# Patient Record
Sex: FEMALE | Race: WHITE | URBAN - METROPOLITAN AREA
[De-identification: names, ages, dates, MRNs, and addresses within clinical notes are randomized per-mention and may not be internally consistent; named-entity substitution may affect disease eponyms.]

---

## 2017-04-19 ENCOUNTER — OUTPATIENT (OUTPATIENT)
Dept: OUTPATIENT SERVICES | Facility: HOSPITAL | Age: 82
LOS: 1 days | Discharge: HOME | End: 2017-04-19

## 2017-06-22 ENCOUNTER — OUTPATIENT (OUTPATIENT)
Dept: OUTPATIENT SERVICES | Facility: HOSPITAL | Age: 82
LOS: 1 days | Discharge: HOME | End: 2017-06-22

## 2017-06-22 DIAGNOSIS — R42 DIZZINESS AND GIDDINESS: ICD-10-CM

## 2017-06-27 DIAGNOSIS — D64.9 ANEMIA, UNSPECIFIED: ICD-10-CM

## 2017-06-28 DIAGNOSIS — R07.81 PLEURODYNIA: ICD-10-CM

## 2017-06-28 DIAGNOSIS — M25.552 PAIN IN LEFT HIP: ICD-10-CM

## 2017-09-08 ENCOUNTER — OUTPATIENT (OUTPATIENT)
Dept: OUTPATIENT SERVICES | Facility: HOSPITAL | Age: 82
LOS: 1 days | Discharge: HOME | End: 2017-09-08

## 2017-09-08 DIAGNOSIS — I20.9 ANGINA PECTORIS, UNSPECIFIED: ICD-10-CM

## 2017-09-08 DIAGNOSIS — E78.00 PURE HYPERCHOLESTEROLEMIA, UNSPECIFIED: ICD-10-CM

## 2017-09-08 DIAGNOSIS — I10 ESSENTIAL (PRIMARY) HYPERTENSION: ICD-10-CM

## 2017-09-08 DIAGNOSIS — R42 DIZZINESS AND GIDDINESS: ICD-10-CM

## 2017-09-08 DIAGNOSIS — E03.9 HYPOTHYROIDISM, UNSPECIFIED: ICD-10-CM

## 2017-09-08 DIAGNOSIS — I48.0 PAROXYSMAL ATRIAL FIBRILLATION: ICD-10-CM

## 2018-01-18 ENCOUNTER — OUTPATIENT (OUTPATIENT)
Dept: OUTPATIENT SERVICES | Facility: HOSPITAL | Age: 83
LOS: 1 days | Discharge: HOME | End: 2018-01-18

## 2018-01-18 DIAGNOSIS — Z79.899 OTHER LONG TERM (CURRENT) DRUG THERAPY: ICD-10-CM

## 2018-01-18 DIAGNOSIS — E78.00 PURE HYPERCHOLESTEROLEMIA, UNSPECIFIED: ICD-10-CM

## 2018-01-18 DIAGNOSIS — I25.10 ATHEROSCLEROTIC HEART DISEASE OF NATIVE CORONARY ARTERY WITHOUT ANGINA PECTORIS: ICD-10-CM

## 2018-01-18 DIAGNOSIS — R42 DIZZINESS AND GIDDINESS: ICD-10-CM

## 2018-01-18 DIAGNOSIS — Z01.810 ENCOUNTER FOR PREPROCEDURAL CARDIOVASCULAR EXAMINATION: ICD-10-CM

## 2018-02-27 ENCOUNTER — OUTPATIENT (OUTPATIENT)
Dept: OUTPATIENT SERVICES | Facility: HOSPITAL | Age: 83
LOS: 1 days | Discharge: HOME | End: 2018-02-27

## 2018-02-27 DIAGNOSIS — I20.9 ANGINA PECTORIS, UNSPECIFIED: ICD-10-CM

## 2018-02-27 DIAGNOSIS — I48.0 PAROXYSMAL ATRIAL FIBRILLATION: ICD-10-CM

## 2018-02-27 DIAGNOSIS — E03.9 HYPOTHYROIDISM, UNSPECIFIED: ICD-10-CM

## 2018-02-27 DIAGNOSIS — I25.10 ATHEROSCLEROTIC HEART DISEASE OF NATIVE CORONARY ARTERY WITHOUT ANGINA PECTORIS: ICD-10-CM

## 2018-06-08 ENCOUNTER — INPATIENT (INPATIENT)
Facility: HOSPITAL | Age: 83
LOS: 3 days | Discharge: HOME | End: 2018-06-12
Attending: INTERNAL MEDICINE | Admitting: INTERNAL MEDICINE

## 2018-06-08 ENCOUNTER — TRANSCRIPTION ENCOUNTER (OUTPATIENT)
Age: 83
End: 2018-06-08

## 2018-06-08 VITALS
HEIGHT: 68 IN | OXYGEN SATURATION: 92 % | RESPIRATION RATE: 18 BRPM | SYSTOLIC BLOOD PRESSURE: 135 MMHG | WEIGHT: 175.05 LBS | HEART RATE: 80 BPM | DIASTOLIC BLOOD PRESSURE: 61 MMHG | TEMPERATURE: 101 F

## 2018-06-08 DIAGNOSIS — J18.1 LOBAR PNEUMONIA, UNSPECIFIED ORGANISM: ICD-10-CM

## 2018-06-08 DIAGNOSIS — I10 ESSENTIAL (PRIMARY) HYPERTENSION: ICD-10-CM

## 2018-06-08 DIAGNOSIS — E03.9 HYPOTHYROIDISM, UNSPECIFIED: ICD-10-CM

## 2018-06-08 DIAGNOSIS — Z90.710 ACQUIRED ABSENCE OF BOTH CERVIX AND UTERUS: Chronic | ICD-10-CM

## 2018-06-08 DIAGNOSIS — Z90.49 ACQUIRED ABSENCE OF OTHER SPECIFIED PARTS OF DIGESTIVE TRACT: Chronic | ICD-10-CM

## 2018-06-08 DIAGNOSIS — Z95.5 PRESENCE OF CORONARY ANGIOPLASTY IMPLANT AND GRAFT: Chronic | ICD-10-CM

## 2018-06-08 LAB
ALBUMIN SERPL ELPH-MCNC: 3.4 G/DL — LOW (ref 3.5–5.2)
ALP SERPL-CCNC: 89 U/L — SIGNIFICANT CHANGE UP (ref 30–115)
ALT FLD-CCNC: 16 U/L — SIGNIFICANT CHANGE UP (ref 0–41)
ANION GAP SERPL CALC-SCNC: 13 MMOL/L — SIGNIFICANT CHANGE UP (ref 7–14)
AST SERPL-CCNC: 19 U/L — SIGNIFICANT CHANGE UP (ref 0–41)
BILIRUB SERPL-MCNC: 1 MG/DL — SIGNIFICANT CHANGE UP (ref 0.2–1.2)
BUN SERPL-MCNC: 22 MG/DL — HIGH (ref 10–20)
CALCIUM SERPL-MCNC: 9.3 MG/DL — SIGNIFICANT CHANGE UP (ref 8.5–10.1)
CHLORIDE SERPL-SCNC: 100 MMOL/L — SIGNIFICANT CHANGE UP (ref 98–110)
CK MB CFR SERPL CALC: 1.1 NG/ML — SIGNIFICANT CHANGE UP (ref 0.6–6.3)
CK SERPL-CCNC: 64 U/L — SIGNIFICANT CHANGE UP (ref 0–225)
CO2 SERPL-SCNC: 24 MMOL/L — SIGNIFICANT CHANGE UP (ref 17–32)
CREAT SERPL-MCNC: 1.1 MG/DL — SIGNIFICANT CHANGE UP (ref 0.7–1.5)
GLUCOSE SERPL-MCNC: 106 MG/DL — HIGH (ref 70–99)
HCT VFR BLD CALC: 33.7 % — LOW (ref 37–47)
HGB BLD-MCNC: 11.3 G/DL — LOW (ref 12–16)
INR BLD: 1.31 RATIO — HIGH (ref 0.65–1.3)
LACTATE SERPL-SCNC: 1.5 MMOL/L — SIGNIFICANT CHANGE UP (ref 0.5–2.2)
MCHC RBC-ENTMCNC: 31.7 PG — HIGH (ref 27–31)
MCHC RBC-ENTMCNC: 33.5 G/DL — SIGNIFICANT CHANGE UP (ref 32–37)
MCV RBC AUTO: 94.7 FL — SIGNIFICANT CHANGE UP (ref 81–99)
NRBC # BLD: 0 /100 WBCS — SIGNIFICANT CHANGE UP (ref 0–0)
NT-PROBNP SERPL-SCNC: 1022 PG/ML — HIGH (ref 0–300)
PLATELET # BLD AUTO: 242 K/UL — SIGNIFICANT CHANGE UP (ref 130–400)
POTASSIUM SERPL-MCNC: 4.2 MMOL/L — SIGNIFICANT CHANGE UP (ref 3.5–5)
POTASSIUM SERPL-SCNC: 4.2 MMOL/L — SIGNIFICANT CHANGE UP (ref 3.5–5)
PROT SERPL-MCNC: 6.1 G/DL — SIGNIFICANT CHANGE UP (ref 6–8)
PROTHROM AB SERPL-ACNC: 14.2 SEC — HIGH (ref 9.95–12.87)
RBC # BLD: 3.56 M/UL — LOW (ref 4.2–5.4)
RBC # FLD: 12.2 % — SIGNIFICANT CHANGE UP (ref 11.5–14.5)
SODIUM SERPL-SCNC: 137 MMOL/L — SIGNIFICANT CHANGE UP (ref 135–146)
TROPONIN T SERPL-MCNC: <0.01 NG/ML — SIGNIFICANT CHANGE UP
WBC # BLD: 11.49 K/UL — HIGH (ref 4.8–10.8)
WBC # FLD AUTO: 11.49 K/UL — HIGH (ref 4.8–10.8)

## 2018-06-08 RX ORDER — METOPROLOL TARTRATE 50 MG
25 TABLET ORAL DAILY
Qty: 0 | Refills: 0 | Status: DISCONTINUED | OUTPATIENT
Start: 2018-06-08 | End: 2018-06-12

## 2018-06-08 RX ORDER — SODIUM CHLORIDE 9 MG/ML
1000 INJECTION INTRAMUSCULAR; INTRAVENOUS; SUBCUTANEOUS ONCE
Qty: 0 | Refills: 0 | Status: COMPLETED | OUTPATIENT
Start: 2018-06-08 | End: 2018-06-08

## 2018-06-08 RX ORDER — CEFTRIAXONE 500 MG/1
1 INJECTION, POWDER, FOR SOLUTION INTRAMUSCULAR; INTRAVENOUS ONCE
Qty: 0 | Refills: 0 | Status: COMPLETED | OUTPATIENT
Start: 2018-06-08 | End: 2018-06-08

## 2018-06-08 RX ORDER — LISINOPRIL 2.5 MG/1
2.5 TABLET ORAL DAILY
Qty: 0 | Refills: 0 | Status: DISCONTINUED | OUTPATIENT
Start: 2018-06-08 | End: 2018-06-08

## 2018-06-08 RX ORDER — LISINOPRIL 2.5 MG/1
20 TABLET ORAL DAILY
Qty: 0 | Refills: 0 | Status: DISCONTINUED | OUTPATIENT
Start: 2018-06-08 | End: 2018-06-12

## 2018-06-08 RX ORDER — PANTOPRAZOLE SODIUM 20 MG/1
40 TABLET, DELAYED RELEASE ORAL
Qty: 0 | Refills: 0 | Status: DISCONTINUED | OUTPATIENT
Start: 2018-06-08 | End: 2018-06-12

## 2018-06-08 RX ORDER — BRIMONIDINE TARTRATE 2 MG/MG
1 SOLUTION/ DROPS OPHTHALMIC THREE TIMES A DAY
Qty: 0 | Refills: 0 | Status: DISCONTINUED | OUTPATIENT
Start: 2018-06-08 | End: 2018-06-10

## 2018-06-08 RX ORDER — ISOSORBIDE DINITRATE 5 MG/1
20 TABLET ORAL ONCE
Qty: 0 | Refills: 0 | Status: COMPLETED | OUTPATIENT
Start: 2018-06-08 | End: 2018-06-08

## 2018-06-08 RX ORDER — ISOSORBIDE DINITRATE 5 MG/1
20 TABLET ORAL THREE TIMES A DAY
Qty: 0 | Refills: 0 | Status: DISCONTINUED | OUTPATIENT
Start: 2018-06-08 | End: 2018-06-12

## 2018-06-08 RX ORDER — SODIUM CHLORIDE 9 MG/ML
1000 INJECTION, SOLUTION INTRAVENOUS ONCE
Qty: 0 | Refills: 0 | Status: COMPLETED | OUTPATIENT
Start: 2018-06-08 | End: 2018-06-08

## 2018-06-08 RX ORDER — LISINOPRIL 2.5 MG/1
20 TABLET ORAL DAILY
Qty: 0 | Refills: 0 | Status: DISCONTINUED | OUTPATIENT
Start: 2018-06-08 | End: 2018-06-08

## 2018-06-08 RX ORDER — AZITHROMYCIN 500 MG/1
500 TABLET, FILM COATED ORAL ONCE
Qty: 0 | Refills: 0 | Status: COMPLETED | OUTPATIENT
Start: 2018-06-08 | End: 2018-06-08

## 2018-06-08 RX ORDER — CLOPIDOGREL BISULFATE 75 MG/1
75 TABLET, FILM COATED ORAL DAILY
Qty: 0 | Refills: 0 | Status: DISCONTINUED | OUTPATIENT
Start: 2018-06-08 | End: 2018-06-12

## 2018-06-08 RX ORDER — LEVOTHYROXINE SODIUM 125 MCG
25 TABLET ORAL DAILY
Qty: 0 | Refills: 0 | Status: DISCONTINUED | OUTPATIENT
Start: 2018-06-08 | End: 2018-06-12

## 2018-06-08 RX ORDER — METOPROLOL TARTRATE 50 MG
25 TABLET ORAL ONCE
Qty: 0 | Refills: 0 | Status: COMPLETED | OUTPATIENT
Start: 2018-06-08 | End: 2018-06-08

## 2018-06-08 RX ORDER — ASPIRIN/CALCIUM CARB/MAGNESIUM 324 MG
81 TABLET ORAL DAILY
Qty: 0 | Refills: 0 | Status: DISCONTINUED | OUTPATIENT
Start: 2018-06-08 | End: 2018-06-12

## 2018-06-08 RX ORDER — LISINOPRIL 2.5 MG/1
20 TABLET ORAL ONCE
Qty: 0 | Refills: 0 | Status: COMPLETED | OUTPATIENT
Start: 2018-06-08 | End: 2018-06-08

## 2018-06-08 RX ORDER — LATANOPROST 0.05 MG/ML
1 SOLUTION/ DROPS OPHTHALMIC; TOPICAL AT BEDTIME
Qty: 0 | Refills: 0 | Status: DISCONTINUED | OUTPATIENT
Start: 2018-06-08 | End: 2018-06-12

## 2018-06-08 RX ORDER — HYDRALAZINE HCL 50 MG
25 TABLET ORAL EVERY 8 HOURS
Qty: 0 | Refills: 0 | Status: DISCONTINUED | OUTPATIENT
Start: 2018-06-08 | End: 2018-06-09

## 2018-06-08 RX ADMIN — SODIUM CHLORIDE 1000 MILLILITER(S): 9 INJECTION, SOLUTION INTRAVENOUS at 19:10

## 2018-06-08 RX ADMIN — CEFTRIAXONE 100 GRAM(S): 500 INJECTION, POWDER, FOR SOLUTION INTRAMUSCULAR; INTRAVENOUS at 20:06

## 2018-06-08 RX ADMIN — SODIUM CHLORIDE 1000 MILLILITER(S): 9 INJECTION INTRAMUSCULAR; INTRAVENOUS; SUBCUTANEOUS at 20:33

## 2018-06-08 RX ADMIN — AZITHROMYCIN 255 MILLIGRAM(S): 500 TABLET, FILM COATED ORAL at 20:32

## 2018-06-08 NOTE — ED PROVIDER NOTE - CONSTITUTIONAL, MLM
normal... Uncomfortable appearing, thin elderly woman, appears younger than stated age, awake, alert, oriented to person, place, time/situation and in no apparent distress.

## 2018-06-08 NOTE — ED ADULT NURSE NOTE - PSH
History of coronary artery stent placement H/O abdominal hysterectomy    History of cholecystectomy    History of coronary artery stent placement

## 2018-06-08 NOTE — H&P ADULT - NSHPLABSRESULTS_GEN_ALL_CORE
11.3   11.49 )-----------( 242      ( 08 Jun 2018 18:42 )             33.7     06-08    137  |  100  |  22<H>  ----------------------------<  106<H>  4.2   |  24  |  1.1    Ca    9.3      08 Jun 2018 18:42    TPro  6.1  /  Alb  3.4<L>  /  TBili  1.0  /  DBili  x   /  AST  19  /  ALT  16  /  AlkPhos  89  06-08            PT/INR - ( 08 Jun 2018 18:42 )   PT: 14.20 sec;   INR: 1.31 ratio           Lactate Trend  06-08 @ 18:42 Lactate:1.5     CARDIAC MARKERS ( 08 Jun 2018 18:42 )  x     / <0.01 ng/mL / 64 U/L / x     / 1.1 ng/mL      CAPILLARY BLOOD GLUCOSE

## 2018-06-08 NOTE — H&P ADULT - ASSESSMENT
Patient is a 89y old  Female who presents with a chief complaint of cough/sob (08 Jun 2018 22:11)---> 24 hrs                                                                                                                                                                                                                                                                                       HEALTH ISSUES - PROBLEM Dx:pneumonia                                                copd                                                 hcvd                                                hypo thyrioidism

## 2018-06-08 NOTE — ED PROVIDER NOTE - OBJECTIVE STATEMENT
89yF with PMH COPD, CVA, HTN, heart stents X 3 on asa/plavix p/w weakness, cough, and fever/chills X 3 days. +wheezing, SOB. no CP. decreased appetite. no orthopnea, leg swelling. no diaphoresis, nausea, back pain.

## 2018-06-08 NOTE — H&P ADULT - NSHPREVIEWOFSYSTEMS_GEN_ALL_CORE
REVIEW OF SYSTEMS:      CONSTITUTIONAL:   fever  neg      EYES/ENT:     ===============  nil  significant  NECK:            supple  RESPIRATORY:       copd    +  CARDIOVASCULAR:   ---------------cad  +  htn +    GASTROINTESTINAL:    ------------------pud    dm==   GENITOURINARY:        NEUROLOGICAL:        SKIN:

## 2018-06-08 NOTE — ED PROVIDER NOTE - PSH
H/O abdominal hysterectomy    History of cholecystectomy    History of coronary artery stent placement

## 2018-06-09 RX ORDER — IPRATROPIUM/ALBUTEROL SULFATE 18-103MCG
3 AEROSOL WITH ADAPTER (GRAM) INHALATION EVERY 6 HOURS
Qty: 0 | Refills: 0 | Status: DISCONTINUED | OUTPATIENT
Start: 2018-06-09 | End: 2018-06-09

## 2018-06-09 RX ORDER — HEPARIN SODIUM 5000 [USP'U]/ML
5000 INJECTION INTRAVENOUS; SUBCUTANEOUS EVERY 12 HOURS
Qty: 0 | Refills: 0 | Status: DISCONTINUED | OUTPATIENT
Start: 2018-06-09 | End: 2018-06-12

## 2018-06-09 RX ORDER — AZITHROMYCIN 500 MG/1
TABLET, FILM COATED ORAL
Qty: 0 | Refills: 0 | Status: DISCONTINUED | OUTPATIENT
Start: 2018-06-10 | End: 2018-06-12

## 2018-06-09 RX ORDER — TIOTROPIUM BROMIDE 18 UG/1
1 CAPSULE ORAL; RESPIRATORY (INHALATION) DAILY
Qty: 0 | Refills: 0 | Status: DISCONTINUED | OUTPATIENT
Start: 2018-06-09 | End: 2018-06-09

## 2018-06-09 RX ORDER — HYDRALAZINE HCL 50 MG
25 TABLET ORAL EVERY 12 HOURS
Qty: 0 | Refills: 0 | Status: DISCONTINUED | OUTPATIENT
Start: 2018-06-09 | End: 2018-06-12

## 2018-06-09 RX ORDER — CEFTRIAXONE 500 MG/1
INJECTION, POWDER, FOR SOLUTION INTRAMUSCULAR; INTRAVENOUS
Qty: 0 | Refills: 0 | Status: DISCONTINUED | OUTPATIENT
Start: 2018-06-10 | End: 2018-06-12

## 2018-06-09 RX ORDER — ALBUTEROL 90 UG/1
1 AEROSOL, METERED ORAL EVERY 4 HOURS
Qty: 0 | Refills: 0 | Status: DISCONTINUED | OUTPATIENT
Start: 2018-06-09 | End: 2018-06-10

## 2018-06-09 RX ORDER — ACETAMINOPHEN 500 MG
650 TABLET ORAL EVERY 6 HOURS
Qty: 0 | Refills: 0 | Status: DISCONTINUED | OUTPATIENT
Start: 2018-06-09 | End: 2018-06-12

## 2018-06-09 RX ADMIN — Medication 650 MILLIGRAM(S): at 14:27

## 2018-06-09 RX ADMIN — BRIMONIDINE TARTRATE 1 DROP(S): 2 SOLUTION/ DROPS OPHTHALMIC at 14:04

## 2018-06-09 RX ADMIN — Medication 81 MILLIGRAM(S): at 12:56

## 2018-06-09 RX ADMIN — Medication 650 MILLIGRAM(S): at 14:06

## 2018-06-09 RX ADMIN — Medication 3 MILLILITER(S): at 01:33

## 2018-06-09 RX ADMIN — CLOPIDOGREL BISULFATE 75 MILLIGRAM(S): 75 TABLET, FILM COATED ORAL at 12:56

## 2018-06-09 RX ADMIN — Medication 25 MILLIGRAM(S): at 00:27

## 2018-06-09 RX ADMIN — Medication 200 MILLIGRAM(S): at 17:34

## 2018-06-09 RX ADMIN — Medication 3 MILLILITER(S): at 07:14

## 2018-06-09 RX ADMIN — HEPARIN SODIUM 5000 UNIT(S): 5000 INJECTION INTRAVENOUS; SUBCUTANEOUS at 17:34

## 2018-06-09 RX ADMIN — LISINOPRIL 20 MILLIGRAM(S): 2.5 TABLET ORAL at 00:26

## 2018-06-09 RX ADMIN — ISOSORBIDE DINITRATE 20 MILLIGRAM(S): 5 TABLET ORAL at 00:26

## 2018-06-09 RX ADMIN — Medication 3 MILLILITER(S): at 13:13

## 2018-06-09 RX ADMIN — ISOSORBIDE DINITRATE 20 MILLIGRAM(S): 5 TABLET ORAL at 14:04

## 2018-06-09 RX ADMIN — ISOSORBIDE DINITRATE 20 MILLIGRAM(S): 5 TABLET ORAL at 21:14

## 2018-06-09 RX ADMIN — Medication 25 MILLIGRAM(S): at 21:14

## 2018-06-09 RX ADMIN — Medication 25 MILLIGRAM(S): at 06:38

## 2018-06-09 RX ADMIN — LATANOPROST 1 DROP(S): 0.05 SOLUTION/ DROPS OPHTHALMIC; TOPICAL at 21:14

## 2018-06-09 RX ADMIN — Medication 25 MICROGRAM(S): at 06:37

## 2018-06-09 RX ADMIN — HEPARIN SODIUM 5000 UNIT(S): 5000 INJECTION INTRAVENOUS; SUBCUTANEOUS at 06:36

## 2018-06-09 RX ADMIN — Medication 25 MILLIGRAM(S): at 17:34

## 2018-06-09 RX ADMIN — Medication 650 MILLIGRAM(S): at 00:52

## 2018-06-09 RX ADMIN — PANTOPRAZOLE SODIUM 40 MILLIGRAM(S): 20 TABLET, DELAYED RELEASE ORAL at 06:38

## 2018-06-09 RX ADMIN — Medication 200 MILLIGRAM(S): at 12:56

## 2018-06-09 NOTE — PROGRESS NOTE ADULT - SUBJECTIVE AND OBJECTIVE BOX
EDGARCHIRAG  89y  Female    Patient is a 89y old  Female who presents with a chief complaint of cough & sob (08 Jun 2018 22:11)      INTERVAL HPI/OVERNIGHT EVENTS:  No overnight events. Patient still complaining of cough and Pleuritic chest pain.      REVIEW OF SYSTEMS:  CONSTITUTIONAL: No fever, weight loss, or fatigue  EYES: No eye pain, visual disturbances, or discharge  ENMT:  No difficulty hearing, tinnitus, vertigo; No sinus or throat pain  NECK: No pain or stiffness  BREASTS: No pain, masses, or nipple discharge  RESPIRATORY: + cough, No wheezing or hemoptysis; + shortness of breath  CARDIOVASCULAR: + chest pain, No palpitations, dizziness, or leg swelling  GASTROINTESTINAL: No abdominal or epigastric pain. No nausea, vomiting, or hematemesis; No diarrhea or constipation. No melena or hematochezia.  GENITOURINARY: No dysuria, frequency, hematuria, or incontinence  NEUROLOGICAL: No headaches, memory loss, loss of strength, numbness, or tremors  SKIN: No itching, burning, rashes, or lesions   LYMPH NODES: No enlarged glands  ENDOCRINE: No heat or cold intolerance; No hair loss  MUSCULOSKELETAL: No joint pain or swelling; No muscle, back, or extremity pain  PSYCHIATRIC: No depression, anxiety, mood swings, or difficulty sleeping  HEME/LYMPH: No easy bruising, or bleeding gums  ALLERGY AND IMMUNOLOGIC: No hives or eczema      VITALS:  T(F): 97.5 (06-09-18 @ 21:00), Max: 97.9 (06-09-18 @ 05:59)  HR: 67 (06-09-18 @ 21:00) (65 - 80)  BP: 111/56 (06-09-18 @ 21:00) (111/56 - 199/83)  RR: 18 (06-09-18 @ 21:00) (18 - 19)  SpO2: 98% (06-09-18 @ 02:05) (90% - 98%)      PHYSICAL EXAM:  GENERAL: NAD, well-groomed, well-developed  HEAD:  Atraumatic, Normocephalic  EYES: conjunctiva and sclera clear  ENMT: No tonsillar erythema, exudates, or enlargement; Moist mucous membranes, Good dentition, No lesions  NECK: Supple, Normal thyroid  NERVOUS SYSTEM:  Alert & Oriented X3, Good concentration; Motor Strength 5/5 B/L upper and lower extremities; DTRs 2+ intact and symmetric  CHEST/LUNG: Decreased AE at the bases bilaterally; + rales, No rhonchi, wheezing, or rubs  HEART: Regular rate and rhythm; No murmurs, rubs, or gallops  ABDOMEN: Soft, Nontender, Nondistended; Bowel sounds present  EXTREMITIES:  2+ Peripheral Pulses, No clubbing, cyanosis, or edema  LYMPH: No lymphadenopathy noted  SKIN: No rashes or lesions    Consultant(s) Notes Reviewed:  [x ] YES  [ ] NO  Care Discussed with Consultants/Other Providers [ x] YES  [ ] NO    LABS:                        11.3   11.49 )-----------( 242      ( 08 Jun 2018 18:42 )             33.7     06-08    137  |  100  |  22<H>  ----------------------------<  106<H>  4.2   |  24  |  1.1    Ca    9.3      08 Jun 2018 18:42    TPro  6.1  /  Alb  3.4<L>  /  TBili  1.0  /  DBili  x   /  AST  19  /  ALT  16  /  AlkPhos  89  06-08    MICROBIOLOGY: None      RADIOLOGY & ADDITIONAL TESTS:  X-ray Chest 2 Views PA/Lat (06.08.18 @ 18:56)   No definite radiographic evidence of pneumonia.      Imaging Personally Reviewed:  [x] YES  [ ] NO    MEDICATIONS  (STANDING):  ALBUTerol    90 MICROgram(s) HFA Inhaler 1 Puff(s) Inhalation every 4 hours  ALBUTerol/ipratropium for Nebulization 3 milliLiter(s) Nebulizer every 6 hours  aspirin  chewable 81 milliGRAM(s) Oral daily  brimonidine 0.2% Ophthalmic Solution 1 Drop(s) Both EYES three times a day  clopidogrel Tablet 75 milliGRAM(s) Oral daily  guaiFENesin   Syrup  (Sugar-Free) 200 milliGRAM(s) Oral every 6 hours  heparin  Injectable 5000 Unit(s) SubCutaneous every 12 hours  hydrALAZINE 25 milliGRAM(s) Oral every 12 hours  isosorbide   dinitrate Tablet (ISORDIL) 20 milliGRAM(s) Oral three times a day  latanoprost 0.005% Ophthalmic Solution 1 Drop(s) Both EYES at bedtime  levothyroxine 25 MICROGram(s) Oral daily  lisinopril 20 milliGRAM(s) Oral daily  metoprolol succinate ER 25 milliGRAM(s) Oral daily  pantoprazole    Tablet 40 milliGRAM(s) Oral before breakfast  tiotropium 18 MICROgram(s) Capsule 1 Capsule(s) Inhalation daily    MEDICATIONS  (PRN):  acetaminophen   Tablet. 650 milliGRAM(s) Oral every 6 hours PRN Moderate Pain (4 - 6)      HEALTH ISSUES - PROBLEM Dx:  Hypothyroid  Hypertension  Left lower lobe pneumonia  Hypertension  Hypothyroid  High blood cholesterol  H/O abdominal hysterectomy  History of cholecystectomy  History of coronary artery stent placement

## 2018-06-09 NOTE — PROGRESS NOTE ADULT - ASSESSMENT
89 year old Female with h/o COPD not on home oxygen, CVA, HTN, CAD s/p stents x 3 on ASA/Plavix presents with weakness, Pleuritic chest pain, cough, and fever/chills for 3 days. Patient mentions dyspnea and wheezing, decreased appetite but no orthopnea, leg swelling or hemoptysis. Work up in the ED was unremarkable: mild Leukocytosis and No pneumonia on Chest X-ray. Patient was admitted for further evaluation.       Assessment and Plan:    1. ? Left lower lobe pneumonia:  Chest x-ray shows increased Left nodular densities. Continue IV Antibiotics.  Repeat Chest x-ray in the AM. Pulm evaluation.        2.  Pleuritic chest pain:   ? due to Pneumonia. No definite consolidation seen on Imaging but nodular densities.  Continue Antibiotics Will hold off any CT imaging until seen by Pulm.  Follow up D-dimer. Cardiac enzyme negative x 1.       3. CAD s/p Stents, Hypertension:  Continue DAPT, Imdur, Metoprolol.  Blood pressures controlled on Lisinopril and Hydralazine.  ? Statin      4. Hypothyroid:  On Synthroid.         DVT Prophylaxis: Heparin  Disposition: when stable. 89 year old Female with h/o COPD not on home oxygen, CVA, HTN, CAD s/p stents x 3 on ASA/Plavix presents with weakness, Pleuritic chest pain, cough, and fever/chills for 3 days. Patient mentions dyspnea and wheezing, decreased appetite but no orthopnea, leg swelling or hemoptysis. Work up in the ED was unremarkable: mild Leukocytosis and No pneumonia on Chest X-ray. Patient was admitted for further evaluation.       Assessment and Plan:    1. ? Left lower lobe pneumonia:  Chest x-ray shows increased Left nodular densities. Continue IV Antibiotics.  Repeat Chest x-ray in the AM. Pulm evaluation.        2.  Pleuritic chest pain:   ? due to Pneumonia. No definite consolidation seen on Imaging but nodular densities.  Continue Antibiotics Will hold off any CT imaging until seen by Pulm.  Follow up D-dimer. Cardiac enzyme negative x 1.       3. h/o CVA, CAD s/p Stents, Hypertension:  Continue DAPT, Imdur, Metoprolol.  Blood pressures controlled on Lisinopril and Hydralazine.  ? Statin        4. Hypothyroid:  On Synthroid.         5. COPD: with acute exacerbation  Continue steroids, ABx and supplemental Oxygen  Duoneb.        DVT Prophylaxis: Heparin  Disposition: when stable.

## 2018-06-10 LAB
ANION GAP SERPL CALC-SCNC: 13 MMOL/L — SIGNIFICANT CHANGE UP (ref 7–14)
BASOPHILS # BLD AUTO: 0.05 K/UL — SIGNIFICANT CHANGE UP (ref 0–0.2)
BASOPHILS NFR BLD AUTO: 0.5 % — SIGNIFICANT CHANGE UP (ref 0–1)
BUN SERPL-MCNC: 14 MG/DL — SIGNIFICANT CHANGE UP (ref 10–20)
CALCIUM SERPL-MCNC: 8.7 MG/DL — SIGNIFICANT CHANGE UP (ref 8.5–10.1)
CHLORIDE SERPL-SCNC: 104 MMOL/L — SIGNIFICANT CHANGE UP (ref 98–110)
CK SERPL-CCNC: 76 U/L — SIGNIFICANT CHANGE UP (ref 0–225)
CO2 SERPL-SCNC: 26 MMOL/L — SIGNIFICANT CHANGE UP (ref 17–32)
CREAT SERPL-MCNC: 0.7 MG/DL — SIGNIFICANT CHANGE UP (ref 0.7–1.5)
CRP SERPL-MCNC: 15.1 MG/DL — HIGH (ref 0–0.4)
D DIMER BLD IA.RAPID-MCNC: 557 NG/ML DDU — HIGH (ref 0–230)
EOSINOPHIL # BLD AUTO: 0.28 K/UL — SIGNIFICANT CHANGE UP (ref 0–0.7)
EOSINOPHIL NFR BLD AUTO: 2.8 % — SIGNIFICANT CHANGE UP (ref 0–8)
ERYTHROCYTE [SEDIMENTATION RATE] IN BLOOD: 65 MM/HR — HIGH (ref 0–20)
GLUCOSE SERPL-MCNC: 92 MG/DL — SIGNIFICANT CHANGE UP (ref 70–99)
HCT VFR BLD CALC: 31 % — LOW (ref 37–47)
HGB BLD-MCNC: 10.3 G/DL — LOW (ref 12–16)
IMM GRANULOCYTES NFR BLD AUTO: 0.7 % — HIGH (ref 0.1–0.3)
LYMPHOCYTES # BLD AUTO: 1.27 K/UL — SIGNIFICANT CHANGE UP (ref 1.2–3.4)
LYMPHOCYTES # BLD AUTO: 12.5 % — LOW (ref 20.5–51.1)
MCHC RBC-ENTMCNC: 31.7 PG — HIGH (ref 27–31)
MCHC RBC-ENTMCNC: 33.2 G/DL — SIGNIFICANT CHANGE UP (ref 32–37)
MCV RBC AUTO: 95.4 FL — SIGNIFICANT CHANGE UP (ref 81–99)
MONOCYTES # BLD AUTO: 1.34 K/UL — HIGH (ref 0.1–0.6)
MONOCYTES NFR BLD AUTO: 13.2 % — HIGH (ref 1.7–9.3)
NEUTROPHILS # BLD AUTO: 7.11 K/UL — HIGH (ref 1.4–6.5)
NEUTROPHILS NFR BLD AUTO: 70.3 % — SIGNIFICANT CHANGE UP (ref 42.2–75.2)
NRBC # BLD: 0 /100 WBCS — SIGNIFICANT CHANGE UP (ref 0–0)
PLATELET # BLD AUTO: 257 K/UL — SIGNIFICANT CHANGE UP (ref 130–400)
POTASSIUM SERPL-MCNC: 4.1 MMOL/L — SIGNIFICANT CHANGE UP (ref 3.5–5)
POTASSIUM SERPL-SCNC: 4.1 MMOL/L — SIGNIFICANT CHANGE UP (ref 3.5–5)
RBC # BLD: 3.25 M/UL — LOW (ref 4.2–5.4)
RBC # FLD: 12.3 % — SIGNIFICANT CHANGE UP (ref 11.5–14.5)
SODIUM SERPL-SCNC: 143 MMOL/L — SIGNIFICANT CHANGE UP (ref 135–146)
TROPONIN T SERPL-MCNC: <0.01 NG/ML — SIGNIFICANT CHANGE UP
WBC # BLD: 10.12 K/UL — SIGNIFICANT CHANGE UP (ref 4.8–10.8)
WBC # FLD AUTO: 10.12 K/UL — SIGNIFICANT CHANGE UP (ref 4.8–10.8)

## 2018-06-10 RX ORDER — ALBUTEROL 90 UG/1
2.5 AEROSOL, METERED ORAL EVERY 6 HOURS
Qty: 0 | Refills: 0 | Status: DISCONTINUED | OUTPATIENT
Start: 2018-06-10 | End: 2018-06-10

## 2018-06-10 RX ORDER — AZITHROMYCIN 500 MG/1
500 TABLET, FILM COATED ORAL ONCE
Qty: 0 | Refills: 0 | Status: COMPLETED | OUTPATIENT
Start: 2018-06-10 | End: 2018-06-10

## 2018-06-10 RX ORDER — CEFTRIAXONE 500 MG/1
1 INJECTION, POWDER, FOR SOLUTION INTRAMUSCULAR; INTRAVENOUS ONCE
Qty: 0 | Refills: 0 | Status: COMPLETED | OUTPATIENT
Start: 2018-06-10 | End: 2018-06-10

## 2018-06-10 RX ORDER — IPRATROPIUM/ALBUTEROL SULFATE 18-103MCG
3 AEROSOL WITH ADAPTER (GRAM) INHALATION EVERY 6 HOURS
Qty: 0 | Refills: 0 | Status: DISCONTINUED | OUTPATIENT
Start: 2018-06-10 | End: 2018-06-12

## 2018-06-10 RX ORDER — AZITHROMYCIN 500 MG/1
500 TABLET, FILM COATED ORAL EVERY 24 HOURS
Qty: 0 | Refills: 0 | Status: DISCONTINUED | OUTPATIENT
Start: 2018-06-11 | End: 2018-06-12

## 2018-06-10 RX ORDER — CEFTRIAXONE 500 MG/1
1 INJECTION, POWDER, FOR SOLUTION INTRAMUSCULAR; INTRAVENOUS EVERY 24 HOURS
Qty: 0 | Refills: 0 | Status: DISCONTINUED | OUTPATIENT
Start: 2018-06-11 | End: 2018-06-12

## 2018-06-10 RX ADMIN — CEFTRIAXONE 100 GRAM(S): 500 INJECTION, POWDER, FOR SOLUTION INTRAMUSCULAR; INTRAVENOUS at 00:41

## 2018-06-10 RX ADMIN — ISOSORBIDE DINITRATE 20 MILLIGRAM(S): 5 TABLET ORAL at 05:39

## 2018-06-10 RX ADMIN — Medication 200 MILLIGRAM(S): at 05:38

## 2018-06-10 RX ADMIN — Medication 200 MILLIGRAM(S): at 00:01

## 2018-06-10 RX ADMIN — ISOSORBIDE DINITRATE 20 MILLIGRAM(S): 5 TABLET ORAL at 21:29

## 2018-06-10 RX ADMIN — Medication 40 MILLIGRAM(S): at 13:34

## 2018-06-10 RX ADMIN — Medication 25 MILLIGRAM(S): at 05:39

## 2018-06-10 RX ADMIN — Medication 3 MILLILITER(S): at 20:31

## 2018-06-10 RX ADMIN — BRIMONIDINE TARTRATE 1 DROP(S): 2 SOLUTION/ DROPS OPHTHALMIC at 13:35

## 2018-06-10 RX ADMIN — Medication 25 MICROGRAM(S): at 05:39

## 2018-06-10 RX ADMIN — Medication 200 MILLIGRAM(S): at 17:25

## 2018-06-10 RX ADMIN — Medication 40 MILLIGRAM(S): at 21:29

## 2018-06-10 RX ADMIN — Medication 200 MILLIGRAM(S): at 12:38

## 2018-06-10 RX ADMIN — Medication 200 MILLIGRAM(S): at 23:24

## 2018-06-10 RX ADMIN — Medication 81 MILLIGRAM(S): at 12:38

## 2018-06-10 RX ADMIN — CLOPIDOGREL BISULFATE 75 MILLIGRAM(S): 75 TABLET, FILM COATED ORAL at 12:38

## 2018-06-10 RX ADMIN — Medication 25 MILLIGRAM(S): at 05:38

## 2018-06-10 RX ADMIN — ISOSORBIDE DINITRATE 20 MILLIGRAM(S): 5 TABLET ORAL at 13:35

## 2018-06-10 RX ADMIN — HEPARIN SODIUM 5000 UNIT(S): 5000 INJECTION INTRAVENOUS; SUBCUTANEOUS at 05:38

## 2018-06-10 RX ADMIN — HEPARIN SODIUM 5000 UNIT(S): 5000 INJECTION INTRAVENOUS; SUBCUTANEOUS at 17:25

## 2018-06-10 RX ADMIN — Medication 25 MILLIGRAM(S): at 17:26

## 2018-06-10 RX ADMIN — CEFTRIAXONE 100 GRAM(S): 500 INJECTION, POWDER, FOR SOLUTION INTRAMUSCULAR; INTRAVENOUS at 23:24

## 2018-06-10 RX ADMIN — Medication 3 MILLILITER(S): at 13:50

## 2018-06-10 RX ADMIN — PANTOPRAZOLE SODIUM 40 MILLIGRAM(S): 20 TABLET, DELAYED RELEASE ORAL at 06:12

## 2018-06-10 RX ADMIN — LATANOPROST 1 DROP(S): 0.05 SOLUTION/ DROPS OPHTHALMIC; TOPICAL at 21:29

## 2018-06-10 RX ADMIN — LISINOPRIL 20 MILLIGRAM(S): 2.5 TABLET ORAL at 05:39

## 2018-06-10 RX ADMIN — AZITHROMYCIN 255 MILLIGRAM(S): 500 TABLET, FILM COATED ORAL at 23:24

## 2018-06-10 RX ADMIN — AZITHROMYCIN 255 MILLIGRAM(S): 500 TABLET, FILM COATED ORAL at 02:27

## 2018-06-10 NOTE — PROGRESS NOTE ADULT - SUBJECTIVE AND OBJECTIVE BOX
EDGARTONY SAVAGEBISHOP  89y  Female    Patient is a 89y old  Female who presents with a chief complaint of cough & sob (08 Jun 2018 22:11)      INTERVAL HPI/OVERNIGHT EVENTS:  No overnight events. Patient complaining of persistent cough and dyspnea overnight with wheezing, Chest pain still persistent.       REVIEW OF SYSTEMS:  CONSTITUTIONAL: No fever, weight loss, or fatigue  EYES: No eye pain, visual disturbances, or discharge  ENMT:  No difficulty hearing, tinnitus, vertigo; No sinus or throat pain  NECK: No pain or stiffness  BREASTS: No pain, masses, or nipple discharge  RESPIRATORY: + cough, + wheezing or hemoptysis; + shortness of breath  CARDIOVASCULAR: + chest pain, No palpitations, dizziness, or leg swelling  GASTROINTESTINAL: No abdominal or epigastric pain. No nausea, vomiting, or hematemesis; No diarrhea or constipation. No melena or hematochezia.  GENITOURINARY: No dysuria, frequency, hematuria, or incontinence  NEUROLOGICAL: No headaches, memory loss, loss of strength, numbness, or tremors  SKIN: No itching, burning, rashes, or lesions   LYMPH NODES: No enlarged glands  ENDOCRINE: No heat or cold intolerance; No hair loss  MUSCULOSKELETAL: No joint pain or swelling; No muscle, back, or extremity pain  PSYCHIATRIC: No depression, anxiety, mood swings, or difficulty sleeping  HEME/LYMPH: No easy bruising, or bleeding gums  ALLERGY AND IMMUNOLOGIC: No hives or eczema      VITALS:  T(C): 36.7 (10 Asif 2018 13:29), Max: 36.8 (10 Asif 2018 05:02)  T(F): 98.1 (10 Asif 2018 13:29), Max: 98.2 (10 Asif 2018 05:02)  HR: 72 (10 Asif 2018 13:29) (67 - 86)  BP: 151/65 (10 Asif 2018 13:29) (111/56 - 169/70)  BP(mean): --  RR: 16 (10 Asif 2018 13:29) (16 - 18)  SpO2: --      PHYSICAL EXAM:  GENERAL: NAD, well-groomed, well-developed  HEAD:  Atraumatic, Normocephalic  EYES: conjunctiva and sclera clear  ENMT: Moist mucous membranes  NECK: Supple, Normal thyroid  NERVOUS SYSTEM:  Alert & Oriented X3, Good concentration; Motor Strength 5/5 B/L upper and lower extremities  CHEST/LUNG: Decreased AE at the bases bilaterally; + rales, No rhonchi, wheezing, or rubs  HEART: Regular rate and rhythm; No murmurs, rubs, or gallops  ABDOMEN: Soft, Nontender, Nondistended; Bowel sounds present  EXTREMITIES:  2+ Peripheral Pulses, No clubbing, cyanosis, or edema  LYMPH: No lymphadenopathy noted  SKIN: No rashes or lesions    Consultant(s) Notes Reviewed:  [x ] YES  [ ] NO  Care Discussed with Consultants/Other Providers [ x] YES  [ ] NO    LABS:                        10.3   10.12 )-----------( 257      ( 10 Asif 2018 07:50 )             31.0     06-10    143  |  104  |  14  ----------------------------<  92  4.1   |  26  |  0.7    Ca    8.7      10 Asif 2018 07:50    TPro  6.1  /  Alb  3.4<L>  /  TBili  1.0  /  DBili  x   /  AST  19  /  ALT  16  /  AlkPhos  89  06-08    CARDIAC MARKERS ( 10 Asif 2018 07:50 )  x     / <0.01 ng/mL / 76 U/L / x     / x      CARDIAC MARKERS ( 08 Jun 2018 18:42 )  x     / <0.01 ng/mL / 64 U/L / x     / 1.1 ng/mL      D-Dimer Assay, Quantitative: 557: Notified Dr. Walker of high DD ng/mL DDU (06.10.18 @ 07:50)        MICROBIOLOGY:   Culture - Blood (06.08.18 @ 18:42)    Specimen Source: .Blood    Culture Results: No growth to date.        RADIOLOGY & ADDITIONAL TESTS:  X-ray Chest 2 Views PA/Lat (06.08.18 @ 18:56)   No definite radiographic evidence of pneumonia.    X-ray Chest 1 View- PORTABLE-Urgent (06.10.18 @ 11:19)  Support devices: None.    Cardiac/mediastinum/hilum: Unchanged    Lung parenchyma/Pleura: Interval development of small left pleural   effusion with adjacent atelectasis. There is also atelectasis in left   upper lobe. No evidence of pneumothorax.    Skeleton/soft tissues: Unchanged      Imaging Personally Reviewed:  [x] YES  [ ] NO    MEDICATIONS  (STANDING):  ALBUTerol/ipratropium for Nebulization 3 milliLiter(s) Nebulizer every 6 hours  aspirin  chewable 81 milliGRAM(s) Oral daily  azithromycin  IVPB      brimonidine 0.2% Ophthalmic Solution 1 Drop(s) Both EYES three times a day  cefTRIAXone   IVPB      clopidogrel Tablet 75 milliGRAM(s) Oral daily  guaiFENesin   Syrup  (Sugar-Free) 200 milliGRAM(s) Oral every 6 hours  heparin  Injectable 5000 Unit(s) SubCutaneous every 12 hours  hydrALAZINE 25 milliGRAM(s) Oral every 12 hours  isosorbide   dinitrate Tablet (ISORDIL) 20 milliGRAM(s) Oral three times a day  latanoprost 0.005% Ophthalmic Solution 1 Drop(s) Both EYES at bedtime  levothyroxine 25 MICROGram(s) Oral daily  lisinopril 20 milliGRAM(s) Oral daily  methylPREDNISolone sodium succinate Injectable 40 milliGRAM(s) IV Push every 8 hours  metoprolol succinate ER 25 milliGRAM(s) Oral daily  pantoprazole    Tablet 40 milliGRAM(s) Oral before breakfast    MEDICATIONS  (PRN):  acetaminophen   Tablet. 650 milliGRAM(s) Oral every 6 hours PRN Moderate Pain (4 - 6)        HEALTH ISSUES - PROBLEM Dx:  Hypothyroid  Hypertension  Left lower lobe pneumonia  Hypertension  Hypothyroid  High blood cholesterol  H/O abdominal hysterectomy  History of cholecystectomy  History of coronary artery stent placement

## 2018-06-10 NOTE — PROGRESS NOTE ADULT - ASSESSMENT
89 year old Female with h/o COPD not on home oxygen, CVA, HTN, CAD s/p stents x 3 on ASA/Plavix presents with weakness, Pleuritic chest pain, cough, and fever/chills for 3 days. Patient mentions dyspnea and wheezing, decreased appetite but no orthopnea, leg swelling or hemoptysis. Work up in the ED was unremarkable: mild Leukocytosis and No pneumonia on Chest X-ray. Patient was admitted for further evaluation.       Assessment and Plan:    1. ? Left lower lobe pneumonia:  Chest x-ray shows increased Left nodular densities. Continue IV Antibiotics.  Repeat Chest x-ray unremarkable. Pulm evaluation pending.      2.  Pleuritic chest pain:   ? due to Pneumonia. No definite consolidation seen on Imaging but nodular densities.  Continue Antibiotics. Elevated D-dimer. Will order CT Chest for further evaluation.  Cardiac enzyme negative x 2.       3. h/o CVA, CAD s/p Stents, Hypertension:  Continue DAPT, Imdur, Metoprolol.  Blood pressures controlled on Lisinopril and Hydralazine.  ? Statin        4. Hypothyroid:  On Synthroid.         5. COPD: with acute exacerbation  Continue steroids, ABx and supplemental Oxygen  Duoneb.        DVT Prophylaxis: Heparin  Disposition: when stable.

## 2018-06-11 RX ORDER — SODIUM CHLORIDE 9 MG/ML
1000 INJECTION, SOLUTION INTRAVENOUS
Qty: 0 | Refills: 0 | Status: DISCONTINUED | OUTPATIENT
Start: 2018-06-11 | End: 2018-06-12

## 2018-06-11 RX ORDER — FUROSEMIDE 40 MG
20 TABLET ORAL ONCE
Qty: 0 | Refills: 0 | Status: COMPLETED | OUTPATIENT
Start: 2018-06-11 | End: 2018-06-11

## 2018-06-11 RX ORDER — NITROGLYCERIN 6.5 MG
1 CAPSULE, EXTENDED RELEASE ORAL ONCE
Qty: 0 | Refills: 0 | Status: COMPLETED | OUTPATIENT
Start: 2018-06-11 | End: 2018-06-11

## 2018-06-11 RX ADMIN — Medication 200 MILLIGRAM(S): at 12:46

## 2018-06-11 RX ADMIN — Medication 25 MILLIGRAM(S): at 05:03

## 2018-06-11 RX ADMIN — Medication 40 MILLIGRAM(S): at 14:36

## 2018-06-11 RX ADMIN — SODIUM CHLORIDE 50 MILLILITER(S): 9 INJECTION, SOLUTION INTRAVENOUS at 02:48

## 2018-06-11 RX ADMIN — Medication 1 INCH(S): at 02:48

## 2018-06-11 RX ADMIN — Medication 3 MILLILITER(S): at 19:34

## 2018-06-11 RX ADMIN — Medication 3 MILLILITER(S): at 13:53

## 2018-06-11 RX ADMIN — Medication 25 MILLIGRAM(S): at 17:32

## 2018-06-11 RX ADMIN — ISOSORBIDE DINITRATE 20 MILLIGRAM(S): 5 TABLET ORAL at 21:39

## 2018-06-11 RX ADMIN — Medication 200 MILLIGRAM(S): at 17:31

## 2018-06-11 RX ADMIN — Medication 200 MILLIGRAM(S): at 05:03

## 2018-06-11 RX ADMIN — Medication 20 MILLIGRAM(S): at 02:48

## 2018-06-11 RX ADMIN — Medication 3 MILLILITER(S): at 08:48

## 2018-06-11 RX ADMIN — HEPARIN SODIUM 5000 UNIT(S): 5000 INJECTION INTRAVENOUS; SUBCUTANEOUS at 17:31

## 2018-06-11 RX ADMIN — Medication 40 MILLIGRAM(S): at 05:04

## 2018-06-11 RX ADMIN — LATANOPROST 1 DROP(S): 0.05 SOLUTION/ DROPS OPHTHALMIC; TOPICAL at 21:40

## 2018-06-11 RX ADMIN — HEPARIN SODIUM 5000 UNIT(S): 5000 INJECTION INTRAVENOUS; SUBCUTANEOUS at 05:04

## 2018-06-11 RX ADMIN — CLOPIDOGREL BISULFATE 75 MILLIGRAM(S): 75 TABLET, FILM COATED ORAL at 12:45

## 2018-06-11 RX ADMIN — Medication 40 MILLIGRAM(S): at 21:40

## 2018-06-11 RX ADMIN — Medication 25 MICROGRAM(S): at 05:03

## 2018-06-11 RX ADMIN — ISOSORBIDE DINITRATE 20 MILLIGRAM(S): 5 TABLET ORAL at 14:36

## 2018-06-11 RX ADMIN — ISOSORBIDE DINITRATE 20 MILLIGRAM(S): 5 TABLET ORAL at 05:05

## 2018-06-11 RX ADMIN — LISINOPRIL 20 MILLIGRAM(S): 2.5 TABLET ORAL at 05:03

## 2018-06-11 RX ADMIN — PANTOPRAZOLE SODIUM 40 MILLIGRAM(S): 20 TABLET, DELAYED RELEASE ORAL at 06:00

## 2018-06-11 RX ADMIN — Medication 81 MILLIGRAM(S): at 12:45

## 2018-06-11 NOTE — DISCHARGE NOTE ADULT - PATIENT PORTAL LINK FT
You can access the [facility] Patient Portal, offered by [facility], by registering with the following website: [email or web address]

## 2018-06-11 NOTE — DISCHARGE NOTE ADULT - CARE PLAN
Principal Discharge DX:	Bronchiectasis  Goal:	Prevent worsening symptoms  Assessment and plan of treatment:	Complete recommended course of Antibiotics and Steroids.  Follow up with Pulmonologist Dr. [facility].  Take Inhalers as prescribed.

## 2018-06-11 NOTE — DISCHARGE NOTE ADULT - HOSPITAL COURSE
89 year old Female with h/o COPD not on home oxygen, CVA, HTN, CAD s/p stents x 3 on ASA/Plavix presents with weakness, Pleuritic chest pain, cough, and fever/chills for 3 days. Patient mentions dyspnea and wheezing, decreased appetite but no orthopnea, leg swelling or hemoptysis. Work up in the ED was unremarkable: mild Leukocytosis and No pneumonia on Chest X-ray. Patient was admitted for further evaluation.       Assessment and Plan:    1. Bronchiectasis: ? super imposed Infection:  No PE or Left lower lobe pneumonia on CT.  Chest x-ray shows increased Left nodular densities. Continue IV Antibiotics.  Repeat Chest x-ray unremarkable.   Pulm consulted: Agreed with Steroids, ABx and bronchodilators.      2.  Pleuritic chest pain:   NO Pneumonia. No definite consolidation seen on Imaging but nodular densities.  Continue Antibiotics. Elevated D-dimer.   CT Chest: no PE, Extensive bronchiectasis.   Cardiac enzyme negative x 2.       3. h/o CVA, CAD s/p Stents, Hypertension:  Continued DAPT, Imdur, Metoprolol.  Blood pressures controlled on Lisinopril and Hydralazine.  ? Statin        4. Hypothyroid:  On Synthroid.         5. COPD: with acute exacerbation  Continued on steroids, ABx and supplemental Oxygen  Duoneb. ICS/LABA on discharge. Follow up with Pulm outpatient.

## 2018-06-11 NOTE — DISCHARGE NOTE ADULT - FINDINGS/TREATMENT
< from: CT Angio Chest w/ IV Cont (06.10.18 @ 21:20) >    No central pulmonary embolus.    Pulmonary edema with left greater than right small bilateral pleural   effusions.    Scarring and bronchiectasis within the right middle lobe and lingula,   with bronchial wall thickening and areas of mucoid impaction elsewhere,   findings compatible small airways disease (DONALD).    Right hilar lymphadenopathy, likely reactive.    < end of copied text >

## 2018-06-11 NOTE — CONSULT NOTE ADULT - SUBJECTIVE AND OBJECTIVE BOX
CHIRAG HERNÁNDEZ    Female     H 89yF with PMH COPD, CVA, HTN, heart stents X 3 on asa/plavix p/w weakness, cough, and fever/chills X 3 days. +wheezing, SOB. no CP. decreased appetite. no orthopnea, leg swelling. no diaphoresis, nausea, back pain.          Allergies  No Known Allergies      I&O's Summary    10 Asif 2018 07:01  -  11 Jun 2018 07:00  --------------------------------------------------------  IN: 250 mL / OUT: 0 mL / NET: 250 mL        FAMILY HISTORY: non contributory        HEALTH ISSUES - PROBLEM Dx:  Hypothyroid: Hypothyroid  Hypertension: Hypertension  Left lower lobe pneumonia: Left lower lobe pneumonia          Vital Signs Last 24 Hrs  T(C): 35.8 (11 Jun 2018 06:25), Max: 36.7 (10 Asif 2018 13:29)  T(F): 96.4 (11 Jun 2018 06:25), Max: 98.1 (10 Asif 2018 13:29)  HR: 70 (11 Jun 2018 06:25) (70 - 80)  BP: 167/71 (11 Jun 2018 06:25) (151/65 - 197/80)  RR: 16 (11 Jun 2018 06:25) (16 - 16)      REVIEW OF SYSTEMS:  CONSTITUTIONAL: No fever, weight loss, or fatigue  EYES: No eye pain, visual disturbances, or discharge  NECK: No pain or stiffness  RESPIRATORY: +cough, wheezing, +chills or hemoptysis; +shortness of breath  CARDIOVASCULAR: +chest pain, no palpitations, dizziness, or leg swelling  GASTROINTESTINAL: No abdominal or epigastric pain. No nausea, vomiting, or hematemesis; No diarrhea or constipation. No melena or hematochezia.  GENITOURINARY: No dysuria, frequency, hematuria, or incontinence  NEUROLOGICAL: No headaches, memory loss, loss of strength, numbness, or tremors  MUSCULOSKELETAL: No joint pain or swelling; No muscle, back, or extremity pain                                  10.3   10.12 )-----------( 257      ( 10 Asif 2018 07:50 )             31.0       06-10    143  |  104  |  14  ----------------------------<  92  4.1   |  26  |  0.7    Ca    8.7      10 Asif 2018 07:50        CARDIAC MARKERS ( 10 Asif 2018 07:50 )  x     / <0.01 ng/mL / 76 U/L / x     / x                  Culture - Blood (collected 08 Jun 2018 18:42)  Source: .Blood Blood  Preliminary Report (10 Asif 2018 02:12):    No growth to date.            Radiology: IMPRESSION:  No central pulmonary embolus.  Pulmonary edema with left greater than right small bilateral pleural   effusions.  Scarring and bronchiectasis within the right middle lobe and lingula,   with bronchial wall thickening and areas of mucoid impaction elsewhere,   findings compatible small airways disease (DONALD).  Right hilar lymphadenopathy, likely reactive.  Dr. Josee Wilks discussed preliminary findings with Cookie BLANCO on   6/10/2018 10:29 PM with readback.  JOSEE WILKS M.D., RESIDENT RADIOLOGIST  This document has been electronically signed.  RONIT BATISTA M.D., ATTENDING RADIOLOGIST  This document has been electronically signed. Jun 11 2018  6:44AM                MEDICATIONS  (STANDING):  ALBUTerol/ipratropium for Nebulization 3 milliLiter(s) Nebulizer every 6 hours  aspirin  chewable 81 milliGRAM(s) Oral daily  azithromycin  IVPB      azithromycin  IVPB 500 milliGRAM(s) IV Intermittent every 24 hours  cefTRIAXone   IVPB 1 Gram(s) IV Intermittent every 24 hours  cefTRIAXone   IVPB      clopidogrel Tablet 75 milliGRAM(s) Oral daily  guaiFENesin   Syrup  (Sugar-Free) 200 milliGRAM(s) Oral every 6 hours  heparin  Injectable 5000 Unit(s) SubCutaneous every 12 hours  hydrALAZINE 25 milliGRAM(s) Oral every 12 hours  isosorbide   dinitrate Tablet (ISORDIL) 20 milliGRAM(s) Oral three times a day  latanoprost 0.005% Ophthalmic Solution 1 Drop(s) Both EYES at bedtime  levothyroxine 25 MICROGram(s) Oral daily  lisinopril 20 milliGRAM(s) Oral daily  methylPREDNISolone sodium succinate Injectable 40 milliGRAM(s) IV Push every 8 hours  metoprolol succinate ER 25 milliGRAM(s) Oral daily  pantoprazole    Tablet 40 milliGRAM(s) Oral before breakfast  sodium chloride 0.45%. 1000 milliLiter(s) (50 mL/Hr) IV Continuous <Continuous>    MEDICATIONS  (PRN):  acetaminophen   Tablet. 650 milliGRAM(s) Oral every 6 hours PRN Moderate Pain (4 - 6)      Prescriptions:        PHYSICAL EXAM:  GENERAL: NAD, well-groomed, well-developed  HEAD:  Atraumatic, Normocephalic  EYES: EOMI, PERRLA, conjunctiva and sclera clear  NERVOUS SYSTEM:  Alert & Oriented X 4, Good concentration; Motor Strength 5/5 B/L upper and lower extremities; DTRs 2+ intact and symmetric  CHEST/LUNG: ++ rales,+ rhonchi,   HEART: Regular rate and rhythm; No murmurs, rubs, or gallops  ABDOMEN: Soft, Nontender, Nondistended; Bowel sounds present  EXTREMITIES:  2+ Peripheral Pulses, No clubbing, cyanosis, or edema  SKIN: No rashes or lesions EDGAR, MAURICEFERNANDO    Female     H 89yF with PMH COPD, CVA, HTN, heart stents X 3 on asa/plavix p/w weakness, cough, and fever/chills X 3 days. +wheezing, SOB. no CP. decreased appetite. no orthopnea, leg swelling. no diaphoresis, nausea, back pain.   She has had multiple pneumonias in the past. She states that she "waited too long" to come in this time.          Allergies  No Known Allergies      I&O's Summary    10 Asif 2018 07:01  -  11 Jun 2018 07:00  --------------------------------------------------------  IN: 250 mL / OUT: 0 mL / NET: 250 mL        FAMILY HISTORY: non contributory        HEALTH ISSUES - PROBLEM Dx:  Hypothyroid: Hypothyroid  Hypertension: Hypertension  Left lower lobe pneumonia: Left lower lobe pneumonia          Vital Signs Last 24 Hrs  T(C): 35.8 (11 Jun 2018 06:25), Max: 36.7 (10 Asif 2018 13:29)  T(F): 96.4 (11 Jun 2018 06:25), Max: 98.1 (10 Asif 2018 13:29)  HR: 70 (11 Jun 2018 06:25) (70 - 80)  BP: 167/71 (11 Jun 2018 06:25) (151/65 - 197/80)  RR: 16 (11 Jun 2018 06:25) (16 - 16)      REVIEW OF SYSTEMS:  CONSTITUTIONAL: No fever, weight loss, or fatigue  EYES: No eye pain, visual disturbances, or discharge  NECK: No pain or stiffness  RESPIRATORY: +cough, wheezing, +chills or hemoptysis; +shortness of breath  CARDIOVASCULAR: +chest pain, no palpitations, dizziness, or leg swelling  GASTROINTESTINAL: No abdominal or epigastric pain. No nausea, vomiting, or hematemesis; No diarrhea or constipation. No melena or hematochezia.  GENITOURINARY: No dysuria, frequency, hematuria, or incontinence  NEUROLOGICAL: No headaches, memory loss, loss of strength, numbness, or tremors  MUSCULOSKELETAL: No joint pain or swelling; No muscle, back, or extremity pain                                  10.3   10.12 )-----------( 257      ( 10 Asif 2018 07:50 )             31.0       06-10    143  |  104  |  14  ----------------------------<  92  4.1   |  26  |  0.7    Ca    8.7      10 Asif 2018 07:50        CARDIAC MARKERS ( 10 Asif 2018 07:50 )  x     / <0.01 ng/mL / 76 U/L / x     / x                  Culture - Blood (collected 08 Jun 2018 18:42)  Source: .Blood Blood  Preliminary Report (10 Asif 2018 02:12):    No growth to date.            Radiology: IMPRESSION:  No central pulmonary embolus.  Pulmonary edema with left greater than right small bilateral pleural   effusions.  Scarring and bronchiectasis within the right middle lobe and lingula,   with bronchial wall thickening and areas of mucoid impaction elsewhere,   findings compatible small airways disease (DONALD).  Right hilar lymphadenopathy, likely reactive.  Dr. Josee Wilks discussed preliminary findings with Cookie BLANCO on   6/10/2018 10:29 PM with readback.  JOSEE WILKS M.D., RESIDENT RADIOLOGIST  This document has been electronically signed.  RONIT BATISTA M.D., ATTENDING RADIOLOGIST  This document has been electronically signed. Jun 11 2018  6:44AM                MEDICATIONS  (STANDING):  ALBUTerol/ipratropium for Nebulization 3 milliLiter(s) Nebulizer every 6 hours  aspirin  chewable 81 milliGRAM(s) Oral daily  azithromycin  IVPB      azithromycin  IVPB 500 milliGRAM(s) IV Intermittent every 24 hours  cefTRIAXone   IVPB 1 Gram(s) IV Intermittent every 24 hours  cefTRIAXone   IVPB      clopidogrel Tablet 75 milliGRAM(s) Oral daily  guaiFENesin   Syrup  (Sugar-Free) 200 milliGRAM(s) Oral every 6 hours  heparin  Injectable 5000 Unit(s) SubCutaneous every 12 hours  hydrALAZINE 25 milliGRAM(s) Oral every 12 hours  isosorbide   dinitrate Tablet (ISORDIL) 20 milliGRAM(s) Oral three times a day  latanoprost 0.005% Ophthalmic Solution 1 Drop(s) Both EYES at bedtime  levothyroxine 25 MICROGram(s) Oral daily  lisinopril 20 milliGRAM(s) Oral daily  methylPREDNISolone sodium succinate Injectable 40 milliGRAM(s) IV Push every 8 hours  metoprolol succinate ER 25 milliGRAM(s) Oral daily  pantoprazole    Tablet 40 milliGRAM(s) Oral before breakfast  sodium chloride 0.45%. 1000 milliLiter(s) (50 mL/Hr) IV Continuous <Continuous>    MEDICATIONS  (PRN):  acetaminophen   Tablet. 650 milliGRAM(s) Oral every 6 hours PRN Moderate Pain (4 - 6)      Prescriptions:        PHYSICAL EXAM:  GENERAL: NAD, well-groomed, well-developed  HEAD:  Atraumatic, Normocephalic  EYES: EOMI, PERRLA, conjunctiva and sclera clear  NERVOUS SYSTEM:  Alert & Oriented X 4, Good concentration; Motor Strength 5/5 B/L upper and lower extremities; DTRs 2+ intact and symmetric  CHEST/LUNG: ++ rales,+ rhonchi,   HEART: Regular rate and rhythm; No murmurs, rubs, or gallops  ABDOMEN: Soft, Nontender, Nondistended; Bowel sounds present  EXTREMITIES:  2+ Peripheral Pulses, No clubbing, cyanosis, or edema  SKIN: No rashes or lesions

## 2018-06-11 NOTE — DISCHARGE NOTE ADULT - OTHER SIGNIFICANT FINDINGS
LABS:                        10.3   10.12 )-----------( 257      ( 10 Asif 2018 07:50 )             31.0     06-10    143  |  104  |  14  ----------------------------<  92  4.1   |  26  |  0.7    Ca    8.7      10 Asif 2018 07:50        CARDIAC MARKERS ( 10 Asif 2018 07:50 )  x     / <0.01 ng/mL / 76 U/L / x     / x      CARDIAC MARKERS ( 08 Jun 2018 18:42 )  x     / <0.01 ng/mL / 64 U/L / x     / 1.1 ng/mL      D-Dimer Assay, Quantitative: 557: Notified Dr. Walker of high DD ng/mL DDU (06.10.18 @ 07:50)        MICROBIOLOGY:   Culture - Blood (06.08.18 @ 18:42)    Specimen Source: .Blood    Culture Results: No growth to date.        RADIOLOGY & ADDITIONAL TESTS:  X-ray Chest 2 Views PA/Lat (06.08.18 @ 18:56)   No definite radiographic evidence of pneumonia.    X-ray Chest 1 View- PORTABLE-Urgent (06.10.18 @ 11:19)  Support devices: None.    Cardiac/mediastinum/hilum: Unchanged    Lung parenchyma/Pleura: Interval development of small left pleural   effusion with adjacent atelectasis. There is also atelectasis in left   upper lobe. No evidence of pneumothorax.    Skeleton/soft tissues: Unchanged

## 2018-06-11 NOTE — PROGRESS NOTE ADULT - ASSESSMENT
89 year old Female with h/o COPD not on home oxygen, CVA, HTN, CAD s/p stents x 3 on ASA/Plavix presents with weakness, Pleuritic chest pain, cough, and fever/chills for 3 days. Patient mentions dyspnea and wheezing, decreased appetite but no orthopnea, leg swelling or hemoptysis. Work up in the ED was unremarkable: mild Leukocytosis and No pneumonia on Chest X-ray. Patient was admitted for further evaluation.       Assessment and Plan:    1. Bronchiectasis ? super imposed Infection:  No PE or Left lower lobe pneumonia on CT.  Chest x-ray shows increased Left nodular densities. Continue IV Antibiotics.  Repeat Chest x-ray unremarkable.   Pulm consulted: Continue current management pending.      2.  Pleuritic chest pain:   NO Pneumonia. No definite consolidation seen on Imaging but nodular densities.  Continue Antibiotics. Elevated D-dimer.   CT Chest: no PE, Extensive bronchiectasis.   Cardiac enzyme negative x 2.       3. h/o CVA, CAD s/p Stents, Hypertension:  Continue DAPT, Imdur, Metoprolol.  Blood pressures controlled on Lisinopril and Hydralazine.  ? Statin        4. Hypothyroid:  On Synthroid.         5. COPD: with acute exacerbation  Continue steroids, ABx and supplemental Oxygen  Duoneb. ICS/LABA on discharge. Follow up with Pulm outpatient.        DVT Prophylaxis: Heparin  Disposition: when stable.

## 2018-06-11 NOTE — PROGRESS NOTE ADULT - SUBJECTIVE AND OBJECTIVE BOX
EDGARTONY SAVAGEBISHOP  89y  Female    Patient is a 89y old  Female who presents with a chief complaint of cough & sob (08 Jun 2018 22:11)      INTERVAL HPI/OVERNIGHT EVENTS:  No overnight events. Patient has no new complaints.       REVIEW OF SYSTEMS:  CONSTITUTIONAL: No fever, weight loss, or fatigue  EYES: No eye pain, visual disturbances, or discharge  ENMT:  No difficulty hearing, tinnitus, vertigo; No sinus or throat pain  NECK: No pain or stiffness  BREASTS: No pain, masses, or nipple discharge  RESPIRATORY: + cough, + wheezing, No hemoptysis; + shortness of breath  CARDIOVASCULAR: + chest pain, No palpitations, dizziness, or leg swelling  GASTROINTESTINAL: No abdominal or epigastric pain. No nausea, vomiting, or hematemesis; No diarrhea or constipation. No melena or hematochezia.  GENITOURINARY: No dysuria, frequency, hematuria, or incontinence  NEUROLOGICAL: No headaches, memory loss, loss of strength, numbness, or tremors  SKIN: No itching, burning, rashes, or lesions   LYMPH NODES: No enlarged glands  ENDOCRINE: No heat or cold intolerance; No hair loss  MUSCULOSKELETAL: No joint pain or swelling; No muscle, back, or extremity pain  PSYCHIATRIC: No depression, anxiety, mood swings, or difficulty sleeping  HEME/LYMPH: No easy bruising, or bleeding gums  ALLERGY AND IMMUNOLOGIC: No hives or eczema      VITALS:  T(C): 35.6 (11 Jun 2018 14:09), Max: 36.5 (10 Asif 2018 21:30)  T(F): 96.1 (11 Jun 2018 14:09), Max: 97.7 (10 Asif 2018 21:30)  HR: 74 (11 Jun 2018 14:09) (70 - 80)  BP: 159/68 (11 Jun 2018 14:09) (159/68 - 197/80)  BP(mean): --  RR: 16 (11 Jun 2018 14:09) (16 - 16)  SpO2: --      PHYSICAL EXAM:  GENERAL: NAD, well-groomed, well-developed  HEAD:  Atraumatic, Normocephalic  EYES: conjunctiva and sclera clear  ENMT: Moist mucous membranes  NECK: Supple, Normal thyroid  NERVOUS SYSTEM:  Alert & Oriented X3, Good concentration; Motor Strength 5/5 B/L upper and lower extremities  CHEST/LUNG: Decreased AE at the bases bilaterally; + rales, No rhonchi, wheezing, or rubs  HEART: Regular rate and rhythm; No murmurs, rubs, or gallops  ABDOMEN: Soft, Nontender, Nondistended; Bowel sounds present  EXTREMITIES:  2+ Peripheral Pulses, No clubbing, cyanosis, or edema  LYMPH: No lymphadenopathy noted  SKIN: No rashes or lesions    Consultant(s) Notes Reviewed:  [x ] YES  [ ] NO  Care Discussed with Consultants/Other Providers [ x] YES  [ ] NO    LABS:                        10.3   10.12 )-----------( 257      ( 10 Asif 2018 07:50 )             31.0     06-10    143  |  104  |  14  ----------------------------<  92  4.1   |  26  |  0.7    Ca    8.7      10 Asif 2018 07:50        CARDIAC MARKERS ( 10 Asif 2018 07:50 )  x     / <0.01 ng/mL / 76 U/L / x     / x      CARDIAC MARKERS ( 08 Jun 2018 18:42 )  x     / <0.01 ng/mL / 64 U/L / x     / 1.1 ng/mL      D-Dimer Assay, Quantitative: 557: Notified Dr. Walker of high DD ng/mL DDU (06.10.18 @ 07:50)        MICROBIOLOGY:   Culture - Blood (06.08.18 @ 18:42)    Specimen Source: .Blood    Culture Results: No growth to date.        RADIOLOGY & ADDITIONAL TESTS:  X-ray Chest 2 Views PA/Lat (06.08.18 @ 18:56)   No definite radiographic evidence of pneumonia.    X-ray Chest 1 View- PORTABLE-Urgent (06.10.18 @ 11:19)  Support devices: None.    Cardiac/mediastinum/hilum: Unchanged    Lung parenchyma/Pleura: Interval development of small left pleural   effusion with adjacent atelectasis. There is also atelectasis in left   upper lobe. No evidence of pneumothorax.    Skeleton/soft tissues: Unchanged      Imaging Personally Reviewed:  [x] YES  [ ] NO    MEDICATIONS  (STANDING):  ALBUTerol/ipratropium for Nebulization 3 milliLiter(s) Nebulizer every 6 hours  aspirin  chewable 81 milliGRAM(s) Oral daily  azithromycin  IVPB 500 milliGRAM(s) IV Intermittent every 24 hours  cefTRIAXone   IVPB 1 Gram(s) IV Intermittent every 24 hours  clopidogrel Tablet 75 milliGRAM(s) Oral daily  guaiFENesin   Syrup  (Sugar-Free) 200 milliGRAM(s) Oral every 6 hours  heparin  Injectable 5000 Unit(s) SubCutaneous every 12 hours  hydrALAZINE 25 milliGRAM(s) Oral every 12 hours  isosorbide   dinitrate Tablet (ISORDIL) 20 milliGRAM(s) Oral three times a day  latanoprost 0.005% Ophthalmic Solution 1 Drop(s) Both EYES at bedtime  levothyroxine 25 MICROGram(s) Oral daily  lisinopril 20 milliGRAM(s) Oral daily  methylPREDNISolone sodium succinate Injectable 40 milliGRAM(s) IV Push every 8 hours  metoprolol succinate ER 25 milliGRAM(s) Oral daily  pantoprazole    Tablet 40 milliGRAM(s) Oral before breakfast  sodium chloride 0.45%. 1000 milliLiter(s) (50 mL/Hr) IV Continuous <Continuous>    MEDICATIONS  (PRN):  acetaminophen   Tablet. 650 milliGRAM(s) Oral every 6 hours PRN Moderate Pain (4 - 6)          HEALTH ISSUES - PROBLEM Dx:  Hypothyroid  Hypertension  Left lower lobe pneumonia  Hypertension  Hypothyroid  High blood cholesterol  H/O abdominal hysterectomy  History of cholecystectomy  History of coronary artery stent placement

## 2018-06-11 NOTE — DISCHARGE NOTE ADULT - PLAN OF CARE
Prevent worsening symptoms Complete recommended course of Antibiotics and Steroids.  Follow up with Pulmonologist Dr. Guajardo [facility].  Take Inhalers as prescribed.

## 2018-06-11 NOTE — DISCHARGE NOTE ADULT - CARE PROVIDER_API CALL
Collin Guajardo), Critical Care Medicine; Geriatric Medicine; Internal Medicine; Pulmonary Disease  [address] [place] [address]  [address]  [place] [address]  Phone: [phone]  Fax: [phone]    Leana Azul (), Internal Medicine  [address] [place] [address] [address]  [place] [address]  Phone: [phone]  Fax: [phone]

## 2018-06-11 NOTE — DISCHARGE NOTE ADULT - MEDICATION SUMMARY - MEDICATIONS TO TAKE
I will START or STAY ON the medications listed below when I get home from the hospital:    Deltasone 20 mg oral tablet  -- 1 tab(s) by mouth once a day   -- Indication: For Bronchiectasis    aspirin 81 mg oral tablet, chewable  -- 1 tab(s) by mouth once a day  -- Indication: For History of coronary artery stent placement    lisinopril 20 mg oral tablet  -- 1 tab(s) by mouth once a day  -- Indication: For Hypertension    isosorbide dinitrate 20 mg oral tablet  -- 1 tab(s) by mouth 3 times a day  -- Indication: For History of coronary artery stent placement    pravastatin 20 mg oral tablet  -- 1 tab(s) by mouth once a day  -- Indication: For History of coronary artery stent placement    clopidogrel 75 mg oral tablet  -- 1 tab(s) by mouth once a day  -- Indication: For History of coronary artery stent placement    metoprolol succinate 25 mg oral tablet, extended release  -- Indication: For Hypertension    Symbicort 80 mcg-4.5 mcg/inh inhalation aerosol  -- 2 puff(s) inhaled every 12 hours   -- Check with your doctor before becoming pregnant.  For inhalation only.  Rinse mouth thoroughly after use.    -- Indication: For Bronchiectasis    Spiriva 18 mcg inhalation capsule  -- 1 cap(s) inhaled once a day   -- Check with your doctor before becoming pregnant.  For inhalation only.  It is very important that you take or use this exactly as directed.  Do not skip doses or discontinue unless directed by your doctor.  Obtain medical advice before taking any non-prescription drugs as some may affect the action of this medication.    -- Indication: For Bronchiectasis    ipratropium-albuterol 20 mcg-100 mcg/inh inhalation aerosol  -- 2 puff(s) inhaled every 6 hours, As Needed   -- Check with your doctor before becoming pregnant.  For inhalation only.  May cause drowsiness or dizziness.  Obtain medical advice before taking any non-prescription drugs as some may affect the action of this medication.  This drug may impair the ability to drive or operate machinery.  Use care until you become familiar with its effects.    -- Indication: For Bronchiectasis    guaiFENesin 100 mg/5 mL oral liquid  -- 10 milliliter(s) by mouth every 6 hours  -- Indication: For Bronchiectasis    isosorbide  -- Indication: For History of coronary artery stent placement    brimonidine 0.2% ophthalmic solution  -- 1 drop(s) to each affected eye 3 times a day  -- Indication: For Vision    latanoprost 0.005% ophthalmic solution  -- 1 drop(s) to each affected eye once a day (in the evening)  -- Indication: For Vision    pantoprazole 20 mg oral delayed release tablet  -- 1 tab(s) by mouth once a day  -- Indication: For Reflux    Levaquin 500 mg oral tablet  -- 1 tab(s) by mouth once a day   -- Avoid prolonged or excessive exposure to direct and/or artificial sunlight while taking this medication.  Do not take dairy products, antacids, or iron preparations within one hour of this medication.  Finish all this medication unless otherwise directed by prescriber.  May cause drowsiness or dizziness.  Medication should be taken with plenty of water.    -- Indication: For Bronchiectasis    Synthroid 25 mcg (0.025 mg) oral tablet  -- 1 tab(s) by mouth once a day  -- Indication: For Low thyroid    hydrALAZINE 25 mg oral tablet  -- Indication: For Hypertension

## 2018-06-11 NOTE — CONSULT NOTE ADULT - ASSESSMENT
Impression:  possible small Pneumonia L base can be causing the pain  No PE  B/L bronchiectasis  Element of obstructive lung disease  tiny effusions.    Suggest:    Supportive care  Continue current abx.  bronchodilators  pt would benefit from a ICS/LABA  Pulmonary toilet  Keep SaO2 >92% Impression:  possible small Pneumonia L base can be causing the pain  No PE  B/L bronchiectasis chronic  Element of obstructive lung disease  tiny effusions.    Suggest:    Supportive care  Continue current abx.  bronchodilators  pt would benefit from a ICS/LABA as OP. PRN albuterol.  Pulmonary toilet  Keep SaO2 >92%  F/U my office 2-4 weeks

## 2018-06-12 ENCOUNTER — TRANSCRIPTION ENCOUNTER (OUTPATIENT)
Age: 83
End: 2018-06-12

## 2018-06-12 VITALS
OXYGEN SATURATION: 95 % | SYSTOLIC BLOOD PRESSURE: 155 MMHG | RESPIRATION RATE: 16 BRPM | HEART RATE: 73 BPM | DIASTOLIC BLOOD PRESSURE: 61 MMHG

## 2018-06-12 RX ORDER — ISOSORBIDE DINITRATE 5 MG/1
0 TABLET ORAL
Qty: 0 | Refills: 0 | DISCHARGE
Start: 2018-06-12

## 2018-06-12 RX ORDER — ISOSORBIDE DINITRATE 5 MG/1
1 TABLET ORAL
Qty: 0 | Refills: 0 | DISCHARGE
Start: 2018-06-12

## 2018-06-12 RX ORDER — BUDESONIDE AND FORMOTEROL FUMARATE DIHYDRATE 160; 4.5 UG/1; UG/1
2 AEROSOL RESPIRATORY (INHALATION)
Qty: 1 | Refills: 0 | OUTPATIENT
Start: 2018-06-12 | End: 2018-07-11

## 2018-06-12 RX ORDER — CIPROFLOXACIN LACTATE 400MG/40ML
1 VIAL (ML) INTRAVENOUS
Qty: 5 | Refills: 0 | OUTPATIENT
Start: 2018-06-12 | End: 2018-06-16

## 2018-06-12 RX ORDER — IPRATROPIUM/ALBUTEROL SULFATE 18-103MCG
2 AEROSOL WITH ADAPTER (GRAM) INHALATION
Qty: 1 | Refills: 0 | OUTPATIENT
Start: 2018-06-12

## 2018-06-12 RX ORDER — TIOTROPIUM BROMIDE 18 UG/1
1 CAPSULE ORAL; RESPIRATORY (INHALATION)
Qty: 30 | Refills: 0 | OUTPATIENT
Start: 2018-06-12 | End: 2018-07-11

## 2018-06-12 RX ADMIN — CLOPIDOGREL BISULFATE 75 MILLIGRAM(S): 75 TABLET, FILM COATED ORAL at 11:17

## 2018-06-12 RX ADMIN — Medication 40 MILLIGRAM(S): at 11:17

## 2018-06-12 RX ADMIN — LISINOPRIL 20 MILLIGRAM(S): 2.5 TABLET ORAL at 06:35

## 2018-06-12 RX ADMIN — Medication 81 MILLIGRAM(S): at 11:17

## 2018-06-12 RX ADMIN — CEFTRIAXONE 100 GRAM(S): 500 INJECTION, POWDER, FOR SOLUTION INTRAMUSCULAR; INTRAVENOUS at 00:20

## 2018-06-12 RX ADMIN — PANTOPRAZOLE SODIUM 40 MILLIGRAM(S): 20 TABLET, DELAYED RELEASE ORAL at 06:36

## 2018-06-12 RX ADMIN — Medication 25 MICROGRAM(S): at 06:34

## 2018-06-12 RX ADMIN — ISOSORBIDE DINITRATE 20 MILLIGRAM(S): 5 TABLET ORAL at 13:58

## 2018-06-12 RX ADMIN — Medication 3 MILLILITER(S): at 08:14

## 2018-06-12 RX ADMIN — Medication 25 MILLIGRAM(S): at 06:35

## 2018-06-12 RX ADMIN — ISOSORBIDE DINITRATE 20 MILLIGRAM(S): 5 TABLET ORAL at 06:35

## 2018-06-12 RX ADMIN — Medication 200 MILLIGRAM(S): at 11:17

## 2018-06-12 NOTE — PROVIDER CONTACT NOTE (OTHER) - SITUATION
PT IS CONFUSED ABOUT SITUATION, GRABBED RN WITH NAILS/DIGGING INTO SKIN, FRUSTRATED & ANGRY ABOUT WHY SHE IS HERE; RN NOTIFIED DTR SNEHAL VIA PHONE, [phone]

## 2018-06-12 NOTE — PROGRESS NOTE ADULT - SUBJECTIVE AND OBJECTIVE BOX
Patient has become agitated and uncooperative-could not give late dose of zithromax. RN did speak to family. Will monitor

## 2018-06-12 NOTE — PROGRESS NOTE ADULT - SUBJECTIVE AND OBJECTIVE BOX
Pt seen and examined. Pt feels well. Upset about episode overnight where she feels like she lost awareness of her surroundings and nurse had dismissed her complaints    T(F): , Max: 97.4 (06-11-18 @ 22:11)  HR: 73 (06-12-18 @ 09:43) (71 - 84)  BP: 155/61 (06-12-18 @ 09:43)  RR: 16 (06-12-18 @ 09:43)  SpO2: 95% (06-12-18 @ 09:43)  General: No apparent distress  Cardiovascular: S1, S2  Gastrointestinal: Soft, Non-tender, Non-distended  Respiratory: Good air entry bilaterally  Musculoskeletal: Moves all extremities  Lymphatic: No edema  Neurologic: No gross motor deficit, A+O x 3  Dermatologic: Skin dry

## 2018-06-12 NOTE — PHYSICAL THERAPY INITIAL EVALUATION ADULT - GENERAL OBSERVATIONS, REHAB EVAL
13:50 - 14:04.  Chart reviewed. Patient available to be seen for physical therapy, confirmed with nurse. Patient encountered already seated at edge of bed, family at bedside. Patient says she would like to walk with PT. 13:50 - 14:05.  Chart reviewed. Patient available to be seen for physical therapy, confirmed with nurse. Patient encountered already seated at edge of bed, family at bedside. Patient says she would like to walk with PT.

## 2018-06-12 NOTE — PROGRESS NOTE ADULT - ASSESSMENT
1. Bronchiectasis ? super imposed Infection:  D/C on levaquin, outpt F/U with pulm      2.  Pleuritic chest pain:   resolved       3. h/o CVA, CAD s/p Stents, Hypertension:  Continue DAPT, Imdur, Metoprolol.  Blood pressures controlled on Lisinopril and Hydralazine, on statin        4. Hypothyroid:  On Synthroid.         5. COPD: with acute exacerbation  Continue steroids, ABx and supplemental Oxygen  Duoneb. ICS/LABA on discharge. Follow up with Pulm outpatient.

## 2018-06-12 NOTE — PROVIDER CONTACT NOTE (OTHER) - REASON
PT IS CONFUSED ABOUT SITUATION, GRABBED RN WITH NAILS/DIGGING INTO SKIN, FRUSTRATED & ANGRY ABOUT WHY SHE IS HERE; RN NOTIFIED DTR SNEHAL VIA PHONE, [phone]; RN NOTIFIED MD EVANS OF AFOREMENTIONED-MD ASSESSED

## 2018-06-12 NOTE — PROVIDER CONTACT NOTE (OTHER) - SITUATION
PT REFUSES am VS & MEDS-bp meds x 3 included; 2ND CALL TO SON-GERTRUDIS CALLED-WILL COME IN; [phone]

## 2018-06-12 NOTE — PROVIDER CONTACT NOTE (OTHER) - REASON
PT REFUSES am VS & MEDS-bp meds x 3 included; 2ND CALL TO SON-GERTRUDIS CALLED-WILL COME IN; [phone]

## 2018-06-12 NOTE — PROVIDER CONTACT NOTE (OTHER) - REASON
bp = 210/88, P = 84; DTR BEBA JANE; PT WILL TAKE po MEDS; BUT STILL INCREDULOUS OF STAFF'S INTENTIONS; HAS IV LINE AND ABX BAGS IN HER PURSE.

## 2018-06-12 NOTE — PROVIDER CONTACT NOTE (OTHER) - SITUATION
bp = 210/88, P = 84; DTR BEBA JANE; PT WILL TAKE po MEDS; BUT STILL INCREDULOUS OF STAFF'S INTENTIONS; HAS IV LINE AND ABX BAGS IN HER PURSE

## 2018-06-14 LAB
CULTURE RESULTS: SIGNIFICANT CHANGE UP
SPECIMEN SOURCE: SIGNIFICANT CHANGE UP

## 2018-06-15 DIAGNOSIS — Z86.73 PERSONAL HISTORY OF TRANSIENT ISCHEMIC ATTACK (TIA), AND CEREBRAL INFARCTION WITHOUT RESIDUAL DEFICITS: ICD-10-CM

## 2018-06-15 DIAGNOSIS — J81.1 CHRONIC PULMONARY EDEMA: ICD-10-CM

## 2018-06-15 DIAGNOSIS — Z79.02 LONG TERM (CURRENT) USE OF ANTITHROMBOTICS/ANTIPLATELETS: ICD-10-CM

## 2018-06-15 DIAGNOSIS — D72.829 ELEVATED WHITE BLOOD CELL COUNT, UNSPECIFIED: ICD-10-CM

## 2018-06-15 DIAGNOSIS — J44.1 CHRONIC OBSTRUCTIVE PULMONARY DISEASE WITH (ACUTE) EXACERBATION: ICD-10-CM

## 2018-06-15 DIAGNOSIS — Z90.710 ACQUIRED ABSENCE OF BOTH CERVIX AND UTERUS: ICD-10-CM

## 2018-06-15 DIAGNOSIS — I25.10 ATHEROSCLEROTIC HEART DISEASE OF NATIVE CORONARY ARTERY WITHOUT ANGINA PECTORIS: ICD-10-CM

## 2018-06-15 DIAGNOSIS — Z79.52 LONG TERM (CURRENT) USE OF SYSTEMIC STEROIDS: ICD-10-CM

## 2018-06-15 DIAGNOSIS — R59.9 ENLARGED LYMPH NODES, UNSPECIFIED: ICD-10-CM

## 2018-06-15 DIAGNOSIS — E78.5 HYPERLIPIDEMIA, UNSPECIFIED: ICD-10-CM

## 2018-06-15 DIAGNOSIS — Z95.5 PRESENCE OF CORONARY ANGIOPLASTY IMPLANT AND GRAFT: ICD-10-CM

## 2018-06-15 DIAGNOSIS — J18.9 PNEUMONIA, UNSPECIFIED ORGANISM: ICD-10-CM

## 2018-06-15 DIAGNOSIS — R07.81 PLEURODYNIA: ICD-10-CM

## 2018-06-15 DIAGNOSIS — I10 ESSENTIAL (PRIMARY) HYPERTENSION: ICD-10-CM

## 2018-06-15 DIAGNOSIS — E03.9 HYPOTHYROIDISM, UNSPECIFIED: ICD-10-CM

## 2018-06-15 DIAGNOSIS — Z79.82 LONG TERM (CURRENT) USE OF ASPIRIN: ICD-10-CM

## 2018-06-15 DIAGNOSIS — Z90.49 ACQUIRED ABSENCE OF OTHER SPECIFIED PARTS OF DIGESTIVE TRACT: ICD-10-CM

## 2018-06-21 ENCOUNTER — INPATIENT (INPATIENT)
Facility: HOSPITAL | Age: 83
LOS: 2 days | Discharge: HOME | End: 2018-06-24
Attending: INTERNAL MEDICINE | Admitting: INTERNAL MEDICINE

## 2018-06-21 VITALS
TEMPERATURE: 98 F | HEART RATE: 67 BPM | RESPIRATION RATE: 18 BRPM | WEIGHT: 169.98 LBS | HEIGHT: 68 IN | OXYGEN SATURATION: 95 % | SYSTOLIC BLOOD PRESSURE: 197 MMHG | DIASTOLIC BLOOD PRESSURE: 79 MMHG

## 2018-06-21 DIAGNOSIS — E78.00 PURE HYPERCHOLESTEROLEMIA, UNSPECIFIED: ICD-10-CM

## 2018-06-21 DIAGNOSIS — Z95.5 PRESENCE OF CORONARY ANGIOPLASTY IMPLANT AND GRAFT: Chronic | ICD-10-CM

## 2018-06-21 DIAGNOSIS — J18.9 PNEUMONIA, UNSPECIFIED ORGANISM: ICD-10-CM

## 2018-06-21 DIAGNOSIS — E03.9 HYPOTHYROIDISM, UNSPECIFIED: ICD-10-CM

## 2018-06-21 DIAGNOSIS — I10 ESSENTIAL (PRIMARY) HYPERTENSION: ICD-10-CM

## 2018-06-21 DIAGNOSIS — Z90.710 ACQUIRED ABSENCE OF BOTH CERVIX AND UTERUS: Chronic | ICD-10-CM

## 2018-06-21 DIAGNOSIS — R06.02 SHORTNESS OF BREATH: ICD-10-CM

## 2018-06-21 DIAGNOSIS — Z90.49 ACQUIRED ABSENCE OF OTHER SPECIFIED PARTS OF DIGESTIVE TRACT: Chronic | ICD-10-CM

## 2018-06-21 LAB
ALBUMIN SERPL ELPH-MCNC: 3.4 G/DL — LOW (ref 3.5–5.2)
ALP SERPL-CCNC: 82 U/L — SIGNIFICANT CHANGE UP (ref 30–115)
ALT FLD-CCNC: 22 U/L — SIGNIFICANT CHANGE UP (ref 0–41)
ANION GAP SERPL CALC-SCNC: 13 MMOL/L — SIGNIFICANT CHANGE UP (ref 7–14)
APPEARANCE UR: CLEAR — SIGNIFICANT CHANGE UP
AST SERPL-CCNC: 17 U/L — SIGNIFICANT CHANGE UP (ref 0–41)
BASE EXCESS BLDV CALC-SCNC: 5.4 MMOL/L — HIGH (ref -2–2)
BASOPHILS # BLD AUTO: 0.08 K/UL — SIGNIFICANT CHANGE UP (ref 0–0.2)
BASOPHILS NFR BLD AUTO: 0.6 % — SIGNIFICANT CHANGE UP (ref 0–1)
BILIRUB DIRECT SERPL-MCNC: <0.2 MG/DL — SIGNIFICANT CHANGE UP (ref 0–0.2)
BILIRUB INDIRECT FLD-MCNC: >0.4 MG/DL — SIGNIFICANT CHANGE UP (ref 0.2–1.2)
BILIRUB SERPL-MCNC: 0.6 MG/DL — SIGNIFICANT CHANGE UP (ref 0.2–1.2)
BILIRUB UR-MCNC: NEGATIVE — SIGNIFICANT CHANGE UP
BUN SERPL-MCNC: 15 MG/DL — SIGNIFICANT CHANGE UP (ref 10–20)
CA-I SERPL-SCNC: 1.27 MMOL/L — SIGNIFICANT CHANGE UP (ref 1.12–1.3)
CALCIUM SERPL-MCNC: 9.8 MG/DL — SIGNIFICANT CHANGE UP (ref 8.5–10.1)
CHLORIDE SERPL-SCNC: 101 MMOL/L — SIGNIFICANT CHANGE UP (ref 98–110)
CK MB CFR SERPL CALC: 1.5 NG/ML — SIGNIFICANT CHANGE UP (ref 0.6–6.3)
CK SERPL-CCNC: 24 U/L — SIGNIFICANT CHANGE UP (ref 0–225)
CO2 SERPL-SCNC: 29 MMOL/L — SIGNIFICANT CHANGE UP (ref 17–32)
COLOR SPEC: YELLOW — SIGNIFICANT CHANGE UP
CREAT SERPL-MCNC: 1 MG/DL — SIGNIFICANT CHANGE UP (ref 0.7–1.5)
DIFF PNL FLD: NEGATIVE — SIGNIFICANT CHANGE UP
EOSINOPHIL # BLD AUTO: 0.39 K/UL — SIGNIFICANT CHANGE UP (ref 0–0.7)
EOSINOPHIL NFR BLD AUTO: 3.1 % — SIGNIFICANT CHANGE UP (ref 0–8)
GAS PNL BLDV: 141 MMOL/L — SIGNIFICANT CHANGE UP (ref 136–145)
GAS PNL BLDV: SIGNIFICANT CHANGE UP
GLUCOSE SERPL-MCNC: 103 MG/DL — HIGH (ref 70–99)
GLUCOSE UR QL: NEGATIVE MG/DL — SIGNIFICANT CHANGE UP
HCO3 BLDV-SCNC: 31 MMOL/L — HIGH (ref 22–29)
HCT VFR BLD CALC: 37.6 % — SIGNIFICANT CHANGE UP (ref 37–47)
HCT VFR BLDA CALC: 39.4 % — SIGNIFICANT CHANGE UP (ref 34–44)
HGB BLD CALC-MCNC: 12.9 G/DL — LOW (ref 14–18)
HGB BLD-MCNC: 12.2 G/DL — SIGNIFICANT CHANGE UP (ref 12–16)
HOROWITZ INDEX BLDV+IHG-RTO: 21 — SIGNIFICANT CHANGE UP
IMM GRANULOCYTES NFR BLD AUTO: 2.3 % — HIGH (ref 0.1–0.3)
KETONES UR-MCNC: NEGATIVE — SIGNIFICANT CHANGE UP
LACTATE BLDV-MCNC: 1.4 MMOL/L — SIGNIFICANT CHANGE UP (ref 0.5–1.6)
LACTATE SERPL-SCNC: 1.2 MMOL/L — SIGNIFICANT CHANGE UP (ref 0.5–2.2)
LEUKOCYTE ESTERASE UR-ACNC: NEGATIVE — SIGNIFICANT CHANGE UP
LYMPHOCYTES # BLD AUTO: 18.6 % — LOW (ref 20.5–51.1)
LYMPHOCYTES # BLD AUTO: 2.31 K/UL — SIGNIFICANT CHANGE UP (ref 1.2–3.4)
MAGNESIUM SERPL-MCNC: 2 MG/DL — SIGNIFICANT CHANGE UP (ref 1.8–2.4)
MANUAL SMEAR VERIFICATION: SIGNIFICANT CHANGE UP
MCHC RBC-ENTMCNC: 31.4 PG — HIGH (ref 27–31)
MCHC RBC-ENTMCNC: 32.4 G/DL — SIGNIFICANT CHANGE UP (ref 32–37)
MCV RBC AUTO: 96.9 FL — SIGNIFICANT CHANGE UP (ref 81–99)
MONOCYTES # BLD AUTO: 1.23 K/UL — HIGH (ref 0.1–0.6)
MONOCYTES NFR BLD AUTO: 9.9 % — HIGH (ref 1.7–9.3)
NEUTROPHILS # BLD AUTO: 8.14 K/UL — HIGH (ref 1.4–6.5)
NEUTROPHILS NFR BLD AUTO: 65.5 % — SIGNIFICANT CHANGE UP (ref 42.2–75.2)
NEUTS BAND # BLD: 3 % — SIGNIFICANT CHANGE UP (ref 0–6)
NITRITE UR-MCNC: NEGATIVE — SIGNIFICANT CHANGE UP
NRBC # BLD: 0 /100 WBCS — SIGNIFICANT CHANGE UP (ref 0–0)
NRBC # BLD: 0 /100 — SIGNIFICANT CHANGE UP (ref 0–0)
NT-PROBNP SERPL-SCNC: 513 PG/ML — HIGH (ref 0–300)
PCO2 BLDV: 50 MMHG — SIGNIFICANT CHANGE UP (ref 41–51)
PH BLDV: 7.4 — SIGNIFICANT CHANGE UP (ref 7.26–7.43)
PH UR: 5.5 — SIGNIFICANT CHANGE UP (ref 5–8)
PLAT MORPH BLD: NORMAL — SIGNIFICANT CHANGE UP
PLATELET # BLD AUTO: 295 K/UL — SIGNIFICANT CHANGE UP (ref 130–400)
PO2 BLDV: 24 MMHG — SIGNIFICANT CHANGE UP (ref 20–40)
POTASSIUM BLDV-SCNC: 4.3 MMOL/L — SIGNIFICANT CHANGE UP (ref 3.3–5.6)
POTASSIUM SERPL-MCNC: 4.7 MMOL/L — SIGNIFICANT CHANGE UP (ref 3.5–5)
POTASSIUM SERPL-SCNC: 4.7 MMOL/L — SIGNIFICANT CHANGE UP (ref 3.5–5)
PROT SERPL-MCNC: 6.5 G/DL — SIGNIFICANT CHANGE UP (ref 6–8)
PROT UR-MCNC: NEGATIVE MG/DL — SIGNIFICANT CHANGE UP
RBC # BLD: 3.88 M/UL — LOW (ref 4.2–5.4)
RBC # FLD: 13.3 % — SIGNIFICANT CHANGE UP (ref 11.5–14.5)
RBC BLD AUTO: NORMAL — SIGNIFICANT CHANGE UP
SAO2 % BLDV: 43 % — SIGNIFICANT CHANGE UP
SODIUM SERPL-SCNC: 143 MMOL/L — SIGNIFICANT CHANGE UP (ref 135–146)
SP GR SPEC: 1.01 — SIGNIFICANT CHANGE UP (ref 1.01–1.03)
TROPONIN T SERPL-MCNC: <0.01 NG/ML — SIGNIFICANT CHANGE UP
UROBILINOGEN FLD QL: 0.2 MG/DL — SIGNIFICANT CHANGE UP (ref 0.2–0.2)
VARIANT LYMPHS # BLD: 1 % — SIGNIFICANT CHANGE UP (ref 0–5)
WBC # BLD: 12.43 K/UL — HIGH (ref 4.8–10.8)
WBC # FLD AUTO: 12.43 K/UL — HIGH (ref 4.8–10.8)

## 2018-06-21 RX ORDER — IPRATROPIUM/ALBUTEROL SULFATE 18-103MCG
3 AEROSOL WITH ADAPTER (GRAM) INHALATION EVERY 6 HOURS
Qty: 0 | Refills: 0 | Status: DISCONTINUED | OUTPATIENT
Start: 2018-06-21 | End: 2018-06-24

## 2018-06-21 RX ORDER — CLOPIDOGREL BISULFATE 75 MG/1
75 TABLET, FILM COATED ORAL DAILY
Qty: 0 | Refills: 0 | Status: DISCONTINUED | OUTPATIENT
Start: 2018-06-21 | End: 2018-06-24

## 2018-06-21 RX ORDER — ISOSORBIDE MONONITRATE 60 MG/1
30 TABLET, EXTENDED RELEASE ORAL DAILY
Qty: 0 | Refills: 0 | Status: DISCONTINUED | OUTPATIENT
Start: 2018-06-21 | End: 2018-06-24

## 2018-06-21 RX ORDER — METOPROLOL TARTRATE 50 MG
25 TABLET ORAL DAILY
Qty: 0 | Refills: 0 | Status: DISCONTINUED | OUTPATIENT
Start: 2018-06-21 | End: 2018-06-24

## 2018-06-21 RX ORDER — HEPARIN SODIUM 5000 [USP'U]/ML
5000 INJECTION INTRAVENOUS; SUBCUTANEOUS EVERY 12 HOURS
Qty: 0 | Refills: 0 | Status: DISCONTINUED | OUTPATIENT
Start: 2018-06-21 | End: 2018-06-24

## 2018-06-21 RX ORDER — BRIMONIDINE TARTRATE 2 MG/MG
1 SOLUTION/ DROPS OPHTHALMIC THREE TIMES A DAY
Qty: 0 | Refills: 0 | Status: DISCONTINUED | OUTPATIENT
Start: 2018-06-21 | End: 2018-06-21

## 2018-06-21 RX ORDER — LISINOPRIL 2.5 MG/1
20 TABLET ORAL DAILY
Qty: 0 | Refills: 0 | Status: DISCONTINUED | OUTPATIENT
Start: 2018-06-21 | End: 2018-06-24

## 2018-06-21 RX ORDER — LEVOTHYROXINE SODIUM 125 MCG
25 TABLET ORAL DAILY
Qty: 0 | Refills: 0 | Status: DISCONTINUED | OUTPATIENT
Start: 2018-06-21 | End: 2018-06-24

## 2018-06-21 RX ORDER — ASPIRIN/CALCIUM CARB/MAGNESIUM 324 MG
325 TABLET ORAL DAILY
Qty: 0 | Refills: 0 | Status: DISCONTINUED | OUTPATIENT
Start: 2018-06-21 | End: 2018-06-24

## 2018-06-21 RX ORDER — PANTOPRAZOLE SODIUM 20 MG/1
40 TABLET, DELAYED RELEASE ORAL
Qty: 0 | Refills: 0 | Status: DISCONTINUED | OUTPATIENT
Start: 2018-06-21 | End: 2018-06-24

## 2018-06-21 RX ORDER — PANTOPRAZOLE SODIUM 20 MG/1
40 TABLET, DELAYED RELEASE ORAL AT BEDTIME
Qty: 0 | Refills: 0 | Status: DISCONTINUED | OUTPATIENT
Start: 2018-06-21 | End: 2018-06-21

## 2018-06-21 RX ORDER — IPRATROPIUM/ALBUTEROL SULFATE 18-103MCG
3 AEROSOL WITH ADAPTER (GRAM) INHALATION ONCE
Qty: 0 | Refills: 0 | Status: COMPLETED | OUTPATIENT
Start: 2018-06-21 | End: 2018-06-21

## 2018-06-21 RX ORDER — SIMVASTATIN 20 MG/1
10 TABLET, FILM COATED ORAL AT BEDTIME
Qty: 0 | Refills: 0 | Status: DISCONTINUED | OUTPATIENT
Start: 2018-06-21 | End: 2018-06-24

## 2018-06-21 RX ORDER — LATANOPROST 0.05 MG/ML
1 SOLUTION/ DROPS OPHTHALMIC; TOPICAL AT BEDTIME
Qty: 0 | Refills: 0 | Status: DISCONTINUED | OUTPATIENT
Start: 2018-06-21 | End: 2018-06-24

## 2018-06-21 RX ORDER — HYDRALAZINE HCL 50 MG
25 TABLET ORAL EVERY 12 HOURS
Qty: 0 | Refills: 0 | Status: DISCONTINUED | OUTPATIENT
Start: 2018-06-21 | End: 2018-06-24

## 2018-06-21 RX ORDER — ASPIRIN/CALCIUM CARB/MAGNESIUM 324 MG
81 TABLET ORAL DAILY
Qty: 0 | Refills: 0 | Status: DISCONTINUED | OUTPATIENT
Start: 2018-06-21 | End: 2018-06-21

## 2018-06-21 RX ORDER — ISOSORBIDE DINITRATE 5 MG/1
20 TABLET ORAL THREE TIMES A DAY
Qty: 0 | Refills: 0 | Status: DISCONTINUED | OUTPATIENT
Start: 2018-06-21 | End: 2018-06-21

## 2018-06-21 RX ORDER — CEFEPIME 1 G/1
1000 INJECTION, POWDER, FOR SOLUTION INTRAMUSCULAR; INTRAVENOUS ONCE
Qty: 0 | Refills: 0 | Status: COMPLETED | OUTPATIENT
Start: 2018-06-21 | End: 2018-06-21

## 2018-06-21 RX ADMIN — LATANOPROST 1 DROP(S): 0.05 SOLUTION/ DROPS OPHTHALMIC; TOPICAL at 23:36

## 2018-06-21 RX ADMIN — Medication 125 MILLIGRAM(S): at 17:56

## 2018-06-21 RX ADMIN — Medication 3 MILLILITER(S): at 17:58

## 2018-06-21 RX ADMIN — SIMVASTATIN 10 MILLIGRAM(S): 20 TABLET, FILM COATED ORAL at 23:36

## 2018-06-21 RX ADMIN — CEFEPIME 100 MILLIGRAM(S): 1 INJECTION, POWDER, FOR SOLUTION INTRAMUSCULAR; INTRAVENOUS at 19:58

## 2018-06-21 NOTE — ED PROVIDER NOTE - MEDICAL DECISION MAKING DETAILS
results reviewed and d/w patient and family.  Pt does report improvement with ED treatment.  Will admit for further care,

## 2018-06-21 NOTE — ED PROVIDER NOTE - ATTENDING CONTRIBUTION TO CARE
89 yo F PMHx noted presents with c/o cough x 2 weeks, progressive weakness.  Pt was admitted for pneumonia and completed course of abx.  States that since hospital d/c she has seen PMD twice and on todays visist was sent to ED for admisison.  Pt has had decreased appetite and lost 2 pounds in 1 week.,  no fevers, no n/v.  On exam pt in NAD AOA x 3, OP clear, no lad, Lungs with ronchi and wheeze b/l, no edema, abd isosft nt nd

## 2018-06-21 NOTE — H&P ADULT - HISTORY OF PRESENT ILLNESS
[age] yo female recently hospitalized is advised to go to the ER by her PMD due to weakness, fatigue and loss of appetite which has resulted in 2 pound weight loss during the week. Patient feels much better after ER intervention which included iv antibiotics, iv steroids and nebulizer treatments 89 yo female recently hospitalized with diagnosis of pneumonia is advised to go to the ER by her PMD due to weakness, fatigue and loss of appetite which has resulted in 2 pound weight loss during the week. Patient feels much better after ER intervention which included iv antibiotics, iv steroids and nebulizer treatments

## 2018-06-21 NOTE — PROGRESS NOTE ADULT - SUBJECTIVE AND OBJECTIVE BOX
Family provided a list of current medications which varies from ER listing. Will adjust current medications and leave copy of list in chart.

## 2018-06-21 NOTE — H&P ADULT - PROBLEM SELECTOR PLAN 1
for now hold spiriva, symbicort and oral prednisone and give iv steroids, duoneb treatments and iv antibiotics for now give iv steroids, duoneb treatments and iv antibiotics (patient used to be on spiriva, symbicort and oral steroids)

## 2018-06-21 NOTE — H&P ADULT - PROBLEM SELECTOR PLAN 2
iv antibiotics iv antibiotics-need official interpretation iv antibiotics-need official interpretation of cxr for now continue iv antibiotics (will continue cefepime and add zithromax)-pending official interpretation of cxr

## 2018-06-21 NOTE — H&P ADULT - NSHPLABSRESULTS_GEN_ALL_CORE
CXR- to me show loss of left sided cardiac border CXR- to me show loss of left sided cardiac border                          12.2   12.43 )-----------( 295      ( 2018 17:53 )             37.6         143  |  101  |  15  ----------------------------<  103<H>  4.7   |  29  |  1.0    Ca    9.8      2018 17:53  Mg     2.0         TPro  6.5  /  Alb  3.4<L>  /  TBili  0.6  /  DBili  <0.2  /  AST  17  /  ALT  22  /  AlkPhos  82            Urinalysis Basic - ( 2018 17:56 )    Color: Yellow / Appearance: Clear / S.010 / pH: x  Gluc: x / Ketone: Negative  / Bili: Negative / Urobili: 0.2 mg/dL   Blood: x / Protein: Negative mg/dL / Nitrite: Negative   Leuk Esterase: Negative / RBC: x / WBC x   Sq Epi: x / Non Sq Epi: x / Bacteria: x        Lactate Trend   @ 17:53 Lactate:1.2     CARDIAC MARKERS ( 2018 17:53 )  x     / <0.01 ng/mL / 24 U/L / x     / 1.5 ng/mL      CAPILLARY BLOOD GLUCOSE        Culture Results:   No growth at 5 days. ( @ 18:42)

## 2018-06-21 NOTE — ED PROVIDER NOTE - PHYSICAL EXAMINATION
VITAL SIGNS: I have reviewed nursing notes and confirm.  CONSTITUTIONAL: Well-developed; well-nourished; in no acute distress.  SKIN: Skin exam is warm and dry, no acute rash.  HEAD: Normocephalic; atraumatic.  EYES: Conjunctiva and sclera clear.  ENT: No nasal discharge; airway clear.  NECK: Supple; non tender.  CARD: S1, S2 normal; no murmurs, gallops, or rubs. Regular rate and rhythm.  RESP: (+) LUQ rhonchi/rales. Speaking in full sentences.   ABD: Normal bowel sounds; soft; non-distended; non-tender; No rebound or guarding.   EXT: Normal ROM. No clubbing, cyanosis or edema.  NEURO: Alert, oriented. Grossly unremarkable. No focal deficits.

## 2018-06-21 NOTE — ED PROVIDER NOTE - OBJECTIVE STATEMENT
89 yo F with PMHx of HTN, HLD, and hypothyroidism presents to the ED c/o SOB, cough, and weakness. Pt was recently discharged from hospital after being admitted for pneumonia. Since discharge pt states symptoms have been persisting. Pt went to PMD today who recommended she go to ED for admission. Pt denies fever, chills, nausea, vomiting, abdominal pain, diarrhea, headache, dizziness, weakness, chest pain, SOB, back pain, LOC, trauma, urinary symptoms, calf pain/swelling, recent travel, recent surgery.

## 2018-06-21 NOTE — ED ADULT NURSE REASSESSMENT NOTE - NS ED NURSE REASSESS COMMENT FT1
Patient is being admitted to medical floor for further evaluation. Alert and oriented X 3. VSS. Resting comfortably. Family at bedside. Report given to receiving nurse Clayton. Awaiting transport.

## 2018-06-22 LAB
ALBUMIN SERPL ELPH-MCNC: 3.3 G/DL — LOW (ref 3.5–5.2)
ALP SERPL-CCNC: 78 U/L — SIGNIFICANT CHANGE UP (ref 30–115)
ALT FLD-CCNC: 18 U/L — SIGNIFICANT CHANGE UP (ref 0–41)
ANION GAP SERPL CALC-SCNC: 15 MMOL/L — HIGH (ref 7–14)
AST SERPL-CCNC: 13 U/L — SIGNIFICANT CHANGE UP (ref 0–41)
BILIRUB SERPL-MCNC: 0.3 MG/DL — SIGNIFICANT CHANGE UP (ref 0.2–1.2)
BUN SERPL-MCNC: 17 MG/DL — SIGNIFICANT CHANGE UP (ref 10–20)
CALCIUM SERPL-MCNC: 9.2 MG/DL — SIGNIFICANT CHANGE UP (ref 8.5–10.1)
CHLORIDE SERPL-SCNC: 102 MMOL/L — SIGNIFICANT CHANGE UP (ref 98–110)
CO2 SERPL-SCNC: 25 MMOL/L — SIGNIFICANT CHANGE UP (ref 17–32)
CREAT SERPL-MCNC: 0.8 MG/DL — SIGNIFICANT CHANGE UP (ref 0.7–1.5)
GLUCOSE SERPL-MCNC: 186 MG/DL — HIGH (ref 70–99)
HCT VFR BLD CALC: 34.2 % — LOW (ref 37–47)
HGB BLD-MCNC: 11.2 G/DL — LOW (ref 12–16)
MCHC RBC-ENTMCNC: 31.4 PG — HIGH (ref 27–31)
MCHC RBC-ENTMCNC: 32.7 G/DL — SIGNIFICANT CHANGE UP (ref 32–37)
MCV RBC AUTO: 95.8 FL — SIGNIFICANT CHANGE UP (ref 81–99)
NRBC # BLD: 0 /100 WBCS — SIGNIFICANT CHANGE UP (ref 0–0)
PLATELET # BLD AUTO: 264 K/UL — SIGNIFICANT CHANGE UP (ref 130–400)
POTASSIUM SERPL-MCNC: 4.4 MMOL/L — SIGNIFICANT CHANGE UP (ref 3.5–5)
POTASSIUM SERPL-SCNC: 4.4 MMOL/L — SIGNIFICANT CHANGE UP (ref 3.5–5)
PROT SERPL-MCNC: 5.7 G/DL — LOW (ref 6–8)
RBC # BLD: 3.57 M/UL — LOW (ref 4.2–5.4)
RBC # FLD: 13.1 % — SIGNIFICANT CHANGE UP (ref 11.5–14.5)
SODIUM SERPL-SCNC: 142 MMOL/L — SIGNIFICANT CHANGE UP (ref 135–146)
WBC # BLD: 7.36 K/UL — SIGNIFICANT CHANGE UP (ref 4.8–10.8)
WBC # FLD AUTO: 7.36 K/UL — SIGNIFICANT CHANGE UP (ref 4.8–10.8)

## 2018-06-22 RX ORDER — CEFEPIME 1 G/1
1000 INJECTION, POWDER, FOR SOLUTION INTRAMUSCULAR; INTRAVENOUS DAILY
Qty: 0 | Refills: 0 | Status: DISCONTINUED | OUTPATIENT
Start: 2018-06-22 | End: 2018-06-24

## 2018-06-22 RX ORDER — METOPROLOL TARTRATE 50 MG
5 TABLET ORAL ONCE
Qty: 0 | Refills: 0 | Status: COMPLETED | OUTPATIENT
Start: 2018-06-22 | End: 2018-06-22

## 2018-06-22 RX ORDER — AZITHROMYCIN 500 MG/1
500 TABLET, FILM COATED ORAL EVERY 24 HOURS
Qty: 0 | Refills: 0 | Status: DISCONTINUED | OUTPATIENT
Start: 2018-06-22 | End: 2018-06-24

## 2018-06-22 RX ORDER — HYDRALAZINE HCL 50 MG
25 TABLET ORAL ONCE
Qty: 0 | Refills: 0 | Status: COMPLETED | OUTPATIENT
Start: 2018-06-22 | End: 2018-06-22

## 2018-06-22 RX ADMIN — CEFEPIME 100 MILLIGRAM(S): 1 INJECTION, POWDER, FOR SOLUTION INTRAMUSCULAR; INTRAVENOUS at 13:52

## 2018-06-22 RX ADMIN — Medication 100 MILLIGRAM(S): at 11:30

## 2018-06-22 RX ADMIN — Medication 25 MILLIGRAM(S): at 18:40

## 2018-06-22 RX ADMIN — PANTOPRAZOLE SODIUM 40 MILLIGRAM(S): 20 TABLET, DELAYED RELEASE ORAL at 05:36

## 2018-06-22 RX ADMIN — Medication 40 MILLIGRAM(S): at 05:35

## 2018-06-22 RX ADMIN — PANTOPRAZOLE SODIUM 40 MILLIGRAM(S): 20 TABLET, DELAYED RELEASE ORAL at 18:41

## 2018-06-22 RX ADMIN — Medication 3 MILLILITER(S): at 19:28

## 2018-06-22 RX ADMIN — Medication 25 MILLIGRAM(S): at 05:37

## 2018-06-22 RX ADMIN — Medication 3 MILLILITER(S): at 13:31

## 2018-06-22 RX ADMIN — LATANOPROST 1 DROP(S): 0.05 SOLUTION/ DROPS OPHTHALMIC; TOPICAL at 21:47

## 2018-06-22 RX ADMIN — LISINOPRIL 20 MILLIGRAM(S): 2.5 TABLET ORAL at 05:36

## 2018-06-22 RX ADMIN — Medication 40 MILLIGRAM(S): at 18:41

## 2018-06-22 RX ADMIN — Medication 25 MILLIGRAM(S): at 05:36

## 2018-06-22 RX ADMIN — AZITHROMYCIN 255 MILLIGRAM(S): 500 TABLET, FILM COATED ORAL at 12:32

## 2018-06-22 RX ADMIN — Medication 325 MILLIGRAM(S): at 11:30

## 2018-06-22 RX ADMIN — HEPARIN SODIUM 5000 UNIT(S): 5000 INJECTION INTRAVENOUS; SUBCUTANEOUS at 18:40

## 2018-06-22 RX ADMIN — CLOPIDOGREL BISULFATE 75 MILLIGRAM(S): 75 TABLET, FILM COATED ORAL at 11:31

## 2018-06-22 RX ADMIN — HEPARIN SODIUM 5000 UNIT(S): 5000 INJECTION INTRAVENOUS; SUBCUTANEOUS at 05:34

## 2018-06-22 RX ADMIN — Medication 25 MICROGRAM(S): at 05:36

## 2018-06-22 RX ADMIN — SIMVASTATIN 10 MILLIGRAM(S): 20 TABLET, FILM COATED ORAL at 21:46

## 2018-06-22 RX ADMIN — Medication 25 MILLIGRAM(S): at 11:33

## 2018-06-22 RX ADMIN — Medication 3 MILLILITER(S): at 07:26

## 2018-06-22 RX ADMIN — ISOSORBIDE MONONITRATE 30 MILLIGRAM(S): 60 TABLET, EXTENDED RELEASE ORAL at 11:31

## 2018-06-23 RX ADMIN — Medication 3 MILLILITER(S): at 19:21

## 2018-06-23 RX ADMIN — HEPARIN SODIUM 5000 UNIT(S): 5000 INJECTION INTRAVENOUS; SUBCUTANEOUS at 17:19

## 2018-06-23 RX ADMIN — Medication 25 MILLIGRAM(S): at 05:53

## 2018-06-23 RX ADMIN — CEFEPIME 100 MILLIGRAM(S): 1 INJECTION, POWDER, FOR SOLUTION INTRAMUSCULAR; INTRAVENOUS at 13:38

## 2018-06-23 RX ADMIN — PANTOPRAZOLE SODIUM 40 MILLIGRAM(S): 20 TABLET, DELAYED RELEASE ORAL at 05:52

## 2018-06-23 RX ADMIN — Medication 25 MILLIGRAM(S): at 17:19

## 2018-06-23 RX ADMIN — Medication 3 MILLILITER(S): at 01:31

## 2018-06-23 RX ADMIN — LISINOPRIL 20 MILLIGRAM(S): 2.5 TABLET ORAL at 05:52

## 2018-06-23 RX ADMIN — Medication 25 MILLIGRAM(S): at 05:52

## 2018-06-23 RX ADMIN — Medication 25 MICROGRAM(S): at 05:52

## 2018-06-23 RX ADMIN — AZITHROMYCIN 255 MILLIGRAM(S): 500 TABLET, FILM COATED ORAL at 11:46

## 2018-06-23 RX ADMIN — Medication 40 MILLIGRAM(S): at 17:19

## 2018-06-23 RX ADMIN — Medication 3 MILLILITER(S): at 07:22

## 2018-06-23 RX ADMIN — SIMVASTATIN 10 MILLIGRAM(S): 20 TABLET, FILM COATED ORAL at 21:18

## 2018-06-23 RX ADMIN — ISOSORBIDE MONONITRATE 30 MILLIGRAM(S): 60 TABLET, EXTENDED RELEASE ORAL at 11:47

## 2018-06-23 RX ADMIN — PANTOPRAZOLE SODIUM 40 MILLIGRAM(S): 20 TABLET, DELAYED RELEASE ORAL at 19:21

## 2018-06-23 RX ADMIN — CLOPIDOGREL BISULFATE 75 MILLIGRAM(S): 75 TABLET, FILM COATED ORAL at 11:47

## 2018-06-23 RX ADMIN — LATANOPROST 1 DROP(S): 0.05 SOLUTION/ DROPS OPHTHALMIC; TOPICAL at 21:18

## 2018-06-23 RX ADMIN — Medication 100 MILLIGRAM(S): at 05:58

## 2018-06-23 RX ADMIN — HEPARIN SODIUM 5000 UNIT(S): 5000 INJECTION INTRAVENOUS; SUBCUTANEOUS at 05:52

## 2018-06-23 RX ADMIN — Medication 3 MILLILITER(S): at 13:48

## 2018-06-23 RX ADMIN — Medication 40 MILLIGRAM(S): at 05:52

## 2018-06-23 RX ADMIN — Medication 325 MILLIGRAM(S): at 11:47

## 2018-06-23 NOTE — CONSULT NOTE ADULT - SUBJECTIVE AND OBJECTIVE BOX
CHIRAG HERNÁNDEZ  MRN-[id number]    HISTORY OF PRESENT ILLNESS:    89 yo female recently hospitalized with diagnosis of pneumonia is advised to go to the ER by her PMD due to weakness, fatigue and loss of appetite which has resulted in 2 pound weight loss during the week. Patient feels much better after ER intervention which included iv antibiotics, iv steroids and nebulizer treatments      PMH/PSH:  PAST MEDICAL & SURGICAL HISTORY:  Hypertension  Hypothyroid  High blood cholesterol  H/O abdominal hysterectomy  History of cholecystectomy  History of coronary artery stent placement    ALLERGIES:  Allergies    No Known Allergies    Intolerances      SOCIAL HABITS:  ex smoker    REVIEW OF SYSTEM:  Elements of review of systems are negative or non-applicable except as noted above in HPI section.       HOME MEDICATIONS:  aspirin 81 mg oral tablet, chewable  brimonidine 0.2% ophthalmic solution  clopidogrel 75 mg oral tablet  hydrALAZINE 25 mg oral tablet  isosorbide dinitrate 20 mg oral tablet  latanoprost 0.005% ophthalmic solution  lisinopril 20 mg oral tablet  metoprolol succinate 25 mg oral tablet, extended release  pantoprazole 20 mg oral delayed release tablet  pravastatin 20 mg oral tablet  Synthroid 25 mcg (0.025 mg) oral tablet    MEDICATIONS:  MEDICATIONS  (STANDING):  ALBUTerol/ipratropium for Nebulization 3 milliLiter(s) Nebulizer every 6 hours  aspirin 325 milliGRAM(s) Oral daily  azithromycin  IVPB 500 milliGRAM(s) IV Intermittent every 24 hours  cefepime   IVPB 1000 milliGRAM(s) IV Intermittent daily  clopidogrel Tablet 75 milliGRAM(s) Oral daily  heparin  Injectable 5000 Unit(s) SubCutaneous every 12 hours  hydrALAZINE 25 milliGRAM(s) Oral every 12 hours  isosorbide   mononitrate ER Tablet (IMDUR) 30 milliGRAM(s) Oral daily  latanoprost 0.005% Ophthalmic Solution 1 Drop(s) Both EYES at bedtime  levothyroxine 25 MICROGram(s) Oral daily  lisinopril 20 milliGRAM(s) Oral daily  methylPREDNISolone sodium succinate Injectable 40 milliGRAM(s) IV Push every 12 hours  metoprolol succinate ER 25 milliGRAM(s) Oral daily  pantoprazole    Tablet 40 milliGRAM(s) Oral two times a day  simvastatin 10 milliGRAM(s) Oral at bedtime    MEDICATIONS  (PRN):  guaiFENesin    Syrup 100 milliGRAM(s) Oral every 6 hours PRN Cough        VITALS:   Vital Signs Last 24 Hrs  T(C): 35.7 (24 Jun 2018 05:05), Max: 36 (23 Jun 2018 14:46)  T(F): 96.3 (24 Jun 2018 05:05), Max: 96.8 (23 Jun 2018 14:46)  HR: 65 (24 Jun 2018 05:05) (65 - 72)  BP: 155/70 (24 Jun 2018 05:05) (151/67 - 161/71)  BP(mean): --  RR: 16 (24 Jun 2018 05:05) (14 - 16)  SpO2: --        PHYSICAL EXAM:    GENERAL: NAD, well-developed  HEAD:  Atraumatic, Normocephalic  NECK: Supple, No JVD  CHEST/LUNG: wheeze  HEART: Regular rate and rhythm; No murmurs, rubs, or gallops  ABDOMEN: Soft, Nontender, Nondistended; Bowel sounds present  EXTREMITIES:  Good peripheral Pulses, No clubbing, cyanosis, or edema      LABS:      Culture - Blood (collected 06-21-18 @ 17:53)  Source: .Blood Blood  Preliminary Report (06-23-18 @ 01:02):    No growth to date.    Culture - Blood (collected 06-21-18 @ 17:53)  Source: .Blood Blood  Preliminary Report (06-23-18 @ 01:02):    No growth to date.        DIAGNOSTIC STUDIES:  < from: Xray Chest 2 Views PA/Lat (06.21.18 @ 17:26) >  Impression:      Left basilar opacity, decreased.    < end of copied text >

## 2018-06-23 NOTE — CONSULT NOTE ADULT - ASSESSMENT
Bronchiectasis/COPD with acute exac  Recent Pna s/p abx    cont iv steroids, switch to po prednisone 40 mg for 5 days start in am  start symbicort bid 160/4.5 2 puff bid  albuterol nebs prn  cont abx for now  follow up pan cultures  id eval  may benefit from bronchoscopic eval down the line as an outpt, to r/o atypical mycobaterial infection, would not complete as in pt as pt actively wheezing  dvt/gi px  oob-chair  consider pt eval

## 2018-06-23 NOTE — PROGRESS NOTE ADULT - SUBJECTIVE AND OBJECTIVE BOX
Pt seen and examined. Still C/O cough and ROBERTS    T(F): , Max: 96.9 (06-23-18 @ 05:47)  HR: 73 (06-23-18 @ 05:47) (71 - 74)  BP: 166/72 (06-23-18 @ 05:47)  RR: 16 (06-23-18 @ 05:47)  SpO2: 95% (06-22-18 @ 19:40)  General: No apparent distress  Cardiovascular: S1, S2  Gastrointestinal: Soft, Non-tender, Non-distended  Respiratory: Good air entry bilaterally  Musculoskeletal: Moves all extremities  Lymphatic: No edema  Neurologic: No gross motor deficit  Dermatologic: Skin dry                          11.2   7.36  )-----------( 264      ( 22 Jun 2018 07:19 )             34.2     06-22    142  |  102  |  17  ----------------------------<  186<H>  4.4   |  25  |  0.8    Ca    9.2      22 Jun 2018 07:19  Mg     2.0     06-21    TPro  5.7<L>  /  Alb  3.3<L>  /  TBili  0.3  /  DBili  x   /  AST  13  /  ALT  18  /  AlkPhos  78  06-22      Culture - Blood (collected 21 Jun 2018 17:53)  Source: .Blood Blood  Preliminary Report (23 Jun 2018 01:02):    No growth to date.    Culture - Blood (collected 21 Jun 2018 17:53)  Source: .Blood Blood  Preliminary Report (23 Jun 2018 01:02):    No growth to date.

## 2018-06-24 ENCOUNTER — TRANSCRIPTION ENCOUNTER (OUTPATIENT)
Age: 83
End: 2018-06-24

## 2018-06-24 VITALS
SYSTOLIC BLOOD PRESSURE: 155 MMHG | TEMPERATURE: 96 F | RESPIRATION RATE: 16 BRPM | DIASTOLIC BLOOD PRESSURE: 70 MMHG | HEART RATE: 65 BPM

## 2018-06-24 RX ORDER — ISOSORBIDE DINITRATE 5 MG/1
0 TABLET ORAL
Qty: 0 | Refills: 0 | COMMUNITY

## 2018-06-24 RX ORDER — AZITHROMYCIN 500 MG/1
1 TABLET, FILM COATED ORAL
Qty: 5 | Refills: 0 | OUTPATIENT
Start: 2018-06-24

## 2018-06-24 RX ADMIN — Medication 25 MICROGRAM(S): at 05:34

## 2018-06-24 RX ADMIN — LISINOPRIL 20 MILLIGRAM(S): 2.5 TABLET ORAL at 05:34

## 2018-06-24 RX ADMIN — Medication 40 MILLIGRAM(S): at 05:33

## 2018-06-24 RX ADMIN — PANTOPRAZOLE SODIUM 40 MILLIGRAM(S): 20 TABLET, DELAYED RELEASE ORAL at 05:34

## 2018-06-24 RX ADMIN — HEPARIN SODIUM 5000 UNIT(S): 5000 INJECTION INTRAVENOUS; SUBCUTANEOUS at 05:33

## 2018-06-24 RX ADMIN — Medication 25 MILLIGRAM(S): at 05:34

## 2018-06-24 NOTE — DISCHARGE NOTE ADULT - MEDICATION SUMMARY - MEDICATIONS TO STOP TAKING
I will STOP taking the medications listed below when I get home from the hospital:    isosorbide    Levaquin 500 mg oral tablet  -- 1 tab(s) by mouth once a day   -- Avoid prolonged or excessive exposure to direct and/or artificial sunlight while taking this medication.  Do not take dairy products, antacids, or iron preparations within one hour of this medication.  Finish all this medication unless otherwise directed by prescriber.  May cause drowsiness or dizziness.  Medication should be taken with plenty of water.

## 2018-06-24 NOTE — DISCHARGE NOTE ADULT - HOSPITAL COURSE
Pt admitted for cough an SOB, Found to be wheezing. Recently admitted for PNA/COPD flare. Pt started on steroids/Abx and nebs. Seen by pulmonary. Discharged in stable condition with instructions to F/U pulm within 2 weeks in office

## 2018-06-24 NOTE — DISCHARGE NOTE ADULT - CARE PLAN
Principal Discharge DX:	COPD with lower respiratory infection  Goal:	To breathe well  Assessment and plan of treatment:	Take medications as prescribed and F/U with Pulm as outpatient within 2 weeks

## 2018-06-24 NOTE — DISCHARGE NOTE ADULT - PATIENT PORTAL LINK FT
You can access the [facility] Patient Portal, offered by [facility], by registering with the following website: [email or web address]

## 2018-06-24 NOTE — DISCHARGE NOTE ADULT - MEDICATION SUMMARY - MEDICATIONS TO TAKE
I will START or STAY ON the medications listed below when I get home from the hospital:    predniSONE 10 mg oral tablet  -- 4 tab daily x 3 days, 3 tab daily x 3 days, 2 tab daily  x 3 days, 1 tab daily x 3 days  -- It is very important that you take or use this exactly as directed.  Do not skip doses or discontinue unless directed by your doctor.  Obtain medical advice before taking any non-prescription drugs as some may affect the action of this medication.  Take with food or milk.    -- Indication: For COPD    aspirin 81 mg oral tablet, chewable  -- 1 tab(s) by mouth once a day  -- Indication: For Hypertension, unspecified type    lisinopril 20 mg oral tablet  -- 1 tab(s) by mouth once a day  -- Indication: For Hypertension, unspecified type    isosorbide dinitrate 20 mg oral tablet  -- 1 tab(s) by mouth 3 times a day  -- Indication: For CAD    pravastatin 20 mg oral tablet  -- 1 tab(s) by mouth once a day  -- Indication: For High blood cholesterol    clopidogrel 75 mg oral tablet  -- 1 tab(s) by mouth once a day  -- Indication: For CAD    metoprolol succinate 25 mg oral tablet, extended release  -- Indication: For Hypertension, unspecified type    Symbicort 80 mcg-4.5 mcg/inh inhalation aerosol  -- 2 puff(s) inhaled every 12 hours   -- Check with your doctor before becoming pregnant.  For inhalation only.  Rinse mouth thoroughly after use.    -- Indication: For COPD    Spiriva 18 mcg inhalation capsule  -- 1 cap(s) inhaled once a day   -- Check with your doctor before becoming pregnant.  For inhalation only.  It is very important that you take or use this exactly as directed.  Do not skip doses or discontinue unless directed by your doctor.  Obtain medical advice before taking any non-prescription drugs as some may affect the action of this medication.    -- Indication: For COPD    ipratropium-albuterol 20 mcg-100 mcg/inh inhalation aerosol  -- 2 puff(s) inhaled every 6 hours, As Needed   -- Check with your doctor before becoming pregnant.  For inhalation only.  May cause drowsiness or dizziness.  Obtain medical advice before taking any non-prescription drugs as some may affect the action of this medication.  This drug may impair the ability to drive or operate machinery.  Use care until you become familiar with its effects.    -- Indication: For COPD    guaiFENesin 100 mg/5 mL oral liquid  -- 10 milliliter(s) by mouth every 6 hours  -- Indication: For COPD    azithromycin 500 mg oral tablet  -- 1 tab(s) by mouth once a day   -- Do not take dairy products, antacids, or iron preparations within one hour of this medication.  Finish all this medication unless otherwise directed by prescriber.    -- Indication: For COPD    brimonidine 0.2% ophthalmic solution  -- 1 drop(s) to each affected eye 3 times a day  -- Indication: For Eyes    latanoprost 0.005% ophthalmic solution  -- 1 drop(s) to each affected eye once a day (in the evening)  -- Indication: For Eyes    pantoprazole 20 mg oral delayed release tablet  -- 1 tab(s) by mouth once a day  -- Indication: For GERD    Synthroid 25 mcg (0.025 mg) oral tablet  -- 1 tab(s) by mouth once a day  -- Indication: For Hypothyroidism, unspecified type    hydrALAZINE 25 mg oral tablet  -- Indication: For Hypertension, unspecified type

## 2018-06-27 LAB
CULTURE RESULTS: SIGNIFICANT CHANGE UP
CULTURE RESULTS: SIGNIFICANT CHANGE UP
SPECIMEN SOURCE: SIGNIFICANT CHANGE UP
SPECIMEN SOURCE: SIGNIFICANT CHANGE UP

## 2018-06-28 DIAGNOSIS — R63.4 ABNORMAL WEIGHT LOSS: ICD-10-CM

## 2018-06-28 DIAGNOSIS — Z87.891 PERSONAL HISTORY OF NICOTINE DEPENDENCE: ICD-10-CM

## 2018-06-28 DIAGNOSIS — Z95.5 PRESENCE OF CORONARY ANGIOPLASTY IMPLANT AND GRAFT: ICD-10-CM

## 2018-06-28 DIAGNOSIS — J47.1 BRONCHIECTASIS WITH (ACUTE) EXACERBATION: ICD-10-CM

## 2018-06-28 DIAGNOSIS — J44.1 CHRONIC OBSTRUCTIVE PULMONARY DISEASE WITH (ACUTE) EXACERBATION: ICD-10-CM

## 2018-06-28 DIAGNOSIS — I25.10 ATHEROSCLEROTIC HEART DISEASE OF NATIVE CORONARY ARTERY WITHOUT ANGINA PECTORIS: ICD-10-CM

## 2018-06-28 DIAGNOSIS — R63.0 ANOREXIA: ICD-10-CM

## 2018-06-28 DIAGNOSIS — E78.5 HYPERLIPIDEMIA, UNSPECIFIED: ICD-10-CM

## 2018-06-28 DIAGNOSIS — E03.9 HYPOTHYROIDISM, UNSPECIFIED: ICD-10-CM

## 2018-06-28 DIAGNOSIS — J18.9 PNEUMONIA, UNSPECIFIED ORGANISM: ICD-10-CM

## 2018-06-28 DIAGNOSIS — K21.9 GASTRO-ESOPHAGEAL REFLUX DISEASE WITHOUT ESOPHAGITIS: ICD-10-CM

## 2018-06-28 DIAGNOSIS — I10 ESSENTIAL (PRIMARY) HYPERTENSION: ICD-10-CM

## 2018-06-28 DIAGNOSIS — J44.0 CHRONIC OBSTRUCTIVE PULMONARY DISEASE WITH (ACUTE) LOWER RESPIRATORY INFECTION: ICD-10-CM

## 2018-07-11 ENCOUNTER — EMERGENCY (EMERGENCY)
Facility: HOSPITAL | Age: 83
LOS: 0 days | Discharge: HOME | End: 2018-07-11
Attending: EMERGENCY MEDICINE | Admitting: EMERGENCY MEDICINE

## 2018-07-11 VITALS
HEART RATE: 84 BPM | OXYGEN SATURATION: 98 % | RESPIRATION RATE: 18 BRPM | DIASTOLIC BLOOD PRESSURE: 78 MMHG | SYSTOLIC BLOOD PRESSURE: 156 MMHG

## 2018-07-11 VITALS
OXYGEN SATURATION: 97 % | HEIGHT: 68 IN | TEMPERATURE: 96 F | SYSTOLIC BLOOD PRESSURE: 197 MMHG | DIASTOLIC BLOOD PRESSURE: 87 MMHG | HEART RATE: 77 BPM | WEIGHT: 175.05 LBS | RESPIRATION RATE: 19 BRPM

## 2018-07-11 DIAGNOSIS — M54.9 DORSALGIA, UNSPECIFIED: ICD-10-CM

## 2018-07-11 DIAGNOSIS — E03.9 HYPOTHYROIDISM, UNSPECIFIED: ICD-10-CM

## 2018-07-11 DIAGNOSIS — Z90.710 ACQUIRED ABSENCE OF BOTH CERVIX AND UTERUS: Chronic | ICD-10-CM

## 2018-07-11 DIAGNOSIS — Z90.49 ACQUIRED ABSENCE OF OTHER SPECIFIED PARTS OF DIGESTIVE TRACT: Chronic | ICD-10-CM

## 2018-07-11 DIAGNOSIS — M54.5 LOW BACK PAIN: ICD-10-CM

## 2018-07-11 DIAGNOSIS — R10.33 PERIUMBILICAL PAIN: ICD-10-CM

## 2018-07-11 DIAGNOSIS — I10 ESSENTIAL (PRIMARY) HYPERTENSION: ICD-10-CM

## 2018-07-11 DIAGNOSIS — Z95.5 PRESENCE OF CORONARY ANGIOPLASTY IMPLANT AND GRAFT: Chronic | ICD-10-CM

## 2018-07-11 DIAGNOSIS — Z79.899 OTHER LONG TERM (CURRENT) DRUG THERAPY: ICD-10-CM

## 2018-07-11 DIAGNOSIS — E78.5 HYPERLIPIDEMIA, UNSPECIFIED: ICD-10-CM

## 2018-07-11 DIAGNOSIS — Z79.82 LONG TERM (CURRENT) USE OF ASPIRIN: ICD-10-CM

## 2018-07-11 LAB
ALBUMIN SERPL ELPH-MCNC: 4 G/DL — SIGNIFICANT CHANGE UP (ref 3.5–5.2)
ALP SERPL-CCNC: 72 U/L — SIGNIFICANT CHANGE UP (ref 30–115)
ALT FLD-CCNC: 18 U/L — SIGNIFICANT CHANGE UP (ref 0–41)
ANION GAP SERPL CALC-SCNC: 13 MMOL/L — SIGNIFICANT CHANGE UP (ref 7–14)
AST SERPL-CCNC: 20 U/L — SIGNIFICANT CHANGE UP (ref 0–41)
BASOPHILS # BLD AUTO: 0.05 K/UL — SIGNIFICANT CHANGE UP (ref 0–0.2)
BASOPHILS NFR BLD AUTO: 0.6 % — SIGNIFICANT CHANGE UP (ref 0–1)
BILIRUB SERPL-MCNC: 0.7 MG/DL — SIGNIFICANT CHANGE UP (ref 0.2–1.2)
BUN SERPL-MCNC: 18 MG/DL — SIGNIFICANT CHANGE UP (ref 10–20)
CALCIUM SERPL-MCNC: 9.9 MG/DL — SIGNIFICANT CHANGE UP (ref 8.5–10.1)
CHLORIDE SERPL-SCNC: 103 MMOL/L — SIGNIFICANT CHANGE UP (ref 98–110)
CO2 SERPL-SCNC: 28 MMOL/L — SIGNIFICANT CHANGE UP (ref 17–32)
CREAT SERPL-MCNC: 0.8 MG/DL — SIGNIFICANT CHANGE UP (ref 0.7–1.5)
EOSINOPHIL # BLD AUTO: 0.25 K/UL — SIGNIFICANT CHANGE UP (ref 0–0.7)
EOSINOPHIL NFR BLD AUTO: 3.1 % — SIGNIFICANT CHANGE UP (ref 0–8)
GLUCOSE SERPL-MCNC: 95 MG/DL — SIGNIFICANT CHANGE UP (ref 70–99)
HCT VFR BLD CALC: 36.7 % — LOW (ref 37–47)
HGB BLD-MCNC: 11.9 G/DL — LOW (ref 12–16)
IMM GRANULOCYTES NFR BLD AUTO: 0.4 % — HIGH (ref 0.1–0.3)
LIDOCAIN IGE QN: 16 U/L — SIGNIFICANT CHANGE UP (ref 7–60)
LYMPHOCYTES # BLD AUTO: 1.81 K/UL — SIGNIFICANT CHANGE UP (ref 1.2–3.4)
LYMPHOCYTES # BLD AUTO: 22.1 % — SIGNIFICANT CHANGE UP (ref 20.5–51.1)
MCHC RBC-ENTMCNC: 31.4 PG — HIGH (ref 27–31)
MCHC RBC-ENTMCNC: 32.4 G/DL — SIGNIFICANT CHANGE UP (ref 32–37)
MCV RBC AUTO: 96.8 FL — SIGNIFICANT CHANGE UP (ref 81–99)
MONOCYTES # BLD AUTO: 0.76 K/UL — HIGH (ref 0.1–0.6)
MONOCYTES NFR BLD AUTO: 9.3 % — SIGNIFICANT CHANGE UP (ref 1.7–9.3)
NEUTROPHILS # BLD AUTO: 5.29 K/UL — SIGNIFICANT CHANGE UP (ref 1.4–6.5)
NEUTROPHILS NFR BLD AUTO: 64.5 % — SIGNIFICANT CHANGE UP (ref 42.2–75.2)
NRBC # BLD: 0 /100 WBCS — SIGNIFICANT CHANGE UP (ref 0–0)
PLATELET # BLD AUTO: 205 K/UL — SIGNIFICANT CHANGE UP (ref 130–400)
POTASSIUM SERPL-MCNC: 5.2 MMOL/L — HIGH (ref 3.5–5)
POTASSIUM SERPL-SCNC: 5.2 MMOL/L — HIGH (ref 3.5–5)
PROT SERPL-MCNC: 6.7 G/DL — SIGNIFICANT CHANGE UP (ref 6–8)
RBC # BLD: 3.79 M/UL — LOW (ref 4.2–5.4)
RBC # FLD: 13.2 % — SIGNIFICANT CHANGE UP (ref 11.5–14.5)
SODIUM SERPL-SCNC: 144 MMOL/L — SIGNIFICANT CHANGE UP (ref 135–146)
WBC # BLD: 8.19 K/UL — SIGNIFICANT CHANGE UP (ref 4.8–10.8)
WBC # FLD AUTO: 8.19 K/UL — SIGNIFICANT CHANGE UP (ref 4.8–10.8)

## 2018-07-11 RX ORDER — BRIMONIDINE TARTRATE 2 MG/MG
1 SOLUTION/ DROPS OPHTHALMIC
Qty: 0 | Refills: 0 | COMMUNITY

## 2018-07-11 RX ORDER — SODIUM CHLORIDE 9 MG/ML
1000 INJECTION INTRAMUSCULAR; INTRAVENOUS; SUBCUTANEOUS ONCE
Qty: 0 | Refills: 0 | Status: COMPLETED | OUTPATIENT
Start: 2018-07-11 | End: 2018-07-11

## 2018-07-11 RX ADMIN — SODIUM CHLORIDE 1000 MILLILITER(S): 9 INJECTION INTRAMUSCULAR; INTRAVENOUS; SUBCUTANEOUS at 19:50

## 2018-07-11 NOTE — ED PROVIDER NOTE - MEDICAL DECISION MAKING DETAILS
results reviewed and d/w patient and grandson.  Copies given.  Will continue, Tylenol or Motrin.  f/u with PMD . Pt has appt tomorrow.  Instructed to return if any worsneing symptoms or concerns.

## 2018-07-11 NOTE — ED PROVIDER NOTE - NS ED ROS FT
Review of Systems    Constitutional: (-) fever/ chills (-) weight loss  Eyes/ENT: (-) blurry vision, (-) epistaxis (-) sore throat (-) ear pain  Cardiovascular: (-) chest pain, (-) syncope (-) palpitations  Respiratory: (-) cough, (-) shortness of breath  Gastrointestinal: (-) vomiting, (-) diarrhea (+) abdominal pain  Musculoskeletal: (-) neck pain, (+) back pain, (-) joint pain (-) pedal edema   Integumentary: (-) rash, (-) swelling  Neurological: (-) headache, (-) altered mental status  Psychiatric: (-) hallucinations or depression   Allergic/Immunologic: (-) pruritus

## 2018-07-11 NOTE — ED PROVIDER NOTE - OBJECTIVE STATEMENT
PT comes in c/o lower back pain and abdominal pain. PT states that she has been taking Motrin for the lower back pain with minimal relief. Pt denies any fever, chills, nausea, vomiting, CP, SOB, urinary symptoms. Pt was recently treated for PNA. PT comes in c/o right lower back pain and abdominal pain. PT states that she has been taking Motrin for the lower back pain with minimal relief. Pt denies any fever, chills, nausea, vomiting, CP, SOB, urinary symptoms. Pt was recently treated for PNA with hospital admission. no cough or fever. patient has been sleeping on recliner recently instead of bed.

## 2018-07-11 NOTE — ED PROVIDER NOTE - ATTENDING CONTRIBUTION TO CARE
89 yo F PMHx noted presents with c/o lower back pain x few days.  Taking Motrin with little relief,  Worse with movement, no h/o trauma, no fever , no urinary complaints.  Pt c/o some abd discomfort as well.  Pt with recent admission for pneumonia.  On exam pt in NAD AAO x 3, appears well.  ambulates with walker, + tender with spasm to left lumbar paraspinals, no rash, no midline vertebral tenderness, Ext atraumatic, FROM, no edema, good tone, equal strength, abd is soft + tender right abdomen, + healing ecchymosis to left lower abdomen,

## 2018-07-11 NOTE — ED PROVIDER NOTE - PHYSICAL EXAMINATION
Vital Signs: I have reviewed the initial vital signs.  Constitutional: well-nourished, no acute distress, normocephalic  Eyes: PERRLA, EOMI, no nystagmus, clear conjunctiva  ENT: MMM, TM b/l clear , no nasal congestion  Cardiovascular: regular rate, regular rhythm, no murmur appreciated  Respiratory: unlabored respiratory effort, clear to auscultation bilaterally  Gastrointestinal: soft, (+) Right sided abdominal tenderness, no rebound, no guarding, non-distended  abdomen, no pulsatile mass  Musculoskeletal: (+) left lower paraspinal muscle spasm, no midline tenderness,   Integumentary: warm, dry, no rash  Neurologic: awake, alert, cranial nerves II-XII grossly intact, extremities’ motor and sensory functions grossly intact, no focal deficits, GCS 15  Psychiatric: appropriate mood, appropriate affect Vital Signs: I have reviewed the initial vital signs.  Constitutional: well-nourished, no acute distress,   Eyes: PERRLA, EOMI, clear conjunctiva  ENT: MMM,   Cardiovascular: regular rate, regular rhythm, no murmur appreciated  Respiratory: unlabored respiratory effort, clear to auscultation bilaterally  Gastrointestinal: soft, (+) Right sided abdominal tenderness, +para umbilical hernia, no rebound, no guarding, non-distended  abdomen, no pulsatile mass  Musculoskeletal: (+) left lower paraspinal muscle spasm, no midline tenderness,   Integumentary: warm, dry, no rash  Neurologic: awake, alert, cranial nerves II-XII grossly intact, extremities’ motor and sensory functions grossly intact, no focal deficits,   Psychiatric: appropriate mood, appropriate affect

## 2018-09-02 NOTE — PATIENT PROFILE ADULT. - PHONE #
continue IV ceftriaxone  follow up urine cultures  ID following  Septic Shock due to UTI with metabolic Encephelopathy, DONNA and Hyperglycemia now resolved [phone] (aeyk)

## 2018-10-02 ENCOUNTER — OUTPATIENT (OUTPATIENT)
Dept: OUTPATIENT SERVICES | Facility: HOSPITAL | Age: 83
LOS: 1 days | Discharge: HOME | End: 2018-10-02

## 2018-10-02 DIAGNOSIS — Z95.5 PRESENCE OF CORONARY ANGIOPLASTY IMPLANT AND GRAFT: Chronic | ICD-10-CM

## 2018-10-02 DIAGNOSIS — J18.2 HYPOSTATIC PNEUMONIA, UNSPECIFIED ORGANISM: ICD-10-CM

## 2018-10-02 DIAGNOSIS — Z90.49 ACQUIRED ABSENCE OF OTHER SPECIFIED PARTS OF DIGESTIVE TRACT: Chronic | ICD-10-CM

## 2018-10-02 DIAGNOSIS — Z90.710 ACQUIRED ABSENCE OF BOTH CERVIX AND UTERUS: Chronic | ICD-10-CM

## 2018-12-10 NOTE — H&P ADULT - NSCORESITESY/N_GEN_A_CORE_RD
[id number]  [address] [place] [id number]  Dept: [phone]  Dept Fax: [phone]    Adolfo Barker is a 13 y.o. female who presents today forher medical conditions/complaints as noted below. Adolfo Barker is c/o of   Chief Complaint   Patient presents with    Pharyngitis     started lastnight         HPI:     Pharyngitis   This is a new problem. The current episode started today (woke up this morning ). The problem occurs constantly. Associated symptoms include congestion and a sore throat. Pertinent negatives include no abdominal pain, anorexia, arthralgias, change in bowel habit, chest pain, chills, coughing, diaphoresis, fatigue, fever, headaches, joint swelling, myalgias, nausea, neck pain, numbness, rash, swollen glands, urinary symptoms, vertigo, visual change, vomiting or weakness. Associated symptoms comments: No otalgia or fullness. . She has tried NSAIDs for the symptoms. The treatment provided mild relief. Adolfo Barker in walk-in accompanied by father with C/O sore throat       Past Medical History:   Diagnosis Date    Allergy     Asthma       History reviewed. No pertinent surgical history. Family History   Problem Relation Age of Onset    Cancer Father     Diabetes Father        Social History   Substance Use Topics    Smoking status: Passive Smoke Exposure - Never Smoker    Smokeless tobacco: Never Used    Alcohol use No      No current outpatient prescriptions on file. No current facility-administered medications for this visit. No Known Allergies        Subjective:      Review of Systems   Constitutional: Negative for appetite change, chills, diaphoresis, fatigue and fever. HENT: Positive for congestion, postnasal drip and sore throat. Negative for drooling, ear discharge, ear pain, facial swelling, hearing loss, mouth sores, nosebleeds, rhinorrhea, sinus pain, sinus pressure and sneezing. Eyes: Negative.
No

## 2019-01-17 ENCOUNTER — OUTPATIENT (OUTPATIENT)
Dept: OUTPATIENT SERVICES | Facility: HOSPITAL | Age: 84
LOS: 1 days | Discharge: HOME | End: 2019-01-17

## 2019-01-17 DIAGNOSIS — I10 ESSENTIAL (PRIMARY) HYPERTENSION: ICD-10-CM

## 2019-01-17 DIAGNOSIS — Z95.5 PRESENCE OF CORONARY ANGIOPLASTY IMPLANT AND GRAFT: Chronic | ICD-10-CM

## 2019-01-17 DIAGNOSIS — Z90.710 ACQUIRED ABSENCE OF BOTH CERVIX AND UTERUS: Chronic | ICD-10-CM

## 2019-01-17 DIAGNOSIS — Z90.49 ACQUIRED ABSENCE OF OTHER SPECIFIED PARTS OF DIGESTIVE TRACT: Chronic | ICD-10-CM

## 2019-02-20 ENCOUNTER — OUTPATIENT (OUTPATIENT)
Dept: OUTPATIENT SERVICES | Facility: HOSPITAL | Age: 84
LOS: 1 days | Discharge: HOME | End: 2019-02-20

## 2019-02-20 DIAGNOSIS — Z90.49 ACQUIRED ABSENCE OF OTHER SPECIFIED PARTS OF DIGESTIVE TRACT: Chronic | ICD-10-CM

## 2019-02-20 DIAGNOSIS — Z90.710 ACQUIRED ABSENCE OF BOTH CERVIX AND UTERUS: Chronic | ICD-10-CM

## 2019-02-20 DIAGNOSIS — R51 HEADACHE: ICD-10-CM

## 2019-02-20 DIAGNOSIS — Z95.5 PRESENCE OF CORONARY ANGIOPLASTY IMPLANT AND GRAFT: Chronic | ICD-10-CM

## 2019-10-21 ENCOUNTER — APPOINTMENT (OUTPATIENT)
Dept: CARDIOLOGY | Facility: CLINIC | Age: 84
End: 2019-10-21
Payer: MEDICARE

## 2019-10-21 PROCEDURE — 99214 OFFICE O/P EST MOD 30 MIN: CPT

## 2019-10-21 PROCEDURE — 93000 ELECTROCARDIOGRAM COMPLETE: CPT

## 2020-02-19 ENCOUNTER — APPOINTMENT (OUTPATIENT)
Dept: CARDIOLOGY | Facility: CLINIC | Age: 85
End: 2020-02-19
Payer: MEDICARE

## 2020-02-19 PROCEDURE — 93306 TTE W/DOPPLER COMPLETE: CPT

## 2020-02-24 ENCOUNTER — APPOINTMENT (OUTPATIENT)
Dept: CARDIOLOGY | Facility: CLINIC | Age: 85
End: 2020-02-24
Payer: MEDICARE

## 2020-02-24 PROCEDURE — 99214 OFFICE O/P EST MOD 30 MIN: CPT

## 2020-02-24 PROCEDURE — 93000 ELECTROCARDIOGRAM COMPLETE: CPT

## 2020-08-03 ENCOUNTER — RECORD ABSTRACTING (OUTPATIENT)
Age: 85
End: 2020-08-03

## 2020-08-03 DIAGNOSIS — Z78.9 OTHER SPECIFIED HEALTH STATUS: ICD-10-CM

## 2020-08-03 DIAGNOSIS — Z87.891 PERSONAL HISTORY OF NICOTINE DEPENDENCE: ICD-10-CM

## 2020-08-03 DIAGNOSIS — Z87.442 PERSONAL HISTORY OF URINARY CALCULI: ICD-10-CM

## 2020-08-03 DIAGNOSIS — I37.9 NONRHEUMATIC PULMONARY VALVE DISORDER, UNSPECIFIED: ICD-10-CM

## 2020-08-03 DIAGNOSIS — M19.90 UNSPECIFIED OSTEOARTHRITIS, UNSPECIFIED SITE: ICD-10-CM

## 2020-08-03 RX ORDER — LEVOTHYROXINE SODIUM 25 UG/1
25 TABLET ORAL DAILY
Refills: 0 | Status: ACTIVE | COMMUNITY

## 2020-08-03 RX ORDER — POTASSIUM CHLORIDE 10 MEQ
CAPSULE, EXTENDED RELEASE ORAL
Refills: 0 | Status: ACTIVE | COMMUNITY

## 2020-08-03 RX ORDER — HYDRALAZINE HYDROCHLORIDE 25 MG/1
25 TABLET ORAL DAILY
Refills: 0 | Status: ACTIVE | COMMUNITY

## 2020-08-03 RX ORDER — PRAVASTATIN SODIUM 20 MG/1
20 TABLET ORAL DAILY
Refills: 0 | Status: ACTIVE | COMMUNITY

## 2020-08-03 RX ORDER — METOPROLOL TARTRATE 25 MG/1
25 TABLET, FILM COATED ORAL DAILY
Refills: 0 | Status: ACTIVE | COMMUNITY

## 2020-08-03 RX ORDER — CRANBERRY FRUIT EXTRACT 200 MG
CAPSULE ORAL
Refills: 0 | Status: ACTIVE | COMMUNITY

## 2020-08-03 RX ORDER — ASPIRIN 325 MG/1
325 TABLET, FILM COATED ORAL DAILY
Refills: 0 | Status: ACTIVE | COMMUNITY

## 2020-08-03 RX ORDER — PSYLLIUM HUSK 0.4 G
CAPSULE ORAL
Refills: 0 | Status: ACTIVE | COMMUNITY

## 2020-08-03 RX ORDER — LATANOPROST/PF 0.005 %
0.01 DROPS OPHTHALMIC (EYE)
Refills: 0 | Status: ACTIVE | COMMUNITY

## 2020-08-03 RX ORDER — ISOSORBIDE MONONITRATE 30 MG
30 TABLET, EXTENDED RELEASE 24 HR ORAL
Refills: 0 | Status: ACTIVE | COMMUNITY

## 2020-08-03 RX ORDER — FUROSEMIDE 40 MG/1
40 TABLET ORAL
Refills: 0 | Status: ACTIVE | COMMUNITY

## 2020-08-03 RX ORDER — PANTOPRAZOLE 40 MG/1
40 TABLET, DELAYED RELEASE ORAL DAILY
Refills: 0 | Status: ACTIVE | COMMUNITY

## 2020-08-03 RX ORDER — ERGOCALCIFEROL (VITAMIN D2) 1250 MCG
50000 CAPSULE ORAL
Refills: 0 | Status: ACTIVE | COMMUNITY

## 2020-08-17 ENCOUNTER — APPOINTMENT (OUTPATIENT)
Dept: CARDIOLOGY | Facility: CLINIC | Age: 85
End: 2020-08-17
Payer: MEDICARE

## 2020-08-17 VITALS
HEART RATE: 63 BPM | BODY MASS INDEX: 26.83 KG/M2 | TEMPERATURE: 98 F | SYSTOLIC BLOOD PRESSURE: 142 MMHG | HEIGHT: 68 IN | WEIGHT: 177 LBS | DIASTOLIC BLOOD PRESSURE: 90 MMHG

## 2020-08-17 DIAGNOSIS — G45.9 TRANSIENT CEREBRAL ISCHEMIC ATTACK, UNSPECIFIED: ICD-10-CM

## 2020-08-17 DIAGNOSIS — Z86.79 PERSONAL HISTORY OF OTHER DISEASES OF THE CIRCULATORY SYSTEM: ICD-10-CM

## 2020-08-17 DIAGNOSIS — I36.9 NONRHEUMATIC TRICUSPID VALVE DISORDER, UNSPECIFIED: ICD-10-CM

## 2020-08-17 DIAGNOSIS — Z00.00 ENCOUNTER FOR GENERAL ADULT MEDICAL EXAMINATION W/OUT ABNORMAL FINDINGS: ICD-10-CM

## 2020-08-17 PROCEDURE — 99214 OFFICE O/P EST MOD 30 MIN: CPT

## 2020-08-17 PROCEDURE — 93000 ELECTROCARDIOGRAM COMPLETE: CPT

## 2020-08-17 RX ORDER — LISINOPRIL 20 MG/1
20 TABLET ORAL
Qty: 90 | Refills: 0 | Status: ACTIVE | COMMUNITY
Start: 2020-08-14

## 2020-08-17 RX ORDER — FLUTICASONE FUROATE AND VILANTEROL TRIFENATATE 100; 25 UG/1; UG/1
100-25 POWDER RESPIRATORY (INHALATION)
Qty: 60 | Refills: 0 | Status: ACTIVE | COMMUNITY
Start: 2020-03-02

## 2020-08-17 RX ORDER — METOPROLOL SUCCINATE 25 MG/1
25 TABLET, EXTENDED RELEASE ORAL
Qty: 90 | Refills: 0 | Status: ACTIVE | COMMUNITY
Start: 2020-03-12

## 2020-08-17 RX ORDER — CLOPIDOGREL BISULFATE 75 MG/1
75 TABLET, FILM COATED ORAL
Qty: 90 | Refills: 0 | Status: ACTIVE | COMMUNITY
Start: 2020-05-22

## 2020-08-17 NOTE — HISTORY OF PRESENT ILLNESS
The sheath is removed from the left femoral artery.  [FreeTextEntry1] : ASHD\par Prior coronary PTCA/stent\par CVA\par Hyperlipidemia

## 2020-08-17 NOTE — PHYSICAL EXAM
[Normal Appearance] : normal appearance [General Appearance - Well Nourished] : well nourished [Normal Conjunctiva] : the conjunctiva exhibited no abnormalities [Normal Oral Mucosa] : normal oral mucosa [General Appearance - In No Acute Distress] : no acute distress [Respiration, Rhythm And Depth] : normal respiratory rhythm and effort [Exaggerated Use Of Accessory Muscles For Inspiration] : no accessory muscle use [Heart Rate And Rhythm] : heart rate and rhythm were normal [Auscultation Breath Sounds / Voice Sounds] : lungs were clear to auscultation bilaterally [Heart Sounds] : normal S1 and S2 [Edema] : no peripheral edema present [Arterial Pulses Normal] : the arterial pulses were normal [Abdomen Soft] : soft [Abdomen Tenderness] : non-tender [Nail Clubbing] : no clubbing of the fingernails [Cyanosis, Localized] : no localized cyanosis [] : no ischemic changes [No Skin Ulcers] : no skin ulcer [No Venous Stasis] : no venous stasis [Skin Turgor] : normal skin turgor [Oriented To Time, Place, And Person] : oriented to person, place, and time [Impaired Insight] : insight and judgment were intact [Affect] : the affect was normal

## 2020-08-17 NOTE — DISCUSSION/SUMMARY
[FreeTextEntry1] : The patient was advised to maintain her present medications.\par She was advised to lower her T. cholesterol level to less than 200 mg/dl and LDL to less than 100 mg/dl.\par Start an exercise program.\par Maintain a low fat, low cholesterol diet.\par Weight reduction is advised.\par RV in 6 months.

## 2021-02-22 ENCOUNTER — APPOINTMENT (OUTPATIENT)
Dept: CARDIOLOGY | Facility: CLINIC | Age: 86
End: 2021-02-22

## 2021-07-26 ENCOUNTER — INPATIENT (INPATIENT)
Facility: HOSPITAL | Age: 86
LOS: 4 days | Discharge: HOME HEALTH | End: 2021-07-31
Attending: INTERNAL MEDICINE | Admitting: INTERNAL MEDICINE
Payer: MEDICARE

## 2021-07-26 VITALS
OXYGEN SATURATION: 92 % | DIASTOLIC BLOOD PRESSURE: 58 MMHG | RESPIRATION RATE: 20 BRPM | WEIGHT: 149.91 LBS | HEIGHT: 68 IN | TEMPERATURE: 98 F | SYSTOLIC BLOOD PRESSURE: 125 MMHG | HEART RATE: 59 BPM

## 2021-07-26 DIAGNOSIS — Z90.49 ACQUIRED ABSENCE OF OTHER SPECIFIED PARTS OF DIGESTIVE TRACT: Chronic | ICD-10-CM

## 2021-07-26 DIAGNOSIS — Z95.5 PRESENCE OF CORONARY ANGIOPLASTY IMPLANT AND GRAFT: Chronic | ICD-10-CM

## 2021-07-26 DIAGNOSIS — J18.9 PNEUMONIA, UNSPECIFIED ORGANISM: ICD-10-CM

## 2021-07-26 DIAGNOSIS — Z90.710 ACQUIRED ABSENCE OF BOTH CERVIX AND UTERUS: Chronic | ICD-10-CM

## 2021-07-26 LAB
ALBUMIN SERPL ELPH-MCNC: 4.1 G/DL — SIGNIFICANT CHANGE UP (ref 3.5–5.2)
ALP SERPL-CCNC: 92 U/L — SIGNIFICANT CHANGE UP (ref 30–115)
ALT FLD-CCNC: 21 U/L — SIGNIFICANT CHANGE UP (ref 0–41)
ANION GAP SERPL CALC-SCNC: 10 MMOL/L — SIGNIFICANT CHANGE UP (ref 7–14)
AST SERPL-CCNC: 42 U/L — HIGH (ref 0–41)
BASOPHILS # BLD AUTO: 0.07 K/UL — SIGNIFICANT CHANGE UP (ref 0–0.2)
BASOPHILS NFR BLD AUTO: 0.9 % — SIGNIFICANT CHANGE UP (ref 0–1)
BILIRUB SERPL-MCNC: 0.6 MG/DL — SIGNIFICANT CHANGE UP (ref 0.2–1.2)
BUN SERPL-MCNC: 23 MG/DL — HIGH (ref 10–20)
CALCIUM SERPL-MCNC: 9.9 MG/DL — SIGNIFICANT CHANGE UP (ref 8.5–10.1)
CHLORIDE SERPL-SCNC: 103 MMOL/L — SIGNIFICANT CHANGE UP (ref 98–110)
CO2 SERPL-SCNC: 26 MMOL/L — SIGNIFICANT CHANGE UP (ref 17–32)
CREAT SERPL-MCNC: 1.2 MG/DL — SIGNIFICANT CHANGE UP (ref 0.7–1.5)
EOSINOPHIL # BLD AUTO: 0.15 K/UL — SIGNIFICANT CHANGE UP (ref 0–0.7)
EOSINOPHIL NFR BLD AUTO: 1.9 % — SIGNIFICANT CHANGE UP (ref 0–8)
GLUCOSE SERPL-MCNC: 95 MG/DL — SIGNIFICANT CHANGE UP (ref 70–99)
HCT VFR BLD CALC: 39.8 % — SIGNIFICANT CHANGE UP (ref 37–47)
HGB BLD-MCNC: 12.8 G/DL — SIGNIFICANT CHANGE UP (ref 12–16)
IMM GRANULOCYTES NFR BLD AUTO: 1 % — HIGH (ref 0.1–0.3)
LACTATE SERPL-SCNC: 1.6 MMOL/L — SIGNIFICANT CHANGE UP (ref 0.7–2)
LYMPHOCYTES # BLD AUTO: 2.19 K/UL — SIGNIFICANT CHANGE UP (ref 1.2–3.4)
LYMPHOCYTES # BLD AUTO: 27.4 % — SIGNIFICANT CHANGE UP (ref 20.5–51.1)
MCHC RBC-ENTMCNC: 31.6 PG — HIGH (ref 27–31)
MCHC RBC-ENTMCNC: 32.2 G/DL — SIGNIFICANT CHANGE UP (ref 32–37)
MCV RBC AUTO: 98.3 FL — SIGNIFICANT CHANGE UP (ref 81–99)
MONOCYTES # BLD AUTO: 1.15 K/UL — HIGH (ref 0.1–0.6)
MONOCYTES NFR BLD AUTO: 14.4 % — HIGH (ref 1.7–9.3)
NEUTROPHILS # BLD AUTO: 4.36 K/UL — SIGNIFICANT CHANGE UP (ref 1.4–6.5)
NEUTROPHILS NFR BLD AUTO: 54.4 % — SIGNIFICANT CHANGE UP (ref 42.2–75.2)
NRBC # BLD: 0 /100 WBCS — SIGNIFICANT CHANGE UP (ref 0–0)
PLATELET # BLD AUTO: 205 K/UL — SIGNIFICANT CHANGE UP (ref 130–400)
POTASSIUM SERPL-MCNC: 5.8 MMOL/L — HIGH (ref 3.5–5)
POTASSIUM SERPL-SCNC: 5.8 MMOL/L — HIGH (ref 3.5–5)
PROT SERPL-MCNC: 7.2 G/DL — SIGNIFICANT CHANGE UP (ref 6–8)
RAPID RVP RESULT: DETECTED
RBC # BLD: 4.05 M/UL — LOW (ref 4.2–5.4)
RBC # FLD: 12.6 % — SIGNIFICANT CHANGE UP (ref 11.5–14.5)
RSV RNA SPEC QL NAA+PROBE: DETECTED
SARS-COV-2 RNA SPEC QL NAA+PROBE: SIGNIFICANT CHANGE UP
SODIUM SERPL-SCNC: 139 MMOL/L — SIGNIFICANT CHANGE UP (ref 135–146)
WBC # BLD: 8 K/UL — SIGNIFICANT CHANGE UP (ref 4.8–10.8)
WBC # FLD AUTO: 8 K/UL — SIGNIFICANT CHANGE UP (ref 4.8–10.8)

## 2021-07-26 PROCEDURE — 99285 EMERGENCY DEPT VISIT HI MDM: CPT

## 2021-07-26 PROCEDURE — 71045 X-RAY EXAM CHEST 1 VIEW: CPT | Mod: 26

## 2021-07-26 RX ORDER — AZITHROMYCIN 500 MG/1
500 TABLET, FILM COATED ORAL ONCE
Refills: 0 | Status: COMPLETED | OUTPATIENT
Start: 2021-07-26 | End: 2021-07-26

## 2021-07-26 RX ORDER — CEFTRIAXONE 500 MG/1
1000 INJECTION, POWDER, FOR SOLUTION INTRAMUSCULAR; INTRAVENOUS ONCE
Refills: 0 | Status: COMPLETED | OUTPATIENT
Start: 2021-07-26 | End: 2021-07-26

## 2021-07-26 RX ORDER — SODIUM CHLORIDE 9 MG/ML
1000 INJECTION INTRAMUSCULAR; INTRAVENOUS; SUBCUTANEOUS ONCE
Refills: 0 | Status: COMPLETED | OUTPATIENT
Start: 2021-07-26 | End: 2021-07-26

## 2021-07-26 RX ORDER — IPRATROPIUM/ALBUTEROL SULFATE 18-103MCG
3 AEROSOL WITH ADAPTER (GRAM) INHALATION
Refills: 0 | Status: DISCONTINUED | OUTPATIENT
Start: 2021-07-26 | End: 2021-07-31

## 2021-07-26 RX ADMIN — SODIUM CHLORIDE 1000 MILLILITER(S): 9 INJECTION INTRAMUSCULAR; INTRAVENOUS; SUBCUTANEOUS at 19:37

## 2021-07-26 RX ADMIN — Medication 3 MILLILITER(S): at 20:20

## 2021-07-26 RX ADMIN — AZITHROMYCIN 255 MILLIGRAM(S): 500 TABLET, FILM COATED ORAL at 20:56

## 2021-07-26 RX ADMIN — Medication 3 MILLILITER(S): at 19:58

## 2021-07-26 RX ADMIN — Medication 125 MILLIGRAM(S): at 19:37

## 2021-07-26 RX ADMIN — CEFTRIAXONE 100 MILLIGRAM(S): 500 INJECTION, POWDER, FOR SOLUTION INTRAMUSCULAR; INTRAVENOUS at 20:56

## 2021-07-26 RX ADMIN — Medication 3 MILLILITER(S): at 20:03

## 2021-07-26 NOTE — H&P ADULT - PROBLEM SELECTOR PLAN 2
Per ER documents. Since patient is improving will continue IV steroids (40 mg BID) with bronchodilators prn

## 2021-07-26 NOTE — H&P ADULT - NSHPREVIEWOFSYSTEMS_GEN_ALL_CORE
At least 9 ROS discussed with patient and are generally negative except for pertinent positives in HPI, PMH and uses for current medications

## 2021-07-26 NOTE — ED ADULT NURSE NOTE - NSIMPLEMENTINTERV_GEN_ALL_ED
Implemented All Universal Safety Interventions:  [place] to call system. Call bell, personal items and telephone within reach. Instruct patient to call for assistance. Room bathroom lighting operational. Non-slip footwear when patient is off stretcher. Physically safe environment: no spills, clutter or unnecessary equipment. Stretcher in lowest position, wheels locked, appropriate side rails in place.

## 2021-07-26 NOTE — H&P ADULT - PROBLEM SELECTOR PLAN 1
with viral component with viral component but strongly doubt gram negative infection with viral component but strongly doubt gram negative infection. For now continue Rocephin and Zithromax started in the ER and monitor

## 2021-07-26 NOTE — H&P ADULT - HISTORY OF PRESENT ILLNESS
89yo female whose PMH includes elevated cholesterol, hypothyroidism HTN and CAD (stent), presents to the ER after she [place] to [place] [facility] [place] endorses cough but says she sought medical care because she was having increasing shortness of breath for a few days. Denies chest pain and is feeling much better after ER intervention   89yo female whose PMH includes elevated cholesterol, hypothyroidism HTN and CAD (stent), presents to the ER after she was found to have pneumonia on CXR done at a [facility] [place]. Patient endorses cough but says she sought medical care because she was having increasing shortness of breath for a few days. Denies chest pain or fever and is feeling much better after ER intervention

## 2021-07-26 NOTE — H&P ADULT - PROBLEM SELECTOR PLAN 4
Monitor on current medications though will not order lisinopril until accurate potassium level is obtained (ER specimen was hemolyzed)

## 2021-07-26 NOTE — H&P ADULT - NSHPLABSRESULTS_GEN_ALL_CORE
12.8   8.00  )-----------( 205 ( 26 Jul 2021 19:35 )             39.8     07-26    139  |  103  |  23<H>  ----------------------------<  95  5.8<H>   |  26  |  1.2    Ca    9.9      26 Jul 2021 19:35    TPro  7.2  /  Alb  4.1  /  TBili  0.6  /  DBili  x   /  AST  42<H>  /  ALT  21  /  AlkPhos  92  07-26              Lactate Trend  07-26 @ 21:05 Lactate:1.6         CAPILLARY BLOOD GLUCOSE

## 2021-07-26 NOTE — H&P ADULT - PROBLEM SELECTOR PROBLEM 2
The patient has been notified of this information and all questions answered.  Medication list has been updated that Mr Paula can stop his Plavix per Dr. Palencia   COPD exacerbation

## 2021-07-26 NOTE — H&P ADULT - NSICDXPASTSURGICALHX_GEN_ALL_CORE_FT
PAST SURGICAL HISTORY:  H/O abdominal hysterectomy     History of cholecystectomy     History of coronary artery stent placement

## 2021-07-27 ENCOUNTER — TRANSCRIPTION ENCOUNTER (OUTPATIENT)
Age: 86
End: 2021-07-27

## 2021-07-27 DIAGNOSIS — J12.1 RESPIRATORY SYNCYTIAL VIRUS PNEUMONIA: ICD-10-CM

## 2021-07-27 DIAGNOSIS — Z79.899 OTHER LONG TERM (CURRENT) DRUG THERAPY: ICD-10-CM

## 2021-07-27 DIAGNOSIS — E03.9 HYPOTHYROIDISM, UNSPECIFIED: ICD-10-CM

## 2021-07-27 DIAGNOSIS — J44.1 CHRONIC OBSTRUCTIVE PULMONARY DISEASE WITH (ACUTE) EXACERBATION: ICD-10-CM

## 2021-07-27 DIAGNOSIS — I10 ESSENTIAL (PRIMARY) HYPERTENSION: ICD-10-CM

## 2021-07-27 DIAGNOSIS — E78.00 PURE HYPERCHOLESTEROLEMIA, UNSPECIFIED: ICD-10-CM

## 2021-07-27 LAB
ALBUMIN SERPL ELPH-MCNC: 3.8 G/DL — SIGNIFICANT CHANGE UP (ref 3.5–5.2)
ALP SERPL-CCNC: 84 U/L — SIGNIFICANT CHANGE UP (ref 30–115)
ALT FLD-CCNC: 17 U/L — SIGNIFICANT CHANGE UP (ref 0–41)
ANION GAP SERPL CALC-SCNC: 14 MMOL/L — SIGNIFICANT CHANGE UP (ref 7–14)
APPEARANCE UR: CLEAR — SIGNIFICANT CHANGE UP
AST SERPL-CCNC: 20 U/L — SIGNIFICANT CHANGE UP (ref 0–41)
BACTERIA # UR AUTO: ABNORMAL
BILIRUB SERPL-MCNC: 0.3 MG/DL — SIGNIFICANT CHANGE UP (ref 0.2–1.2)
BILIRUB UR-MCNC: NEGATIVE — SIGNIFICANT CHANGE UP
BUN SERPL-MCNC: 17 MG/DL — SIGNIFICANT CHANGE UP (ref 10–20)
CALCIUM SERPL-MCNC: 9.3 MG/DL — SIGNIFICANT CHANGE UP (ref 8.5–10.1)
CHLORIDE SERPL-SCNC: 108 MMOL/L — SIGNIFICANT CHANGE UP (ref 98–110)
CO2 SERPL-SCNC: 23 MMOL/L — SIGNIFICANT CHANGE UP (ref 17–32)
COD CRY URNS QL: NEGATIVE — SIGNIFICANT CHANGE UP
COLOR SPEC: YELLOW — SIGNIFICANT CHANGE UP
CREAT SERPL-MCNC: 0.8 MG/DL — SIGNIFICANT CHANGE UP (ref 0.7–1.5)
DIFF PNL FLD: NEGATIVE — SIGNIFICANT CHANGE UP
EPI CELLS # UR: ABNORMAL /HPF
GLUCOSE SERPL-MCNC: 156 MG/DL — HIGH (ref 70–99)
GLUCOSE UR QL: NEGATIVE MG/DL — SIGNIFICANT CHANGE UP
GRAN CASTS # UR COMP ASSIST: NEGATIVE — SIGNIFICANT CHANGE UP
HCT VFR BLD CALC: 39.7 % — SIGNIFICANT CHANGE UP (ref 37–47)
HGB BLD-MCNC: 12.5 G/DL — SIGNIFICANT CHANGE UP (ref 12–16)
HYALINE CASTS # UR AUTO: NEGATIVE — SIGNIFICANT CHANGE UP
KETONES UR-MCNC: NEGATIVE — SIGNIFICANT CHANGE UP
LEUKOCYTE ESTERASE UR-ACNC: NEGATIVE — SIGNIFICANT CHANGE UP
MCHC RBC-ENTMCNC: 31.1 PG — HIGH (ref 27–31)
MCHC RBC-ENTMCNC: 31.5 G/DL — LOW (ref 32–37)
MCV RBC AUTO: 98.8 FL — SIGNIFICANT CHANGE UP (ref 81–99)
NITRITE UR-MCNC: NEGATIVE — SIGNIFICANT CHANGE UP
NRBC # BLD: 0 /100 WBCS — SIGNIFICANT CHANGE UP (ref 0–0)
PH UR: 7 — SIGNIFICANT CHANGE UP (ref 5–8)
PLATELET # BLD AUTO: 207 K/UL — SIGNIFICANT CHANGE UP (ref 130–400)
POTASSIUM SERPL-MCNC: 4.4 MMOL/L — SIGNIFICANT CHANGE UP (ref 3.5–5)
POTASSIUM SERPL-SCNC: 4.4 MMOL/L — SIGNIFICANT CHANGE UP (ref 3.5–5)
PROCALCITONIN SERPL-MCNC: 0.04 NG/ML — SIGNIFICANT CHANGE UP (ref 0.02–0.1)
PROT SERPL-MCNC: 6.4 G/DL — SIGNIFICANT CHANGE UP (ref 6–8)
PROT UR-MCNC: 100 MG/DL
RBC # BLD: 4.02 M/UL — LOW (ref 4.2–5.4)
RBC # FLD: 12.4 % — SIGNIFICANT CHANGE UP (ref 11.5–14.5)
RBC CASTS # UR COMP ASSIST: SIGNIFICANT CHANGE UP /HPF
SODIUM SERPL-SCNC: 145 MMOL/L — SIGNIFICANT CHANGE UP (ref 135–146)
SP GR SPEC: 1.02 — SIGNIFICANT CHANGE UP (ref 1.01–1.03)
TRI-PHOS CRY UR QL COMP ASSIST: NEGATIVE — SIGNIFICANT CHANGE UP
URATE CRY FLD QL MICRO: NEGATIVE — SIGNIFICANT CHANGE UP
UROBILINOGEN FLD QL: 0.2 MG/DL — SIGNIFICANT CHANGE UP (ref 0.2–0.2)
WBC # BLD: 5.51 K/UL — SIGNIFICANT CHANGE UP (ref 4.8–10.8)
WBC # FLD AUTO: 5.51 K/UL — SIGNIFICANT CHANGE UP (ref 4.8–10.8)
WBC UR QL: SIGNIFICANT CHANGE UP /HPF

## 2021-07-27 PROCEDURE — 99232 SBSQ HOSP IP/OBS MODERATE 35: CPT

## 2021-07-27 RX ORDER — ASPIRIN/CALCIUM CARB/MAGNESIUM 324 MG
81 TABLET ORAL DAILY
Refills: 0 | Status: DISCONTINUED | OUTPATIENT
Start: 2021-07-27 | End: 2021-07-31

## 2021-07-27 RX ORDER — LEVOTHYROXINE SODIUM 125 MCG
25 TABLET ORAL
Refills: 0 | Status: DISCONTINUED | OUTPATIENT
Start: 2021-07-27 | End: 2021-07-31

## 2021-07-27 RX ORDER — LATANOPROST 0.05 MG/ML
1 SOLUTION/ DROPS OPHTHALMIC; TOPICAL AT BEDTIME
Refills: 0 | Status: DISCONTINUED | OUTPATIENT
Start: 2021-07-27 | End: 2021-07-31

## 2021-07-27 RX ORDER — CEFTRIAXONE 500 MG/1
1000 INJECTION, POWDER, FOR SOLUTION INTRAMUSCULAR; INTRAVENOUS EVERY 24 HOURS
Refills: 0 | Status: COMPLETED | OUTPATIENT
Start: 2021-07-27 | End: 2021-07-31

## 2021-07-27 RX ORDER — CEFTRIAXONE 500 MG/1
1000 INJECTION, POWDER, FOR SOLUTION INTRAMUSCULAR; INTRAVENOUS EVERY 24 HOURS
Refills: 0 | Status: DISCONTINUED | OUTPATIENT
Start: 2021-07-27 | End: 2021-07-27

## 2021-07-27 RX ORDER — LISINOPRIL 2.5 MG/1
20 TABLET ORAL DAILY
Refills: 0 | Status: DISCONTINUED | OUTPATIENT
Start: 2021-07-27 | End: 2021-07-27

## 2021-07-27 RX ORDER — PANTOPRAZOLE SODIUM 20 MG/1
40 TABLET, DELAYED RELEASE ORAL
Refills: 0 | Status: DISCONTINUED | OUTPATIENT
Start: 2021-07-27 | End: 2021-07-31

## 2021-07-27 RX ORDER — HEPARIN SODIUM 5000 [USP'U]/ML
5000 INJECTION INTRAVENOUS; SUBCUTANEOUS EVERY 8 HOURS
Refills: 0 | Status: DISCONTINUED | OUTPATIENT
Start: 2021-07-27 | End: 2021-07-31

## 2021-07-27 RX ORDER — AZITHROMYCIN 500 MG/1
500 TABLET, FILM COATED ORAL EVERY 24 HOURS
Refills: 0 | Status: DISCONTINUED | OUTPATIENT
Start: 2021-07-27 | End: 2021-07-27

## 2021-07-27 RX ORDER — AZITHROMYCIN 500 MG/1
250 TABLET, FILM COATED ORAL DAILY
Refills: 0 | Status: COMPLETED | OUTPATIENT
Start: 2021-07-27 | End: 2021-07-30

## 2021-07-27 RX ORDER — ISOSORBIDE MONONITRATE 60 MG/1
30 TABLET, EXTENDED RELEASE ORAL DAILY
Refills: 0 | Status: DISCONTINUED | OUTPATIENT
Start: 2021-07-27 | End: 2021-07-31

## 2021-07-27 RX ORDER — IPRATROPIUM/ALBUTEROL SULFATE 18-103MCG
3 AEROSOL WITH ADAPTER (GRAM) INHALATION EVERY 6 HOURS
Refills: 0 | Status: DISCONTINUED | OUTPATIENT
Start: 2021-07-27 | End: 2021-07-31

## 2021-07-27 RX ORDER — METOPROLOL TARTRATE 50 MG
25 TABLET ORAL DAILY
Refills: 0 | Status: DISCONTINUED | OUTPATIENT
Start: 2021-07-27 | End: 2021-07-31

## 2021-07-27 RX ORDER — CLOPIDOGREL BISULFATE 75 MG/1
75 TABLET, FILM COATED ORAL DAILY
Refills: 0 | Status: DISCONTINUED | OUTPATIENT
Start: 2021-07-27 | End: 2021-07-31

## 2021-07-27 RX ORDER — HYDRALAZINE HCL 50 MG
25 TABLET ORAL
Refills: 0 | Status: DISCONTINUED | OUTPATIENT
Start: 2021-07-27 | End: 2021-07-30

## 2021-07-27 RX ADMIN — HEPARIN SODIUM 5000 UNIT(S): 5000 INJECTION INTRAVENOUS; SUBCUTANEOUS at 22:16

## 2021-07-27 RX ADMIN — CLOPIDOGREL BISULFATE 75 MILLIGRAM(S): 75 TABLET, FILM COATED ORAL at 12:14

## 2021-07-27 RX ADMIN — Medication 40 MILLIGRAM(S): at 17:31

## 2021-07-27 RX ADMIN — AZITHROMYCIN 250 MILLIGRAM(S): 500 TABLET, FILM COATED ORAL at 12:14

## 2021-07-27 RX ADMIN — PANTOPRAZOLE SODIUM 40 MILLIGRAM(S): 20 TABLET, DELAYED RELEASE ORAL at 05:16

## 2021-07-27 RX ADMIN — Medication 81 MILLIGRAM(S): at 12:14

## 2021-07-27 RX ADMIN — Medication 25 MILLIGRAM(S): at 17:31

## 2021-07-27 RX ADMIN — Medication 25 MILLIGRAM(S): at 05:16

## 2021-07-27 RX ADMIN — Medication 40 MILLIGRAM(S): at 05:16

## 2021-07-27 RX ADMIN — ISOSORBIDE MONONITRATE 30 MILLIGRAM(S): 60 TABLET, EXTENDED RELEASE ORAL at 12:14

## 2021-07-27 RX ADMIN — CEFTRIAXONE 100 MILLIGRAM(S): 500 INJECTION, POWDER, FOR SOLUTION INTRAMUSCULAR; INTRAVENOUS at 12:14

## 2021-07-27 RX ADMIN — Medication 25 MICROGRAM(S): at 05:16

## 2021-07-27 NOTE — DISCHARGE NOTE PROVIDER - NSDCMRMEDTOKEN_GEN_ALL_CORE_FT
aspirin 81 mg oral tablet, chewable: 1 tab(s) orally once a day  clopidogrel 75 mg oral tablet: 1 tab(s) orally once a day  hydrALAZINE 25 mg oral tablet:   isosorbide dinitrate 20 mg oral tablet: 1 tab(s) orally 3 times a day  latanoprost 0.005% ophthalmic solution: 1 drop(s) to each affected eye once a day (in the evening)  latanoprost ophthalmic: to each affected eye once a day (at bedtime)  lisinopril 20 mg oral tablet: 1 tab(s) orally once a day  metoprolol succinate 25 mg oral tablet, extended release:   pantoprazole 20 mg oral delayed release tablet: 1 tab(s) orally once a day  pravastatin 20 mg oral tablet: 1 tab(s) orally once a day  Synthroid 25 mcg (0.025 mg) oral tablet: 1 tab(s) orally once a day   albuterol 90 mcg/inh inhalation aerosol: 2 puff(s) inhaled every 6 hours   aspirin 81 mg oral tablet, chewable: 1 tab(s) orally once a day  azithromycin 500 mg oral tablet: 1 tab(s) orally once a day  budesonide-formoterol 80 mcg-4.5 mcg/inh inhalation aerosol: 2 puff(s) inhaled 2 times a day   cefpodoxime 200 mg oral tablet: 1 tab(s) orally every 12 hours   clopidogrel 75 mg oral tablet: 1 tab(s) orally once a day  hydrALAZINE 25 mg oral tablet: 1 tab(s) orally 3 times a day  isosorbide dinitrate 20 mg oral tablet: 1 tab(s) orally 3 times a day  latanoprost 0.005% ophthalmic solution: 1 drop(s) to each affected eye once a day (in the evening)  lisinopril 20 mg oral tablet: 1 tab(s) orally once a day  metoprolol succinate 25 mg oral tablet, extended release:   pantoprazole 20 mg oral delayed release tablet: 1 tab(s) orally once a day  polyethylene glycol 3350 oral powder for reconstitution: 17 gram(s) orally once a day  predniSONE 10 mg oral tablet: 4 tab(s) orally once a day x 3 days  decrese by one every 3 days  senna oral tablet: 2 tab(s) orally once a day (at bedtime)  Synthroid 25 mcg (0.025 mg) oral tablet: 1 tab(s) orally once a day  tamsulosin 0.4 mg oral capsule: 1 cap(s) orally once a day (at bedtime)

## 2021-07-27 NOTE — DISCHARGE NOTE PROVIDER - CARE PROVIDERS DIRECT ADDRESSES
,DirectAddress_Unknown ,DirectAddress_Unknown,[email or web address]

## 2021-07-27 NOTE — PROGRESS NOTE ADULT - SUBJECTIVE AND OBJECTIVE BOX
Patient is a 89y old  Female who presents with a chief complaint of sob and urine incontinence    SUBJECTIVE / OVERNIGHT EVENTS: Pt on 3L O2 and lungs are filled with rhonchi. Pt states shes concerned about her incontinence that began yesterday on admission. she does not want a acevedo but also wants to make sure her incontinence is addressed. Prior to this she felt urgency but always made it to the bathroom. Sent a ua and Ucx to make sure she has no uti prior to starting oxybutynin.     ADDITIONAL REVIEW OF SYSTEMS:  incontinence  sob  no cp  no diarrhea or constipation  no abd pain    MEDICATIONS  (STANDING):  albuterol/ipratropium for Nebulization.. 3 milliLiter(s) Nebulizer every 20 minutes  aspirin  chewable 81 milliGRAM(s) Oral daily  azithromycin   Tablet 250 milliGRAM(s) Oral daily  cefTRIAXone   IVPB 1000 milliGRAM(s) IV Intermittent every 24 hours  clopidogrel Tablet 75 milliGRAM(s) Oral daily  hydrALAZINE 25 milliGRAM(s) Oral two times a day  isosorbide   mononitrate ER Tablet (IMDUR) 30 milliGRAM(s) Oral daily  latanoprost 0.005% Ophthalmic Solution 1 Drop(s) Both EYES at bedtime  levothyroxine 25 MICROGram(s) Oral <User Schedule>  methylPREDNISolone sodium succinate Injectable 40 milliGRAM(s) IV Push two times a day  metoprolol succinate ER 25 milliGRAM(s) Oral daily  pantoprazole    Tablet 40 milliGRAM(s) Oral before breakfast    MEDICATIONS  (PRN):  albuterol/ipratropium for Nebulization 3 milliLiter(s) Nebulizer every 6 hours PRN Shortness of Breath and/or Wheezing      CAPILLARY BLOOD GLUCOSE        I&O's Summary    26 Jul 2021 07:01  -  27 Jul 2021 07:00  --------------------------------------------------------  IN: 0 mL / OUT: 800 mL / NET: -800 mL        PHYSICAL EXAM:  Vital Signs Last 24 Hrs  T(C): 35.8 (27 Jul 2021 05:05), Max: 36.4 (26 Jul 2021 17:45)  T(F): 96.5 (27 Jul 2021 05:05), Max: 97.6 (26 Jul 2021 17:45)  HR: 66 (27 Jul 2021 06:57) (59 - 75)  BP: 182/75 (27 Jul 2021 06:57) (125/58 - 190/71)  BP(mean): --  RR: 20 (27 Jul 2021 05:05) (18 - 20)  SpO2: 92% (27 Jul 2021 01:05) (92% - 92%)  CONSTITUTIONAL: NAD, well-developed, well-groomed  EYES: PERRLA; conjunctiva and sclera clear  ENMT: Moist oral mucosa, no pharyngeal injection or exudates; normal dentition  NECK: Supple, no palpable masses; no thyromegaly  RESPIRATORY: Rhonchi in all lung fields  CARDIOVASCULAR: Regular rate and rhythm, normal S1 and S2, no murmur/rub/gallop; No lower extremity edema; Peripheral pulses are 2+ bilaterally  ABDOMEN: Nontender to palpation, normoactive bowel sounds, no rebound/guarding; No hepatosplenomegaly  MUSCULOSKELETAL:  Normal gait; no clubbing or cyanosis of digits; no joint swelling or tenderness to palpation  PSYCH: A+O to person, place, and time; affect appropriate  NEUROLOGY: CN 2-12 are intact and symmetric; no gross sensory deficits   SKIN: No rashes; no palpable lesions    LABS:                        12.5   5.51  )-----------( 207      ( 27 Jul 2021 05:46 )             39.7     07-27    145  |  108  |  17  ----------------------------<  156<H>  4.4   |  23  |  0.8    Ca    9.3      27 Jul 2021 05:46    TPro  6.4  /  Alb  3.8  /  TBili  0.3  /  DBili  x   /  AST  20  /  ALT  17  /  AlkPhos  84  07-27                RADIOLOGY & ADDITIONAL TESTS:  Results Reviewed:   Imaging Personally Reviewed:  Electrocardiogram Personally Reviewed:    COORDINATION OF CARE:  Care Discussed with Consultants/Other Providers [Y/N]:  Prior or Outpatient Records Reviewed [Y/N]:

## 2021-07-27 NOTE — PROGRESS NOTE ADULT - ASSESSMENT
89yo female whose PMH includes elevated cholesterol, hypothyroidism HTN and CAD (stent), presents to the ER after she was found to have pneumonia on CXR done at [place] [facility] [place] Patient endorses cough but says she sought medical care because she was having increasing shortness of breath for a few days. 89yo female whose PMH includes elevated cholesterol, hypothyroidism HTN and CAD (stent), presents to the ER after she was [place] to [place] [facility] [place] endorses cough but says she sought medical care because she was having increasing shortness of breath for a few days.      #RSV PNa with overlying bacterial PNA?  #underlying copd (smoking h/o)  - nebs  - yellow phlegm and 3L O2  - will c/w azithro and ceftriaxone  - wean off O2 as tolerated  - supportive treatment for viral component  - solumedrol    #hypothyroidism  - 25 mcg levothyroxine    #CAD w/ stent  - c/w aspirin and plavix  - toprol    #HTN  - hydralazine    #gerd  - ppi    #proph  vte: heparin

## 2021-07-27 NOTE — ED PROVIDER NOTE - OBJECTIVE STATEMENT
89 yo female hx of COPD/HTN/ HLD/ Hypothyroid present c/o coughing for about a week. felt more SOB and coughing so came to ED for evaluation. denies sick contact and recent travel. Completed COVID-19 vaccine series few months ago. Denies fever/chill/HA/dizziness/chest pain/palpitation/abd pain/n/v/d/ black stool/bloody stool/urinary sxs

## 2021-07-27 NOTE — ED PROVIDER NOTE - ATTENDING WITH...
Continue Regimen: - Continue Triamcinalone cream as needed for leg.\\n- Continue to keep the legs elevated to the level of the hips Otc Regimen: - continue moisturizer cream Vanicream or Cerave on a daily basis Detail Level: Detailed ACP

## 2021-07-27 NOTE — DISCHARGE NOTE PROVIDER - PROVIDER TOKENS
FREE:[LAST:[Primary Care],FIRST:[Doctor],PHONE:[(   )    -],FAX:[(   )    -]] PROVIDER:[TOKEN:[[id number]],FOLLOWUP:[2 weeks]] PROVIDER:[TOKEN:[[id number]],FOLLOWUP:[2 weeks]],PROVIDER:[TOKEN:[[id number]],FOLLOWUP:[1 week]]

## 2021-07-27 NOTE — DISCHARGE NOTE PROVIDER - CARE PROVIDER_API CALL
Primary Care, Doctor  Phone: (   )    -  Fax: (   )    -  Follow Up Time:    Tan Pop)  Critical Care Medicine; Internal Medicine; Pulmonary Disease  [address] [place] [address]  Phone: [phone]  Fax: [phone]  Follow Up Time: 2 weeks   Tan Pop)  Critical Care Medicine; Internal Medicine; Pulmonary Disease  [address], [address]  [place] [address]  Phone: [phone]  Fax: [phone]  Follow Up Time: 2 weeks    Jan Castano  UROLOGY  [address] [place] [address], [address] [address]  Phone: [phone]  Fax: [phone]  Follow Up Time: 1 week

## 2021-07-27 NOTE — ED PROVIDER NOTE - PROGRESS NOTE DETAILS
SaO2 - 92%. Elderly female with COPD. CXR found RML infiltrate. + RSV. will admit for further management and observation.

## 2021-07-27 NOTE — DISCHARGE NOTE PROVIDER - HOSPITAL COURSE
89yo female whose PMH includes elevated cholesterol, hypothyroidism HTN and CAD (stent), presents to the ER after she was found to [place] [facility] [place] endorses cough but says she sought medical care because she was having increasing shortness of breath for a few days. Denies chest pain or fever and is feeling much better after ER intervention.    Patient admitted to medicine service, started on Zithromax and Rocephin along with IV solumedrol and experienced symtomatic improvement *** 89yo female whose PMH includes elevated cholesterol, hypothyroidism HTN and CAD (stent), presents to the ER after she was found to have pneumonia on CXR done [place] [facility] [place] Patient endorses cough but says she sought medical care because she was having increasing shortness of breath for a few days. Denies chest pain or fever and is feeling much better after ER intervention.  Patient admitted to medicine service, started on Zithromax and Rocephin along with IV solumedrol and experienced symptomatic improvement. Pulmonary was following closely.  Pt will be dc home on steroid taper and outpatient follow up with pulmonology.  89yo female whose PMH includes elevated cholesterol, hypothyroidism HTN and CAD (stent), presents to the ER after she was found to have pneumonia on [place] [facility] [place] Patient endorses cough but says she sought medical care because she was having increasing shortness of breath for a few days.      #RSV PNa with overlying bacterial PNA?  #underlying copd (smoking h/o)  -BC shows Staph epi.  vanco started.  ID recommended to hold vanco.  repeat Blood culture negative  - SPO2 on RA 96%.  With ambulation 95% on RA  -Continue with current antibiotic  -Continue with po steriod with taper   -repeat cxr shows improvement  -Outpatient follow up with pulmonary    #hypothyroidism  - 25 mcg levothyroxine  -TSH wnl    #CAD w/ stent  - c/w aspirin and plavix  - toprol    #HTN  - hydralazine    #gerd  - ppi    # urinary retention  -Flomax, and acevedo  -outpatient follow up with urology for acevedo removal    patient was seen and examined today  feels better  cough improving.  SOB resolved.  afebrile  Constitutional: No fever, fatigue or weight loss.  Skin: No rash.  Eyes: No recent vision problems or eye pain.  ENT: No congestion, ear pain, or sore throat.  Endocrine: No thyroid problems.  Cardiovascular: No chest pain or palpation.  Respiratory: No   shortness of breath, congestion, or wheezing.  Gastrointestinal: No abdominal pain, nausea, vomiting, or diarrhea.  Genitourinary: No dysuria.  Musculoskeletal: No joint swelling.  Neurologic: No headache.  Vital Signs Last 24 Hrs  T(C): 35.4 (07-31-21 @ 04:36), Max: 35.5 (07-30-21 @ 21:19)  T(F): 95.8 (07-31-21 @ 04:36), Max: 95.9 (07-30-21 @ 21:19)  HR: 48 (07-31-21 @ 09:22) (48 - 96)  BP: 158/68 (07-31-21 @ 04:36) (144/67 - 191/81)  BP(mean): --  RR: 17 (07-31-21 @ 09:22) (16 - 18)  SpO2: 96% (07-31-21 @ 09:22) (95% - 96%)    PHYSICAL EXAM-  GENERAL: NAD,    HEAD:  Atraumatic, Normocephalic  EYES: EOMI, PERRLA, conjunctiva and sclera clear  NECK: Supple, No JVD, Normal thyroid  NERVOUS SYSTEM:  Alert & Oriented X3, Moving all extremities  CHEST/LUNG: Clear to percussion bilaterally; No rales, rhonchi, wheezing, or rubs  HEART: Regular rate and rhythm; No murmurs, rubs, or gallops  ABDOMEN: Soft, Nontender, Nondistended; Bowel sounds present  EXTREMITIES:   , No clubbing, cyanosis, or edema  SKIN: No rashes or lesions  Offered rehab but patient, and family refused, prefers home with services  Hospital course, and discharge planning were discussed with patient, and grand daughter in details.  all questions were answered.  seems to understand, and in agreement.  time 70 min

## 2021-07-27 NOTE — DISCHARGE NOTE PROVIDER - NSDCFUADDAPPT_GEN_ALL_CORE_FT
Please follow up with your doctor, and pulmonary in 1-2 weeks  Please come back to the hospital if you develop any new symptoms or concerns Please follow up with your doctor, and pulmonary in 1-2 weeks  Please follow up with urology in 3-4 days for acevedo removal  Please come back to the hospital if you develop any new symptoms or concerns

## 2021-07-27 NOTE — DISCHARGE NOTE PROVIDER - NSDCCPCAREPLAN_GEN_ALL_CORE_FT
PRINCIPAL DISCHARGE DIAGNOSIS  Diagnosis: Pneumonia  Assessment and Plan of Treatment: You were given IV antibiotics, make sure to complete the course on discharge.      SECONDARY DISCHARGE DIAGNOSES  Diagnosis: RSV (respiratory syncytial virus pneumonia)  Assessment and Plan of Treatment: Symptomatic treatment    Diagnosis: COPD exacerbation  Assessment and Plan of Treatment:      PRINCIPAL DISCHARGE DIAGNOSIS  Diagnosis: Pneumonia  Assessment and Plan of Treatment: You were given IV antibiotics and steroids, make sure to complete the course as directed.  Please follow up with pulmonary in 1-2 weeks      SECONDARY DISCHARGE DIAGNOSES  Diagnosis: RSV (respiratory syncytial virus pneumonia)  Assessment and Plan of Treatment: Symptomatic treatment    Diagnosis: COPD exacerbation  Assessment and Plan of Treatment: continue medications as recommended

## 2021-07-27 NOTE — ED PROVIDER NOTE - PMH
High blood cholesterol    Hypertension    Hypothyroid     COPD (chronic obstructive pulmonary disease)    High blood cholesterol    Hypertension    Hypothyroid

## 2021-07-27 NOTE — ED PROVIDER NOTE - CLINICAL SUMMARY MEDICAL DECISION MAKING FREE TEXT BOX
89yF  pmhx copd htn hld CAD  pw cough yellow sputum x 5 days with progressive  SOB.   CXR  + rightopacity  CAP abx given,  rvp with  + RSV.  pt admitted

## 2021-07-27 NOTE — ED PROVIDER NOTE - CARE PLAN
Principal Discharge DX:	Pneumonia  Secondary Diagnosis:	RSV (respiratory syncytial virus pneumonia)  Secondary Diagnosis:	COPD exacerbation

## 2021-07-28 LAB
-  STAPHYLOCOCCUS EPIDERMIDIS, METHICILLIN RESISTANT: SIGNIFICANT CHANGE UP
ANION GAP SERPL CALC-SCNC: 14 MMOL/L — SIGNIFICANT CHANGE UP (ref 7–14)
BUN SERPL-MCNC: 23 MG/DL — HIGH (ref 10–20)
CALCIUM SERPL-MCNC: 10.2 MG/DL — HIGH (ref 8.5–10.1)
CHLORIDE SERPL-SCNC: 101 MMOL/L — SIGNIFICANT CHANGE UP (ref 98–110)
CO2 SERPL-SCNC: 25 MMOL/L — SIGNIFICANT CHANGE UP (ref 17–32)
CREAT SERPL-MCNC: 0.8 MG/DL — SIGNIFICANT CHANGE UP (ref 0.7–1.5)
GLUCOSE SERPL-MCNC: 95 MG/DL — SIGNIFICANT CHANGE UP (ref 70–99)
GRAM STN FLD: SIGNIFICANT CHANGE UP
HCT VFR BLD CALC: 43.8 % — SIGNIFICANT CHANGE UP (ref 37–47)
HGB BLD-MCNC: 14.2 G/DL — SIGNIFICANT CHANGE UP (ref 12–16)
MCHC RBC-ENTMCNC: 31.7 PG — HIGH (ref 27–31)
MCHC RBC-ENTMCNC: 32.4 G/DL — SIGNIFICANT CHANGE UP (ref 32–37)
MCV RBC AUTO: 97.8 FL — SIGNIFICANT CHANGE UP (ref 81–99)
METHOD TYPE: SIGNIFICANT CHANGE UP
MRSA PCR RESULT.: NEGATIVE — SIGNIFICANT CHANGE UP
NRBC # BLD: 0 /100 WBCS — SIGNIFICANT CHANGE UP (ref 0–0)
PLATELET # BLD AUTO: 225 K/UL — SIGNIFICANT CHANGE UP (ref 130–400)
POTASSIUM SERPL-MCNC: 4.6 MMOL/L — SIGNIFICANT CHANGE UP (ref 3.5–5)
POTASSIUM SERPL-SCNC: 4.6 MMOL/L — SIGNIFICANT CHANGE UP (ref 3.5–5)
RBC # BLD: 4.48 M/UL — SIGNIFICANT CHANGE UP (ref 4.2–5.4)
RBC # FLD: 12.3 % — SIGNIFICANT CHANGE UP (ref 11.5–14.5)
SODIUM SERPL-SCNC: 140 MMOL/L — SIGNIFICANT CHANGE UP (ref 135–146)
WBC # BLD: 14.43 K/UL — HIGH (ref 4.8–10.8)
WBC # FLD AUTO: 14.43 K/UL — HIGH (ref 4.8–10.8)

## 2021-07-28 PROCEDURE — 99232 SBSQ HOSP IP/OBS MODERATE 35: CPT

## 2021-07-28 RX ORDER — VANCOMYCIN HCL 1 G
750 VIAL (EA) INTRAVENOUS ONCE
Refills: 0 | Status: COMPLETED | OUTPATIENT
Start: 2021-07-28 | End: 2021-07-28

## 2021-07-28 RX ADMIN — CEFTRIAXONE 100 MILLIGRAM(S): 500 INJECTION, POWDER, FOR SOLUTION INTRAMUSCULAR; INTRAVENOUS at 12:47

## 2021-07-28 RX ADMIN — HEPARIN SODIUM 5000 UNIT(S): 5000 INJECTION INTRAVENOUS; SUBCUTANEOUS at 05:53

## 2021-07-28 RX ADMIN — CLOPIDOGREL BISULFATE 75 MILLIGRAM(S): 75 TABLET, FILM COATED ORAL at 12:46

## 2021-07-28 RX ADMIN — LATANOPROST 1 DROP(S): 0.05 SOLUTION/ DROPS OPHTHALMIC; TOPICAL at 22:55

## 2021-07-28 RX ADMIN — Medication 40 MILLIGRAM(S): at 05:53

## 2021-07-28 RX ADMIN — Medication 40 MILLIGRAM(S): at 17:25

## 2021-07-28 RX ADMIN — Medication 25 MICROGRAM(S): at 05:54

## 2021-07-28 RX ADMIN — Medication 250 MILLIGRAM(S): at 16:17

## 2021-07-28 RX ADMIN — Medication 25 MILLIGRAM(S): at 17:25

## 2021-07-28 RX ADMIN — Medication 81 MILLIGRAM(S): at 12:47

## 2021-07-28 RX ADMIN — PANTOPRAZOLE SODIUM 40 MILLIGRAM(S): 20 TABLET, DELAYED RELEASE ORAL at 06:05

## 2021-07-28 RX ADMIN — AZITHROMYCIN 250 MILLIGRAM(S): 500 TABLET, FILM COATED ORAL at 12:46

## 2021-07-28 RX ADMIN — Medication 25 MILLIGRAM(S): at 05:54

## 2021-07-28 RX ADMIN — ISOSORBIDE MONONITRATE 30 MILLIGRAM(S): 60 TABLET, EXTENDED RELEASE ORAL at 12:46

## 2021-07-28 NOTE — PHYSICAL THERAPY INITIAL EVALUATION ADULT - GAIT DEVIATIONS NOTED, PT EVAL
forward flexed trunk, dec heel strike and push off/decreased vicki/decreased step length/decreased stride length/decreased weight-shifting ability

## 2021-07-28 NOTE — CONSULT NOTE ADULT - SUBJECTIVE AND OBJECTIVE BOX
EDGAR CHIRAG  89y, Female  Allergy: No Known Allergies      CHIEF COMPLAINT: PNA (2021 12:03)      HPI:  89yo female whose PMH includes elevated cholesterol, hypothyroidism, COPD HTN and CAD (stent), presents to the ER after she was found to have pneumonia on CXR done [place] [facility] [place] Patient endorses cough but says she sought medical care because she was having increasing shortness of breath for a few days. Denies chest pain or fever and is feeling much better after ER intervention   (2021 23:33)    FAMILY HISTORY:  Family history unknown      PAST MEDICAL & SURGICAL HISTORY:  High blood cholesterol    Hypothyroid    Hypertension    COPD (chronic obstructive pulmonary disease)    History of coronary artery stent placement    History of cholecystectomy    H/O abdominal hysterectomy        SOCIAL HISTORY  Social History:  Stopped tobacco use many years ago (2021 23:33)        ROS  General: Denies fevers, chills, nightsweats, weight loss  HEENT: Denies headache, rhinorrhea, sore throat, eye pain  CV: Denies CP, palpitations  GI: Denies abdominal pain, diarrhea  : Denies dysuria, hematuria  MSK: Denies arthralgias  SKIN: Denies rash   NEURO: Denies paresthesias, weakness  PSYCH: Denies depression    VITALS:  T(F): 95.6, Max: 96 (21 @ 21:49)  HR: 52  BP: 163/69  RR: 18Vital Signs Last 24 Hrs  T(C): 35.3 (2021 04:35), Max: 35.6 (2021 21:49)  T(F): 95.6 (2021 04:35), Max: 96 (2021 21:49)  HR: 52 (2021 04:35) (52 - 56)  BP: 163/69 (2021 04:35) (157/68 - 185/75)  BP(mean): --  RR: 18 (2021 04:35) (18 - 18)  SpO2: 92% (2021 06:28) (92% - 96%)    PHYSICAL EXAM:  Gen: NAD, resting in bed  HEENT: Normocephalic, atraumatic  Neck: supple, no lymphadenopathy  CV: Regular rate & regular rhythm  Lungs: decreased BS at bases, no fremitus  Abdomen: Soft, BS present  Ext: Warm, well perfused  Neuro: non focal, awake  Skin: no rash, no erythema    TESTS & MEASUREMENTS:                        14.2   14.43 )-----------( 225      ( 2021 06:40 )             43.8         140  |  101  |  23<H>  ----------------------------<  95  4.6   |  25  |  0.8    Ca    10.2<H>      2021 06:40    TPro  6.4  /  Alb  3.8  /  TBili  0.3  /  DBili  x   /  AST  20  /  ALT  17  /  AlkPhos  84      eGFR if Non African American: 63 mL/min/1.73M2 (21 @ 06:40)  eGFR if : 73 mL/min/1.73M2 (21 @ 06:40)    LIVER FUNCTIONS - ( 2021 05:46 )  Alb: 3.8 g/dL / Pro: 6.4 g/dL / ALK PHOS: 84 U/L / ALT: 17 U/L / AST: 20 U/L / GGT: x           Urinalysis Basic - ( 2021 20:10 )    Color: Yellow / Appearance: Clear / S.020 / pH: x  Gluc: x / Ketone: Negative  / Bili: Negative / Urobili: 0.2 mg/dL   Blood: x / Protein: 100 mg/dL / Nitrite: Negative   Leuk Esterase: Negative / RBC: 1-2 /HPF / WBC 3-5 /HPF   Sq Epi: x / Non Sq Epi: Few /HPF / Bacteria: Few        Culture - Blood (collected 21 @ 19:35)  Source: .Blood Blood  Gram Stain (21 @ 05:48):    Growth in aerobic bottle: Gram Positive Cocci in Clusters  Preliminary Report (21 @ 05:48):    Growth in aerobic bottle: Gram Positive Cocci in Clusters    ***Blood Panel PCR results on this specimen are available    approximately 3 hours after the Gram stain result.***    Gram stain, PCR, and/or culture results may not always    correspond due to difference in methodologies.    ************************************************************    This PCR assay was performed by multiplex PCR. This    Assay tests for 66 bacterial and resistance gene targets.    Please refer to the [facility] test directory    at [email or web address] for details.  Organism: Blood Culture PCR (21 @ 07:58)  Organism: Blood Culture PCR (21 @ 07:58)      -  Staphylococcus epidermidis, Methicillin resistant: Detec      Method Type: PCR    Culture - Blood (collected 21 @ 19:35)  Source: .Blood Blood  Preliminary Report (21 @ 04:01):    No growth to date.        Lactate, Blood: 1.6 mmol/L (21 @ 21:05)      INFECTIOUS DISEASES TESTING      RADIOLOGY & ADDITIONAL TESTS:  I have personally reviewed the last Chest xray  CXR  Xray Chest 1 View- PORTABLE-Urgent:   EXAM:  XR CHEST PORTABLE URGENT 1V            PROCEDURE DATE:  2021            INTERPRETATION:  Clinical History / Reason for exam: Cough    Comparison : Chest radiograph 10/2/2018.    Technique/Positioning: Single frontal chest radiograph.    Findings:    Support devices: None.    Cardiac/mediastinum/hilum: Stable heart size. Atherosclerotic aorta.    Lung parenchyma/Pleura: Patchy bilateral pulmonary opacities. No pneumothorax.    Skeleton/soft tissues: Bony demineralization. Degenerative changes.    Impression:    Patchy bilateral pulmonary opacities compatible with infectious etiology in the appropriate setting. Radiographic follow-up is recommended.        --- End of Report ---              PRICE BRICENO MD; Attending Radiologist  This document has been electronically signed. 2021  9:05AM (21 @ 19:55)      CT      CARDIOLOGY TESTING      MEDICATIONS  albuterol/ipratropium for Nebulization.. 3  aspirin  chewable 81  azithromycin   Tablet 250  cefTRIAXone   IVPB 1000  clopidogrel Tablet 75  heparin   Injectable 5000  hydrALAZINE 25  isosorbide   mononitrate ER Tablet (IMDUR) 30  latanoprost 0.005% Ophthalmic Solution 1  levothyroxine 25  methylPREDNISolone sodium succinate Injectable 40  metoprolol succinate ER 25  pantoprazole    Tablet 40  vancomycin  IVPB 750      ANTIBIOTICS:  azithromycin   Tablet 250 milliGRAM(s) Oral daily  cefTRIAXone   IVPB 1000 milliGRAM(s) IV Intermittent every 24 hours  vancomycin  IVPB 750 milliGRAM(s) IV Intermittent once      All available historical data has been reviewed

## 2021-07-28 NOTE — PROGRESS NOTE ADULT - SUBJECTIVE AND OBJECTIVE BOX
CC.  sob and urine incontinence  HPI.  Patient reports Cough, and SOB which is improving  Afebrile      Constitutional: No fever, fatigue or weight loss.  Skin: No rash.  Eyes: No recent vision problems or eye pain.  ENT: No congestion, ear pain, or sore throat.  Endocrine: No thyroid problems.  Cardiovascular: No chest pain or palpation.  Respiratory: No   congestion, or wheezing.  Gastrointestinal: No abdominal pain, nausea, vomiting, or diarrhea.  Genitourinary: No dysuria.  Musculoskeletal: No joint swelling.  Neurologic: No headache.      Vital Signs Last 24 Hrs  T(C): 35.3 (21 @ 04:35), Max: 35.6 (21 @ 21:49)  T(F): 95.6 (21 @ 04:35), Max: 96 (21 @ 21:49)  HR: 52 (21 @ 04:35) (52 - 56)  BP: 163/69 (21 @ 04:35) (157/68 - 185/75)  BP(mean): --  RR: 18 (21 @ 04:35) (18 - 18)  SpO2: 92% (21 @ 06:28) (92% - 96%)        PHYSICAL EXAM-  GENERAL: NAD   HEAD:  Atraumatic, Normocephalic  EYES: EOMI, PERRLA, conjunctiva and sclera clear  NECK: Supple, No JVD, Normal thyroid  NERVOUS SYSTEM:  Alert & Oriented X3, Moving all extremities  CHEST/LUNG: + rhonchi with good air entry  HEART: Regular rate and rhythm; No murmurs, rubs, or gallops  ABDOMEN: Soft, Nontender, Nondistended; Bowel sounds present  EXTREMITIES:    No clubbing, cyanosis, or edema  SKIN: No rashes or lesions                                  14.2   14.43 )-----------( 225      ( 2021 06:40 )             43.8         140  |  101  |  23<H>  ----------------------------<  95  4.6   |  25  |  0.8    Ca    10.2<H>      2021 06:40    TPro  6.4  /  Alb  3.8  /  TBili  0.3  /  DBili  x   /  AST  20  /  ALT  17  /  AlkPhos  84  -          Urinalysis Basic - ( 2021 20:10 )    Color: Yellow / Appearance: Clear / S.020 / pH: x  Gluc: x / Ketone: Negative  / Bili: Negative / Urobili: 0.2 mg/dL   Blood: x / Protein: 100 mg/dL / Nitrite: Negative   Leuk Esterase: Negative / RBC: 1-2 /HPF / WBC 3-5 /HPF   Sq Epi: x / Non Sq Epi: Few /HPF / Bacteria: Few              MEDICATIONS  (STANDING):  albuterol/ipratropium for Nebulization.. 3 milliLiter(s) Nebulizer every 20 minutes  aspirin  chewable 81 milliGRAM(s) Oral daily  azithromycin   Tablet 250 milliGRAM(s) Oral daily  cefTRIAXone   IVPB 1000 milliGRAM(s) IV Intermittent every 24 hours  clopidogrel Tablet 75 milliGRAM(s) Oral daily  heparin   Injectable 5000 Unit(s) SubCutaneous every 8 hours  hydrALAZINE 25 milliGRAM(s) Oral two times a day  isosorbide   mononitrate ER Tablet (IMDUR) 30 milliGRAM(s) Oral daily  latanoprost 0.005% Ophthalmic Solution 1 Drop(s) Both EYES at bedtime  levothyroxine 25 MICROGram(s) Oral <User Schedule>  methylPREDNISolone sodium succinate Injectable 40 milliGRAM(s) IV Push two times a day  metoprolol succinate ER 25 milliGRAM(s) Oral daily  pantoprazole    Tablet 40 milliGRAM(s) Oral before breakfast  vancomycin  IVPB 750 milliGRAM(s) IV Intermittent once    MEDICATIONS  (PRN):  albuterol/ipratropium for Nebulization 3 milliLiter(s) Nebulizer every 6 hours PRN Shortness of Breath and/or Wheezing      Imaging Personally Reviewed:     [x ] YES  [ ] NO    Consultant(s) Notes Reviewed:  [x ] YES  [ ] NO    Care Discussed with Consultants/Other Providers [x ] YES  [ ] NO  Care Discussed with Consultants/Other Providers [x ] YES  [ ] No medical contraindication for discharge

## 2021-07-28 NOTE — CHART NOTE - NSCHARTNOTEFT_GEN_A_CORE
Called for positive blood cultures, GPC in clusters. For now continue current antibiotic regimen Called for positive blood cultures, GPC in clusters. For now continue current antibiotic regimen (Temps reviewed)

## 2021-07-28 NOTE — PHYSICAL THERAPY INITIAL EVALUATION ADULT - ADDITIONAL COMMENTS
Pt reports she lives in the side apartment of her daughter's house - no steps to enter the apartment. Pt ambulates with rollator at baseline and takes care of herself during the day.

## 2021-07-28 NOTE — PROGRESS NOTE ADULT - ASSESSMENT
89yo female whose PMH includes elevated cholesterol, hypothyroidism HTN and CAD (stent), presents to the ER after she was found to have pneumonia on CXR done at a [facility] [place]. Patient endorses cough but says she sought medical care because she was having increasing shortness of breath for a few days.      #RSV PNa with overlying bacterial PNA?  #underlying copd (smoking h/o)  -BC shows Staph epi.  vanco started.  ID recommended to hold vanco  - nebs  - yellow phlegm and 3L O2  - will c/w azithro and ceftriaxone  - wean off O2 as tolerated  - supportive treatment for viral component  - solumedrol  -repeat cxr in am  -pulmonary consult     #hypothyroidism  - 25 mcg levothyroxine    #CAD w/ stent  - c/w aspirin and plavix  - toprol    #HTN  - hydralazine    #gerd  - ppi    #Progress Note Handoff  Pending (specify):  still acute   Family discussion:  plan of care was discussed with patient in details.  all questions were answered.  seems to understand, and in agreement  Disposition:  unknown

## 2021-07-28 NOTE — PHYSICAL THERAPY INITIAL EVALUATION ADULT - GENERAL OBSERVATIONS, REHAB EVAL
Pt encountered semi-reclined in bed, NAD, on 2 L02/min via NC, +primafit, ok to be seen by PT as confirmed by RN and pt agreeable. +chart reviewed

## 2021-07-29 LAB
ANION GAP SERPL CALC-SCNC: 14 MMOL/L — SIGNIFICANT CHANGE UP (ref 7–14)
BUN SERPL-MCNC: 29 MG/DL — HIGH (ref 10–20)
CALCIUM SERPL-MCNC: 10.3 MG/DL — HIGH (ref 8.5–10.1)
CHLORIDE SERPL-SCNC: 105 MMOL/L — SIGNIFICANT CHANGE UP (ref 98–110)
CO2 SERPL-SCNC: 20 MMOL/L — SIGNIFICANT CHANGE UP (ref 17–32)
COVID-19 SPIKE DOMAIN AB INTERP: POSITIVE
COVID-19 SPIKE DOMAIN ANTIBODY RESULT: 56.2 U/ML — HIGH
CREAT SERPL-MCNC: 0.7 MG/DL — SIGNIFICANT CHANGE UP (ref 0.7–1.5)
CULTURE RESULTS: SIGNIFICANT CHANGE UP
CULTURE RESULTS: SIGNIFICANT CHANGE UP
GLUCOSE SERPL-MCNC: 110 MG/DL — HIGH (ref 70–99)
HCT VFR BLD CALC: 43.9 % — SIGNIFICANT CHANGE UP (ref 37–47)
HGB BLD-MCNC: 14.2 G/DL — SIGNIFICANT CHANGE UP (ref 12–16)
MCHC RBC-ENTMCNC: 31.3 PG — HIGH (ref 27–31)
MCHC RBC-ENTMCNC: 32.3 G/DL — SIGNIFICANT CHANGE UP (ref 32–37)
MCV RBC AUTO: 96.9 FL — SIGNIFICANT CHANGE UP (ref 81–99)
NRBC # BLD: 0 /100 WBCS — SIGNIFICANT CHANGE UP (ref 0–0)
ORGANISM # SPEC MICROSCOPIC CNT: SIGNIFICANT CHANGE UP
ORGANISM # SPEC MICROSCOPIC CNT: SIGNIFICANT CHANGE UP
PLATELET # BLD AUTO: 281 K/UL — SIGNIFICANT CHANGE UP (ref 130–400)
POTASSIUM SERPL-MCNC: 4.5 MMOL/L — SIGNIFICANT CHANGE UP (ref 3.5–5)
POTASSIUM SERPL-SCNC: 4.5 MMOL/L — SIGNIFICANT CHANGE UP (ref 3.5–5)
RBC # BLD: 4.53 M/UL — SIGNIFICANT CHANGE UP (ref 4.2–5.4)
RBC # FLD: 12.4 % — SIGNIFICANT CHANGE UP (ref 11.5–14.5)
SARS-COV-2 IGG+IGM SERPL QL IA: 56.2 U/ML — HIGH
SARS-COV-2 IGG+IGM SERPL QL IA: POSITIVE
SODIUM SERPL-SCNC: 139 MMOL/L — SIGNIFICANT CHANGE UP (ref 135–146)
SPECIMEN SOURCE: SIGNIFICANT CHANGE UP
SPECIMEN SOURCE: SIGNIFICANT CHANGE UP
TSH SERPL-MCNC: 0.58 UIU/ML — SIGNIFICANT CHANGE UP (ref 0.27–4.2)
WBC # BLD: 13.71 K/UL — HIGH (ref 4.8–10.8)
WBC # FLD AUTO: 13.71 K/UL — HIGH (ref 4.8–10.8)

## 2021-07-29 PROCEDURE — 99222 1ST HOSP IP/OBS MODERATE 55: CPT

## 2021-07-29 PROCEDURE — 99232 SBSQ HOSP IP/OBS MODERATE 35: CPT

## 2021-07-29 PROCEDURE — 71045 X-RAY EXAM CHEST 1 VIEW: CPT | Mod: 26

## 2021-07-29 RX ORDER — HYDRALAZINE HCL 50 MG
10 TABLET ORAL ONCE
Refills: 0 | Status: COMPLETED | OUTPATIENT
Start: 2021-07-29 | End: 2021-07-29

## 2021-07-29 RX ORDER — BUDESONIDE AND FORMOTEROL FUMARATE DIHYDRATE 160; 4.5 UG/1; UG/1
2 AEROSOL RESPIRATORY (INHALATION)
Refills: 0 | Status: DISCONTINUED | OUTPATIENT
Start: 2021-07-29 | End: 2021-07-31

## 2021-07-29 RX ORDER — AMLODIPINE BESYLATE 2.5 MG/1
5 TABLET ORAL ONCE
Refills: 0 | Status: COMPLETED | OUTPATIENT
Start: 2021-07-29 | End: 2021-07-29

## 2021-07-29 RX ADMIN — HEPARIN SODIUM 5000 UNIT(S): 5000 INJECTION INTRAVENOUS; SUBCUTANEOUS at 05:57

## 2021-07-29 RX ADMIN — AMLODIPINE BESYLATE 5 MILLIGRAM(S): 2.5 TABLET ORAL at 01:26

## 2021-07-29 RX ADMIN — Medication 40 MILLIGRAM(S): at 05:57

## 2021-07-29 RX ADMIN — Medication 10 MILLIGRAM(S): at 01:26

## 2021-07-29 RX ADMIN — Medication 25 MILLIGRAM(S): at 05:57

## 2021-07-29 RX ADMIN — Medication 40 MILLIGRAM(S): at 17:51

## 2021-07-29 RX ADMIN — Medication 81 MILLIGRAM(S): at 11:09

## 2021-07-29 RX ADMIN — ISOSORBIDE MONONITRATE 30 MILLIGRAM(S): 60 TABLET, EXTENDED RELEASE ORAL at 11:09

## 2021-07-29 RX ADMIN — Medication 25 MILLIGRAM(S): at 17:50

## 2021-07-29 RX ADMIN — Medication 25 MICROGRAM(S): at 05:57

## 2021-07-29 RX ADMIN — Medication 25 MILLIGRAM(S): at 05:58

## 2021-07-29 RX ADMIN — CLOPIDOGREL BISULFATE 75 MILLIGRAM(S): 75 TABLET, FILM COATED ORAL at 11:09

## 2021-07-29 RX ADMIN — PANTOPRAZOLE SODIUM 40 MILLIGRAM(S): 20 TABLET, DELAYED RELEASE ORAL at 05:58

## 2021-07-29 RX ADMIN — LATANOPROST 1 DROP(S): 0.05 SOLUTION/ DROPS OPHTHALMIC; TOPICAL at 21:07

## 2021-07-29 RX ADMIN — HEPARIN SODIUM 5000 UNIT(S): 5000 INJECTION INTRAVENOUS; SUBCUTANEOUS at 21:07

## 2021-07-29 RX ADMIN — AZITHROMYCIN 250 MILLIGRAM(S): 500 TABLET, FILM COATED ORAL at 11:09

## 2021-07-29 RX ADMIN — CEFTRIAXONE 100 MILLIGRAM(S): 500 INJECTION, POWDER, FOR SOLUTION INTRAMUSCULAR; INTRAVENOUS at 11:09

## 2021-07-29 RX ADMIN — HEPARIN SODIUM 5000 UNIT(S): 5000 INJECTION INTRAVENOUS; SUBCUTANEOUS at 16:09

## 2021-07-29 NOTE — PROGRESS NOTE ADULT - ASSESSMENT
89yo female whose PMH includes elevated cholesterol, hypothyroidism HTN and CAD (stent), presents to the ER after she was found to have pneumonia on CXR done [place] [facility] [place]. Patient endorses cough but says she sought medical care because she was having increasing shortness of breath for a few days.      #RSV PNa with overlying bacterial PNA?  #underlying copd (smoking h/o)  -BC shows Staph epi.  vanco started.  ID recommended to hold vanco  - nebs  - yellow phlegm and 3L O2  - will c/w azithro and ceftriaxone  - wean off O2 as tolerated  - supportive treatment for viral component  - solumedrol  -repeat cxr in am  -pulmonary consult     #hypothyroidism  - 25 mcg levothyroxine    #CAD w/ stent  - c/w aspirin and plavix  - toprol    #HTN  - hydralazine    #gerd  - ppi    #Progress Note Handoff  Pending (specify):  still acute   Family discussion:  plan of care was discussed with patient in details.  all questions were answered.  seems to understand, and in agreement  Disposition:  unknown   89yo female whose PMH includes elevated cholesterol, hypothyroidism HTN and CAD (stent), presents to the ER after she was found to have pneumonia on CXR done at a [facility] [place]. Patient endorses cough but says she sought medical care because she was having increasing shortness of breath for a few days.      #RSV PNa with overlying bacterial PNA?  #underlying copd (smoking h/o)  -BC shows Staph epi.  vanco started.  ID recommended to hold vanco  - nebs  - yellow phlegm and 3L O2  - c/w azithro and ceftriaxone  - wean off O2 as tolerated  - supportive treatment for viral component  - switch to po steriod  -repeat cxr shows improvement  -pulmonary to follow up     #hypothyroidism  - 25 mcg levothyroxine    #CAD w/ stent  - c/w aspirin and plavix  - toprol    #HTN  - hydralazine    #gerd  - ppi    #Progress Note Handoff  Pending (specify):  still acute   Family discussion:  plan of care was discussed with patient in details.  all questions were answered.  seems to understand, and in agreement  Disposition:  unknown

## 2021-07-29 NOTE — PROGRESS NOTE ADULT - SUBJECTIVE AND OBJECTIVE BOX
CC.  sob and urine incontinence  HPI.  Patient reports Cough, and SOB which is improving  Afebrile      Constitutional: No fever, fatigue or weight loss.  Skin: No rash.  Eyes: No recent vision problems or eye pain.  ENT: No congestion, ear pain, or sore throat.  Endocrine: No thyroid problems.  Cardiovascular: No chest pain or palpation.  Respiratory: No   congestion, or wheezing.  Gastrointestinal: No abdominal pain, nausea, vomiting, or diarrhea.  Genitourinary: No dysuria.  Musculoskeletal: No joint swelling.  Neurologic: No headache.      Vital Signs Last 24 Hrs  T(C): 35.7 (2021 04:36), Max: 35.8 (2021 21:48)  T(F): 96.3 (2021 04:36), Max: 96.4 (2021 21:48)  HR: 62 (2021 04:36) (56 - 66)  BP: 149/64 (2021 04:36) (145/61 - 196/81)  BP(mean): --  RR: 18 (2021 04:36) (16 - 18)  SpO2: 98% (2021 00:50) (98% - 98%)        PHYSICAL EXAM-  GENERAL: NAD   HEAD:  Atraumatic, Normocephalic  EYES: EOMI, PERRLA, conjunctiva and sclera clear  NECK: Supple, No JVD, Normal thyroid  NERVOUS SYSTEM:  Alert & Oriented X3, Moving all extremities  CHEST/LUNG: + rhonchi with good air entry  HEART: Regular rate and rhythm; No murmurs, rubs, or gallops  ABDOMEN: Soft, Nontender, Nondistended; Bowel sounds present  EXTREMITIES:    No clubbing, cyanosis, or edema  SKIN: No rashes or lesions                                    14.2   13.71 )-----------( 281      ( 2021 07:38 )             43.9     07-    139  |  105  |  29<H>  ----------------------------<  110<H>  4.5   |  20  |  0.7    Ca    10.3<H>      2021 07:38            Urinalysis Basic - ( 2021 20:10 )    Color: Yellow / Appearance: Clear / S.020 / pH: x  Gluc: x / Ketone: Negative  / Bili: Negative / Urobili: 0.2 mg/dL   Blood: x / Protein: 100 mg/dL / Nitrite: Negative   Leuk Esterase: Negative / RBC: 1-2 /HPF / WBC 3-5 /HPF   Sq Epi: x / Non Sq Epi: Few /HPF / Bacteria: Few                  MEDICATIONS  (STANDING):  albuterol/ipratropium for Nebulization.. 3 milliLiter(s) Nebulizer every 20 minutes  aspirin  chewable 81 milliGRAM(s) Oral daily  azithromycin   Tablet 250 milliGRAM(s) Oral daily  cefTRIAXone   IVPB 1000 milliGRAM(s) IV Intermittent every 24 hours  clopidogrel Tablet 75 milliGRAM(s) Oral daily  heparin   Injectable 5000 Unit(s) SubCutaneous every 8 hours  hydrALAZINE 25 milliGRAM(s) Oral two times a day  isosorbide   mononitrate ER Tablet (IMDUR) 30 milliGRAM(s) Oral daily  latanoprost 0.005% Ophthalmic Solution 1 Drop(s) Both EYES at bedtime  levothyroxine 25 MICROGram(s) Oral <User Schedule>  methylPREDNISolone sodium succinate Injectable 40 milliGRAM(s) IV Push two times a day  metoprolol succinate ER 25 milliGRAM(s) Oral daily  pantoprazole    Tablet 40 milliGRAM(s) Oral before breakfast  vancomycin  IVPB 750 milliGRAM(s) IV Intermittent once    MEDICATIONS  (PRN):  albuterol/ipratropium for Nebulization 3 milliLiter(s) Nebulizer every 6 hours PRN Shortness of Breath and/or Wheezing      Imaging Personally Reviewed:     [x ] YES  [ ] NO    Consultant(s) Notes Reviewed:  [x ] YES  [ ] NO    Care Discussed with Consultants/Other Providers [x ] YES  [ ] NO  Care Discussed with Consultants/Other Providers [x ] YES  [ ] No medical contraindication for discharge CC.  sob and urine incontinence  HPI.  Patient reports Cough, and SOB which is improving  Afebrile      Constitutional: No fever, fatigue or weight loss.  Skin: No rash.  Eyes: No recent vision problems or eye pain.  ENT: No congestion, ear pain, or sore throat.  Endocrine: No thyroid problems.  Cardiovascular: No chest pain or palpation.  Respiratory: No   congestion, or wheezing.  Gastrointestinal: No abdominal pain, nausea, vomiting, or diarrhea.  Genitourinary: No dysuria.  Musculoskeletal: No joint swelling.  Neurologic: No headache.      Vital Signs Last 24 Hrs  T(C): 35.7 (2021 04:36), Max: 35.8 (2021 21:48)  T(F): 96.3 (2021 04:36), Max: 96.4 (2021 21:48)  HR: 62 (2021 04:36) (56 - 66)  BP: 149/64 (2021 04:36) (145/61 - 196/81)  BP(mean): --  RR: 18 (2021 04:36) (16 - 18)  SpO2: 98% (2021 00:50) (98% - 98%)        PHYSICAL EXAM-  GENERAL: NAD   HEAD:  Atraumatic, Normocephalic  EYES: EOMI, PERRLA, conjunctiva and sclera clear  NECK: Supple, No JVD, Normal thyroid  NERVOUS SYSTEM:  Alert & Oriented X3, Moving all extremities  CHEST/LUNG: + rhonchi with good air entry  HEART: Regular rate and rhythm; No murmurs, rubs, or gallops  ABDOMEN: Soft, Nontender, Nondistended; Bowel sounds present  EXTREMITIES:    No clubbing, cyanosis, or edema  SKIN: No rashes or lesions                              14.2   13.71 )-----------( 281      ( 2021 07:38 )             43.9     07-    139  |  105  |  29<H>  ----------------------------<  110<H>  4.5   |  20  |  0.7    Ca    10.3<H>      2021 07:38            Urinalysis Basic - ( 2021 20:10 )    Color: Yellow / Appearance: Clear / S.020 / pH: x  Gluc: x / Ketone: Negative  / Bili: Negative / Urobili: 0.2 mg/dL   Blood: x / Protein: 100 mg/dL / Nitrite: Negative   Leuk Esterase: Negative / RBC: 1-2 /HPF / WBC 3-5 /HPF   Sq Epi: x / Non Sq Epi: Few /HPF / Bacteria: Few      MEDICATIONS  (STANDING):  albuterol/ipratropium for Nebulization.. 3 milliLiter(s) Nebulizer every 20 minutes  aspirin  chewable 81 milliGRAM(s) Oral daily  azithromycin   Tablet 250 milliGRAM(s) Oral daily  cefTRIAXone   IVPB 1000 milliGRAM(s) IV Intermittent every 24 hours  clopidogrel Tablet 75 milliGRAM(s) Oral daily  heparin   Injectable 5000 Unit(s) SubCutaneous every 8 hours  hydrALAZINE 25 milliGRAM(s) Oral two times a day  isosorbide   mononitrate ER Tablet (IMDUR) 30 milliGRAM(s) Oral daily  latanoprost 0.005% Ophthalmic Solution 1 Drop(s) Both EYES at bedtime  levothyroxine 25 MICROGram(s) Oral <User Schedule>  methylPREDNISolone sodium succinate Injectable 40 milliGRAM(s) IV Push two times a day  metoprolol succinate ER 25 milliGRAM(s) Oral daily  pantoprazole    Tablet 40 milliGRAM(s) Oral before breakfast  vancomycin  IVPB 750 milliGRAM(s) IV Intermittent once    MEDICATIONS  (PRN):  albuterol/ipratropium for Nebulization 3 milliLiter(s) Nebulizer every 6 hours PRN Shortness of Breath and/or Wheezing      Imaging Personally Reviewed:     [x ] YES  [ ] NO    Consultant(s) Notes Reviewed:  [x ] YES  [ ] NO    Care Discussed with Consultants/Other Providers [x ] YES  [ ] NO  Care Discussed with Consultants/Other Providers [x ] YES  [ ] No medical contraindication for discharge

## 2021-07-29 NOTE — CONSULT NOTE ADULT - SUBJECTIVE AND OBJECTIVE BOX
Patient is a 89y old  Female who presents with a chief complaint of SOB (2021 12:00)      HPI:  89yo female whose PMH includes elevated cholesterol, hypothyroidism HTN and CAD (stent), presents to the ER after she was found to have pneumonia on [place] [facility] [place] Patient endorses cough but says she sought medical care because she was having increasing shortness of breath for a few days. Denies chest pain or fever and is feeling much better after ER intervention   (2021 23:33)      PAST MEDICAL & SURGICAL HISTORY:  High blood cholesterol    Hypothyroid    Hypertension    COPD (chronic obstructive pulmonary disease)    History of coronary artery stent placement    History of cholecystectomy    H/O abdominal hysterectomy        FAMILY HISTORY:  Family history unknown      Family history: No family cardiovascular system   Occupation:  Alochol: Denied  Smoking: Denied  Drug Use: Denied  Marital Status:           Allergies    No Known Allergies    Intolerances        Home Medications:  aspirin 81 mg oral tablet, chewable: 1 tab(s) orally once a day (2018 18:04)  clopidogrel 75 mg oral tablet: 1 tab(s) orally once a day (2018 18:04)  hydrALAZINE 25 mg oral tablet:  (2018 18:04)  isosorbide dinitrate 20 mg oral tablet: 1 tab(s) orally 3 times a day (2018 18:04)  latanoprost 0.005% ophthalmic solution: 1 drop(s) to each affected eye once a day (in the evening) (2018 18:02)  latanoprost ophthalmic: to each affected eye once a day (at bedtime) (2018 18:04)  lisinopril 20 mg oral tablet: 1 tab(s) orally once a day (2018 18:04)  metoprolol succinate 25 mg oral tablet, extended release:  (2018 18:04)  pantoprazole 20 mg oral delayed release tablet: 1 tab(s) orally once a day (2018 18:04)  pravastatin 20 mg oral tablet: 1 tab(s) orally once a day (2018 18:04)  Synthroid 25 mcg (0.025 mg) oral tablet: 1 tab(s) orally once a day (2018 18:04)      ROS: as in HPI; All other systems reviewed are negative        PHYSICAL EXAM:  Vital Signs Last 24 Hrs  T(C): 35.7 (2021 04:36), Max: 35.8 (2021 21:48)  T(F): 96.3 (2021 04:36), Max: 96.4 (2021 21:48)  HR: 62 (2021 04:36) (56 - 66)  BP: 149/64 (2021 04:36) (145/61 - 196/81)  BP(mean): --  RR: 18 (2021 04:36) (16 - 18)  SpO2: 98% (2021 00:50) (98% - 98%)      GENERAL: NAD, well-groomed, well-developed  HEAD:  Atraumatic, Normocephalic  EYES: EOMI, PERRLA, conjunctiva and sclera clear  ENMT: No tonsillar erythema, exudates, or enlargement; Moist mucous membranes, Good dentition, No lesions  NECK: Supple, No JVD, Normal thyroid  NERVOUS SYSTEM:  Alert & Oriented X3, Good concentration; Motor Strength 5/5 B/L upper and lower extremities; DTRs 2+ intact and symmetric  CHEST/LUNG: b/l wheeze   HEART: Regular rate and rhythm; No murmurs, rubs, or gallops  ABDOMEN: Soft, Nontender, Nondistended; Bowel sounds present  EXTREMITIES:  2+ Peripheral Pulses, No clubbing, cyanosis, or edema  LYMPH: No lymphadenopathy noted  SKIN: No rashes or lesions    HOSPITAL MEDICATIONS:  MEDICATIONS  (STANDING):  albuterol/ipratropium for Nebulization.. 3 milliLiter(s) Nebulizer every 20 minutes  aspirin  chewable 81 milliGRAM(s) Oral daily  azithromycin   Tablet 250 milliGRAM(s) Oral daily  cefTRIAXone   IVPB 1000 milliGRAM(s) IV Intermittent every 24 hours  clopidogrel Tablet 75 milliGRAM(s) Oral daily  heparin   Injectable 5000 Unit(s) SubCutaneous every 8 hours  hydrALAZINE 25 milliGRAM(s) Oral two times a day  isosorbide   mononitrate ER Tablet (IMDUR) 30 milliGRAM(s) Oral daily  latanoprost 0.005% Ophthalmic Solution 1 Drop(s) Both EYES at bedtime  levothyroxine 25 MICROGram(s) Oral <User Schedule>  methylPREDNISolone sodium succinate Injectable 40 milliGRAM(s) IV Push two times a day  metoprolol succinate ER 25 milliGRAM(s) Oral daily  pantoprazole    Tablet 40 milliGRAM(s) Oral before breakfast    MEDICATIONS  (PRN):  albuterol/ipratropium for Nebulization 3 milliLiter(s) Nebulizer every 6 hours PRN Shortness of Breath and/or Wheezing      LABS:                        14.2   13.71 )-----------( 281      ( 2021 07:38 )             43.9     07-    139  |  105  |  29<H>  ----------------------------<  110<H>  4.5   |  20  |  0.7    Ca    10.3<H>      2021 07:38        Urinalysis Basic - ( 2021 20:10 )    Color: Yellow / Appearance: Clear / S.020 / pH: x  Gluc: x / Ketone: Negative  / Bili: Negative / Urobili: 0.2 mg/dL   Blood: x / Protein: 100 mg/dL / Nitrite: Negative   Leuk Esterase: Negative / RBC: 1-2 /HPF / WBC 3-5 /HPF   Sq Epi: x / Non Sq Epi: Few /HPF / Bacteria: Few              Culture - Urine (collected 2021 20:10)  Source: Clean Catch Clean Catch (Midstream)  Final Report (2021 04:22):    <10,000 CFU/mL Normal Urogenital Kristin    Culture - Blood (collected 2021 19:35)  Source: .Blood Blood  Gram Stain (2021 05:48):    Growth in aerobic bottle: Gram Positive Cocci in Clusters  Preliminary Report (2021 05:48):    Growth in aerobic bottle: Gram Positive Cocci in Clusters    ***Blood Panel PCR results on this specimen are available    approximately 3 hours after the Gram stain result.***    Gram stain, PCR, and/or culture results may not always    correspond due to difference in methodologies.    ************************************************************    This PCR assay was performed by multiplex PCR. This    Assay tests for 66 bacterial and resistance gene targets.    Please refer to the [facility] test directory    at [email or web address] for details.  Organism: Blood Culture PCR (2021 07:58)  Organism: Blood Culture PCR (2021 07:58)    Culture - Blood (collected 2021 19:35)  Source: .Blood Blood  Preliminary Report (2021 04:01):    No growth to date.        RADIOLOGY: small b/l opacity decrease on   [ ] Reviewed and interpreted by me    ECHO:    Point of Care Ultrasound Findings;    PFT:

## 2021-07-29 NOTE — CONSULT NOTE ADULT - ASSESSMENT
Impression:  most likley viral PNA   with asthma exacerbation hyperactive airway         Plan:  prednisone 40 mg daily   albuterol Q 4 hrs and prn   check procal   azitrhomycin total 5 days   pancx   start symbicort 80 Q 12 hrs   
ASSESSMENT  89y F admitted with PNEUMONIA; RSV (RESPIRATORY SYNCYTIAL PNEUMONIA)    HPI:  89yo female whose PMH includes elevated cholesterol, hypothyroidism, COPD HTN and CAD (stent), presents to the ER after she was found to have pneumonia on CXR done at a [facility] [place]. Patient endorses cough but says she sought medical care because she was having increasing shortness of breath for a few days. Denies chest pain or fever and is feeling much better after ER intervention     PROBLEMS  Pt with suspected Gram negative pneumonia due to COPD.    On steroids for RSV (wbc ralph to 14)    Cxray with patchy bilateral pulmonary opacities.     B/C with MRSE (1/2 sets): probable contaminant    New problem with additional W/U  acute illness with systemic symptoms     On azithromycin   Tablet 250 milliGRAM(s) Oral daily  cefTRIAXone   IVPB 1000 milliGRAM(s) IV Intermittent every 24 hours  vancomycin  IVPB 750 milliGRAM(s) IV Intermittent once      PLAN  - Await blood cultures  Sputum C&S and Gram stain  Urine legionella antigen  D/C Vancomycin  - Continue Ceftriaxone, Zithromax  Repeat B/C x 2  TSH in view of hypothermia

## 2021-07-30 LAB
ANION GAP SERPL CALC-SCNC: 10 MMOL/L — SIGNIFICANT CHANGE UP (ref 7–14)
BUN SERPL-MCNC: 34 MG/DL — HIGH (ref 10–20)
CALCIUM SERPL-MCNC: 10 MG/DL — SIGNIFICANT CHANGE UP (ref 8.5–10.1)
CHLORIDE SERPL-SCNC: 104 MMOL/L — SIGNIFICANT CHANGE UP (ref 98–110)
CO2 SERPL-SCNC: 24 MMOL/L — SIGNIFICANT CHANGE UP (ref 17–32)
CREAT SERPL-MCNC: 0.8 MG/DL — SIGNIFICANT CHANGE UP (ref 0.7–1.5)
GLUCOSE SERPL-MCNC: 119 MG/DL — HIGH (ref 70–99)
HCT VFR BLD CALC: 40.9 % — SIGNIFICANT CHANGE UP (ref 37–47)
HGB BLD-MCNC: 13.3 G/DL — SIGNIFICANT CHANGE UP (ref 12–16)
MCHC RBC-ENTMCNC: 31.4 PG — HIGH (ref 27–31)
MCHC RBC-ENTMCNC: 32.5 G/DL — SIGNIFICANT CHANGE UP (ref 32–37)
MCV RBC AUTO: 96.5 FL — SIGNIFICANT CHANGE UP (ref 81–99)
NRBC # BLD: 0 /100 WBCS — SIGNIFICANT CHANGE UP (ref 0–0)
PLATELET # BLD AUTO: 257 K/UL — SIGNIFICANT CHANGE UP (ref 130–400)
POTASSIUM SERPL-MCNC: 4.6 MMOL/L — SIGNIFICANT CHANGE UP (ref 3.5–5)
POTASSIUM SERPL-SCNC: 4.6 MMOL/L — SIGNIFICANT CHANGE UP (ref 3.5–5)
RBC # BLD: 4.24 M/UL — SIGNIFICANT CHANGE UP (ref 4.2–5.4)
RBC # FLD: 12.3 % — SIGNIFICANT CHANGE UP (ref 11.5–14.5)
SODIUM SERPL-SCNC: 138 MMOL/L — SIGNIFICANT CHANGE UP (ref 135–146)
WBC # BLD: 12.05 K/UL — HIGH (ref 4.8–10.8)
WBC # FLD AUTO: 12.05 K/UL — HIGH (ref 4.8–10.8)

## 2021-07-30 PROCEDURE — 99232 SBSQ HOSP IP/OBS MODERATE 35: CPT

## 2021-07-30 PROCEDURE — 99233 SBSQ HOSP IP/OBS HIGH 50: CPT

## 2021-07-30 RX ORDER — AZITHROMYCIN 500 MG/1
500 TABLET, FILM COATED ORAL DAILY
Refills: 0 | Status: DISCONTINUED | OUTPATIENT
Start: 2021-07-30 | End: 2021-07-31

## 2021-07-30 RX ORDER — HYDRALAZINE HCL 50 MG
25 TABLET ORAL THREE TIMES A DAY
Refills: 0 | Status: DISCONTINUED | OUTPATIENT
Start: 2021-07-30 | End: 2021-07-31

## 2021-07-30 RX ORDER — HYDRALAZINE HCL 50 MG
25 TABLET ORAL ONCE
Refills: 0 | Status: COMPLETED | OUTPATIENT
Start: 2021-07-30 | End: 2021-07-30

## 2021-07-30 RX ADMIN — Medication 40 MILLIGRAM(S): at 05:27

## 2021-07-30 RX ADMIN — Medication 25 MILLIGRAM(S): at 05:36

## 2021-07-30 RX ADMIN — HEPARIN SODIUM 5000 UNIT(S): 5000 INJECTION INTRAVENOUS; SUBCUTANEOUS at 21:02

## 2021-07-30 RX ADMIN — Medication 25 MICROGRAM(S): at 05:27

## 2021-07-30 RX ADMIN — BUDESONIDE AND FORMOTEROL FUMARATE DIHYDRATE 2 PUFF(S): 160; 4.5 AEROSOL RESPIRATORY (INHALATION) at 09:02

## 2021-07-30 RX ADMIN — CLOPIDOGREL BISULFATE 75 MILLIGRAM(S): 75 TABLET, FILM COATED ORAL at 13:45

## 2021-07-30 RX ADMIN — Medication 81 MILLIGRAM(S): at 13:43

## 2021-07-30 RX ADMIN — HEPARIN SODIUM 5000 UNIT(S): 5000 INJECTION INTRAVENOUS; SUBCUTANEOUS at 13:45

## 2021-07-30 RX ADMIN — PANTOPRAZOLE SODIUM 40 MILLIGRAM(S): 20 TABLET, DELAYED RELEASE ORAL at 05:27

## 2021-07-30 RX ADMIN — Medication 25 MILLIGRAM(S): at 21:15

## 2021-07-30 RX ADMIN — HEPARIN SODIUM 5000 UNIT(S): 5000 INJECTION INTRAVENOUS; SUBCUTANEOUS at 05:27

## 2021-07-30 RX ADMIN — Medication 25 MILLIGRAM(S): at 16:43

## 2021-07-30 RX ADMIN — LATANOPROST 1 DROP(S): 0.05 SOLUTION/ DROPS OPHTHALMIC; TOPICAL at 21:15

## 2021-07-30 RX ADMIN — AZITHROMYCIN 250 MILLIGRAM(S): 500 TABLET, FILM COATED ORAL at 13:45

## 2021-07-30 RX ADMIN — CEFTRIAXONE 100 MILLIGRAM(S): 500 INJECTION, POWDER, FOR SOLUTION INTRAMUSCULAR; INTRAVENOUS at 13:46

## 2021-07-30 RX ADMIN — ISOSORBIDE MONONITRATE 30 MILLIGRAM(S): 60 TABLET, EXTENDED RELEASE ORAL at 13:45

## 2021-07-30 NOTE — PROGRESS NOTE ADULT - ASSESSMENT
Impression:  most likley viral PNA   with asthma exacerbation hyperactive airway         Plan:  prednisone 40 mg daily for 2 days and then 20 mg for 3 days   albuterol Q 4 hrs and prn   check procal   azitrhomycin total 5 days   pancx   symbicort 80 Q 12 hrs   follow as outpatient

## 2021-07-30 NOTE — PROGRESS NOTE ADULT - SUBJECTIVE AND OBJECTIVE BOX
CC.  sob and urine incontinence  HPI.  Patient reports Cough, and SOB which is improving  Afebrile      Constitutional: No fever, fatigue or weight loss.  Skin: No rash.  Eyes: No recent vision problems or eye pain.  ENT: No congestion, ear pain, or sore throat.  Endocrine: No thyroid problems.  Cardiovascular: No chest pain or palpation.  Respiratory: No   congestion, or wheezing.  Gastrointestinal: No abdominal pain, nausea, vomiting, or diarrhea.  Genitourinary: No dysuria.  Musculoskeletal: No joint swelling.  Neurologic: No headache.      Vital Signs Last 24 Hrs  T(C): 36.4 (30 Jul 2021 05:37), Max: 36.4 (30 Jul 2021 05:37)  T(F): 97.5 (30 Jul 2021 05:37), Max: 97.5 (30 Jul 2021 05:37)  HR: 48 (30 Jul 2021 05:37) (48 - 54)  BP: 167/70 (30 Jul 2021 05:37) (129/59 - 167/70)  BP(mean): --  RR: 16 (30 Jul 2021 05:37) (16 - 18)  SpO2: --        PHYSICAL EXAM-  GENERAL: NAD   HEAD:  Atraumatic, Normocephalic  EYES: EOMI, PERRLA, conjunctiva and sclera clear  NECK: Supple, No JVD, Normal thyroid  NERVOUS SYSTEM:  Alert & Oriented X3, Moving all extremities  CHEST/LUNG: + rhonchi with good air entry  HEART: Regular rate and rhythm; No murmurs, rubs, or gallops  ABDOMEN: Soft, Nontender, Nondistended; Bowel sounds present  EXTREMITIES:    No clubbing, cyanosis, or edema  SKIN: No rashes or lesions                                            13.3   12.05 )-----------( 257      ( 30 Jul 2021 07:27 )             40.9     07-30    138  |  104  |  34<H>  ----------------------------<  119<H>  4.6   |  24  |  0.8    Ca    10.0      30 Jul 2021 07:27              MEDICATIONS  (STANDING):  albuterol/ipratropium for Nebulization.. 3 milliLiter(s) Nebulizer every 20 minutes  aspirin  chewable 81 milliGRAM(s) Oral daily  azithromycin   Tablet 250 milliGRAM(s) Oral daily  cefTRIAXone   IVPB 1000 milliGRAM(s) IV Intermittent every 24 hours  clopidogrel Tablet 75 milliGRAM(s) Oral daily  heparin   Injectable 5000 Unit(s) SubCutaneous every 8 hours  hydrALAZINE 25 milliGRAM(s) Oral two times a day  isosorbide   mononitrate ER Tablet (IMDUR) 30 milliGRAM(s) Oral daily  latanoprost 0.005% Ophthalmic Solution 1 Drop(s) Both EYES at bedtime  levothyroxine 25 MICROGram(s) Oral <User Schedule>  methylPREDNISolone sodium succinate Injectable 40 milliGRAM(s) IV Push two times a day  metoprolol succinate ER 25 milliGRAM(s) Oral daily  pantoprazole    Tablet 40 milliGRAM(s) Oral before breakfast  vancomycin  IVPB 750 milliGRAM(s) IV Intermittent once    MEDICATIONS  (PRN):  albuterol/ipratropium for Nebulization 3 milliLiter(s) Nebulizer every 6 hours PRN Shortness of Breath and/or Wheezing      Imaging Personally Reviewed:     [x ] YES  [ ] NO    Consultant(s) Notes Reviewed:  [x ] YES  [ ] NO    Care Discussed with Consultants/Other Providers [x ] YES  [ ] NO  Care Discussed with Consultants/Other Providers [x ] YES  [ ] No medical contraindication for discharge

## 2021-07-30 NOTE — PROGRESS NOTE ADULT - SUBJECTIVE AND OBJECTIVE BOX
Patient is a 89y old  Female who presents with a chief complaint of SOB (30 Jul 2021 08:58)      Over Night Events:  Patient seen and examined.   feel better today still has cough but improved     ROS:  See HPI    PHYSICAL EXAM    ICU Vital Signs Last 24 Hrs  T(C): 36.4 (30 Jul 2021 05:37), Max: 36.4 (30 Jul 2021 05:37)  T(F): 97.5 (30 Jul 2021 05:37), Max: 97.5 (30 Jul 2021 05:37)  HR: 48 (30 Jul 2021 05:37) (48 - 54)  BP: 167/70 (30 Jul 2021 05:37) (129/59 - 167/70)  BP(mean): --  ABP: --  ABP(mean): --  RR: 16 (30 Jul 2021 09:05) (16 - 18)  SpO2: 95% (30 Jul 2021 09:05) (95% - 97%)      General:AO  HEENT:     cuca           Lymph Nodes: NO cervical LN   Lungs: Bilateral mild wheeze   Cardiovascular: Regular   Abdomen: Soft, Positive BS  Extremities: No clubbing   Skin: warm   Neurological: no focal   Musculoskeletal: move all ext     I&O's Detail    29 Jul 2021 07:01  -  30 Jul 2021 07:00  --------------------------------------------------------  IN:  Total IN: 0 mL    OUT:    Voided (mL): 1400 mL  Total OUT: 1400 mL    Total NET: -1400 mL          LABS:                          13.3   12.05 )-----------( 257      ( 30 Jul 2021 07:27 )             40.9         30 Jul 2021 07:27    138    |  104    |  34     ----------------------------<  119    4.6     |  24     |  0.8      Ca    10.0       30 Jul 2021 07:27                                                                                                                                                  Culture - Urine (collected 27 Jul 2021 20:10)  Source: Clean Catch Clean Catch (Midstream)  Final Report (29 Jul 2021 04:22):    <10,000 CFU/mL Normal Urogenital Kristin                                                                                           MEDICATIONS  (STANDING):  albuterol/ipratropium for Nebulization.. 3 milliLiter(s) Nebulizer every 20 minutes  aspirin  chewable 81 milliGRAM(s) Oral daily  azithromycin   Tablet 250 milliGRAM(s) Oral daily  budesonide  80 MICROgram(s)/formoterol 4.5 MICROgram(s) Inhaler 2 Puff(s) Inhalation two times a day  cefTRIAXone   IVPB 1000 milliGRAM(s) IV Intermittent every 24 hours  clopidogrel Tablet 75 milliGRAM(s) Oral daily  heparin   Injectable 5000 Unit(s) SubCutaneous every 8 hours  hydrALAZINE 25 milliGRAM(s) Oral two times a day  isosorbide   mononitrate ER Tablet (IMDUR) 30 milliGRAM(s) Oral daily  latanoprost 0.005% Ophthalmic Solution 1 Drop(s) Both EYES at bedtime  levothyroxine 25 MICROGram(s) Oral <User Schedule>  metoprolol succinate ER 25 milliGRAM(s) Oral daily  pantoprazole    Tablet 40 milliGRAM(s) Oral before breakfast  predniSONE   Tablet 40 milliGRAM(s) Oral daily    MEDICATIONS  (PRN):  albuterol/ipratropium for Nebulization 3 milliLiter(s) Nebulizer every 6 hours PRN Shortness of Breath and/or Wheezing          Xrays:  TLC:  OG:  ET tube:                                                                                       ECHO:  [facility] ICU:

## 2021-07-30 NOTE — PROGRESS NOTE ADULT - ASSESSMENT
89yo female whose PMH includes elevated cholesterol, hypothyroidism HTN and CAD (stent), presents to the ER after she was found to have pneumonia on CXR done at a [facility]. Patient endorses cough but says she sought medical care because she was having increasing shortness of breath for a few days.      #RSV PNa with overlying bacterial PNA?  #underlying copd (smoking h/o)  -BC shows Staph epi.  vanco started.  ID recommended to hold vanco.  repeat Blood culture pending   - nebs  - Currently on 2 liter, wean off oxygen, and asses for home oxygen  - c/w azithro and ceftriaxone  - supportive treatment for viral component  - continue with po steriod with taper   -repeat cxr shows improvement  -pulmonary and ID to follow up     #hypothyroidism  - 25 mcg levothyroxine  -TSH wnl    #CAD w/ stent  - c/w aspirin and plavix  - toprol    #HTN  - hydralazine    #gerd  - ppi    #Progress Note Handoff  Pending (specify):  still acute   Family discussion:  plan of care was discussed with patient in details.  all questions were answered.  seems to understand, and in agreement  Disposition:  unknown

## 2021-07-31 ENCOUNTER — TRANSCRIPTION ENCOUNTER (OUTPATIENT)
Age: 86
End: 2021-07-31

## 2021-07-31 VITALS
SYSTOLIC BLOOD PRESSURE: 143 MMHG | DIASTOLIC BLOOD PRESSURE: 76 MMHG | RESPIRATION RATE: 16 BRPM | TEMPERATURE: 96 F | OXYGEN SATURATION: 96 % | HEART RATE: 65 BPM

## 2021-07-31 LAB — PROCALCITONIN SERPL-MCNC: 0.04 NG/ML — SIGNIFICANT CHANGE UP (ref 0.02–0.1)

## 2021-07-31 PROCEDURE — 93010 ELECTROCARDIOGRAM REPORT: CPT

## 2021-07-31 PROCEDURE — 99232 SBSQ HOSP IP/OBS MODERATE 35: CPT

## 2021-07-31 RX ORDER — BUDESONIDE AND FORMOTEROL FUMARATE DIHYDRATE 160; 4.5 UG/1; UG/1
2 AEROSOL RESPIRATORY (INHALATION)
Qty: 1 | Refills: 0
Start: 2021-07-31 | End: 2021-08-29

## 2021-07-31 RX ORDER — AZITHROMYCIN 500 MG/1
1 TABLET, FILM COATED ORAL
Qty: 3 | Refills: 0
Start: 2021-07-31 | End: 2021-08-02

## 2021-07-31 RX ORDER — TAMSULOSIN HYDROCHLORIDE 0.4 MG/1
1 CAPSULE ORAL
Qty: 30 | Refills: 0
Start: 2021-07-31 | End: 2021-08-29

## 2021-07-31 RX ORDER — ALBUTEROL 90 UG/1
2 AEROSOL, METERED ORAL
Qty: 1 | Refills: 0
Start: 2021-07-31 | End: 2021-08-29

## 2021-07-31 RX ORDER — POLYETHYLENE GLYCOL 3350 17 G/17G
17 POWDER, FOR SOLUTION ORAL
Qty: 510 | Refills: 0
Start: 2021-07-31 | End: 2021-08-29

## 2021-07-31 RX ORDER — HYDRALAZINE HCL 50 MG
0 TABLET ORAL
Qty: 0 | Refills: 0 | DISCHARGE

## 2021-07-31 RX ORDER — SENNA PLUS 8.6 MG/1
2 TABLET ORAL AT BEDTIME
Refills: 0 | Status: DISCONTINUED | OUTPATIENT
Start: 2021-07-31 | End: 2021-07-31

## 2021-07-31 RX ORDER — SENNA PLUS 8.6 MG/1
2 TABLET ORAL
Qty: 60 | Refills: 0
Start: 2021-07-31 | End: 2021-08-29

## 2021-07-31 RX ORDER — POLYETHYLENE GLYCOL 3350 17 G/17G
17 POWDER, FOR SOLUTION ORAL DAILY
Refills: 0 | Status: DISCONTINUED | OUTPATIENT
Start: 2021-07-31 | End: 2021-07-31

## 2021-07-31 RX ORDER — CEFPODOXIME PROXETIL 100 MG
1 TABLET ORAL
Qty: 14 | Refills: 0
Start: 2021-07-31 | End: 2021-08-06

## 2021-07-31 RX ORDER — LATANOPROST 0.05 MG/ML
0 SOLUTION/ DROPS OPHTHALMIC; TOPICAL
Qty: 0 | Refills: 0 | DISCHARGE

## 2021-07-31 RX ORDER — TAMSULOSIN HYDROCHLORIDE 0.4 MG/1
0.4 CAPSULE ORAL AT BEDTIME
Refills: 0 | Status: DISCONTINUED | OUTPATIENT
Start: 2021-07-31 | End: 2021-07-31

## 2021-07-31 RX ADMIN — Medication 40 MILLIGRAM(S): at 05:21

## 2021-07-31 RX ADMIN — Medication 25 MILLIGRAM(S): at 05:20

## 2021-07-31 RX ADMIN — SENNA PLUS 2 TABLET(S): 8.6 TABLET ORAL at 14:24

## 2021-07-31 RX ADMIN — BUDESONIDE AND FORMOTEROL FUMARATE DIHYDRATE 2 PUFF(S): 160; 4.5 AEROSOL RESPIRATORY (INHALATION) at 08:51

## 2021-07-31 RX ADMIN — CLOPIDOGREL BISULFATE 75 MILLIGRAM(S): 75 TABLET, FILM COATED ORAL at 11:21

## 2021-07-31 RX ADMIN — ISOSORBIDE MONONITRATE 30 MILLIGRAM(S): 60 TABLET, EXTENDED RELEASE ORAL at 11:22

## 2021-07-31 RX ADMIN — PANTOPRAZOLE SODIUM 40 MILLIGRAM(S): 20 TABLET, DELAYED RELEASE ORAL at 05:20

## 2021-07-31 RX ADMIN — Medication 25 MICROGRAM(S): at 05:20

## 2021-07-31 RX ADMIN — AZITHROMYCIN 500 MILLIGRAM(S): 500 TABLET, FILM COATED ORAL at 11:21

## 2021-07-31 RX ADMIN — CEFTRIAXONE 100 MILLIGRAM(S): 500 INJECTION, POWDER, FOR SOLUTION INTRAMUSCULAR; INTRAVENOUS at 11:24

## 2021-07-31 RX ADMIN — POLYETHYLENE GLYCOL 3350 17 GRAM(S): 17 POWDER, FOR SOLUTION ORAL at 11:22

## 2021-07-31 RX ADMIN — HEPARIN SODIUM 5000 UNIT(S): 5000 INJECTION INTRAVENOUS; SUBCUTANEOUS at 14:20

## 2021-07-31 RX ADMIN — Medication 81 MILLIGRAM(S): at 11:21

## 2021-07-31 RX ADMIN — Medication 25 MILLIGRAM(S): at 14:20

## 2021-07-31 RX ADMIN — Medication 25 MILLIGRAM(S): at 05:23

## 2021-07-31 RX ADMIN — HEPARIN SODIUM 5000 UNIT(S): 5000 INJECTION INTRAVENOUS; SUBCUTANEOUS at 05:20

## 2021-07-31 NOTE — DISCHARGE NOTE NURSING/CASE MANAGEMENT/SOCIAL WORK - PATIENT PORTAL LINK FT
You can access the FollowMyHealth Patient Portal offered by [facility] by registering at the following website: [email or web address]. By joining [employer]’s FollowMyHealth portal, you will also be able to view your health information using other applications (apps) compatible with our system.

## 2021-07-31 NOTE — DISCHARGE NOTE NURSING/CASE MANAGEMENT/SOCIAL WORK - NSDCFUADDAPPT_GEN_ALL_CORE_FT
Please follow up with your doctor, and pulmonary in 1-2 weeks  Please follow up with urology in 3-4 days for acevedo removal  Please come back to the hospital if you develop any new symptoms or concerns

## 2021-08-01 ENCOUNTER — EMERGENCY (EMERGENCY)
Facility: HOSPITAL | Age: 86
LOS: 0 days | Discharge: HOME | End: 2021-08-01
Attending: EMERGENCY MEDICINE | Admitting: EMERGENCY MEDICINE
Payer: MEDICARE

## 2021-08-01 VITALS
SYSTOLIC BLOOD PRESSURE: 132 MMHG | DIASTOLIC BLOOD PRESSURE: 63 MMHG | OXYGEN SATURATION: 95 % | TEMPERATURE: 96 F | HEIGHT: 68 IN | RESPIRATION RATE: 18 BRPM | HEART RATE: 62 BPM | WEIGHT: 179.9 LBS

## 2021-08-01 DIAGNOSIS — J44.9 CHRONIC OBSTRUCTIVE PULMONARY DISEASE, UNSPECIFIED: ICD-10-CM

## 2021-08-01 DIAGNOSIS — Z98.890 OTHER SPECIFIED POSTPROCEDURAL STATES: ICD-10-CM

## 2021-08-01 DIAGNOSIS — Z79.82 LONG TERM (CURRENT) USE OF ASPIRIN: ICD-10-CM

## 2021-08-01 DIAGNOSIS — I10 ESSENTIAL (PRIMARY) HYPERTENSION: ICD-10-CM
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
## 2021-08-01 DIAGNOSIS — Z96.0 PRESENCE OF UROGENITAL IMPLANTS: ICD-10-CM

## 2021-08-01 DIAGNOSIS — Z79.890 HORMONE REPLACEMENT THERAPY: ICD-10-CM
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
## 2021-08-01 DIAGNOSIS — Z95.5 PRESENCE OF CORONARY ANGIOPLASTY IMPLANT AND GRAFT: Chronic | ICD-10-CM

## 2021-08-01 DIAGNOSIS — E03.9 HYPOTHYROIDISM, UNSPECIFIED: ICD-10-CM

## 2021-08-01 DIAGNOSIS — Z87.891 PERSONAL HISTORY OF NICOTINE DEPENDENCE: ICD-10-CM

## 2021-08-01 DIAGNOSIS — Z90.49 ACQUIRED ABSENCE OF OTHER SPECIFIED PARTS OF DIGESTIVE TRACT: Chronic | ICD-10-CM

## 2021-08-01 DIAGNOSIS — E78.5 HYPERLIPIDEMIA, UNSPECIFIED: ICD-10-CM

## 2021-08-01 DIAGNOSIS — Z90.89 ACQUIRED ABSENCE OF OTHER ORGANS: ICD-10-CM

## 2021-08-01 DIAGNOSIS — Z79.899 OTHER LONG TERM (CURRENT) DRUG THERAPY: ICD-10-CM

## 2021-08-01 DIAGNOSIS — T83.098A OTHER MECHANICAL COMPLICATION OF OTHER URINARY CATHETER, INITIAL ENCOUNTER: ICD-10-CM

## 2021-08-01 DIAGNOSIS — Z90.710 ACQUIRED ABSENCE OF BOTH CERVIX AND UTERUS: Chronic | ICD-10-CM

## 2021-08-01 LAB
CULTURE RESULTS: SIGNIFICANT CHANGE UP
SPECIMEN SOURCE: SIGNIFICANT CHANGE UP

## 2021-08-01 PROCEDURE — 99283 EMERGENCY DEPT VISIT LOW MDM: CPT

## 2021-08-01 NOTE — ED PROVIDER NOTE - ATTENDING CONTRIBUTION TO CARE
89 yo F presnets from home with family for replacement of Martines catheter,  Pt accidentally pulled catheter out.  On exam pt in NAD AAOx  3, pleasant, abd soft nt nd

## 2021-08-01 NOTE — ED ADULT NURSE NOTE - PMH
COPD (chronic obstructive pulmonary disease)    High blood cholesterol    Hypertension    Hypothyroid

## 2021-08-01 NOTE — ED PROVIDER NOTE - NS ED ROS FT
Constitutional: no fever, chills, no recent weight loss, change in appetite or malaise  GI: No nausea, vomiting, diarrhea or abdominal pain.  : see HPI  Skin: No skin rash.  Endocrine: No history of thyroid disease or diabetes.

## 2021-08-01 NOTE — ED PROVIDER NOTE - PHYSICAL EXAMINATION
CONSTITUTIONAL: Well-appearing; well-nourished; in no apparent distress.   GI/: Normal bowel sounds; non-distended; non-tender; no palpable organomegaly.   NEURO/PSYCH: A & O x 4; grossly unremarkable.

## 2021-08-01 NOTE — ED PROVIDER NOTE - OBJECTIVE STATEMENT
89 yo female hx of HTN/HLD/Hypothyroid/ COPD/ chronic Martines present from home 2/2 Martines catheter accidentally removed at home. denies pain and bleeding from urethra.

## 2021-08-01 NOTE — ED PROVIDER NOTE - PATIENT PORTAL LINK FT
You can access the FollowMyHealth Patient Portal offered by [facility] by registering at the following website: [email or web address]. By joining [employer]’s FollowMyHealth portal, you will also be able to view your health information using other applications (apps) compatible with our system.

## 2021-08-01 NOTE — ED ADULT NURSE NOTE - NSIMPLEMENTINTERV_GEN_ALL_ED
Implemented All Fall with Harm Risk Interventions:  [place] to call system. Call bell, personal items and telephone within reach. Instruct patient to call for assistance. Room bathroom lighting operational. Non-slip footwear when patient is off stretcher. Physically safe environment: no spills, clutter or unnecessary equipment. Stretcher in lowest position, wheels locked, appropriate side rails in place. Provide visual cue, wrist band, yellow gown, etc. Monitor gait and stability. Monitor for mental status changes and reorient to person, place, and time. Review medications for side effects contributing to fall risk. Reinforce activity limits and safety measures with patient and family. Provide visual clues: red socks.

## 2021-08-01 NOTE — ED PROVIDER NOTE - NSFOLLOWUPINSTRUCTIONS_ED_ALL_ED_FT
Martines Catheter Placement and Care    WHAT YOU NEED TO KNOW:    A Martines catheter is a sterile tube that is inserted into your bladder to drain urine. It is also called an indwelling urinary catheter. The tip of the catheter has a small balloon filled with solution that holds the catheter inside your bladder.    DISCHARGE INSTRUCTIONS:    Return to the emergency department if:   Your catheter comes out.   You suddenly have material that looks like sand in the tubing or drainage bag.  No urine is draining into the bag and you have checked the system.  You have pain in your hip, back, pelvis, or lower abdomen.  You are confused or cannot think clearly.    Contact your healthcare provider if:   You have a fever.  You have bladder spasms for more than 1 day after the catheter is placed.  You see blood in the tubing or drainage bag.  You have a rash or itching where the catheter tube is secured to your skin.  Urine leaks from or around the catheter, tubing, or drainage bag.  The closed drainage system has accidently come open or apart.   You see a layer of crystals inside the tubing.  You have questions or concerns about your condition or care.    Care for your Martines catheter:     Clean your genital area 2 times every day.Clean your catheter and the area around where it was inserted. Use soap and water. Clean your anal opening and catheter area after every bowel movement.     Secure the catheter tube so you do not pull or move the catheter. This helps prevent pain and bladder spasms. Healthcare providers will show you how to use medical tape or a strap to secure the catheter tube to your body.     Keep a closed drainage system. Your Martines catheter should always be attached to the drainage bag to form a closed system. Do not disconnect any part of the closed system unless you need to change the bag.    Care for your drainage bag:     Ask if a leg bag is right for you. A leg bag can be worn under your clothes. Ask your healthcare provider for more information about a leg bag.     Keep the drainage bag below the level of your waist. This helps stop urine from moving back up the tubing and into your bladder. Do not loop or kink the tubing. This can cause urine to back up and collect in your bladder. Do not let the drainage bag touch or lie on the floor.    Empty the drainage bag when needed. The weight of a full drainage bag can be painful. Empty the drainage bag every 3 to 6 hours or when it is ? full.     Clean and change the drainage bag as directed. Ask your healthcare provider how often you should change the drainage bag and what cleaning solution to use. Wear disposable gloves when you change the bag. Do not allow the end of the catheter or tubing to touch anything. Clean the ends with an alcohol pad before you reconnect them.    What to do if problems develop:     No urine is draining into the bag:   Check for kinks in the tubing and straighten them out.     Check the tape or strap used to secure the catheter tube to your skin. Make sure it is not blocking the tube.     Make sure you are not sitting or lying on the tubing.    Make sure the urine bag is hanging below the level of your waist.    Urine leaks from or around the catheter, tubing, or drainage bag: Check if the closed drainage system has accidently come open or apart. Clean the catheter and tubing ends with a new alcohol pad and reconnect them.     Prevent an infection:     Wash your hands often. Wash before and after you touch your catheter, tubing, or drainage bag. Use soap and water. Wear clean disposable gloves when you care for your catheter or disconnect the drainage bag. Wash your hands before you prepare or eat food.     Drink liquids as directed. Ask your healthcare provider how much liquid to drink each day and which liquids are best for you. Liquids will help flush your kidneys and bladder to help prevent infection.    Follow up with your healthcare provider as directed: Write down your questions so you remember to ask them during your visits.

## 2021-08-02 PROBLEM — J44.9 CHRONIC OBSTRUCTIVE PULMONARY DISEASE, UNSPECIFIED: Chronic | Status: ACTIVE | Noted: 2021-07-27

## 2021-08-03 ENCOUNTER — NON-APPOINTMENT (OUTPATIENT)
Age: 86
End: 2021-08-03

## 2021-08-03 ENCOUNTER — APPOINTMENT (OUTPATIENT)
Dept: UROLOGY | Facility: CLINIC | Age: 86
End: 2021-08-03
Payer: MEDICARE

## 2021-08-03 VITALS — HEIGHT: 68 IN | BODY MASS INDEX: 27.28 KG/M2 | WEIGHT: 180 LBS

## 2021-08-03 DIAGNOSIS — N39.45 CONTINUOUS LEAKAGE: ICD-10-CM

## 2021-08-03 DIAGNOSIS — R35.0 FREQUENCY OF MICTURITION: ICD-10-CM

## 2021-08-03 PROCEDURE — 99204 OFFICE O/P NEW MOD 45 MIN: CPT

## 2021-08-03 NOTE — HISTORY OF PRESENT ILLNESS
[FreeTextEntry1] : CHIRAG HERNÁNDEZ is a 89 year old female who presents for consultation for urinary incontinence and Martines catheter placement.\par \par On 7/26/2021 patient presented to the emergency room complaining of shortness of breath with cough and severe urinary incontinence.  Symptoms had started few days prior.\par \par She was found to have pneumonia secondary to RSV as well as positive blood cultures for staph.  Martines catheter was placed secondary to her incontinence.  She states she was not in urinary retention however, she was placed on tamsulosin.\par She was found to have blood cultures positive for staph and was treated with ceftriaxone and azithromycin.  Infectious disease consults reviewed.\par She was discharged, on Cefpodoxime and azithromycin, and instructed to follow-up with urology regarding a Martines catheter.\par The next day she was getting out of bed and pulled the Martines catheter out, with balloon intact, which resulted in severe pain and bleeding.  On 8/1/2021 she presents emergency room where catheter was replaced.\par She presents today for Martines removal.\par At baseline she is voiding well with 2-3 episodes of nocturia however, no urgency, frequency, or incontinence throughout the day.  She denies history of urinary tract infections.\par Hospital notes reviewed.\par \par Denies gross hematuria, dysuria or associated symptoms. Denies flank pain.\par Denies  PMH including previous kidney stones, recurrent UTIs.\par Family History: No  malignancies\par Social History: Denies cigarette smoking, EtOH abuse, or illicit drug use. \par \par  CBC from 7/30/2021 demonstrates slightly elevated white count at 12.05.\par BMP demonstrates a creatinine of 0.8 with an estimated GFR 63.\par Glucose slightly elevated 119.\par Urine testing demonstrated bacteria with no significant RBCs.\par Blood culture demonstrates coag negative staph\par \par \par

## 2021-08-03 NOTE — ASSESSMENT
[FreeTextEntry1] : CHIRAG HERNÁNDEZ is a 89 year old female who presents for consultation for urinary incontinence and Martines catheter placement during hospitalization for RSV pneumonia, unclear if pt was ever in urinary retention.\par \par AT baseline no urologic hx, no LUTS prior or UTIs.\par Martines catheter removed today (Jalil Thornton RN as chaperone) for trial of void\par \par -Increase p.o. fluid today.  All signs/symptoms of urinary retention reviewed in detail and patient understands to present to the office and/or go to the emergency room if they present.\par -Can discontinue tamsulosin if urinating well by the end of the week\par -Continue antibiotic regimen as per ID\par -Follow-up 6 weeks renal/bladder ultrasound

## 2021-08-03 NOTE — PHYSICAL EXAM
[General Appearance - Well Developed] : well developed [General Appearance - Well Nourished] : well nourished [Normal Appearance] : normal appearance [Well Groomed] : well groomed [General Appearance - In No Acute Distress] : no acute distress [Respiration, Rhythm And Depth] : normal respiratory rhythm and effort [Exaggerated Use Of Accessory Muscles For Inspiration] : no accessory muscle use [Abdomen Soft] : soft [Abdomen Tenderness] : non-tender [Costovertebral Angle Tenderness] : no ~M costovertebral angle tenderness [] : no rash [No Focal Deficits] : no focal deficits [Affect] : the affect was normal [Oriented To Time, Place, And Person] : oriented to person, place, and time [Mood] : the mood was normal [Not Anxious] : not anxious [FreeTextEntry1] : Ambulating with walker

## 2021-08-04 DIAGNOSIS — E78.5 HYPERLIPIDEMIA, UNSPECIFIED: ICD-10-CM

## 2021-08-04 DIAGNOSIS — K21.9 GASTRO-ESOPHAGEAL REFLUX DISEASE WITHOUT ESOPHAGITIS: ICD-10-CM

## 2021-08-04 DIAGNOSIS — Z87.891 PERSONAL HISTORY OF NICOTINE DEPENDENCE: ICD-10-CM

## 2021-08-04 DIAGNOSIS — I25.10 ATHEROSCLEROTIC HEART DISEASE OF NATIVE CORONARY ARTERY WITHOUT ANGINA PECTORIS: ICD-10-CM

## 2021-08-04 DIAGNOSIS — J44.1 CHRONIC OBSTRUCTIVE PULMONARY DISEASE WITH (ACUTE) EXACERBATION: ICD-10-CM

## 2021-08-04 DIAGNOSIS — Z79.82 LONG TERM (CURRENT) USE OF ASPIRIN: ICD-10-CM

## 2021-08-04 DIAGNOSIS — Z79.02 LONG TERM (CURRENT) USE OF ANTITHROMBOTICS/ANTIPLATELETS: ICD-10-CM

## 2021-08-04 DIAGNOSIS — J12.1 RESPIRATORY SYNCYTIAL VIRUS PNEUMONIA: ICD-10-CM

## 2021-08-04 DIAGNOSIS — E03.9 HYPOTHYROIDISM, UNSPECIFIED: ICD-10-CM

## 2021-08-04 DIAGNOSIS — Z79.890 HORMONE REPLACEMENT THERAPY: ICD-10-CM

## 2021-08-04 DIAGNOSIS — J15.9 UNSPECIFIED BACTERIAL PNEUMONIA: ICD-10-CM

## 2021-08-04 DIAGNOSIS — E78.00 PURE HYPERCHOLESTEROLEMIA, UNSPECIFIED: ICD-10-CM

## 2021-08-04 DIAGNOSIS — J44.0 CHRONIC OBSTRUCTIVE PULMONARY DISEASE WITH (ACUTE) LOWER RESPIRATORY INFECTION: ICD-10-CM

## 2021-08-04 DIAGNOSIS — I10 ESSENTIAL (PRIMARY) HYPERTENSION: ICD-10-CM

## 2021-08-04 DIAGNOSIS — R05 COUGH: ICD-10-CM

## 2021-08-04 DIAGNOSIS — Z95.5 PRESENCE OF CORONARY ANGIOPLASTY IMPLANT AND GRAFT: ICD-10-CM

## 2021-08-04 DIAGNOSIS — R32 UNSPECIFIED URINARY INCONTINENCE: ICD-10-CM

## 2021-08-19 ENCOUNTER — RESULT REVIEW (OUTPATIENT)
Age: 86
End: 2021-08-19

## 2021-08-19 ENCOUNTER — OUTPATIENT (OUTPATIENT)
Dept: OUTPATIENT SERVICES | Facility: HOSPITAL | Age: 86
LOS: 1 days | Discharge: HOME | End: 2021-08-19
Payer: MEDICARE

## 2021-08-19 DIAGNOSIS — Z95.5 PRESENCE OF CORONARY ANGIOPLASTY IMPLANT AND GRAFT: Chronic | ICD-10-CM

## 2021-08-19 DIAGNOSIS — R35.0 FREQUENCY OF MICTURITION: ICD-10-CM

## 2021-08-19 DIAGNOSIS — N39.45 CONTINUOUS LEAKAGE: ICD-10-CM

## 2021-08-19 DIAGNOSIS — Z90.710 ACQUIRED ABSENCE OF BOTH CERVIX AND UTERUS: Chronic | ICD-10-CM

## 2021-08-19 DIAGNOSIS — Z90.49 ACQUIRED ABSENCE OF OTHER SPECIFIED PARTS OF DIGESTIVE TRACT: Chronic | ICD-10-CM

## 2021-08-19 PROCEDURE — 76770 US EXAM ABDO BACK WALL COMP: CPT | Mod: 26

## 2021-08-20 ENCOUNTER — NON-APPOINTMENT (OUTPATIENT)
Age: 86
End: 2021-08-20

## 2021-09-01 ENCOUNTER — APPOINTMENT (OUTPATIENT)
Age: 86
End: 2021-09-01

## 2021-09-14 ENCOUNTER — APPOINTMENT (OUTPATIENT)
Dept: CARDIOLOGY | Facility: CLINIC | Age: 86
End: 2021-09-14
Payer: MEDICARE

## 2021-09-14 VITALS — DIASTOLIC BLOOD PRESSURE: 70 MMHG | SYSTOLIC BLOOD PRESSURE: 120 MMHG | HEART RATE: 52 BPM

## 2021-09-14 PROCEDURE — 93000 ELECTROCARDIOGRAM COMPLETE: CPT

## 2021-09-14 PROCEDURE — 99214 OFFICE O/P EST MOD 30 MIN: CPT

## 2021-09-14 NOTE — PHYSICAL EXAM
[Normal Appearance] : normal appearance [General Appearance - Well Nourished] : well nourished [General Appearance - In No Acute Distress] : no acute distress [Normal Conjunctiva] : the conjunctiva exhibited no abnormalities [Normal Oral Mucosa] : normal oral mucosa [Respiration, Rhythm And Depth] : normal respiratory rhythm and effort [Exaggerated Use Of Accessory Muscles For Inspiration] : no accessory muscle use [Auscultation Breath Sounds / Voice Sounds] : lungs were clear to auscultation bilaterally [Heart Rate And Rhythm] : heart rate and rhythm were normal [Arterial Pulses Normal] : the arterial pulses were normal [Heart Sounds] : normal S1 and S2 [Edema] : no peripheral edema present [Abdomen Soft] : soft [Abdomen Tenderness] : non-tender [Nail Clubbing] : no clubbing of the fingernails [Cyanosis, Localized] : no localized cyanosis [] : no ischemic changes [Skin Turgor] : normal skin turgor [No Venous Stasis] : no venous stasis [No Skin Ulcers] : no skin ulcer [Oriented To Time, Place, And Person] : oriented to person, place, and time [Impaired Insight] : insight and judgment were intact [Affect] : the affect was normal

## 2021-09-14 NOTE — DISCUSSION/SUMMARY
[FreeTextEntry1] : The patient was advised to maintain her present medications.\par She was advised to lower her T. cholesterol level to less than 200 mg/dl and LDL to less than 100 mg/dl.\par CBC BMP lipid and hepatic panel is advised.\par Maintain a low fat, low cholesterol diet.\par Weight reduction is advised.\par RV in 6 months.

## 2021-09-17 ENCOUNTER — APPOINTMENT (OUTPATIENT)
Age: 86
End: 2021-09-17

## 2021-09-23 ENCOUNTER — APPOINTMENT (OUTPATIENT)
Dept: UROLOGY | Facility: CLINIC | Age: 86
End: 2021-09-23

## 2021-09-29 ENCOUNTER — APPOINTMENT (OUTPATIENT)
Age: 86
End: 2021-09-29

## 2021-10-16 ENCOUNTER — TRANSCRIPTION ENCOUNTER (OUTPATIENT)
Age: 86
End: 2021-10-16

## 2021-10-16 ENCOUNTER — RESULT REVIEW (OUTPATIENT)
Age: 86
End: 2021-10-16

## 2022-03-15 ENCOUNTER — APPOINTMENT (OUTPATIENT)
Dept: CARDIOLOGY | Facility: CLINIC | Age: 87
End: 2022-03-15
Payer: MEDICARE

## 2022-03-15 VITALS
HEART RATE: 62 BPM | BODY MASS INDEX: 24.55 KG/M2 | TEMPERATURE: 97.4 F | SYSTOLIC BLOOD PRESSURE: 162 MMHG | WEIGHT: 162 LBS | HEIGHT: 68 IN | DIASTOLIC BLOOD PRESSURE: 64 MMHG

## 2022-03-15 PROCEDURE — 93000 ELECTROCARDIOGRAM COMPLETE: CPT

## 2022-03-15 PROCEDURE — 99214 OFFICE O/P EST MOD 30 MIN: CPT

## 2022-03-15 RX ORDER — ISOSORBIDE MONONITRATE 30 MG/1
30 TABLET, EXTENDED RELEASE ORAL
Qty: 90 | Refills: 3 | Status: ACTIVE | COMMUNITY
Start: 2022-03-15 | End: 1900-01-01

## 2022-03-15 NOTE — PHYSICAL EXAM
[Normal Appearance] : normal appearance [General Appearance - Well Nourished] : well nourished [General Appearance - In No Acute Distress] : no acute distress [Normal Conjunctiva] : the conjunctiva exhibited no abnormalities [Normal Oral Mucosa] : normal oral mucosa [Respiration, Rhythm And Depth] : normal respiratory rhythm and effort [Exaggerated Use Of Accessory Muscles For Inspiration] : no accessory muscle use [Auscultation Breath Sounds / Voice Sounds] : lungs were clear to auscultation bilaterally [Heart Rate And Rhythm] : heart rate and rhythm were normal [Heart Sounds] : normal S1 and S2 [Arterial Pulses Normal] : the arterial pulses were normal [Edema] : no peripheral edema present [Abdomen Soft] : soft [Abdomen Tenderness] : non-tender [Nail Clubbing] : no clubbing of the fingernails [Cyanosis, Localized] : no localized cyanosis [] : no ischemic changes [Skin Turgor] : normal skin turgor [No Venous Stasis] : no venous stasis [No Skin Ulcers] : no skin ulcer [Oriented To Time, Place, And Person] : oriented to person, place, and time [Impaired Insight] : insight and judgment were intact [Affect] : the affect was normal [FreeTextEntry1] : Grade II/VI systolic murmur at LSB

## 2022-03-15 NOTE — DISCUSSION/SUMMARY
[FreeTextEntry1] : The patient was advised to maintain her present medications.\par Will restart imdur 30 mg QD\par 2D echo doppler\par She was advised to lower her T. cholesterol level to less than 200 mg/dl and LDL to less than 100 mg/dl.\par CBC BMP lipid and hepatic panel is advised.\par Maintain a low fat, low cholesterol diet.\par Weight reduction is advised.\par RV in 6 months.

## 2022-03-15 NOTE — REASON FOR VISIT
[FreeTextEntry1] : Patient presents for an over due follow up visit.\par She has not had any lab testing performed and has a request for blood work from her PMD.

## 2022-03-30 ENCOUNTER — APPOINTMENT (OUTPATIENT)
Dept: CARDIOLOGY | Facility: CLINIC | Age: 87
End: 2022-03-30
Payer: MEDICARE

## 2022-03-30 PROCEDURE — 93306 TTE W/DOPPLER COMPLETE: CPT

## 2022-05-13 NOTE — DISCHARGE NOTE PROVIDER - NSDCCONDITION_GEN_ALL_CORE
amLODIPine 10 mg oral tablet: 1 tab(s) orally once a day  aspirin 81 mg oral delayed release tablet: 1 tab(s) orally once a day  atenolol 50 mg oral tablet: 1 tab(s) orally once a day  clopidogrel 75 mg oral tablet: 1 tab(s) orally once a day  finasteride 5 mg oral tablet: 1 tab(s) orally once a day  folic acid 1 mg oral tablet: 1 tab(s) orally once a day  Rapaflo 8 mg oral capsule: 1 cap(s) orally once a day  rosuvastatin 5 mg oral tablet: 1 tab(s) orally once a day (at bedtime)   Stable

## 2022-08-04 ENCOUNTER — INPATIENT (INPATIENT)
Facility: HOSPITAL | Age: 87
LOS: 14 days | Discharge: HOME | End: 2022-08-19
Attending: INTERNAL MEDICINE | Admitting: INTERNAL MEDICINE

## 2022-08-04 VITALS
TEMPERATURE: 96 F | RESPIRATION RATE: 18 BRPM | OXYGEN SATURATION: 95 % | WEIGHT: 151.9 LBS | DIASTOLIC BLOOD PRESSURE: 81 MMHG | HEIGHT: 68 IN | SYSTOLIC BLOOD PRESSURE: 184 MMHG | HEART RATE: 63 BPM

## 2022-08-04 DIAGNOSIS — H26.9 UNSPECIFIED CATARACT: Chronic | ICD-10-CM

## 2022-08-04 DIAGNOSIS — Z95.5 PRESENCE OF CORONARY ANGIOPLASTY IMPLANT AND GRAFT: Chronic | ICD-10-CM

## 2022-08-04 DIAGNOSIS — Z90.49 ACQUIRED ABSENCE OF OTHER SPECIFIED PARTS OF DIGESTIVE TRACT: Chronic | ICD-10-CM

## 2022-08-04 DIAGNOSIS — Z90.710 ACQUIRED ABSENCE OF BOTH CERVIX AND UTERUS: Chronic | ICD-10-CM

## 2022-08-04 LAB
ALBUMIN SERPL ELPH-MCNC: 4 G/DL — SIGNIFICANT CHANGE UP (ref 3.5–5.2)
ALP SERPL-CCNC: 99 U/L — SIGNIFICANT CHANGE UP (ref 30–115)
ALT FLD-CCNC: 25 U/L — SIGNIFICANT CHANGE UP (ref 0–41)
ANION GAP SERPL CALC-SCNC: 10 MMOL/L — SIGNIFICANT CHANGE UP (ref 7–14)
APPEARANCE UR: CLEAR — SIGNIFICANT CHANGE UP
AST SERPL-CCNC: 26 U/L — SIGNIFICANT CHANGE UP (ref 0–41)
BACTERIA # UR AUTO: ABNORMAL
BASOPHILS # BLD AUTO: 0.08 K/UL — SIGNIFICANT CHANGE UP (ref 0–0.2)
BASOPHILS NFR BLD AUTO: 1 % — SIGNIFICANT CHANGE UP (ref 0–1)
BILIRUB SERPL-MCNC: 0.7 MG/DL — SIGNIFICANT CHANGE UP (ref 0.2–1.2)
BILIRUB UR-MCNC: NEGATIVE — SIGNIFICANT CHANGE UP
BUN SERPL-MCNC: 13 MG/DL — SIGNIFICANT CHANGE UP (ref 10–20)
CALCIUM SERPL-MCNC: 10 MG/DL — SIGNIFICANT CHANGE UP (ref 8.5–10.1)
CHLORIDE SERPL-SCNC: 103 MMOL/L — SIGNIFICANT CHANGE UP (ref 98–110)
CO2 SERPL-SCNC: 27 MMOL/L — SIGNIFICANT CHANGE UP (ref 17–32)
COD CRY URNS QL: NEGATIVE — SIGNIFICANT CHANGE UP
COLOR SPEC: YELLOW — SIGNIFICANT CHANGE UP
CREAT SERPL-MCNC: 0.8 MG/DL — SIGNIFICANT CHANGE UP (ref 0.7–1.5)
DIFF PNL FLD: NEGATIVE — SIGNIFICANT CHANGE UP
EGFR: 67 ML/MIN/1.73M2 — SIGNIFICANT CHANGE UP
EOSINOPHIL # BLD AUTO: 0.12 K/UL — SIGNIFICANT CHANGE UP (ref 0–0.7)
EOSINOPHIL NFR BLD AUTO: 1.5 % — SIGNIFICANT CHANGE UP (ref 0–8)
EPI CELLS # UR: ABNORMAL /HPF
GLUCOSE SERPL-MCNC: 88 MG/DL — SIGNIFICANT CHANGE UP (ref 70–99)
GLUCOSE UR QL: NEGATIVE MG/DL — SIGNIFICANT CHANGE UP
GRAN CASTS # UR COMP ASSIST: NEGATIVE — SIGNIFICANT CHANGE UP
HCT VFR BLD CALC: 38.8 % — SIGNIFICANT CHANGE UP (ref 37–47)
HGB BLD-MCNC: 12.6 G/DL — SIGNIFICANT CHANGE UP (ref 12–16)
HYALINE CASTS # UR AUTO: NEGATIVE — SIGNIFICANT CHANGE UP
IMM GRANULOCYTES NFR BLD AUTO: 0.7 % — HIGH (ref 0.1–0.3)
KETONES UR-MCNC: NEGATIVE — SIGNIFICANT CHANGE UP
LEUKOCYTE ESTERASE UR-ACNC: ABNORMAL
LIDOCAIN IGE QN: 9 U/L — SIGNIFICANT CHANGE UP (ref 7–60)
LYMPHOCYTES # BLD AUTO: 2.47 K/UL — SIGNIFICANT CHANGE UP (ref 1.2–3.4)
LYMPHOCYTES # BLD AUTO: 29.9 % — SIGNIFICANT CHANGE UP (ref 20.5–51.1)
MAGNESIUM SERPL-MCNC: 1.5 MG/DL — LOW (ref 1.8–2.4)
MCHC RBC-ENTMCNC: 31.3 PG — HIGH (ref 27–31)
MCHC RBC-ENTMCNC: 32.5 G/DL — SIGNIFICANT CHANGE UP (ref 32–37)
MCV RBC AUTO: 96.3 FL — SIGNIFICANT CHANGE UP (ref 81–99)
MONOCYTES # BLD AUTO: 1.13 K/UL — HIGH (ref 0.1–0.6)
MONOCYTES NFR BLD AUTO: 13.7 % — HIGH (ref 1.7–9.3)
NEUTROPHILS # BLD AUTO: 4.4 K/UL — SIGNIFICANT CHANGE UP (ref 1.4–6.5)
NEUTROPHILS NFR BLD AUTO: 53.2 % — SIGNIFICANT CHANGE UP (ref 42.2–75.2)
NITRITE UR-MCNC: NEGATIVE — SIGNIFICANT CHANGE UP
NRBC # BLD: 0 /100 WBCS — SIGNIFICANT CHANGE UP (ref 0–0)
PH UR: 7 — SIGNIFICANT CHANGE UP (ref 5–8)
PLATELET # BLD AUTO: 310 K/UL — SIGNIFICANT CHANGE UP (ref 130–400)
POTASSIUM SERPL-MCNC: 3.6 MMOL/L — SIGNIFICANT CHANGE UP (ref 3.5–5)
POTASSIUM SERPL-SCNC: 3.6 MMOL/L — SIGNIFICANT CHANGE UP (ref 3.5–5)
PROT SERPL-MCNC: 6.5 G/DL — SIGNIFICANT CHANGE UP (ref 6–8)
PROT UR-MCNC: NEGATIVE MG/DL — SIGNIFICANT CHANGE UP
RBC # BLD: 4.03 M/UL — LOW (ref 4.2–5.4)
RBC # FLD: 13.1 % — SIGNIFICANT CHANGE UP (ref 11.5–14.5)
RBC CASTS # UR COMP ASSIST: NEGATIVE — SIGNIFICANT CHANGE UP
SARS-COV-2 RNA SPEC QL NAA+PROBE: SIGNIFICANT CHANGE UP
SODIUM SERPL-SCNC: 140 MMOL/L — SIGNIFICANT CHANGE UP (ref 135–146)
SP GR SPEC: 1.01 — SIGNIFICANT CHANGE UP (ref 1.01–1.03)
TRI-PHOS CRY UR QL COMP ASSIST: NEGATIVE — SIGNIFICANT CHANGE UP
TROPONIN T SERPL-MCNC: <0.01 NG/ML — SIGNIFICANT CHANGE UP
URATE CRY FLD QL MICRO: NEGATIVE — SIGNIFICANT CHANGE UP
UROBILINOGEN FLD QL: 1 MG/DL
WBC # BLD: 8.26 K/UL — SIGNIFICANT CHANGE UP (ref 4.8–10.8)
WBC # FLD AUTO: 8.26 K/UL — SIGNIFICANT CHANGE UP (ref 4.8–10.8)
WBC UR QL: ABNORMAL /HPF

## 2022-08-04 PROCEDURE — 99222 1ST HOSP IP/OBS MODERATE 55: CPT

## 2022-08-04 PROCEDURE — 93010 ELECTROCARDIOGRAM REPORT: CPT

## 2022-08-04 PROCEDURE — 74177 CT ABD & PELVIS W/CONTRAST: CPT | Mod: 26,MA

## 2022-08-04 PROCEDURE — 99285 EMERGENCY DEPT VISIT HI MDM: CPT

## 2022-08-04 RX ORDER — MAGNESIUM SULFATE 500 MG/ML
2 VIAL (ML) INJECTION ONCE
Refills: 0 | Status: COMPLETED | OUTPATIENT
Start: 2022-08-04 | End: 2022-08-04

## 2022-08-04 RX ORDER — LEVOTHYROXINE SODIUM 125 MCG
1 TABLET ORAL
Qty: 0 | Refills: 0 | DISCHARGE

## 2022-08-04 RX ORDER — SODIUM CHLORIDE 9 MG/ML
1000 INJECTION INTRAMUSCULAR; INTRAVENOUS; SUBCUTANEOUS ONCE
Refills: 0 | Status: COMPLETED | OUTPATIENT
Start: 2022-08-04 | End: 2022-08-04

## 2022-08-04 RX ORDER — CLOPIDOGREL BISULFATE 75 MG/1
1 TABLET, FILM COATED ORAL
Qty: 0 | Refills: 0 | DISCHARGE

## 2022-08-04 RX ADMIN — Medication 25 GRAM(S): at 20:19

## 2022-08-04 RX ADMIN — SODIUM CHLORIDE 1000 MILLILITER(S): 9 INJECTION INTRAMUSCULAR; INTRAVENOUS; SUBCUTANEOUS at 18:03

## 2022-08-04 NOTE — ED PROVIDER NOTE - ATTENDING APP SHARED VISIT CONTRIBUTION OF CARE
89y female with nausea, anorexia, and intermittent watery nb diarrhea x 3 weeks with weight loss after sharing a roast beef sandwich with family who also fell ill, no fever, no travel, no recent abx use, saw PMD today who sent stool sample but referred for eval, on exam vital signs appreciated, nontoxic, youthful, dry mm abd +bs, sof twith mild diffuse ttp no g/r, labs and studies reviewed, will admit for supprotive care, GI eval, stable for floor

## 2022-08-04 NOTE — ED PROVIDER NOTE - PHYSICAL EXAMINATION
Vital Signs: I have reviewed the initial vital signs.  Constitutional: well-nourished, no acute distress, normocephalic  Eyes: PERRLA, EOMI, no nystagmus, clear conjunctiva  ENT: MMM,+dry mucous membranes  Cardiovascular: regular rate, regular rhythm, no murmur appreciated  Respiratory: unlabored respiratory effort, clear to auscultation bilaterally  Gastrointestinal: soft, non-tender, non-distended  abdomen, no cva tenderness  Musculoskeletal: supple neck, no lower extremity edema, no bony tenderness  Integumentary: warm, dry, no rash  Neurologic: awake, alert, cranial nerves II-XII grossly intact, extremities’ motor and sensory functions grossly intact, no focal deficits, GCS 15

## 2022-08-04 NOTE — H&P ADULT - NSHPPHYSICALEXAM_GEN_ALL_CORE
VITALS:   T(C): 35.7 (08-04-22 @ 20:50), Max: 35.7 (08-04-22 @ 17:37)  HR: 65 (08-04-22 @ 20:50) (63 - 65)  BP: 159/64 (08-04-22 @ 20:50) (159/64 - 184/81)  RR: 18 (08-04-22 @ 20:50) (18 - 18)  SpO2: 93% (08-04-22 @ 20:50) (93% - 95%)    GENERAL: NAD, lying in bed comfortably  HEAD:  Atraumatic, Normocephalic  EYES: EOMI, conjunctiva and sclera clear  ENT: Moist mucous membranes  NECK: Supple, No JVD  CHEST/LUNG: + rhonchi, no wheezing, Unlabored respirations  HEART: Regular rate and rhythm; No murmurs, rubs, or gallops  ABDOMEN: Bowel sounds present; Soft, + tenderness left side abdomen  EXTREMITIES: No clubbing, cyanosis, or edema  NERVOUS SYSTEM:  Alert & Oriented X3, speech clear. No deficits   MSK: FROM all 4 extremities, full and equal strength  SKIN: No rashes or lesions

## 2022-08-04 NOTE — ED ADULT NURSE NOTE - NSICDXPASTMEDICALHX_GEN_ALL_CORE_FT
PAST MEDICAL HISTORY:  COPD (chronic obstructive pulmonary disease)     High blood cholesterol     Hypertension     Hypothyroid

## 2022-08-04 NOTE — H&P ADULT - NS ATTEND AMEND GEN_ALL_CORE FT
Seen at bedside independent of medical PA, agree with assessment (would classify as magnesium deficiency) and plan as outlined

## 2022-08-04 NOTE — ED PROVIDER NOTE - CARE PLAN
Principal Discharge DX:	Diarrhea  Secondary Diagnosis:	Dehydration  Secondary Diagnosis:	Hypomagnesemia   1

## 2022-08-04 NOTE — ED ADULT NURSE NOTE - NSICDXPASTSURGICALHX_GEN_ALL_CORE_FT
PAST SURGICAL HISTORY:  Cataract     H/O abdominal hysterectomy     History of cholecystectomy     History of coronary artery stent placement

## 2022-08-04 NOTE — ED PROVIDER NOTE - NS ED ROS FT
Review of Systems    Constitutional: (-) fever/ chills (+)loss of appetite or  weight loss  Eyes (-) visual changes  ENT: (-) epistaxis (-) sore throat (-) ear pain  Cardiovascular: (-) chest pain, (-) syncope (-) palpitations  Respiratory: (-) cough, (-) shortness of breath  Gastrointestinal: (-) vomiting, (+) diarrhea (+) abdominal pain  : (-) dysuria , hematuria   neck: (-) neck pain or stiffness  Musculoskeletal:  (-) back pain, (-) joint pain   Integumentary: (-) rash, (-) swelling  Neurological: (-) headache, (-) altered mental status

## 2022-08-04 NOTE — ED ADULT NURSE NOTE - NSFALLRSKASSESSTYPE_ED_ALL_ED
Patient had PD catheter placed yesterday and called concerned this morning with concerns of gauze turning pink and had become wet. Patient did have serous drainage on gauze but no s/s infection to PD catheter insertion site and incision to left side of abdomen continues to have dermabond in place. Explained to patient that this drainage can occur after PD catheter insertion due to the tunnel that is created and the size of the abdomen. Cleansed area with betadine and applied clean DSD and secured with tape. Patient will call if there are any further questions or concerns.
Initial (On Arrival)

## 2022-08-04 NOTE — ED PROVIDER NOTE - OBJECTIVE STATEMENT
89 y/o female with hx of HTN, hypothyroid presents to the ED with non bloody diarrhea and 30 lb weight loss over past 5 weeks. patient states similar illness with son in law who also ate same food prior to onset of symptoms. patient denies any vomiting . patient with decreased po intake. no dysuria or gross hematuria . no back pain. no fevers. patient sent stool sample off to lab. no rash. no cp, palpitations, sob. patient c/o dry mouth and increased weakness.

## 2022-08-04 NOTE — H&P ADULT - NSICDXPASTMEDICALHX_GEN_ALL_CORE_FT
PAST MEDICAL HISTORY:  CAD (coronary artery disease)     COPD (chronic obstructive pulmonary disease)     High blood cholesterol     Hypertension     Hypothyroid

## 2022-08-04 NOTE — H&P ADULT - NSHPLABSRESULTS_GEN_ALL_CORE
LABS:  cret                        12.6   8.26  )-----------( 310      ( 04 Aug 2022 18:05 )             38.8     08-04    140  |  103  |  13  ----------------------------<  88  3.6   |  27  |  0.8    Ca    10.0      04 Aug 2022 18:05  Mg     1.5     08-04    TPro  6.5  /  Alb  4.0  /  TBili  0.7  /  DBili  x   /  AST  26  /  ALT  25  /  AlkPhos  99  08-04      RADIOLOGY:    CT Abdomen and Pelvis w/ IV Cont (08.04.22 @ 21:58)     IMPRESSION:    No evidence of acute intra-abdominal pathology.

## 2022-08-04 NOTE — H&P ADULT - ASSESSMENT
# Diarrhea  # Dehydration  # Hypomagnesemia. 89 y/o fF w/PMHx of HTN, CAD s/p stent, COPD, hypothyroidism presented to the ED with intermittent abdominal pain with associated intermittent non bloody diarrhea and 30 lb weight loss over past 5 weeks.    # Abdominal pain  # Diarrhea  # Dehydration  # Hypomagnesemia  - CT A/P 8/4: No evidence of acute intra-abdominal pathology.  -  89 y/o F w/PMHx of HTN, CAD s/p stent, COPD, hypothyroidism presented to the ED with intermittent abdominal pain with associated intermittent non bloody diarrhea and 30 lb weight loss over past 5 weeks.    # Abdominal pain  # Diarrhea  # Dehydration  # Hypomagnesemia  - CT A/P 8/4: No evidence of acute intra-abdominal pathology.  - WBC WNL, pt afebrile  - IVF  - NPO  - GI consult  - stool culture  - ova and parasite  - c-diff with iso  - am labs    # Hypomagnesemia   - repleted in ED  - am Mg+ to reassess    # CAD  - c/w plavix and ASA 81    # HTN  - c/w lisinopril, metoprolol ER, hydralazine     # Hypothyroidism  - c/w synthroid    # COPD  - c/w inhalers   89 y/o F w/PMHx of HTN, CAD s/p stent, COPD, hypothyroidism presented to the ED with intermittent abdominal pain with associated intermittent non bloody diarrhea and 30 lb weight loss over past 5 weeks.    # Abdominal pain  # Diarrhea  # Dehydration  # Hypomagnesemia  - CT A/P 8/4: No evidence of acute intra-abdominal pathology.  - WBC WNL, pt afebrile  - IVF  - NPO  - GI consult  - GI PCR  - stool culture  - ova and parasite  - c-diff with iso  - am labs    # Hypomagnesemia   - repleted in ED  - am Mg+ to reassess    # CAD  - c/w plavix and ASA 81    # HTN  - c/w lisinopril, metoprolol ER, hydralazine     # Hypothyroidism  - c/w synthroid    # COPD  - c/w inhalers

## 2022-08-04 NOTE — H&P ADULT - HISTORY OF PRESENT ILLNESS
89 y/o female with hx of HTN, hypothyroid presented to the ED with non bloody diarrhea and 30 lb weight loss over past 5 weeks. patient states similar illness with son in law who also ate same food prior to onset of symptoms. patient denies any vomiting . patient with decreased po intake. no dysuria or gross hematuria . no back pain. no fevers. patient sent stool sample off to lab. no rash. no cp, palpitations, sob. patient c/o dry mouth and increased weakness. 89 y/o fF w/PMHx of HTN, CAD s/p stent, COPD, hypothyroidism presented to the ED with intermittent abdominal pain with associated intermittent non bloody diarrhea and 30 lb weight loss over past 5 weeks. patient states she had a roast beef meal prior to onset of symptoms. patient denies any vomiting. patient with decreased po intake. States she had a BM yesterday and + flatus today. Pt denies any of any hematuria, back pain, fever, chills. Admits to having dry mouth and increased weakness. States she sent stool sample off to lab. Denies cp, palpitations, sob.   Pt hesitant at times to answer my questions stating, "you know why im here" several times. AA+O x4  Last colonoscopy was >5 years ago and pt stated, "my doctor told me not to have any more because its not good for someone my age".  89 y/o F w/PMHx of HTN, CAD s/p stent, COPD, hypothyroidism presented to the ED with intermittent abdominal pain with associated intermittent non bloody diarrhea and 30 lb weight loss over past 5 weeks. patient states she had a roast beef meal prior to onset of symptoms. patient denies any vomiting. patient with decreased po intake. States she had a BM yesterday and + flatus today. Pt denies any of any hematuria, back pain, fever, chills. Admits to having dry mouth and increased weakness. no travel, no recent abx use, saw PMD today who sent stool sample but referred for eval, States she sent stool sample off to lab. Denies cp, palpitations, sob.   Pt hesitant at times to answer my questions stating, "you know why im here" several times. AA+O x4  Last colonoscopy was >5 years ago and pt stated, "my doctor told me not to have any more because its not good for someone my age".

## 2022-08-05 LAB
ALBUMIN SERPL ELPH-MCNC: 3.2 G/DL — LOW (ref 3.5–5.2)
ALP SERPL-CCNC: 89 U/L — SIGNIFICANT CHANGE UP (ref 30–115)
ALT FLD-CCNC: 22 U/L — SIGNIFICANT CHANGE UP (ref 0–41)
ANION GAP SERPL CALC-SCNC: 11 MMOL/L — SIGNIFICANT CHANGE UP (ref 7–14)
AST SERPL-CCNC: 29 U/L — SIGNIFICANT CHANGE UP (ref 0–41)
BILIRUB SERPL-MCNC: 0.7 MG/DL — SIGNIFICANT CHANGE UP (ref 0.2–1.2)
BUN SERPL-MCNC: 8 MG/DL — LOW (ref 10–20)
CALCIUM SERPL-MCNC: 8.5 MG/DL — SIGNIFICANT CHANGE UP (ref 8.5–10.1)
CHLORIDE SERPL-SCNC: 109 MMOL/L — SIGNIFICANT CHANGE UP (ref 98–110)
CO2 SERPL-SCNC: 25 MMOL/L — SIGNIFICANT CHANGE UP (ref 17–32)
CREAT SERPL-MCNC: 0.5 MG/DL — LOW (ref 0.7–1.5)
EGFR: 86 ML/MIN/1.73M2 — SIGNIFICANT CHANGE UP
GLUCOSE SERPL-MCNC: 78 MG/DL — SIGNIFICANT CHANGE UP (ref 70–99)
HCT VFR BLD CALC: 37.2 % — SIGNIFICANT CHANGE UP (ref 37–47)
HGB BLD-MCNC: 11.8 G/DL — LOW (ref 12–16)
MAGNESIUM SERPL-MCNC: 1.8 MG/DL — SIGNIFICANT CHANGE UP (ref 1.8–2.4)
MCHC RBC-ENTMCNC: 30.6 PG — SIGNIFICANT CHANGE UP (ref 27–31)
MCHC RBC-ENTMCNC: 31.7 G/DL — LOW (ref 32–37)
MCV RBC AUTO: 96.4 FL — SIGNIFICANT CHANGE UP (ref 81–99)
NRBC # BLD: 0 /100 WBCS — SIGNIFICANT CHANGE UP (ref 0–0)
PLATELET # BLD AUTO: 280 K/UL — SIGNIFICANT CHANGE UP (ref 130–400)
POTASSIUM SERPL-MCNC: 3.2 MMOL/L — LOW (ref 3.5–5)
POTASSIUM SERPL-SCNC: 3.2 MMOL/L — LOW (ref 3.5–5)
PROT SERPL-MCNC: 5.6 G/DL — LOW (ref 6–8)
RBC # BLD: 3.86 M/UL — LOW (ref 4.2–5.4)
RBC # FLD: 13.1 % — SIGNIFICANT CHANGE UP (ref 11.5–14.5)
SODIUM SERPL-SCNC: 145 MMOL/L — SIGNIFICANT CHANGE UP (ref 135–146)
WBC # BLD: 8.13 K/UL — SIGNIFICANT CHANGE UP (ref 4.8–10.8)
WBC # FLD AUTO: 8.13 K/UL — SIGNIFICANT CHANGE UP (ref 4.8–10.8)

## 2022-08-05 PROCEDURE — 99223 1ST HOSP IP/OBS HIGH 75: CPT

## 2022-08-05 PROCEDURE — 99233 SBSQ HOSP IP/OBS HIGH 50: CPT

## 2022-08-05 RX ORDER — LEVOTHYROXINE SODIUM 125 MCG
25 TABLET ORAL DAILY
Refills: 0 | Status: DISCONTINUED | OUTPATIENT
Start: 2022-08-05 | End: 2022-08-19

## 2022-08-05 RX ORDER — LEVOTHYROXINE SODIUM 125 MCG
0 TABLET ORAL
Qty: 0 | Refills: 0 | DISCHARGE

## 2022-08-05 RX ORDER — ENOXAPARIN SODIUM 100 MG/ML
30 INJECTION SUBCUTANEOUS EVERY 24 HOURS
Refills: 0 | Status: DISCONTINUED | OUTPATIENT
Start: 2022-08-05 | End: 2022-08-19

## 2022-08-05 RX ORDER — HYDRALAZINE HCL 50 MG
25 TABLET ORAL THREE TIMES A DAY
Refills: 0 | Status: DISCONTINUED | OUTPATIENT
Start: 2022-08-05 | End: 2022-08-11

## 2022-08-05 RX ORDER — LISINOPRIL 2.5 MG/1
20 TABLET ORAL DAILY
Refills: 0 | Status: DISCONTINUED | OUTPATIENT
Start: 2022-08-05 | End: 2022-08-19

## 2022-08-05 RX ORDER — CLOPIDOGREL BISULFATE 75 MG/1
75 TABLET, FILM COATED ORAL DAILY
Refills: 0 | Status: DISCONTINUED | OUTPATIENT
Start: 2022-08-05 | End: 2022-08-19

## 2022-08-05 RX ORDER — ALBUTEROL 90 UG/1
2 AEROSOL, METERED ORAL EVERY 6 HOURS
Refills: 0 | Status: DISCONTINUED | OUTPATIENT
Start: 2022-08-05 | End: 2022-08-19

## 2022-08-05 RX ORDER — BUDESONIDE AND FORMOTEROL FUMARATE DIHYDRATE 160; 4.5 UG/1; UG/1
2 AEROSOL RESPIRATORY (INHALATION)
Refills: 0 | Status: DISCONTINUED | OUTPATIENT
Start: 2022-08-05 | End: 2022-08-19

## 2022-08-05 RX ORDER — PANTOPRAZOLE SODIUM 20 MG/1
40 TABLET, DELAYED RELEASE ORAL
Refills: 0 | Status: DISCONTINUED | OUTPATIENT
Start: 2022-08-05 | End: 2022-08-19

## 2022-08-05 RX ORDER — SODIUM CHLORIDE 9 MG/ML
1000 INJECTION INTRAMUSCULAR; INTRAVENOUS; SUBCUTANEOUS
Refills: 0 | Status: DISCONTINUED | OUTPATIENT
Start: 2022-08-05 | End: 2022-08-07

## 2022-08-05 RX ORDER — ISOSORBIDE DINITRATE 5 MG/1
10 TABLET ORAL THREE TIMES A DAY
Refills: 0 | Status: DISCONTINUED | OUTPATIENT
Start: 2022-08-05 | End: 2022-08-19

## 2022-08-05 RX ORDER — ONDANSETRON 8 MG/1
4 TABLET, FILM COATED ORAL EVERY 8 HOURS
Refills: 0 | Status: DISCONTINUED | OUTPATIENT
Start: 2022-08-05 | End: 2022-08-19

## 2022-08-05 RX ORDER — HYDRALAZINE HCL 50 MG
25 TABLET ORAL ONCE
Refills: 0 | Status: COMPLETED | OUTPATIENT
Start: 2022-08-05 | End: 2022-08-05

## 2022-08-05 RX ORDER — LATANOPROST 0.05 MG/ML
1 SOLUTION/ DROPS OPHTHALMIC; TOPICAL AT BEDTIME
Refills: 0 | Status: DISCONTINUED | OUTPATIENT
Start: 2022-08-05 | End: 2022-08-19

## 2022-08-05 RX ORDER — ASPIRIN/CALCIUM CARB/MAGNESIUM 324 MG
81 TABLET ORAL DAILY
Refills: 0 | Status: DISCONTINUED | OUTPATIENT
Start: 2022-08-05 | End: 2022-08-19

## 2022-08-05 RX ORDER — METOPROLOL TARTRATE 50 MG
25 TABLET ORAL DAILY
Refills: 0 | Status: DISCONTINUED | OUTPATIENT
Start: 2022-08-05 | End: 2022-08-19

## 2022-08-05 RX ORDER — LANOLIN ALCOHOL/MO/W.PET/CERES
3 CREAM (GRAM) TOPICAL AT BEDTIME
Refills: 0 | Status: DISCONTINUED | OUTPATIENT
Start: 2022-08-05 | End: 2022-08-19

## 2022-08-05 RX ORDER — ACETAMINOPHEN 500 MG
650 TABLET ORAL EVERY 6 HOURS
Refills: 0 | Status: DISCONTINUED | OUTPATIENT
Start: 2022-08-05 | End: 2022-08-11

## 2022-08-05 RX ADMIN — ISOSORBIDE DINITRATE 10 MILLIGRAM(S): 5 TABLET ORAL at 06:08

## 2022-08-05 RX ADMIN — Medication 25 MILLIGRAM(S): at 03:53

## 2022-08-05 RX ADMIN — Medication 81 MILLIGRAM(S): at 12:16

## 2022-08-05 RX ADMIN — SODIUM CHLORIDE 75 MILLILITER(S): 9 INJECTION INTRAMUSCULAR; INTRAVENOUS; SUBCUTANEOUS at 21:48

## 2022-08-05 RX ADMIN — PANTOPRAZOLE SODIUM 40 MILLIGRAM(S): 20 TABLET, DELAYED RELEASE ORAL at 06:08

## 2022-08-05 RX ADMIN — Medication 25 MILLIGRAM(S): at 21:10

## 2022-08-05 RX ADMIN — LISINOPRIL 20 MILLIGRAM(S): 2.5 TABLET ORAL at 06:08

## 2022-08-05 RX ADMIN — LATANOPROST 1 DROP(S): 0.05 SOLUTION/ DROPS OPHTHALMIC; TOPICAL at 21:48

## 2022-08-05 RX ADMIN — Medication 25 MILLIGRAM(S): at 06:08

## 2022-08-05 RX ADMIN — BUDESONIDE AND FORMOTEROL FUMARATE DIHYDRATE 2 PUFF(S): 160; 4.5 AEROSOL RESPIRATORY (INHALATION) at 21:10

## 2022-08-05 RX ADMIN — ISOSORBIDE DINITRATE 10 MILLIGRAM(S): 5 TABLET ORAL at 12:17

## 2022-08-05 RX ADMIN — CLOPIDOGREL BISULFATE 75 MILLIGRAM(S): 75 TABLET, FILM COATED ORAL at 12:16

## 2022-08-05 RX ADMIN — Medication 25 MICROGRAM(S): at 06:08

## 2022-08-05 RX ADMIN — Medication 25 MILLIGRAM(S): at 15:17

## 2022-08-05 RX ADMIN — ISOSORBIDE DINITRATE 10 MILLIGRAM(S): 5 TABLET ORAL at 17:45

## 2022-08-05 RX ADMIN — SODIUM CHLORIDE 75 MILLILITER(S): 9 INJECTION INTRAMUSCULAR; INTRAVENOUS; SUBCUTANEOUS at 06:09

## 2022-08-05 RX ADMIN — SODIUM CHLORIDE 75 MILLILITER(S): 9 INJECTION INTRAMUSCULAR; INTRAVENOUS; SUBCUTANEOUS at 03:54

## 2022-08-05 NOTE — CONSULT NOTE ADULT - ASSESSMENT
89yFemale pmh HTN, CAD s/p stent, COPD presents for diarrhea about 2-3 episodes daily and weight loss over 5 weeks. GI consulted for abdominal pain and diarrhea.    Problem 1-Abdominal pain and diarrhea--->resolved  ddx r/o infectious colitis  Rec  If diarrhea returns can do as below  -Check C-Diff by PCR and if negative check stool Ova and Parasites x3, Cultures, Stool Giardia, Serum Ameba titres, GI PCR  -Tissue Transglutaminase IGA, IGG R/O Celiac Disease   -Check TSH  -Anti-Enterocyte Antibody R/O Autoimmune Enteritis   -Discussed prospects of EGD/Colonoscopy and patient does not want to consider it even outpatient  - Follow up with our [facility] located at [address] [place] [address]. Phone Number: [phone]   89yFemale pmh HTN, CAD s/p stent, COPD presents for diarrhea about 2-3 episodes daily and weight loss over 5 weeks. GI consulted for abdominal pain and diarrhea.    Problem 1-Abdominal pain and diarrhea--->improving   ddx r/o infectious colitis  Rec  If diarrhea returns can do as below  -Check C-Diff by PCR and if negative check stool Ova and Parasites x3, Cultures, Stool Giardia, Serum Ameba titres, GI PCR  -Tissue Transglutaminase IGA, IGG R/O Celiac Disease   -Check TSH  -Anti-Enterocyte Antibody R/O Autoimmune Enteritis   -Discussed prospects of EGD/Colonoscopy and patient does not want to consider it even outpatient, risks and benefits discussed   - Follow up with our [facility] located at [address] [place] [address]. Phone Number: [phone]     Recall GI if needed   89yFemale pmh HTN, CAD s/p stent, COPD presents for diarrhea about 2-3 episodes daily and weight loss over 5 weeks. GI consulted for abdominal pain and diarrhea.    #Abdominal pain and diarrhea--->improving   #chronic nausea  #decreased appetite  #unintentional weight loss    ddx includes organic pathology leading to n/v and unintentional weight loss (PUD, mass, etc) vs. infectious (but less likely diarrhea it has been ongoing for > 4 weeks)  CT is reassuring w/ no pathology but cannot exclude luminal pathology    Rec  -If diarrhea recurrs: Check C-Diff by PCR and if negative check stool Ova and Parasites x3, Cultures, Stool Giardia, Serum Ameba titres, GI PCR  -Tissue Transglutaminase IGA, and total IgA, R/O Celiac Disease   -Check TSH  -Discussed prospects of EGD/Colonoscopy and patient does not want to consider it even outpatient, risks and benefits discussed - reasonable given age and comorbities  -Follow up with our [facility] located at [address] [place] [address]. Phone Number: [phone]     Recall GI if needed

## 2022-08-05 NOTE — CONSULT NOTE ADULT - NS ATTEND AMEND GEN_ALL_CORE FT
I edited the note .   Time-based billing (NON-critical care).   70 minutes spent on total encounter; more than 50% of the visit was spent counseling and / or coordinating care by the attending physician.  The necessity of the time spent during the encounter on this date of service was due to:   Coordination of care.

## 2022-08-05 NOTE — PATIENT PROFILE ADULT - FALL HARM RISK - HARM RISK INTERVENTIONS

## 2022-08-05 NOTE — PROGRESS NOTE ADULT - SUBJECTIVE AND OBJECTIVE BOX
Patient is a 89y old  Female who presents with a chief complaint of Abdominal pain with diarrhea and weight loss     SUBJECTIVE / OVERNIGHT EVENTS: None       MEDICATIONS  (STANDING):  aspirin  chewable 81 milliGRAM(s) Oral daily  budesonide  80 MICROgram(s)/formoterol 4.5 MICROgram(s) Inhaler 2 Puff(s) Inhalation two times a day  clopidogrel Tablet 75 milliGRAM(s) Oral daily  hydrALAZINE 25 milliGRAM(s) Oral three times a day  isosorbide   dinitrate Tablet (ISORDIL) 10 milliGRAM(s) Oral three times a day  latanoprost 0.005% Ophthalmic Solution 1 Drop(s) Both EYES at bedtime  levothyroxine 25 MICROGram(s) Oral daily  lisinopril 20 milliGRAM(s) Oral daily  metoprolol succinate ER 25 milliGRAM(s) Oral daily  pantoprazole    Tablet 40 milliGRAM(s) Oral before breakfast  sodium chloride 0.9%. 1000 milliLiter(s) (75 mL/Hr) IV Continuous <Continuous>    MEDICATIONS  (PRN):  acetaminophen     Tablet .. 650 milliGRAM(s) Oral every 6 hours PRN Temp greater or equal to 38C (100.4F), Mild Pain (1 - 3)  ALBUTerol    90 MICROgram(s) HFA Inhaler 2 Puff(s) Inhalation every 6 hours PRN Shortness of Breath and/or Wheezing  aluminum hydroxide/magnesium hydroxide/simethicone Suspension 30 milliLiter(s) Oral every 4 hours PRN Dyspepsia  melatonin 3 milliGRAM(s) Oral at bedtime PRN Insomnia  ondansetron Injectable 4 milliGRAM(s) IV Push every 8 hours PRN Nausea and/or Vomiting      PHYSICAL EXAM:  Vital Signs Last 24 Hrs  T(C): 36.4 (05 Aug 2022 05:00), Max: 36.4 (05 Aug 2022 03:26)  T(F): 97.6 (05 Aug 2022 05:00), Max: 97.6 (05 Aug 2022 05:00)  HR: 65 (05 Aug 2022 05:00) (60 - 71)  BP: 190/78 (05 Aug 2022 05:00) (155/75 - 200/85)  BP(mean): --  RR: 18 (05 Aug 2022 05:00) (18 - 19)  SpO2: 95% (05 Aug 2022 02:31) (93% - 95%)    Parameters below as of 05 Aug 2022 02:31  Patient On (Oxygen Delivery Method): room air      GENERAL: No acute distress, well-developed  HEAD:  Atraumatic, Normocephalic  EYES: EOMI, PERRLA, conjunctiva and sclera clear  NECK: Supple, no lymphadenopathy, no JVD  CHEST/LUNG: CTAB; No wheezes, rales, or rhonchi  HEART: Regular rate and rhythm; No murmurs, rubs, or gallops  ABDOMEN: Soft, non-tender, non-distended; normal bowel sounds, no organomegaly  EXTREMITIES:  2+ peripheral pulses b/l, No clubbing, cyanosis, or edema  NEUROLOGY: A&O x 3, no focal deficits  SKIN: No rashes or lesions    LABS:                        12.6   8.26  )-----------( 310      ( 04 Aug 2022 18:05 )             38.8     08-04    140  |  103  |  13  ----------------------------<  88  3.6   |  27  |  0.8    Ca    10.0      04 Aug 2022 18:05  Mg     1.5     08-04    TPro  6.5  /  Alb  4.0  /  TBili  0.7  /  DBili  x   /  AST  26  /  ALT  25  /  AlkPhos  99  08-04      CARDIAC MARKERS ( 04 Aug 2022 18:05 )  x     / <0.01 ng/mL / x     / x     / x          Urinalysis Basic - ( 04 Aug 2022 18:45 )    Color: Yellow / Appearance: Clear / S.010 / pH: x  Gluc: x / Ketone: Negative  / Bili: Negative / Urobili: 1.0 mg/dL   Blood: x / Protein: Negative mg/dL / Nitrite: Negative   Leuk Esterase: Moderate / RBC: Negative / WBC 6-10 /HPF   Sq Epi: x / Non Sq Epi: Few /HPF / Bacteria: Many          RADIOLOGY & ADDITIONAL TESTS:    < from: CT Abdomen and Pelvis w/ IV Cont (22 @ 21:58) >  IMPRESSION:    No evidence of acute intra-abdominal pathology.    --- End of Report ---

## 2022-08-05 NOTE — PROGRESS NOTE ADULT - ASSESSMENT
89 y/o F w/PMHx of HTN, CAD s/p stent, COPD, hypothyroidism presented to the ED with intermittent abdominal pain with associated intermittent non bloody diarrhea and 30 lb weight loss over past 5 weeks.    Gastroenteritis likely Viral   -No signs of SIRS and CT A/P: No evidence of acute intra-abdominal pathology.  -Supportive Care: Bowel Rest and Hydration  -F/up GI consult, C.Diff, GI PCR, O&P and stool culture     Asymptomatic UTI: monitor for now     H/O CAD + Essential HTN   - c/w Plavix, ASA 81, Lisnopril, BB and Hydralazine   -Monitor BP, may need to taper given age       Magnesium  deficiency    Supplement and monitor       H/O Hypothyroidism:  c/w synthroid  H/O  COPD: stable : c/w inhalers    DVT PPX: Lovenox   GI PPX: PPI   Full code  Dispo:From

## 2022-08-05 NOTE — CONSULT NOTE ADULT - SUBJECTIVE AND OBJECTIVE BOX
Chief complaint/Reason for consult:    HPI:  89 y/o F w/PMHx of HTN, CAD s/p stent, COPD, hypothyroidism presented to the ED with intermittent abdominal pain with associated intermittent non bloody diarrhea and 30 lb weight loss over past 5 weeks. patient states she had a roast beef meal prior to onset of symptoms. patient denies any vomiting. patient with decreased po intake. States she had a BM yesterday and + flatus today. Pt denies any of any hematuria, back pain, fever, chills. Admits to having dry mouth and increased weakness. no travel, no recent abx use, saw PMD today who sent stool sample but referred for eval, States she sent stool sample off to lab. Denies cp, palpitations, sob.   Pt hesitant at times to answer my questions stating, "you know why im here" several times. AA+O x4  Last colonoscopy was >5 years ago and pt stated, "my doctor told me not to have any more because its not good for someone my age".  (04 Aug 2022 23:54)    89yFemale      PAST MEDICAL & SURGICAL HISTORY:      Family history:  FAMILY HISTORY:    No GI cancers in first or second degree relatives    Social History: No smoking. No alcohol. No illegal drug use.    Allergies:        MEDICATIONS: Home Medications:  aspirin 81 mg oral tablet, chewable: 1 tab(s) orally once a day (04 Aug 2022 18:08)  CLOPIDOGREL 75 MG TABLET: TAKE 1 TABLET BY MOUTH EVERY DAY (05 Aug 2022 01:12)  hydrALAZINE 25 mg oral tablet: 1 tab(s) orally 3 times a day (04 Aug 2022 18:08)  isosorbide dinitrate 10 mg oral tablet:  (04 Aug 2022 18:08)  latanoprost 0.005% ophthalmic solution: 1 drop(s) to each affected eye once a day (in the evening) (04 Aug 2022 18:08)  levothyroxine 25 mcg (0.025 mg) oral tablet: 1 tab(s) orally once a day (05 Aug 2022 01:14)  lisinopril 20 mg oral tablet: 1 tab(s) orally once a day (04 Aug 2022 18:08)  metoprolol succinate 25 mg oral tablet, extended release:  (04 Aug 2022 18:08)  pantoprazole 20 mg oral delayed release tablet: 1 tab(s) orally once a day (04 Aug 2022 18:08)          MEDICATIONS  (STANDING):  aspirin  chewable 81 milliGRAM(s) Oral daily  budesonide  80 MICROgram(s)/formoterol 4.5 MICROgram(s) Inhaler 2 Puff(s) Inhalation two times a day  clopidogrel Tablet 75 milliGRAM(s) Oral daily  enoxaparin Injectable 30 milliGRAM(s) SubCutaneous every 24 hours  hydrALAZINE 25 milliGRAM(s) Oral three times a day  isosorbide   dinitrate Tablet (ISORDIL) 10 milliGRAM(s) Oral three times a day  latanoprost 0.005% Ophthalmic Solution 1 Drop(s) Both EYES at bedtime  levothyroxine 25 MICROGram(s) Oral daily  lisinopril 20 milliGRAM(s) Oral daily  metoprolol succinate ER 25 milliGRAM(s) Oral daily  pantoprazole    Tablet 40 milliGRAM(s) Oral before breakfast  sodium chloride 0.9%. 1000 milliLiter(s) (75 mL/Hr) IV Continuous <Continuous>    MEDICATIONS  (PRN):  acetaminophen     Tablet .. 650 milliGRAM(s) Oral every 6 hours PRN Temp greater or equal to 38C (100.4F), Mild Pain (1 - 3)  ALBUTerol    90 MICROgram(s) HFA Inhaler 2 Puff(s) Inhalation every 6 hours PRN Shortness of Breath and/or Wheezing  aluminum hydroxide/magnesium hydroxide/simethicone Suspension 30 milliLiter(s) Oral every 4 hours PRN Dyspepsia  melatonin 3 milliGRAM(s) Oral at bedtime PRN Insomnia  ondansetron Injectable 4 milliGRAM(s) IV Push every 8 hours PRN Nausea and/or Vomiting        REVIEW OF SYSTEMS  General:  No weight loss, fevers, or chills.  Eyes:  No reported pain or visual changes  ENT:  No sore throat or runny nose.  NECK: No stiffness or lymphadenopathy  CV:  No chest pain or palpitations.  Resp:  No shortness of breath, cough, wheezing or hemoptysis  GI:  No abdominal pain, nausea, vomiting, dysphagia, diarrhea or constipation. No rectal bleeding, melena, or hematemesis.  Muscle:  No aches or weakness  Neuro:  No tingling, numbness       VITALS:   T(F): 97.6 (08-05-22 @ 05:00), Max: 97.6 (08-05-22 @ 05:00)  HR: 59 (08-05-22 @ 08:56) (59 - 71)  BP: 174/70 (08-05-22 @ 08:56) (155/75 - 200/85)  RR: 18 (08-05-22 @ 05:00) (18 - 19)  SpO2: 95% (08-05-22 @ 02:31) (93% - 95%)    PHYSICAL EXAM:  GENERAL: AAOx3, no acute distress.  HEAD:  Atraumatic, Normocephalic  EYES: conjunctiva and sclera clear  NECK: Supple, No thyromegaly   CHEST/LUNG: Clear to auscultation bilaterally; No wheeze, rhonchi, or rales  HEART: Regular rate and rhythm; normal S1, S2, No murmurs.  ABDOMEN: Soft, nontender, nondistended; Bowel sounds present  NEUROLOGY: No asterixis or tremor  SKIN: Intact, no jaundice          LABS:  08-05    145  |  109  |  8<L>  ----------------------------<  78  3.2<L>   |  25  |  0.5<L>    Ca    8.5      05 Aug 2022 07:51  Mg     1.8     08-05    TPro  5.6<L>  /  Alb  3.2<L>  /  TBili  0.7  /  DBili  x   /  AST  29  /  ALT  22  /  AlkPhos  89  08-05                          11.8   8.13  )-----------( 280      ( 05 Aug 2022 07:51 )             37.2     LIVER FUNCTIONS - ( 05 Aug 2022 07:51 )  Alb: 3.2 g/dL / Pro: 5.6 g/dL / ALK PHOS: 89 U/L / ALT: 22 U/L / AST: 29 U/L / GGT: x               IMAGING:    < from: CT Abdomen and Pelvis w/ IV Cont (08.04.22 @ 21:58) >    ACC: [id number] EXAM:  CT ABDOMEN AND PELVIS IC                          PROCEDURE DATE:  08/04/2022          INTERPRETATION:  CLINICAL HISTORY: Abdominal pain.    TECHNIQUE: Contiguous axial CT images were obtained from the lower chest   to the pubic symphysis following administration of intravenous contrast.   Oral contrast was not administered. Reformatted images in the coronal and   sagittal planes were acquired.    COMPARISON: CT ABDOMEN/PELVIS 7/11/2018. Lateral chest x-ray October 16, 2021.    FINDINGS:    LOWER CHEST: Chronic right middle lobe and lingular traction   bronchiectasis.    HEPATOBILIARY: Post cholecystectomy. Stable mild intra and extra hepatic   biliary duct dilatation, expected post cholecystectomy state.   Unremarkable liver.    SPLEEN: Unremarkable.    PANCREAS: Unremarkable.    ADRENAL GLANDS: Mild nodular thickening of the left adrenal gland..    KIDNEYS: Symmetric enhancement. No hydronephrosis. Right renal cysts in   addition to bilateral subcentimeter hypodensities too small to   characterize.    ABDOMINOPELVIC NODES: No lymphadenopathy.    PELVIC ORGANS: Post hysterectomy.    PERITONEUM/MESENTERY/BOWEL: No bowel obstruction, ascites or   intraperitoneal free air. Colonic diverticulosis without evidenceof   acute diverticulitis. Appendix not visualized; no pericecal inflammatory   changes.    BONES/SOFT TISSUES: Degenerative changes of the spine. Chronic   compression deformity of T12. Small fat-containing umbilical hernia.    OTHER: Atherosclerotic calcification.    IMPRESSION:    No evidence of acute intra-abdominal pathology.    --- End of Report ---          EDDI ORR MD; Resident Radiologist  This document has been electronically signed.  DEVIN GORDILLO MD; Attending Radiologist  This document has been electronically signed. Aug  4 2022 10:41PM    < end of copied text >       Chief complaint/Reason for consult: diarrhea, abdominal pain    HPI:  89 y/o F w/PMHx of HTN, CAD s/p stent, COPD, hypothyroidism presented to the ED with intermittent abdominal pain with associated intermittent non bloody diarrhea and 30 lb weight loss over past 5 weeks. patient states she had a roast beef meal prior to onset of symptoms. patient denies any vomiting. patient with decreased po intake. States she had a BM yesterday and + flatus today. Pt denies any of any hematuria, back pain, fever, chills. Admits to having dry mouth and increased weakness. no travel, no recent abx use, saw PMD today who sent stool sample but referred for eval, States she sent stool sample off to lab. Denies cp, palpitations, sob.   Pt hesitant at times to answer my questions stating, "you know why im here" several times. AA+O x4  Last colonoscopy was >5 years ago and pt stated, "my doctor told me not to have any more because its not good for someone my age".  (04 Aug 2022 23:54)    GI Updates: 89yFemale pmh HTN, CAD s/p stent, COPD presents for diarrhea about 2-3 episodes daily and weight loss over 5 weeks. GI consulted for abdominal pain and diarrhea. Currently patient reports it has resolved and she feels much better. Patient denies nausea, vomiting, hematemesis, melena, blood in stool, diarrhea, constipation, abdominal pain.       PAST MEDICAL & SURGICAL HISTORY:   High blood cholesterol      Hypothyroid      Hypertension      COPD (chronic obstructive pulmonary disease)      CAD (coronary artery disease)      History of coronary artery stent placement      History of cholecystectomy      H/O abdominal hysterectomy      Cataract            Family history:  FAMILY HISTORY:    No GI cancers in first or second degree relatives    Social History: No smoking. No alcohol. No illegal drug use.    Allergies:   No Known Allergies        MEDICATIONS: Home Medications:  aspirin 81 mg oral tablet, chewable: 1 tab(s) orally once a day (04 Aug 2022 18:08)  CLOPIDOGREL 75 MG TABLET: TAKE 1 TABLET BY MOUTH EVERY DAY (05 Aug 2022 01:12)  hydrALAZINE 25 mg oral tablet: 1 tab(s) orally 3 times a day (04 Aug 2022 18:08)  isosorbide dinitrate 10 mg oral tablet:  (04 Aug 2022 18:08)  latanoprost 0.005% ophthalmic solution: 1 drop(s) to each affected eye once a day (in the evening) (04 Aug 2022 18:08)  levothyroxine 25 mcg (0.025 mg) oral tablet: 1 tab(s) orally once a day (05 Aug 2022 01:14)  lisinopril 20 mg oral tablet: 1 tab(s) orally once a day (04 Aug 2022 18:08)  metoprolol succinate 25 mg oral tablet, extended release:  (04 Aug 2022 18:08)  pantoprazole 20 mg oral delayed release tablet: 1 tab(s) orally once a day (04 Aug 2022 18:08)          MEDICATIONS  (STANDING):  aspirin  chewable 81 milliGRAM(s) Oral daily  budesonide  80 MICROgram(s)/formoterol 4.5 MICROgram(s) Inhaler 2 Puff(s) Inhalation two times a day  clopidogrel Tablet 75 milliGRAM(s) Oral daily  enoxaparin Injectable 30 milliGRAM(s) SubCutaneous every 24 hours  hydrALAZINE 25 milliGRAM(s) Oral three times a day  isosorbide   dinitrate Tablet (ISORDIL) 10 milliGRAM(s) Oral three times a day  latanoprost 0.005% Ophthalmic Solution 1 Drop(s) Both EYES at bedtime  levothyroxine 25 MICROGram(s) Oral daily  lisinopril 20 milliGRAM(s) Oral daily  metoprolol succinate ER 25 milliGRAM(s) Oral daily  pantoprazole    Tablet 40 milliGRAM(s) Oral before breakfast  sodium chloride 0.9%. 1000 milliLiter(s) (75 mL/Hr) IV Continuous <Continuous>    MEDICATIONS  (PRN):  acetaminophen     Tablet .. 650 milliGRAM(s) Oral every 6 hours PRN Temp greater or equal to 38C (100.4F), Mild Pain (1 - 3)  ALBUTerol    90 MICROgram(s) HFA Inhaler 2 Puff(s) Inhalation every 6 hours PRN Shortness of Breath and/or Wheezing  aluminum hydroxide/magnesium hydroxide/simethicone Suspension 30 milliLiter(s) Oral every 4 hours PRN Dyspepsia  melatonin 3 milliGRAM(s) Oral at bedtime PRN Insomnia  ondansetron Injectable 4 milliGRAM(s) IV Push every 8 hours PRN Nausea and/or Vomiting        REVIEW OF SYSTEMS  General:  + weight loss, No fevers, or chills.  Eyes:  No reported pain or visual changes  ENT:  No sore throat or runny nose.  NECK: No stiffness or lymphadenopathy  CV:  No chest pain or palpitations.  Resp:  No shortness of breath, cough, wheezing or hemoptysis  GI:  No abdominal pain, nausea, vomiting, dysphagia, diarrhea or constipation. No rectal bleeding, melena, or hematemesis.  Muscle:  No aches or weakness  Neuro:  No tingling, numbness       VITALS:   T(F): 97.6 (08-05-22 @ 05:00), Max: 97.6 (08-05-22 @ 05:00)  HR: 59 (08-05-22 @ 08:56) (59 - 71)  BP: 174/70 (08-05-22 @ 08:56) (155/75 - 200/85)  RR: 18 (08-05-22 @ 05:00) (18 - 19)  SpO2: 95% (08-05-22 @ 02:31) (93% - 95%)    PHYSICAL EXAM:  GENERAL: AAOx3, no acute distress.  HEAD:  Atraumatic, Normocephalic  EYES: conjunctiva and sclera clear  NECK: Supple, No thyromegaly   CHEST/LUNG: Clear to auscultation bilaterally; No wheeze, rhonchi, or rales  HEART: Regular rate and rhythm; normal S1, S2, No murmurs.  ABDOMEN: Soft, nontender, nondistended; Bowel sounds present  NEUROLOGY: No asterixis or tremor  SKIN: Intact, no jaundice          LABS:  08-05    145  |  109  |  8<L>  ----------------------------<  78  3.2<L>   |  25  |  0.5<L>    Ca    8.5      05 Aug 2022 07:51  Mg     1.8     08-05    TPro  5.6<L>  /  Alb  3.2<L>  /  TBili  0.7  /  DBili  x   /  AST  29  /  ALT  22  /  AlkPhos  89  08-05                          11.8   8.13  )-----------( 280      ( 05 Aug 2022 07:51 )             37.2     LIVER FUNCTIONS - ( 05 Aug 2022 07:51 )  Alb: 3.2 g/dL / Pro: 5.6 g/dL / ALK PHOS: 89 U/L / ALT: 22 U/L / AST: 29 U/L / GGT: x               IMAGING:    < from: CT Abdomen and Pelvis w/ IV Cont (08.04.22 @ 21:58) >    ACC: [id number] EXAM:  CT ABDOMEN AND PELVIS IC                          PROCEDURE DATE:  08/04/2022          INTERPRETATION:  CLINICAL HISTORY: Abdominal pain.    TECHNIQUE: Contiguous axial CT images were obtained from the lower chest   to the pubic symphysis following administration of intravenous contrast.   Oral contrast was not administered. Reformatted images in the coronal and   sagittal planes were acquired.    COMPARISON: CT ABDOMEN/PELVIS 7/11/2018. Lateral chest x-ray October 16, 2021.    FINDINGS:    LOWER CHEST: Chronic right middle lobe and lingular traction   bronchiectasis.    HEPATOBILIARY: Post cholecystectomy. Stable mild intra and extra hepatic   biliary duct dilatation, expected post cholecystectomy state.   Unremarkable liver.    SPLEEN: Unremarkable.    PANCREAS: Unremarkable.    ADRENAL GLANDS: Mild nodular thickening of the left adrenal gland..    KIDNEYS: Symmetric enhancement. No hydronephrosis. Right renal cysts in   addition to bilateral subcentimeter hypodensities too small to   characterize.    ABDOMINOPELVIC NODES: No lymphadenopathy.    PELVIC ORGANS: Post hysterectomy.    PERITONEUM/MESENTERY/BOWEL: No bowel obstruction, ascites or   intraperitoneal free air. Colonic diverticulosis without evidenceof   acute diverticulitis. Appendix not visualized; no pericecal inflammatory   changes.    BONES/SOFT TISSUES: Degenerative changes of the spine. Chronic   compression deformity of T12. Small fat-containing umbilical hernia.    OTHER: Atherosclerotic calcification.    IMPRESSION:    No evidence of acute intra-abdominal pathology.    --- End of Report ---          EDDI ORR MD; Resident Radiologist  This document has been electronically signed.  DEVIN GORDILLO MD; Attending Radiologist  This document has been electronically signed. Aug  4 2022 10:41PM    < end of copied text >

## 2022-08-06 LAB
ANION GAP SERPL CALC-SCNC: 12 MMOL/L — SIGNIFICANT CHANGE UP (ref 7–14)
BASOPHILS # BLD AUTO: 0.07 K/UL — SIGNIFICANT CHANGE UP (ref 0–0.2)
BASOPHILS NFR BLD AUTO: 0.8 % — SIGNIFICANT CHANGE UP (ref 0–1)
BUN SERPL-MCNC: 8 MG/DL — LOW (ref 10–20)
C DIFF BY PCR RESULT: NEGATIVE — SIGNIFICANT CHANGE UP
CALCIUM SERPL-MCNC: 8.7 MG/DL — SIGNIFICANT CHANGE UP (ref 8.5–10.1)
CHLORIDE SERPL-SCNC: 105 MMOL/L — SIGNIFICANT CHANGE UP (ref 98–110)
CO2 SERPL-SCNC: 25 MMOL/L — SIGNIFICANT CHANGE UP (ref 17–32)
CREAT SERPL-MCNC: 0.6 MG/DL — LOW (ref 0.7–1.5)
EGFR: 82 ML/MIN/1.73M2 — SIGNIFICANT CHANGE UP
EOSINOPHIL # BLD AUTO: 0.32 K/UL — SIGNIFICANT CHANGE UP (ref 0–0.7)
EOSINOPHIL NFR BLD AUTO: 3.8 % — SIGNIFICANT CHANGE UP (ref 0–8)
GLUCOSE BLDC GLUCOMTR-MCNC: 63 MG/DL — LOW (ref 70–99)
GLUCOSE SERPL-MCNC: 172 MG/DL — HIGH (ref 70–99)
HCT VFR BLD CALC: 38.3 % — SIGNIFICANT CHANGE UP (ref 37–47)
HGB BLD-MCNC: 12.3 G/DL — SIGNIFICANT CHANGE UP (ref 12–16)
IMM GRANULOCYTES NFR BLD AUTO: 0.5 % — HIGH (ref 0.1–0.3)
LYMPHOCYTES # BLD AUTO: 1.61 K/UL — SIGNIFICANT CHANGE UP (ref 1.2–3.4)
LYMPHOCYTES # BLD AUTO: 19.2 % — LOW (ref 20.5–51.1)
MAGNESIUM SERPL-MCNC: 1.7 MG/DL — LOW (ref 1.8–2.4)
MCHC RBC-ENTMCNC: 31 PG — SIGNIFICANT CHANGE UP (ref 27–31)
MCHC RBC-ENTMCNC: 32.1 G/DL — SIGNIFICANT CHANGE UP (ref 32–37)
MCV RBC AUTO: 96.5 FL — SIGNIFICANT CHANGE UP (ref 81–99)
MONOCYTES # BLD AUTO: 0.84 K/UL — HIGH (ref 0.1–0.6)
MONOCYTES NFR BLD AUTO: 10 % — HIGH (ref 1.7–9.3)
NEUTROPHILS # BLD AUTO: 5.51 K/UL — SIGNIFICANT CHANGE UP (ref 1.4–6.5)
NEUTROPHILS NFR BLD AUTO: 65.7 % — SIGNIFICANT CHANGE UP (ref 42.2–75.2)
NRBC # BLD: 0 /100 WBCS — SIGNIFICANT CHANGE UP (ref 0–0)
PHOSPHATE SERPL-MCNC: 2.8 MG/DL — SIGNIFICANT CHANGE UP (ref 2.1–4.9)
PLATELET # BLD AUTO: 278 K/UL — SIGNIFICANT CHANGE UP (ref 130–400)
POTASSIUM SERPL-MCNC: 3 MMOL/L — LOW (ref 3.5–5)
POTASSIUM SERPL-SCNC: 3 MMOL/L — LOW (ref 3.5–5)
RBC # BLD: 3.97 M/UL — LOW (ref 4.2–5.4)
RBC # FLD: 13 % — SIGNIFICANT CHANGE UP (ref 11.5–14.5)
SODIUM SERPL-SCNC: 142 MMOL/L — SIGNIFICANT CHANGE UP (ref 135–146)
WBC # BLD: 8.39 K/UL — SIGNIFICANT CHANGE UP (ref 4.8–10.8)
WBC # FLD AUTO: 8.39 K/UL — SIGNIFICANT CHANGE UP (ref 4.8–10.8)

## 2022-08-06 PROCEDURE — 99232 SBSQ HOSP IP/OBS MODERATE 35: CPT

## 2022-08-06 RX ORDER — POTASSIUM CHLORIDE 20 MEQ
40 PACKET (EA) ORAL
Refills: 0 | Status: COMPLETED | OUTPATIENT
Start: 2022-08-06 | End: 2022-08-06

## 2022-08-06 RX ORDER — HYDRALAZINE HCL 50 MG
10 TABLET ORAL ONCE
Refills: 0 | Status: COMPLETED | OUTPATIENT
Start: 2022-08-06 | End: 2022-08-06

## 2022-08-06 RX ORDER — MAGNESIUM SULFATE 500 MG/ML
2 VIAL (ML) INJECTION ONCE
Refills: 0 | Status: COMPLETED | OUTPATIENT
Start: 2022-08-06 | End: 2022-08-06

## 2022-08-06 RX ORDER — DEXTROSE 50 % IN WATER 50 %
25 SYRINGE (ML) INTRAVENOUS ONCE
Refills: 0 | Status: COMPLETED | OUTPATIENT
Start: 2022-08-06 | End: 2022-08-06

## 2022-08-06 RX ADMIN — BUDESONIDE AND FORMOTEROL FUMARATE DIHYDRATE 2 PUFF(S): 160; 4.5 AEROSOL RESPIRATORY (INHALATION) at 20:41

## 2022-08-06 RX ADMIN — Medication 25 MICROGRAM(S): at 05:05

## 2022-08-06 RX ADMIN — ISOSORBIDE DINITRATE 10 MILLIGRAM(S): 5 TABLET ORAL at 18:03

## 2022-08-06 RX ADMIN — Medication 25 MILLIGRAM(S): at 13:51

## 2022-08-06 RX ADMIN — Medication 25 MILLIGRAM(S): at 05:04

## 2022-08-06 RX ADMIN — Medication 40 MILLIEQUIVALENT(S): at 13:52

## 2022-08-06 RX ADMIN — BUDESONIDE AND FORMOTEROL FUMARATE DIHYDRATE 2 PUFF(S): 160; 4.5 AEROSOL RESPIRATORY (INHALATION) at 11:25

## 2022-08-06 RX ADMIN — Medication 40 MILLIEQUIVALENT(S): at 18:03

## 2022-08-06 RX ADMIN — Medication 81 MILLIGRAM(S): at 11:25

## 2022-08-06 RX ADMIN — ENOXAPARIN SODIUM 30 MILLIGRAM(S): 100 INJECTION SUBCUTANEOUS at 05:05

## 2022-08-06 RX ADMIN — LATANOPROST 1 DROP(S): 0.05 SOLUTION/ DROPS OPHTHALMIC; TOPICAL at 21:52

## 2022-08-06 RX ADMIN — Medication 12.5 GRAM(S): at 13:54

## 2022-08-06 RX ADMIN — Medication 10 MILLIGRAM(S): at 18:50

## 2022-08-06 RX ADMIN — ONDANSETRON 4 MILLIGRAM(S): 8 TABLET, FILM COATED ORAL at 23:39

## 2022-08-06 RX ADMIN — CLOPIDOGREL BISULFATE 75 MILLIGRAM(S): 75 TABLET, FILM COATED ORAL at 11:25

## 2022-08-06 RX ADMIN — Medication 25 GRAM(S): at 07:35

## 2022-08-06 RX ADMIN — Medication 25 MILLIGRAM(S): at 21:51

## 2022-08-06 RX ADMIN — ISOSORBIDE DINITRATE 10 MILLIGRAM(S): 5 TABLET ORAL at 10:07

## 2022-08-06 RX ADMIN — LISINOPRIL 20 MILLIGRAM(S): 2.5 TABLET ORAL at 05:04

## 2022-08-06 RX ADMIN — PANTOPRAZOLE SODIUM 40 MILLIGRAM(S): 20 TABLET, DELAYED RELEASE ORAL at 11:25

## 2022-08-06 RX ADMIN — ISOSORBIDE DINITRATE 10 MILLIGRAM(S): 5 TABLET ORAL at 05:05

## 2022-08-06 NOTE — PROGRESS NOTE ADULT - ASSESSMENT
89 y/o F w/PMHx of HTN, CAD s/p stent, COPD, hypothyroidism presented to the ED with intermittent abdominal pain with associated intermittent non bloody diarrhea and 30 lb weight loss over past 5 weeks.    Gastroenteritis likely Viral   -No signs of SIRS and CT A/P: No evidence of acute intra-abdominal pathology  -Supportive Care: Bowel Rest and Hydration  -F/up GI consult, C.Diff, GI PCR, O&P and stool culture     Hypomagnesemia and Hypokalemia  Likely from Diarrhaea  Supplement as need      Asymptomatic UTI  Ucx: gram Negative Rods   Sensitivities pending     H/O CAD + Essential HTN   - c/w Plavix, ASA 81, Lisinopril BB and Hydralazine       H/O Hypothyroidism:  c/w synthroid  H/O  COPD: stable : c/w inhalers    DVT PPX: Lovenox   GI PPX: PPI   Full code  Dispo:From home  Pending clinical improvement

## 2022-08-06 NOTE — CHART NOTE - NSCHARTNOTEFT_GEN_A_CORE
RN called for /76. Pt with hx HTN.   As per Dr. Paris recommendation will order Hydralazine 10 mg iv x1 stat.   RN aware of the plan and will repeat BP in 30 min. Will monitor.

## 2022-08-06 NOTE — PROGRESS NOTE ADULT - SUBJECTIVE AND OBJECTIVE BOX
Patient is a 89y old  Female who presents with a chief complaint of Abdominal pain with diarrhea and weight loss     SUBJECTIVE / OVERNIGHT EVENTS: None     Examined today, diarrhea improved.   Now on clears will advance as tolerated         MEDICATIONS  (STANDING):  aspirin  chewable 81 milliGRAM(s) Oral daily  budesonide  80 MICROgram(s)/formoterol 4.5 MICROgram(s) Inhaler 2 Puff(s) Inhalation two times a day  clopidogrel Tablet 75 milliGRAM(s) Oral daily  dextrose 50% Injectable 25 Gram(s) IV Push once  enoxaparin Injectable 30 milliGRAM(s) SubCutaneous every 24 hours  hydrALAZINE 25 milliGRAM(s) Oral three times a day  isosorbide   dinitrate Tablet (ISORDIL) 10 milliGRAM(s) Oral three times a day  latanoprost 0.005% Ophthalmic Solution 1 Drop(s) Both EYES at bedtime  levothyroxine 25 MICROGram(s) Oral daily  lisinopril 20 milliGRAM(s) Oral daily  magnesium sulfate  IVPB 2 Gram(s) IV Intermittent once  metoprolol succinate ER 25 milliGRAM(s) Oral daily  pantoprazole    Tablet 40 milliGRAM(s) Oral before breakfast  potassium chloride   Powder 40 milliEquivalent(s) Oral every 2 hours  sodium chloride 0.9%. 1000 milliLiter(s) (75 mL/Hr) IV Continuous <Continuous>    MEDICATIONS  (PRN):  acetaminophen     Tablet .. 650 milliGRAM(s) Oral every 6 hours PRN Temp greater or equal to 38C (100.4F), Mild Pain (1 - 3)  ALBUTerol    90 MICROgram(s) HFA Inhaler 2 Puff(s) Inhalation every 6 hours PRN Shortness of Breath and/or Wheezing  aluminum hydroxide/magnesium hydroxide/simethicone Suspension 30 milliLiter(s) Oral every 4 hours PRN Dyspepsia  melatonin 3 milliGRAM(s) Oral at bedtime PRN Insomnia  ondansetron Injectable 4 milliGRAM(s) IV Push every 8 hours PRN Nausea and/or Vomiting      CAPILLARY BLOOD GLUCOSE      POCT Blood Glucose.: 63 mg/dL (06 Aug 2022 07:31)    I&O's Summary    05 Aug 2022 07:01  -  06 Aug 2022 07:00  --------------------------------------------------------  IN: 0 mL / OUT: 400 mL / NET: -400 mL        PHYSICAL EXAM:  Vital Signs Last 24 Hrs  T(C): 35.6 (06 Aug 2022 05:00), Max: 36.3 (05 Aug 2022 13:56)  T(F): 96.1 (06 Aug 2022 05:00), Max: 97.3 (05 Aug 2022 13:56)  HR: 58 (06 Aug 2022 09:00) (57 - 64)  BP: 186/77 (06 Aug 2022 09:00) (116/74 - 193/81)  BP(mean): --  RR: 18 (06 Aug 2022 05:00) (18 - 18)  SpO2: --        GENERAL: No acute distress, well-developed  HEAD:  Atraumatic, Normocephalic  EYES: EOMI, PERRLA, conjunctiva and sclera clear  NECK: Supple, no lymphadenopathy, no JVD  CHEST/LUNG: CTAB; No wheezes, rales, or rhonchi  HEART: Regular rate and rhythm; No murmurs, rubs, or gallops  ABDOMEN: Soft, non-tender, non-distended; normal bowel sounds, no organomegaly  EXTREMITIES:  2+ peripheral pulses b/l, No clubbing, cyanosis, or edema  NEUROLOGY: A&O x 3, no focal deficits  SKIN: No rashes or lesions    LABS:                        12.3   8.39  )-----------( 278      ( 06 Aug 2022 08:11 )             38.3     08-06    142  |  105  |  8<L>  ----------------------------<  172<H>  3.0<L>   |  25  |  0.6<L>    Ca    8.7      06 Aug 2022 08:11  Phos  2.8     08-06  Mg     1.7     08-06    TPro  5.6<L>  /  Alb  3.2<L>  /  TBili  0.7  /  DBili  x   /  AST  29  /  ALT  22  /  AlkPhos  89  08-05      CARDIAC MARKERS ( 04 Aug 2022 18:05 )  x     / <0.01 ng/mL / x     / x     / x          Urinalysis Basic - ( 04 Aug 2022 18:45 )    Color: Yellow / Appearance: Clear / S.010 / pH: x  Gluc: x / Ketone: Negative  / Bili: Negative / Urobili: 1.0 mg/dL   Blood: x / Protein: Negative mg/dL / Nitrite: Negative   Leuk Esterase: Moderate / RBC: Negative / WBC 6-10 /HPF   Sq Epi: x / Non Sq Epi: Few /HPF / Bacteria: Many        Culture - Urine (collected 04 Aug 2022 18:45)  Source: Clean Catch Clean Catch (Midstream)  Preliminary Report (06 Aug 2022 09:16):    >100,000 CFU/ml Gram Negative Rods

## 2022-08-06 NOTE — CHART NOTE - NSCHARTNOTEFT_GEN_A_CORE
Pt was found to he hypoglycemic this morning. Pt was NPO. RN administered dextrose. Will recheck FS.   Will advance diet to clears as per Dr. Paris.   It pt tolerates, will advance further for lunch.

## 2022-08-07 LAB
-  AMIKACIN: SIGNIFICANT CHANGE UP
-  AMOXICILLIN/CLAVULANIC ACID: SIGNIFICANT CHANGE UP
-  AMPICILLIN/SULBACTAM: SIGNIFICANT CHANGE UP
-  AMPICILLIN: SIGNIFICANT CHANGE UP
-  AZTREONAM: SIGNIFICANT CHANGE UP
-  CEFAZOLIN: SIGNIFICANT CHANGE UP
-  CEFEPIME: SIGNIFICANT CHANGE UP
-  CEFOXITIN: SIGNIFICANT CHANGE UP
-  CEFTRIAXONE: SIGNIFICANT CHANGE UP
-  CIPROFLOXACIN: SIGNIFICANT CHANGE UP
-  ERTAPENEM: SIGNIFICANT CHANGE UP
-  GENTAMICIN: SIGNIFICANT CHANGE UP
-  LEVOFLOXACIN: SIGNIFICANT CHANGE UP
-  MEROPENEM: SIGNIFICANT CHANGE UP
-  NITROFURANTOIN: SIGNIFICANT CHANGE UP
-  PIPERACILLIN/TAZOBACTAM: SIGNIFICANT CHANGE UP
-  TOBRAMYCIN: SIGNIFICANT CHANGE UP
-  TRIMETHOPRIM/SULFAMETHOXAZOLE: SIGNIFICANT CHANGE UP
ANION GAP SERPL CALC-SCNC: 9 MMOL/L — SIGNIFICANT CHANGE UP (ref 7–14)
BASOPHILS # BLD AUTO: 0.07 K/UL — SIGNIFICANT CHANGE UP (ref 0–0.2)
BASOPHILS NFR BLD AUTO: 0.6 % — SIGNIFICANT CHANGE UP (ref 0–1)
BUN SERPL-MCNC: 7 MG/DL — LOW (ref 10–20)
C DIFF TOX GENS STL QL NAA+PROBE: SIGNIFICANT CHANGE UP
CALCIUM SERPL-MCNC: 8.7 MG/DL — SIGNIFICANT CHANGE UP (ref 8.5–10.1)
CHLORIDE SERPL-SCNC: 106 MMOL/L — SIGNIFICANT CHANGE UP (ref 98–110)
CO2 SERPL-SCNC: 23 MMOL/L — SIGNIFICANT CHANGE UP (ref 17–32)
CREAT SERPL-MCNC: 0.6 MG/DL — LOW (ref 0.7–1.5)
CULTURE RESULTS: SIGNIFICANT CHANGE UP
CULTURE RESULTS: SIGNIFICANT CHANGE UP
EGFR: 82 ML/MIN/1.73M2 — SIGNIFICANT CHANGE UP
EOSINOPHIL # BLD AUTO: 0.28 K/UL — SIGNIFICANT CHANGE UP (ref 0–0.7)
EOSINOPHIL NFR BLD AUTO: 2.6 % — SIGNIFICANT CHANGE UP (ref 0–8)
GLUCOSE SERPL-MCNC: 97 MG/DL — SIGNIFICANT CHANGE UP (ref 70–99)
HCT VFR BLD CALC: 38.6 % — SIGNIFICANT CHANGE UP (ref 37–47)
HGB BLD-MCNC: 12.5 G/DL — SIGNIFICANT CHANGE UP (ref 12–16)
IMM GRANULOCYTES NFR BLD AUTO: 0.5 % — HIGH (ref 0.1–0.3)
LYMPHOCYTES # BLD AUTO: 1.37 K/UL — SIGNIFICANT CHANGE UP (ref 1.2–3.4)
LYMPHOCYTES # BLD AUTO: 12.5 % — LOW (ref 20.5–51.1)
MAGNESIUM SERPL-MCNC: 1.9 MG/DL — SIGNIFICANT CHANGE UP (ref 1.8–2.4)
MCHC RBC-ENTMCNC: 31.2 PG — HIGH (ref 27–31)
MCHC RBC-ENTMCNC: 32.4 G/DL — SIGNIFICANT CHANGE UP (ref 32–37)
MCV RBC AUTO: 96.3 FL — SIGNIFICANT CHANGE UP (ref 81–99)
METHOD TYPE: SIGNIFICANT CHANGE UP
MONOCYTES # BLD AUTO: 1.15 K/UL — HIGH (ref 0.1–0.6)
MONOCYTES NFR BLD AUTO: 10.5 % — HIGH (ref 1.7–9.3)
NEUTROPHILS # BLD AUTO: 7.99 K/UL — HIGH (ref 1.4–6.5)
NEUTROPHILS NFR BLD AUTO: 73.3 % — SIGNIFICANT CHANGE UP (ref 42.2–75.2)
NRBC # BLD: 0 /100 WBCS — SIGNIFICANT CHANGE UP (ref 0–0)
ORGANISM # SPEC MICROSCOPIC CNT: SIGNIFICANT CHANGE UP
ORGANISM # SPEC MICROSCOPIC CNT: SIGNIFICANT CHANGE UP
PHOSPHATE SERPL-MCNC: 2.4 MG/DL — SIGNIFICANT CHANGE UP (ref 2.1–4.9)
PLATELET # BLD AUTO: 285 K/UL — SIGNIFICANT CHANGE UP (ref 130–400)
POTASSIUM SERPL-MCNC: 3.7 MMOL/L — SIGNIFICANT CHANGE UP (ref 3.5–5)
POTASSIUM SERPL-SCNC: 3.7 MMOL/L — SIGNIFICANT CHANGE UP (ref 3.5–5)
RBC # BLD: 4.01 M/UL — LOW (ref 4.2–5.4)
RBC # FLD: 13.1 % — SIGNIFICANT CHANGE UP (ref 11.5–14.5)
SODIUM SERPL-SCNC: 138 MMOL/L — SIGNIFICANT CHANGE UP (ref 135–146)
SPECIMEN SOURCE: SIGNIFICANT CHANGE UP
SPECIMEN SOURCE: SIGNIFICANT CHANGE UP
WBC # BLD: 10.92 K/UL — HIGH (ref 4.8–10.8)
WBC # FLD AUTO: 10.92 K/UL — HIGH (ref 4.8–10.8)

## 2022-08-07 PROCEDURE — 99233 SBSQ HOSP IP/OBS HIGH 50: CPT

## 2022-08-07 RX ADMIN — Medication 81 MILLIGRAM(S): at 11:21

## 2022-08-07 RX ADMIN — LATANOPROST 1 DROP(S): 0.05 SOLUTION/ DROPS OPHTHALMIC; TOPICAL at 21:37

## 2022-08-07 RX ADMIN — BUDESONIDE AND FORMOTEROL FUMARATE DIHYDRATE 2 PUFF(S): 160; 4.5 AEROSOL RESPIRATORY (INHALATION) at 08:25

## 2022-08-07 RX ADMIN — Medication 25 MILLIGRAM(S): at 21:34

## 2022-08-07 RX ADMIN — ISOSORBIDE DINITRATE 10 MILLIGRAM(S): 5 TABLET ORAL at 11:21

## 2022-08-07 RX ADMIN — ISOSORBIDE DINITRATE 10 MILLIGRAM(S): 5 TABLET ORAL at 15:18

## 2022-08-07 RX ADMIN — Medication 25 MILLIGRAM(S): at 15:18

## 2022-08-07 RX ADMIN — ENOXAPARIN SODIUM 30 MILLIGRAM(S): 100 INJECTION SUBCUTANEOUS at 05:53

## 2022-08-07 RX ADMIN — ISOSORBIDE DINITRATE 10 MILLIGRAM(S): 5 TABLET ORAL at 05:57

## 2022-08-07 RX ADMIN — Medication 25 MILLIGRAM(S): at 05:57

## 2022-08-07 RX ADMIN — Medication 25 MICROGRAM(S): at 05:57

## 2022-08-07 RX ADMIN — LISINOPRIL 20 MILLIGRAM(S): 2.5 TABLET ORAL at 05:57

## 2022-08-07 RX ADMIN — PANTOPRAZOLE SODIUM 40 MILLIGRAM(S): 20 TABLET, DELAYED RELEASE ORAL at 06:51

## 2022-08-07 RX ADMIN — CLOPIDOGREL BISULFATE 75 MILLIGRAM(S): 75 TABLET, FILM COATED ORAL at 11:21

## 2022-08-07 NOTE — PROGRESS NOTE ADULT - SUBJECTIVE AND OBJECTIVE BOX
Patient is a 89y old  Female who presents with a chief complaint of Diarrhea       SUBJECTIVE / OVERNIGHT EVENTS: none       MEDICATIONS  (STANDING):  aspirin  chewable 81 milliGRAM(s) Oral daily  budesonide  80 MICROgram(s)/formoterol 4.5 MICROgram(s) Inhaler 2 Puff(s) Inhalation two times a day  clopidogrel Tablet 75 milliGRAM(s) Oral daily  enoxaparin Injectable 30 milliGRAM(s) SubCutaneous every 24 hours  hydrALAZINE 25 milliGRAM(s) Oral three times a day  isosorbide   dinitrate Tablet (ISORDIL) 10 milliGRAM(s) Oral three times a day  latanoprost 0.005% Ophthalmic Solution 1 Drop(s) Both EYES at bedtime  levothyroxine 25 MICROGram(s) Oral daily  lisinopril 20 milliGRAM(s) Oral daily  metoprolol succinate ER 25 milliGRAM(s) Oral daily  pantoprazole    Tablet 40 milliGRAM(s) Oral before breakfast    MEDICATIONS  (PRN):  acetaminophen     Tablet .. 650 milliGRAM(s) Oral every 6 hours PRN Temp greater or equal to 38C (100.4F), Mild Pain (1 - 3)  ALBUTerol    90 MICROgram(s) HFA Inhaler 2 Puff(s) Inhalation every 6 hours PRN Shortness of Breath and/or Wheezing  aluminum hydroxide/magnesium hydroxide/simethicone Suspension 30 milliLiter(s) Oral every 4 hours PRN Dyspepsia  melatonin 3 milliGRAM(s) Oral at bedtime PRN Insomnia  ondansetron Injectable 4 milliGRAM(s) IV Push every 8 hours PRN Nausea and/or Vomiting        I&O's Summary    06 Aug 2022 07:01  -  07 Aug 2022 07:00  --------------------------------------------------------  IN: 0 mL / OUT: 350 mL / NET: -350 mL        PHYSICAL EXAM:  Vital Signs Last 24 Hrs  T(C): 36 (07 Aug 2022 05:00), Max: 36.3 (06 Aug 2022 21:06)  T(F): 96.8 (07 Aug 2022 05:00), Max: 97.3 (06 Aug 2022 21:06)  HR: 69 (07 Aug 2022 05:00) (65 - 70)  BP: 139/81 (07 Aug 2022 05:00) (139/81 - 208/76)  BP(mean): --  RR: 18 (07 Aug 2022 05:00) (18 - 18)  SpO2: 96% (06 Aug 2022 21:56) (96% - 96%)    Parameters below as of 06 Aug 2022 21:56  Patient On (Oxygen Delivery Method): room air      GENERAL: No acute distress, well-developed  HEAD:  Atraumatic, Normocephalic  EYES: EOMI, PERRLA, conjunctiva and sclera clear  NECK: Supple, no lymphadenopathy, no JVD  CHEST/LUNG: CTAB; No wheezes, rales, or rhonchi  HEART: Regular rate and rhythm; No murmurs, rubs, or gallops  ABDOMEN: Soft, non-tender, non-distended; normal bowel sounds, no organomegaly  EXTREMITIES:  2+ peripheral pulses b/l, No clubbing, cyanosis, or edema  NEUROLOGY: A&O x 3, no focal deficits  SKIN: No rashes or lesions    LABS:                        12.5   10.92 )-----------( 285      ( 07 Aug 2022 08:09 )             38.6     08-07    138  |  106  |  7<L>  ----------------------------<  97  3.7   |  23  |  0.6<L>    Ca    8.7      07 Aug 2022 08:09  Phos  2.4     08-07  Mg     1.9     08-07        Culture - Urine (collected 04 Aug 2022 18:45)  Source: Clean Catch Clean Catch (Midstream)  Final Report (07 Aug 2022 09:46):    >100,000 CFU/ml Proteus mirabilis  Organism: Proteus mirabilis (07 Aug 2022 09:46)  Organism: Proteus mirabilis (07 Aug 2022 09:46)        RADIOLOGY & ADDITIONAL TESTS:  Results Reviewed:   Imaging Personally Reviewed:  Electrocardiogram Personally Reviewed:    COORDINATION OF CARE:  Care Discussed with Consultants/Other Providers [Y/N]:  Prior or Outpatient Records Reviewed [Y/N]:

## 2022-08-07 NOTE — PROGRESS NOTE ADULT - ASSESSMENT
89 y/o F w/PMHx of HTN, CAD s/p stent, COPD, hypothyroidism presented to the ED with intermittent abdominal pain with associated intermittent non bloody diarrhea and 30 lb weight loss over past 5 weeks.    Gastroenteritis likely Viral   -No signs of SIRS and CT A/P: No evidence of acute intra-abdominal pathology  -Supportive Care: Bowel Rest and Hydration  -F/up GI consult, C.Diff, GI PCR, O&P and stool culture     Hypomagnesemia and Hypokalemia  Likely from Diarrhaea  Supplement as need      Asymptomatic UTI  Ucx: gram Negative Rods   Proteus Mirabilis, Will ask ID if Treatment is needed     H/O CAD + Essential HTN   - c/w Plavix, ASA 81, Lisinopril BB and Hydralazine       H/O Hypothyroidism:  c/w synthroid  H/O  COPD: stable : c/w inhalers    DVT PPX: Lovenox   GI PPX: PPI   Full code  Dispo:From home  Pending clinical improvement    89 y/o F w/PMHx of HTN, CAD s/p stent, COPD, hypothyroidism presented to the ED with intermittent abdominal pain with associated intermittent non bloody diarrhea and 30 lb weight loss over past 5 weeks.    Gastroenteritis likely Viral   -No signs of SIRS and CT A/P: No evidence of acute intra-abdominal pathology  -Supportive Care: Bowel Rest and Hydration  -C. diff toxin, GI PCR, stool culture all neg   -will advance diet and plan to D/C tomorrow     Hypomagnesemia and Hypokalemia  Likely from Diarrhaea  Supplement as need      Asymptomatic UTI  Ucx: gram Negative Rods   Proteus Mirabilis, Will ask ID if Treatment is needed     H/O CAD + Essential HTN   - c/w Plavix, ASA 81, Lisinopril BB and Hydralazine       H/O Hypothyroidism:  c/w synthroid  H/O  COPD: stable : c/w inhalers    DVT PPX: Lovenox   GI PPX: PPI   Full code  Dispo: From home  Possible D/C tomorrow

## 2022-08-08 LAB
ANION GAP SERPL CALC-SCNC: 11 MMOL/L — SIGNIFICANT CHANGE UP (ref 7–14)
BASOPHILS # BLD AUTO: 0.06 K/UL — SIGNIFICANT CHANGE UP (ref 0–0.2)
BASOPHILS NFR BLD AUTO: 0.4 % — SIGNIFICANT CHANGE UP (ref 0–1)
BUN SERPL-MCNC: 9 MG/DL — LOW (ref 10–20)
CALCIUM SERPL-MCNC: 9 MG/DL — SIGNIFICANT CHANGE UP (ref 8.5–10.1)
CHLORIDE SERPL-SCNC: 104 MMOL/L — SIGNIFICANT CHANGE UP (ref 98–110)
CO2 SERPL-SCNC: 24 MMOL/L — SIGNIFICANT CHANGE UP (ref 17–32)
CREAT SERPL-MCNC: 0.7 MG/DL — SIGNIFICANT CHANGE UP (ref 0.7–1.5)
CULTURE RESULTS: SIGNIFICANT CHANGE UP
EGFR: 79 ML/MIN/1.73M2 — SIGNIFICANT CHANGE UP
EOSINOPHIL # BLD AUTO: 0.33 K/UL — SIGNIFICANT CHANGE UP (ref 0–0.7)
EOSINOPHIL NFR BLD AUTO: 2 % — SIGNIFICANT CHANGE UP (ref 0–8)
GLUCOSE SERPL-MCNC: 97 MG/DL — SIGNIFICANT CHANGE UP (ref 70–99)
HCT VFR BLD CALC: 39.4 % — SIGNIFICANT CHANGE UP (ref 37–47)
HGB BLD-MCNC: 12.6 G/DL — SIGNIFICANT CHANGE UP (ref 12–16)
IMM GRANULOCYTES NFR BLD AUTO: 0.7 % — HIGH (ref 0.1–0.3)
LYMPHOCYTES # BLD AUTO: 1.98 K/UL — SIGNIFICANT CHANGE UP (ref 1.2–3.4)
LYMPHOCYTES # BLD AUTO: 12.2 % — LOW (ref 20.5–51.1)
MCHC RBC-ENTMCNC: 30.9 PG — SIGNIFICANT CHANGE UP (ref 27–31)
MCHC RBC-ENTMCNC: 32 G/DL — SIGNIFICANT CHANGE UP (ref 32–37)
MCV RBC AUTO: 96.6 FL — SIGNIFICANT CHANGE UP (ref 81–99)
MONOCYTES # BLD AUTO: 1.58 K/UL — HIGH (ref 0.1–0.6)
MONOCYTES NFR BLD AUTO: 9.7 % — HIGH (ref 1.7–9.3)
NEUTROPHILS # BLD AUTO: 12.15 K/UL — HIGH (ref 1.4–6.5)
NEUTROPHILS NFR BLD AUTO: 75 % — SIGNIFICANT CHANGE UP (ref 42.2–75.2)
NRBC # BLD: 0 /100 WBCS — SIGNIFICANT CHANGE UP (ref 0–0)
PLATELET # BLD AUTO: 308 K/UL — SIGNIFICANT CHANGE UP (ref 130–400)
POTASSIUM SERPL-MCNC: 3.9 MMOL/L — SIGNIFICANT CHANGE UP (ref 3.5–5)
POTASSIUM SERPL-SCNC: 3.9 MMOL/L — SIGNIFICANT CHANGE UP (ref 3.5–5)
RBC # BLD: 4.08 M/UL — LOW (ref 4.2–5.4)
RBC # FLD: 13.2 % — SIGNIFICANT CHANGE UP (ref 11.5–14.5)
SODIUM SERPL-SCNC: 139 MMOL/L — SIGNIFICANT CHANGE UP (ref 135–146)
SPECIMEN SOURCE: SIGNIFICANT CHANGE UP
WBC # BLD: 16.21 K/UL — HIGH (ref 4.8–10.8)
WBC # FLD AUTO: 16.21 K/UL — HIGH (ref 4.8–10.8)

## 2022-08-08 PROCEDURE — 99233 SBSQ HOSP IP/OBS HIGH 50: CPT

## 2022-08-08 RX ADMIN — Medication 25 MILLIGRAM(S): at 14:09

## 2022-08-08 RX ADMIN — ISOSORBIDE DINITRATE 10 MILLIGRAM(S): 5 TABLET ORAL at 12:19

## 2022-08-08 RX ADMIN — LATANOPROST 1 DROP(S): 0.05 SOLUTION/ DROPS OPHTHALMIC; TOPICAL at 22:16

## 2022-08-08 RX ADMIN — Medication 25 MILLIGRAM(S): at 05:38

## 2022-08-08 RX ADMIN — PANTOPRAZOLE SODIUM 40 MILLIGRAM(S): 20 TABLET, DELAYED RELEASE ORAL at 05:38

## 2022-08-08 RX ADMIN — Medication 25 MICROGRAM(S): at 05:38

## 2022-08-08 RX ADMIN — ISOSORBIDE DINITRATE 10 MILLIGRAM(S): 5 TABLET ORAL at 05:38

## 2022-08-08 RX ADMIN — BUDESONIDE AND FORMOTEROL FUMARATE DIHYDRATE 2 PUFF(S): 160; 4.5 AEROSOL RESPIRATORY (INHALATION) at 08:02

## 2022-08-08 RX ADMIN — ISOSORBIDE DINITRATE 10 MILLIGRAM(S): 5 TABLET ORAL at 16:10

## 2022-08-08 RX ADMIN — CLOPIDOGREL BISULFATE 75 MILLIGRAM(S): 75 TABLET, FILM COATED ORAL at 12:23

## 2022-08-08 RX ADMIN — Medication 81 MILLIGRAM(S): at 12:22

## 2022-08-08 RX ADMIN — ENOXAPARIN SODIUM 30 MILLIGRAM(S): 100 INJECTION SUBCUTANEOUS at 05:39

## 2022-08-08 RX ADMIN — LISINOPRIL 20 MILLIGRAM(S): 2.5 TABLET ORAL at 05:38

## 2022-08-08 RX ADMIN — Medication 25 MILLIGRAM(S): at 22:15

## 2022-08-08 RX ADMIN — BUDESONIDE AND FORMOTEROL FUMARATE DIHYDRATE 2 PUFF(S): 160; 4.5 AEROSOL RESPIRATORY (INHALATION) at 22:16

## 2022-08-08 NOTE — PHYSICAL THERAPY INITIAL EVALUATION ADULT - ADDITIONAL COMMENTS
Pt reports she lives with daughter and has no steps to enter or any steps inside home.   Pt uses rollator PTA and requires assist from her daughter for most activities PTA.

## 2022-08-08 NOTE — PHYSICAL THERAPY INITIAL EVALUATION ADULT - PERTINENT HX OF CURRENT PROBLEM, REHAB EVAL
89 y/o F w/PMHx of HTN, CAD s/p stent, COPD, hypothyroidism presented to the ED with intermittent abdominal pain with associated intermittent non bloody diarrhea and 30 lb weight loss over past 5 weeks.  Pt admitted with diagnosis of Diarrhea, Dehydration and Hypomagnesemia.

## 2022-08-08 NOTE — PROGRESS NOTE ADULT - ASSESSMENT
89 y/o F w/PMHx of HTN, CAD s/p stent, COPD, hypothyroidism presented to the ED with intermittent abdominal pain with associated intermittent non bloody diarrhea and 30 lb weight loss over past 5 weeks.    Gastroenteritis likely Viral   -No signs of SIRS and CT A/P: No evidence of acute intra-abdominal pathology  -Supportive Care: Bowel Rest and Hydration  -Now Tolerating regular diet   -C. diff toxin Neg, GI PCR with Cyclospora SPP   -ID consult for treatment evaluation     Hypomagnesemia and Hypokalemia  Likely from Diarrhaea  Supplement as need      Asymptomatic UTI  Ucx: gram Negative Rods   Proteus Mirabilis, no treatment required per ID  ** (Dr Lopez)     H/O CAD + Essential HTN   - c/w Plavix, ASA 81, Lisinopril BB and Hydralazine       H/O Hypothyroidism:  c/w synthroid  H/O  COPD: stable : c/w inhalers    DVT PPX: Lovenox   GI PPX: PPI   Full code  Dispo: From home  Possible D/C tomorrow

## 2022-08-08 NOTE — PHYSICAL THERAPY INITIAL EVALUATION ADULT - GENERAL OBSERVATIONS, REHAB EVAL
11:35-12:00 Chart reviewed. Patient available to be seen for physical therapy, denies pain, confirmed with RN.  Pt rec'd on commode in NAD.

## 2022-08-08 NOTE — PROGRESS NOTE ADULT - SUBJECTIVE AND OBJECTIVE BOX
Patient is a 89y old  Female who presents with a chief complaint of Diarrhea       SUBJECTIVE / OVERNIGHT EVENTS: none     Examined, no Diarrhea for the past two days  GI PCR positive with Cyclospora Spp, will consult ID   D/C planning probably tomorrow       MEDICATIONS  (STANDING):  aspirin  chewable 81 milliGRAM(s) Oral daily  budesonide  80 MICROgram(s)/formoterol 4.5 MICROgram(s) Inhaler 2 Puff(s) Inhalation two times a day  clopidogrel Tablet 75 milliGRAM(s) Oral daily  enoxaparin Injectable 30 milliGRAM(s) SubCutaneous every 24 hours  hydrALAZINE 25 milliGRAM(s) Oral three times a day  isosorbide   dinitrate Tablet (ISORDIL) 10 milliGRAM(s) Oral three times a day  latanoprost 0.005% Ophthalmic Solution 1 Drop(s) Both EYES at bedtime  levothyroxine 25 MICROGram(s) Oral daily  lisinopril 20 milliGRAM(s) Oral daily  metoprolol succinate ER 25 milliGRAM(s) Oral daily  pantoprazole    Tablet 40 milliGRAM(s) Oral before breakfast    MEDICATIONS  (PRN):  acetaminophen     Tablet .. 650 milliGRAM(s) Oral every 6 hours PRN Temp greater or equal to 38C (100.4F), Mild Pain (1 - 3)  ALBUTerol    90 MICROgram(s) HFA Inhaler 2 Puff(s) Inhalation every 6 hours PRN Shortness of Breath and/or Wheezing  aluminum hydroxide/magnesium hydroxide/simethicone Suspension 30 milliLiter(s) Oral every 4 hours PRN Dyspepsia  melatonin 3 milliGRAM(s) Oral at bedtime PRN Insomnia  ondansetron Injectable 4 milliGRAM(s) IV Push every 8 hours PRN Nausea and/or Vomiting      PHYSICAL EXAM:  Vital Signs Last 24 Hrs  T(C): 36.7 (08 Aug 2022 13:23), Max: 36.7 (08 Aug 2022 13:23)  T(F): 98 (08 Aug 2022 13:23), Max: 98 (08 Aug 2022 13:23)  HR: 64 (08 Aug 2022 13:23) (64 - 77)  BP: 133/61 (08 Aug 2022 13:23) (133/61 - 191/79)  BP(mean): --  RR: 18 (08 Aug 2022 05:19) (18 - 18)  SpO2: --        GENERAL: No acute distress, well-developed  HEAD:  Atraumatic, Normocephalic  EYES: EOMI, PERRLA, conjunctiva and sclera clear  NECK: Supple, no lymphadenopathy, no JVD  CHEST/LUNG: CTAB; No wheezes, rales, or rhonchi  HEART: Regular rate and rhythm; No murmurs, rubs, or gallops  ABDOMEN: Soft, non-tender, non-distended; normal bowel sounds, no organomegaly  EXTREMITIES:  2+ peripheral pulses b/l, No clubbing, cyanosis, or edema  NEUROLOGY: A&O x 3, no focal deficits  SKIN: No rashes or lesions    LABS:                        12.6   16.21 )-----------( 308      ( 08 Aug 2022 08:27 )             39.4     08-08    139  |  104  |  9<L>  ----------------------------<  97  3.9   |  24  |  0.7    Ca    9.0      08 Aug 2022 08:27  Phos  2.4     08-07  Mg     1.9     08-07      GI PCR Panel, Stool (collected 06 Aug 2022 22:51)  Source: .Stool Feces  Final Report (07 Aug 2022 22:44):    Cyclospora species    DETECTED by PCR    *******Please Note:*******    GI panel PCR evaluates for:    Campylobacter, Plesiomonas shigelloides, Salmonella,    Vibrio, Yersinia enterocolitica, Enteroaggregative    Escherichia coli (EAEC), Enteropathogenic E.coli(EPEC),    Enterotoxigenic E. coli (ETEC) lt/st, Shiga-like    toxin-producing E. coli (STEC) stx1/stx2,    Shigella/ Enteroinvasive E. coli (EIEC), Cryptosporidium,    Cyclospora cayetanensis, Entamoeba histolytica,    Giardia lamblia, Adenovirus F 40/41, Astrovirus,    Norovirus GI/GII, Rotavirus A, Sapovirus

## 2022-08-09 LAB
ANION GAP SERPL CALC-SCNC: 9 MMOL/L — SIGNIFICANT CHANGE UP (ref 7–14)
BASOPHILS # BLD AUTO: 0.07 K/UL — SIGNIFICANT CHANGE UP (ref 0–0.2)
BASOPHILS NFR BLD AUTO: 0.6 % — SIGNIFICANT CHANGE UP (ref 0–1)
BUN SERPL-MCNC: 12 MG/DL — SIGNIFICANT CHANGE UP (ref 10–20)
CALCIUM SERPL-MCNC: 8.9 MG/DL — SIGNIFICANT CHANGE UP (ref 8.5–10.1)
CHLORIDE SERPL-SCNC: 104 MMOL/L — SIGNIFICANT CHANGE UP (ref 98–110)
CO2 SERPL-SCNC: 26 MMOL/L — SIGNIFICANT CHANGE UP (ref 17–32)
CREAT SERPL-MCNC: 0.7 MG/DL — SIGNIFICANT CHANGE UP (ref 0.7–1.5)
CULTURE RESULTS: SIGNIFICANT CHANGE UP
EGFR: 79 ML/MIN/1.73M2 — SIGNIFICANT CHANGE UP
EOSINOPHIL # BLD AUTO: 0.36 K/UL — SIGNIFICANT CHANGE UP (ref 0–0.7)
EOSINOPHIL NFR BLD AUTO: 2.8 % — SIGNIFICANT CHANGE UP (ref 0–8)
GLUCOSE SERPL-MCNC: 82 MG/DL — SIGNIFICANT CHANGE UP (ref 70–99)
HCT VFR BLD CALC: 34.9 % — LOW (ref 37–47)
HGB BLD-MCNC: 11.4 G/DL — LOW (ref 12–16)
IMM GRANULOCYTES NFR BLD AUTO: 0.6 % — HIGH (ref 0.1–0.3)
LYMPHOCYTES # BLD AUTO: 17 % — LOW (ref 20.5–51.1)
LYMPHOCYTES # BLD AUTO: 2.15 K/UL — SIGNIFICANT CHANGE UP (ref 1.2–3.4)
MAGNESIUM SERPL-MCNC: 1.6 MG/DL — LOW (ref 1.8–2.4)
MCHC RBC-ENTMCNC: 31.7 PG — HIGH (ref 27–31)
MCHC RBC-ENTMCNC: 32.7 G/DL — SIGNIFICANT CHANGE UP (ref 32–37)
MCV RBC AUTO: 96.9 FL — SIGNIFICANT CHANGE UP (ref 81–99)
MONOCYTES # BLD AUTO: 1.24 K/UL — HIGH (ref 0.1–0.6)
MONOCYTES NFR BLD AUTO: 9.8 % — HIGH (ref 1.7–9.3)
NEUTROPHILS # BLD AUTO: 8.75 K/UL — HIGH (ref 1.4–6.5)
NEUTROPHILS NFR BLD AUTO: 69.2 % — SIGNIFICANT CHANGE UP (ref 42.2–75.2)
NRBC # BLD: 0 /100 WBCS — SIGNIFICANT CHANGE UP (ref 0–0)
PLATELET # BLD AUTO: 296 K/UL — SIGNIFICANT CHANGE UP (ref 130–400)
POTASSIUM SERPL-MCNC: 3.7 MMOL/L — SIGNIFICANT CHANGE UP (ref 3.5–5)
POTASSIUM SERPL-SCNC: 3.7 MMOL/L — SIGNIFICANT CHANGE UP (ref 3.5–5)
RBC # BLD: 3.6 M/UL — LOW (ref 4.2–5.4)
RBC # FLD: 13.2 % — SIGNIFICANT CHANGE UP (ref 11.5–14.5)
SARS-COV-2 RNA SPEC QL NAA+PROBE: SIGNIFICANT CHANGE UP
SODIUM SERPL-SCNC: 139 MMOL/L — SIGNIFICANT CHANGE UP (ref 135–146)
SPECIMEN SOURCE: SIGNIFICANT CHANGE UP
WBC # BLD: 12.65 K/UL — HIGH (ref 4.8–10.8)
WBC # FLD AUTO: 12.65 K/UL — HIGH (ref 4.8–10.8)

## 2022-08-09 PROCEDURE — 99233 SBSQ HOSP IP/OBS HIGH 50: CPT

## 2022-08-09 PROCEDURE — 74018 RADEX ABDOMEN 1 VIEW: CPT | Mod: 26

## 2022-08-09 RX ORDER — CEFTRIAXONE 500 MG/1
1000 INJECTION, POWDER, FOR SOLUTION INTRAMUSCULAR; INTRAVENOUS ONCE
Refills: 0 | Status: COMPLETED | OUTPATIENT
Start: 2022-08-09 | End: 2022-08-09

## 2022-08-09 RX ORDER — SACCHAROMYCES BOULARDII 250 MG
250 POWDER IN PACKET (EA) ORAL
Refills: 0 | Status: DISCONTINUED | OUTPATIENT
Start: 2022-08-09 | End: 2022-08-19

## 2022-08-09 RX ORDER — CEFTRIAXONE 500 MG/1
INJECTION, POWDER, FOR SOLUTION INTRAMUSCULAR; INTRAVENOUS
Refills: 0 | Status: DISCONTINUED | OUTPATIENT
Start: 2022-08-09 | End: 2022-08-10

## 2022-08-09 RX ORDER — CEFTRIAXONE 500 MG/1
1000 INJECTION, POWDER, FOR SOLUTION INTRAMUSCULAR; INTRAVENOUS EVERY 24 HOURS
Refills: 0 | Status: DISCONTINUED | OUTPATIENT
Start: 2022-08-10 | End: 2022-08-10

## 2022-08-09 RX ADMIN — Medication 81 MILLIGRAM(S): at 11:26

## 2022-08-09 RX ADMIN — BUDESONIDE AND FORMOTEROL FUMARATE DIHYDRATE 2 PUFF(S): 160; 4.5 AEROSOL RESPIRATORY (INHALATION) at 21:09

## 2022-08-09 RX ADMIN — ISOSORBIDE DINITRATE 10 MILLIGRAM(S): 5 TABLET ORAL at 11:25

## 2022-08-09 RX ADMIN — Medication 25 MILLIGRAM(S): at 15:15

## 2022-08-09 RX ADMIN — Medication 25 MILLIGRAM(S): at 06:27

## 2022-08-09 RX ADMIN — Medication 25 MICROGRAM(S): at 06:27

## 2022-08-09 RX ADMIN — ISOSORBIDE DINITRATE 10 MILLIGRAM(S): 5 TABLET ORAL at 15:16

## 2022-08-09 RX ADMIN — LISINOPRIL 20 MILLIGRAM(S): 2.5 TABLET ORAL at 06:27

## 2022-08-09 RX ADMIN — PANTOPRAZOLE SODIUM 40 MILLIGRAM(S): 20 TABLET, DELAYED RELEASE ORAL at 06:27

## 2022-08-09 RX ADMIN — BUDESONIDE AND FORMOTEROL FUMARATE DIHYDRATE 2 PUFF(S): 160; 4.5 AEROSOL RESPIRATORY (INHALATION) at 08:25

## 2022-08-09 RX ADMIN — ENOXAPARIN SODIUM 30 MILLIGRAM(S): 100 INJECTION SUBCUTANEOUS at 06:26

## 2022-08-09 RX ADMIN — LATANOPROST 1 DROP(S): 0.05 SOLUTION/ DROPS OPHTHALMIC; TOPICAL at 21:13

## 2022-08-09 RX ADMIN — Medication 25 MILLIGRAM(S): at 21:13

## 2022-08-09 RX ADMIN — ISOSORBIDE DINITRATE 10 MILLIGRAM(S): 5 TABLET ORAL at 06:27

## 2022-08-09 RX ADMIN — Medication 25 MILLIGRAM(S): at 06:26

## 2022-08-09 RX ADMIN — CLOPIDOGREL BISULFATE 75 MILLIGRAM(S): 75 TABLET, FILM COATED ORAL at 11:26

## 2022-08-09 RX ADMIN — CEFTRIAXONE 100 MILLIGRAM(S): 500 INJECTION, POWDER, FOR SOLUTION INTRAMUSCULAR; INTRAVENOUS at 16:30

## 2022-08-09 NOTE — PROGRESS NOTE ADULT - SUBJECTIVE AND OBJECTIVE BOX
Patient is a 89y old  Female who presents with a chief complaint of Weakness     SUBJECTIVE / OVERNIGHT EVENTS: None     Patient examined  Consulted with ID, no intervention for Cyclospora  Plans to  treat UTI given recent leukocytosis  F/UP KUB, abdomen is mildly distended     MEDICATIONS  (STANDING):  aspirin  chewable 81 milliGRAM(s) Oral daily  budesonide  80 MICROgram(s)/formoterol 4.5 MICROgram(s) Inhaler 2 Puff(s) Inhalation two times a day  clopidogrel Tablet 75 milliGRAM(s) Oral daily  enoxaparin Injectable 30 milliGRAM(s) SubCutaneous every 24 hours  hydrALAZINE 25 milliGRAM(s) Oral three times a day  isosorbide   dinitrate Tablet (ISORDIL) 10 milliGRAM(s) Oral three times a day  latanoprost 0.005% Ophthalmic Solution 1 Drop(s) Both EYES at bedtime  levothyroxine 25 MICROGram(s) Oral daily  lisinopril 20 milliGRAM(s) Oral daily  metoprolol succinate ER 25 milliGRAM(s) Oral daily  pantoprazole    Tablet 40 milliGRAM(s) Oral before breakfast    MEDICATIONS  (PRN):  acetaminophen     Tablet .. 650 milliGRAM(s) Oral every 6 hours PRN Temp greater or equal to 38C (100.4F), Mild Pain (1 - 3)  ALBUTerol    90 MICROgram(s) HFA Inhaler 2 Puff(s) Inhalation every 6 hours PRN Shortness of Breath and/or Wheezing  aluminum hydroxide/magnesium hydroxide/simethicone Suspension 30 milliLiter(s) Oral every 4 hours PRN Dyspepsia  melatonin 3 milliGRAM(s) Oral at bedtime PRN Insomnia  ondansetron Injectable 4 milliGRAM(s) IV Push every 8 hours PRN Nausea and/or Vomiting      CAPILLARY BLOOD GLUCOSE        I&O's Summary    08 Aug 2022 07:01  -  09 Aug 2022 07:00  --------------------------------------------------------  IN: 0 mL / OUT: 800 mL / NET: -800 mL        PHYSICAL EXAM:  Vital Signs Last 24 Hrs  T(C): 36.7 (09 Aug 2022 14:48), Max: 36.8 (08 Aug 2022 20:38)  T(F): 98 (09 Aug 2022 14:48), Max: 98.3 (08 Aug 2022 20:38)  HR: 68 (09 Aug 2022 14:48) (68 - 69)  BP: 188/78 (09 Aug 2022 14:48) (144/63 - 188/78)  BP(mean): --  RR: 18 (09 Aug 2022 05:00) (18 - 18)  SpO2: --          LABS:                        11.4   12.65 )-----------( 296      ( 09 Aug 2022 05:57 )             34.9     08-09    139  |  104  |  12  ----------------------------<  82  3.7   |  26  |  0.7    Ca    8.9      09 Aug 2022 05:57  Mg     1.6     08-09      GI PCR Panel, Stool (collected 06 Aug 2022 22:51)  Source: .Stool Feces  Final Report (07 Aug 2022 22:44):    Cyclospora species    DETECTED by PCR    *******Please Note:*******    GI panel PCR evaluates for:    Campylobacter, Plesiomonas shigelloides, Salmonella,    Vibrio, Yersinia enterocolitica, Enteroaggregative    Escherichia coli (EAEC), Enteropathogenic E.coli(EPEC),    Enterotoxigenic E. coli (ETEC) lt/st, Shiga-like    toxin-producing E. coli (STEC) stx1/stx2,    Shigella/ Enteroinvasive E. coli (EIEC), Cryptosporidium,    Cyclospora cayetanensis, Entamoeba histolytica,    Giardia lamblia, Adenovirus F 40/41, Astrovirus,    Norovirus GI/GII, Rotavirus A, Sapovirus    Culture - Stool (collected 06 Aug 2022 22:51)  Source: .Stool Feces  Preliminary Report (08 Aug 2022 17:18):    No enteric pathogens to date: Final culture pending      RADIOLOGY & ADDITIONAL TESTS

## 2022-08-09 NOTE — CONSULT NOTE ADULT - SUBJECTIVE AND OBJECTIVE BOX
CHIRAG HERNÁNDEZ  89y, Female  Allergy: No Known Allergies      CHIEF COMPLAINT: diarrhea (08 Aug 2022 15:03)      LOS  5d    HPI:  89 y/o F w/PMHx of HTN, CAD s/p stent, COPD, hypothyroidism presented to the ED with intermittent abdominal pain with associated intermittent non bloody diarrhea and 30 lb weight loss over past 5 weeks. patient states she had a roast beef meal prior to onset of symptoms. patient denies any vomiting. patient with decreased po intake. States she had a BM yesterday and + flatus today. Pt denies any of any hematuria, back pain, fever, chills. Admits to having dry mouth and increased weakness. no travel, no recent abx use, saw PMD today who sent stool sample but referred for eval, States she sent stool sample off to lab. Denies cp, palpitations, sob.   Pt hesitant at times to answer my questions stating, "you know why im here" several times. AA+O x4  Last colonoscopy was >5 years ago and pt stated, "my doctor told me not to have any more because its not good for someone my age".  (04 Aug 2022 23:54)      INFECTIOUS DISEASE HISTORY:  History as above.     PAST MEDICAL & SURGICAL HISTORY:  High blood cholesterol      Hypothyroid      Hypertension      COPD (chronic obstructive pulmonary disease)      CAD (coronary artery disease)      History of coronary artery stent placement      History of cholecystectomy      H/O abdominal hysterectomy      Cataract          FAMILY HISTORY  Family history unknown        SOCIAL HISTORY  Social History:  Denies smoking  Denies ETOH use  Denies drug use  Lives with family (04 Aug 2022 23:54)        ROS  General: Denies rigors, nightsweats  HEENT: Denies headache, rhinorrhea, sore throat, eye pain  CV: Denies CP, palpitations  PULM: Denies wheezing, hemoptysis  GI: Denies hematemesis, hematochezia, melena  : Denies discharge, hematuria  MSK: Denies arthralgias, myalgias  SKIN: Denies rash, lesions  NEURO: Denies paresthesias, weakness  PSYCH: Denies depression, anxiety    VITALS:  T(F): 97.3, Max: 98.3 (08-08-22 @ 20:38)  HR: 69  BP: 160/70  RR: 18Vital Signs Last 24 Hrs  T(C): 36.3 (09 Aug 2022 05:00), Max: 36.8 (08 Aug 2022 20:38)  T(F): 97.3 (09 Aug 2022 05:00), Max: 98.3 (08 Aug 2022 20:38)  HR: 69 (09 Aug 2022 05:00) (68 - 69)  BP: 160/70 (09 Aug 2022 05:00) (144/63 - 160/70)  BP(mean): --  RR: 18 (09 Aug 2022 05:00) (18 - 18)  SpO2: --        PHYSICAL EXAM:  Gen: NAD, resting in bed  HEENT: Normocephalic, atraumatic  Neck: supple, no lymphadenopathy  CV: Regular rate & regular rhythm  Lungs: decreased BS at bases, no fremitus  Abdomen: Soft, BS present  Ext: Warm, well perfused  Neuro: non focal, awake  Skin: no rash, no erythema  Lines: no phlebitis    TESTS & MEASUREMENTS:                        11.4   12.65 )-----------( 296      ( 09 Aug 2022 05:57 )             34.9     08-09    139  |  104  |  12  ----------------------------<  82  3.7   |  26  |  0.7    Ca    8.9      09 Aug 2022 05:57  Mg     1.6     08-09              GI PCR Panel, Stool (collected 08-06-22 @ 22:51)  Source: .Stool Feces  Final Report (08-07-22 @ 22:44):    Cyclospora species    DETECTED by PCR    *******Please Note:*******    GI panel PCR evaluates for:    Campylobacter, Plesiomonas shigelloides, Salmonella,    Vibrio, Yersinia enterocolitica, Enteroaggregative    Escherichia coli (EAEC), Enteropathogenic E.coli(EPEC),    Enterotoxigenic E. coli (ETEC) lt/st, Shiga-like    toxin-producing E. coli (STEC) stx1/stx2,    Shigella/ Enteroinvasive E. coli (EIEC), Cryptosporidium,    Cyclospora cayetanensis, Entamoeba histolytica,    Giardia lamblia, Adenovirus F 40/41, Astrovirus,    Norovirus GI/GII, Rotavirus A, Sapovirus    Culture - Stool (collected 08-06-22 @ 22:51)  Source: .Stool Feces  Preliminary Report (08-08-22 @ 17:18):    No enteric pathogens to date: Final culture pending    Culture - Urine (collected 08-04-22 @ 18:45)  Source: Clean Catch Clean Catch (Midstream)  Final Report (08-07-22 @ 09:46):    >100,000 CFU/ml Proteus mirabilis  Organism: Proteus mirabilis (08-07-22 @ 09:46)  Organism: Proteus mirabilis (08-07-22 @ 09:46)      -  Amikacin: S <=16      -  Amoxicillin/Clavulanic Acid: S <=8/4      -  Ampicillin: S <=8 These ampicillin results predict results for amoxicillin      -  Ampicillin/Sulbactam: S <=4/2 Enterobacter, Klebsiella aerogenes, Citrobacter, and Serratia may develop resistance during prolonged therapy (3-4 days)      -  Aztreonam: S <=4      -  Cefazolin: S 4 (MIC_CL_COM_ENTERIC_CEFAZU) For uncomplicated UTI with K. pneumoniae, E. coli, or P. mirablis: SAMEER <=16 is sensitive and SAMEER >=32 is resistant. This also predicts results for oral agents cefaclor, cefdinir, cefpodoxime, cefprozil, cefuroxime axetil, cephalexin and locarbef for uncomplicated UTI. Note that some isolates may be susceptible to these agents while testing resistant to cefazolin.      -  Cefepime: S <=2      -  Cefoxitin: S <=8      -  Ceftriaxone: S <=1 Enterobacter, Klebsiella aerogenes, Citrobacter, and Serratia may develop resistance during prolonged therapy      -  Ciprofloxacin: S <=0.25      -  Ertapenem: S <=0.5      -  Gentamicin: S 4      -  Levofloxacin: S <=0.5      -  Meropenem: S <=1      -  Nitrofurantoin: R >64 Should not be used to treat pyelonephritis      -  Piperacillin/Tazobactam: S <=8      -  Tobramycin: S <=2      -  Trimethoprim/Sulfamethoxazole: S <=0.5/9.5      Method Type: [facility]            INFECTIOUS DISEASES TESTING  Clostridium difficile Toxin by PCR: RESULT INTERPRETATION:    Not Detected - No Clostridium difficile toxins detected by amplified DNA  PCR    C. difficile PCR test results should be interpreted only with  consideration of the patient's clinical situation and history.  This test  will detect the presence of toxigenic C. difficle.  However it cannot be  used as the sole criteria  for the diagnosis of antibiotic associated diarrhea, antibiotic  associated colitis, or pseudomembranous colitis.  Colonization with  C.difficile may exceed 20% in hospital patients, the majority of whom are  without Toxigenic  Clostridium Diffidisease.  Testing is generally not recommended in  children below the age of 1 year, as up to half of healthy infants are  asymptomatically colonized withC.difficile.  In addition, C.difficile  PCR testing  cannot be used as a "test of cure" as dead organism nucleic acids will  persist and be detected after treatment.  Successful treatment of  C.difficile disease is determined by resolution of clinical symptoms. Method: CDPCR  TOXIGENIC CLOST.DIFFICILE NEGATIVE;  027-NAP1-B1 PRESUMPTIVE NEGATIVE.  By: Real-Time PCR (Polymerase Chain Reaction)  NOTE: This method detects the Toxin B gene (tCdB), the  binary toxin gene (CDT), and the single-base-pair  deletion at nucleotide 117 within the gene encoding  a negative regulator of toxin production (tcdC 117).  The combined presence of genes encoding Toxin B and  binary toxin and the tcdC 117 deletion has been  associated with hypervirulent C. difficile strain  known as 027/NAP1/B1, which has been associated with  severe disease outbreaks in healthcare facilities  worldwide. (08-06-22 @ 22:51)  COVID-19 PCR: NotDetec (08-04-22 @ 22:40)      RADIOLOGY & ADDITIONAL TESTS:  I have personally reviewed the last Chest xray  CXR      CT      CARDIOLOGY TESTING  12 Lead ECG:   Ventricular Rate 66 BPM    Atrial Rate 66 BPM    P-R Interval 212 ms    QRS Duration 140 ms    Q-T Interval 446 ms    QTC Calculation(Bazett) 467 ms    P Axis 68 degrees    R Axis -54 degrees    T Axis 96 degrees    Diagnosis Line Sinus rhythm [id number] degree A-V block  Non-specific intra-ventricular conduction delay  Left ventricular hypertrophy with repolarization abnormality  Left axis deviation  Possible Lateral infarct , age undetermined  Abnormal ECG    Confirmed by Luis Eduardo Johnson ([id number]) on 8/5/2022 9:56:09 AM (08-04-22 @ 18:45)      MEDICATIONS  aspirin  chewable 81 Oral daily  budesonide  80 MICROgram(s)/formoterol 4.5 MICROgram(s) Inhaler 2 Inhalation two times a day  clopidogrel Tablet 75 Oral daily  enoxaparin Injectable 30 SubCutaneous every 24 hours  hydrALAZINE 25 Oral three times a day  isosorbide   dinitrate Tablet (ISORDIL) 10 Oral three times a day  latanoprost 0.005% Ophthalmic Solution 1 Both EYES at bedtime  levothyroxine 25 Oral daily  lisinopril 20 Oral daily  metoprolol succinate ER 25 Oral daily  pantoprazole    Tablet 40 Oral before breakfast      Weight  Weight (kg): 72.3 (08-05-22 @ 03:26)    ANTIBIOTICS:      ALLERGIES:  No Known Allergies

## 2022-08-09 NOTE — PROGRESS NOTE ADULT - ASSESSMENT
89 y/o F w/PMHx of HTN, CAD s/p stent, COPD, hypothyroidism presented to the ED with intermittent abdominal pain with associated intermittent non bloody diarrhea and 30 lb weight loss over past 5 weeks.    Gastroenteritis likely Viral   -No signs of SIRS and CT A/P: No evidence of acute intra-abdominal pathology  -Supportive Care: Bowel Rest and Hydration  -Now Tolerating regular diet   -C. diff toxin Neg, GI PCR with Cyclospora SPP   -ID consult for treatment evaluation: no intervention   F/up KUB for mildly distended abdomen     Hypomagnesemia and Hypokalemia  Likely from Diarrhaea  Supplement as need      Asymptomatic UTI  Ucx: gram Negative Rods   Proteus Mirabilis, ID Initially suggested no treatment  -Will treat given new leukocytosis   -Ceftriaxone     H/O CAD + Essential HTN   - c/w Plavix, ASA 81, Lisinopril BB and Hydralazine     H/O Hypothyroidism:  c/w synthroid  H/O  COPD: stable : c/w inhalers    DVT PPX: Lovenox   GI PPX: PPI   Full code  Dispo: From home  Possible D/C tomorrow  89 y/o F w/PMHx of HTN, CAD s/p stent, COPD, hypothyroidism presented to the ED with intermittent abdominal pain with associated intermittent non bloody diarrhea and 30 lb weight loss over past 5 weeks.    Gastroenteritis likely Viral   -No signs of SIRS and CT A/P: No evidence of acute intra-abdominal pathology  -Supportive Care: Bowel Rest and Hydration  -Now Tolerating regular diet   -C. diff toxin Neg, GI PCR with Cyclospora SPP   -ID consult for treatment evaluation: no intervention   F/up KUB for mildly distended abdomen     Hypomagnesemia and Hypokalemia  Likely from Diarrhaea  Supplement as need      Asymptomatic UTI  Ucx: gram Negative Rods   Proteus Mirabilis, ID Initially suggested no treatment  -Will treat given new leukocytosis   -Ceftriaxone     H/O CAD + Essential HTN   - c/w Plavix, ASA 81, Lisinopril BB and Hydralazine     H/O Hypothyroidism:  c/w synthroid  H/O  COPD: stable : c/w inhalers    DVT PPX: Lovenox   GI PPX: PPI   Full code  Dispo: From home  Possible D/C tomorrow pending Auth from Nick

## 2022-08-09 NOTE — CONSULT NOTE ADULT - ASSESSMENT
ASSESSMENT  89 y/o F w/PMHx of HTN, CAD s/p stent, COPD, hypothyroidism presented to the ED with intermittent abdominal pain with associated intermittent non bloody diarrhea and 30 lb weight loss over past 5 weeks    IMPRESSION  #Chronic diarrhea - Cyclospora identified on PCR - resolved  #Leukocytosis   #Urine Cx 8/4 Proteus mirabilis  #CAD   #COPD  #Obesity BMI (kg/m2): 24.2  #Abx allergy: NDKA      RECOMMENDATIONS  This is a preliminary incomplete pended note, all final recommendations to follow after interview and examination of the patient.    Please call or message on [employer] Teams if with any questions.  Spectra [phone]       ASSESSMENT  89 y/o F w/PMHx of HTN, CAD s/p stent, COPD, hypothyroidism presented to the ED with intermittent abdominal pain with associated intermittent non bloody diarrhea and 30 lb weight loss over past 5 weeks    IMPRESSION  #Chronic diarrhea - Cyclospora identified on PCR - resolved  - CT Abd/pelvis 8/4 no acute intra-abdominal process  #Leukocytosis   #Urine Cx 8/4 Proteus mirabilis - now symptomatic? - complaining of dysuria during interview  #CAD   #COPD  #Obesity BMI (kg/m2): 24.2  #Abx allergy: NDKA    RECOMMENDATIONS  - no treatment needed for cyclospora as diarrhea seems to have resolved   - start ceftriaxone 1g daily for symptomatic UTI   - trend WBC -- if stable, can treat with PO vantin 200 mg BID for 5 days for cystitis     Please call or message on [employer] Teams if with any questions.  Spectra [phone]

## 2022-08-10 ENCOUNTER — TRANSCRIPTION ENCOUNTER (OUTPATIENT)
Age: 87
End: 2022-08-10

## 2022-08-10 LAB
ANION GAP SERPL CALC-SCNC: 12 MMOL/L — SIGNIFICANT CHANGE UP (ref 7–14)
APPEARANCE UR: CLEAR — SIGNIFICANT CHANGE UP
BACTERIA # UR AUTO: SIGNIFICANT CHANGE UP /HPF
BASOPHILS # BLD AUTO: 0.1 K/UL — SIGNIFICANT CHANGE UP (ref 0–0.2)
BASOPHILS NFR BLD AUTO: 0.9 % — SIGNIFICANT CHANGE UP (ref 0–1)
BILIRUB UR-MCNC: NEGATIVE — SIGNIFICANT CHANGE UP
BUN SERPL-MCNC: 12 MG/DL — SIGNIFICANT CHANGE UP (ref 10–20)
CALCIUM SERPL-MCNC: 8.9 MG/DL — SIGNIFICANT CHANGE UP (ref 8.5–10.1)
CHLORIDE SERPL-SCNC: 100 MMOL/L — SIGNIFICANT CHANGE UP (ref 98–110)
CO2 SERPL-SCNC: 25 MMOL/L — SIGNIFICANT CHANGE UP (ref 17–32)
COLOR SPEC: YELLOW — SIGNIFICANT CHANGE UP
CREAT SERPL-MCNC: 0.7 MG/DL — SIGNIFICANT CHANGE UP (ref 0.7–1.5)
DIFF PNL FLD: NEGATIVE — SIGNIFICANT CHANGE UP
EGFR: 79 ML/MIN/1.73M2 — SIGNIFICANT CHANGE UP
EOSINOPHIL # BLD AUTO: 0.09 K/UL — SIGNIFICANT CHANGE UP (ref 0–0.7)
EOSINOPHIL NFR BLD AUTO: 0.9 % — SIGNIFICANT CHANGE UP (ref 0–8)
EPI CELLS # UR: ABNORMAL /HPF
GLUCOSE SERPL-MCNC: 102 MG/DL — HIGH (ref 70–99)
GLUCOSE UR QL: NEGATIVE MG/DL — SIGNIFICANT CHANGE UP
HCT VFR BLD CALC: 37.3 % — SIGNIFICANT CHANGE UP (ref 37–47)
HGB BLD-MCNC: 12.1 G/DL — SIGNIFICANT CHANGE UP (ref 12–16)
IMM GRANULOCYTES NFR BLD AUTO: 1.8 % — HIGH (ref 0.1–0.3)
KETONES UR-MCNC: NEGATIVE — SIGNIFICANT CHANGE UP
LACTATE SERPL-SCNC: 1.4 MMOL/L — SIGNIFICANT CHANGE UP (ref 0.7–2)
LEUKOCYTE ESTERASE UR-ACNC: ABNORMAL
LYMPHOCYTES # BLD AUTO: 0.95 K/UL — LOW (ref 1.2–3.4)
LYMPHOCYTES # BLD AUTO: 9 % — LOW (ref 20.5–51.1)
MCHC RBC-ENTMCNC: 31.3 PG — HIGH (ref 27–31)
MCHC RBC-ENTMCNC: 32.4 G/DL — SIGNIFICANT CHANGE UP (ref 32–37)
MCV RBC AUTO: 96.4 FL — SIGNIFICANT CHANGE UP (ref 81–99)
MONOCYTES # BLD AUTO: 0.71 K/UL — HIGH (ref 0.1–0.6)
MONOCYTES NFR BLD AUTO: 6.7 % — SIGNIFICANT CHANGE UP (ref 1.7–9.3)
NEUTROPHILS # BLD AUTO: 8.49 K/UL — HIGH (ref 1.4–6.5)
NEUTROPHILS NFR BLD AUTO: 80.7 % — HIGH (ref 42.2–75.2)
NITRITE UR-MCNC: NEGATIVE — SIGNIFICANT CHANGE UP
NRBC # BLD: 0 /100 WBCS — SIGNIFICANT CHANGE UP (ref 0–0)
PH UR: 6.5 — SIGNIFICANT CHANGE UP (ref 5–8)
PLATELET # BLD AUTO: 309 K/UL — SIGNIFICANT CHANGE UP (ref 130–400)
POTASSIUM SERPL-MCNC: 3.9 MMOL/L — SIGNIFICANT CHANGE UP (ref 3.5–5)
POTASSIUM SERPL-SCNC: 3.9 MMOL/L — SIGNIFICANT CHANGE UP (ref 3.5–5)
PROT UR-MCNC: 30 MG/DL
RBC # BLD: 3.87 M/UL — LOW (ref 4.2–5.4)
RBC # FLD: 13.1 % — SIGNIFICANT CHANGE UP (ref 11.5–14.5)
RBC CASTS # UR COMP ASSIST: NEGATIVE — SIGNIFICANT CHANGE UP
SODIUM SERPL-SCNC: 137 MMOL/L — SIGNIFICANT CHANGE UP (ref 135–146)
SP GR SPEC: 1.02 — SIGNIFICANT CHANGE UP (ref 1.01–1.03)
UROBILINOGEN FLD QL: 1 MG/DL
WBC # BLD: 10.53 K/UL — SIGNIFICANT CHANGE UP (ref 4.8–10.8)
WBC # FLD AUTO: 10.53 K/UL — SIGNIFICANT CHANGE UP (ref 4.8–10.8)
WBC UR QL: ABNORMAL /HPF

## 2022-08-10 PROCEDURE — 99233 SBSQ HOSP IP/OBS HIGH 50: CPT

## 2022-08-10 PROCEDURE — 71045 X-RAY EXAM CHEST 1 VIEW: CPT | Mod: 26

## 2022-08-10 PROCEDURE — 70450 CT HEAD/BRAIN W/O DYE: CPT | Mod: 26

## 2022-08-10 RX ORDER — HYDRALAZINE HCL 50 MG
20 TABLET ORAL ONCE
Refills: 0 | Status: DISCONTINUED | OUTPATIENT
Start: 2022-08-10 | End: 2022-08-10

## 2022-08-10 RX ORDER — HYDRALAZINE HCL 50 MG
10 TABLET ORAL ONCE
Refills: 0 | Status: DISCONTINUED | OUTPATIENT
Start: 2022-08-10 | End: 2022-08-10

## 2022-08-10 RX ORDER — ACETAMINOPHEN 500 MG
650 TABLET ORAL ONCE
Refills: 0 | Status: COMPLETED | OUTPATIENT
Start: 2022-08-10 | End: 2022-08-10

## 2022-08-10 RX ORDER — METOPROLOL TARTRATE 50 MG
0 TABLET ORAL
Qty: 0 | Refills: 0 | DISCHARGE

## 2022-08-10 RX ORDER — CEFEPIME 1 G/1
1000 INJECTION, POWDER, FOR SOLUTION INTRAMUSCULAR; INTRAVENOUS EVERY 12 HOURS
Refills: 0 | Status: DISCONTINUED | OUTPATIENT
Start: 2022-08-10 | End: 2022-08-12

## 2022-08-10 RX ORDER — CLOPIDOGREL BISULFATE 75 MG/1
0 TABLET, FILM COATED ORAL
Qty: 0 | Refills: 1 | DISCHARGE

## 2022-08-10 RX ORDER — SODIUM CHLORIDE 9 MG/ML
1000 INJECTION INTRAMUSCULAR; INTRAVENOUS; SUBCUTANEOUS
Refills: 0 | Status: DISCONTINUED | OUTPATIENT
Start: 2022-08-10 | End: 2022-08-19

## 2022-08-10 RX ORDER — LABETALOL HCL 100 MG
10 TABLET ORAL ONCE
Refills: 0 | Status: COMPLETED | OUTPATIENT
Start: 2022-08-10 | End: 2022-08-10

## 2022-08-10 RX ORDER — HYDRALAZINE HCL 50 MG
10 TABLET ORAL ONCE
Refills: 0 | Status: COMPLETED | OUTPATIENT
Start: 2022-08-10 | End: 2022-08-10

## 2022-08-10 RX ORDER — LABETALOL HCL 100 MG
5 TABLET ORAL ONCE
Refills: 0 | Status: COMPLETED | OUTPATIENT
Start: 2022-08-10 | End: 2022-08-10

## 2022-08-10 RX ORDER — SACCHAROMYCES BOULARDII 250 MG
1 POWDER IN PACKET (EA) ORAL
Qty: 0 | Refills: 0 | DISCHARGE
Start: 2022-08-10

## 2022-08-10 RX ORDER — VANCOMYCIN HCL 1 G
1000 VIAL (EA) INTRAVENOUS EVERY 12 HOURS
Refills: 0 | Status: DISCONTINUED | OUTPATIENT
Start: 2022-08-10 | End: 2022-08-11

## 2022-08-10 RX ADMIN — Medication 650 MILLIGRAM(S): at 21:47

## 2022-08-10 RX ADMIN — Medication 250 MILLIGRAM(S): at 05:01

## 2022-08-10 RX ADMIN — Medication 10 MILLIGRAM(S): at 03:29

## 2022-08-10 RX ADMIN — SODIUM CHLORIDE 100 MILLILITER(S): 9 INJECTION INTRAMUSCULAR; INTRAVENOUS; SUBCUTANEOUS at 19:42

## 2022-08-10 RX ADMIN — Medication 650 MILLIGRAM(S): at 11:03

## 2022-08-10 RX ADMIN — Medication 25 MILLIGRAM(S): at 08:22

## 2022-08-10 RX ADMIN — CLOPIDOGREL BISULFATE 75 MILLIGRAM(S): 75 TABLET, FILM COATED ORAL at 11:01

## 2022-08-10 RX ADMIN — Medication 650 MILLIGRAM(S): at 04:53

## 2022-08-10 RX ADMIN — LATANOPROST 1 DROP(S): 0.05 SOLUTION/ DROPS OPHTHALMIC; TOPICAL at 22:31

## 2022-08-10 RX ADMIN — Medication 250 MILLIGRAM(S): at 18:26

## 2022-08-10 RX ADMIN — Medication 10 MILLIGRAM(S): at 04:42

## 2022-08-10 RX ADMIN — PANTOPRAZOLE SODIUM 40 MILLIGRAM(S): 20 TABLET, DELAYED RELEASE ORAL at 05:00

## 2022-08-10 RX ADMIN — Medication 5 MILLIGRAM(S): at 02:27

## 2022-08-10 RX ADMIN — Medication 81 MILLIGRAM(S): at 11:01

## 2022-08-10 RX ADMIN — Medication 25 MILLIGRAM(S): at 13:28

## 2022-08-10 RX ADMIN — CEFEPIME 100 MILLIGRAM(S): 1 INJECTION, POWDER, FOR SOLUTION INTRAMUSCULAR; INTRAVENOUS at 17:21

## 2022-08-10 RX ADMIN — ISOSORBIDE DINITRATE 10 MILLIGRAM(S): 5 TABLET ORAL at 08:22

## 2022-08-10 RX ADMIN — ENOXAPARIN SODIUM 30 MILLIGRAM(S): 100 INJECTION SUBCUTANEOUS at 05:01

## 2022-08-10 RX ADMIN — ONDANSETRON 4 MILLIGRAM(S): 8 TABLET, FILM COATED ORAL at 04:02

## 2022-08-10 RX ADMIN — LISINOPRIL 20 MILLIGRAM(S): 2.5 TABLET ORAL at 08:22

## 2022-08-10 RX ADMIN — Medication 1 TABLET(S): at 11:48

## 2022-08-10 RX ADMIN — Medication 650 MILLIGRAM(S): at 06:12

## 2022-08-10 RX ADMIN — ISOSORBIDE DINITRATE 10 MILLIGRAM(S): 5 TABLET ORAL at 11:01

## 2022-08-10 RX ADMIN — Medication 25 MICROGRAM(S): at 05:00

## 2022-08-10 NOTE — DISCHARGE NOTE PROVIDER - NSDCFUSCHEDAPPT_GEN_ALL_CORE_FT
[facility]  CARDIOLOGY [address] [place] [address]  Scheduled Appointment: 09/07/2022

## 2022-08-10 NOTE — DISCHARGE NOTE PROVIDER - CARE PROVIDER_API CALL
Ronak Curtis)  Cardiovascular Disease; Internal Medicine; Interventional Cardiology  [address] [place] [address]  Phone: [phone]  Fax: [phone]  Follow Up Time:

## 2022-08-10 NOTE — PROGRESS NOTE ADULT - SUBJECTIVE AND OBJECTIVE BOX
89 y/o F w/PMHx of HTN, CAD s/p stent, COPD, hypothyroidism presented to the ED with intermittent abdominal pain with associated intermittent non bloody diarrhea and 30 lb weight loss over past 5 weeks.    Today:  Seen at bedside, was nauseated overnight.            REVIEW OF SYSTEMS:  No new complaints    MEDICATIONS  (STANDING):  aspirin  chewable 81 milliGRAM(s) Oral daily  budesonide  80 MICROgram(s)/formoterol 4.5 MICROgram(s) Inhaler 2 Puff(s) Inhalation two times a day  clopidogrel Tablet 75 milliGRAM(s) Oral daily  enoxaparin Injectable 30 milliGRAM(s) SubCutaneous every 24 hours  hydrALAZINE 25 milliGRAM(s) Oral three times a day  isosorbide   dinitrate Tablet (ISORDIL) 10 milliGRAM(s) Oral three times a day  latanoprost 0.005% Ophthalmic Solution 1 Drop(s) Both EYES at bedtime  levothyroxine 25 MICROGram(s) Oral daily  lisinopril 20 milliGRAM(s) Oral daily  metoprolol succinate ER 25 milliGRAM(s) Oral daily  pantoprazole    Tablet 40 milliGRAM(s) Oral before breakfast  saccharomyces boulardii 250 milliGRAM(s) Oral two times a day  trimethoprim  160 mG/sulfamethoxazole 800 mG 1 Tablet(s) Oral two times a day    MEDICATIONS  (PRN):  acetaminophen     Tablet .. 650 milliGRAM(s) Oral every 6 hours PRN Temp greater or equal to 38C (100.4F), Mild Pain (1 - 3)  ALBUTerol    90 MICROgram(s) HFA Inhaler 2 Puff(s) Inhalation every 6 hours PRN Shortness of Breath and/or Wheezing  aluminum hydroxide/magnesium hydroxide/simethicone Suspension 30 milliLiter(s) Oral every 4 hours PRN Dyspepsia  melatonin 3 milliGRAM(s) Oral at bedtime PRN Insomnia  ondansetron Injectable 4 milliGRAM(s) IV Push every 8 hours PRN Nausea and/or Vomiting      Allergies  No Known Allergies        Vital Signs Last 24 Hrs  T(C): 37.4 (10 Aug 2022 10:51), Max: 38 (10 Aug 2022 05:10)  T(F): 99.3 (10 Aug 2022 10:51), Max: 100.4 (10 Aug 2022 05:10)  HR: 80 (10 Aug 2022 10:51) (68 - 87)  BP: 153/67 (10 Aug 2022 10:51) (113/59 - 219/87)  RR: 18 (10 Aug 2022 10:51) (18 - 18)  SpO2: 92% (10 Aug 2022 10:51) (92% - 92%)        PHYSICAL EXAM:  GENERAL: No acute distress, well-developed  HEAD:  Atraumatic, Normocephalic  EYES: EOMI, PERRLA, conjunctiva and sclera clear  NECK: Supple, no lymphadenopathy, no JVD  CHEST/LUNG: CTAB; No wheezes, rales, or rhonchi  HEART: Regular rate and rhythm; No murmurs, rubs, or gallops  ABDOMEN: Soft, non-tender, non-distended; normal bowel sounds, no organomegaly  EXTREMITIES:  2+ peripheral pulses b/l, No clubbing, cyanosis, or edema  NEUROLOGY: A&O x 3, no focal deficits  SKIN: No rashes or lesions      LABS:                        12.1   10.53 )-----------( 309      ( 10 Aug 2022 05:45 )             37.3     08-10    137  |  100  |  12  ----------------------------<  102<H>  3.9   |  25  |  0.7    Ca    8.9      10 Aug 2022 05:45  Mg     1.6     08-09             89 y/o F w/PMHx of HTN, CAD s/p stent, COPD, hypothyroidism presented to the ED with intermittent abdominal pain with associated intermittent non bloody diarrhea and 30 lb weight loss over past 5 weeks.    Today:  Seen at bedside, was nauseated overnight.  Fever spikes today 100.4 and 100.6 deg F.            REVIEW OF SYSTEMS:  No new complaints    MEDICATIONS  (STANDING):  aspirin  chewable 81 milliGRAM(s) Oral daily  budesonide  80 MICROgram(s)/formoterol 4.5 MICROgram(s) Inhaler 2 Puff(s) Inhalation two times a day  clopidogrel Tablet 75 milliGRAM(s) Oral daily  enoxaparin Injectable 30 milliGRAM(s) SubCutaneous every 24 hours  hydrALAZINE 25 milliGRAM(s) Oral three times a day  isosorbide   dinitrate Tablet (ISORDIL) 10 milliGRAM(s) Oral three times a day  latanoprost 0.005% Ophthalmic Solution 1 Drop(s) Both EYES at bedtime  levothyroxine 25 MICROGram(s) Oral daily  lisinopril 20 milliGRAM(s) Oral daily  metoprolol succinate ER 25 milliGRAM(s) Oral daily  pantoprazole    Tablet 40 milliGRAM(s) Oral before breakfast  saccharomyces boulardii 250 milliGRAM(s) Oral two times a day  trimethoprim  160 mG/sulfamethoxazole 800 mG 1 Tablet(s) Oral two times a day    MEDICATIONS  (PRN):  acetaminophen     Tablet .. 650 milliGRAM(s) Oral every 6 hours PRN Temp greater or equal to 38C (100.4F), Mild Pain (1 - 3)  ALBUTerol    90 MICROgram(s) HFA Inhaler 2 Puff(s) Inhalation every 6 hours PRN Shortness of Breath and/or Wheezing  aluminum hydroxide/magnesium hydroxide/simethicone Suspension 30 milliLiter(s) Oral every 4 hours PRN Dyspepsia  melatonin 3 milliGRAM(s) Oral at bedtime PRN Insomnia  ondansetron Injectable 4 milliGRAM(s) IV Push every 8 hours PRN Nausea and/or Vomiting      Allergies  No Known Allergies        Vital Signs Last 24 Hrs  T(C): 37.4 (10 Aug 2022 10:51), Max: 38 (10 Aug 2022 05:10)  T(F): 99.3 (10 Aug 2022 10:51), Max: 100.4 (10 Aug 2022 05:10)  HR: 80 (10 Aug 2022 10:51) (68 - 87)  BP: 153/67 (10 Aug 2022 10:51) (113/59 - 219/87)  RR: 18 (10 Aug 2022 10:51) (18 - 18)  SpO2: 92% (10 Aug 2022 10:51) (92% - 92%)        PHYSICAL EXAM:  GENERAL: lethargic, confused  HEAD:  Atraumatic, Normocephalic  EYES: EOMI, PERRLA, conjunctiva and sclera clear  NECK: Supple, no lymphadenopathy, no JVD  CHEST/LUNG: CTAB; No wheezes, rales, or rhonchi  HEART: Regular rate and rhythm; No murmurs, rubs, or gallops  ABDOMEN: Soft, non-tender, non-distended; normal bowel sounds, no organomegaly  EXTREMITIES:  2+ peripheral pulses b/l, No clubbing, cyanosis, or edema  NEUROLOGY: A&O x 1  SKIN: No rashes or lesions      LABS:                        12.1   10.53 )-----------( 309      ( 10 Aug 2022 05:45 )             37.3     08-10    137  |  100  |  12  ----------------------------<  102<H>  3.9   |  25  |  0.7    Ca    8.9      10 Aug 2022 05:45  Mg     1.6     08-09

## 2022-08-10 NOTE — CHART NOTE - NSCHARTNOTEFT_GEN_A_CORE
Systolic BP is 190 despite usual BP meds (which include metoprolol) and dose of hydralazine. She has a headache. Will try IVP Labetalol

## 2022-08-10 NOTE — PROGRESS NOTE ADULT - ASSESSMENT
89 y/o F w/PMHx of HTN, CAD s/p stent, COPD, hypothyroidism presented to the ED with intermittent abdominal pain with associated intermittent non bloody diarrhea and 30 lb weight loss over past 5 weeks.    Gastroenteritis- cyclospora    -No signs of SIRS and CT A/P: No evidence of acute intra-abdominal pathology  -Supportive Care: Bowel Rest and Hydration, Zofran PRN  -Now Tolerating regular diet   -C. diff toxin Neg, GI PCR with Cyclospora SPP   -Will DC on 7 day course of Bactrim    Hypomagnesemia and Hypokalemia  Likely from Diarrhaea  Monitor and replace    Acute Cystitis  Ucx: gram Negative Rods   Proteus Mirabilis  -Bactrim will cover both UTI and Cyclospora    H/O CAD + Essential HTN   - c/w Plavix, ASA 81, Lisinopril BB and Hydralazine     H/O Hypothyroidism:  c/w synthroid  H/O  COPD: stable : c/w inhalers    DC planning to [place] 89 y/o F w/PMHx of HTN, CAD s/p stent, COPD, hypothyroidism presented to the ED with intermittent abdominal pain with associated intermittent non bloody diarrhea and 30 lb weight loss over past 5 weeks.    New fevers 8/10 with AMS:  -Pt seen at bedside lethargic, confused  -urgent CT head  -CXR  -Blood Cx   -RVP, MRSA swab  -UA/UCx    Gastroenteritis- cyclospora    -No signs of SIRS on admission and CT A/P: No evidence of acute intra-abdominal pathology  -Supportive Care: Bowel Rest and Hydration, Zofran PRN  -Now Tolerating regular diet   -C. diff toxin Neg, GI PCR with Cyclospora SPP   -Started Bactrim    Hypomagnesemia and Hypokalemia  Likely from Diarrhaea  Monitor and replace    Acute Cystitis  Ucx: gram Negative Rods   Proteus Mirabilis  -Bactrim will cover both UTI and Cyclospora    H/O CAD + Essential HTN   - c/w Plavix, ASA 81, Lisinopril BB and Hydralazine     H/O Hypothyroidism:  c/w synthroid  H/O  COPD: stable : c/w inhalers    Acute- follow up new Septic work up.  Patient was to be discharged and spiked new fevers today. 89 y/o F w/PMHx of HTN, CAD s/p stent, COPD, hypothyroidism presented to the ED with intermittent abdominal pain with associated intermittent non bloody diarrhea and 30 lb weight loss over past 5 weeks.    New fevers 8/10 with AMS:  -Pt seen at bedside lethargic, confused  -urgent CT head  -CXR  -Blood Cx   -RVP, MRSA swab  -UA/UCx  -Started Vanco and Cefepime empirically, ID following    Gastroenteritis- cyclospora    -No signs of SIRS on admission and CT A/P: No evidence of acute intra-abdominal pathology  -Supportive Care: Bowel Rest and Hydration, Zofran PRN  -Now Tolerating regular diet.   -C. diff toxin Neg, GI PCR with Cyclospora SPP     Hypomagnesemia and Hypokalemia  Likely from Diarrhaea  Monitor and replace    Acute Cystitis  Ucx: gram Negative Rods   Proteus Mirabilis  -Now on Vanco and Cefepime for new fever spikes while inpatient, follow new work up    H/O CAD + Essential HTN   - c/w Plavix, ASA 81, Lisinopril BB and Hydralazine     H/O Hypothyroidism:  c/w synthroid  H/O  COPD: stable : c/w inhalers    Acute- follow up new Septic work up.  Patient was to be discharged and spiked new fevers today.

## 2022-08-10 NOTE — CHART NOTE - NSCHARTNOTEFT_GEN_A_CORE
BP now 113/59, in view of medications given overnight (IV labetalol and IV hydralazine), will postpone giving morning antihypertensive meds til 0900. BP now 113/59, in view of medications given overnight (IV labetalol and IV hydralazine), will postpone giving morning antihypertensive meds til 0900. Patient did report to RN that she feels much better and wants to sleep.

## 2022-08-10 NOTE — DISCHARGE NOTE PROVIDER - HOSPITAL COURSE
89 y/o F w/PMHx of HTN, CAD s/p stent, COPD, hypothyroidism presented to the ED with intermittent abdominal pain with associated intermittent non bloody diarrhea and 30 lb weight loss over past 5 weeks.    Gastroenteritis- cyclospora    -No signs of SIRS and CT A/P: No evidence of acute intra-abdominal pathology  -Supportive Care: Bowel Rest and Hydration  -Now Tolerating regular diet   -C. diff toxin Neg, GI PCR with Cyclospora SPP   -Will DC on 7 day course of Bactrim    Hypomagnesemia and Hypokalemia  Likely from Diarrhaea    Acute Cystitis  Ucx: gram Negative Rods   Proteus Mirabilis  -Bactrim will cover both UTI and Cyclospora    H/O CAD + Essential HTN   - c/w Plavix, ASA 81, Lisinopril BB and Hydralazine     H/O Hypothyroidism:  c/w synthroid  H/O  COPD: stable : c/w inhalers    Dispo: [place] 89 y/o F w/PMHx of HTN, CAD s/p stent, COPD, hypothyroidism presented to the ED with intermittent abdominal pain with associated intermittent non bloody diarrhea and 30 lb weight loss over past 5 weeks.    Gastroenteritis- Cyclospora SPP   Will complete bactrim course on 8/20    Acute Cystitis  Ucx: gram Negative Rods : Proteus Mirabilis  -Bactrim will cover both UTI and Cyclospora    r

## 2022-08-10 NOTE — DISCHARGE NOTE PROVIDER - NSDCMRMEDTOKEN_GEN_ALL_CORE_FT
albuterol 90 mcg/inh inhalation aerosol: 2 puff(s) inhaled every 6 hours   aspirin 81 mg oral tablet, chewable: 1 tab(s) orally once a day  budesonide-formoterol 80 mcg-4.5 mcg/inh inhalation aerosol: 2 puff(s) inhaled 2 times a day   CLOPIDOGREL 75 MG TABLET: 1 each orally once a day  hydrALAZINE 25 mg oral tablet: 1 tab(s) orally 3 times a day  isosorbide dinitrate 10 mg oral tablet: 1 tab(s) orally once a day  latanoprost 0.005% ophthalmic solution: 1 drop(s) to each affected eye once a day (in the evening)  levothyroxine 25 mcg (0.025 mg) oral tablet: 1 tab(s) orally once a day  lisinopril 20 mg oral tablet: 1 tab(s) orally once a day  metoprolol succinate 25 mg oral tablet, extended release: 1  orally once a day  pantoprazole 20 mg oral delayed release tablet: 1 tab(s) orally once a day  saccharomyces boulardii lyo 250 mg oral capsule: 1 cap(s) orally 2 times a day  sulfamethoxazole-trimethoprim 800 mg-160 mg oral tablet: 1 tab(s) orally 2 times a day   albuterol 90 mcg/inh inhalation aerosol: 2 puff(s) inhaled every 6 hours   aspirin 81 mg oral tablet, chewable: 1 tab(s) orally once a day  budesonide-formoterol 80 mcg-4.5 mcg/inh inhalation aerosol: 2 puff(s) inhaled 2 times a day   CLOPIDOGREL 75 MG TABLET: 1 each orally once a day  hydrALAZINE 25 mg oral tablet: 1 tab(s) orally 3 times a day  isosorbide dinitrate 10 mg oral tablet: 1 tab(s) orally once a day  latanoprost 0.005% ophthalmic solution: 1 drop(s) to each affected eye once a day (in the evening)  levothyroxine 25 mcg (0.025 mg) oral tablet: 1 tab(s) orally once a day  lisinopril 20 mg oral tablet: 1 tab(s) orally once a day  metoprolol succinate 25 mg oral tablet, extended release: 1  orally once a day  metroNIDAZOLE 500 mg oral tablet: 1 tab(s) orally every 8 hours  Ends 8/20/22  pantoprazole 20 mg oral delayed release tablet: 1 tab(s) orally once a day  polyethylene glycol 3350 oral powder for reconstitution: 17 gram(s) orally once a day  saccharomyces boulardii lyo 250 mg oral capsule: 1 cap(s) orally 2 times a day  senna leaf extract oral tablet: 2 tab(s) orally once a day (at bedtime)  sulfamethoxazole-trimethoprim 800 mg-160 mg oral tablet: 1 tab(s) orally 2 times a day  Ends 8/20/22

## 2022-08-10 NOTE — DISCHARGE NOTE PROVIDER - ATTENDING DISCHARGE PHYSICAL EXAMINATION:
GENERAL: No acute distress, well-developed  HEAD:  Atraumatic, Normocephalic  CHEST/LUNG: CTAB; No wheezes, rales, or rhonchi  HEART: Regular rate and rhythm; No murmurs, rubs, or gallops  ABDOMEN: Soft, non-tender, non-distended; normal bowel sounds, no organomegaly  EXTREMITIES:  2+ peripheral pulses b/l, No clubbing, cyanosis, or edema  NEUROLOGY: A&O x 2, no focal deficits

## 2022-08-10 NOTE — CHART NOTE - NSCHARTNOTEFT_GEN_A_CORE
Despite 15 mg IVP Labetalol, systolic BP is 203 though diastolic is now 83. Nausea has improved (she did get Zofran). Will now try IVP hydralazine Despite 15 mg IVP Labetalol, systolic BP is 203 though diastolic is now 83. Nausea has improved (she did get Zofran) and she actually looks more comfortable then earlier. Radial pulses feel normal (they haven't been pounding as I would have expected with these BP's. Will now try IVP hydralazine 10 mg (on oral dose of this)

## 2022-08-10 NOTE — DISCHARGE NOTE PROVIDER - NSDCCPCAREPLAN_GEN_ALL_CORE_FT
PRINCIPAL DISCHARGE DIAGNOSIS  Diagnosis: Diarrhea  Assessment and Plan of Treatment: You have an infection caused by Cyclospora which is a GI parasite.  Bactrim (antibiotic) should treat this.  Please follow with your primary doctor after rehab.       PRINCIPAL DISCHARGE DIAGNOSIS  Diagnosis: Diarrhea  Assessment and Plan of Treatment: You have an infection caused by Cyclospora which is a GI parasite.  Bactrim (antibiotic) should treat this.  Please follow with your primary doctor after rehab.      SECONDARY DISCHARGE DIAGNOSES  Diagnosis: Acute cystitis  Assessment and Plan of Treatment: Complete your antibiotics as prescribed

## 2022-08-11 LAB
ALBUMIN SERPL ELPH-MCNC: 3.5 G/DL — SIGNIFICANT CHANGE UP (ref 3.5–5.2)
ALP SERPL-CCNC: 102 U/L — SIGNIFICANT CHANGE UP (ref 30–115)
ALT FLD-CCNC: 21 U/L — SIGNIFICANT CHANGE UP (ref 0–41)
ANION GAP SERPL CALC-SCNC: 12 MMOL/L — SIGNIFICANT CHANGE UP (ref 7–14)
AST SERPL-CCNC: 27 U/L — SIGNIFICANT CHANGE UP (ref 0–41)
BASOPHILS # BLD AUTO: 0.13 K/UL — SIGNIFICANT CHANGE UP (ref 0–0.2)
BASOPHILS NFR BLD AUTO: 1.2 % — HIGH (ref 0–1)
BILIRUB SERPL-MCNC: 0.5 MG/DL — SIGNIFICANT CHANGE UP (ref 0.2–1.2)
BUN SERPL-MCNC: 12 MG/DL — SIGNIFICANT CHANGE UP (ref 10–20)
CALCIUM SERPL-MCNC: 9.5 MG/DL — SIGNIFICANT CHANGE UP (ref 8.5–10.1)
CHLORIDE SERPL-SCNC: 101 MMOL/L — SIGNIFICANT CHANGE UP (ref 98–110)
CO2 SERPL-SCNC: 25 MMOL/L — SIGNIFICANT CHANGE UP (ref 17–32)
CREAT SERPL-MCNC: 0.7 MG/DL — SIGNIFICANT CHANGE UP (ref 0.7–1.5)
EGFR: 79 ML/MIN/1.73M2 — SIGNIFICANT CHANGE UP
EOSINOPHIL # BLD AUTO: 0.09 K/UL — SIGNIFICANT CHANGE UP (ref 0–0.7)
EOSINOPHIL NFR BLD AUTO: 0.8 % — SIGNIFICANT CHANGE UP (ref 0–8)
GLUCOSE SERPL-MCNC: 119 MG/DL — HIGH (ref 70–99)
HCT VFR BLD CALC: 39.9 % — SIGNIFICANT CHANGE UP (ref 37–47)
HGB BLD-MCNC: 12.9 G/DL — SIGNIFICANT CHANGE UP (ref 12–16)
IMM GRANULOCYTES NFR BLD AUTO: 2.9 % — HIGH (ref 0.1–0.3)
LYMPHOCYTES # BLD AUTO: 2.88 K/UL — SIGNIFICANT CHANGE UP (ref 1.2–3.4)
LYMPHOCYTES # BLD AUTO: 26.2 % — SIGNIFICANT CHANGE UP (ref 20.5–51.1)
MAGNESIUM SERPL-MCNC: 1.6 MG/DL — LOW (ref 1.8–2.4)
MCHC RBC-ENTMCNC: 31.1 PG — HIGH (ref 27–31)
MCHC RBC-ENTMCNC: 32.3 G/DL — SIGNIFICANT CHANGE UP (ref 32–37)
MCV RBC AUTO: 96.1 FL — SIGNIFICANT CHANGE UP (ref 81–99)
MONOCYTES # BLD AUTO: 1.23 K/UL — HIGH (ref 0.1–0.6)
MONOCYTES NFR BLD AUTO: 11.2 % — HIGH (ref 1.7–9.3)
MRSA PCR RESULT.: NEGATIVE — SIGNIFICANT CHANGE UP
NEUTROPHILS # BLD AUTO: 6.33 K/UL — SIGNIFICANT CHANGE UP (ref 1.4–6.5)
NEUTROPHILS NFR BLD AUTO: 57.7 % — SIGNIFICANT CHANGE UP (ref 42.2–75.2)
NRBC # BLD: 0 /100 WBCS — SIGNIFICANT CHANGE UP (ref 0–0)
PHOSPHATE SERPL-MCNC: 3 MG/DL — SIGNIFICANT CHANGE UP (ref 2.1–4.9)
PLATELET # BLD AUTO: 370 K/UL — SIGNIFICANT CHANGE UP (ref 130–400)
POTASSIUM SERPL-MCNC: 4 MMOL/L — SIGNIFICANT CHANGE UP (ref 3.5–5)
POTASSIUM SERPL-SCNC: 4 MMOL/L — SIGNIFICANT CHANGE UP (ref 3.5–5)
PROCALCITONIN SERPL-MCNC: 0.05 NG/ML — SIGNIFICANT CHANGE UP (ref 0.02–0.1)
PROT SERPL-MCNC: 6.4 G/DL — SIGNIFICANT CHANGE UP (ref 6–8)
RAPID RVP RESULT: SIGNIFICANT CHANGE UP
RBC # BLD: 4.15 M/UL — LOW (ref 4.2–5.4)
RBC # FLD: 13 % — SIGNIFICANT CHANGE UP (ref 11.5–14.5)
SARS-COV-2 RNA SPEC QL NAA+PROBE: SIGNIFICANT CHANGE UP
SODIUM SERPL-SCNC: 138 MMOL/L — SIGNIFICANT CHANGE UP (ref 135–146)
WBC # BLD: 10.98 K/UL — HIGH (ref 4.8–10.8)
WBC # FLD AUTO: 10.98 K/UL — HIGH (ref 4.8–10.8)

## 2022-08-11 PROCEDURE — 99233 SBSQ HOSP IP/OBS HIGH 50: CPT

## 2022-08-11 PROCEDURE — 71045 X-RAY EXAM CHEST 1 VIEW: CPT | Mod: 26

## 2022-08-11 PROCEDURE — 74177 CT ABD & PELVIS W/CONTRAST: CPT | Mod: 26

## 2022-08-11 RX ORDER — HYDRALAZINE HCL 50 MG
10 TABLET ORAL EVERY 6 HOURS
Refills: 0 | Status: DISCONTINUED | OUTPATIENT
Start: 2022-08-11 | End: 2022-08-19

## 2022-08-11 RX ORDER — ACETAMINOPHEN 500 MG
650 TABLET ORAL EVERY 6 HOURS
Refills: 0 | Status: DISCONTINUED | OUTPATIENT
Start: 2022-08-11 | End: 2022-08-17

## 2022-08-11 RX ORDER — HYDRALAZINE HCL 50 MG
10 TABLET ORAL ONCE
Refills: 0 | Status: DISCONTINUED | OUTPATIENT
Start: 2022-08-11 | End: 2022-08-11

## 2022-08-11 RX ORDER — ACETAMINOPHEN 500 MG
975 TABLET ORAL ONCE
Refills: 0 | Status: COMPLETED | OUTPATIENT
Start: 2022-08-11 | End: 2022-08-11

## 2022-08-11 RX ADMIN — SODIUM CHLORIDE 100 MILLILITER(S): 9 INJECTION INTRAMUSCULAR; INTRAVENOUS; SUBCUTANEOUS at 05:55

## 2022-08-11 RX ADMIN — CEFEPIME 100 MILLIGRAM(S): 1 INJECTION, POWDER, FOR SOLUTION INTRAMUSCULAR; INTRAVENOUS at 18:38

## 2022-08-11 RX ADMIN — Medication 650 MILLIGRAM(S): at 14:43

## 2022-08-11 RX ADMIN — Medication 250 MILLIGRAM(S): at 06:11

## 2022-08-11 RX ADMIN — ENOXAPARIN SODIUM 30 MILLIGRAM(S): 100 INJECTION SUBCUTANEOUS at 06:01

## 2022-08-11 RX ADMIN — BUDESONIDE AND FORMOTEROL FUMARATE DIHYDRATE 2 PUFF(S): 160; 4.5 AEROSOL RESPIRATORY (INHALATION) at 20:42

## 2022-08-11 RX ADMIN — LATANOPROST 1 DROP(S): 0.05 SOLUTION/ DROPS OPHTHALMIC; TOPICAL at 21:58

## 2022-08-11 RX ADMIN — Medication 10 MILLIGRAM(S): at 21:51

## 2022-08-11 RX ADMIN — Medication 975 MILLIGRAM(S): at 03:01

## 2022-08-11 RX ADMIN — Medication 10 MILLIGRAM(S): at 10:50

## 2022-08-11 RX ADMIN — CEFEPIME 100 MILLIGRAM(S): 1 INJECTION, POWDER, FOR SOLUTION INTRAMUSCULAR; INTRAVENOUS at 05:56

## 2022-08-11 NOTE — PROGRESS NOTE ADULT - ASSESSMENT
89 y/o F w/PMHx of HTN, CAD s/p stent, COPD, hypothyroidism presented to the ED with intermittent abdominal pain with associated intermittent non bloody diarrhea and 30 lb weight loss over past 5 weeks.    Pt admitted for gastroenteritis that resolved then developed SIRS with fevers and leukocytosis started on 8/10 and AMS:  - remained lethargic, abd tender and distended  - get CT abd  - Ucx +ve for proteus senstive to cephlosporin, per chart had dysuria,  - cont with cfepime, f/u rvp, bcx, repeat ucx and CT abd, CTh neg  -urgent CT head  - Get ABG  -DC Vanco nasal mrsa neg, c/w Cefepime empirically, ID f/u appreciated   - daughter at bedside, discussed the plans with her, GOCs discussed she needs time to decide asked for palliative eval aware of the plans    Gastroenteritis- cyclospora    -No signs of SIRS on admission and CT A/P: No evidence of acute intra-abdominal pathology  -Supportive Care: Bowel Rest and Hydration, Zofran [place]  -C. diff toxin Neg, GI PCR with Cyclospora SPP     Hypomagnesemia and Hypokalemia  Likely from Diarrhaea  Monitor and replace    H/O CAD + Essential HTN   - c/w Plavix, ASA 81, Lisinopril BB and Hydralazine     H/O Hypothyroidism:  c/w synthroid  H/O  COPD: stable : c/w inhalers    GOCs: pt need time to decided, requested palliative eval  #Dispo: home,  prognosis poor 89 y/o F w/PMHx of HTN, CAD s/p stent, COPD, hypothyroidism presented to the ED with intermittent abdominal pain with associated intermittent non bloody diarrhea and 30 lb weight loss over past 5 weeks.    Pt admitted for gastroenteritis that resolved then developed SIRS with fevers and leukocytosis started on 8/10 and AMS  metabolic encephelopathy   - remained lethargic, abd tender and distended  - get CT abd  - Ucx +ve for proteus senstive to cephlosporin, per chart had dysuria,  - cont with cfepime, f/u rvp, bcx, repeat ucx and CT abd, CTh neg  -urgent CT head  - Get ABG  -DC Vanco nasal mrsa neg, c/w Cefepime empirically, ID f/u appreciated   - daughter at bedside, discussed the plans with her, GOCs discussed she needs time to decide asked for palliative eval aware of the plans    Gastroenteritis- cyclospora    -No signs of SIRS on admission and CT A/P: No evidence of acute intra-abdominal pathology  -Supportive Care: Bowel Rest and Hydration, Zofran [place]  -C. diff toxin Neg, GI PCR with Cyclospora SPP     Hypomagnesemia and Hypokalemia  Likely from Diarrhaea  Monitor and replace    H/O CAD + Essential HTN   - c/w Plavix, ASA 81, Lisinopril BB and Hydralazine     H/O Hypothyroidism:  c/w synthroid  H/O  COPD: stable : c/w inhalers    GOCs: pt need time to decided, requested palliative eval  #Dispo: home,  prognosis poor

## 2022-08-11 NOTE — CHART NOTE - NSCHARTNOTEFT_GEN_A_CORE
PALLIATIVE MEDICINE INTERDISCIPLINARY TEAM NOTE    Provider:  [ x  ]Social Work   [   ]          [ x  ] Initial visit [   ] Follow up    Family or contact name / phone #   Met with: [ x  ] Patient:  patient was observed to be resting comfortably   [ x  ] Family:  daughter, Magdalena Xie, was at bedside  [   ] Other:    Primary Language: [x   ] English [   ] Other*:                      *Interpretation provided by:    SUPPORT DIAGNOSES            (Check all that apply)  [   ] Psychosocial spiritual assessment (PSSA)  [x   ] EOL issues  [   ] Cultural / spiritual concerns  [ x  ] Pain / suffering  [   ] Dementia / AMS  [   ] Other:  [   ] AD issues  [   ] Grief / loss / sadness  [   ] Discharge issues  [ x  ] Distress / coping    PSYCHOSOCIAL ASSESSMENT OF PATIENT         (Check all that apply)  [  x ] Initial Assessment            [   ] Reassessment          [   ] Not Applicable this visit    Pain/suffering acuity:  patient appeared to be comfortable  [   ] None to mild (0-3)           [   ] Moderate (4-6)        [   ] High (7-10)    Mental Status:  [   ] Alert/oriented (x3)          [   ] Confused/Altered(x2/x1)         [x   ] Non-resp    Functional status:  [   ] Independent w ADLs      [   ] Needs Assistance             [ x  ] Bedbound/Full Care    Coping:  unable to assess  [   ] Coping well                     [   ] Coping w/difficulty            [   ] Poor coping    Support system:  [x   ] Strong                              [   ] Adequate                        [   ] Inadequate      Past history and medications for: unknown    [ ] Anxiety       [ ] Depression    [ ] Sleep disorders     SPIRITUAL ASSESSMENT  Episcopalian/Spiritual practice: ___________________________    Role of organized Tenriism:  [   ] Important                     [   ] Some (fam tradition, cultural)               [   ] None    Effects on medical care:  [   ] Yes, _____________________________________                         [   ] None    Cultural/Mosque need:  [   ] Yes, _____________________________________                         [   ] None    Refer to Pastoral Care:  [   ] Yes           [   ] No, not at this time    SERVICE PROVIDED  [   ]PSSA                                                                             [   ]Discharge support / facilitation  [x   ]AD / goals of care counseling                                  [   ]EOL / death / bereavement counseling  [ x  ]Counseling / support                                                [   ] Family meeting  [   ]Prayer / sacrament / ritual                                      [   ] Referral   [   ]Other                                                                       NOTE and Plan of Care (PoC):    Patient is a 89 year old female.  Patient was admitted on 8/4/22, dx:  Diarrhea, Dehydration, Hypomagnesemia.      Patient was observed to be resting comfortably.  Daughter, Magdalena Xie, HCP, was at bedside.  Provided information regarding role of Palliative Care, AD and CMO. Support rendered.  MOLST  to reflect DNR/I and Lack of Capacity were completed.  T/C to Dr. Celis:  requested MD to sign MOLST and LOC.

## 2022-08-11 NOTE — PROGRESS NOTE ADULT - SUBJECTIVE AND OBJECTIVE BOX
CHIRAG HERNÁNDEZ  89y, Female  Allergy: No Known Allergies      LOS  7d    CHIEF COMPLAINT: gastroenteritis (11 Aug 2022 11:42)      INTERVAL EVENTS/HPI  - No acute events overnight  - T(F): , Max: 101.4 (22 @ 13:40)  -- febrile, appears uncomfortable  - abdominal pain on exam   - WBC Count: 10.98 (22 @ 07:45)  WBC Count: 10.53 (08-10-22 @ 05:45)     - Creatinine, Serum: 0.7 (22 @ 07:45)  Creatinine, Serum: 0.7 (08-10-22 @ 05:45)       ROS  General: Denies rigors, nightsweats  HEENT: Denies headache, rhinorrhea, sore throat, eye pain  CV: Denies CP, palpitations  PULM: Denies wheezing, hemoptysis  GI: Denies hematemesis, hematochezia, melena  : Denies discharge, hematuria  MSK: Denies arthralgias, myalgias  SKIN: Denies rash, lesions  NEURO: Denies paresthesias, weakness  PSYCH: Denies depression, anxiety    VITALS:  T(F): 97.8, Max: 101.4 (22 @ 13:40)  HR: 69  BP: 146/101  RR: 18Vital Signs Last 24 Hrs  T(C): 36.6 (11 Aug 2022 21:00), Max: 38.6 (11 Aug 2022 13:40)  T(F): 97.8 (11 Aug 2022 21:00), Max: 101.4 (11 Aug 2022 13:40)  HR: 69 (11 Aug 2022 21:00) (69 - 80)  BP: 146/101 (11 Aug 2022 21:00) (146/101 - 184/71)  BP(mean): --  RR: 18 (11 Aug 2022 21:00) (18 - 18)  SpO2: 94% (11 Aug 2022 21:00) (92% - 94%)    Parameters below as of 11 Aug 2022 11:37  Patient On (Oxygen Delivery Method): nasal cannula  O2 Flow (L/min): 4      PHYSICAL EXAM:  Gen: NAD, resting in bed  HEENT: Normocephalic, atraumatic  Neck: supple, no lymphadenopathy  CV: Regular rate & regular rhythm  Lungs: decreased BS at bases, no fremitus  Abdomen: Soft, BS present  Ext: Warm, well perfused  Neuro: non focal, awake  Skin: no rash, no erythema  Lines: no phlebitis    FH: Non-contributory  Social Hx: Non-contributory    TESTS & MEASUREMENTS:                        12.9   10.98 )-----------( 370      ( 11 Aug 2022 07:45 )             39.9     08-    138  |  101  |  12  ----------------------------<  119<H>  4.0   |  25  |  0.7    Ca    9.5      11 Aug 2022 07:45  Phos  3.0       Mg     1.6         TPro  6.4  /  Alb  3.5  /  TBili  0.5  /  DBili  x   /  AST  27  /  ALT  21  /  AlkPhos  102  08      LIVER FUNCTIONS - ( 11 Aug 2022 07:45 )  Alb: 3.5 g/dL / Pro: 6.4 g/dL / ALK PHOS: 102 U/L / ALT: 21 U/L / AST: 27 U/L / GGT: x           Urinalysis Basic - ( 10 Aug 2022 20:20 )    Color: Yellow / Appearance: Clear / S.025 / pH: x  Gluc: x / Ketone: Negative  / Bili: Negative / Urobili: 1.0 mg/dL   Blood: x / Protein: 30 mg/dL / Nitrite: Negative   Leuk Esterase: Small / RBC: Negative / WBC 6-10 /HPF   Sq Epi: x / Non Sq Epi: Few /HPF / Bacteria: Occasional /HPF        GI PCR Panel, Stool (collected 22 @ 22:51)  Source: .Stool Feces  Final Report (22 @ 22:44):    Cyclospora species    DETECTED by PCR    *******Please Note:*******    GI panel PCR evaluates for:    Campylobacter, Plesiomonas shigelloides, Salmonella,    Vibrio, Yersinia enterocolitica, Enteroaggregative    Escherichia coli (EAEC), Enteropathogenic E.coli(EPEC),    Enterotoxigenic E. coli (ETEC) lt/st, Shiga-like    toxin-producing E. coli (STEC) stx1/stx2,    Shigella/ Enteroinvasive E. coli (EIEC), Cryptosporidium,    Cyclospora cayetanensis, Entamoeba histolytica,    Giardia lamblia, Adenovirus F 40/41, Astrovirus,    Norovirus GI/GII, Rotavirus A, Sapovirus    Culture - Stool (collected 22 @ 22:51)  Source: .Stool Feces  Final Report (22 @ 21:07):    No enteric pathogens isolated.    (Stool culture examined for Salmonella,    Shigella, Campylobacter, Aeromonas, Plesiomonas,    Vibrio, E.coli O157 and Yersinia)    Culture - Urine (collected 22 @ 18:45)  Source: Clean Catch Clean Catch (Midstream)  Final Report (22 @ 09:46):    >100,000 CFU/ml Proteus mirabilis  Organism: Proteus mirabilis (22 @ 09:46)  Organism: Proteus mirabilis (22 @ 09:46)      -  Amikacin: S <=16      -  Amoxicillin/Clavulanic Acid: S <=8/4      -  Ampicillin: S <=8 These ampicillin results predict results for amoxicillin      -  Ampicillin/Sulbactam: S <=4/2 Enterobacter, Klebsiella aerogenes, Citrobacter, and Serratia may develop resistance during prolonged therapy (3-4 days)      -  Aztreonam: S <=4      -  Cefazolin: S 4 ([email or web address]_[email or web address]) For uncomplicated UTI with K. pneumoniae, E. coli, or P. mirablis: SAMEER <=16 is sensitive and SAMEER >=32 is resistant. This also predicts results for oral agents cefaclor, cefdinir, cefpodoxime, cefprozil, cefuroxime axetil, cephalexin and locarbef for uncomplicated UTI. Note that some isolates may be susceptible to these agents while testing resistant to cefazolin.      -  Cefepime: S <=2      -  Cefoxitin: S <=8      -  Ceftriaxone: S <=1 Enterobacter, Klebsiella aerogenes, Citrobacter, and Serratia may develop resistance during prolonged therapy      -  Ciprofloxacin: S <=0.25      -  Ertapenem: S <=0.5      -  Gentamicin: S 4      -  Levofloxacin: S <=0.5      -  Meropenem: S <=1      -  Nitrofurantoin: R >64 Should not be used to treat pyelonephritis      -  Piperacillin/Tazobactam: S <=8      -  Tobramycin: S <=2      -  Trimethoprim/Sulfamethoxazole: S <=0.5/9.5      Method Type: SAMEER        Lactate, Blood: 1.4 mmol/L (08-10-22 @ 18:57)      INFECTIOUS DISEASES TESTING  Rapid RVP Result: NotDetec (22 @ 08:00)  MRSA PCR Result.: Negative (22 @ 00:00)  Procalcitonin, Serum: 0.05 (08-10-22 @ 18:57)  COVID-19 PCR: NotDetec (22 @ 11:45)  COVID-19 PCR: NotDetec (22 @ 22:40)      INFLAMMATORY MARKERS      RADIOLOGY & ADDITIONAL TESTS:  I have personally reviewed the last available Chest xray  CXR  Xray Chest 1 View- PORTABLE-Urgent:   ACC: [id number] EXAM:  XR CHEST PORTABLE URGENT 1V                          PROCEDURE DATE:  08/10/2022          INTERPRETATION:  Clinical History / Reason for exam: Fever    Comparison : Chest radiograph 10/16/2021.    Technique/Positioning: Frontal, portable.    Findings:    Support devices: None.    Cardiac/mediastinum/hilum: Heart size is stable.    Lung parenchyma/Pleura: Questionable right perihilar and left   retrocardiac opacities. Biapical pleural thickening versus scarring.    Skeleton/soft tissues: Degenerative change    Impression:    Questionable right perihilar and left retrocardiac opacities        --- End of Report ---            ANGEL LUIS MATTHEW MD; Attending Radiologist  This document has been electronically signed. Aug 11 2022 8:29AM (08-10-22 @ 15:53)      CT      CARDIOLOGY TESTING  12 Lead ECG:   Ventricular Rate 66 BPM    Atrial Rate 66 BPM    P-R Interval 212 ms    QRS Duration 140 ms    Q-T Interval 446 ms    QTC Calculation(Bazett) 467 ms    P Axis 68 degrees    R Axis -54 degrees    T Axis 96 degrees    Diagnosis Line Sinus rhythm [id number] degree A-V block  Non-specific intra-ventricular conduction delay  Left ventricular hypertrophy with repolarization abnormality  Left axis deviation  Possible Lateral infarct , age undetermined  Abnormal ECG    Confirmed by Luis Eduardo Johnson ([id number]) on 2022 9:56:09 AM (22 @ 18:45)      MEDICATIONS  aspirin  chewable 81 Oral daily  budesonide  80 MICROgram(s)/formoterol 4.5 MICROgram(s) Inhaler 2 Inhalation two times a day  cefepime   IVPB 1000 IV Intermittent every 12 hours  clopidogrel Tablet 75 Oral daily  enoxaparin Injectable 30 SubCutaneous every 24 hours  isosorbide   dinitrate Tablet (ISORDIL) 10 Oral three times a day  latanoprost 0.005% Ophthalmic Solution 1 Both EYES at bedtime  levothyroxine 25 Oral daily  lisinopril 20 Oral daily  metoprolol succinate ER 25 Oral daily  pantoprazole    Tablet 40 Oral before breakfast  saccharomyces boulardii 250 Oral two times a day  sodium chloride 0.9%. 1000 IV Continuous <Continuous>      WEIGHT  Weight (kg): 72.3 (22 @ 03:26)  Creatinine, Serum: 0.7 mg/dL (22 @ 07:45)      ANTIBIOTICS:  cefepime   IVPB 1000 milliGRAM(s) IV Intermittent every 12 hours      All available historical records have been reviewed

## 2022-08-11 NOTE — PROGRESS NOTE ADULT - ASSESSMENT
ASSESSMENT  89 y/o F w/PMHx of HTN, CAD s/p stent, COPD, hypothyroidism presented to the ED with intermittent abdominal pain with associated intermittent non bloody diarrhea and 30 lb weight loss over past 5 weeks    IMPRESSION  #Chronic diarrhea - Cyclospora identified on PCR - resolved  - CT Abd/pelvis 8/4 no acute intra-abdominal process  #Leukocytosis   #Urine Cx 8/4 Proteus mirabilis   #CAD   #COPD  #Obesity BMI (kg/m2): 24.2  #Abx allergy: NDKA    RECOMMENDATIONS  - continue cefepime   - planned for CT Abd/Pelvis  - add flagyl if any intra-abdominal process  - follow-up blood cx    Please call or message on [employer] Teams if with any questions.  Spectra [phone]

## 2022-08-11 NOTE — GOALS OF CARE CONVERSATION - ADVANCED CARE PLANNING - CONVERSATION DETAILS
I discussed the GOCs the daughter Magdalena Durant, her mother developed fever and has been lethargic,   she is getting treatment with anabiotics while pending results of the work up,  she doesn't want her mother to get intubated or resuscitated incase of cardiopulmonary arrest,  regarding central line she will think about it and let us know.
Provided information regarding the role of Palliative Care, AD and CMO.

## 2022-08-11 NOTE — PROGRESS NOTE ADULT - SUBJECTIVE AND OBJECTIVE BOX
Patient is a 89y old  Female who presents with a chief complaint of Diarrhea (09 Aug 2022 15:05)      OVERNIGHT EVENTS: events noted, remain lethargic, not taking PO meds, fever over night    SUBJECTIVE / INTERVAL HPI: Patient seen and examined at bedside.     VITAL SIGNS:  Vital Signs Last 24 Hrs  T(C): 37.4 (11 Aug 2022 05:00), Max: 38.6 (10 Aug 2022 22:02)  T(F): 99.4 (11 Aug 2022 05:00), Max: 101.4 (10 Aug 2022 22:02)  HR: 70 (11 Aug 2022 05:00) (70 - 75)  BP: 184/71 (11 Aug 2022 05:00) (141/59 - 184/72)  BP(mean): --  RR: 18 (11 Aug 2022 05:00) (18 - 18)  SpO2: 92% (11 Aug 2022 11:37) (92% - 92%)    Parameters below as of 11 Aug 2022 11:37  Patient On (Oxygen Delivery Method): nasal cannula  O2 Flow (L/min): 4      PHYSICAL EXAM:    General: old lady chronically look ill  HEENT: NC/AT; PERRL, clear conjunctiva  Neck: supple  Cardiovascular: +S1/S2; RRR  Respiratory: mild b/l crackles and wheeze  Gastrointestinal: soft, distended and tender abd at RUQ and umbilical area, no guarding or rebound ; +BSx4  Extremities: WWP; 2+ peripheral pulses; no edema   Neurological:  alert and awake, only follow simple commands, not talking no focal deficits    MEDICATIONS:  MEDICATIONS  (STANDING):  aspirin  chewable 81 milliGRAM(s) Oral daily  budesonide  80 MICROgram(s)/formoterol 4.5 MICROgram(s) Inhaler 2 Puff(s) Inhalation two times a day  cefepime   IVPB 1000 milliGRAM(s) IV Intermittent every 12 hours  clopidogrel Tablet 75 milliGRAM(s) Oral daily  enoxaparin Injectable 30 milliGRAM(s) SubCutaneous every 24 hours  isosorbide   dinitrate Tablet (ISORDIL) 10 milliGRAM(s) Oral three times a day  latanoprost 0.005% Ophthalmic Solution 1 Drop(s) Both EYES at bedtime  levothyroxine 25 MICROGram(s) Oral daily  lisinopril 20 milliGRAM(s) Oral daily  metoprolol succinate ER 25 milliGRAM(s) Oral daily  pantoprazole    Tablet 40 milliGRAM(s) Oral before breakfast  saccharomyces boulardii 250 milliGRAM(s) Oral two times a day  sodium chloride 0.9%. 1000 milliLiter(s) (50 mL/Hr) IV Continuous <Continuous>    MEDICATIONS  (PRN):  acetaminophen     Tablet .. 650 milliGRAM(s) Oral every 6 hours PRN Temp greater or equal to 38C (100.4F), Mild Pain (1 - 3)  ALBUTerol    90 MICROgram(s) HFA Inhaler 2 Puff(s) Inhalation every 6 hours PRN Shortness of Breath and/or Wheezing  aluminum hydroxide/magnesium hydroxide/simethicone Suspension 30 milliLiter(s) Oral every 4 hours PRN Dyspepsia  hydrALAZINE Injectable 10 milliGRAM(s) IV Push every 6 hours PRN hypertension  melatonin 3 milliGRAM(s) Oral at bedtime PRN Insomnia  ondansetron Injectable 4 milliGRAM(s) IV Push every 8 hours PRN Nausea and/or Vomiting      ALLERGIES:  Allergies    No Known Allergies    Intolerances        LABS:                        12.9   10.98 )-----------( 370      ( 11 Aug 2022 07:45 )             39.9     08-11    138  |  101  |  12  ----------------------------<  119<H>  4.0   |  25  |  0.7    Ca    9.5      11 Aug 2022 07:45  Phos  3.0     08-11  Mg     1.6     -    TPro  6.4  /  Alb  3.5  /  TBili  0.5  /  DBili  x   /  AST  27  /  ALT  21  /  AlkPhos  102  08-11      Urinalysis Basic - ( 10 Aug 2022 20:20 )    Color: Yellow / Appearance: Clear / S.025 / pH: x  Gluc: x / Ketone: Negative  / Bili: Negative / Urobili: 1.0 mg/dL   Blood: x / Protein: 30 mg/dL / Nitrite: Negative   Leuk Esterase: Small / RBC: Negative / WBC 6-10 /HPF   Sq Epi: x / Non Sq Epi: Few /HPF / Bacteria: Occasional /HPF      < from: CT Head No Cont (08.10.22 @ 18:02) >  IMPRESSION:    No acute intracranial pathology. No evidence of midline shift, mass   effect or intracranial hemorrhage.    < end of copied text >  Procalcitonin, Serum: 0.05: Note: Concentrations <0.5 ng/mL do not exclude an infection, on account  of localized infections (without systemic signs) which can be associated  with such low concentrations, or a systemic infection in its initial  stages (<6 hours). Furthermore, increased procalcitonin may occur without  infection. PCT concentrations between 0.5 and 2.0 ng/mL should be  interpreted taking into account the patient’s history. It is recommended  to pretest PCT within 6-24 hours if any concentrations <2.0 mg/mL are  obtained. ng/mL (08.10.22 @ 18:57)\Culture - Urine (22 @ 18:45)    -  Amikacin: S <=16    -  Amoxicillin/Clavulanic Acid: S <=8/4    -  Ampicillin: S <=8 These ampicillin results predict results for amoxicillin    -  Ampicillin/Sulbactam: S <=4/2 Enterobacter, Klebsiella aerogenes, Citrobacter, and Serratia may develop resistance during prolonged therapy (3-4 days)    -  Aztreonam: S <=4    -  Cefazolin: S 4 (MIC_CL_COM_[email or web address]) For uncomplicated UTI with K. pneumoniae, E. coli, or P. mirablis: SAMEER <=16 is sensitive and SAMEER >=32 is resistant. This also predicts results for oral agents cefaclor, cefdinir, cefpodoxime, cefprozil, cefuroxime axetil, cephalexin and locarbef for uncomplicated UTI. Note that some isolates may be susceptible to these agents while testing resistant to cefazolin.    -  Cefepime: S <=2    -  Cefoxitin: S <=8    -  Ceftriaxone: S <=1 Enterobacter, Klebsiella aerogenes, Citrobacter, and Serratia may develop resistance during prolonged therapy    -  Ciprofloxacin: S <=0.25    -  Ertapenem: S <=0.5    -  Gentamicin: S 4    -  Levofloxacin: S <=0.5    -  Meropenem: S <=1    -  Nitrofurantoin: R >64 Should not be used to treat pyelonephritis    -  Piperacillin/Tazobactam: S <=8    -  Tobramycin: S <=2    -  Trimethoprim/Sulfamethoxazole: S <=0.5/9.5    Specimen Source: Clean Catch Clean Catch (Midstream)    Culture Results:   >100,000 CFU/ml Proteus mirabilis    Organism Identification: Proteus mirabilis    Organism: Proteus mirabilis    Method Type: SAMEER

## 2022-08-12 DIAGNOSIS — E03.9 HYPOTHYROIDISM, UNSPECIFIED: ICD-10-CM

## 2022-08-12 DIAGNOSIS — K52.9 NONINFECTIVE GASTROENTERITIS AND COLITIS, UNSPECIFIED: ICD-10-CM

## 2022-08-12 DIAGNOSIS — Z71.89 OTHER SPECIFIED COUNSELING: ICD-10-CM

## 2022-08-12 DIAGNOSIS — R41.82 ALTERED MENTAL STATUS, UNSPECIFIED: ICD-10-CM

## 2022-08-12 DIAGNOSIS — Z51.5 ENCOUNTER FOR PALLIATIVE CARE: ICD-10-CM

## 2022-08-12 LAB
ALBUMIN SERPL ELPH-MCNC: 3.2 G/DL — LOW (ref 3.5–5.2)
ALP SERPL-CCNC: 86 U/L — SIGNIFICANT CHANGE UP (ref 30–115)
ALT FLD-CCNC: 19 U/L — SIGNIFICANT CHANGE UP (ref 0–41)
ANION GAP SERPL CALC-SCNC: 11 MMOL/L — SIGNIFICANT CHANGE UP (ref 7–14)
AST SERPL-CCNC: 27 U/L — SIGNIFICANT CHANGE UP (ref 0–41)
BASOPHILS # BLD AUTO: 0.12 K/UL — SIGNIFICANT CHANGE UP (ref 0–0.2)
BASOPHILS NFR BLD AUTO: 1.1 % — HIGH (ref 0–1)
BILIRUB SERPL-MCNC: 0.5 MG/DL — SIGNIFICANT CHANGE UP (ref 0.2–1.2)
BUN SERPL-MCNC: 11 MG/DL — SIGNIFICANT CHANGE UP (ref 10–20)
CALCIUM SERPL-MCNC: 9 MG/DL — SIGNIFICANT CHANGE UP (ref 8.5–10.1)
CHLORIDE SERPL-SCNC: 101 MMOL/L — SIGNIFICANT CHANGE UP (ref 98–110)
CO2 SERPL-SCNC: 25 MMOL/L — SIGNIFICANT CHANGE UP (ref 17–32)
CREAT SERPL-MCNC: 0.5 MG/DL — LOW (ref 0.7–1.5)
CULTURE RESULTS: NO GROWTH — SIGNIFICANT CHANGE UP
EGFR: 86 ML/MIN/1.73M2 — SIGNIFICANT CHANGE UP
EOSINOPHIL # BLD AUTO: 0.18 K/UL — SIGNIFICANT CHANGE UP (ref 0–0.7)
EOSINOPHIL NFR BLD AUTO: 1.7 % — SIGNIFICANT CHANGE UP (ref 0–8)
GLUCOSE SERPL-MCNC: 80 MG/DL — SIGNIFICANT CHANGE UP (ref 70–99)
HCT VFR BLD CALC: 37.8 % — SIGNIFICANT CHANGE UP (ref 37–47)
HGB BLD-MCNC: 11.9 G/DL — LOW (ref 12–16)
IMM GRANULOCYTES NFR BLD AUTO: 2.5 % — HIGH (ref 0.1–0.3)
LYMPHOCYTES # BLD AUTO: 1.37 K/UL — SIGNIFICANT CHANGE UP (ref 1.2–3.4)
LYMPHOCYTES # BLD AUTO: 12.8 % — LOW (ref 20.5–51.1)
MAGNESIUM SERPL-MCNC: 1.6 MG/DL — LOW (ref 1.8–2.4)
MCHC RBC-ENTMCNC: 30.4 PG — SIGNIFICANT CHANGE UP (ref 27–31)
MCHC RBC-ENTMCNC: 31.5 G/DL — LOW (ref 32–37)
MCV RBC AUTO: 96.4 FL — SIGNIFICANT CHANGE UP (ref 81–99)
MONOCYTES # BLD AUTO: 1.16 K/UL — HIGH (ref 0.1–0.6)
MONOCYTES NFR BLD AUTO: 10.8 % — HIGH (ref 1.7–9.3)
NEUTROPHILS # BLD AUTO: 7.62 K/UL — HIGH (ref 1.4–6.5)
NEUTROPHILS NFR BLD AUTO: 71.1 % — SIGNIFICANT CHANGE UP (ref 42.2–75.2)
NRBC # BLD: 0 /100 WBCS — SIGNIFICANT CHANGE UP (ref 0–0)
PLATELET # BLD AUTO: 317 K/UL — SIGNIFICANT CHANGE UP (ref 130–400)
POTASSIUM SERPL-MCNC: 3.9 MMOL/L — SIGNIFICANT CHANGE UP (ref 3.5–5)
POTASSIUM SERPL-SCNC: 3.9 MMOL/L — SIGNIFICANT CHANGE UP (ref 3.5–5)
PROT SERPL-MCNC: 5.6 G/DL — LOW (ref 6–8)
RBC # BLD: 3.92 M/UL — LOW (ref 4.2–5.4)
RBC # FLD: 13.1 % — SIGNIFICANT CHANGE UP (ref 11.5–14.5)
SODIUM SERPL-SCNC: 137 MMOL/L — SIGNIFICANT CHANGE UP (ref 135–146)
SPECIMEN SOURCE: SIGNIFICANT CHANGE UP
WBC # BLD: 10.72 K/UL — SIGNIFICANT CHANGE UP (ref 4.8–10.8)
WBC # FLD AUTO: 10.72 K/UL — SIGNIFICANT CHANGE UP (ref 4.8–10.8)

## 2022-08-12 PROCEDURE — 99233 SBSQ HOSP IP/OBS HIGH 50: CPT

## 2022-08-12 PROCEDURE — G0426: CPT | Mod: 95

## 2022-08-12 RX ORDER — MAGNESIUM SULFATE 500 MG/ML
2 VIAL (ML) INJECTION ONCE
Refills: 0 | Status: COMPLETED | OUTPATIENT
Start: 2022-08-12 | End: 2022-08-12

## 2022-08-12 RX ORDER — METRONIDAZOLE 500 MG
500 TABLET ORAL EVERY 8 HOURS
Refills: 0 | Status: DISCONTINUED | OUTPATIENT
Start: 2022-08-12 | End: 2022-08-17

## 2022-08-12 RX ORDER — CEFTRIAXONE 500 MG/1
1000 INJECTION, POWDER, FOR SOLUTION INTRAMUSCULAR; INTRAVENOUS EVERY 24 HOURS
Refills: 0 | Status: DISCONTINUED | OUTPATIENT
Start: 2022-08-13 | End: 2022-08-17

## 2022-08-12 RX ORDER — METRONIDAZOLE 500 MG
TABLET ORAL
Refills: 0 | Status: DISCONTINUED | OUTPATIENT
Start: 2022-08-12 | End: 2022-08-17

## 2022-08-12 RX ORDER — METRONIDAZOLE 500 MG
500 TABLET ORAL ONCE
Refills: 0 | Status: COMPLETED | OUTPATIENT
Start: 2022-08-12 | End: 2022-08-12

## 2022-08-12 RX ADMIN — LATANOPROST 1 DROP(S): 0.05 SOLUTION/ DROPS OPHTHALMIC; TOPICAL at 22:09

## 2022-08-12 RX ADMIN — SODIUM CHLORIDE 50 MILLILITER(S): 9 INJECTION INTRAMUSCULAR; INTRAVENOUS; SUBCUTANEOUS at 05:40

## 2022-08-12 RX ADMIN — Medication 100 MILLIGRAM(S): at 22:09

## 2022-08-12 RX ADMIN — Medication 100 MILLIGRAM(S): at 14:11

## 2022-08-12 RX ADMIN — Medication 260 MILLIGRAM(S): at 17:44

## 2022-08-12 RX ADMIN — Medication 25 GRAM(S): at 14:31

## 2022-08-12 RX ADMIN — Medication 260 MILLIGRAM(S): at 15:35

## 2022-08-12 RX ADMIN — CEFEPIME 100 MILLIGRAM(S): 1 INJECTION, POWDER, FOR SOLUTION INTRAMUSCULAR; INTRAVENOUS at 05:39

## 2022-08-12 RX ADMIN — Medication 100 MILLIGRAM(S): at 11:04

## 2022-08-12 RX ADMIN — ENOXAPARIN SODIUM 30 MILLIGRAM(S): 100 INJECTION SUBCUTANEOUS at 05:40

## 2022-08-12 RX ADMIN — Medication 10 MILLIGRAM(S): at 06:47

## 2022-08-12 NOTE — PROGRESS NOTE ADULT - SUBJECTIVE AND OBJECTIVE BOX
CHIRAG HERNÁNDEZ  89y, Female  Allergy: No Known Allergies      LOS  8d    CHIEF COMPLAINT: gastroenteritis (11 Aug 2022 11:42)      INTERVAL EVENTS/HPI  - No acute events overnight  - T(F): , Max: 101.4 (22 @ 13:40)  - Denies any worsening symptoms  - Tolerating medication  - WBC Count: 10.72 (22 @ 06:12)  WBC Count: 10.98 (22 @ 07:45)     - Creatinine, Serum: 0.5 (22 @ 06:12)  Creatinine, Serum: 0.7 (22 @ 07:45)       ROS  General: Denies rigors, nightsweats  HEENT: Denies headache, rhinorrhea, sore throat, eye pain  CV: Denies CP, palpitations  PULM: Denies wheezing, hemoptysis  GI: Denies hematemesis, hematochezia, melena  : Denies discharge, hematuria  MSK: Denies arthralgias, myalgias  SKIN: Denies rash, lesions  NEURO: Denies paresthesias, weakness  PSYCH: Denies depression, anxiety    VITALS:  T(F): 97.6, Max: 101.4 (22 @ 13:40)  HR: 69  BP: 181/89  RR: 18Vital Signs Last 24 Hrs  T(C): 36.4 (12 Aug 2022 05:07), Max: 38.6 (11 Aug 2022 13:40)  T(F): 97.6 (12 Aug 2022 05:07), Max: 101.4 (11 Aug 2022 13:40)  HR: 69 (12 Aug 2022 06:54) (66 - 80)  BP: 181/89 (12 Aug 2022 06:54) (146/101 - 182/66)  BP(mean): --  RR: 18 (12 Aug 2022 05:07) (18 - 18)  SpO2: 94% (11 Aug 2022 21:00) (94% - 94%)    Parameters below as of 11 Aug 2022 21:00  Patient On (Oxygen Delivery Method): nasal cannula  O2 Flow (L/min): 4      PHYSICAL EXAM:  Gen: NAD, resting in bed  HEENT: Normocephalic, atraumatic  Neck: supple, no lymphadenopathy  CV: Regular rate & regular rhythm  Lungs: decreased BS at bases, no fremitus  Abdomen: Soft, BS present  Ext: Warm, well perfused  Neuro: non focal, awake  Skin: no rash, no erythema  Lines: no phlebitis    FH: Non-contributory  Social Hx: Non-contributory    TESTS & MEASUREMENTS:                        11.9   10.72 )-----------( 317      ( 12 Aug 2022 06:12 )             37.8     0812    137  |  101  |  11  ----------------------------<  80  3.9   |  25  |  0.5<L>    Ca    9.0      12 Aug 2022 06:12  Phos  3.0       Mg     1.6         TPro  5.6<L>  /  Alb  3.2<L>  /  TBili  0.5  /  DBili  x   /  AST  27  /  ALT  19  /  AlkPhos  86  0812      LIVER FUNCTIONS - ( 12 Aug 2022 06:12 )  Alb: 3.2 g/dL / Pro: 5.6 g/dL / ALK PHOS: 86 U/L / ALT: 19 U/L / AST: 27 U/L / GGT: x           Urinalysis Basic - ( 10 Aug 2022 20:20 )    Color: Yellow / Appearance: Clear / S.025 / pH: x  Gluc: x / Ketone: Negative  / Bili: Negative / Urobili: 1.0 mg/dL   Blood: x / Protein: 30 mg/dL / Nitrite: Negative   Leuk Esterase: Small / RBC: Negative / WBC 6-10 /HPF   Sq Epi: x / Non Sq Epi: Few /HPF / Bacteria: Occasional /HPF        Culture - Urine (collected 08-10-22 @ 20:20)  Source: Clean Catch Clean Catch (Midstream)  Final Report (22 @ 07:38):    No growth    Culture - Blood (collected 08-10-22 @ 18:57)  Source: .Blood None  Preliminary Report (22 @ 01:02):    No growth to date.    Culture - Blood (collected 08-10-22 @ 16:10)  Source: .Blood Blood-Venous  Preliminary Report (22 @ 01:02):    No growth to date.    GI PCR Panel, Stool (collected 08-06-22 @ 22:51)  Source: .Stool Feces  Final Report (22 @ 22:44):    Cyclospora species    DETECTED by PCR    *******Please Note:*******    GI panel PCR evaluates for:    Campylobacter, Plesiomonas shigelloides, Salmonella,    Vibrio, Yersinia enterocolitica, Enteroaggregative    Escherichia coli (EAEC), Enteropathogenic E.coli(EPEC),    Enterotoxigenic E. coli (ETEC) lt/st, Shiga-like    toxin-producing E. coli (STEC) stx1/stx2,    Shigella/ Enteroinvasive E. coli (EIEC), Cryptosporidium,    Cyclospora cayetanensis, Entamoeba histolytica,    Giardia lamblia, Adenovirus F 40/41, Astrovirus,    Norovirus GI/GII, Rotavirus A, Sapovirus    Culture - Stool (collected 22 @ 22:51)  Source: .Stool Feces  Final Report (22 @ 21:07):    No enteric pathogens isolated.    (Stool culture examined for Salmonella,    Shigella, Campylobacter, Aeromonas, Plesiomonas,    Vibrio, E.coli O157 and Yersinia)    Culture - Urine (collected 22 @ 18:45)  Source: Clean Catch Clean Catch (Midstream)  Final Report (22 @ 09:46):    >100,000 CFU/ml Proteus mirabilis  Organism: Proteus mirabilis (22 @ 09:46)  Organism: Proteus mirabilis (22 @ 09:46)      -  Amikacin: S <=16      -  Amoxicillin/Clavulanic Acid: S <=8/4      -  Ampicillin: S <=8 These ampicillin results predict results for amoxicillin      -  Ampicillin/Sulbactam: S <=4/2 Enterobacter, Klebsiella aerogenes, Citrobacter, and Serratia may develop resistance during prolonged therapy (3-4 days)      -  Aztreonam: S <=4      -  Cefazolin: S 4 (MIC_CL_COM_ENTERIC_CEFAZU) For uncomplicated UTI with K. pneumoniae, E. coli, or P. mirablis: SAMEER <=16 is sensitive and SAMEER >=32 is resistant. This also predicts results for oral agents cefaclor, cefdinir, cefpodoxime, cefprozil, cefuroxime axetil, cephalexin and locarbef for uncomplicated UTI. Note that some isolates may be susceptible to these agents while testing resistant to cefazolin.      -  Cefepime: S <=2      -  Cefoxitin: S <=8      -  Ceftriaxone: S <=1 Enterobacter, Klebsiella aerogenes, Citrobacter, and Serratia may develop resistance during prolonged therapy      -  Ciprofloxacin: S <=0.25      -  Ertapenem: S <=0.5      -  Gentamicin: S 4      -  Levofloxacin: S <=0.5      -  Meropenem: S <=1      -  Nitrofurantoin: R >64 Should not be used to treat pyelonephritis      -  Piperacillin/Tazobactam: S <=8      -  Tobramycin: S <=2      -  Trimethoprim/Sulfamethoxazole: S <=0.5/9.5      Method Type: SAMEER        Lactate, Blood: 1.4 mmol/L (08-10-22 @ 18:57)      INFECTIOUS DISEASES TESTING  Rapid RVP Result: NotDetec (22 @ 08:00)  MRSA PCR Result.: Negative (22 @ 00:00)  Procalcitonin, Serum: 0.05 (08-10-22 @ 18:57)  COVID-19 PCR: NotDetec (22 @ 11:45)  COVID-19 PCR: NotDetec (22 @ 22:40)      INFLAMMATORY MARKERS      RADIOLOGY & ADDITIONAL TESTS:  I have personally reviewed the last available Chest xray  CXR  Xray Chest 1 View- PORTABLE-Urgent:   ACC: [id number] EXAM:  XR CHEST PORTABLE URGENT 1V                          PROCEDURE DATE:  08/10/2022          INTERPRETATION:  Clinical History / Reason for exam: Fever    Comparison : Chest radiograph 10/16/2021.    Technique/Positioning: Frontal, portable.    Findings:    Support devices: None.    Cardiac/mediastinum/hilum: Heart size is stable.    Lung parenchyma/Pleura: Questionable right perihilar and left   retrocardiac opacities. Biapical pleural thickening versus scarring.    Skeleton/soft tissues: Degenerative change    Impression:    Questionable right perihilar and left retrocardiac opacities        --- End of Report ---            ANGEL LUIS MATTHEW MD; Attending Radiologist  This document has been electronically signed. Aug 11 2022 8:29AM (08-10-22 @ 15:53)      CT  CT Abdomen and Pelvis w/ IV Cont:   ACC: [id number] EXAM:  CT ABDOMEN AND PELVIS IC                          PROCEDURE DATE:  2022          INTERPRETATION:  CLINICAL HISTORY: Fever, abdominal distention.    TECHNIQUE: Contiguous axial CT images were obtained from the lower chest   to the pubic symphysis following administration of Optiray intravenous   contrast. Oral contrast was not administered. Reformatted images in the   coronal and sagittal planes were acquired.    COMPARISON: CT abdomen pelvis 2022.      FINDINGS:    LOWER CHEST: Small bilateral pleural effusions. Bibasilar bronchiectasis   and areas of mucoid impaction redemonstrated.    HEPATOBILIARY: Unchanged    SPLEEN: Unchanged    PANCREAS: Unchanged    ADRENAL GLANDS: Unchanged    KIDNEYS: Right renal cysts and bilateral hypodensities too small to   characterize. Mild bilateral hydroureter, new  from prior.    ABDOMINOPELVIC NODES: Unchanged    PELVIC ORGANS: Distended bladder. Post hysterectomy.    PERITONEUM/MESENTERY/BOWEL: Mural thickening extending from right colon   to left colon, compatible with colitis. No free air or fluid collection.   No evidence of bowel obstruction. Colonic diverticulosis.    BONES/SOFT TISSUES: Small foci of gas in the anterior abdominal wall   likely injection related. Otherwise unchanged.    OTHER: Atherosclerotic calcifications      IMPRESSION:    Colitis extending from right to left colon.    Mild bilateral hydroureter, may be on the basis of bladder distention.    --- End of Report ---            PRICE BRICENO MD; Attending Radiologist  This document has been electronically signed. Aug 12 2022  8:39AM (22 @ 16:41)      CARDIOLOGY TESTING  12 Lead ECG:   Ventricular Rate 66 BPM    Atrial Rate 66 BPM    P-R Interval 212 ms    QRS Duration 140 ms    Q-T Interval 446 ms    QTC Calculation(Bazett) 467 ms    P Axis 68 degrees    R Axis -54 degrees    T Axis 96 degrees    Diagnosis Line Sinus rhythm [id number] degree A-V block  Non-specific intra-ventricular conduction delay  Left ventricular hypertrophy with repolarization abnormality  Left axis deviation  Possible Lateral infarct , age undetermined  Abnormal ECG    Confirmed by Luis Eduardo Johnson ([id number]) on 2022 9:56:09 AM (22 @ 18:45)      MEDICATIONS  aspirin  chewable 81 Oral daily  budesonide  80 MICROgram(s)/formoterol 4.5 MICROgram(s) Inhaler 2 Inhalation two times a day  cefepime   IVPB 1000 IV Intermittent every 12 hours  clopidogrel Tablet 75 Oral daily  enoxaparin Injectable 30 SubCutaneous every 24 hours  isosorbide   dinitrate Tablet (ISORDIL) 10 Oral three times a day  latanoprost 0.005% Ophthalmic Solution 1 Both EYES at bedtime  levothyroxine 25 Oral daily  lisinopril 20 Oral daily  metoprolol succinate ER 25 Oral daily  metroNIDAZOLE  IVPB     metroNIDAZOLE  IVPB 500 IV Intermittent every 8 hours  pantoprazole    Tablet 40 Oral before breakfast  saccharomyces boulardii 250 Oral two times a day  sodium chloride 0.9%. 1000 IV Continuous <Continuous>      WEIGHT  Weight (kg): 72.3 (22 @ 03:26)  Creatinine, Serum: 0.5 mg/dL (22 @ 06:12)      ANTIBIOTICS:  cefepime   IVPB 1000 milliGRAM(s) IV Intermittent every 12 hours  metroNIDAZOLE  IVPB      metroNIDAZOLE  IVPB 500 milliGRAM(s) IV Intermittent every 8 hours      All available historical records have been reviewed

## 2022-08-12 NOTE — CONSULT NOTE ADULT - SUBJECTIVE AND OBJECTIVE BOX
HPI: 89F with PMH of HTN, CAD s/p stent, COPD, hypothyroidism, here with intermittent abdominal pain and weight loss, due to colitis, possibly cyclospora, on ceftriaxone, flagyl, and bactrim. Palliative care called for GOC per daughter's request.     PERTINENT PM/SXH:   High blood cholesterol    Hypothyroid    Hypertension    COPD (chronic obstructive pulmonary disease)    CAD (coronary artery disease)      History of coronary artery stent placement    History of cholecystectomy    H/O abdominal hysterectomy    Cataract      FAMILY HISTORY: cannot obtain from patient    ITEMS NOT CHECKED ARE NOT PRESENT    SOCIAL HISTORY:   Significant other/partner[ ]  Children[X ]  Confucianist/Spirituality:  Substance hx:  [ ]   Tobacco hx:  [ ]   Alcohol hx: [ ]   Living Situation: [X ]Home  [ ]Long term care  [ ]Rehab [ ]Other  Home Services: [ ] HHA [ ] Zoltan RN [ ] Hospice  Occupation:  Home Opioid hx:  [ ] Y [ X] N [X ] I-Stop Reference No: [id number]    ADVANCE DIRECTIVES:    DNR  MOLST  [X ]  Living Will  [ ]   DECISION MAKER(s):  [ ] Health Care Proxy(s)  [ ] Surrogate(s)  [ ] Guardian           Name(s): Phone Number(s):    BASELINE (I)ADL(s) (prior to admission):  Robinson: [ ]Total  [ ] Moderate [ ]Dependent  Palliative Performance Status Version 2:         %    http://npcrc.org/files/news/palliative_performance_scale_ppsv2.pdf    Allergies    No Known Allergies    Intolerances    MEDICATIONS  (STANDING):  aspirin  chewable 81 milliGRAM(s) Oral daily  budesonide  80 MICROgram(s)/formoterol 4.5 MICROgram(s) Inhaler 2 Puff(s) Inhalation two times a day  clopidogrel Tablet 75 milliGRAM(s) Oral daily  enoxaparin Injectable 30 milliGRAM(s) SubCutaneous every 24 hours  isosorbide   dinitrate Tablet (ISORDIL) 10 milliGRAM(s) Oral three times a day  latanoprost 0.005% Ophthalmic Solution 1 Drop(s) Both EYES at bedtime  levothyroxine 25 MICROGram(s) Oral daily  lisinopril 20 milliGRAM(s) Oral daily  metoprolol succinate ER 25 milliGRAM(s) Oral daily  metroNIDAZOLE  IVPB      metroNIDAZOLE  IVPB 500 milliGRAM(s) IV Intermittent every 8 hours  pantoprazole    Tablet 40 milliGRAM(s) Oral before breakfast  saccharomyces boulardii 250 milliGRAM(s) Oral two times a day  sodium chloride 0.9%. 1000 milliLiter(s) (50 mL/Hr) IV Continuous <Continuous>  trimethoprim / sulfamethoxazole IVPB 160 milliGRAM(s) IV Intermittent two times a day    MEDICATIONS  (PRN):  acetaminophen  Suppository .. 650 milliGRAM(s) Rectal every 6 hours PRN Temp greater or equal to 38C (100.4F), Mild Pain (1 - 3)  ALBUTerol    90 MICROgram(s) HFA Inhaler 2 Puff(s) Inhalation every 6 hours PRN Shortness of Breath and/or Wheezing  aluminum hydroxide/magnesium hydroxide/simethicone Suspension 30 milliLiter(s) Oral every 4 hours PRN Dyspepsia  hydrALAZINE Injectable 10 milliGRAM(s) IV Push every 6 hours PRN hypertension  melatonin 3 milliGRAM(s) Oral at bedtime PRN Insomnia  ondansetron Injectable 4 milliGRAM(s) IV Push every 8 hours PRN Nausea and/or Vomiting    PRESENT SYMPTOMS: [X ]Unable to obtain due to poor mentation   Source if other than patient:  [ ]Family   [ ]Team     Pain: [ ]yes [X ]no  QOL impact -   Location -                    Aggravating factors -  Quality -  Radiation -  Timing-  Severity (0-10 scale):  Minimal acceptable level (0-10 scale):     CPOT:    https://www.[facility].org/getattachment/vym02h14-2z5p-5d0f-5d0o-1073k7450t3h/Critical-Care-Pain-Observation-Tool-(CPOT)      PAIN AD Score: 0    http://geriatrictoolkit.[place].[facility]/cog/painad.pdf (press ctrl +  left click to view)    Dyspnea:                           [ ]Mild [ ]Moderate [ ]Severe  Anxiety:                             [ ]Mild [ ]Moderate [ ]Severe  Fatigue:                             [ ]Mild [ ]Moderate [ ]Severe  Nausea:                             [ ]Mild [ ]Moderate [ ]Severe  Loss of appetite:              [ ]Mild [ ]Moderate [ ]Severe  Constipation:                    [ ]Mild [ ]Moderate [ ]Severe    Other Symptoms:  [ ]All other review of systems negative     Palliative Performance Status Version 2:         %    http://npcrc.org/files/news/palliative_performance_scale_ppsv2.pdf  PHYSICAL EXAM:  Vital Signs Last 24 Hrs  T(C): 36.8 (12 Aug 2022 13:57), Max: 37.7 (11 Aug 2022 18:30)  T(F): 98.2 (12 Aug 2022 13:57), Max: 99.8 (11 Aug 2022 18:30)  HR: 66 (12 Aug 2022 13:57) (66 - 69)  BP: 162/73 (12 Aug 2022 13:57) (146/101 - 181/89)  BP(mean): --  RR: 18 (12 Aug 2022 13:57) (18 - 18)  SpO2: 94% (11 Aug 2022 21:00) (94% - 94%)    Parameters below as of 11 Aug 2022 21:00  Patient On (Oxygen Delivery Method): nasal cannula  O2 Flow (L/min): 4   I&O's Summary    11 Aug 2022 07:01  -  12 Aug 2022 07:00  --------------------------------------------------------  IN: 0 mL / OUT: 700 mL / NET: -700 mL    12 Aug 2022 07:01  -  12 Aug 2022 17:03  --------------------------------------------------------  IN: 0 mL / OUT: 300 mL / NET: -300 mL    Limited exam due to telehealth  GENERAL:  [ ]Alert  [ ]Oriented x   [ ]Lethargic  [ ]Cachexia  [ ]Unarousable  [X ]Verbal  [ ]Non-Verbal  Behavioral:   [ ] Anxiety  [ ] Delirium [ ] Agitation [X ] Other  HEENT:  [ ]Normal   [ ]Dry mouth   [ ]ET Tube/Trach  [ ]Oral lesions  PULMONARY:   [ ]Clear [ ]Tachypnea  [ ]Audible excessive secretions [X] No labored breathing  [ ]Rhonchi        [ ]Right [ ]Left [ ]Bilateral  [ ]Crackles        [ ]Right [ ]Left [ ]Bilateral  [ ]Wheezing     [ ]Right [ ]Left [ ]Bilateral  [ ]Diminished breath sounds [ ]right [ ]left [ ]bilateral  CARDIOVASCULAR:    [ ]Regular [ ]Irregular [ ]Tachy  [ ]Brent [ ]Murmur [ ]Other  GASTROINTESTINAL:  [ ]Soft  [ ]Distended   [ ]+BS  [ ]Non tender [ ]Tender  [ ]PEG [ ]OGT/ NGT  Last BM:   GENITOURINARY:  [ ]Normal [ ] Incontinent   [ ]Oliguria/Anuria   [ ]Martines  MUSCULOSKELETAL:   [ ]Normal   [ ]Weakness  [ ]Bed/Wheelchair bound [ ]Edema  NEUROLOGIC:   [ ]No focal deficits  [X ]Cognitive impairment  [ ]Dysphagia [ ]Dysarthria [ ]Paresis [ ]Other   SKIN:   [ ]Normal   [X] Nonjaundiced [ ]Rash  [ ]Pressure ulcer(s)       Present on admission [ ]y [ ]n    CRITICAL CARE:  [ ] Shock Present  [ ]Septic [ ]Cardiogenic [ ]Neurologic [ ]Hypovolemic  [ ]  Vasopressors [ ]  Inotropes   [ ]Respiratory failure present [ ]Mechanical ventilation [ ]Non-invasive ventilatory support [ ]High flow  [ ]Acute  [ ]Chronic [ ]Hypoxic  [ ]Hypercarbic [ ]Other  [ ]Other organ failure     LABS: reviewed                        11.9   10.72 )-----------( 317      ( 12 Aug 2022 06:12 )             37.8   08-12    137  |  101  |  11  ----------------------------<  80  3.9   |  25  |  0.5<L>    Ca    9.0      12 Aug 2022 06:12  Phos  3.0     08-11  Mg     1.6     08-12    TPro  5.6<L>  /  Alb  3.2<L>  /  TBili  0.5  /  DBili  x   /  AST  27  /  ALT  19  /  AlkPhos  86  08-12      Urinalysis Basic - ( 10 Aug 2022 20:20 )    Color: Yellow / Appearance: Clear / S.025 / pH: x  Gluc: x / Ketone: Negative  / Bili: Negative / Urobili: 1.0 mg/dL   Blood: x / Protein: 30 mg/dL / Nitrite: Negative   Leuk Esterase: Small / RBC: Negative / WBC 6-10 /HPF   Sq Epi: x / Non Sq Epi: Few /HPF / Bacteria: Occasional /HPF      RADIOLOGY & ADDITIONAL STUDIES:    PROTEIN CALORIE MALNUTRITION PRESENT: [ ]mild [ ]moderate [ ]severe [ ]underweight [ ]morbid obesity  https://www.andeal.org/vault/2440/web/files/ONC/Table_Clinical%20Characteristics%20to%20Document%20Malnutrition-White%20JV%20et%20al%2020.pdf    Height (cm): 172.7 (22 @ 03:26)  Weight (kg): 72.3 (22 @ 03:26)  BMI (kg/m2): 24.2 (22 @ 03:26)    [ ]PPSV2 < or = to 30% [ ]significant weight loss  [ ]poor nutritional intake  [ ]anasarca      [ ]Artificial Nutrition      REFERRALS:   [ ]Chaplaincy  [ ]Hospice  [ ]Child Life  [ ]Social Work  [ ]Case management [ ]Holistic Therapy     Goals of Care Document:     ______________  Alex Carbone MD  Palliative Medicine  [facility]   of Geriatric and Palliative Medicine  [phone]

## 2022-08-12 NOTE — PROGRESS NOTE ADULT - ASSESSMENT
89 y/o F w/PMHx of HTN, CAD s/p stent, COPD, hypothyroidism presented to the ED with intermittent abdominal pain with associated intermittent non bloody diarrhea and 30 lb weight loss over past 5 weeks.    Pt admitted for gastroenteritis that resolved then developed SIRS with fevers and leukocytosis started on 8/10 and AMS  metabolic encephelopathy   - fever redolved, Bcx and Ucx negative  - previous Ucx +ve for proteus senstive to cephlosporin, per chart had dysuria,  - CTH negative  - CT abd with colitis, new colitis > has no diarrhea, ight be from Cyclospora she had on admission   - narrow abx to rocephin, flagyl and start Bactrim for cyclospora, for 7 days   - daughter at bedside, discussed the plans with her, GOCs discussed she needs time to decide asked for palliative eval aware of the plans    Gastroenteritis- cyclospora    -No signs of SIRS on admission and CT A/P: No evidence of acute intra-abdominal pathology  -Supportive Care: Bowel Rest and Hydration, Zofran [place]  -C. diff toxin Neg, GI PCR with Cyclospora SPP     Hypomagnesemia and Hypokalemia  Likely from Diarrhaea  Monitor and replace    H/O CAD + Essential HTN   - c/w Plavix, ASA 81, Lisinopril BB and Hydralazine     H/O Hypothyroidism:  c/w synthroid  H/O  COPD: stable : c/w inhalers    GOCs: DNR and DNI  #Dispo: home,  prognosis poor

## 2022-08-12 NOTE — CONSULT NOTE ADULT - PROBLEM SELECTOR RECOMMENDATION 4
- d/w daughter, who states mother lives in an apartment in their house, and has support  - she is open to GOC, after seeing how patient improves, if she is able to be back at her baseline (doing most ADLs by herself), she would like Eger to RAJESH before return home; if not, she is open to discussing other options  - patient is DNR

## 2022-08-12 NOTE — CONSULT NOTE ADULT - ASSESSMENT
89F with PMH of HTN, CAD s/p stent, COPD, hypothyroidism, here with intermittent abdominal pain and weight loss, due to colitis, possibly cyclospora, on ceftriaxone, flagyl, and bactrim. Palliative care called for GOC per daughter's request.

## 2022-08-12 NOTE — PROGRESS NOTE ADULT - ASSESSMENT
ASSESSMENT  89 y/o F w/PMHx of HTN, CAD s/p stent, COPD, hypothyroidism presented to the ED with intermittent abdominal pain with associated intermittent non bloody diarrhea and 30 lb weight loss over past 5 weeks    IMPRESSION  #Chronic diarrhea - Cyclospora identified on PCR - resolved, but worsening abdominal pain on 8/11  - CT Abd/pelvis 8/4 no acute intra-abdominal process  - CT Abdomen and Pelvis w/ IV Cont (08.11.22 @ 16:41): Colitis extending from right to left colon. Mild bilateral hydroureter, may be on the basis of bladder distention.    #Fevers 8/10  - Urine Cx 8/10 NG  - Blood Cx 8/10 NG     #Urine Cx 8/4 Proteus mirabilis   #CAD   #COPD  #Obesity BMI (kg/m2): 24.2  #Abx allergy: NDKA    RECOMMENDATIONS  - unclear if colitis is from Cyclospora infection - she does not have diarrhea, but in considerable abdominal pain   - start bactrim 1DS BID   - narrow cefepime to ceftriaxone 1g daily and start flagyl 500 mg TID for general colitis infection     Please call or message on Microsoft Teams if with any questions.  Spectra [phone]

## 2022-08-12 NOTE — CONSULT NOTE ADULT - PROBLEM SELECTOR RECOMMENDATION 5
- will follow  ______________  Alex Carbone MD  Palliative Medicine  [facility]   of Geriatric and Palliative Medicine  [phone]

## 2022-08-12 NOTE — PROGRESS NOTE ADULT - SUBJECTIVE AND OBJECTIVE BOX
Patient is a 89y old  Female who presents with a chief complaint of gastroenteritis (11 Aug 2022 11:42)      OVERNIGHT EVENTS: mental status slightly improved, no fever overnight, no diarrhea or SOB    SUBJECTIVE / INTERVAL HPI: Patient seen and examined at bedside.     VITAL SIGNS:  Vital Signs Last 24 Hrs  T(C): 36.8 (12 Aug 2022 13:57), Max: 37.7 (11 Aug 2022 18:30)  T(F): 98.2 (12 Aug 2022 13:57), Max: 99.8 (11 Aug 2022 18:30)  HR: 66 (12 Aug 2022 13:57) (66 - 69)  BP: 162/73 (12 Aug 2022 13:57) (146/101 - 181/89)  BP(mean): --  RR: 18 (12 Aug 2022 13:57) (18 - 18)  SpO2: 94% (11 Aug 2022 21:00) (94% - 94%)    Parameters below as of 11 Aug 2022 21:00  Patient On (Oxygen Delivery Method): nasal cannula  O2 Flow (L/min): 4      PHYSICAL EXAM:    HEENT: NC/AT; PERRL, clear conjunctiva  Neck: supple  Cardiovascular: +S1/S2; scattered b/l crackles   Respiratory: CTA b/l; no W/R/R  Gastrointestinal: soft, mild tendneress at Lower abd R>L, ND; +BSx4  Extremities: WWP; 2+ peripheral pulses; no edema   Neurological: Alert and awake, not oriented, reponse to simple questions by yes, no, i dont know, slightly better than yesterday (was  not responsing just moaning),  no apparent focal deficits    MEDICATIONS:  MEDICATIONS  (STANDING):  aspirin  chewable 81 milliGRAM(s) Oral daily  budesonide  80 MICROgram(s)/formoterol 4.5 MICROgram(s) Inhaler 2 Puff(s) Inhalation two times a day  clopidogrel Tablet 75 milliGRAM(s) Oral daily  enoxaparin Injectable 30 milliGRAM(s) SubCutaneous every 24 hours  isosorbide   dinitrate Tablet (ISORDIL) 10 milliGRAM(s) Oral three times a day  latanoprost 0.005% Ophthalmic Solution 1 Drop(s) Both EYES at bedtime  levothyroxine 25 MICROGram(s) Oral daily  lisinopril 20 milliGRAM(s) Oral daily  metoprolol succinate ER 25 milliGRAM(s) Oral daily  metroNIDAZOLE  IVPB      metroNIDAZOLE  IVPB 500 milliGRAM(s) IV Intermittent every 8 hours  pantoprazole    Tablet 40 milliGRAM(s) Oral before breakfast  saccharomyces boulardii 250 milliGRAM(s) Oral two times a day  sodium chloride 0.9%. 1000 milliLiter(s) (50 mL/Hr) IV Continuous <Continuous>  trimethoprim / sulfamethoxazole IVPB 160 milliGRAM(s) IV Intermittent two times a day    MEDICATIONS  (PRN):  acetaminophen  Suppository .. 650 milliGRAM(s) Rectal every 6 hours PRN Temp greater or equal to 38C (100.4F), Mild Pain (1 - 3)  ALBUTerol    90 MICROgram(s) HFA Inhaler 2 Puff(s) Inhalation every 6 hours PRN Shortness of Breath and/or Wheezing  aluminum hydroxide/magnesium hydroxide/simethicone Suspension 30 milliLiter(s) Oral every 4 hours PRN Dyspepsia  hydrALAZINE Injectable 10 milliGRAM(s) IV Push every 6 hours PRN hypertension  melatonin 3 milliGRAM(s) Oral at bedtime PRN Insomnia  ondansetron Injectable 4 milliGRAM(s) IV Push every 8 hours PRN Nausea and/or Vomiting      ALLERGIES:  Allergies    No Known Allergies    Intolerances        LABS:                        11.9   10.72 )-----------( 317      ( 12 Aug 2022 06:12 )             37.8     08-12    137  |  101  |  11  ----------------------------<  80  3.9   |  25  |  0.5<L>    Ca    9.0      12 Aug 2022 06:12  Phos  3.0     08-11  Mg     1.6     08-12    TPro  5.6<L>  /  Alb  3.2<L>  /  TBili  0.5  /  DBili  x   /  AST  27  /  ALT  19  /  AlkPhos  86  08-12      Urinalysis Basic - ( 10 Aug 2022 20:20 )    Color: Yellow / Appearance: Clear / S.025 / pH: x  Gluc: x / Ketone: Negative  / Bili: Negative / Urobili: 1.0 mg/dL   Blood: x / Protein: 30 mg/dL / Nitrite: Negative   Leuk Esterase: Small / RBC: Negative / WBC 6-10 /HPF   Sq Epi: x / Non Sq Epi: Few /HPF / Bacteria: Occasional /HPF      < from: CT Abdomen and Pelvis w/ IV Cont (22 @ 16:41) >  IMPRESSION:    Colitis extending from right to left colon.    Mild bilateral hydroureter, may be on the basis of bladder distention.    --- End of Report ---    < end of copied text >

## 2022-08-13 LAB
ALBUMIN SERPL ELPH-MCNC: 3.2 G/DL — LOW (ref 3.5–5.2)
ALP SERPL-CCNC: 88 U/L — SIGNIFICANT CHANGE UP (ref 30–115)
ALT FLD-CCNC: 20 U/L — SIGNIFICANT CHANGE UP (ref 0–41)
ANION GAP SERPL CALC-SCNC: 13 MMOL/L — SIGNIFICANT CHANGE UP (ref 7–14)
AST SERPL-CCNC: 31 U/L — SIGNIFICANT CHANGE UP (ref 0–41)
BASOPHILS # BLD AUTO: 0.15 K/UL — SIGNIFICANT CHANGE UP (ref 0–0.2)
BASOPHILS NFR BLD AUTO: 1.3 % — HIGH (ref 0–1)
BILIRUB SERPL-MCNC: 0.5 MG/DL — SIGNIFICANT CHANGE UP (ref 0.2–1.2)
BUN SERPL-MCNC: 10 MG/DL — SIGNIFICANT CHANGE UP (ref 10–20)
CALCIUM SERPL-MCNC: 8.8 MG/DL — SIGNIFICANT CHANGE UP (ref 8.5–10.1)
CHLORIDE SERPL-SCNC: 96 MMOL/L — LOW (ref 98–110)
CO2 SERPL-SCNC: 26 MMOL/L — SIGNIFICANT CHANGE UP (ref 17–32)
CREAT SERPL-MCNC: 0.5 MG/DL — LOW (ref 0.7–1.5)
EGFR: 86 ML/MIN/1.73M2 — SIGNIFICANT CHANGE UP
EOSINOPHIL # BLD AUTO: 0.23 K/UL — SIGNIFICANT CHANGE UP (ref 0–0.7)
EOSINOPHIL NFR BLD AUTO: 2.1 % — SIGNIFICANT CHANGE UP (ref 0–8)
GLUCOSE SERPL-MCNC: 72 MG/DL — SIGNIFICANT CHANGE UP (ref 70–99)
HCT VFR BLD CALC: 38.1 % — SIGNIFICANT CHANGE UP (ref 37–47)
HGB BLD-MCNC: 12.4 G/DL — SIGNIFICANT CHANGE UP (ref 12–16)
IMM GRANULOCYTES NFR BLD AUTO: 2.9 % — HIGH (ref 0.1–0.3)
LYMPHOCYTES # BLD AUTO: 1.51 K/UL — SIGNIFICANT CHANGE UP (ref 1.2–3.4)
LYMPHOCYTES # BLD AUTO: 13.6 % — LOW (ref 20.5–51.1)
MAGNESIUM SERPL-MCNC: 1.9 MG/DL — SIGNIFICANT CHANGE UP (ref 1.8–2.4)
MCHC RBC-ENTMCNC: 30.9 PG — SIGNIFICANT CHANGE UP (ref 27–31)
MCHC RBC-ENTMCNC: 32.5 G/DL — SIGNIFICANT CHANGE UP (ref 32–37)
MCV RBC AUTO: 95 FL — SIGNIFICANT CHANGE UP (ref 81–99)
MONOCYTES # BLD AUTO: 1.39 K/UL — HIGH (ref 0.1–0.6)
MONOCYTES NFR BLD AUTO: 12.5 % — HIGH (ref 1.7–9.3)
NEUTROPHILS # BLD AUTO: 7.54 K/UL — HIGH (ref 1.4–6.5)
NEUTROPHILS NFR BLD AUTO: 67.6 % — SIGNIFICANT CHANGE UP (ref 42.2–75.2)
NRBC # BLD: 0 /100 WBCS — SIGNIFICANT CHANGE UP (ref 0–0)
PLATELET # BLD AUTO: 350 K/UL — SIGNIFICANT CHANGE UP (ref 130–400)
POTASSIUM SERPL-MCNC: 3.3 MMOL/L — LOW (ref 3.5–5)
POTASSIUM SERPL-SCNC: 3.3 MMOL/L — LOW (ref 3.5–5)
PROCALCITONIN SERPL-MCNC: 0.07 NG/ML — SIGNIFICANT CHANGE UP (ref 0.02–0.1)
PROT SERPL-MCNC: 5.6 G/DL — LOW (ref 6–8)
RBC # BLD: 4.01 M/UL — LOW (ref 4.2–5.4)
RBC # FLD: 12.9 % — SIGNIFICANT CHANGE UP (ref 11.5–14.5)
SODIUM SERPL-SCNC: 135 MMOL/L — SIGNIFICANT CHANGE UP (ref 135–146)
WBC # BLD: 11.14 K/UL — HIGH (ref 4.8–10.8)
WBC # FLD AUTO: 11.14 K/UL — HIGH (ref 4.8–10.8)

## 2022-08-13 PROCEDURE — 76937 US GUIDE VASCULAR ACCESS: CPT | Mod: 26,59

## 2022-08-13 PROCEDURE — 36569 INSJ PICC 5 YR+ W/O IMAGING: CPT

## 2022-08-13 PROCEDURE — 99233 SBSQ HOSP IP/OBS HIGH 50: CPT

## 2022-08-13 RX ORDER — POTASSIUM CHLORIDE 20 MEQ
20 PACKET (EA) ORAL ONCE
Refills: 0 | Status: COMPLETED | OUTPATIENT
Start: 2022-08-13 | End: 2022-08-13

## 2022-08-13 RX ADMIN — CEFTRIAXONE 100 MILLIGRAM(S): 500 INJECTION, POWDER, FOR SOLUTION INTRAMUSCULAR; INTRAVENOUS at 05:33

## 2022-08-13 RX ADMIN — Medication 100 MILLIGRAM(S): at 05:33

## 2022-08-13 RX ADMIN — Medication 260 MILLIGRAM(S): at 21:03

## 2022-08-13 RX ADMIN — LATANOPROST 1 DROP(S): 0.05 SOLUTION/ DROPS OPHTHALMIC; TOPICAL at 21:03

## 2022-08-13 RX ADMIN — Medication 100 MILLIGRAM(S): at 16:51

## 2022-08-13 RX ADMIN — Medication 260 MILLIGRAM(S): at 08:21

## 2022-08-13 RX ADMIN — ENOXAPARIN SODIUM 30 MILLIGRAM(S): 100 INJECTION SUBCUTANEOUS at 05:38

## 2022-08-13 RX ADMIN — Medication 100 MILLIGRAM(S): at 21:03

## 2022-08-13 RX ADMIN — Medication 50 MILLIEQUIVALENT(S): at 10:49

## 2022-08-13 NOTE — PROGRESS NOTE ADULT - SUBJECTIVE AND OBJECTIVE BOX
Patient is a 89y old  Female who presents with a chief complaint of colitis (12 Aug 2022 17:00)      OVERNIGHT EVENTS: more awake today, able to say few words    SUBJECTIVE / INTERVAL HPI: Patient seen and examined at bedside.     VITAL SIGNS:  Vital Signs Last 24 Hrs  T(C): 36.7 (13 Aug 2022 05:00), Max: 36.8 (12 Aug 2022 13:57)  T(F): 98.1 (13 Aug 2022 05:00), Max: 98.2 (12 Aug 2022 13:57)  HR: 69 (13 Aug 2022 05:00) (66 - 74)  BP: 156/76 (13 Aug 2022 05:00) (156/76 - 172/63)  BP(mean): --  RR: 18 (13 Aug 2022 05:00) (18 - 18)  SpO2: 92% (13 Aug 2022 07:39) (92% - 92%)    Parameters below as of 13 Aug 2022 07:39  Patient On (Oxygen Delivery Method): nasal cannula  O2 Flow (L/min): 4      PHYSICAL EXAM:    General: thin old lady chronically look ill   HEENT: NC/AT; PERRL, clear conjunctiva  Neck: supple  Cardiovascular: +S1/S2  Respiratory: ; scattered b/l crackles   Gastrointestinal: soft, mid abd tenderness, no rebound or guarding /ND; +BSx4  Extremities: WWP; 2+ peripheral pulses; no edema   Neurological: alert, awake, able to say yes and no, follow very simple commands which was not 2 days ago; no focal deficits    MEDICATIONS:  MEDICATIONS  (STANDING):  aspirin  chewable 81 milliGRAM(s) Oral daily  budesonide  80 MICROgram(s)/formoterol 4.5 MICROgram(s) Inhaler 2 Puff(s) Inhalation two times a day  cefTRIAXone   IVPB 1000 milliGRAM(s) IV Intermittent every 24 hours  clopidogrel Tablet 75 milliGRAM(s) Oral daily  enoxaparin Injectable 30 milliGRAM(s) SubCutaneous every 24 hours  isosorbide   dinitrate Tablet (ISORDIL) 10 milliGRAM(s) Oral three times a day  latanoprost 0.005% Ophthalmic Solution 1 Drop(s) Both EYES at bedtime  levothyroxine 25 MICROGram(s) Oral daily  lisinopril 20 milliGRAM(s) Oral daily  metoprolol succinate ER 25 milliGRAM(s) Oral daily  metroNIDAZOLE  IVPB      metroNIDAZOLE  IVPB 500 milliGRAM(s) IV Intermittent every 8 hours  pantoprazole    Tablet 40 milliGRAM(s) Oral before breakfast  saccharomyces boulardii 250 milliGRAM(s) Oral two times a day  sodium chloride 0.9%. 1000 milliLiter(s) (50 mL/Hr) IV Continuous <Continuous>  trimethoprim / sulfamethoxazole IVPB 160 milliGRAM(s) IV Intermittent two times a day    MEDICATIONS  (PRN):  acetaminophen  Suppository .. 650 milliGRAM(s) Rectal every 6 hours PRN Temp greater or equal to 38C (100.4F), Mild Pain (1 - 3)  ALBUTerol    90 MICROgram(s) HFA Inhaler 2 Puff(s) Inhalation every 6 hours PRN Shortness of Breath and/or Wheezing  aluminum hydroxide/magnesium hydroxide/simethicone Suspension 30 milliLiter(s) Oral every 4 hours PRN Dyspepsia  hydrALAZINE Injectable 10 milliGRAM(s) IV Push every 6 hours PRN hypertension  melatonin 3 milliGRAM(s) Oral at bedtime PRN Insomnia  ondansetron Injectable 4 milliGRAM(s) IV Push every 8 hours PRN Nausea and/or Vomiting      ALLERGIES:  Allergies    No Known Allergies    Intolerances        LABS:                        12.4   11.14 )-----------( 350      ( 13 Aug 2022 06:08 )             38.1     08-13    135  |  96<L>  |  10  ----------------------------<  72  3.3<L>   |  26  |  0.5<L>    Ca    8.8      13 Aug 2022 06:08  Mg     1.9     08-13    TPro  5.6<L>  /  Alb  3.2<L>  /  TBili  0.5  /  DBili  x   /  AST  31  /  ALT  20  /  AlkPhos  88  08-13    < from: CT Abdomen and Pelvis w/ IV Cont (08.11.22 @ 16:41) >  IMPRESSION:    Colitis extending from right to left colon.    Mild bilateral hydroureter, may be on the basis of bladder distention.    --- End of Report ---    < end of copied text >

## 2022-08-13 NOTE — PROGRESS NOTE ADULT - ASSESSMENT
89 y/o F w/PMHx of HTN, CAD s/p stent, COPD, hypothyroidism presented to the ED with intermittent abdominal pain with associated intermittent non bloody diarrhea and 30 lb weight loss over past 5 weeks.    Pt admitted for gastroenteritis that resolved then developed SIRS with fevers and leukocytosis started on 8/10 and AMS  metabolic encephelopathy   - fever resolved Bcx and Ucx negative  - previous Ucx +ve for proteus sensitive to cephalosporin per chart had dysuria,  - CTH negative  - CT abd with colitis, new colitis > has no diarrhea, might be from Cyclospora she had on admission   - narrow abx to Rocephin flagyl and c/w Bactrim for cyclospora, for 7 days   - daughter at bedside, discussed the plans with her    Gastroenteritis- cyclospora    -No signs of SIRS on admission and CT A/P: No evidence of acute intra-abdominal pathology  -Supportive Care: Bowel Rest and Hydration, Zofran [place]  -C. diff toxin Neg, GI PCR with Cyclospora SPP     Hypomagnesemia and Hypokalemia  Likely from Diarrhaea  Monitor and replace    H/O CAD + Essential HTN   - c/w Plavix, ASA 81, Lisinopril BB and Hydralazine     H/O Hypothyroidism:  c/w synthroid  H/O  COPD: stable : c/w inhalers    GOCs: DNR and DNI  #Dispo: TBD,  prognosis poor   89 y/o F w/PMHx of HTN, CAD s/p stent, COPD, hypothyroidism presented to the ED with intermittent abdominal pain with associated intermittent non bloody diarrhea and 30 lb weight loss over past 5 weeks.    Pt admitted for gastroenteritis that resolved then developed SIRS with fevers and leukocytosis started on 8/10 and AMS  metabolic encephelopathy   - fever resolved Bcx and Ucx negative  - previous Ucx +ve for proteus sensitive to cephalosporin per chart had dysuria,  - CTH negative  - CT abd with colitis, new colitis > has no diarrhea, might be from Cyclospora she had on admission   - narrow abx to Rocephin flagyl and c/w Bactrim for cyclospora, for 7 days   - speech and swallow eval for feeding  - daughter at bedside, discussed the plans with her    Gastroenteritis- cyclospora    -No signs of SIRS on admission and CT A/P: No evidence of acute intra-abdominal pathology  -Supportive Care: Bowel Rest and Hydration, Zofran [place]  -C. diff toxin Neg, GI PCR with Cyclospora SPP     Hypomagnesemia and Hypokalemia  Likely from Diarrhaea  Monitor and replace    H/O CAD + Essential HTN   - c/w Plavix, ASA 81, Lisinopril BB and Hydralazine     H/O Hypothyroidism:  c/w synthroid  H/O  COPD: stable : c/w inhalers    GOCs: DNR and DNI  #Dispo: TBD,  prognosis poor

## 2022-08-14 LAB
ALBUMIN SERPL ELPH-MCNC: 3.3 G/DL — LOW (ref 3.5–5.2)
ALP SERPL-CCNC: 103 U/L — SIGNIFICANT CHANGE UP (ref 30–115)
ALT FLD-CCNC: 29 U/L — SIGNIFICANT CHANGE UP (ref 0–41)
ANION GAP SERPL CALC-SCNC: 20 MMOL/L — HIGH (ref 7–14)
AST SERPL-CCNC: 43 U/L — HIGH (ref 0–41)
BASOPHILS # BLD AUTO: 0.19 K/UL — SIGNIFICANT CHANGE UP (ref 0–0.2)
BASOPHILS NFR BLD AUTO: 1.4 % — HIGH (ref 0–1)
BILIRUB SERPL-MCNC: 0.4 MG/DL — SIGNIFICANT CHANGE UP (ref 0.2–1.2)
BUN SERPL-MCNC: 14 MG/DL — SIGNIFICANT CHANGE UP (ref 10–20)
CALCIUM SERPL-MCNC: 9.4 MG/DL — SIGNIFICANT CHANGE UP (ref 8.5–10.1)
CHLORIDE SERPL-SCNC: 95 MMOL/L — LOW (ref 98–110)
CO2 SERPL-SCNC: 21 MMOL/L — SIGNIFICANT CHANGE UP (ref 17–32)
CREAT SERPL-MCNC: 0.5 MG/DL — LOW (ref 0.7–1.5)
EGFR: 86 ML/MIN/1.73M2 — SIGNIFICANT CHANGE UP
EOSINOPHIL # BLD AUTO: 0.2 K/UL — SIGNIFICANT CHANGE UP (ref 0–0.7)
EOSINOPHIL NFR BLD AUTO: 1.5 % — SIGNIFICANT CHANGE UP (ref 0–8)
GLUCOSE SERPL-MCNC: 60 MG/DL — LOW (ref 70–99)
HCT VFR BLD CALC: 43.8 % — SIGNIFICANT CHANGE UP (ref 37–47)
HGB BLD-MCNC: 14 G/DL — SIGNIFICANT CHANGE UP (ref 12–16)
IMM GRANULOCYTES NFR BLD AUTO: 2.8 % — HIGH (ref 0.1–0.3)
LYMPHOCYTES # BLD AUTO: 1.73 K/UL — SIGNIFICANT CHANGE UP (ref 1.2–3.4)
LYMPHOCYTES # BLD AUTO: 12.9 % — LOW (ref 20.5–51.1)
MAGNESIUM SERPL-MCNC: 1.9 MG/DL — SIGNIFICANT CHANGE UP (ref 1.8–2.4)
MCHC RBC-ENTMCNC: 30.5 PG — SIGNIFICANT CHANGE UP (ref 27–31)
MCHC RBC-ENTMCNC: 32 G/DL — SIGNIFICANT CHANGE UP (ref 32–37)
MCV RBC AUTO: 95.4 FL — SIGNIFICANT CHANGE UP (ref 81–99)
MONOCYTES # BLD AUTO: 1.4 K/UL — HIGH (ref 0.1–0.6)
MONOCYTES NFR BLD AUTO: 10.5 % — HIGH (ref 1.7–9.3)
NEUTROPHILS # BLD AUTO: 9.47 K/UL — HIGH (ref 1.4–6.5)
NEUTROPHILS NFR BLD AUTO: 70.9 % — SIGNIFICANT CHANGE UP (ref 42.2–75.2)
NRBC # BLD: 0 /100 WBCS — SIGNIFICANT CHANGE UP (ref 0–0)
PLATELET # BLD AUTO: 398 K/UL — SIGNIFICANT CHANGE UP (ref 130–400)
POTASSIUM SERPL-MCNC: 3.7 MMOL/L — SIGNIFICANT CHANGE UP (ref 3.5–5)
POTASSIUM SERPL-SCNC: 3.7 MMOL/L — SIGNIFICANT CHANGE UP (ref 3.5–5)
PROT SERPL-MCNC: 5.9 G/DL — LOW (ref 6–8)
RBC # BLD: 4.59 M/UL — SIGNIFICANT CHANGE UP (ref 4.2–5.4)
RBC # FLD: 12.9 % — SIGNIFICANT CHANGE UP (ref 11.5–14.5)
SODIUM SERPL-SCNC: 136 MMOL/L — SIGNIFICANT CHANGE UP (ref 135–146)
WBC # BLD: 13.37 K/UL — HIGH (ref 4.8–10.8)
WBC # FLD AUTO: 13.37 K/UL — HIGH (ref 4.8–10.8)

## 2022-08-14 PROCEDURE — 99232 SBSQ HOSP IP/OBS MODERATE 35: CPT

## 2022-08-14 PROCEDURE — 76937 US GUIDE VASCULAR ACCESS: CPT | Mod: 26,59

## 2022-08-14 PROCEDURE — 36569 INSJ PICC 5 YR+ W/O IMAGING: CPT

## 2022-08-14 RX ADMIN — LATANOPROST 1 DROP(S): 0.05 SOLUTION/ DROPS OPHTHALMIC; TOPICAL at 21:09

## 2022-08-14 RX ADMIN — SODIUM CHLORIDE 50 MILLILITER(S): 9 INJECTION INTRAMUSCULAR; INTRAVENOUS; SUBCUTANEOUS at 14:36

## 2022-08-14 RX ADMIN — ISOSORBIDE DINITRATE 10 MILLIGRAM(S): 5 TABLET ORAL at 12:57

## 2022-08-14 RX ADMIN — Medication 100 MILLIGRAM(S): at 14:35

## 2022-08-14 RX ADMIN — Medication 260 MILLIGRAM(S): at 18:57

## 2022-08-14 RX ADMIN — Medication 260 MILLIGRAM(S): at 05:29

## 2022-08-14 RX ADMIN — CEFTRIAXONE 100 MILLIGRAM(S): 500 INJECTION, POWDER, FOR SOLUTION INTRAMUSCULAR; INTRAVENOUS at 05:28

## 2022-08-14 RX ADMIN — CLOPIDOGREL BISULFATE 75 MILLIGRAM(S): 75 TABLET, FILM COATED ORAL at 12:57

## 2022-08-14 RX ADMIN — BUDESONIDE AND FORMOTEROL FUMARATE DIHYDRATE 2 PUFF(S): 160; 4.5 AEROSOL RESPIRATORY (INHALATION) at 21:09

## 2022-08-14 RX ADMIN — ENOXAPARIN SODIUM 30 MILLIGRAM(S): 100 INJECTION SUBCUTANEOUS at 05:28

## 2022-08-14 RX ADMIN — Medication 100 MILLIGRAM(S): at 21:08

## 2022-08-14 RX ADMIN — ISOSORBIDE DINITRATE 10 MILLIGRAM(S): 5 TABLET ORAL at 16:17

## 2022-08-14 RX ADMIN — Medication 100 MILLIGRAM(S): at 05:29

## 2022-08-14 RX ADMIN — Medication 250 MILLIGRAM(S): at 18:57

## 2022-08-14 RX ADMIN — Medication 81 MILLIGRAM(S): at 12:57

## 2022-08-14 RX ADMIN — Medication 10 MILLIGRAM(S): at 05:27

## 2022-08-14 NOTE — PROGRESS NOTE ADULT - ASSESSMENT
89 y/o F w/PMHx of HTN, CAD s/p stent, COPD, hypothyroidism presented to the ED with intermittent abdominal pain with associated intermittent non bloody diarrhea and 30 lb weight loss over past 5 weeks.    Pt admitted for gastroenteritis that resolved then developed SIRS with fevers and leukocytosis started on 8/10 and AMS  metabolic encephelopathy   - fever resolved Bcx and Ucx negative  - previous Ucx +ve for proteus sensitive to cephalosporin per chart had dysuria,  - CTH negative  - CT abd with colitis, new colitis > has no diarrhea, might be from Cyclospora she had on admission   - narrow abx to Rocephin flagyl and c/w Bactrim for cyclospora, for 7 days   - speech and swallow eval for feeding  - daughter at bedside, discussed the plans with her    Gastroenteritis- cyclospora    -No signs of SIRS on admission and CT A/P: No evidence of acute intra-abdominal pathology  -Supportive Care: Bowel Rest and Hydration, Zofran [place]  -C. diff toxin Neg, GI PCR with Cyclospora SPP     Hypomagnesemia and Hypokalemia  Likely from Diarrhaea  Monitor and replace    H/O CAD + Essential HTN   - c/w Plavix, ASA 81, Lisinopril BB and Hydralazine     H/O Hypothyroidism:  c/w synthroid  H/O  COPD: stable : c/w inhalers    GOCs: DNR and DNI  #Dispo: Dispo planning, SNF ?,  prognosis poor 89 y/o F w/PMHx of HTN, CAD s/p stent, COPD, hypothyroidism presented to the ED with intermittent abdominal pain with associated intermittent non bloody diarrhea and 30 lb weight loss over past 5 weeks.    Pt admitted for gastroenteritis that resolved then developed SIRS with fevers and leukocytosis started on 8/10 and AMS  metabolic encephelopathy   - fever resolved Bcx and Ucx negative  - previous Ucx +ve for proteus sensitive to cephalosporin per chart had dysuria,  - CTH negative  - CT abd with colitis, new colitis > has no diarrhea, might be from Cyclospora she had on admission   - narrow abx to Rocephin miguel, final ID recm  - c/w Bactrim for cyclospora, for 7 days   - speech and swallow eval for feeding  - daughter at bedside, discussed the plans with her    Gastroenteritis- cyclospora    -No signs of SIRS on admission and CT A/P: No evidence of acute intra-abdominal pathology  -Supportive Care: Bowel Rest and Hydration, Zofran [place]  -C. diff toxin Neg, GI PCR with Cyclospora SPP     Hypomagnesemia and Hypokalemia  Likely from Diarrhaea  Monitor and replace    H/O CAD + Essential HTN   - c/w Plavix, ASA 81, Lisinopril BB and Hydralazine     H/O Hypothyroidism:  c/w synthroid  H/O  COPD: stable : c/w inhalers    GOCs: DNR and DNI  #Dispo: Dispo planning, ID final recom, SNF ?,  prognosis poor

## 2022-08-14 NOTE — PROGRESS NOTE ADULT - SUBJECTIVE AND OBJECTIVE BOX
Patient is a 89y old  Female who presents with a chief complaint of diarrhea (13 Aug 2022 12:11)      OVERNIGHT EVENTS: mental status back to baseline, able to answer my questions, follows commands     SUBJECTIVE / INTERVAL HPI: Patient seen and examined at bedside.     VITAL SIGNS:  Vital Signs Last 24 Hrs  T(C): 36.2 (14 Aug 2022 04:50), Max: 36.3 (13 Aug 2022 14:12)  T(F): 97.2 (14 Aug 2022 04:50), Max: 97.3 (13 Aug 2022 14:12)  HR: 73 (14 Aug 2022 04:50) (71 - 76)  BP: 171/72 (14 Aug 2022 09:59) (153/74 - 185/81)  BP(mean): --  RR: 16 (14 Aug 2022 04:50) (16 - 18)  SpO2: --        PHYSICAL EXAM:    General: thin old lady chronically look ill   HEENT: NC/AT; PERRL, clear conjunctiva  Neck: supple  Cardiovascular: +S1/S2  Respiratory: ; scattered b/l crackles   Gastrointestinal: soft, mid abd tenderness, no rebound or guarding /ND; +BSx4  Extremities: WWP; 2+ peripheral pulses; no edema   Neurological: alert, awake, oriented x2-3, no focal deficits    MEDICATIONS:  MEDICATIONS  (STANDING):  aspirin  chewable 81 milliGRAM(s) Oral daily  budesonide  80 MICROgram(s)/formoterol 4.5 MICROgram(s) Inhaler 2 Puff(s) Inhalation two times a day  cefTRIAXone   IVPB 1000 milliGRAM(s) IV Intermittent every 24 hours  clopidogrel Tablet 75 milliGRAM(s) Oral daily  enoxaparin Injectable 30 milliGRAM(s) SubCutaneous every 24 hours  isosorbide   dinitrate Tablet (ISORDIL) 10 milliGRAM(s) Oral three times a day  latanoprost 0.005% Ophthalmic Solution 1 Drop(s) Both EYES at bedtime  levothyroxine 25 MICROGram(s) Oral daily  lisinopril 20 milliGRAM(s) Oral daily  metoprolol succinate ER 25 milliGRAM(s) Oral daily  metroNIDAZOLE  IVPB      metroNIDAZOLE  IVPB 500 milliGRAM(s) IV Intermittent every 8 hours  pantoprazole    Tablet 40 milliGRAM(s) Oral before breakfast  saccharomyces boulardii 250 milliGRAM(s) Oral two times a day  sodium chloride 0.9%. 1000 milliLiter(s) (50 mL/Hr) IV Continuous <Continuous>  trimethoprim / sulfamethoxazole IVPB 160 milliGRAM(s) IV Intermittent two times a day    MEDICATIONS  (PRN):  acetaminophen  Suppository .. 650 milliGRAM(s) Rectal every 6 hours PRN Temp greater or equal to 38C (100.4F), Mild Pain (1 - 3)  ALBUTerol    90 MICROgram(s) HFA Inhaler 2 Puff(s) Inhalation every 6 hours PRN Shortness of Breath and/or Wheezing  aluminum hydroxide/magnesium hydroxide/simethicone Suspension 30 milliLiter(s) Oral every 4 hours PRN Dyspepsia  hydrALAZINE Injectable 10 milliGRAM(s) IV Push every 6 hours PRN hypertension  melatonin 3 milliGRAM(s) Oral at bedtime PRN Insomnia  ondansetron Injectable 4 milliGRAM(s) IV Push every 8 hours PRN Nausea and/or Vomiting      ALLERGIES:  Allergies    No Known Allergies    Intolerances        LABS:                        14.0   13.37 )-----------( 398      ( 14 Aug 2022 08:42 )             43.8     08-14    136  |  95<L>  |  14  ----------------------------<  60<L>  3.7   |  21  |  0.5<L>    Ca    9.4      14 Aug 2022 08:42  Mg     1.9     08-14    TPro  5.9<L>  /  Alb  3.3<L>  /  TBili  0.4  /  DBili  x   /  AST  43<H>  /  ALT  29  /  AlkPhos  103  08-14

## 2022-08-15 LAB
ALBUMIN SERPL ELPH-MCNC: 3.2 G/DL — LOW (ref 3.5–5.2)
ALP SERPL-CCNC: 93 U/L — SIGNIFICANT CHANGE UP (ref 30–115)
ALT FLD-CCNC: 31 U/L — SIGNIFICANT CHANGE UP (ref 0–41)
ANION GAP SERPL CALC-SCNC: 13 MMOL/L — SIGNIFICANT CHANGE UP (ref 7–14)
AST SERPL-CCNC: 51 U/L — HIGH (ref 0–41)
BASOPHILS # BLD AUTO: 0.16 K/UL — SIGNIFICANT CHANGE UP (ref 0–0.2)
BASOPHILS NFR BLD AUTO: 1 % — SIGNIFICANT CHANGE UP (ref 0–1)
BILIRUB SERPL-MCNC: 0.4 MG/DL — SIGNIFICANT CHANGE UP (ref 0.2–1.2)
BUN SERPL-MCNC: 17 MG/DL — SIGNIFICANT CHANGE UP (ref 10–20)
CALCIUM SERPL-MCNC: 9.1 MG/DL — SIGNIFICANT CHANGE UP (ref 8.5–10.1)
CHLORIDE SERPL-SCNC: 96 MMOL/L — LOW (ref 98–110)
CO2 SERPL-SCNC: 24 MMOL/L — SIGNIFICANT CHANGE UP (ref 17–32)
CREAT SERPL-MCNC: 0.7 MG/DL — SIGNIFICANT CHANGE UP (ref 0.7–1.5)
EGFR: 79 ML/MIN/1.73M2 — SIGNIFICANT CHANGE UP
EOSINOPHIL # BLD AUTO: 0.24 K/UL — SIGNIFICANT CHANGE UP (ref 0–0.7)
EOSINOPHIL NFR BLD AUTO: 1.5 % — SIGNIFICANT CHANGE UP (ref 0–8)
GLUCOSE SERPL-MCNC: 116 MG/DL — HIGH (ref 70–99)
HCT VFR BLD CALC: 40.1 % — SIGNIFICANT CHANGE UP (ref 37–47)
HGB BLD-MCNC: 13.1 G/DL — SIGNIFICANT CHANGE UP (ref 12–16)
IMM GRANULOCYTES NFR BLD AUTO: 2.6 % — HIGH (ref 0.1–0.3)
LYMPHOCYTES # BLD AUTO: 1.98 K/UL — SIGNIFICANT CHANGE UP (ref 1.2–3.4)
LYMPHOCYTES # BLD AUTO: 12.8 % — LOW (ref 20.5–51.1)
MAGNESIUM SERPL-MCNC: 1.7 MG/DL — LOW (ref 1.8–2.4)
MCHC RBC-ENTMCNC: 30.9 PG — SIGNIFICANT CHANGE UP (ref 27–31)
MCHC RBC-ENTMCNC: 32.7 G/DL — SIGNIFICANT CHANGE UP (ref 32–37)
MCV RBC AUTO: 94.6 FL — SIGNIFICANT CHANGE UP (ref 81–99)
MONOCYTES # BLD AUTO: 1.64 K/UL — HIGH (ref 0.1–0.6)
MONOCYTES NFR BLD AUTO: 10.6 % — HIGH (ref 1.7–9.3)
NEUTROPHILS # BLD AUTO: 11.1 K/UL — HIGH (ref 1.4–6.5)
NEUTROPHILS NFR BLD AUTO: 71.5 % — SIGNIFICANT CHANGE UP (ref 42.2–75.2)
NRBC # BLD: 0 /100 WBCS — SIGNIFICANT CHANGE UP (ref 0–0)
PLATELET # BLD AUTO: 387 K/UL — SIGNIFICANT CHANGE UP (ref 130–400)
POTASSIUM SERPL-MCNC: 3.7 MMOL/L — SIGNIFICANT CHANGE UP (ref 3.5–5)
POTASSIUM SERPL-SCNC: 3.7 MMOL/L — SIGNIFICANT CHANGE UP (ref 3.5–5)
PROT SERPL-MCNC: 5.8 G/DL — LOW (ref 6–8)
RBC # BLD: 4.24 M/UL — SIGNIFICANT CHANGE UP (ref 4.2–5.4)
RBC # FLD: 12.8 % — SIGNIFICANT CHANGE UP (ref 11.5–14.5)
SARS-COV-2 RNA SPEC QL NAA+PROBE: SIGNIFICANT CHANGE UP
SODIUM SERPL-SCNC: 133 MMOL/L — LOW (ref 135–146)
WBC # BLD: 15.52 K/UL — HIGH (ref 4.8–10.8)
WBC # FLD AUTO: 15.52 K/UL — HIGH (ref 4.8–10.8)

## 2022-08-15 PROCEDURE — 99232 SBSQ HOSP IP/OBS MODERATE 35: CPT

## 2022-08-15 PROCEDURE — 70450 CT HEAD/BRAIN W/O DYE: CPT | Mod: 26

## 2022-08-15 RX ORDER — MAGNESIUM SULFATE 500 MG/ML
2 VIAL (ML) INJECTION ONCE
Refills: 0 | Status: COMPLETED | OUTPATIENT
Start: 2022-08-15 | End: 2022-08-15

## 2022-08-15 RX ADMIN — Medication 100 MILLIGRAM(S): at 05:12

## 2022-08-15 RX ADMIN — CLOPIDOGREL BISULFATE 75 MILLIGRAM(S): 75 TABLET, FILM COATED ORAL at 11:30

## 2022-08-15 RX ADMIN — PANTOPRAZOLE SODIUM 40 MILLIGRAM(S): 20 TABLET, DELAYED RELEASE ORAL at 05:14

## 2022-08-15 RX ADMIN — ISOSORBIDE DINITRATE 10 MILLIGRAM(S): 5 TABLET ORAL at 20:31

## 2022-08-15 RX ADMIN — LISINOPRIL 20 MILLIGRAM(S): 2.5 TABLET ORAL at 05:13

## 2022-08-15 RX ADMIN — Medication 260 MILLIGRAM(S): at 05:14

## 2022-08-15 RX ADMIN — LATANOPROST 1 DROP(S): 0.05 SOLUTION/ DROPS OPHTHALMIC; TOPICAL at 20:32

## 2022-08-15 RX ADMIN — Medication 100 MILLIGRAM(S): at 13:46

## 2022-08-15 RX ADMIN — CEFTRIAXONE 100 MILLIGRAM(S): 500 INJECTION, POWDER, FOR SOLUTION INTRAMUSCULAR; INTRAVENOUS at 05:12

## 2022-08-15 RX ADMIN — ISOSORBIDE DINITRATE 10 MILLIGRAM(S): 5 TABLET ORAL at 05:13

## 2022-08-15 RX ADMIN — Medication 260 MILLIGRAM(S): at 20:31

## 2022-08-15 RX ADMIN — Medication 100 MILLIGRAM(S): at 20:31

## 2022-08-15 RX ADMIN — Medication 81 MILLIGRAM(S): at 11:30

## 2022-08-15 RX ADMIN — Medication 25 GRAM(S): at 13:45

## 2022-08-15 RX ADMIN — Medication 25 MICROGRAM(S): at 05:13

## 2022-08-15 RX ADMIN — Medication 25 MILLIGRAM(S): at 05:13

## 2022-08-15 RX ADMIN — Medication 250 MILLIGRAM(S): at 05:13

## 2022-08-15 RX ADMIN — ENOXAPARIN SODIUM 30 MILLIGRAM(S): 100 INJECTION SUBCUTANEOUS at 05:12

## 2022-08-15 RX ADMIN — ISOSORBIDE DINITRATE 10 MILLIGRAM(S): 5 TABLET ORAL at 12:08

## 2022-08-15 RX ADMIN — Medication 250 MILLIGRAM(S): at 18:52

## 2022-08-15 NOTE — OCCUPATIONAL THERAPY INITIAL EVALUATION ADULT - PERTINENT HX OF CURRENT PROBLEM, REHAB EVAL
Admitted due to intermittent abdominal pain with associated intermittent non bloody diarrhea and 30 lb weight loss over past 5 weeks. patient states she had a roast beef meal prior to onset of symptoms. patient denies any vomiting. patient with decreased po intake. States she had a BM yesterday and + flatus today. CT Head 8/15/22: No evidence of acute intracranial pathology. Stable exam since 8/10/2022. Stable moderate chronic microvascular changes and parenchymal atrophy. CT Abdomen 8/11/22: Colitis extending from right to left colon. Mild bilateral hydroureter, may be on the basis of bladder distention.

## 2022-08-15 NOTE — OCCUPATIONAL THERAPY INITIAL EVALUATION ADULT - ADDITIONAL COMMENTS
As per granddaughter and pt; pt was independent in all ADL's prior to admission. Pt uses rollator to ambulate at all times.

## 2022-08-15 NOTE — PROGRESS NOTE ADULT - SUBJECTIVE AND OBJECTIVE BOX
CHIRAG HERNÁNDEZ  89y, Female  Allergy: No Known Allergies      LOS  11d    CHIEF COMPLAINT: diarrhea (14 Aug 2022 11:31)      INTERVAL EVENTS/HPI  - No acute events overnight  - T(F): , Max: 97.7 (08-15-22 @ 14:19)  - still with abdominal pain   - denies any diarrhea  - WBC Count: 15.52 (08-15-22 @ 08:00)  WBC Count: 13.37 (08-14-22 @ 08:42)     - Creatinine, Serum: 0.7 (08-15-22 @ 08:00)  Creatinine, Serum: 0.5 (08-14-22 @ 08:42)       ROS  General: Denies rigors, nightsweats  HEENT: Denies headache, rhinorrhea, sore throat, eye pain  CV: Denies CP, palpitations  PULM: Denies wheezing, hemoptysis  GI: Denies hematemesis, hematochezia, melena  : Denies discharge, hematuria  MSK: Denies arthralgias, myalgias  SKIN: Denies rash, lesions  NEURO: Denies paresthesias, weakness  PSYCH: Denies depression, anxiety    VITALS:  T(F): 97.7, Max: 97.7 (08-15-22 @ 14:19)  HR: 64  BP: 150/65  RR: 18Vital Signs Last 24 Hrs  T(C): 36.5 (15 Aug 2022 14:19), Max: 36.5 (15 Aug 2022 14:19)  T(F): 97.7 (15 Aug 2022 14:19), Max: 97.7 (15 Aug 2022 14:19)  HR: 64 (15 Aug 2022 14:19) (2 - 72)  BP: 150/65 (15 Aug 2022 14:19) (142/55 - 190/82)  BP(mean): 67 (15 Aug 2022 11:30) (67 - 67)  RR: 18 (15 Aug 2022 14:19) (18 - 18)  SpO2: 96% (15 Aug 2022 11:30) (96% - 96%)        PHYSICAL EXAM:  Gen: NAD, resting in bed  HEENT: Normocephalic, atraumatic  Neck: supple, no lymphadenopathy  CV: Regular rate & regular rhythm  Lungs: decreased BS at bases, no fremitus  Abdomen: Soft, BS present  Ext: Warm, well perfused  Neuro: non focal, awake  Skin: no rash, no erythema  Lines: no phlebitis    FH: Non-contributory  Social Hx: Non-contributory    TESTS & MEASUREMENTS:                        13.1   15.52 )-----------( 387      ( 15 Aug 2022 08:00 )             40.1     08-15    133<L>  |  96<L>  |  17  ----------------------------<  116<H>  3.7   |  24  |  0.7    Ca    9.1      15 Aug 2022 08:00  Mg     1.7     08-15    TPro  5.8<L>  /  Alb  3.2<L>  /  TBili  0.4  /  DBili  x   /  AST  51<H>  /  ALT  31  /  AlkPhos  93  08-15      LIVER FUNCTIONS - ( 15 Aug 2022 08:00 )  Alb: 3.2 g/dL / Pro: 5.8 g/dL / ALK PHOS: 93 U/L / ALT: 31 U/L / AST: 51 U/L / GGT: x               Culture - Urine (collected 08-10-22 @ 20:20)  Source: Clean Catch Clean Catch (Midstream)  Final Report (08-12-22 @ 07:38):    No growth    Culture - Blood (collected 08-10-22 @ 18:57)  Source: .Blood None  Preliminary Report (08-12-22 @ 01:02):    No growth to date.    Culture - Blood (collected 08-10-22 @ 16:10)  Source: .Blood Blood-Venous  Preliminary Report (08-12-22 @ 01:02):    No growth to date.    GI PCR Panel, Stool (collected 08-06-22 @ 22:51)  Source: .Stool Feces  Final Report (08-07-22 @ 22:44):    Cyclospora species    DETECTED by PCR    *******Please Note:*******    GI panel PCR evaluates for:    Campylobacter, Plesiomonas shigelloides, Salmonella,    Vibrio, Yersinia enterocolitica, Enteroaggregative    Escherichia coli (EAEC), Enteropathogenic E.coli(EPEC),    Enterotoxigenic E. coli (ETEC) lt/st, Shiga-like    toxin-producing E. coli (STEC) stx1/stx2,    Shigella/ Enteroinvasive E. coli (EIEC), Cryptosporidium,    Cyclospora cayetanensis, Entamoeba histolytica,    Giardia lamblia, Adenovirus F 40/41, Astrovirus,    Norovirus GI/GII, Rotavirus A, Sapovirus    Culture - Stool (collected 08-06-22 @ 22:51)  Source: .Stool Feces  Final Report (08-09-22 @ 21:07):    No enteric pathogens isolated.    (Stool culture examined for Salmonella,    Shigella, Campylobacter, Aeromonas, Plesiomonas,    Vibrio, E.coli O157 and Yersinia)    Culture - Urine (collected 08-04-22 @ 18:45)  Source: Clean Catch Clean Catch (Midstream)  Final Report (08-07-22 @ 09:46):    >100,000 CFU/ml Proteus mirabilis  Organism: Proteus mirabilis (08-07-22 @ 09:46)  Organism: Proteus mirabilis (08-07-22 @ 09:46)      -  Amikacin: S <=16      -  Amoxicillin/Clavulanic Acid: S <=8/4      -  Ampicillin: S <=8 These ampicillin results predict results for amoxicillin      -  Ampicillin/Sulbactam: S <=4/2 Enterobacter, Klebsiella aerogenes, Citrobacter, and Serratia may develop resistance during prolonged therapy (3-4 days)      -  Aztreonam: S <=4      -  Cefazolin: S 4 (MIC_CL_COM_ENTERIC_CEFAZU) For uncomplicated UTI with K. pneumoniae, E. coli, or P. mirablis: SAMEER <=16 is sensitive and SAMEER >=32 is resistant. This also predicts results for oral agents cefaclor, cefdinir, cefpodoxime, cefprozil, cefuroxime axetil, cephalexin and locarbef for uncomplicated UTI. Note that some isolates may be susceptible to these agents while testing resistant to cefazolin.      -  Cefepime: S <=2      -  Cefoxitin: S <=8      -  Ceftriaxone: S <=1 Enterobacter, Klebsiella aerogenes, Citrobacter, and Serratia may develop resistance during prolonged therapy      -  Ciprofloxacin: S <=0.25      -  Ertapenem: S <=0.5      -  Gentamicin: S 4      -  Levofloxacin: S <=0.5      -  Meropenem: S <=1      -  Nitrofurantoin: R >64 Should not be used to treat pyelonephritis      -  Piperacillin/Tazobactam: S <=8      -  Tobramycin: S <=2      -  Trimethoprim/Sulfamethoxazole: S <=0.5/9.5      Method Type: [facility]            INFECTIOUS DISEASES TESTING  COVID-19 PCR: NotDetec (08-14-22 @ 13:15)  Procalcitonin, Serum: 0.07 (08-13-22 @ 06:08)  Rapid RVP Result: NotDetec (08-11-22 @ 08:00)  MRSA PCR Result.: Negative (08-11-22 @ 00:00)  Procalcitonin, Serum: 0.05 (08-10-22 @ 18:57)  COVID-19 PCR: NotDetec (08-09-22 @ 11:45)  COVID-19 PCR: NotDetec (08-04-22 @ 22:40)      INFLAMMATORY MARKERS      RADIOLOGY & ADDITIONAL TESTS:  I have personally reviewed the last available Chest xray  CXR      CT      CARDIOLOGY TESTING  12 Lead ECG:   Ventricular Rate 66 BPM    Atrial Rate 66 BPM    P-R Interval 212 ms    QRS Duration 140 ms    Q-T Interval 446 ms    QTC Calculation(Bazett) 467 ms    P Axis 68 degrees    R Axis -54 degrees    T Axis 96 degrees    Diagnosis Line Sinus rhythm clml7ty degree A-V block  Non-specific intra-ventricular conduction delay  Left ventricular hypertrophy with repolarization abnormality  Left axis deviation  Possible Lateral infarct , age undetermined  Abnormal ECG    Confirmed by Luis Eduardo Johnson ([id number]) on 8/5/2022 9:56:09 AM (08-04-22 @ 18:45)      MEDICATIONS  aspirin  chewable 81 Oral daily  budesonide  80 MICROgram(s)/formoterol 4.5 MICROgram(s) Inhaler 2 Inhalation two times a day  cefTRIAXone   IVPB 1000 IV Intermittent every 24 hours  clopidogrel Tablet 75 Oral daily  enoxaparin Injectable 30 SubCutaneous every 24 hours  isosorbide   dinitrate Tablet (ISORDIL) 10 Oral three times a day  latanoprost 0.005% Ophthalmic Solution 1 Both EYES at bedtime  levothyroxine 25 Oral daily  lisinopril 20 Oral daily  metoprolol succinate ER 25 Oral daily  metroNIDAZOLE  IVPB     metroNIDAZOLE  IVPB 500 IV Intermittent every 8 hours  pantoprazole    Tablet 40 Oral before breakfast  saccharomyces boulardii 250 Oral two times a day  sodium chloride 0.9%. 1000 IV Continuous <Continuous>  trimethoprim / sulfamethoxazole IVPB 160 IV Intermittent two times a day      WEIGHT  Weight (kg): 72.3 (08-05-22 @ 03:26)  Creatinine, Serum: 0.7 mg/dL (08-15-22 @ 08:00)      ANTIBIOTICS:  cefTRIAXone   IVPB 1000 milliGRAM(s) IV Intermittent every 24 hours  metroNIDAZOLE  IVPB      metroNIDAZOLE  IVPB 500 milliGRAM(s) IV Intermittent every 8 hours  trimethoprim / sulfamethoxazole IVPB 160 milliGRAM(s) IV Intermittent two times a day      All available historical records have been reviewed

## 2022-08-15 NOTE — CHART NOTE - NSCHARTNOTEFT_GEN_A_CORE
PALLIATIVE MEDICINE INTERDISCIPLINARY TEAM NOTE    Provider:  [   ]Social Work   [   ]          [   ] Initial visit [   ] Follow up    Family or contact name / phone #   Met with: [   ] Patient  [   ] Family  [   ] Other:    Primary Language: [   ] English [   ] Other*:                      *Interpretation provided by:    SUPPORT DIAGNOSES            (Check all that apply)  [   ] Psychosocial spiritual assessment (PSSA)  [   ] EOL issues  [   ] Cultural / spiritual concerns  [   ] Pain / suffering  [   ] Dementia / AMS  [   ] Other:  [   ] AD issues  [   ] Grief / loss / sadness  [   ] Discharge issues  [   ] Distress / coping    PSYCHOSOCIAL ASSESSMENT OF PATIENT         (Check all that apply)  [   ] Initial Assessment            [   ] Reassessment          [   ] Not Applicable this visit    Pain/suffering acuity:  [   ] None to mild (0-3)           [   ] Moderate (4-6)        [   ] High (7-10)    Mental Status:  [   ] Alert/oriented (x3)          [   ] Confused/Altered(x2/x1)         [   ] Non-resp    Functional status:  [   ] Independent w ADLs      [   ] Needs Assistance             [   ] Bedbound/Full Care    Coping:  [   ] Coping well                     [   ] Coping w/difficulty            [   ] Poor coping    Support system:  [   ] Strong                              [   ] Adequate                        [   ] Inadequate    SPIRITUAL ASSESSMENT  Yazdanism/Spiritual practice: ___Catholic________________________    Role of organized Holiness:  [ x  ] Important                     [   ] Some (fam tradition, cultural)               [   ] None    Effects on medical care:  [   ] Yes, _____________________________________                         [  x ] None    Cultural/Hinduism need:  [   ] Yes, _____________________________________                         [  x ] None    Refer to Pastoral Care:  [   ] Yes           [  x ] No, not at this time    SERVICE PROVIDED  [   ]PSSA                                                                             [   ]Discharge support / facilitation  [   ]AD / goals of care counseling                                  [   ]EOL / death / bereavement counseling  [   ]Counseling / support                                                [   ] Family meeting  [ x  ]Prayer / sacrament / ritual                                      [   ] Referral   [   ]Other                                                                       NOTE and Plan of Care (PoC): Pt seemed to be comfortable, her granddaughter-in-law was present at the time of visit. it is stated that yesterday she spoke a few words and was up for a while. Family is looking forward to her leaving the hospital. I will follow up as needed.

## 2022-08-15 NOTE — PROGRESS NOTE ADULT - SUBJECTIVE AND OBJECTIVE BOX
89 y/o F w/PMHx of HTN, CAD s/p stent, COPD, hypothyroidism presented to the ED with intermittent abdominal pain with associated intermittent non bloody diarrhea and 30 lb weight loss over past 5 weeks.    Today:  Seen at bedside, confused but awake and alert.          REVIEW OF SYSTEMS:  No new complaints    MEDICATIONS  (STANDING):  aspirin  chewable 81 milliGRAM(s) Oral daily  budesonide  80 MICROgram(s)/formoterol 4.5 MICROgram(s) Inhaler 2 Puff(s) Inhalation two times a day  cefTRIAXone   IVPB 1000 milliGRAM(s) IV Intermittent every 24 hours  clopidogrel Tablet 75 milliGRAM(s) Oral daily  enoxaparin Injectable 30 milliGRAM(s) SubCutaneous every 24 hours  isosorbide   dinitrate Tablet (ISORDIL) 10 milliGRAM(s) Oral three times a day  latanoprost 0.005% Ophthalmic Solution 1 Drop(s) Both EYES at bedtime  levothyroxine 25 MICROGram(s) Oral daily  lisinopril 20 milliGRAM(s) Oral daily  magnesium sulfate  IVPB 2 Gram(s) IV Intermittent once  metoprolol succinate ER 25 milliGRAM(s) Oral daily  metroNIDAZOLE  IVPB      metroNIDAZOLE  IVPB 500 milliGRAM(s) IV Intermittent every 8 hours  pantoprazole    Tablet 40 milliGRAM(s) Oral before breakfast  saccharomyces boulardii 250 milliGRAM(s) Oral two times a day  sodium chloride 0.9%. 1000 milliLiter(s) (50 mL/Hr) IV Continuous <Continuous>  trimethoprim / sulfamethoxazole IVPB 160 milliGRAM(s) IV Intermittent two times a day    MEDICATIONS  (PRN):  acetaminophen  Suppository .. 650 milliGRAM(s) Rectal every 6 hours PRN Temp greater or equal to 38C (100.4F), Mild Pain (1 - 3)  ALBUTerol    90 MICROgram(s) HFA Inhaler 2 Puff(s) Inhalation every 6 hours PRN Shortness of Breath and/or Wheezing  aluminum hydroxide/magnesium hydroxide/simethicone Suspension 30 milliLiter(s) Oral every 4 hours PRN Dyspepsia  hydrALAZINE Injectable 10 milliGRAM(s) IV Push every 6 hours PRN hypertension  melatonin 3 milliGRAM(s) Oral at bedtime PRN Insomnia  ondansetron Injectable 4 milliGRAM(s) IV Push every 8 hours PRN Nausea and/or Vomiting      Allergies  No Known Allergies        Vital Signs Last 24 Hrs  T(C): 35.6 (15 Aug 2022 04:45), Max: 36.1 (14 Aug 2022 13:53)  T(F): 96.1 (15 Aug 2022 04:45), Max: 97 (14 Aug 2022 20:20)  HR: 2 (15 Aug 2022 11:30) (2 - 83)  BP: 142/55 (15 Aug 2022 11:30) (129/59 - 198/79)  BP(mean): 67 (15 Aug 2022 11:30) (67 - 67)  RR: 18 (15 Aug 2022 11:30) (18 - 18)  SpO2: 96% (15 Aug 2022 11:30) (96% - 96%)        PHYSICAL EXAM:  General: frail and elderly  HEENT: NC/AT; PERRL, clear conjunctiva  Neck: supple  Cardiovascular: +S1/S2  Respiratory: ; scattered b/l crackles   Gastrointestinal: soft, mid abd tenderness, no rebound or guarding /ND; +BSx4  Extremities: WWP; 2+ peripheral pulses; no edema   Neurological: alert, awake, oriented x2-3, no focal deficits      LABS:                        13.1   15.52 )-----------( 387      ( 15 Aug 2022 08:00 )             40.1     08-15    133<L>  |  96<L>  |  17  ----------------------------<  116<H>  3.7   |  24  |  0.7    Ca    9.1      15 Aug 2022 08:00  Mg     1.7     08-15    TPro  5.8<L>  /  Alb  3.2<L>  /  TBili  0.4  /  DBili  x   /  AST  51<H>  /  ALT  31  /  AlkPhos  93  08-15

## 2022-08-15 NOTE — OCCUPATIONAL THERAPY INITIAL EVALUATION ADULT - RANGE OF MOTION EXAMINATION
RUE/LUE AROM 1/4 range bilateral shoulders; 1/2 range Bilateral elbows, wrists, and hands; PROM of shoulder 0/120; LUE/RUE elbow wrist and hands WFLs

## 2022-08-15 NOTE — PROGRESS NOTE ADULT - ASSESSMENT
89 y/o F w/PMHx of HTN, CAD s/p stent, COPD, hypothyroidism presented to the ED with intermittent abdominal pain with associated intermittent non bloody diarrhea and 30 lb weight loss over past 5 weeks.    Pt admitted for gastroenteritis that resolved then developed SIRS with fevers and leukocytosis started on 8/10 and AMS  metabolic encephelopathy   - fever resolved Bcx and Ucx negative  - previous Ucx +ve for proteus sensitive to cephalosporin per chart had dysuria,  - CTH negative, repeat head CT 8/15 neg  - CT abd with colitis, new colitis > has no diarrhea, might be from Cyclospora she had on admission   - narrow abx to Rocephin miguel, final ID recm  - c/w Bactrim for cyclospora, for 7 days   - speech and swallow eval-NPO until MBS    Gastroenteritis- cyclospora    -No signs of SIRS on admission and CT A/P: No evidence of acute intra-abdominal pathology  -Supportive Care: Bowel Rest and Hydration, Zofran HI  -C. diff toxin Neg, GI PCR with Cyclospora SPP     Hypomagnesemia and Hypokalemia  Likely from Diarrhaea  Monitor and replace    H/O CAD + Essential HTN   - c/w Plavix, ASA 81, Lisinopril BB and Hydralazine     H/O Hypothyroidism:  c/w synthroid  H/O  COPD: stable : c/w inhalers    GOCs: DNR and DNI  #Dispo: Dispo planning, ID final recom, SNF ?,  prognosis poor

## 2022-08-15 NOTE — PROGRESS NOTE ADULT - ASSESSMENT
ASSESSMENT  89 y/o F w/PMHx of HTN, CAD s/p stent, COPD, hypothyroidism presented to the ED with intermittent abdominal pain with associated intermittent non bloody diarrhea and 30 lb weight loss over past 5 weeks    IMPRESSION  #Chronic diarrhea - Cyclospora identified on PCR - resolved, but worsening abdominal pain on 8/11  - CT Abd/pelvis 8/4 no acute intra-abdominal process  - CT Abdomen and Pelvis w/ IV Cont (08.11.22 @ 16:41): Colitis extending from right to left colon. Mild bilateral hydroureter, may be on the basis of bladder distention.    #Fevers 8/10  - Urine Cx 8/10 NG  - Blood Cx 8/10 NG     #Urine Cx 8/4 Proteus mirabilis   #CAD   #COPD  #Obesity BMI (kg/m2): 24.2  #Abx allergy: NDKA    RECOMMENDATIONS  - continue bactrim 1DS BID  - continue ceftriaxone 1g daily and flagyl 500 mg TID   - trend WBC   - monitor bowel movements     Please call or message on [employer] Teams if with any questions.  Spectra [phone]

## 2022-08-15 NOTE — OCCUPATIONAL THERAPY INITIAL EVALUATION ADULT - GENERAL OBSERVATIONS, REHAB EVAL
13:47-14:11; Chart reviewed, ok to treat by Occupational Therapist as confirmed by SRIKANTH Liriano, Pt received seated in ASU recliner +heplock LUE +premafit +O2 (3L) in NAD, pt's daughter and granddaughter present at bedside. Pt in agreement with OT IE.

## 2022-08-15 NOTE — OCCUPATIONAL THERAPY INITIAL EVALUATION ADULT - LIVES WITH, PROFILE
daughter in apartment on the bottom floor; no steps to enter and no steps once inside. +walk-in shower +grab bars; as per granddaughter pt is supposed to move to [place] where bathroom has ADA requirements/children

## 2022-08-16 LAB
ALBUMIN SERPL ELPH-MCNC: 3 G/DL — LOW (ref 3.5–5.2)
ALP SERPL-CCNC: 83 U/L — SIGNIFICANT CHANGE UP (ref 30–115)
ALT FLD-CCNC: 29 U/L — SIGNIFICANT CHANGE UP (ref 0–41)
ANION GAP SERPL CALC-SCNC: 12 MMOL/L — SIGNIFICANT CHANGE UP (ref 7–14)
AST SERPL-CCNC: 39 U/L — SIGNIFICANT CHANGE UP (ref 0–41)
BASOPHILS # BLD AUTO: 0.18 K/UL — SIGNIFICANT CHANGE UP (ref 0–0.2)
BASOPHILS NFR BLD AUTO: 1.5 % — HIGH (ref 0–1)
BILIRUB SERPL-MCNC: 0.3 MG/DL — SIGNIFICANT CHANGE UP (ref 0.2–1.2)
BUN SERPL-MCNC: 14 MG/DL — SIGNIFICANT CHANGE UP (ref 10–20)
CALCIUM SERPL-MCNC: 8.8 MG/DL — SIGNIFICANT CHANGE UP (ref 8.5–10.1)
CHLORIDE SERPL-SCNC: 97 MMOL/L — LOW (ref 98–110)
CO2 SERPL-SCNC: 24 MMOL/L — SIGNIFICANT CHANGE UP (ref 17–32)
CREAT SERPL-MCNC: 0.6 MG/DL — LOW (ref 0.7–1.5)
CULTURE RESULTS: SIGNIFICANT CHANGE UP
CULTURE RESULTS: SIGNIFICANT CHANGE UP
EGFR: 82 ML/MIN/1.73M2 — SIGNIFICANT CHANGE UP
EOSINOPHIL # BLD AUTO: 0.2 K/UL — SIGNIFICANT CHANGE UP (ref 0–0.7)
EOSINOPHIL NFR BLD AUTO: 1.7 % — SIGNIFICANT CHANGE UP (ref 0–8)
GLUCOSE SERPL-MCNC: 90 MG/DL — SIGNIFICANT CHANGE UP (ref 70–99)
HCT VFR BLD CALC: 36.7 % — LOW (ref 37–47)
HGB BLD-MCNC: 12.1 G/DL — SIGNIFICANT CHANGE UP (ref 12–16)
IMM GRANULOCYTES NFR BLD AUTO: 3 % — HIGH (ref 0.1–0.3)
LYMPHOCYTES # BLD AUTO: 1.78 K/UL — SIGNIFICANT CHANGE UP (ref 1.2–3.4)
LYMPHOCYTES # BLD AUTO: 15.1 % — LOW (ref 20.5–51.1)
MAGNESIUM SERPL-MCNC: 2 MG/DL — SIGNIFICANT CHANGE UP (ref 1.8–2.4)
MCHC RBC-ENTMCNC: 30.9 PG — SIGNIFICANT CHANGE UP (ref 27–31)
MCHC RBC-ENTMCNC: 33 G/DL — SIGNIFICANT CHANGE UP (ref 32–37)
MCV RBC AUTO: 93.9 FL — SIGNIFICANT CHANGE UP (ref 81–99)
MONOCYTES # BLD AUTO: 1.36 K/UL — HIGH (ref 0.1–0.6)
MONOCYTES NFR BLD AUTO: 11.6 % — HIGH (ref 1.7–9.3)
NEUTROPHILS # BLD AUTO: 7.88 K/UL — HIGH (ref 1.4–6.5)
NEUTROPHILS NFR BLD AUTO: 67.1 % — SIGNIFICANT CHANGE UP (ref 42.2–75.2)
NRBC # BLD: 0 /100 WBCS — SIGNIFICANT CHANGE UP (ref 0–0)
PHOSPHATE SERPL-MCNC: 2.6 MG/DL — SIGNIFICANT CHANGE UP (ref 2.1–4.9)
PLATELET # BLD AUTO: 372 K/UL — SIGNIFICANT CHANGE UP (ref 130–400)
POTASSIUM SERPL-MCNC: 3.5 MMOL/L — SIGNIFICANT CHANGE UP (ref 3.5–5)
POTASSIUM SERPL-SCNC: 3.5 MMOL/L — SIGNIFICANT CHANGE UP (ref 3.5–5)
PROT SERPL-MCNC: 5 G/DL — LOW (ref 6–8)
RBC # BLD: 3.91 M/UL — LOW (ref 4.2–5.4)
RBC # FLD: 12.9 % — SIGNIFICANT CHANGE UP (ref 11.5–14.5)
SODIUM SERPL-SCNC: 133 MMOL/L — LOW (ref 135–146)
SPECIMEN SOURCE: SIGNIFICANT CHANGE UP
SPECIMEN SOURCE: SIGNIFICANT CHANGE UP
WBC # BLD: 11.75 K/UL — HIGH (ref 4.8–10.8)
WBC # FLD AUTO: 11.75 K/UL — HIGH (ref 4.8–10.8)

## 2022-08-16 PROCEDURE — 99232 SBSQ HOSP IP/OBS MODERATE 35: CPT

## 2022-08-16 PROCEDURE — 74230 X-RAY XM SWLNG FUNCJ C+: CPT | Mod: 26

## 2022-08-16 RX ADMIN — Medication 25 MICROGRAM(S): at 05:38

## 2022-08-16 RX ADMIN — Medication 250 MILLIGRAM(S): at 05:37

## 2022-08-16 RX ADMIN — Medication 250 MILLIGRAM(S): at 17:29

## 2022-08-16 RX ADMIN — ISOSORBIDE DINITRATE 10 MILLIGRAM(S): 5 TABLET ORAL at 17:29

## 2022-08-16 RX ADMIN — LATANOPROST 1 DROP(S): 0.05 SOLUTION/ DROPS OPHTHALMIC; TOPICAL at 22:17

## 2022-08-16 RX ADMIN — Medication 100 MILLIGRAM(S): at 05:37

## 2022-08-16 RX ADMIN — CEFTRIAXONE 100 MILLIGRAM(S): 500 INJECTION, POWDER, FOR SOLUTION INTRAMUSCULAR; INTRAVENOUS at 05:38

## 2022-08-16 RX ADMIN — ISOSORBIDE DINITRATE 10 MILLIGRAM(S): 5 TABLET ORAL at 05:38

## 2022-08-16 RX ADMIN — Medication 260 MILLIGRAM(S): at 05:37

## 2022-08-16 RX ADMIN — ENOXAPARIN SODIUM 30 MILLIGRAM(S): 100 INJECTION SUBCUTANEOUS at 05:37

## 2022-08-16 RX ADMIN — Medication 25 MILLIGRAM(S): at 05:38

## 2022-08-16 RX ADMIN — Medication 81 MILLIGRAM(S): at 12:33

## 2022-08-16 RX ADMIN — Medication 100 MILLIGRAM(S): at 14:47

## 2022-08-16 RX ADMIN — CLOPIDOGREL BISULFATE 75 MILLIGRAM(S): 75 TABLET, FILM COATED ORAL at 12:33

## 2022-08-16 RX ADMIN — Medication 100 MILLIGRAM(S): at 21:52

## 2022-08-16 RX ADMIN — Medication 260 MILLIGRAM(S): at 17:29

## 2022-08-16 RX ADMIN — BUDESONIDE AND FORMOTEROL FUMARATE DIHYDRATE 2 PUFF(S): 160; 4.5 AEROSOL RESPIRATORY (INHALATION) at 08:05

## 2022-08-16 RX ADMIN — SODIUM CHLORIDE 50 MILLILITER(S): 9 INJECTION INTRAMUSCULAR; INTRAVENOUS; SUBCUTANEOUS at 22:17

## 2022-08-16 RX ADMIN — LISINOPRIL 20 MILLIGRAM(S): 2.5 TABLET ORAL at 05:38

## 2022-08-16 NOTE — PROGRESS NOTE ADULT - SUBJECTIVE AND OBJECTIVE BOX
EDGARCHIRAG SAVAGE  89y, Female  Allergy: No Known Allergies      LOS  12d    CHIEF COMPLAINT: diarrhea (14 Aug 2022 11:31)      INTERVAL EVENTS/HPI  - No acute events overnight  - T(F): , Max: 98.2 (08-16-22 @ 04:50)  - WBC improving -- discussed with daughter   - no diarrhea   - WBC Count: 11.75 (08-16-22 @ 06:00)  WBC Count: 15.52 (08-15-22 @ 08:00)     - Creatinine, Serum: 0.6 (08-16-22 @ 06:00)  Creatinine, Serum: 0.7 (08-15-22 @ 08:00)       ROS  General: Denies rigors, nightsweats  HEENT: Denies headache, rhinorrhea, sore throat, eye pain  CV: Denies CP, palpitations  PULM: Denies wheezing, hemoptysis  GI: Denies hematemesis, hematochezia, melena  : Denies discharge, hematuria  MSK: Denies arthralgias, myalgias  SKIN: Denies rash, lesions  NEURO: Denies paresthesias, weakness  PSYCH: Denies depression, anxiety    VITALS:  T(F): 97.9, Max: 98.2 (08-16-22 @ 04:50)  HR: 71  BP: 178/79  RR: 18Vital Signs Last 24 Hrs  T(C): 36.6 (16 Aug 2022 14:21), Max: 36.8 (16 Aug 2022 04:50)  T(F): 97.9 (16 Aug 2022 14:21), Max: 98.2 (16 Aug 2022 04:50)  HR: 71 (16 Aug 2022 14:21) (67 - 75)  BP: 178/79 (16 Aug 2022 14:21) (140/66 - 182/72)  BP(mean): --  RR: 18 (16 Aug 2022 14:21) (18 - 18)  SpO2: 95% (16 Aug 2022 12:32) (95% - 95%)    Parameters below as of 16 Aug 2022 12:32  Patient On (Oxygen Delivery Method): nasal cannula  O2 Flow (L/min): 3      PHYSICAL EXAM:  Gen: NAD, resting in bed  HEENT: Normocephalic, atraumatic  Neck: supple, no lymphadenopathy  CV: Regular rate & regular rhythm  Lungs: decreased BS at bases, no fremitus  Abdomen: Soft, BS present  Ext: Warm, well perfused  Neuro: non focal, awake  Skin: no rash, no erythema  Lines: no phlebitis    FH: Non-contributory  Social Hx: Non-contributory    TESTS & MEASUREMENTS:                        12.1   11.75 )-----------( 372      ( 16 Aug 2022 06:00 )             36.7     08-16    133<L>  |  97<L>  |  14  ----------------------------<  90  3.5   |  24  |  0.6<L>    Ca    8.8      16 Aug 2022 06:00  Phos  2.6     08-16  Mg     2.0     08-16    TPro  5.0<L>  /  Alb  3.0<L>  /  TBili  0.3  /  DBili  x   /  AST  39  /  ALT  29  /  AlkPhos  83  08-16      LIVER FUNCTIONS - ( 16 Aug 2022 06:00 )  Alb: 3.0 g/dL / Pro: 5.0 g/dL / ALK PHOS: 83 U/L / ALT: 29 U/L / AST: 39 U/L / GGT: x               Culture - Urine (collected 08-10-22 @ 20:20)  Source: Clean Catch Clean Catch (Midstream)  Final Report (08-12-22 @ 07:38):    No growth    Culture - Blood (collected 08-10-22 @ 18:57)  Source: .Blood None  Final Report (08-16-22 @ 01:01):    No Growth Final    Culture - Blood (collected 08-10-22 @ 16:10)  Source: .Blood Blood-Venous  Final Report (08-16-22 @ 01:01):    No Growth Final    GI PCR Panel, Stool (collected 08-06-22 @ 22:51)  Source: .Stool Feces  Final Report (08-07-22 @ 22:44):    Cyclospora species    DETECTED by PCR    *******Please Note:*******    GI panel PCR evaluates for:    Campylobacter, Plesiomonas shigelloides, Salmonella,    Vibrio, Yersinia enterocolitica, Enteroaggregative    Escherichia coli (EAEC), Enteropathogenic E.coli(EPEC),    Enterotoxigenic E. coli (ETEC) lt/st, Shiga-like    toxin-producing E. coli (STEC) stx1/stx2,    Shigella/ Enteroinvasive E. coli (EIEC), Cryptosporidium,    Cyclospora cayetanensis, Entamoeba histolytica,    Giardia lamblia, Adenovirus F 40/41, Astrovirus,    Norovirus GI/GII, Rotavirus A, Sapovirus    Culture - Stool (collected 08-06-22 @ 22:51)  Source: .Stool Feces  Final Report (08-09-22 @ 21:07):    No enteric pathogens isolated.    (Stool culture examined for Salmonella,    Shigella, Campylobacter, Aeromonas, Plesiomonas,    Vibrio, E.coli O157 and Yersinia)    Culture - Urine (collected 08-04-22 @ 18:45)  Source: Clean Catch Clean Catch (Midstream)  Final Report (08-07-22 @ 09:46):    >100,000 CFU/ml Proteus mirabilis  Organism: Proteus mirabilis (08-07-22 @ 09:46)  Organism: Proteus mirabilis (08-07-22 @ 09:46)      -  Amikacin: S <=16      -  Amoxicillin/Clavulanic Acid: S <=8/4      -  Ampicillin: S <=8 These ampicillin results predict results for amoxicillin      -  Ampicillin/Sulbactam: S <=4/2 Enterobacter, Klebsiella aerogenes, Citrobacter, and Serratia may develop resistance during prolonged therapy (3-4 days)      -  Aztreonam: S <=4      -  Cefazolin: S 4 (MIC_CL_COM_ENTERIC_[id number]) For uncomplicated UTI with K. pneumoniae, E. coli, or P. mirablis: SAMEER <=16 is sensitive and SAMEER >=32 is resistant. This also predicts results for oral agents cefaclor, cefdinir, cefpodoxime, cefprozil, cefuroxime axetil, cephalexin and locarbef for uncomplicated UTI. Note that some isolates may be susceptible to these agents while testing resistant to cefazolin.      -  Cefepime: S <=2      -  Cefoxitin: S <=8      -  Ceftriaxone: S <=1 Enterobacter, Klebsiella aerogenes, Citrobacter, and Serratia may develop resistance during prolonged therapy      -  Ciprofloxacin: S <=0.25      -  Ertapenem: S <=0.5      -  Gentamicin: S 4      -  Levofloxacin: S <=0.5      -  Meropenem: S <=1      -  Nitrofurantoin: R >64 Should not be used to treat pyelonephritis      -  Piperacillin/Tazobactam: S <=8      -  Tobramycin: S <=2      -  Trimethoprim/Sulfamethoxazole: S <=0.5/9.5      Method Type: [facility]            INFECTIOUS DISEASES TESTING  COVID-19 PCR: NotDetec (08-14-22 @ 13:15)  Procalcitonin, Serum: 0.07 (08-13-22 @ 06:08)  Rapid RVP Result: NotDetec (08-11-22 @ 08:00)  MRSA PCR Result.: Negative (08-11-22 @ 00:00)  Procalcitonin, Serum: 0.05 (08-10-22 @ 18:57)  COVID-19 PCR: NotDetec (08-09-22 @ 11:45)  COVID-19 PCR: NotDetec (08-04-22 @ 22:40)      INFLAMMATORY MARKERS      RADIOLOGY & ADDITIONAL TESTS:  I have personally reviewed the last available Chest xray  CXR      CT      CARDIOLOGY TESTING  12 Lead ECG:   Ventricular Rate 66 BPM    Atrial Rate 66 BPM    P-R Interval 212 ms    QRS Duration 140 ms    Q-T Interval 446 ms    QTC Calculation(Bazett) 467 ms    P Axis 68 degrees    R Axis -54 degrees    T Axis 96 degrees    Diagnosis Line Sinus rhythm vsce3yr degree A-V block  Non-specific intra-ventricular conduction delay  Left ventricular hypertrophy with repolarization abnormality  Left axis deviation  Possible Lateral infarct , age undetermined  Abnormal ECG    Confirmed by Luis Eduardo Johnson ([id number]) on 8/5/2022 9:56:09 AM (08-04-22 @ 18:45)      MEDICATIONS  aspirin  chewable 81 Oral daily  budesonide  80 MICROgram(s)/formoterol 4.5 MICROgram(s) Inhaler 2 Inhalation two times a day  cefTRIAXone   IVPB 1000 IV Intermittent every 24 hours  clopidogrel Tablet 75 Oral daily  enoxaparin Injectable 30 SubCutaneous every 24 hours  isosorbide   dinitrate Tablet (ISORDIL) 10 Oral three times a day  latanoprost 0.005% Ophthalmic Solution 1 Both EYES at bedtime  levothyroxine 25 Oral daily  lisinopril 20 Oral daily  metoprolol succinate ER 25 Oral daily  metroNIDAZOLE  IVPB     metroNIDAZOLE  IVPB 500 IV Intermittent every 8 hours  pantoprazole    Tablet 40 Oral before breakfast  saccharomyces boulardii 250 Oral two times a day  sodium chloride 0.9%. 1000 IV Continuous <Continuous>  trimethoprim / sulfamethoxazole IVPB 160 IV Intermittent two times a day      WEIGHT  Weight (kg): 72.3 (08-05-22 @ 03:26)  Creatinine, Serum: 0.6 mg/dL (08-16-22 @ 06:00)      ANTIBIOTICS:  cefTRIAXone   IVPB 1000 milliGRAM(s) IV Intermittent every 24 hours  metroNIDAZOLE  IVPB      metroNIDAZOLE  IVPB 500 milliGRAM(s) IV Intermittent every 8 hours  trimethoprim / sulfamethoxazole IVPB 160 milliGRAM(s) IV Intermittent two times a day      All available historical records have been reviewed

## 2022-08-16 NOTE — SWALLOW VFSS/MBS ASSESSMENT ADULT - ESOPHAGEAL STAGE
Attempted A-P view to assess esophageal motility; however pt with poor positioning and unable to visualize

## 2022-08-16 NOTE — SWALLOW VFSS/MBS ASSESSMENT ADULT - PHARYNGEAL PHASE COMMENTS
Patient with aspiration during the swallow w/ thin and mildly thick liquids. Pt with diffuse pharyngeal weakness characterized by poor BOT to PPW contact, reduced hyolaryngeal excursion, and reduced arytenoid to epiglottis contact, resulting in aspiration during the swallow. Pt not sensate to aspirated material and cued coughs not effective in clearing material from airway. Chin tuck was effective in reducing airway invasion with mildly thick liquids.

## 2022-08-16 NOTE — SWALLOW VFSS/MBS ASSESSMENT ADULT - SLP GENERAL OBSERVATIONS
Pt transported to radiology; improved alertness and mental status from yesterday. Tolerating 3L O2 via nasal cannula

## 2022-08-16 NOTE — SWALLOW VFSS/MBS ASSESSMENT ADULT - ROSENBEK'S PENETRATION ASPIRATION SCALE
w/ thin and mildly thick liquids in head neutral position/(8) contrast passes glottis, visible subglottic residue remains, absent patient response (aspiration)

## 2022-08-16 NOTE — SWALLOW VFSS/MBS ASSESSMENT ADULT - ORAL PHASE COMMENTS
Pt with reduced bolus control resulting in diffuse residue in oral cavity. Poor tongue to palate contact. Spillage of bolus into pyriform sinuses noted before the swallow. Impairments likely 2/2 generalized weakness of oral musculature

## 2022-08-16 NOTE — SWALLOW VFSS/MBS ASSESSMENT ADULT - RECOMMENDED FEEDING/EATING TECHNIQUES
crush medication (when feasible)/oral hygiene/position upright (90 degrees)/small sips/bites/tuck chin

## 2022-08-16 NOTE — SWALLOW VFSS/MBS ASSESSMENT ADULT - ADDITIONAL INFORMATION
Patient reporting s/s dysphagia prior to admission. Did not benefit from bedside diet changes. Therefore, MBS recommended.

## 2022-08-16 NOTE — SWALLOW VFSS/MBS ASSESSMENT ADULT - ASPIRATION DURING THE SWALLOW - SILENT
aspiration during the swallow with thin (more than 5-10%) and mildly thick liquids (<5-10%). Pt not sensate to aspirated material; cued cough not effective in clearing material from airway/Moderate

## 2022-08-16 NOTE — SWALLOW VFSS/MBS ASSESSMENT ADULT - CONSISTENCIES ADMINISTERED
Not able to pull from straw with mildly thick liquids./thin liquid/moderately thick/mildly thick/pureed

## 2022-08-16 NOTE — PROGRESS NOTE ADULT - ASSESSMENT
89 y/o F w/PMHx of HTN, CAD s/p stent, COPD, hypothyroidism presented to the ED with intermittent abdominal pain with associated intermittent non bloody diarrhea and 30 lb weight loss over past 5 weeks.    Pt admitted for gastroenteritis that resolved then developed SIRS with fevers and leukocytosis started on 8/10 and AMS  metabolic encephelopathy   - fever resolved Bcx and Ucx negative  - previous Ucx +ve for proteus sensitive to cephalosporin per chart had dysuria  - CTH negative, repeat head CT 8/15 neg  - CT abd with colitis, new colitis > has no diarrhea, might be from Cyclospora she had on admission   - narrowed abx to Rocephin flagyl, final ID recm  - c/w Bactrim for cyclospora, for 7 days (to end 8/18)    Dysphagia:  -Was present prior to admission as per family  -MBS done, SLP appreciated.  -Puree with mild thick liquids and repeat MBS in 4 weeks    Gastroenteritis- cyclospora    -No signs of SIRS on admission and CT A/P: No evidence of acute intra-abdominal pathology  -Supportive Care: Bowel Rest and Hydration, Zofran [place]  -C. diff toxin Neg, GI PCR with Cyclospora SPP   -Continue Bactrim    Hypomagnesemia and Hypokalemia  Likely from Diarrhaea  Monitor and replace    H/O CAD + Essential HTN   - c/w Plavix, ASA 81, Lisinopril BB and Hydralazine     H/O Hypothyroidism:  c/w synthroid  H/O  COPD: stable : c/w inhalers    GOCs: DNR and DNI  #Dispo: Dispo planning, ID final recom, SNF ?,

## 2022-08-16 NOTE — PROGRESS NOTE ADULT - SUBJECTIVE AND OBJECTIVE BOX
89 y/o F w/PMHx of HTN, CAD s/p stent, COPD, hypothyroidism presented to the ED with intermittent abdominal pain with associated intermittent non bloody diarrhea and 30 lb weight loss over past 5 weeks.    Today:  Seen at bedside, no new complaints.          REVIEW OF SYSTEMS:  No new complaints      MEDICATIONS  (STANDING):  aspirin  chewable 81 milliGRAM(s) Oral daily  budesonide  80 MICROgram(s)/formoterol 4.5 MICROgram(s) Inhaler 2 Puff(s) Inhalation two times a day  cefTRIAXone   IVPB 1000 milliGRAM(s) IV Intermittent every 24 hours  clopidogrel Tablet 75 milliGRAM(s) Oral daily  enoxaparin Injectable 30 milliGRAM(s) SubCutaneous every 24 hours  isosorbide   dinitrate Tablet (ISORDIL) 10 milliGRAM(s) Oral three times a day  latanoprost 0.005% Ophthalmic Solution 1 Drop(s) Both EYES at bedtime  levothyroxine 25 MICROGram(s) Oral daily  lisinopril 20 milliGRAM(s) Oral daily  metoprolol succinate ER 25 milliGRAM(s) Oral daily  metroNIDAZOLE  IVPB      metroNIDAZOLE  IVPB 500 milliGRAM(s) IV Intermittent every 8 hours  pantoprazole    Tablet 40 milliGRAM(s) Oral before breakfast  saccharomyces boulardii 250 milliGRAM(s) Oral two times a day  sodium chloride 0.9%. 1000 milliLiter(s) (50 mL/Hr) IV Continuous <Continuous>  trimethoprim / sulfamethoxazole IVPB 160 milliGRAM(s) IV Intermittent two times a day    MEDICATIONS  (PRN):  acetaminophen  Suppository .. 650 milliGRAM(s) Rectal every 6 hours PRN Temp greater or equal to 38C (100.4F), Mild Pain (1 - 3)  ALBUTerol    90 MICROgram(s) HFA Inhaler 2 Puff(s) Inhalation every 6 hours PRN Shortness of Breath and/or Wheezing  aluminum hydroxide/magnesium hydroxide/simethicone Suspension 30 milliLiter(s) Oral every 4 hours PRN Dyspepsia  hydrALAZINE Injectable 10 milliGRAM(s) IV Push every 6 hours PRN hypertension  melatonin 3 milliGRAM(s) Oral at bedtime PRN Insomnia  ondansetron Injectable 4 milliGRAM(s) IV Push every 8 hours PRN Nausea and/or Vomiting      Allergies  No Known Allergies          Vital Signs Last 24 Hrs  T(C): 36.8 (16 Aug 2022 04:50), Max: 36.8 (16 Aug 2022 04:50)  T(F): 98.2 (16 Aug 2022 04:50), Max: 98.2 (16 Aug 2022 04:50)  HR: 70 (16 Aug 2022 06:44) (64 - 75)  BP: 140/66 (16 Aug 2022 06:44) (140/66 - 182/72)  RR: 18 (16 Aug 2022 12:32) (18 - 18)  SpO2: 95% (16 Aug 2022 12:32) (95% - 95%)    Parameters below as of 16 Aug 2022 12:32  Patient On (Oxygen Delivery Method): nasal cannula  O2 Flow (L/min): 3      PHYSICAL EXAM:  General: frail and elderly  HEENT: NC/AT; PERRL, clear conjunctiva  Neck: supple  Cardiovascular: +S1/S2  Respiratory: ; scattered b/l crackles   Gastrointestinal: soft, mid abd tenderness, no rebound or guarding /ND; +BSx4  Extremities: WWP; 2+ peripheral pulses; no edema   Neurological: alert, awake, oriented x2-3, no focal deficits    LABS:                        12.1   11.75 )-----------( 372      ( 16 Aug 2022 06:00 )             36.7     08-16    133<L>  |  97<L>  |  14  ----------------------------<  90  3.5   |  24  |  0.6<L>    Ca    8.8      16 Aug 2022 06:00  Phos  2.6     08-16  Mg     2.0     08-16    TPro  5.0<L>  /  Alb  3.0<L>  /  TBili  0.3  /  DBili  x   /  AST  39  /  ALT  29  /  AlkPhos  83  08-16

## 2022-08-16 NOTE — SWALLOW VFSS/MBS ASSESSMENT ADULT - ORAL PHASE
Delayed oral transit time/Residue in oral cavity/Incomplete tongue to palate contact/Uncontrolled bolus / spillover in leilani-pharynx

## 2022-08-16 NOTE — PROGRESS NOTE ADULT - ASSESSMENT
ASSESSMENT  89 y/o F w/PMHx of HTN, CAD s/p stent, COPD, hypothyroidism presented to the ED with intermittent abdominal pain with associated intermittent non bloody diarrhea and 30 lb weight loss over past 5 weeks    IMPRESSION  #Chronic diarrhea - Cyclospora identified on PCR - resolved, but worsening abdominal pain on 8/11  - CT Abd/pelvis 8/4 no acute intra-abdominal process  - CT Abdomen and Pelvis w/ IV Cont (08.11.22 @ 16:41): Colitis extending from right to left colon. Mild bilateral hydroureter, may be on the basis of bladder distention.    #Fevers 8/10  - Urine Cx 8/10 NG  - Blood Cx 8/10 NG     #Urine Cx 8/4 Proteus mirabilis   #CAD   #COPD  #Obesity BMI (kg/m2): 24.2  #Abx allergy: NDKA    RECOMMENDATIONS  - continue bactrim 1DS BID until 8/20 to complete 10 day course  - can stop ceftriaxone -- continue flagyl 500 mg TID also until 8/20     Please call or message on [employer] Teams if with any questions.  Spectra [phone]

## 2022-08-16 NOTE — SWALLOW VFSS/MBS ASSESSMENT ADULT - DIAGNOSTIC IMPRESSIONS
Patient with moderate oral phase dysphagia characterized by generalized weakness of oral musculature and poor coordination of bolus. Moderate pharyngeal dysphagia characterized by poor airway invasion. Unable to assess esophageal phase due to pt poor positioning/inability to stand for visualization of A-P view.

## 2022-08-17 LAB
ALBUMIN SERPL ELPH-MCNC: 3.1 G/DL — LOW (ref 3.5–5.2)
ALP SERPL-CCNC: 77 U/L — SIGNIFICANT CHANGE UP (ref 30–115)
ALT FLD-CCNC: 29 U/L — SIGNIFICANT CHANGE UP (ref 0–41)
ANION GAP SERPL CALC-SCNC: 11 MMOL/L — SIGNIFICANT CHANGE UP (ref 7–14)
AST SERPL-CCNC: 38 U/L — SIGNIFICANT CHANGE UP (ref 0–41)
BASOPHILS # BLD AUTO: 0.15 K/UL — SIGNIFICANT CHANGE UP (ref 0–0.2)
BASOPHILS NFR BLD AUTO: 1.5 % — HIGH (ref 0–1)
BILIRUB SERPL-MCNC: 0.3 MG/DL — SIGNIFICANT CHANGE UP (ref 0.2–1.2)
BUN SERPL-MCNC: 9 MG/DL — LOW (ref 10–20)
CALCIUM SERPL-MCNC: 8.8 MG/DL — SIGNIFICANT CHANGE UP (ref 8.5–10.1)
CHLORIDE SERPL-SCNC: 99 MMOL/L — SIGNIFICANT CHANGE UP (ref 98–110)
CO2 SERPL-SCNC: 26 MMOL/L — SIGNIFICANT CHANGE UP (ref 17–32)
CREAT SERPL-MCNC: 0.6 MG/DL — LOW (ref 0.7–1.5)
EGFR: 82 ML/MIN/1.73M2 — SIGNIFICANT CHANGE UP
EOSINOPHIL # BLD AUTO: 0.22 K/UL — SIGNIFICANT CHANGE UP (ref 0–0.7)
EOSINOPHIL NFR BLD AUTO: 2.1 % — SIGNIFICANT CHANGE UP (ref 0–8)
GLUCOSE SERPL-MCNC: 89 MG/DL — SIGNIFICANT CHANGE UP (ref 70–99)
HCT VFR BLD CALC: 35.5 % — LOW (ref 37–47)
HGB BLD-MCNC: 11.7 G/DL — LOW (ref 12–16)
IMM GRANULOCYTES NFR BLD AUTO: 2.5 % — HIGH (ref 0.1–0.3)
LYMPHOCYTES # BLD AUTO: 1.32 K/UL — SIGNIFICANT CHANGE UP (ref 1.2–3.4)
LYMPHOCYTES # BLD AUTO: 12.8 % — LOW (ref 20.5–51.1)
MAGNESIUM SERPL-MCNC: 1.8 MG/DL — SIGNIFICANT CHANGE UP (ref 1.8–2.4)
MCHC RBC-ENTMCNC: 30.9 PG — SIGNIFICANT CHANGE UP (ref 27–31)
MCHC RBC-ENTMCNC: 33 G/DL — SIGNIFICANT CHANGE UP (ref 32–37)
MCV RBC AUTO: 93.7 FL — SIGNIFICANT CHANGE UP (ref 81–99)
MONOCYTES # BLD AUTO: 1.11 K/UL — HIGH (ref 0.1–0.6)
MONOCYTES NFR BLD AUTO: 10.8 % — HIGH (ref 1.7–9.3)
NEUTROPHILS # BLD AUTO: 7.25 K/UL — HIGH (ref 1.4–6.5)
NEUTROPHILS NFR BLD AUTO: 70.3 % — SIGNIFICANT CHANGE UP (ref 42.2–75.2)
NRBC # BLD: 0 /100 WBCS — SIGNIFICANT CHANGE UP (ref 0–0)
PHOSPHATE SERPL-MCNC: 2.5 MG/DL — SIGNIFICANT CHANGE UP (ref 2.1–4.9)
PLATELET # BLD AUTO: 326 K/UL — SIGNIFICANT CHANGE UP (ref 130–400)
POTASSIUM SERPL-MCNC: 3.5 MMOL/L — SIGNIFICANT CHANGE UP (ref 3.5–5)
POTASSIUM SERPL-SCNC: 3.5 MMOL/L — SIGNIFICANT CHANGE UP (ref 3.5–5)
PROT SERPL-MCNC: 5.3 G/DL — LOW (ref 6–8)
RBC # BLD: 3.79 M/UL — LOW (ref 4.2–5.4)
RBC # FLD: 13.2 % — SIGNIFICANT CHANGE UP (ref 11.5–14.5)
SARS-COV-2 RNA SPEC QL NAA+PROBE: SIGNIFICANT CHANGE UP
SODIUM SERPL-SCNC: 136 MMOL/L — SIGNIFICANT CHANGE UP (ref 135–146)
WBC # BLD: 10.31 K/UL — SIGNIFICANT CHANGE UP (ref 4.8–10.8)
WBC # FLD AUTO: 10.31 K/UL — SIGNIFICANT CHANGE UP (ref 4.8–10.8)

## 2022-08-17 PROCEDURE — 99233 SBSQ HOSP IP/OBS HIGH 50: CPT

## 2022-08-17 PROCEDURE — 99232 SBSQ HOSP IP/OBS MODERATE 35: CPT

## 2022-08-17 PROCEDURE — 74018 RADEX ABDOMEN 1 VIEW: CPT | Mod: 26

## 2022-08-17 RX ORDER — POLYETHYLENE GLYCOL 3350 17 G/17G
17 POWDER, FOR SOLUTION ORAL DAILY
Refills: 0 | Status: DISCONTINUED | OUTPATIENT
Start: 2022-08-17 | End: 2022-08-19

## 2022-08-17 RX ORDER — ACETAMINOPHEN 500 MG
650 TABLET ORAL EVERY 6 HOURS
Refills: 0 | Status: DISCONTINUED | OUTPATIENT
Start: 2022-08-17 | End: 2022-08-19

## 2022-08-17 RX ORDER — IPRATROPIUM/ALBUTEROL SULFATE 18-103MCG
3 AEROSOL WITH ADAPTER (GRAM) INHALATION EVERY 6 HOURS
Refills: 0 | Status: DISCONTINUED | OUTPATIENT
Start: 2022-08-17 | End: 2022-08-19

## 2022-08-17 RX ORDER — METRONIDAZOLE 500 MG
500 TABLET ORAL EVERY 8 HOURS
Refills: 0 | Status: DISCONTINUED | OUTPATIENT
Start: 2022-08-17 | End: 2022-08-19

## 2022-08-17 RX ORDER — SENNA PLUS 8.6 MG/1
2 TABLET ORAL AT BEDTIME
Refills: 0 | Status: DISCONTINUED | OUTPATIENT
Start: 2022-08-17 | End: 2022-08-19

## 2022-08-17 RX ADMIN — CEFTRIAXONE 100 MILLIGRAM(S): 500 INJECTION, POWDER, FOR SOLUTION INTRAMUSCULAR; INTRAVENOUS at 05:51

## 2022-08-17 RX ADMIN — Medication 25 MICROGRAM(S): at 05:50

## 2022-08-17 RX ADMIN — Medication 25 MILLIGRAM(S): at 05:50

## 2022-08-17 RX ADMIN — Medication 100 MILLIGRAM(S): at 05:56

## 2022-08-17 RX ADMIN — Medication 250 MILLIGRAM(S): at 05:51

## 2022-08-17 RX ADMIN — ONDANSETRON 4 MILLIGRAM(S): 8 TABLET, FILM COATED ORAL at 08:06

## 2022-08-17 RX ADMIN — LATANOPROST 1 DROP(S): 0.05 SOLUTION/ DROPS OPHTHALMIC; TOPICAL at 21:55

## 2022-08-17 RX ADMIN — ISOSORBIDE DINITRATE 10 MILLIGRAM(S): 5 TABLET ORAL at 10:42

## 2022-08-17 RX ADMIN — ISOSORBIDE DINITRATE 10 MILLIGRAM(S): 5 TABLET ORAL at 16:54

## 2022-08-17 RX ADMIN — CLOPIDOGREL BISULFATE 75 MILLIGRAM(S): 75 TABLET, FILM COATED ORAL at 11:07

## 2022-08-17 RX ADMIN — Medication 81 MILLIGRAM(S): at 11:07

## 2022-08-17 RX ADMIN — ISOSORBIDE DINITRATE 10 MILLIGRAM(S): 5 TABLET ORAL at 05:50

## 2022-08-17 RX ADMIN — Medication 500 MILLIGRAM(S): at 21:55

## 2022-08-17 RX ADMIN — ENOXAPARIN SODIUM 30 MILLIGRAM(S): 100 INJECTION SUBCUTANEOUS at 05:52

## 2022-08-17 RX ADMIN — BUDESONIDE AND FORMOTEROL FUMARATE DIHYDRATE 2 PUFF(S): 160; 4.5 AEROSOL RESPIRATORY (INHALATION) at 21:54

## 2022-08-17 RX ADMIN — Medication 250 MILLIGRAM(S): at 17:00

## 2022-08-17 RX ADMIN — SENNA PLUS 2 TABLET(S): 8.6 TABLET ORAL at 21:58

## 2022-08-17 RX ADMIN — PANTOPRAZOLE SODIUM 40 MILLIGRAM(S): 20 TABLET, DELAYED RELEASE ORAL at 05:56

## 2022-08-17 RX ADMIN — Medication 260 MILLIGRAM(S): at 17:49

## 2022-08-17 RX ADMIN — Medication 260 MILLIGRAM(S): at 05:49

## 2022-08-17 RX ADMIN — LISINOPRIL 20 MILLIGRAM(S): 2.5 TABLET ORAL at 05:50

## 2022-08-17 RX ADMIN — Medication 500 MILLIGRAM(S): at 13:59

## 2022-08-17 RX ADMIN — BUDESONIDE AND FORMOTEROL FUMARATE DIHYDRATE 2 PUFF(S): 160; 4.5 AEROSOL RESPIRATORY (INHALATION) at 10:42

## 2022-08-17 NOTE — SWALLOW BEDSIDE ASSESSMENT ADULT - SWALLOW EVAL: ORAL MUSCULATURE
+generalized weakness/anomalies present

## 2022-08-17 NOTE — SWALLOW BEDSIDE ASSESSMENT ADULT - ESOPHAGEAL PHASE
No overt s/s esophageal impairment
BP acceptable, restart losartan
No overt s/s esophageal impairment

## 2022-08-17 NOTE — SWALLOW BEDSIDE ASSESSMENT ADULT - MODE OF PRESENTATION
hand-over-hand self feeding d/t weakness/cup/spoon/straw/self fed
hand-over-hand self feeding d/t weakness/spoon
hand-over-hand self feeding d/t weakness/spoon

## 2022-08-17 NOTE — PROGRESS NOTE ADULT - ASSESSMENT
89 y/o F w/PMHx of HTN, CAD s/p stent, COPD, hypothyroidism presented to the ED with intermittent abdominal pain with associated intermittent non bloody diarrhea and 30 lb weight loss over past 5 weeks.    Pt admitted for gastroenteritis that resolved then developed SIRS with fevers and leukocytosis started on 8/10 and AMS  metabolic encephelopathy   - fever resolved Bcx and Ucx negative  - previous Ucx +ve for proteus sensitive to cephalosporin per chart had dysuria  - CTH negative, repeat head CT 8/15 neg  - CT abd with colitis, new colitis > has no diarrhea, might be from Cyclospora she had on admission   - narrowed abx to Flagyl until 8/20  - c/w Bactrim for cyclospora, for 7 days (to end 8/20)    Dysphagia:  -Was present prior to admission as per family  -MBS done, SLP appreciated.  -Puree with mild thick liquids and repeat MBS in 4 weeks    Gastroenteritis- cyclospora    -No signs of SIRS on admission and CT A/P: No evidence of acute intra-abdominal pathology  -Supportive Care: Bowel Rest and Hydration, Zofran AR  -C. diff toxin Neg, GI PCR with Cyclospora SPP   -Continue Bactrim until 8/20    Hypomagnesemia and Hypokalemia  Likely from Diarrhaea  Monitor and replace    H/O CAD + Essential HTN   - c/w Plavix, ASA 81, Lisinopril BB and Hydralazine     H/O Hypothyroidism:  c/w synthroid  H/O  COPD: stable : c/w inhalers    GOCs: DNR and DNI  #Dispo: DC planning, likely DC to [facility].  Complete Flagyl PO (colitis) and Bactrim (cyclospora) on 8/20.

## 2022-08-17 NOTE — SWALLOW BEDSIDE ASSESSMENT ADULT - ASR SWALLOW LINGUAL MOBILITY
impaired left lateral movement/impaired right lateral movement
impaired left lateral movement/impaired right lateral movement

## 2022-08-17 NOTE — SWALLOW BEDSIDE ASSESSMENT ADULT - ADDITIONAL RECOMMENDATIONS
SLP services to f/u to monitor for potential further diet upgrade.
NPO until MBSS can be performed to r/o aspiration and determine most appropriate diet consistency
Stringent oral care to reduce oral pathogens and risk of aspiration PNA. Continue with pureed solids and mildly thick liquids w/ chin tuck. Will f/u PRN to implement postural maneuver

## 2022-08-17 NOTE — SWALLOW BEDSIDE ASSESSMENT ADULT - CONSISTENCIES ADMINISTERED
ice chips x2, thins via tsp x2, thins via cup sip x6, thins via straw sips x2./thin liquid/pureed/minced & moist/soft & bite-sized
mildly thick/pureed
via tsp x4/thin liquid

## 2022-08-17 NOTE — SWALLOW BEDSIDE ASSESSMENT ADULT - ASR SWALLOW REFERRAL
PT/OT to improve neck ROM for chin tuck posture/ENT/occupational therapy/physical therapy
shaky hands + poor coordination. Difficulty feeding herself./occupational therapy/physical therapy
shaky hands + poor coordination. Difficulty feeding herself./occupational therapy/physical therapy

## 2022-08-17 NOTE — SWALLOW BEDSIDE ASSESSMENT ADULT - PHARYNGEAL PHASE
for thin liquids via straw sips 1/2 trials./Cough post oral intake
1x following poor usage of chin tuck/Wet vocal quality post oral intake
Wet vocal quality post oral intake/Cough post oral intake/Throat clear post oral intake

## 2022-08-17 NOTE — SWALLOW BEDSIDE ASSESSMENT ADULT - COMMENTS
Per discussion with RN, PCA and family, pt was not tolerating PO d/t mental status and lethargy, however now mental status is nearing baseline.
Discussion with SRIKANTH April regarding MBS results
Per discussion with RN, pt with difficulty swallowing meds and thin liquids; poor PO intake overnight 2/2 pt reported difficulty swallowing.

## 2022-08-17 NOTE — PROGRESS NOTE ADULT - PROBLEM SELECTOR PLAN 4
- d/w patient and daughter, explained role of palliative care  - goals currently are for RAJESH, and return home, where patient was largely independent, lives in apartment attached to daughter's home, did not have HHA prior to admission  - daughter open to d/w palliative LCSW

## 2022-08-17 NOTE — PROGRESS NOTE ADULT - PROBLEM SELECTOR PLAN 5
- will follow  ______________  Alex Carbone MD  Palliative Medicine  [facility]   [facility]  [phone]

## 2022-08-17 NOTE — SWALLOW BEDSIDE ASSESSMENT ADULT - ASR SWALLOW LABIAL MOBILITY
impaired seal/impaired coordination

## 2022-08-17 NOTE — SWALLOW BEDSIDE ASSESSMENT ADULT - ORAL PHASE
Delayed oral transit time
Delayed oral transit time
mild prolonged mastication and stasis with soft & bite sized trials./Delayed oral transit time/Stasis in lateral sulci

## 2022-08-17 NOTE — PROGRESS NOTE ADULT - SUBJECTIVE AND OBJECTIVE BOX
HPI: 89F with PMH of HTN, CAD s/p stent, COPD, hypothyroidism, here with intermittent abdominal pain and weight loss, due to colitis, possibly cyclospora, on ceftriaxone, flagyl, and bactrim. Palliative care called for GOC per daughter's request.     INTERVAL EVENTS:  8/17: patient better today, alert, conversant, passed S&S    ADVANCE DIRECTIVES:    DNR  MOLST  [X ]  Living Will  [ ]   DECISION MAKER(s):  [ ] Health Care Proxy(s)  [ ] Surrogate(s)  [ ] Guardian           Name(s): Phone Number(s):    BASELINE (I)ADL(s) (prior to admission):  [place]: [ ]Total  [ ] Moderate [ ]Dependent  Palliative Performance Status Version 2:         %    http://npcrc.org/files/news/palliative_performance_scale_ppsv2.pdf    Allergies    No Known Allergies    Intolerances    MEDICATIONS  (STANDING):  aspirin  chewable 81 milliGRAM(s) Oral daily  budesonide  80 MICROgram(s)/formoterol 4.5 MICROgram(s) Inhaler 2 Puff(s) Inhalation two times a day  clopidogrel Tablet 75 milliGRAM(s) Oral daily  enoxaparin Injectable 30 milliGRAM(s) SubCutaneous every 24 hours  isosorbide   dinitrate Tablet (ISORDIL) 10 milliGRAM(s) Oral three times a day  latanoprost 0.005% Ophthalmic Solution 1 Drop(s) Both EYES at bedtime  levothyroxine 25 MICROGram(s) Oral daily  lisinopril 20 milliGRAM(s) Oral daily  metoprolol succinate ER 25 milliGRAM(s) Oral daily  metroNIDAZOLE    Tablet 500 milliGRAM(s) Oral every 8 hours  pantoprazole    Tablet 40 milliGRAM(s) Oral before breakfast  polyethylene glycol 3350 17 Gram(s) Oral daily  saccharomyces boulardii 250 milliGRAM(s) Oral two times a day  senna 2 Tablet(s) Oral at bedtime  sodium chloride 0.9%. 1000 milliLiter(s) (50 mL/Hr) IV Continuous <Continuous>  trimethoprim / sulfamethoxazole IVPB 160 milliGRAM(s) IV Intermittent two times a day    MEDICATIONS  (PRN):  acetaminophen     Tablet .. 650 milliGRAM(s) Oral every 6 hours PRN Temp greater or equal to 38C (100.4F), Mild Pain (1 - 3)  ALBUTerol    90 MICROgram(s) HFA Inhaler 2 Puff(s) Inhalation every 6 hours PRN Shortness of Breath and/or Wheezing  aluminum hydroxide/magnesium hydroxide/simethicone Suspension 30 milliLiter(s) Oral every 4 hours PRN Dyspepsia  hydrALAZINE Injectable 10 milliGRAM(s) IV Push every 6 hours PRN hypertension  melatonin 3 milliGRAM(s) Oral at bedtime PRN Insomnia  ondansetron Injectable 4 milliGRAM(s) IV Push every 8 hours PRN Nausea and/or Vomiting      PRESENT SYMPTOMS: [X ]Unable to obtain due to poor mentation   Source if other than patient:  [ ]Family   [ ]Team     Pain: [X ]yes [ ]no; intermittent mild abdominal pain  QOL impact -   Location -                    Aggravating factors -  Quality -  Radiation -  Timing-  Severity (0-10 scale):  Minimal acceptable level (0-10 scale):     CPOT:    https://www.[email or web address]/getattachment/[email or web address]/Critical-Care-Pain-Observation-Tool-(CPOT)      PAIN AD Score: 0    [email or web address].[email or web address]/painad.pdf (press ctrl +  left click to view)    Dyspnea:                           [ ]Mild [ ]Moderate [ ]Severe  Anxiety:                             [ ]Mild [ ]Moderate [ ]Severe  Fatigue:                             [ ]Mild [ ]Moderate [ ]Severe  Nausea:                             [ ]Mild [ ]Moderate [ ]Severe  Loss of appetite:              [ ]Mild [ ]Moderate [ ]Severe  Constipation:                    [ ]Mild [ ]Moderate [ ]Severe    Other Symptoms:  [ ]All other review of systems negative     Palliative Performance Status Version 2:         %    http://npcrc.org/files/news/palliative_performance_scale_ppsv2.pdf    PHYSICAL EXAM:  Vital Signs Last 24 Hrs  T(C): 36.4 (17 Aug 2022 14:07), Max: 36.9 (17 Aug 2022 04:40)  T(F): 97.6 (17 Aug 2022 14:07), Max: 98.5 (17 Aug 2022 04:40)  HR: 61 (17 Aug 2022 14:07) (61 - 76)  BP: 152/67 (17 Aug 2022 14:07) (124/61 - 183/77)  BP(mean): --  RR: 18 (17 Aug 2022 14:07) (18 - 18)  SpO2: --      GENERAL:  [ ]Alert  [ ]Oriented x   [ ]Lethargic  [ ]Cachexia  [ ]Unarousable  [X ]Verbal  [ ]Non-Verbal  Behavioral:   [ ] Anxiety  [ ] Delirium [ ] Agitation [X ] Other  HEENT:  [ ]Normal   [ ]Dry mouth   [ ]ET Tube/Trach  [ ]Oral lesions  PULMONARY:   [ ]Clear [ ]Tachypnea  [ ]Audible excessive secretions [X] No labored breathing  [ ]Rhonchi        [ ]Right [ ]Left [ ]Bilateral  [ ]Crackles        [ ]Right [ ]Left [ ]Bilateral  [ ]Wheezing     [ ]Right [ ]Left [ ]Bilateral  [ ]Diminished breath sounds [ ]right [ ]left [ ]bilateral  CARDIOVASCULAR:    [ ]Regular [ ]Irregular [ ]Tachy  [ ]Brent [ ]Murmur [ ]Other  GASTROINTESTINAL:  [ ]Soft  [ X]Nondistended   [ ]+BS  [ ]Non tender [ ]Tender  [ ]PEG [ ]OGT/ NGT  Last BM:   GENITOURINARY:  [X ]Normal [ ] Incontinent   [ ]Oliguria/Anuria   [ ]Martines  MUSCULOSKELETAL:   [ ]Normal   [ ]Weakness  [ ]Bed/Wheelchair bound [ ]Edema  NEUROLOGIC:   [X ]No focal deficits  [ ]Cognitive impairment  [ ]Dysphagia [ ]Dysarthria [ ]Paresis [ ]Other   SKIN:   [ ]Normal   [X] Nonjaundiced [ ]Rash  [ ]Pressure ulcer(s)       Present on admission [ ]y [ ]n    CRITICAL CARE:  [ ] Shock Present  [ ]Septic [ ]Cardiogenic [ ]Neurologic [ ]Hypovolemic  [ ]  Vasopressors [ ]  Inotropes   [ ]Respiratory failure present [ ]Mechanical ventilation [ ]Non-invasive ventilatory support [ ]High flow  [ ]Acute  [ ]Chronic [ ]Hypoxic  [ ]Hypercarbic [ ]Other  [ ]Other organ failure     LABS: reviewed                          11.7   10.31 )-----------( 326      ( 17 Aug 2022 07:19 )             35.5   08-17    136  |  99  |  9<L>  ----------------------------<  89  3.5   |  26  |  0.6<L>    Ca    8.8      17 Aug 2022 07:19  Phos  2.5     08-17  Mg     1.8     08-17        RADIOLOGY & ADDITIONAL STUDIES:    PROTEIN CALORIE MALNUTRITION PRESENT: [ ]mild [ ]moderate [ ]severe [ ]underweight [ ]morbid obesity  https://www.andeal.org/vault/2440/web/files/ONC/Table_Clinical%20Characteristics%20to%20Document%20Malnutrition-White%20JV%20et%20al%202012.pdf    Height (cm): 172.7 (08-05-22 @ 03:26)  Weight (kg): 72.3 (08-05-22 @ 03:26)  BMI (kg/m2): 24.2 (08-05-22 @ 03:26)    [ ]PPSV2 < or = to 30% [ ]significant weight loss  [ ]poor nutritional intake  [ ]anasarca      [ ]Artificial Nutrition      REFERRALS:   [ ]Chaplaincy  [ ]Hospice  [ ]Child Life  [ ]Social Work  [ ]Case management [ ]Holistic Therapy     Goals of Care Document:     ______________  Alex Carbone MD  Palliative Medicine  [facility]   of Geriatric and Palliative Medicine  [phone]

## 2022-08-17 NOTE — SWALLOW BEDSIDE ASSESSMENT ADULT - SWALLOW EVAL: FEEDING ASSISTANCE
Pt c/o shaky hands/frequent cues/help required

## 2022-08-17 NOTE — PROGRESS NOTE ADULT - SUBJECTIVE AND OBJECTIVE BOX
89 y/o F w/PMHx of HTN, CAD s/p stent, COPD, hypothyroidism presented to the ED with intermittent abdominal pain with associated intermittent non bloody diarrhea and 30 lb weight loss over past 5 weeks.    Today:  Seen at bedside, states she has abdominal pain.      REVIEW OF SYSTEMS:  Abdominal pain    MEDICATIONS  (STANDING):  aspirin  chewable 81 milliGRAM(s) Oral daily  budesonide  80 MICROgram(s)/formoterol 4.5 MICROgram(s) Inhaler 2 Puff(s) Inhalation two times a day  clopidogrel Tablet 75 milliGRAM(s) Oral daily  enoxaparin Injectable 30 milliGRAM(s) SubCutaneous every 24 hours  isosorbide   dinitrate Tablet (ISORDIL) 10 milliGRAM(s) Oral three times a day  latanoprost 0.005% Ophthalmic Solution 1 Drop(s) Both EYES at bedtime  levothyroxine 25 MICROGram(s) Oral daily  lisinopril 20 milliGRAM(s) Oral daily  metoprolol succinate ER 25 milliGRAM(s) Oral daily  metroNIDAZOLE  IVPB      metroNIDAZOLE  IVPB 500 milliGRAM(s) IV Intermittent every 8 hours  pantoprazole    Tablet 40 milliGRAM(s) Oral before breakfast  saccharomyces boulardii 250 milliGRAM(s) Oral two times a day  sodium chloride 0.9%. 1000 milliLiter(s) (50 mL/Hr) IV Continuous <Continuous>  trimethoprim / sulfamethoxazole IVPB 160 milliGRAM(s) IV Intermittent two times a day    MEDICATIONS  (PRN):  acetaminophen  Suppository .. 650 milliGRAM(s) Rectal every 6 hours PRN Temp greater or equal to 38C (100.4F), Mild Pain (1 - 3)  ALBUTerol    90 MICROgram(s) HFA Inhaler 2 Puff(s) Inhalation every 6 hours PRN Shortness of Breath and/or Wheezing  aluminum hydroxide/magnesium hydroxide/simethicone Suspension 30 milliLiter(s) Oral every 4 hours PRN Dyspepsia  hydrALAZINE Injectable 10 milliGRAM(s) IV Push every 6 hours PRN hypertension  melatonin 3 milliGRAM(s) Oral at bedtime PRN Insomnia  ondansetron Injectable 4 milliGRAM(s) IV Push every 8 hours PRN Nausea and/or Vomiting      Allergies  No Known Allergies          Vital Signs Last 24 Hrs  T(C): 36.9 (17 Aug 2022 04:40), Max: 36.9 (17 Aug 2022 04:40)  T(F): 98.5 (17 Aug 2022 04:40), Max: 98.5 (17 Aug 2022 04:40)  HR: 73 (17 Aug 2022 06:56) (70 - 76)  BP: 171/76 (17 Aug 2022 06:56) (124/61 - 183/77)  RR: 18 (17 Aug 2022 04:40) (18 - 18)  SpO2: 95% (16 Aug 2022 12:32) (95% - 95%)    Parameters below as of 16 Aug 2022 12:32  Patient On (Oxygen Delivery Method): nasal cannula  O2 Flow (L/min): 3      PHYSICAL EXAM:  General: frail and elderly  HEENT: NC/AT; PERRL, clear conjunctiva  Neck: supple  Cardiovascular: +S1/S2  Respiratory: ; scattered b/l crackles   Gastrointestinal: soft, mid abd tenderness, no rebound or guarding /ND; +BSx4  Extremities: WWP; 2+ peripheral pulses; no edema   Neurological: alert, awake, oriented x2-3, no focal deficits    LABS:                        11.7   10.31 )-----------( 326      ( 17 Aug 2022 07:19 )             35.5     08-17    136  |  99  |  9<L>  ----------------------------<  89  3.5   |  26  |  0.6<L>    Ca    8.8      17 Aug 2022 07:19  Phos  2.5     08-17  Mg     1.8     08-17    TPro  5.3<L>  /  Alb  3.1<L>  /  TBili  0.3  /  DBili  x   /  AST  38  /  ALT  29  /  AlkPhos  77  08-17

## 2022-08-17 NOTE — SWALLOW BEDSIDE ASSESSMENT ADULT - ORAL PREPARATORY PHASE
reduced labial seal around tsp/Anterior loss of bolus
reduced labial seal around tsp/Anterior loss of bolus
mild anterior loss with thins via cup, straw, and spoon sips./Anterior loss of bolus

## 2022-08-18 LAB
ALBUMIN SERPL ELPH-MCNC: 2.8 G/DL — LOW (ref 3.5–5.2)
ALP SERPL-CCNC: 68 U/L — SIGNIFICANT CHANGE UP (ref 30–115)
ALT FLD-CCNC: 26 U/L — SIGNIFICANT CHANGE UP (ref 0–41)
ANION GAP SERPL CALC-SCNC: 8 MMOL/L — SIGNIFICANT CHANGE UP (ref 7–14)
AST SERPL-CCNC: 33 U/L — SIGNIFICANT CHANGE UP (ref 0–41)
BASOPHILS # BLD AUTO: 0.15 K/UL — SIGNIFICANT CHANGE UP (ref 0–0.2)
BASOPHILS NFR BLD AUTO: 1.3 % — HIGH (ref 0–1)
BILIRUB SERPL-MCNC: 0.3 MG/DL — SIGNIFICANT CHANGE UP (ref 0.2–1.2)
BUN SERPL-MCNC: 7 MG/DL — LOW (ref 10–20)
CALCIUM SERPL-MCNC: 8.6 MG/DL — SIGNIFICANT CHANGE UP (ref 8.5–10.1)
CHLORIDE SERPL-SCNC: 98 MMOL/L — SIGNIFICANT CHANGE UP (ref 98–110)
CO2 SERPL-SCNC: 27 MMOL/L — SIGNIFICANT CHANGE UP (ref 17–32)
CREAT SERPL-MCNC: 0.7 MG/DL — SIGNIFICANT CHANGE UP (ref 0.7–1.5)
EGFR: 79 ML/MIN/1.73M2 — SIGNIFICANT CHANGE UP
EOSINOPHIL # BLD AUTO: 0.38 K/UL — SIGNIFICANT CHANGE UP (ref 0–0.7)
EOSINOPHIL NFR BLD AUTO: 3.2 % — SIGNIFICANT CHANGE UP (ref 0–8)
GLUCOSE SERPL-MCNC: 110 MG/DL — HIGH (ref 70–99)
HCT VFR BLD CALC: 32.9 % — LOW (ref 37–47)
HGB BLD-MCNC: 10.6 G/DL — LOW (ref 12–16)
IMM GRANULOCYTES NFR BLD AUTO: 1.6 % — HIGH (ref 0.1–0.3)
LYMPHOCYTES # BLD AUTO: 1.74 K/UL — SIGNIFICANT CHANGE UP (ref 1.2–3.4)
LYMPHOCYTES # BLD AUTO: 14.5 % — LOW (ref 20.5–51.1)
MCHC RBC-ENTMCNC: 30.9 PG — SIGNIFICANT CHANGE UP (ref 27–31)
MCHC RBC-ENTMCNC: 32.2 G/DL — SIGNIFICANT CHANGE UP (ref 32–37)
MCV RBC AUTO: 95.9 FL — SIGNIFICANT CHANGE UP (ref 81–99)
MONOCYTES # BLD AUTO: 1.26 K/UL — HIGH (ref 0.1–0.6)
MONOCYTES NFR BLD AUTO: 10.5 % — HIGH (ref 1.7–9.3)
NEUTROPHILS # BLD AUTO: 8.27 K/UL — HIGH (ref 1.4–6.5)
NEUTROPHILS NFR BLD AUTO: 68.9 % — SIGNIFICANT CHANGE UP (ref 42.2–75.2)
NRBC # BLD: 0 /100 WBCS — SIGNIFICANT CHANGE UP (ref 0–0)
PLATELET # BLD AUTO: 313 K/UL — SIGNIFICANT CHANGE UP (ref 130–400)
POTASSIUM SERPL-MCNC: 3.4 MMOL/L — LOW (ref 3.5–5)
POTASSIUM SERPL-SCNC: 3.4 MMOL/L — LOW (ref 3.5–5)
PROT SERPL-MCNC: 4.7 G/DL — LOW (ref 6–8)
RBC # BLD: 3.43 M/UL — LOW (ref 4.2–5.4)
RBC # FLD: 13.3 % — SIGNIFICANT CHANGE UP (ref 11.5–14.5)
SODIUM SERPL-SCNC: 133 MMOL/L — LOW (ref 135–146)
WBC # BLD: 11.99 K/UL — HIGH (ref 4.8–10.8)
WBC # FLD AUTO: 11.99 K/UL — HIGH (ref 4.8–10.8)

## 2022-08-18 PROCEDURE — 99233 SBSQ HOSP IP/OBS HIGH 50: CPT

## 2022-08-18 RX ORDER — POTASSIUM CHLORIDE 20 MEQ
40 PACKET (EA) ORAL EVERY 4 HOURS
Refills: 0 | Status: COMPLETED | OUTPATIENT
Start: 2022-08-18 | End: 2022-08-18

## 2022-08-18 RX ADMIN — Medication 260 MILLIGRAM(S): at 05:25

## 2022-08-18 RX ADMIN — Medication 500 MILLIGRAM(S): at 15:35

## 2022-08-18 RX ADMIN — ISOSORBIDE DINITRATE 10 MILLIGRAM(S): 5 TABLET ORAL at 15:36

## 2022-08-18 RX ADMIN — Medication 40 MILLIEQUIVALENT(S): at 15:35

## 2022-08-18 RX ADMIN — Medication 260 MILLIGRAM(S): at 17:58

## 2022-08-18 RX ADMIN — Medication 250 MILLIGRAM(S): at 05:24

## 2022-08-18 RX ADMIN — Medication 40 MILLIEQUIVALENT(S): at 17:57

## 2022-08-18 RX ADMIN — POLYETHYLENE GLYCOL 3350 17 GRAM(S): 17 POWDER, FOR SOLUTION ORAL at 11:31

## 2022-08-18 RX ADMIN — SODIUM CHLORIDE 50 MILLILITER(S): 9 INJECTION INTRAMUSCULAR; INTRAVENOUS; SUBCUTANEOUS at 00:53

## 2022-08-18 RX ADMIN — ENOXAPARIN SODIUM 30 MILLIGRAM(S): 100 INJECTION SUBCUTANEOUS at 05:24

## 2022-08-18 RX ADMIN — Medication 25 MICROGRAM(S): at 05:23

## 2022-08-18 RX ADMIN — SENNA PLUS 2 TABLET(S): 8.6 TABLET ORAL at 22:10

## 2022-08-18 RX ADMIN — CLOPIDOGREL BISULFATE 75 MILLIGRAM(S): 75 TABLET, FILM COATED ORAL at 11:30

## 2022-08-18 RX ADMIN — Medication 81 MILLIGRAM(S): at 11:31

## 2022-08-18 RX ADMIN — Medication 3 MILLILITER(S): at 19:09

## 2022-08-18 RX ADMIN — LISINOPRIL 20 MILLIGRAM(S): 2.5 TABLET ORAL at 05:24

## 2022-08-18 RX ADMIN — Medication 3 MILLILITER(S): at 07:56

## 2022-08-18 RX ADMIN — Medication 500 MILLIGRAM(S): at 22:10

## 2022-08-18 RX ADMIN — ISOSORBIDE DINITRATE 10 MILLIGRAM(S): 5 TABLET ORAL at 05:23

## 2022-08-18 RX ADMIN — ISOSORBIDE DINITRATE 10 MILLIGRAM(S): 5 TABLET ORAL at 11:31

## 2022-08-18 RX ADMIN — Medication 500 MILLIGRAM(S): at 05:23

## 2022-08-18 RX ADMIN — Medication 3 MILLILITER(S): at 14:08

## 2022-08-18 RX ADMIN — Medication 25 MILLIGRAM(S): at 05:23

## 2022-08-18 RX ADMIN — PANTOPRAZOLE SODIUM 40 MILLIGRAM(S): 20 TABLET, DELAYED RELEASE ORAL at 08:03

## 2022-08-18 RX ADMIN — Medication 250 MILLIGRAM(S): at 17:57

## 2022-08-18 RX ADMIN — LATANOPROST 1 DROP(S): 0.05 SOLUTION/ DROPS OPHTHALMIC; TOPICAL at 22:10

## 2022-08-18 RX ADMIN — BUDESONIDE AND FORMOTEROL FUMARATE DIHYDRATE 2 PUFF(S): 160; 4.5 AEROSOL RESPIRATORY (INHALATION) at 22:10

## 2022-08-18 NOTE — SWALLOW BEDSIDE ASSESSMENT ADULT - SWALLOW EVAL: CURRENT DIET
Minced/moist and thin via controlled sips
Puree with Mildly Thick liquids w/ chin heidy
NPO pending speech/swallow evaluation
Puree and mildly thick liquids w/ chin tuck

## 2022-08-18 NOTE — SWALLOW BEDSIDE ASSESSMENT ADULT - SLP PERTINENT HISTORY OF CURRENT PROBLEM
89 y/o F w/PMHx of HTN, CAD s/p stent, COPD, hypothyroidism presented to the ED with intermittent abdominal pain with associated intermittent non bloody diarrhea and 30 lb weight loss over past 5 weeks. Pt admitted for gastroenteritis that resolved then developed SIRS with fevers and leukocytosis started on 8/10 and AMS, metabolic encephelopathy
89 y/o F w/PMHx of HTN, CAD s/p stent, COPD, hypothyroidism presented to the ED with intermittent abdominal pain with associated intermittent non bloody diarrhea and 30 lb weight loss over past 5 weeks. Pt admitted for gastroenteritis that resolved then developed SIRS with fevers and leukocytosis started on 8/10 and AMS, metabolic encephelopathy
89 y/o F w/PMHx of HTN, CAD s/p stent, COPD, hypothyroidism presented to the ED with intermittent abdominal pain with associated intermittent non bloody diarrhea and 30 lb weight loss over past 5 weeks. Pt admitted for gastroenteritis that resolved then developed SIRS with fevers and leukocytosis started on 8/10 and AMS, metabolic encephelopathy
89 y/o F w/PMHx of HTN, CAD s/p stent, COPD, hypothyroidism presented to the ED with intermittent abdominal pain with associated intermittent non bloody diarrhea and 30 lb weight loss over past 5 weeks. Pt admitted for gastroenteritis that resolved then developed SIRS with fevers and leukocytosis started on 8/10 and AMS, metabolic encephelopathy

## 2022-08-18 NOTE — CHART NOTE - NSCHARTNOTEFT_GEN_A_CORE
Daughter was at bedside.  Patient reported that she was not in pain.  Daughter discussed that patient is improved.  Patient/family want rehab at SNF.

## 2022-08-18 NOTE — PROGRESS NOTE ADULT - TIME BILLING
I have personally seen and examined this patient.    I have reviewed all pertinent clinical information and reviewed all relevant imaging and diagnostic studies personally.   I counseled the patient about diagnostic testing and treatment plan. All questions were answered.   I discussed recommendations with the primary team.

## 2022-08-18 NOTE — SWALLOW BEDSIDE ASSESSMENT ADULT - SLP GENERAL OBSERVATIONS
Pt received alert but lethargic, upright in bed and orientedx2. Pt w/ wet vocal quality and throat clearing at baseline.
Pt AOx3, able to recall results of MBS and need for chin tuck during mildly thick liquid intake
Patient received awake in bed, minimally verbal but able to express some wants/needs. Daughter present at bedside.
Pt received awake and alert in bed with multiple family members at the bedside. Per discussion with pt and family, mental status is nearing baseline, makes wants/needs known without difficulty. Significant improved MS per staff. Pt denies h/o dysphagia. Pt appears very weak with shaky hands and poor coordination.

## 2022-08-18 NOTE — PROGRESS NOTE ADULT - ASSESSMENT
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 Mild bilateral hydroureter, may be on the basis of bladder distention.      #Urine Cx 8/4 Proteus mirabilis   #CAD   #COPD  #Obesity BMI (kg/m2): 24.2  #Abx allergy: NDKA    RECOMMENDATIONS  - continue bactrim 1DS BID until 8/20 to complete 10 day course  - can stop ceftriaxone -- continue flagyl 500 mg TID also until 8/20     Please call or message on Microsoft Teams if with any questions.  Spectra [phone]

## 2022-08-18 NOTE — SWALLOW BEDSIDE ASSESSMENT ADULT - SWALLOW EVAL: RECOMMENDED DIET
Continue puree consistency with mildly thick liquids with use of chin tuck
Minced & moist with thin liquids via small single cup sips only. No straws. Aspiration Precautions
NPO until MBSS
Puree and mildly thick liquids w/ chin tuck

## 2022-08-18 NOTE — PROGRESS NOTE ADULT - SUBJECTIVE AND OBJECTIVE BOX
Patient is a 89y old  Female who presents with a chief complaint of Diarrhea     SUBJECTIVE / OVERNIGHT EVENTS: none       MEDICATIONS  (STANDING):  albuterol/ipratropium for Nebulization 3 milliLiter(s) Nebulizer every 6 hours  aspirin  chewable 81 milliGRAM(s) Oral daily  budesonide  80 MICROgram(s)/formoterol 4.5 MICROgram(s) Inhaler 2 Puff(s) Inhalation two times a day  clopidogrel Tablet 75 milliGRAM(s) Oral daily  enoxaparin Injectable 30 milliGRAM(s) SubCutaneous every 24 hours  isosorbide   dinitrate Tablet (ISORDIL) 10 milliGRAM(s) Oral three times a day  latanoprost 0.005% Ophthalmic Solution 1 Drop(s) Both EYES at bedtime  levothyroxine 25 MICROGram(s) Oral daily  lisinopril 20 milliGRAM(s) Oral daily  metoprolol succinate ER 25 milliGRAM(s) Oral daily  metroNIDAZOLE    Tablet 500 milliGRAM(s) Oral every 8 hours  pantoprazole    Tablet 40 milliGRAM(s) Oral before breakfast  polyethylene glycol 3350 17 Gram(s) Oral daily  potassium chloride   Powder 40 milliEquivalent(s) Oral every 4 hours  saccharomyces boulardii 250 milliGRAM(s) Oral two times a day  senna 2 Tablet(s) Oral at bedtime  sodium chloride 0.9%. 1000 milliLiter(s) (50 mL/Hr) IV Continuous <Continuous>  trimethoprim / sulfamethoxazole IVPB 160 milliGRAM(s) IV Intermittent two times a day    MEDICATIONS  (PRN):  acetaminophen     Tablet .. 650 milliGRAM(s) Oral every 6 hours PRN Temp greater or equal to 38C (100.4F), Mild Pain (1 - 3)  ALBUTerol    90 MICROgram(s) HFA Inhaler 2 Puff(s) Inhalation every 6 hours PRN Shortness of Breath and/or Wheezing  aluminum hydroxide/magnesium hydroxide/simethicone Suspension 30 milliLiter(s) Oral every 4 hours PRN Dyspepsia  hydrALAZINE Injectable 10 milliGRAM(s) IV Push every 6 hours PRN hypertension  melatonin 3 milliGRAM(s) Oral at bedtime PRN Insomnia  ondansetron Injectable 4 milliGRAM(s) IV Push every 8 hours PRN Nausea and/or Vomiting        I&O's Summary    17 Aug 2022 07:01  -  18 Aug 2022 07:00  --------------------------------------------------------  IN: 0 mL / OUT: 1050 mL / NET: -1050 mL        PHYSICAL EXAM:  Vital Signs Last 24 Hrs  T(C): 36.5 (18 Aug 2022 14:00), Max: 37.2 (17 Aug 2022 21:06)  T(F): 97.7 (18 Aug 2022 14:00), Max: 98.9 (17 Aug 2022 21:06)  HR: 69 (18 Aug 2022 14:00) (69 - 71)  BP: 147/59 (18 Aug 2022 14:00) (135/69 - 157/68)  BP(mean): --  RR: 18 (18 Aug 2022 14:00) (18 - 18)  SpO2: --        GENERAL: No acute distress, well-developed  HEAD:  Atraumatic, Normocephalic  EYES: EOMI, PERRLA, conjunctiva and sclera clear  NECK: Supple, no lymphadenopathy, no JVD  CHEST/LUNG: CTAB; No wheezes, rales, or rhonchi  HEART: Regular rate and rhythm; No murmurs, rubs, or gallops  ABDOMEN: Soft, non-tender, non-distended; normal bowel sounds, no organomegaly  EXTREMITIES:  2+ peripheral pulses b/l, No clubbing, cyanosis, or edema  NEUROLOGY: A&O x 3, no focal deficits  SKIN: No rashes or lesions    LABS:                        10.6   11.99 )-----------( 313      ( 18 Aug 2022 07:36 )             32.9     08-18    133<L>  |  98  |  7<L>  ----------------------------<  110<H>  3.4<L>   |  27  |  0.7    Ca    8.6      18 Aug 2022 07:36  Phos  2.5     08-17  Mg     1.8     08-17    TPro  4.7<L>  /  Alb  2.8<L>  /  TBili  0.3  /  DBili  x   /  AST  33  /  ALT  26  /  AlkPhos  68  08-18

## 2022-08-18 NOTE — PROGRESS NOTE ADULT - PROVIDER SPECIALTY LIST ADULT
Hospitalist
Infectious Disease
Infectious Disease
Internal Medicine
Internal Medicine
Hospitalist
Infectious Disease
Palliative Care

## 2022-08-18 NOTE — SWALLOW BEDSIDE ASSESSMENT ADULT - SWALLOW EVAL: RECOMMENDED FEEDING/EATING TECHNIQUES
no straws/oral hygiene/position upright (90 degrees)/small sips/bites
chin tuck w/ mildly thick liquids only/crush medication (when feasible)/no straws/oral hygiene/position upright (90 degrees)/small sips/bites/tuck chin
chin tuck w/ mildly thick liquids only/allow for swallow between intakes/maintain upright posture during/after eating for 30 mins/oral hygiene/position upright (90 degrees)/small sips/bites/tuck chin
no straws/oral hygiene/position upright (90 degrees)/small sips/bites

## 2022-08-18 NOTE — PROGRESS NOTE ADULT - ASSESSMENT
Pt admitted for gastroenteritis that resolved then developed SIRS with fevers and leukocytosis started on 8/10 and AMS  metabolic encephelopathy: now Resolved   - fever resolved Bcx and Ucx negative  - previous Ucx grew proteus Mirabilis: Completed 3days Ceftriaxone   - CTH negative, repeat head CT 8/15 neg  - CT abd with colitis, new colitis > has no diarrhea, might be from Cyclospora she had on admission   - narrowed abx to Flagyl until 8/20  - c/w Bactrim for cyclospora, for 7 days (to end 8/20)    Dysphagia  -Was present prior to admission as per family  -MBS done, SLP appreciated.  -Puree with mild thick liquids and repeat MBS in 4 weeks    Gastroenteritis- cyclospora    -No signs of SIRS on admission and CT A/P: No evidence of acute intra-abdominal pathology  -Supportive Care: Bowel Rest and Hydration, Zofran ME  -C. diff toxin Neg, GI PCR with Cyclospora SPP   -Continue Bactrim until 8/20  -Probiotics     Hypomagnesemia and Hypokalemia  Likely from Diarrhaea  Monitor and replace    H/O CAD + Essential HTN   - c/w Plavix, ASA 81, Lisinopril BB and Hydralazine     H/O Hypothyroidism:  c/w synthroid  H/O  COPD: stable : c/w inhalers    DVT PPX: Lovenox    GI PPX: PPI   GOCs: DNR and DNI  #Dispo: from Home   DC planning Pending Auth  to STR.

## 2022-08-18 NOTE — SWALLOW BEDSIDE ASSESSMENT ADULT - ASR SWALLOW ASPIRATION MONITOR
change of breathing pattern/oral hygiene/position upright (90Y)/cough/gurgly voice/fever/pneumonia/throat clearing/upper respiratory infection

## 2022-08-18 NOTE — SWALLOW BEDSIDE ASSESSMENT ADULT - NS ASR SWALLOW FINDINGS DISCUS
RN (Nik)/Nursing
SRIKANTH April, daughter Magadlena/Physician/Nursing/Patient/Family
PA, SRIKANTH Asencio, PCA/Physician/Nursing/Patient/Family
SRIKANTH Liriano MD [place]/Physician/Nursing/Patient/Family

## 2022-08-18 NOTE — SWALLOW BEDSIDE ASSESSMENT ADULT - NS SPL SWALLOW CLINIC TRIAL FT
Pt with moderate oropharyngeal dysphagia as evidenced by MBS results. Pt re-educated on correct usage of chin tuck maneuver with mildly thick liquid intake. Pt able to perform in 2/3 trials, but poor neck ROM is barrier. Reported to PT/OT.
Attempted PO trials at bedside, however patient refusing. Dinner tray at bedside and per discussion with daughter, patient refused dinner. She reports her mother has been attempting to utilize chin tuck strategy, however noted with some difficulty due to limited neck ROM. Daughter expressed concern that patient refused PO this evening. RN made aware.
Pt w/ suspected pharyngeal dysphagia c/b poor secretion management at baseline (wet vocal quality, throat clearing) and coughing post tsp sips of thin liquids 2/2x. Pt reports "it's impossible to swallow right now." Additionally, RN reporting pt with difficulties over the weekend c/b increasingly wet vocal quality post thin liquid and puree trials, resulting in RN stopping PO feeding.

## 2022-08-18 NOTE — SWALLOW BEDSIDE ASSESSMENT ADULT - SWALLOW EVAL: DIAGNOSIS
Pt presented with mild oral dysphagia and concern for pharyngeal impairment with thin liquids when not small controlled cup sips.
Pt presents w/ suspected pharyngeal dysphagia c/b pt reports and + overt s/s aspiration
Patient with known moderate oropharyngeal dysphagia s/p MBSS on 8/16/2022
Moderate oropharyngeal dysphagia

## 2022-08-19 ENCOUNTER — TRANSCRIPTION ENCOUNTER (OUTPATIENT)
Age: 87
End: 2022-08-19

## 2022-08-19 VITALS
SYSTOLIC BLOOD PRESSURE: 191 MMHG | HEART RATE: 67 BPM | RESPIRATION RATE: 18 BRPM | DIASTOLIC BLOOD PRESSURE: 80 MMHG | TEMPERATURE: 98 F

## 2022-08-19 LAB
ANION GAP SERPL CALC-SCNC: 8 MMOL/L — SIGNIFICANT CHANGE UP (ref 7–14)
BASOPHILS # BLD AUTO: 0.11 K/UL — SIGNIFICANT CHANGE UP (ref 0–0.2)
BASOPHILS NFR BLD AUTO: 1.1 % — HIGH (ref 0–1)
BUN SERPL-MCNC: 6 MG/DL — LOW (ref 10–20)
CALCIUM SERPL-MCNC: 9 MG/DL — SIGNIFICANT CHANGE UP (ref 8.5–10.1)
CHLORIDE SERPL-SCNC: 103 MMOL/L — SIGNIFICANT CHANGE UP (ref 98–110)
CO2 SERPL-SCNC: 27 MMOL/L — SIGNIFICANT CHANGE UP (ref 17–32)
CREAT SERPL-MCNC: 0.6 MG/DL — LOW (ref 0.7–1.5)
EGFR: 82 ML/MIN/1.73M2 — SIGNIFICANT CHANGE UP
EOSINOPHIL # BLD AUTO: 0.28 K/UL — SIGNIFICANT CHANGE UP (ref 0–0.7)
EOSINOPHIL NFR BLD AUTO: 2.7 % — SIGNIFICANT CHANGE UP (ref 0–8)
GLUCOSE SERPL-MCNC: 102 MG/DL — HIGH (ref 70–99)
HCT VFR BLD CALC: 33.3 % — LOW (ref 37–47)
HGB BLD-MCNC: 10.9 G/DL — LOW (ref 12–16)
IMM GRANULOCYTES NFR BLD AUTO: 2.3 % — HIGH (ref 0.1–0.3)
LYMPHOCYTES # BLD AUTO: 1.96 K/UL — SIGNIFICANT CHANGE UP (ref 1.2–3.4)
LYMPHOCYTES # BLD AUTO: 18.8 % — LOW (ref 20.5–51.1)
MCHC RBC-ENTMCNC: 31.1 PG — HIGH (ref 27–31)
MCHC RBC-ENTMCNC: 32.7 G/DL — SIGNIFICANT CHANGE UP (ref 32–37)
MCV RBC AUTO: 94.9 FL — SIGNIFICANT CHANGE UP (ref 81–99)
MONOCYTES # BLD AUTO: 1.11 K/UL — HIGH (ref 0.1–0.6)
MONOCYTES NFR BLD AUTO: 10.6 % — HIGH (ref 1.7–9.3)
NEUTROPHILS # BLD AUTO: 6.74 K/UL — HIGH (ref 1.4–6.5)
NEUTROPHILS NFR BLD AUTO: 64.5 % — SIGNIFICANT CHANGE UP (ref 42.2–75.2)
NRBC # BLD: 0 /100 WBCS — SIGNIFICANT CHANGE UP (ref 0–0)
PLATELET # BLD AUTO: 327 K/UL — SIGNIFICANT CHANGE UP (ref 130–400)
POTASSIUM SERPL-MCNC: 4.1 MMOL/L — SIGNIFICANT CHANGE UP (ref 3.5–5)
POTASSIUM SERPL-SCNC: 4.1 MMOL/L — SIGNIFICANT CHANGE UP (ref 3.5–5)
RBC # BLD: 3.51 M/UL — LOW (ref 4.2–5.4)
RBC # FLD: 13.4 % — SIGNIFICANT CHANGE UP (ref 11.5–14.5)
SARS-COV-2 RNA SPEC QL NAA+PROBE: SIGNIFICANT CHANGE UP
SODIUM SERPL-SCNC: 138 MMOL/L — SIGNIFICANT CHANGE UP (ref 135–146)
WBC # BLD: 10.44 K/UL — SIGNIFICANT CHANGE UP (ref 4.8–10.8)
WBC # FLD AUTO: 10.44 K/UL — SIGNIFICANT CHANGE UP (ref 4.8–10.8)

## 2022-08-19 PROCEDURE — 99239 HOSP IP/OBS DSCHRG MGMT >30: CPT

## 2022-08-19 RX ORDER — METRONIDAZOLE 500 MG
1 TABLET ORAL
Qty: 5 | Refills: 0
Start: 2022-08-19 | End: 2022-08-19

## 2022-08-19 RX ORDER — SENNA PLUS 8.6 MG/1
2 TABLET ORAL
Qty: 0 | Refills: 0 | DISCHARGE
Start: 2022-08-19

## 2022-08-19 RX ORDER — POLYETHYLENE GLYCOL 3350 17 G/17G
17 POWDER, FOR SOLUTION ORAL
Qty: 0 | Refills: 0 | DISCHARGE
Start: 2022-08-19

## 2022-08-19 RX ORDER — METRONIDAZOLE 500 MG
1 TABLET ORAL
Qty: 4 | Refills: 0
Start: 2022-08-19 | End: 2022-08-19

## 2022-08-19 RX ADMIN — Medication 500 MILLIGRAM(S): at 05:38

## 2022-08-19 RX ADMIN — Medication 81 MILLIGRAM(S): at 11:05

## 2022-08-19 RX ADMIN — Medication 25 MICROGRAM(S): at 05:38

## 2022-08-19 RX ADMIN — ENOXAPARIN SODIUM 30 MILLIGRAM(S): 100 INJECTION SUBCUTANEOUS at 05:37

## 2022-08-19 RX ADMIN — CLOPIDOGREL BISULFATE 75 MILLIGRAM(S): 75 TABLET, FILM COATED ORAL at 11:05

## 2022-08-19 RX ADMIN — ISOSORBIDE DINITRATE 10 MILLIGRAM(S): 5 TABLET ORAL at 11:05

## 2022-08-19 RX ADMIN — Medication 1 TABLET(S): at 12:14

## 2022-08-19 RX ADMIN — Medication 650 MILLIGRAM(S): at 11:04

## 2022-08-19 RX ADMIN — Medication 25 MILLIGRAM(S): at 05:38

## 2022-08-19 RX ADMIN — PANTOPRAZOLE SODIUM 40 MILLIGRAM(S): 20 TABLET, DELAYED RELEASE ORAL at 08:10

## 2022-08-19 RX ADMIN — ISOSORBIDE DINITRATE 10 MILLIGRAM(S): 5 TABLET ORAL at 05:38

## 2022-08-19 RX ADMIN — Medication 260 MILLIGRAM(S): at 05:39

## 2022-08-19 RX ADMIN — BUDESONIDE AND FORMOTEROL FUMARATE DIHYDRATE 2 PUFF(S): 160; 4.5 AEROSOL RESPIRATORY (INHALATION) at 11:06

## 2022-08-19 RX ADMIN — LISINOPRIL 20 MILLIGRAM(S): 2.5 TABLET ORAL at 05:38

## 2022-08-19 RX ADMIN — POLYETHYLENE GLYCOL 3350 17 GRAM(S): 17 POWDER, FOR SOLUTION ORAL at 11:05

## 2022-08-19 RX ADMIN — Medication 250 MILLIGRAM(S): at 05:38

## 2022-08-19 NOTE — CHART NOTE - NSCHARTNOTESELECT_GEN_ALL_CORE
Event Note
Hypomagnesimia
PA Note/Event Note
Palliative Care SW Initial Assessment/Event Note
Event Note
PA Note/Event Note
Palliative Care SW Note/Event Note
advance diet

## 2022-08-19 NOTE — DISCHARGE NOTE NURSING/CASE MANAGEMENT/SOCIAL WORK - PATIENT PORTAL LINK FT
You can access the FollowMyHealth Patient Portal offered by [facility] by registering at the following website: [email or web address]. By joining [facility]’s FollowMyHealth portal, you will also be able to view your health information using other applications (apps) compatible with our system.

## 2022-08-19 NOTE — CHART NOTE - NSCHARTNOTEFT_GEN_A_CORE
CHIRAG HERNÁNDEZ  MRN-[id number]    VITAL SIGNS:  T(F): 97.8 (08-19-22 @ 05:06), Max: 97.8 (08-19-22 @ 05:06)  HR: 77 (08-19-22 @ 05:06)  BP: 183/76 (08-19-22 @ 05:06)  SpO2: --      PHYSICAL EXAMINATION:  General: NAD, resting comfortably in bed   Pulmonary: CTA b/l, no adventitious sounds   Cardiovascular: S1/S2 appreciated, RRR, no murmurs  Gastrointestinal/Abdomen & Pelvis: Soft, NT, ND   Neurologic/Motor: AO x 3, no focal deficits     TEST RESULTS:                        10.9   10.44 )-----------( 327      ( 19 Aug 2022 06:07 )             33.3       08-19    138  |  103  |  6<L>  ----------------------------<  102<H>  4.1   |  27  |  0.6<L>    Ca    9.0      19 Aug 2022 06:07    TPro  4.7<L>  /  Alb  2.8<L>  /  TBili  0.3  /  DBili  x   /  AST  33  /  ALT  26  /  AlkPhos  68  08-18      DISPOSITION: Home

## 2022-08-19 NOTE — CHART NOTE - NSCHARTNOTEFT_GEN_A_CORE
PALLIATIVE MEDICINE INTERDISCIPLINARY TEAM NOTE    Provider:  [   ]Social Work   [   ]          [   ] Initial visit [   ] Follow up    Family or contact name / phone #   Met with: [   ] Patient  [   ] Family  [   ] Other:    Primary Language: [   ] English [   ] Other*:                      *Interpretation provided by:    SUPPORT DIAGNOSES            (Check all that apply)  [   ] Psychosocial spiritual assessment (PSSA)  [   ] EOL issues  [   ] Cultural / spiritual concerns  [   ] Pain / suffering  [   ] Dementia / AMS  [   ] Other:  [   ] AD issues  [   ] Grief / loss / sadness  [   ] Discharge issues  [   ] Distress / coping    PSYCHOSOCIAL ASSESSMENT OF PATIENT         (Check all that apply)  [   ] Initial Assessment            [   ] Reassessment          [   ] Not Applicable this visit    Pain/suffering acuity:  [   ] None to mild (0-3)           [   ] Moderate (4-6)        [   ] High (7-10)    Mental Status:  [   ] Alert/oriented (x3)          [   ] Confused/Altered(x2/x1)         [   ] Non-resp    Functional status:  [   ] Independent w ADLs      [   ] Needs Assistance             [   ] Bedbound/Full Care    Coping:  [   ] Coping well                     [   ] Coping w/difficulty            [   ] Poor coping    Support system:  [   ] Strong                              [   ] Adequate                        [   ] Inadequate    SPIRITUAL ASSESSMENT  Roman Catholic/Spiritual practice: ___Catholic________________________    Role of organized Episcopalian:  [   ] Important                     [ x  ] Some (fam tradition, cultural)               [   ] None    Effects on medical care:  [   ] Yes, _____________________________________                         [ x  ] None    Cultural/Sikh need:  [   ] Yes, _____________________________________                         [ x  ] None    Refer to Pastoral Care:  [   ] Yes           [  x ] No, not at this time    SERVICE PROVIDED  [   ]PSSA                                                                             [   ]Discharge support / facilitation  [   ]AD / goals of care counseling                                  [   ]EOL / death / bereavement counseling  [   ]Counseling / support                                                [   ] Family meeting  [ x  ]Prayer / sacrament / ritual                                      [   ] Referral   [   ]Other                                                                       NOTE and Plan of Care (PoC): Pt is leaving for Hospice today. she seems to be comfortable.

## 2022-08-22 ENCOUNTER — INPATIENT (INPATIENT)
Facility: HOSPITAL | Age: 87
LOS: 14 days | Discharge: NURSING, REHAB OR LONG-TERM CARE | End: 2022-09-06
Attending: HOSPITALIST | Admitting: HOSPITALIST

## 2022-08-22 VITALS
DIASTOLIC BLOOD PRESSURE: 84 MMHG | OXYGEN SATURATION: 96 % | RESPIRATION RATE: 16 BRPM | HEART RATE: 78 BPM | SYSTOLIC BLOOD PRESSURE: 184 MMHG

## 2022-08-22 DIAGNOSIS — Z95.5 PRESENCE OF CORONARY ANGIOPLASTY IMPLANT AND GRAFT: Chronic | ICD-10-CM

## 2022-08-22 DIAGNOSIS — Z90.49 ACQUIRED ABSENCE OF OTHER SPECIFIED PARTS OF DIGESTIVE TRACT: Chronic | ICD-10-CM

## 2022-08-22 DIAGNOSIS — Z90.710 ACQUIRED ABSENCE OF BOTH CERVIX AND UTERUS: Chronic | ICD-10-CM

## 2022-08-22 DIAGNOSIS — H26.9 UNSPECIFIED CATARACT: Chronic | ICD-10-CM

## 2022-08-22 LAB
ALBUMIN SERPL ELPH-MCNC: 3.4 G/DL — LOW (ref 3.5–5.2)
ALP SERPL-CCNC: 82 U/L — SIGNIFICANT CHANGE UP (ref 30–115)
ALT FLD-CCNC: 27 U/L — SIGNIFICANT CHANGE UP (ref 0–41)
ANION GAP SERPL CALC-SCNC: 11 MMOL/L — SIGNIFICANT CHANGE UP (ref 7–14)
APPEARANCE UR: ABNORMAL
APTT BLD: 30.4 SEC — SIGNIFICANT CHANGE UP (ref 27–39.2)
AST SERPL-CCNC: 32 U/L — SIGNIFICANT CHANGE UP (ref 0–41)
BACTERIA # UR AUTO: NEGATIVE — SIGNIFICANT CHANGE UP
BASE EXCESS BLDV CALC-SCNC: 6.6 MMOL/L — HIGH (ref -2–3)
BASOPHILS # BLD AUTO: 0.11 K/UL — SIGNIFICANT CHANGE UP (ref 0–0.2)
BASOPHILS NFR BLD AUTO: 0.7 % — SIGNIFICANT CHANGE UP (ref 0–1)
BILIRUB SERPL-MCNC: 0.5 MG/DL — SIGNIFICANT CHANGE UP (ref 0.2–1.2)
BILIRUB UR-MCNC: NEGATIVE — SIGNIFICANT CHANGE UP
BUN SERPL-MCNC: 13 MG/DL — SIGNIFICANT CHANGE UP (ref 10–20)
CALCIUM SERPL-MCNC: 9.8 MG/DL — SIGNIFICANT CHANGE UP (ref 8.5–10.1)
CHLORIDE SERPL-SCNC: 99 MMOL/L — SIGNIFICANT CHANGE UP (ref 98–110)
CO2 SERPL-SCNC: 27 MMOL/L — SIGNIFICANT CHANGE UP (ref 17–32)
COLOR SPEC: YELLOW — SIGNIFICANT CHANGE UP
CREAT SERPL-MCNC: 0.8 MG/DL — SIGNIFICANT CHANGE UP (ref 0.7–1.5)
DIFF PNL FLD: ABNORMAL
EGFR: 67 ML/MIN/1.73M2 — SIGNIFICANT CHANGE UP
EOSINOPHIL # BLD AUTO: 0.28 K/UL — SIGNIFICANT CHANGE UP (ref 0–0.7)
EOSINOPHIL NFR BLD AUTO: 1.7 % — SIGNIFICANT CHANGE UP (ref 0–8)
EPI CELLS # UR: 9 /HPF — HIGH (ref 0–5)
GAS PNL BLDV: 129 MMOL/L — LOW (ref 136–145)
GAS PNL BLDV: SIGNIFICANT CHANGE UP
GLUCOSE SERPL-MCNC: 114 MG/DL — HIGH (ref 70–99)
GLUCOSE UR QL: NEGATIVE — SIGNIFICANT CHANGE UP
HCO3 BLDV-SCNC: 31 MMOL/L — HIGH (ref 22–29)
HCT VFR BLD CALC: 37.4 % — SIGNIFICANT CHANGE UP (ref 37–47)
HGB BLD-MCNC: 12.6 G/DL — SIGNIFICANT CHANGE UP (ref 12–16)
HYALINE CASTS # UR AUTO: 52 /LPF — HIGH (ref 0–7)
IMM GRANULOCYTES NFR BLD AUTO: 1.3 % — HIGH (ref 0.1–0.3)
INR BLD: 1.16 RATIO — SIGNIFICANT CHANGE UP (ref 0.65–1.3)
KETONES UR-MCNC: NEGATIVE — SIGNIFICANT CHANGE UP
LACTATE BLDV-MCNC: 2.2 MMOL/L — HIGH (ref 0.5–2)
LACTATE SERPL-SCNC: 1.8 MMOL/L — SIGNIFICANT CHANGE UP (ref 0.7–2)
LEUKOCYTE ESTERASE UR-ACNC: ABNORMAL
LYMPHOCYTES # BLD AUTO: 13.5 % — LOW (ref 20.5–51.1)
LYMPHOCYTES # BLD AUTO: 2.22 K/UL — SIGNIFICANT CHANGE UP (ref 1.2–3.4)
MCHC RBC-ENTMCNC: 31.9 PG — HIGH (ref 27–31)
MCHC RBC-ENTMCNC: 33.7 G/DL — SIGNIFICANT CHANGE UP (ref 32–37)
MCV RBC AUTO: 94.7 FL — SIGNIFICANT CHANGE UP (ref 81–99)
MONOCYTES # BLD AUTO: 1.52 K/UL — HIGH (ref 0.1–0.6)
MONOCYTES NFR BLD AUTO: 9.3 % — SIGNIFICANT CHANGE UP (ref 1.7–9.3)
NEUTROPHILS # BLD AUTO: 12.07 K/UL — HIGH (ref 1.4–6.5)
NEUTROPHILS NFR BLD AUTO: 73.5 % — SIGNIFICANT CHANGE UP (ref 42.2–75.2)
NITRITE UR-MCNC: NEGATIVE — SIGNIFICANT CHANGE UP
NRBC # BLD: 0 /100 WBCS — SIGNIFICANT CHANGE UP (ref 0–0)
PCO2 BLDV: 40 MMHG — SIGNIFICANT CHANGE UP (ref 39–42)
PH BLDV: 7.49 — HIGH (ref 7.32–7.43)
PH UR: 7 — SIGNIFICANT CHANGE UP (ref 5–8)
PLATELET # BLD AUTO: 402 K/UL — HIGH (ref 130–400)
PO2 BLDV: 59 MMHG — SIGNIFICANT CHANGE UP
POTASSIUM SERPL-MCNC: 4.8 MMOL/L — SIGNIFICANT CHANGE UP (ref 3.5–5)
POTASSIUM SERPL-SCNC: 4.8 MMOL/L — SIGNIFICANT CHANGE UP (ref 3.5–5)
PROT SERPL-MCNC: 6 G/DL — SIGNIFICANT CHANGE UP (ref 6–8)
PROT UR-MCNC: ABNORMAL
PROTHROM AB SERPL-ACNC: 13.3 SEC — HIGH (ref 9.95–12.87)
RBC # BLD: 3.95 M/UL — LOW (ref 4.2–5.4)
RBC # FLD: 14.2 % — SIGNIFICANT CHANGE UP (ref 11.5–14.5)
RBC CASTS # UR COMP ASSIST: 44 /HPF — HIGH (ref 0–4)
SAO2 % BLDV: 93 % — SIGNIFICANT CHANGE UP
SARS-COV-2 RNA SPEC QL NAA+PROBE: SIGNIFICANT CHANGE UP
SODIUM SERPL-SCNC: 137 MMOL/L — SIGNIFICANT CHANGE UP (ref 135–146)
SP GR SPEC: 1.02 — SIGNIFICANT CHANGE UP (ref 1.01–1.03)
TROPONIN T SERPL-MCNC: 0.01 NG/ML — SIGNIFICANT CHANGE UP
UROBILINOGEN FLD QL: SIGNIFICANT CHANGE UP
WBC # BLD: 16.41 K/UL — HIGH (ref 4.8–10.8)
WBC # FLD AUTO: 16.41 K/UL — HIGH (ref 4.8–10.8)
WBC UR QL: 50 /HPF — HIGH (ref 0–5)

## 2022-08-22 PROCEDURE — 0042T: CPT | Mod: MA

## 2022-08-22 PROCEDURE — 99291 CRITICAL CARE FIRST HOUR: CPT

## 2022-08-22 PROCEDURE — 71045 X-RAY EXAM CHEST 1 VIEW: CPT | Mod: 26

## 2022-08-22 PROCEDURE — 74177 CT ABD & PELVIS W/CONTRAST: CPT | Mod: 26,MA

## 2022-08-22 PROCEDURE — 70496 CT ANGIOGRAPHY HEAD: CPT | Mod: 26,MA

## 2022-08-22 PROCEDURE — 71260 CT THORAX DX C+: CPT | Mod: 26,MA

## 2022-08-22 PROCEDURE — 70498 CT ANGIOGRAPHY NECK: CPT | Mod: 26,MA

## 2022-08-22 PROCEDURE — 93010 ELECTROCARDIOGRAM REPORT: CPT

## 2022-08-22 RX ORDER — CEFEPIME 1 G/1
2000 INJECTION, POWDER, FOR SOLUTION INTRAMUSCULAR; INTRAVENOUS ONCE
Refills: 0 | Status: COMPLETED | OUTPATIENT
Start: 2022-08-22 | End: 2022-08-22

## 2022-08-22 RX ORDER — SODIUM CHLORIDE 9 MG/ML
1000 INJECTION, SOLUTION INTRAVENOUS ONCE
Refills: 0 | Status: COMPLETED | OUTPATIENT
Start: 2022-08-22 | End: 2022-08-22

## 2022-08-22 RX ORDER — VANCOMYCIN HCL 1 G
1500 VIAL (EA) INTRAVENOUS ONCE
Refills: 0 | Status: COMPLETED | OUTPATIENT
Start: 2022-08-22 | End: 2022-08-22

## 2022-08-22 RX ORDER — ENOXAPARIN SODIUM 100 MG/ML
60 INJECTION SUBCUTANEOUS ONCE
Refills: 0 | Status: COMPLETED | OUTPATIENT
Start: 2022-08-22 | End: 2022-08-22

## 2022-08-22 RX ADMIN — CEFEPIME 100 MILLIGRAM(S): 1 INJECTION, POWDER, FOR SOLUTION INTRAMUSCULAR; INTRAVENOUS at 21:11

## 2022-08-22 RX ADMIN — SODIUM CHLORIDE 1000 MILLILITER(S): 9 INJECTION, SOLUTION INTRAVENOUS at 21:11

## 2022-08-22 RX ADMIN — Medication 250 MILLIGRAM(S): at 21:44

## 2022-08-22 RX ADMIN — ENOXAPARIN SODIUM 60 MILLIGRAM(S): 100 INJECTION SUBCUTANEOUS at 23:29

## 2022-08-22 NOTE — ED PROVIDER NOTE - OBJECTIVE STATEMENT
Patient is a 89y Female with PMhx HTN, CAD s/p stent, COPD, hypothyroidism, BIBA for evaluation of AMS. EMS state that the patient has had left sided facial droop and decreased strength in her left upper extremity. Otherwise patient is a poor historian and unable to obtain other history.

## 2022-08-22 NOTE — ED PROVIDER NOTE - PROGRESS NOTE DETAILS
PK: Stroke code called. Patient brought to CT scan. PK: Patient's daughter at bedside, able to provide history. Patient was admitted on 8/4-8/19 for diarrhea, weight loss, and treated for metabolic encephalopathy. Patient has not been at her baseline mental status since being admitted. decreased strength on the left and left droop are baseline. For the last two days, patient has been less active and responsive. Unsure if febrile at Nursing home. Mental status change is not acute. Stroke code cancelled. PK: Labs and imaging reviewed. elevated lactate and leukocytosis noted. Urine + for UTI. Patient has history of recurrent UTIs. Will give Cefepime and Vancomycin. Ct angio results discussed with neuro. Patient to be admitted to med/tele.

## 2022-08-22 NOTE — ED PROVIDER NOTE - ATTENDING CONTRIBUTION TO CARE
89 y/o F w/PMHx of HTN, CAD s/p stent, COPD, hypothyroidism, Recent admission for encephalopathy secondary to presumed UTI and parasitic GI infection brought in by EMS from rehab for altered mental status.  Patient initially of stroke code or as EMS stated that patient had acute onset altered mental status as well as left-sided weakness 1.5 hours prior to her evaluation however when daughter arrived to clarify the history stating that over the last week patient has had progressive altered mental status over the last couple of days, today daughter found her to be somnolent and not acting like her self.  Patient was ANO x3 at baseline however since last admission has had a steady decline in mental status.  No known fevers vomiting or diarrhea per daughter.  Finished antibiotics 3 days ago.     CONSTITUTIONAL: Well-developed; somnolent on exam however arousable  SKIN: warm, dry  HEAD: Normocephalic; atraumatic.  EYES: PERRL, EOMI, no conjunctival erythema.  ENT: No nasal discharge; airway clear.  NECK: Supple; non tender.  CARD: S1, S2 normal; no murmurs, gallops, or rubs. Regular rate and rhythm.   RESP: crackles to the bases.  ABD: soft nondistended, tenderness to the suprapubic region.   EXT: Normal ROM.  No clubbing, cyanosis or edema.   NEURO: oriented x 2, mild dec strength to the LUE, dec weakness to the bilateral LEs. sensation intact to all four extremities.     stroke code cancelled when discussing with stroke attending.   Concern for metabolic encephalopathy  plan for labs, imaging, ua, reassess.

## 2022-08-22 NOTE — ED ADULT TRIAGE NOTE - CHIEF COMPLAINT QUOTE
BIBA from nursing home for stoke-like symptoms that started 2 days ago, sent into hospital today for right sided facial droop that started 1 hour prior to arrival

## 2022-08-22 NOTE — ED ADULT NURSE NOTE - OBJECTIVE STATEMENT
pt sent from NH for stroke-like symptoms for 2 days   as per EMS pt developed right sided facial droop that happened 1 hour PTA

## 2022-08-22 NOTE — ED PROVIDER NOTE - CARE PLAN
1 Principal Discharge DX:	Metabolic encephalopathy  Secondary Diagnosis:	Acute UTI   Principal Discharge DX:	Metabolic encephalopathy  Secondary Diagnosis:	Acute UTI  Secondary Diagnosis:	Pulmonary embolism

## 2022-08-22 NOTE — ED ADULT NURSE NOTE - NSIMPLEMENTINTERV_GEN_ALL_ED
Implemented All Fall with Harm Risk Interventions:  [place] to call system. Call bell, personal items and telephone within reach. Instruct patient to call for assistance. Room bathroom lighting operational. Non-slip footwear when patient is off stretcher. Physically safe environment: no spills, clutter or unnecessary equipment. Stretcher in lowest position, wheels locked, appropriate side rails in place. Provide visual cue, wrist band, yellow gown, etc. Monitor gait and stability. Monitor for mental status changes and reorient to person, place, and time. Review medications for side effects contributing to fall risk. Reinforce activity limits and safety measures with patient and family. Provide visual clues: red socks.

## 2022-08-22 NOTE — ED PROVIDER NOTE - PHYSICAL EXAMINATION
CONSTITUTIONAL: A&OX2, cachetic appearing  SKIN: Warm dry  HEAD: NCAT  EYES: NL inspection  ENT: MMM  NECK: Supple; non tender.  CARD: RRR  RESP: CTAB  ABD: S/NT no R/G  EXT: no signs of externa truama, no pedal edema.   NEURO: slight left sided facial droop, 4/5 strength on the left upper extremity

## 2022-08-22 NOTE — ED PROVIDER NOTE - CLINICAL SUMMARY MEDICAL DECISION MAKING FREE TEXT BOX
89 yo F w/ History as documented brought in by EMS for concern of altered mental status.  Labs reviewed with leukocytosis, UTI on labs.  Imaging concerning for acute subsegmental PEs.  No evidence of heart strain.  Will give 1 dose of Lovenox and admit to telemetry for further monitoring.  Stroke code initially called however canceled after further information. given first dose of abx. admitted for further management.

## 2022-08-22 NOTE — CONSULT NOTE ADULT - SUBJECTIVE AND OBJECTIVE BOX
CC:HPI: HPI:      ROS:  Constitutional, Neurological, Psychiatric, Eyes, ENT, Cardiovascular, Respiratory, Gastrointestinal, Genitourinary, Musculoskeletal, Integumentary, Endocrine and Heme/Lymph are otherwise negative.     Physical Exam:  Constitutional: alert and in no acute distress.  Eyes: the sclera and conjunctiva were normal, pupils were equal in size, round, reactive to light, with normal accommodation and extraocular movements were intact.   Back: no costovertebral angle tenderness and no spinal tenderness.      Neuro Exam:  Orientation: oriented to person, oriented to place and oriented to time.   Attention: normal concentrating ability and visual attention was not decreased.   Language: no difficulty naming common objects, no difficulty repeating a phrase, no difficulty writing a sentence, fluency intact, comprehension intact and reading intact.   Fund of knowledge: displays adequate knowledge of personal past history.   Cranial Nerves: visual acuity intact bilaterally, visual fields full to confrontation, pupils equal round and reactive to light, extraocular motion intact, facial sensation intact symmetrically, face symmetrical, hearing was intact bilaterally, tongue and palate midline, head turning and shoulder shrug symmetric and there was no tongue deviation with protrusion.   Motor: muscle tone was normal in all four extremities, muscle strength was normal in all four extremities and normal bulk in all four extremities.   Sensory exam: light touch was intact.   Coordination:. normal gait. balance was intact. there was no past-pointing. no tremor present.   Deep tendon reflexes:   Biceps right 2+. Biceps left 2+.    Triceps right 2+. Triceps left 2+.  LOC  Brachioradialis right 2+. Brachioradialis left 2+.    Patella right 2+. Patella left 2+.    Ankle jerk right 2+. Ankle jerk left 2+.   Plantar responses normal on the right, normal on the left.      NIHSS:     Allergies    No Known Allergies    Intolerances      MEDICATIONS  (STANDING):    MEDICATIONS  (PRN):      LABS:                        12.6   16.41 )-----------( 402      ( 22 Aug 2022 20:17 )             37.4     08-22    137  |  99  |  13  ----------------------------<  114<H>  4.8   |  27  |  0.8    Ca    9.8      22 Aug 2022 20:17    TPro  6.0  /  Alb  3.4<L>  /  TBili  0.5  /  DBili  x   /  AST  32  /  ALT  27  /  AlkPhos  82  08-22    PT/INR - ( 22 Aug 2022 20:17 )   PT: 13.30 sec;   INR: 1.16 ratio         PTT - ( 22 Aug 2022 20:17 )  PTT:30.4 sec    Urinalysis (08.22.22 @ 20:32)   pH Urine: 7.0   Glucose Qualitative, Urine: Negative   Blood, Urine: Moderate   Color: Yellow   Urine Appearance: Slightly Turbid   Bilirubin: Negative   Ketone - Urine: Negative   Specific Gravity: 1.023   Protein, Urine: 30 mg/dL   Urobilinogen: <2 mg/dL   Nitrite: Negative   Leukocyte Esterase Concentration: Large       Urine Microscopic-Add On (NC) (08.10.22 @ 20:20)   Epithelial Cells: Few /HPF   Bacteria: Occasional /HPF   White Blood Cell - Urine: 6-10 /HPF   Red Blood Cell - Urine: Negative     COVID-19 PCR (08.22.22 @ 20:17)   COVID-19 PCR: NotDetec        Neuro Imaging:  < from: CT Brain Stroke Protocol (08.22.22 @ 19:53) >  Findings:    The ventricles and cortical sulci demonstrate stable moderate parenchymal   atrophy.    There are patchy hypodensities throughout the hemispheric white matter   without mass effect compatible with moderate chronic microvascular   changes.    There is no acute territorial infarct, intracranial hemorrhage,   extra-axial fluid collection or midline shift.    No depressed skull fracture. Unchanged complete opacification of the   right sphenoid sinus. Unchanged polypoid opacities within the right   maxillary sinus. The remaining visualized paranasal sinuses and mastoids   are well-aerated.      IMPRESSION:    No evidence of acute intracranial pathology.  Stable exam since 8/15/2022.      Findings discussed with Dr. Leija on 8/22/2022 at 8:06 PM.    --- End of Report ---    TELMA AGUERO MD; Resident Radiologist  This document has been electronically signed.  CHRISTIANO KNAPP MD; Attending Interventional Radiologist  This document has been electronically signed. Aug 22 2022  8:35PM    < end of copied text >        < from: CT Angio Neck Stroke Protocol w/ IV Cont (08.22.22 @ 20:14) >  IMPRESSION:    CTA head and neck:    Mild stenosis of the bilateral proximal internal carotid arteries.    Moderate stenosis at the origin of the left vertebral artery.    Calcification of the cavernous segment of the bilateral distal ICAs   resulting in mild stenosis.    Focal punctate calcification in the distal left M3 segment. Distal flow  past calcification is maintained.    CT perfusion:    The time to peak images demonstrate 4 cc of perfusion deficit in the left   temporo-occipital region without corresponding core infarct.    --- End of Report ---    TELMA AGUERO MD; Resident Radiologist  This document has been electronically signed.  CHRISTIANO KNAPP MD; Attending Interventional Radiologist  This document has been electronically signed. Aug 22 2022  9:31PM    < end of copied text >        EEG:     Echo:   Carotid Doppler: N/A  Telemetry:
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
## 2022-08-23 DIAGNOSIS — I26.99 OTHER PULMONARY EMBOLISM WITHOUT ACUTE COR PULMONALE: ICD-10-CM

## 2022-08-23 PROBLEM — I25.10 ATHEROSCLEROTIC HEART DISEASE OF NATIVE CORONARY ARTERY WITHOUT ANGINA PECTORIS: Chronic | Status: ACTIVE | Noted: 2022-08-05

## 2022-08-23 LAB
ALBUMIN SERPL ELPH-MCNC: 3.1 G/DL — LOW (ref 3.5–5.2)
ALP SERPL-CCNC: 71 U/L — SIGNIFICANT CHANGE UP (ref 30–115)
ALT FLD-CCNC: 20 U/L — SIGNIFICANT CHANGE UP (ref 0–41)
ANION GAP SERPL CALC-SCNC: 14 MMOL/L — SIGNIFICANT CHANGE UP (ref 7–14)
AST SERPL-CCNC: 36 U/L — SIGNIFICANT CHANGE UP (ref 0–41)
BASOPHILS # BLD AUTO: 0.08 K/UL — SIGNIFICANT CHANGE UP (ref 0–0.2)
BASOPHILS NFR BLD AUTO: 0.9 % — SIGNIFICANT CHANGE UP (ref 0–1)
BILIRUB SERPL-MCNC: 0.6 MG/DL — SIGNIFICANT CHANGE UP (ref 0.2–1.2)
BUN SERPL-MCNC: 10 MG/DL — SIGNIFICANT CHANGE UP (ref 10–20)
CALCIUM SERPL-MCNC: 9.3 MG/DL — SIGNIFICANT CHANGE UP (ref 8.5–10.1)
CHLORIDE SERPL-SCNC: 99 MMOL/L — SIGNIFICANT CHANGE UP (ref 98–110)
CO2 SERPL-SCNC: 22 MMOL/L — SIGNIFICANT CHANGE UP (ref 17–32)
CREAT SERPL-MCNC: 0.7 MG/DL — SIGNIFICANT CHANGE UP (ref 0.7–1.5)
EGFR: 79 ML/MIN/1.73M2 — SIGNIFICANT CHANGE UP
EOSINOPHIL # BLD AUTO: 0.25 K/UL — SIGNIFICANT CHANGE UP (ref 0–0.7)
EOSINOPHIL NFR BLD AUTO: 2.8 % — SIGNIFICANT CHANGE UP (ref 0–8)
GLUCOSE SERPL-MCNC: 89 MG/DL — SIGNIFICANT CHANGE UP (ref 70–99)
HCT VFR BLD CALC: 37.9 % — SIGNIFICANT CHANGE UP (ref 37–47)
HGB BLD-MCNC: 12.2 G/DL — SIGNIFICANT CHANGE UP (ref 12–16)
IMM GRANULOCYTES NFR BLD AUTO: 1.6 % — HIGH (ref 0.1–0.3)
LYMPHOCYTES # BLD AUTO: 1.17 K/UL — LOW (ref 1.2–3.4)
LYMPHOCYTES # BLD AUTO: 13.1 % — LOW (ref 20.5–51.1)
MAGNESIUM SERPL-MCNC: 1.7 MG/DL — LOW (ref 1.8–2.4)
MCHC RBC-ENTMCNC: 30.5 PG — SIGNIFICANT CHANGE UP (ref 27–31)
MCHC RBC-ENTMCNC: 32.2 G/DL — SIGNIFICANT CHANGE UP (ref 32–37)
MCV RBC AUTO: 94.8 FL — SIGNIFICANT CHANGE UP (ref 81–99)
MONOCYTES # BLD AUTO: 0.41 K/UL — SIGNIFICANT CHANGE UP (ref 0.1–0.6)
MONOCYTES NFR BLD AUTO: 4.6 % — SIGNIFICANT CHANGE UP (ref 1.7–9.3)
NEUTROPHILS # BLD AUTO: 6.86 K/UL — HIGH (ref 1.4–6.5)
NEUTROPHILS NFR BLD AUTO: 77 % — HIGH (ref 42.2–75.2)
NRBC # BLD: 0 /100 WBCS — SIGNIFICANT CHANGE UP (ref 0–0)
PLATELET # BLD AUTO: 262 K/UL — SIGNIFICANT CHANGE UP (ref 130–400)
POTASSIUM SERPL-MCNC: 5 MMOL/L — SIGNIFICANT CHANGE UP (ref 3.5–5)
POTASSIUM SERPL-SCNC: 5 MMOL/L — SIGNIFICANT CHANGE UP (ref 3.5–5)
PROT SERPL-MCNC: 5.5 G/DL — LOW (ref 6–8)
RBC # BLD: 4 M/UL — LOW (ref 4.2–5.4)
RBC # FLD: 14.2 % — SIGNIFICANT CHANGE UP (ref 11.5–14.5)
SODIUM SERPL-SCNC: 135 MMOL/L — SIGNIFICANT CHANGE UP (ref 135–146)
T4 FREE SERPL-MCNC: 1.2 NG/DL — SIGNIFICANT CHANGE UP (ref 0.9–1.8)
T4 FREE+ TSH PNL SERPL: 4.92 UIU/ML — HIGH (ref 0.27–4.2)
WBC # BLD: 8.91 K/UL — SIGNIFICANT CHANGE UP (ref 4.8–10.8)
WBC # FLD AUTO: 8.91 K/UL — SIGNIFICANT CHANGE UP (ref 4.8–10.8)

## 2022-08-23 PROCEDURE — 70551 MRI BRAIN STEM W/O DYE: CPT | Mod: 26

## 2022-08-23 PROCEDURE — 99223 1ST HOSP IP/OBS HIGH 75: CPT

## 2022-08-23 PROCEDURE — 99222 1ST HOSP IP/OBS MODERATE 55: CPT

## 2022-08-23 RX ORDER — ASPIRIN/CALCIUM CARB/MAGNESIUM 324 MG
81 TABLET ORAL DAILY
Refills: 0 | Status: DISCONTINUED | OUTPATIENT
Start: 2022-08-23 | End: 2022-08-27

## 2022-08-23 RX ORDER — LATANOPROST 0.05 MG/ML
1 SOLUTION/ DROPS OPHTHALMIC; TOPICAL
Qty: 0 | Refills: 0 | DISCHARGE

## 2022-08-23 RX ORDER — CLOPIDOGREL BISULFATE 75 MG/1
1 TABLET, FILM COATED ORAL
Qty: 0 | Refills: 1 | DISCHARGE

## 2022-08-23 RX ORDER — SENNA PLUS 8.6 MG/1
2 TABLET ORAL AT BEDTIME
Refills: 0 | Status: DISCONTINUED | OUTPATIENT
Start: 2022-08-23 | End: 2022-09-03

## 2022-08-23 RX ORDER — PANTOPRAZOLE SODIUM 20 MG/1
1 TABLET, DELAYED RELEASE ORAL
Qty: 0 | Refills: 0 | DISCHARGE

## 2022-08-23 RX ORDER — ALBUTEROL 90 UG/1
2 AEROSOL, METERED ORAL EVERY 6 HOURS
Refills: 0 | Status: DISCONTINUED | OUTPATIENT
Start: 2022-08-23 | End: 2022-09-06

## 2022-08-23 RX ORDER — ACETAMINOPHEN 500 MG
650 TABLET ORAL EVERY 6 HOURS
Refills: 0 | Status: DISCONTINUED | OUTPATIENT
Start: 2022-08-23 | End: 2022-09-06

## 2022-08-23 RX ORDER — METOPROLOL TARTRATE 50 MG
25 TABLET ORAL DAILY
Refills: 0 | Status: DISCONTINUED | OUTPATIENT
Start: 2022-08-23 | End: 2022-08-27

## 2022-08-23 RX ORDER — LEVOTHYROXINE SODIUM 125 MCG
1 TABLET ORAL
Qty: 0 | Refills: 0 | DISCHARGE

## 2022-08-23 RX ORDER — HYDRALAZINE HCL 50 MG
1 TABLET ORAL
Qty: 0 | Refills: 0 | DISCHARGE

## 2022-08-23 RX ORDER — HYDRALAZINE HCL 50 MG
25 TABLET ORAL THREE TIMES A DAY
Refills: 0 | Status: DISCONTINUED | OUTPATIENT
Start: 2022-08-23 | End: 2022-08-27

## 2022-08-23 RX ORDER — ISOSORBIDE DINITRATE 5 MG/1
10 TABLET ORAL THREE TIMES A DAY
Refills: 0 | Status: DISCONTINUED | OUTPATIENT
Start: 2022-08-23 | End: 2022-08-27

## 2022-08-23 RX ORDER — METOPROLOL TARTRATE 50 MG
1 TABLET ORAL
Qty: 0 | Refills: 0 | DISCHARGE

## 2022-08-23 RX ORDER — LATANOPROST 0.05 MG/ML
1 SOLUTION/ DROPS OPHTHALMIC; TOPICAL AT BEDTIME
Refills: 0 | Status: DISCONTINUED | OUTPATIENT
Start: 2022-08-23 | End: 2022-09-06

## 2022-08-23 RX ORDER — POLYETHYLENE GLYCOL 3350 17 G/17G
17 POWDER, FOR SOLUTION ORAL DAILY
Refills: 0 | Status: DISCONTINUED | OUTPATIENT
Start: 2022-08-23 | End: 2022-09-03

## 2022-08-23 RX ORDER — ASPIRIN/CALCIUM CARB/MAGNESIUM 324 MG
1 TABLET ORAL
Qty: 0 | Refills: 0 | DISCHARGE

## 2022-08-23 RX ORDER — ONDANSETRON 8 MG/1
4 TABLET, FILM COATED ORAL EVERY 8 HOURS
Refills: 0 | Status: DISCONTINUED | OUTPATIENT
Start: 2022-08-23 | End: 2022-09-06

## 2022-08-23 RX ORDER — LISINOPRIL 2.5 MG/1
1 TABLET ORAL
Qty: 0 | Refills: 0 | DISCHARGE

## 2022-08-23 RX ORDER — SACCHAROMYCES BOULARDII 250 MG
250 POWDER IN PACKET (EA) ORAL
Refills: 0 | Status: DISCONTINUED | OUTPATIENT
Start: 2022-08-23 | End: 2022-09-06

## 2022-08-23 RX ORDER — CEFTRIAXONE 500 MG/1
2000 INJECTION, POWDER, FOR SOLUTION INTRAMUSCULAR; INTRAVENOUS EVERY 24 HOURS
Refills: 0 | Status: DISCONTINUED | OUTPATIENT
Start: 2022-08-23 | End: 2022-08-27

## 2022-08-23 RX ORDER — CLOPIDOGREL BISULFATE 75 MG/1
75 TABLET, FILM COATED ORAL DAILY
Refills: 0 | Status: DISCONTINUED | OUTPATIENT
Start: 2022-08-23 | End: 2022-08-27

## 2022-08-23 RX ORDER — LANOLIN ALCOHOL/MO/W.PET/CERES
3 CREAM (GRAM) TOPICAL AT BEDTIME
Refills: 0 | Status: DISCONTINUED | OUTPATIENT
Start: 2022-08-23 | End: 2022-09-06

## 2022-08-23 RX ORDER — BUDESONIDE AND FORMOTEROL FUMARATE DIHYDRATE 160; 4.5 UG/1; UG/1
2 AEROSOL RESPIRATORY (INHALATION)
Refills: 0 | Status: DISCONTINUED | OUTPATIENT
Start: 2022-08-23 | End: 2022-09-06

## 2022-08-23 RX ORDER — SODIUM CHLORIDE 9 MG/ML
1000 INJECTION, SOLUTION INTRAVENOUS
Refills: 0 | Status: DISCONTINUED | OUTPATIENT
Start: 2022-08-23 | End: 2022-08-25

## 2022-08-23 RX ORDER — MAGNESIUM SULFATE 500 MG/ML
2 VIAL (ML) INJECTION ONCE
Refills: 0 | Status: COMPLETED | OUTPATIENT
Start: 2022-08-23 | End: 2022-08-23

## 2022-08-23 RX ORDER — ENOXAPARIN SODIUM 100 MG/ML
50 INJECTION SUBCUTANEOUS EVERY 12 HOURS
Refills: 0 | Status: DISCONTINUED | OUTPATIENT
Start: 2022-08-23 | End: 2022-09-03

## 2022-08-23 RX ORDER — LEVOTHYROXINE SODIUM 125 MCG
25 TABLET ORAL DAILY
Refills: 0 | Status: DISCONTINUED | OUTPATIENT
Start: 2022-08-23 | End: 2022-09-06

## 2022-08-23 RX ORDER — LISINOPRIL 2.5 MG/1
20 TABLET ORAL DAILY
Refills: 0 | Status: DISCONTINUED | OUTPATIENT
Start: 2022-08-23 | End: 2022-08-27

## 2022-08-23 RX ORDER — PANTOPRAZOLE SODIUM 20 MG/1
40 TABLET, DELAYED RELEASE ORAL
Refills: 0 | Status: DISCONTINUED | OUTPATIENT
Start: 2022-08-23 | End: 2022-08-29

## 2022-08-23 RX ORDER — ENOXAPARIN SODIUM 100 MG/ML
60 INJECTION SUBCUTANEOUS EVERY 12 HOURS
Refills: 0 | Status: DISCONTINUED | OUTPATIENT
Start: 2022-08-23 | End: 2022-08-23

## 2022-08-23 RX ADMIN — LATANOPROST 1 DROP(S): 0.05 SOLUTION/ DROPS OPHTHALMIC; TOPICAL at 22:22

## 2022-08-23 RX ADMIN — CEFTRIAXONE 100 MILLIGRAM(S): 500 INJECTION, POWDER, FOR SOLUTION INTRAMUSCULAR; INTRAVENOUS at 05:24

## 2022-08-23 RX ADMIN — ENOXAPARIN SODIUM 50 MILLIGRAM(S): 100 INJECTION SUBCUTANEOUS at 12:52

## 2022-08-23 RX ADMIN — SODIUM CHLORIDE 70 MILLILITER(S): 9 INJECTION, SOLUTION INTRAVENOUS at 05:44

## 2022-08-23 RX ADMIN — ENOXAPARIN SODIUM 50 MILLIGRAM(S): 100 INJECTION SUBCUTANEOUS at 22:22

## 2022-08-23 RX ADMIN — Medication 25 GRAM(S): at 13:09

## 2022-08-23 NOTE — CHART NOTE - NSCHARTNOTEFT_GEN_A_CORE
Patient did not pass the swallow test and an NG tube was planned. However the daughter at bedside preferred to discuss the placement of the NG tube tomorrow because she believes her mother expressed at some point that she does not want an NG tube. Patient did not pass speech and swallow screen, NGT recommended by SLP.   Per daughter bedside, her mother has expressed in the past that she does not want a feeding tube (referring to a PEG Tube). She believes her mother would also not want an NGT, and be comfortable.   Discussed with daughter that we will keep patient on D5 and IVF for the night, switch meds to IV, and try repeat Sp and swallow screen tomorrow. If she fails again, daughter wishes to further discuss GOC and possible comfort measures.

## 2022-08-23 NOTE — H&P ADULT - ATTENDING COMMENTS
#Unresponsiveness  cta with bl subsegmental pe; no R heart strain  therapeutic lovenox  resolving, responds to questions; history limited  to confirm baseline mental status  ua postiive  ceftriaxone  f/u ucx, bcx  cth/ cta L temporal-occipital perfusion defect  mri brain  reeg  appreciate neuro  tte    #Progress Note Handoff:  Pending (specify):  Consults_________, Tests________, Test Results_______, Other_____mri brain____  Family discussion:  Disposition: Home___/SNF___/Other________/Unknown at this time__x______

## 2022-08-23 NOTE — CONSULT NOTE ADULT - NS ATTEND AMEND GEN_ALL_CORE FT
Patient seen and examined and agree with above except as noted.  Patients history, notes, labs, imaging, vitals and meds reviewed personally.  Patient unable to provide history.  Not following commands.  Decreased BTT b/l, pupils pin point non reactive  Increased tone in all extremities and grimaces with pain when moving her LE b/l  No obvious facial asymmetry at rest and when grimacing  Speech appears mildly dysarthric when she speaks    Plan as above (Likely toxic/metabolic, REEG being started on my evaluation will await results)

## 2022-08-23 NOTE — H&P ADULT - ASSESSMENT
89-year-old female w/ hx of Asthma, COPD, hyperactive airway, b/l chronic bronchiectasis, DL, HTN, CAD s/p PCI, hypothyroidism. Patient was recently admitted w/ cyclospora gastroenteritis & cystitis; stay was complicated by metabolic encephalopathy. Patient is presenting for metabolic encephalopathy, found to have subsegmental PE and positive urinalysis. She is hemodynamically stable & afebrile.    # Subsegmental PE  - no right heart strain on CT  - f/u ECG (not present in chart)  - c/w Lovenox [confirm weight in am]  - Consider switch to Eliquis if no planned procedures  - Currently on 3 L NC, was on 2 L in NH    # Encephalopathy  # Suspected Metabolic encephalopathy  # Complicated UTI?  # Nonconvulsive Seizures?  # Stroke?  - Unclear etiology at this point  - Positive UA, but w/o suprapubic or CVA tenderness or CT findings supportive of UTI  - lactate 2.2  - s/p vanco 1.5 g & cefepime 2 g  - f/u UCx & BCx  - VBG: pH 7.49, pCO2 40, pO2 59, HCO3 27  - Gluc  114  - f/u TSH w/ reflex FT4  - EEG  - MR head w/o contrast (f/u daughter for contraindications)  - f/u Neuro  - Cleared by S&S last admission for puree w/ mild thick fluids w/ chin tuck  - Dysphagia screen; If +ve, c/s S&S  - NPO for now    # Nodular Thyroid  # Hypothyroidism  - f/u TSH w/ reflex FT4  - If low TSH, consider inpatient US    # Lactic acidosis  - IV maintenance D5 1/2 NS 70 ml/hr    # Metabolic alkalosis  - unclear source (not on diuresis or bicarb, not vomiting    # Asthma  #COPD  #hyperactive airway  # b/l chronic bronchiectasis  - c/w albuterol & Symbicort    # DL  # HTN  # CAD s/p PCI  - c/w isosorbide dinitrate, aspirin, Plavix, metoprolol  - c/w lisinopril  - unclear why not on statin    # Urinary Retention?  - Martines inserted in ED (significantly distended on CT)  - unclear volume of urine on Martines insertion, however reported by RN to significant  - c/w Martines    # Diverticulosis  - High fiber diet    # chronic diarrhea   - unclear if persistent  - hx of cyclospora treated w/ bactrim & Flagyl until 8/20/22  - Monitor for diarrhea (did not have any so far)  - f/u comprehensive cascade  - c/w saccharomyces boulardii    # Weight loss  # Hx abdominal pain  - patient refused outpatient colonoscopy & EGD    # Trace right pleural effusion  # Atelectasis  - incentive spirometry    # Right hilar 9 mm LN (decreased in size)  - outpatient f/u    # Constipation  - c/w Senna & Miralax    # Glaucoma  - c/w latanoprost    # DVT PPx  - Lovenox    # GI PPx  - c/w Protonix    # Diet  - NPO for now pending dysphagia screen  - If passed S&S  puree w/ mild thick fluids w/ chin tuck    # Diet  - DNR, DNI

## 2022-08-23 NOTE — H&P ADULT - HISTORY OF PRESENT ILLNESS
89-year-old female w/ hx of Asthma, COPD, hyperactive airway, b/l chronic bronchiectasis, DL, HTN, CAD s/p PCI, hypothyroidism. Patient was recently admitted w/ cyclospora gastroenteritis & cystitis; stay was complicated by metabolic encephalopathy. Patient is presenting for worsening lethargy and decreased responsiveness. Patient unable to participate in history. Called daughter, did not .    In the ED, left sided droop was noted, code stroke was called. CT head, CTA, & CTP were done -> -ve for acute events    CT showed acute b/l subsegmental PE w/o right heart strain -> s/p Lovenox 60    UA +ve  WBC 16, PMN 73.5%  lactate 2.2  s/p vanco 1.5 g & cefepime 2 g    PMHx: diverticulosis, scoliosis, umbilical hernia, chronic diarrhea (unclear if persistent) (hx of cyclospora treated w/ bactrim & Flagyl until 8/20/22)  PSHx: s/p hysterectomy, s/p cholecystectomy    s/p Pfizer covid vaccine x 2 on 3/6/22 & 3/27/21    In the ED, VS: T 37.6 (rectal), HR 80, /84, RR 17, SpO2 96% on 3 L NC (was on 2 L at NH)

## 2022-08-23 NOTE — CONSULT NOTE ADULT - ASSESSMENT
89 yo F w/PMhx of  HTN, CAD s/p stent, COPD, hypothyroidism, was sent in from [facility] for worsening AMS. As per daughter patient was admitted to [facility] a week ago for persistent abdominal pain and was treated for colitis and was discharge 8/19 to nursing home. Daughter states that her mother has not been herself for the past few weeks and as of today she was found to be more lethargic since 2.30pm to the point that she was not responsive to verbal commands and was not answering questions. On arrival a stroke code was activated but was cancelled as per telestroke attending. Labs reveal leukocytosis, +UTI. CTA  concerning for acute subsegmental PE.  No evidence of heart strain, received one dose of Lovenox and antibiotic for UTI. CTH negative for acute findings. CTA H/n negative for LVO or flow limiting stenosis has distal left m3 stenosis . Perfusion deficit in left temporo-occipital region without corresponding core infarct.     #AMS Likely seizure in the setting of infection, toxic metabolic encephalopathy r/o Stroke      Plan  REEG  N/C MRI BRAIN if not contraindicated  Check hbaic, TSH, Vitamin b12 and folate levels  Management of medical complaints as per primary team  Attending note will follow

## 2022-08-23 NOTE — CONSULT NOTE ADULT - SUBJECTIVE AND OBJECTIVE BOX
CC: worsening AMS      HPI:  89 yo F w/PMhx of  HTN, CAD s/p stent, COPD, hypothyroidism, was sent in from [facility] for worsening AMS. As per daughter patient was admitted to [facility] a week ago for persistent abdominal pain and was treated for colitis and was discharge yesterday to nursing home. Daughter states that her mother has not been herself for the past few weeks and as of today she was found to be more lethargic since 2.30pm to the point that she was not responsive to verbal commands and was not answering questions.       Home Medications:  Aspirin 81 mg oral tablet, chewable: 1 tab(s) orally once a day (04 Aug 2022 18:08)  CLOPIDOGREL 75 MG TABLET: 1 each orally once a day (10 Aug 2022 11:12)  Hydralazine 25 mg oral tablet: 1 tab(s) orally 3 times a day (04 Aug 2022 18:08)  Isosorbide dinitrate 10 mg oral tablet: 1 tab(s) orally once a day (10 Aug 2022 11:12)  Latanoprost 0.005% ophthalmic solution: 1 drop(s) to each affected eye once a day (in the evening) (04 Aug 2022 18:08)  levothyroxine 25 mcg (0.025 mg) oral tablet: 1 tab(s) orally once a day (05 Aug 2022 01:14)  lisinopril 20 mg oral tablet: 1 tab(s) orally once a day (04 Aug 2022 18:08)  Metoprolol succinate 25 mg oral tablet, extended release: 1  orally once a day (10 Aug 2022 11:12)  Pantoprazole 20 mg oral delayed release tablet: 1 tab(s) orally once a day (04 Aug 2022 18:08)  Polyethylene glycol 3350 oral powder for reconstitution: 17 gram(s) orally once a day (19 Aug 2022 08:44)  Saccharomyces boulardii lyo 250 mg oral capsule: 1 cap(s) orally 2 times a day (10 Aug 2022 11:12)  Senna leaf extract oral tablet: 2 tab(s) orally once a day (at bedtime) (19 Aug 2022 08:44)      Neuro Exam:  Orientation: Patient is nearly obtunded, moaning but not saying words or sentences. Not following commands(At baseline patient is able to speak few words and answer questions appropriately)  Cranial Nerves: PERRLA, Eyes midline at primary position, no obvious facial asymmetry , grimaces, moans but not speaking in sentences   Motor: No spontaneous extremity mvmts noted, no abnormal mvmts noted  Sensory exam: Responds to pain stimulus in all exts  Coordination:. JOSE G  Plantar response mute bilaterally.      NIHSS: 19  LOC 3   Commands 1  Questions 0  VF 0   Gaze 0  Face 0  RUE 3  RLE 3  LUE 3  LLE 3  Sensory 0  Speech 0  Language 3  Ataxia 0  Extinction 0    mRankin score 4 (baseline)  `0 No symptoms at all  1 No significant disability despite symptoms; able to carry out all usual duties and activities without assistance  2 Slight disability; unable to carry out all previous activities, but able to look after own affairs  3 Moderate disability; requiring some help, but able to walk without assistance  4 Moderately severe disability; unable to walk without assistance and unable to attend to own bodily needs without assistance  5 Severe disability; bedridden, incontinent and requiring constant nursing care and attention  6 Dead    Allergies    No Known Allergies    Intolerances      MEDICATIONS  (STANDING):    MEDICATIONS  (PRN):      LABS:                        12.6   16.41 )-----------( 402      ( 22 Aug 2022 20:17 )             37.4     08-22    137  |  99  |  13  ----------------------------<  114<H>  4.8   |  27  |  0.8    Ca    9.8      22 Aug 2022 20:17    TPro  6.0  /  Alb  3.4<L>  /  TBili  0.5  /  DBili  x   /  AST  32  /  ALT  27  /  AlkPhos  82  08-22    PT/INR - ( 22 Aug 2022 20:17 )   PT: 13.30 sec;   INR: 1.16 ratio         PTT - ( 22 Aug 2022 20:17 )  PTT:30.4 sec          Neuro Imaging:  < from: CT Brain Stroke Protocol (08.22.22 @ 19:53) >  Findings:    The ventricles and cortical sulci demonstrate stable moderate parenchymal   atrophy.    There are patchy hypodensities throughout the hemispheric white matter   without mass effect compatible with moderate chronic microvascular   changes.    There is no acute territorial infarct, intracranial hemorrhage,   extra-axial fluid collection or midline shift.    No depressed skull fracture. Unchanged complete opacification of the   right sphenoid sinus. Unchanged polypoid opacities within the right   maxillary sinus. The remaining visualized paranasal sinuses and mastoids   are well-aerated.      IMPRESSION:    No evidence of acute intracranial pathology.  Stable exam since 8/15/2022.      Findings discussed with Dr. Leija on 8/22/2022 at 8:06 PM.    --- End of Report ---    TELMA AGUERO MD; Resident Radiologist  This document has been electronically signed.  CHRISTIANO KNAPP MD; Attending Interventional Radiologist  This document has been electronically signed. Aug 22 2022  8:35PM    < end of copied text >          < from: CT Angio Neck Stroke Protocol w/ IV Cont (08.22.22 @ 20:14) >    IMPRESSION:    CTA head and neck:    Mild stenosis of the bilateral proximal internal carotid arteries.    Moderate stenosis at the origin of the left vertebral artery.    Calcification of the cavernous segment of the bilateral distal ICAs   resulting in mild stenosis.    Focal punctate calcification in the distal left M3 segment. Distal flow  past calcification is maintained.    CT perfusion:    The time to peak images demonstrate 4 cc of perfusion deficit in the left   temporo-occipital region without corresponding core infarct.    --- End of Report ---    TELMA AGUERO MD; Resident Radiologist  This document has been electronically signed.  CHRISTIANO KNAPP MD; Attending Interventional Radiologist  This document has been electronically signed. Aug 22 2022  9:31PM    < end of copied text >        < from: CT Chest w/ IV Cont (08.22.22 @ 20:14) >  IMPRESSION:    Small intraluminal filling defects, consistent with acute pulmonary   emboli, are noted in subsegmental branches of the right and left lower   lobe pulmonary arteries. (Right: 5/169; 5/190;6/191 / Left: 5/187; 6/203)   There is no evidence of right heart strain (RV:LV ratio <1; RV=4.4 cm and   LV = 5.0 cm)    No definite CT evidence of acute intra-abdominal or pelvic pathology.    Distended bladder measures up to 16.4 cm. Clinical correlation for   urinary retention is recommended.    Additional stable chronic findings, as above.    Findings were discussed with Dr. Leija at 10:15 PM 8/22/2022, with read   back    --- End of Report ---    VIMAL CHÁVEZ MD; Resident Radiologist  This document has been electronically signed.  CHRISTIANO KNAPP MD; Attending Interventional Radiologist    < end of copied text >    EEG:     Echo:   Carotid Doppler: N/A  Telemetry:

## 2022-08-23 NOTE — H&P ADULT - NSHPPHYSICALEXAM_GEN_ALL_CORE
VITAL SIGNS: AFebrile, vital signs stable  CONSTITUTIONAL: looking into space, spontaneous eye contact when approached, mouth open w/ dentures limiting airway -> removed and placed in lopez  SKIN: Skin exam is warm and dry, no acute rash.  HEAD: Normocephalic; atraumatic.  EYES: Pupils equal round reactive to light, No obvious Extraocular movements deficits (limited by cooperation), conjunctiva and sclera clear.  ENT: No nasal discharge; airway clear. Moist mucus membranes.  NECK: Supple; non tender. No rigidity  CARD: Regular rate and rhythm. Normal S1, S2; no murmurs, gallops, or rubs.  RESP: Lungs clear to auscultation bilaterally. No wheezes, rales or rhonchi.  ABD: Abdomen soft; non-tender; non-distended;  no hepatosplenomegaly. No costovertebral angle tenderness.   EXT: Normal ROM. No clubbing, cyanosis or edema. No calf tenderness to palpation.  NEURO: Awake, Alert, oriented x 0, eye contact, moves extremities to pain, does not vocalize. Babinski -ve, CN II-VII nl (can't assess CN V). Did not appreciate facial droop  PSYCH: Not cooperative

## 2022-08-24 DIAGNOSIS — Z71.89 OTHER SPECIFIED COUNSELING: ICD-10-CM

## 2022-08-24 DIAGNOSIS — R41.82 ALTERED MENTAL STATUS, UNSPECIFIED: ICD-10-CM

## 2022-08-24 DIAGNOSIS — Z51.5 ENCOUNTER FOR PALLIATIVE CARE: ICD-10-CM

## 2022-08-24 DIAGNOSIS — R13.10 DYSPHAGIA, UNSPECIFIED: ICD-10-CM

## 2022-08-24 LAB
A1C WITH ESTIMATED AVERAGE GLUCOSE RESULT: 4.8 % — SIGNIFICANT CHANGE UP (ref 4–5.6)
ALBUMIN SERPL ELPH-MCNC: 3 G/DL — LOW (ref 3.5–5.2)
ALP SERPL-CCNC: 67 U/L — SIGNIFICANT CHANGE UP (ref 30–115)
ALT FLD-CCNC: 22 U/L — SIGNIFICANT CHANGE UP (ref 0–41)
ANION GAP SERPL CALC-SCNC: 9 MMOL/L — SIGNIFICANT CHANGE UP (ref 7–14)
AST SERPL-CCNC: 31 U/L — SIGNIFICANT CHANGE UP (ref 0–41)
BASOPHILS # BLD AUTO: 0.15 K/UL — SIGNIFICANT CHANGE UP (ref 0–0.2)
BASOPHILS NFR BLD AUTO: 1.4 % — HIGH (ref 0–1)
BILIRUB SERPL-MCNC: 0.4 MG/DL — SIGNIFICANT CHANGE UP (ref 0.2–1.2)
BUN SERPL-MCNC: 12 MG/DL — SIGNIFICANT CHANGE UP (ref 10–20)
CALCIUM SERPL-MCNC: 9.5 MG/DL — SIGNIFICANT CHANGE UP (ref 8.5–10.1)
CHLORIDE SERPL-SCNC: 97 MMOL/L — LOW (ref 98–110)
CO2 SERPL-SCNC: 27 MMOL/L — SIGNIFICANT CHANGE UP (ref 17–32)
CREAT SERPL-MCNC: 0.6 MG/DL — LOW (ref 0.7–1.5)
CULTURE RESULTS: NO GROWTH — SIGNIFICANT CHANGE UP
EGFR: 82 ML/MIN/1.73M2 — SIGNIFICANT CHANGE UP
EOSINOPHIL # BLD AUTO: 0.36 K/UL — SIGNIFICANT CHANGE UP (ref 0–0.7)
EOSINOPHIL NFR BLD AUTO: 3.3 % — SIGNIFICANT CHANGE UP (ref 0–8)
ESTIMATED AVERAGE GLUCOSE: 91 MG/DL — SIGNIFICANT CHANGE UP (ref 68–114)
FOLATE SERPL-MCNC: 13.6 NG/ML — SIGNIFICANT CHANGE UP
GLUCOSE SERPL-MCNC: 94 MG/DL — SIGNIFICANT CHANGE UP (ref 70–99)
HCT VFR BLD CALC: 37.6 % — SIGNIFICANT CHANGE UP (ref 37–47)
HGB BLD-MCNC: 12.4 G/DL — SIGNIFICANT CHANGE UP (ref 12–16)
IMM GRANULOCYTES NFR BLD AUTO: 1 % — HIGH (ref 0.1–0.3)
LACTATE SERPL-SCNC: 1.1 MMOL/L — SIGNIFICANT CHANGE UP (ref 0.7–2)
LYMPHOCYTES # BLD AUTO: 0.91 K/UL — LOW (ref 1.2–3.4)
LYMPHOCYTES # BLD AUTO: 8.4 % — LOW (ref 20.5–51.1)
MAGNESIUM SERPL-MCNC: 1.9 MG/DL — SIGNIFICANT CHANGE UP (ref 1.8–2.4)
MCHC RBC-ENTMCNC: 31.2 PG — HIGH (ref 27–31)
MCHC RBC-ENTMCNC: 33 G/DL — SIGNIFICANT CHANGE UP (ref 32–37)
MCV RBC AUTO: 94.7 FL — SIGNIFICANT CHANGE UP (ref 81–99)
MONOCYTES # BLD AUTO: 1.42 K/UL — HIGH (ref 0.1–0.6)
MONOCYTES NFR BLD AUTO: 13.2 % — HIGH (ref 1.7–9.3)
NEUTROPHILS # BLD AUTO: 7.83 K/UL — HIGH (ref 1.4–6.5)
NEUTROPHILS NFR BLD AUTO: 72.7 % — SIGNIFICANT CHANGE UP (ref 42.2–75.2)
NRBC # BLD: 0 /100 WBCS — SIGNIFICANT CHANGE UP (ref 0–0)
PLATELET # BLD AUTO: 310 K/UL — SIGNIFICANT CHANGE UP (ref 130–400)
POTASSIUM SERPL-MCNC: 4.4 MMOL/L — SIGNIFICANT CHANGE UP (ref 3.5–5)
POTASSIUM SERPL-SCNC: 4.4 MMOL/L — SIGNIFICANT CHANGE UP (ref 3.5–5)
PROT SERPL-MCNC: 5.5 G/DL — LOW (ref 6–8)
RBC # BLD: 3.97 M/UL — LOW (ref 4.2–5.4)
RBC # FLD: 14.2 % — SIGNIFICANT CHANGE UP (ref 11.5–14.5)
SODIUM SERPL-SCNC: 133 MMOL/L — LOW (ref 135–146)
SPECIMEN SOURCE: SIGNIFICANT CHANGE UP
TSH SERPL-MCNC: 5.91 UIU/ML — HIGH (ref 0.27–4.2)
VIT B12 SERPL-MCNC: 849 PG/ML — SIGNIFICANT CHANGE UP (ref 232–1245)
WBC # BLD: 10.78 K/UL — SIGNIFICANT CHANGE UP (ref 4.8–10.8)
WBC # FLD AUTO: 10.78 K/UL — SIGNIFICANT CHANGE UP (ref 4.8–10.8)

## 2022-08-24 PROCEDURE — 99223 1ST HOSP IP/OBS HIGH 75: CPT

## 2022-08-24 PROCEDURE — 93970 EXTREMITY STUDY: CPT | Mod: 26

## 2022-08-24 PROCEDURE — 99232 SBSQ HOSP IP/OBS MODERATE 35: CPT

## 2022-08-24 PROCEDURE — 95816 EEG AWAKE AND DROWSY: CPT | Mod: 26

## 2022-08-24 RX ADMIN — CEFTRIAXONE 100 MILLIGRAM(S): 500 INJECTION, POWDER, FOR SOLUTION INTRAMUSCULAR; INTRAVENOUS at 05:11

## 2022-08-24 RX ADMIN — ENOXAPARIN SODIUM 50 MILLIGRAM(S): 100 INJECTION SUBCUTANEOUS at 05:15

## 2022-08-24 RX ADMIN — ENOXAPARIN SODIUM 50 MILLIGRAM(S): 100 INJECTION SUBCUTANEOUS at 17:20

## 2022-08-24 NOTE — CONSULT NOTE ADULT - PROBLEM SELECTOR RECOMMENDATION 5
- will follow  ______________  Alex Carbone MD  Palliative Medicine  [facility]   of Geriatric and Palliative Medicine  [phone]

## 2022-08-24 NOTE — CHART NOTE - NSCHARTNOTEFT_GEN_A_CORE
Discussed MRI brain with neuroradiology and no acute infarcts and extensive microvascular changes.  EEG shows generalized slowing and no clear epileptiform discharges.  Likely cause of confusion is toxic/metabolic encephalopathy secondary to UTI in setting of severe dementia  No further neurological workup     Please recall for further questions or new complaints

## 2022-08-24 NOTE — CONSULT NOTE ADULT - ASSESSMENT
89F with PMH of COPD, asthma, HTN, CAD s/p PCI, hypothyroidism, here after recent admission at [facility] for cyclospora gastroenteritis and cystitis and metabolic encephalopathy, discharged to [facility] and admitted here for worsening lethargy. Code stroke called on admission, with CT head negative, but CTA showing acute bilateral subsegmental PE, now on therapeutic lovenox. Patient did not pass speech and swallow, and palliative care called to help discuss nutritional GOC.

## 2022-08-24 NOTE — PROGRESS NOTE ADULT - SUBJECTIVE AND OBJECTIVE BOX
key: Insulin Trade Name ([id number]-diabetes educator in 2 weeks    Sherine Collier, MPH, RD, LD, CDE   Time Spent: 60 minutes  Encounter Type: Individual    Any diabetes medication dose changes were made via the CDE Protocol and Collaborative Practice Agreement with the patient's primary care provider. A copy of this encounter was    CHIRAG HERNÁNDEZ  89y Female    Patient is a 89y old  Female who presents with a chief complaint of AMS    INTERVAL HPI/OVERNIGHT EVENTS:    ROS: Pt denies fever, chills, SOB, chest pain, palpitations, nausea, vomiting, abdominal pain. Pt has slight improvement in her mental status from presentation but not back to her baseline where she is completely with it and verbal.    Overnight events: No acute events overnight.    Vital Signs Last 24 Hrs  T(C): 37.2 (24 Aug 2022 16:03), Max: 37.2 (24 Aug 2022 16:03)  T(F): 98.9 (24 Aug 2022 16:03), Max: 98.9 (24 Aug 2022 16:03)  HR: 84 (24 Aug 2022 16:03) (75 - 84)  BP: 131/70 (24 Aug 2022 16:03) (131/70 - 170/76)  BP(mean): --  RR: 18 (24 Aug 2022 16:03) (18 - 18)  SpO2: 96% (24 Aug 2022 16:03) (96% - 98%)    Parameters below as of 24 Aug 2022 16:03  Patient On (Oxygen Delivery Method): nasal cannula        No Known Allergies      MEDICATIONS  (STANDING):  aspirin  chewable 81 milliGRAM(s) Oral daily  budesonide  80 MICROgram(s)/formoterol 4.5 MICROgram(s) Inhaler 2 Puff(s) Inhalation two times a day  cefTRIAXone   IVPB 2000 milliGRAM(s) IV Intermittent every 24 hours  clopidogrel Tablet 75 milliGRAM(s) Oral daily  dextrose 5% + sodium chloride 0.45%. 1000 milliLiter(s) (50 mL/Hr) IV Continuous <Continuous>  enoxaparin Injectable 50 milliGRAM(s) SubCutaneous every 12 hours  hydrALAZINE 25 milliGRAM(s) Oral three times a day  isosorbide   dinitrate Tablet (ISORDIL) 10 milliGRAM(s) Oral three times a day  latanoprost 0.005% Ophthalmic Solution 1 Drop(s) Both EYES at bedtime  levothyroxine 25 MICROGram(s) Oral daily  lisinopril 20 milliGRAM(s) Oral daily  metoprolol succinate ER 25 milliGRAM(s) Oral daily  pantoprazole    Tablet 40 milliGRAM(s) Oral before breakfast  polyethylene glycol 3350 17 Gram(s) Oral daily  saccharomyces boulardii 250 milliGRAM(s) Oral two times a day  senna 2 Tablet(s) Oral at bedtime    MEDICATIONS  (PRN):  acetaminophen     Tablet .. 650 milliGRAM(s) Oral every 6 hours PRN Temp greater or equal to 38C (100.4F), Mild Pain (1 - 3)  ALBUTerol    90 MICROgram(s) HFA Inhaler 2 Puff(s) Inhalation every 6 hours PRN Shortness of Breath and/or Wheezing  aluminum hydroxide/magnesium hydroxide/simethicone Suspension 30 milliLiter(s) Oral every 4 hours PRN Dyspepsia  melatonin 3 milliGRAM(s) Oral at bedtime PRN Insomnia  ondansetron Injectable 4 milliGRAM(s) IV Push every 8 hours PRN Nausea and/or Vomiting      PHYSICAL EXAM:  General Appearance: NAD, normal for age and gender. 	  Neck: Normal JVP, no bruit.   Eyes: Conjunctiva clear, Extra Ocular muscles intact. No scleral icterus.  ENMT: Dry oral mucosa. No oral lesion.  Cardiovascular: Regular rate and rhythm S1 S2, No JVD, No murmurs.  Respiratory: Bilateral air entry. Decreased breath sounds at bases. No wheezes, rales or rhonchi.  Psychiatry: Alert and oriented x 2, Mood & affect appropriate.  Gastrointestinal:  Soft, Non-tender, Non-distended.  Neurologic: Non-focal deficits.  Musculoskeletal/ extremities: Move all extremities. No clubbing, cyanosis or edema.  Vascular: Peripheral pulses palpable bilaterally.  Skin/Integumen: No rashes, No ecchymoses, No cyanosis.    LABS:                        12.4   10.78 )-----------( 310      ( 24 Aug 2022 07:07 )             37.6     08-24    133<L>  |  97<L>  |  12  ----------------------------<  94  4.4   |  27  |  0.6<L>    Ca    9.5      24 Aug 2022 07:07  Mg     1.9     08-24    TPro  5.5<L>  /  Alb  3.0<L>  /  TBili  0.4  /  DBili  x   /  AST  31  /  ALT  22  /  AlkPhos  67  08-24    PT/INR - ( 22 Aug 2022 20:17 )   PT: 13.30 sec;   INR: 1.16 ratio         PTT - ( 22 Aug 2022 20:17 )  PTT:30.4 sec    Culture - Blood (collected 22 Aug 2022 20:53)  Source: .Blood Blood  Preliminary Report (24 Aug 2022 02:02):    No growth to date.    Culture - Urine (collected 22 Aug 2022 20:32)  Source: Clean Catch Clean Catch (Midstream)  Final Report (24 Aug 2022 07:22):    No growth    Culture - Blood (collected 22 Aug 2022 20:17)  Source: .Blood Blood  Preliminary Report (24 Aug 2022 02:02):    No growth to date.        RADIOLOGY & ADDITIONAL TESTS:             shared with the provider.

## 2022-08-24 NOTE — CONSULT NOTE ADULT - SUBJECTIVE AND OBJECTIVE BOX
HPI: 89F with PMH of COPD, asthma, HTN, CAD s/p PCI, hypothyroidism, here after recent admission at [facility] for cyclospora gastroenteritis and cystitis and metabolic encephalopathy, discharged to [facility] and admitted here for worsening lethargy. Code stroke called on admission, with CT head negative, but CTA showing acute bilateral subsegmental PE, now on therapeutic lovenox. Patient did not pass speech and swallow, and palliative care called to help discuss nutritional GOC.    PERTINENT PM/SXH:   High blood cholesterol    Hypothyroid    Hypertension    COPD (chronic obstructive pulmonary disease)    CAD (coronary artery disease)      History of coronary artery stent placement    History of cholecystectomy    H/O abdominal hysterectomy    Cataract      FAMILY HISTORY:  No GI cancers in first or second degree relatives    ITEMS NOT CHECKED ARE NOT PRESENT    SOCIAL HISTORY:   Significant other/partner[ ]  Children[X ]  Christianity/Spirituality:  Substance hx:  [ ]   Tobacco hx:  [ ]   Alcohol hx: [ ]   Living Situation: [ ]Home  [ ]Long term care  [X ]Rehab [ ]Other  Home Services: [ ] HHA [ ] Visting RN [ ] Hospice  Occupation:  Home Opioid hx:  [ ] Y [X ] N [X ] I-Stop Reference No: [id number]    ADVANCE DIRECTIVES:    DNR  MOLST  [ ]  Living Will  [ ]   DECISION MAKER(s):  [ ] Health Care Proxy(s)  [ ] Surrogate(s)  [ ] Guardian           Name(s): Phone Number(s):    BASELINE (I)ADL(s) (prior to admission):  Coleman: [ ]Total  [ ] Moderate [ ]Dependent  Palliative Performance Status Version 2:         %    http://npcrc.org/files/news/palliative_performance_scale_ppsv2.pdf    Allergies    No Known Allergies    Intolerances    MEDICATIONS  (STANDING):  aspirin  chewable 81 milliGRAM(s) Oral daily  budesonide  80 MICROgram(s)/formoterol 4.5 MICROgram(s) Inhaler 2 Puff(s) Inhalation two times a day  cefTRIAXone   IVPB 2000 milliGRAM(s) IV Intermittent every 24 hours  clopidogrel Tablet 75 milliGRAM(s) Oral daily  dextrose 5% + sodium chloride 0.45%. 1000 milliLiter(s) (50 mL/Hr) IV Continuous <Continuous>  enoxaparin Injectable 50 milliGRAM(s) SubCutaneous every 12 hours  hydrALAZINE 25 milliGRAM(s) Oral three times a day  isosorbide   dinitrate Tablet (ISORDIL) 10 milliGRAM(s) Oral three times a day  latanoprost 0.005% Ophthalmic Solution 1 Drop(s) Both EYES at bedtime  levothyroxine 25 MICROGram(s) Oral daily  lisinopril 20 milliGRAM(s) Oral daily  metoprolol succinate ER 25 milliGRAM(s) Oral daily  pantoprazole    Tablet 40 milliGRAM(s) Oral before breakfast  polyethylene glycol 3350 17 Gram(s) Oral daily  saccharomyces boulardii 250 milliGRAM(s) Oral two times a day  senna 2 Tablet(s) Oral at bedtime    MEDICATIONS  (PRN):  acetaminophen     Tablet .. 650 milliGRAM(s) Oral every 6 hours PRN Temp greater or equal to 38C (100.4F), Mild Pain (1 - 3)  ALBUTerol    90 MICROgram(s) HFA Inhaler 2 Puff(s) Inhalation every 6 hours PRN Shortness of Breath and/or Wheezing  aluminum hydroxide/magnesium hydroxide/simethicone Suspension 30 milliLiter(s) Oral every 4 hours PRN Dyspepsia  melatonin 3 milliGRAM(s) Oral at bedtime PRN Insomnia  ondansetron Injectable 4 milliGRAM(s) IV Push every 8 hours PRN Nausea and/or Vomiting    PRESENT SYMPTOMS: [ X]Unable to obtain due to poor mentation   Source if other than patient:  [ ]Family   [ ]Team     Pain: [ ]yes [ ]no  QOL impact -   Location -                    Aggravating factors -  Quality -  Radiation -  Timing-  Severity (0-10 scale):  Minimal acceptable level (0-10 scale):     CPOT:    https://www.sccm.org/getattachment/frm16j31-8a3a-1u1p-3r2q-7058p9209i4x/Critical-Care-Pain-Observation-Tool-(CPOT)      PAIN AD Score: 0    http://geriatrictoolkit.[place].[email or web address]/cog/painad.pdf (press ctrl +  left click to view)    Dyspnea:                           [ ]Mild [ ]Moderate [ ]Severe  Anxiety:                             [ ]Mild [ ]Moderate [ ]Severe  Fatigue:                             [ ]Mild [ ]Moderate [ ]Severe  Nausea:                             [ ]Mild [ ]Moderate [ ]Severe  Loss of appetite:              [ ]Mild [ ]Moderate [ ]Severe  Constipation:                    [ ]Mild [ ]Moderate [ ]Severe    Other Symptoms:  [ ]All other review of systems negative     Palliative Performance Status Version 2:         %    http://npcrc.org/files/news/palliative_performance_scale_ppsv2.pdf  PHYSICAL EXAM:  Vital Signs Last 24 Hrs  T(C): 36.7 (24 Aug 2022 07:21), Max: 36.8 (24 Aug 2022 00:22)  T(F): 98 (24 Aug 2022 07:21), Max: 98.2 (24 Aug 2022 00:22)  HR: 78 (24 Aug 2022 07:21) (75 - 78)  BP: 170/76 (24 Aug 2022 07:21) (149/69 - 170/76)  BP(mean): --  RR: 18 (24 Aug 2022 07:) (18 - 18)  SpO2: 98% (24 Aug 2022 07:21) (98% - 98%)    Parameters below as of 24 Aug 2022 07:21  Patient On (Oxygen Delivery Method): nasal cannula  O2 Flow (L/min): 3   I&O's Summary    GENERAL:  [ ]Alert  [ ]Oriented x   [ ]Lethargic  [ ]Cachexia  [ ]Unarousable  [X ]Verbal  [ ]Non-Verbal  Behavioral:   [ ] Anxiety  [ ] Delirium [ ] Agitation [X ] Other calm  HEENT:  [ ]Normal   [X ]Dry mouth   [ ]ET Tube/Trach  [ ]Oral lesions  PULMONARY:   [ ]Clear [ ]Tachypnea  [ ]Audible excessive secretions [X] No labored breathing  [ ]Rhonchi        [ ]Right [ ]Left [ ]Bilateral  [ ]Crackles        [ ]Right [ ]Left [ ]Bilateral  [ ]Wheezing     [ ]Right [ ]Left [ ]Bilateral  [ ]Diminished breath sounds [ ]right [ ]left [ ]bilateral  CARDIOVASCULAR:    [ ]Regular [ ]Irregular [ ]Tachy  [ ]Brent [ ]Murmur [ ]Other  GASTROINTESTINAL:  [ ]Soft  [ X]Nondistended   [ ]+BS  [ ]Non tender [ ]Tender  [ ]PEG [ ]OGT/ NGT  Last BM:   GENITOURINARY:  [ X]Normal [ ] Incontinent   [ ]Oliguria/Anuria   [ ]Martines  MUSCULOSKELETAL:   [ ]Normal   [X ]Weakness  [ ]Bed/Wheelchair bound [ ]Edema  NEUROLOGIC:   [ ]No focal deficits  [ ]Cognitive impairment  [X ]Dysphagia [ ]Dysarthria [ ]Paresis [ ]Other   SKIN:   [ ]Normal   [X] Nonjaundiced [ ]Rash  [ ]Pressure ulcer(s)       Present on admission [ ]y [ ]n    CRITICAL CARE:  [ ] Shock Present  [ ]Septic [ ]Cardiogenic [ ]Neurologic [ ]Hypovolemic  [ ]  Vasopressors [ ]  Inotropes   [ ]Respiratory failure present [ ]Mechanical ventilation [ ]Non-invasive ventilatory support [ ]High flow  [ ]Acute  [ ]Chronic [ ]Hypoxic  [ ]Hypercarbic [ ]Other  [ ]Other organ failure     LABS: reviewed                        12.4   1078 )-----------( 310      ( 24 Aug 2022 07:07 )             37.6   08-24    133<L>  |  97<L>  |  12  ----------------------------<  94  4.4   |  27  |  0.6<L>    Ca    9.5      24 Aug 2022 07:07  Mg     1.9     08-24    TPro  5.5<L>  /  Alb  3.0<L>  /  TBili  0.4  /  DBili  x   /  AST  31  /  ALT  22  /  AlkPhos  67  08-24  PT/INR - ( 22 Aug 2022 20:17 )   PT: 13.30 sec;   INR: 1.16 ratio         PTT - ( 22 Aug 2022 20:17 )  PTT:30.4 sec    Urinalysis Basic - ( 22 Aug 2022 20:32 )    Color: Yellow / Appearance: Slightly Turbid / S.023 / pH: x  Gluc: x / Ketone: Negative  / Bili: Negative / Urobili: <2 mg/dL   Blood: x / Protein: 30 mg/dL / Nitrite: Negative   Leuk Esterase: Large / RBC: 44 /HPF / WBC 50 /HPF   Sq Epi: x / Non Sq Epi: 9 /HPF / Bacteria: Negative      RADIOLOGY & ADDITIONAL STUDIES:    MR head 22    No acute infarct, intracranial hemorrhage, or midline shift.    Moderate-severe chronic microvascular ischemic changes, significantly   progressed since 2017.    Moderate right/mild left mastoid effusion, nonspecific.    PROTEIN CALORIE MALNUTRITION PRESENT: [ ]mild [ ]moderate [ ]severe [ ]underweight [ ]morbid obesity  https://www.andeal.org/vault/4806/web/files/ONC/Table_Clinical%20Characteristics%20to%20Document%20Malnutrition-White%20JV%20et%20al%2020.pdf    Height (cm): 172.7 (22 @ 20:22), 172.7 (22 @ 03:26)  Weight (kg): 53.5 (22 @ 07:39), 72.3 (22 @ 03:26)  BMI (kg/m2): 17.9 (22 @ 07:39), 24.2 (22 @ 20:22), 24.2 (22 @ 03:26)    [ ]PPSV2 < or = to 30% [ ]significant weight loss  [ ]poor nutritional intake  [ ]anasarca      [ ]Artificial Nutrition      REFERRALS:   [ ]Chaplaincy  [ ]Hospice  [ ]Child Life  [ ]Social Work  [ ]Case management [ ]Holistic Therapy     Goals of Care Document:     ______________  Alex Carbone MD  Palliative Medicine  [facility]   of Geriatric and Palliative Medicine  [phone]

## 2022-08-24 NOTE — PROGRESS NOTE ADULT - ASSESSMENT
89-year-old female w/ hx of Asthma, COPD, hyperactive airway, b/l chronic bronchiectasis, DL, HTN, CAD s/p PCI, hypothyroidism. Patient was recently admitted w/ cyclospora gastroenteritis & cystitis; stay was complicated by metabolic encephalopathy. Patient is presenting for metabolic encephalopathy, found to have subsegmental PE and positive urinalysis. She is hemodynamically stable & afebrile.    # Subsegmental PE  - c/w Lovenox, will switch to oral eliquis once pt able to take po consistently  - c/w telemetry for now while awaiting Echo  - c/w oxygen supplementation  - Echo pending    # Suspected Metabolic encephalopathy from suspected UTI  # History of recent colitis as well as cyclospora PCR positive treated with bactrim & Flagyl until 8/20/22  - discontinue acevedo, trial of void  - c/w IV antibiotic for now  - monitor for diarrhea, GI PCR if diarrhea  - given negative urine and blood culture, no other evidence of infection to explain encephalopathy, will follow up ID if there is concerned regarding encephalitis from other infectious etiology  - c/w saccharomyces boulardii    # Dysphagia  - currently NPO  - speech and swallow daily assessment  - palliative following   - IV d5+1/2NS 70 cc/hr hydration     # Nodular Thyroid  # Hypothyroidism  - TSH mildly elevated but will hold off med for now    # Lactic acidosis, resolving  - IV maintenance D5 1/2 NS 70 ml/hr    # Metabolic alkalosis  - unclear source (not on diuresis or bicarb, not vomiting)    # Asthma  # COPD  # b/l chronic bronchiectasis  - c/w albuterol & Symbicort    # DLD  # HTN  # CAD s/p PCI  - c/w isosorbide dinitrate, aspirin, Plavix, metoprolol  - c/w lisinopril    # Urinary Retention?  - monitor urine output    # Diverticulosis  - avoid constipation    # Weight loss  # Hx abdominal pain  - patient refused outpatient colonoscopy & EGD    # Trace right pleural effusion  # Atelectasis  - incentive spirometry    # Right hilar 9 mm LN (decreased in size)  - outpatient f/u    # Constipation  - c/w Senna & Miralax    # Glaucoma  - c/w latanoprost    # DVT PPx  - Lovenox    # GI PPx  - c/w Protonix    # Diet  - DNR, DNI

## 2022-08-24 NOTE — CONSULT NOTE ADULT - PROBLEM SELECTOR RECOMMENDATION 4
- d/w daughter, discussed nutritional GOC, she states patient would not want a PEG tube, but will discuss a trial of NGT feedings with her. Discussed TPN, and how this would be a higher risk than benefit for patient at this time as compared to enteral feedings  - ongoing GOC

## 2022-08-24 NOTE — CONSULT NOTE ADULT - PROBLEM SELECTOR RECOMMENDATION 9
- c/w IV ceftriaxone, high risk, monitor counts  - f/u cultures  - mental status improving today per daughter, monitor

## 2022-08-24 NOTE — SWALLOW BEDSIDE ASSESSMENT ADULT - SLP PERTINENT HISTORY OF CURRENT PROBLEM
89 yo Female w/PMhx of HTN, CAD s/p stent, COPD, hypothyroidism, was sent in from [facility]  for worsening AMS. As per daughter patient was admitted to [facility] a week ago for persistent abdominal pain and was treated for colitis and was discharge 8/19 to nursing home. On arrival to ED stroke code was activated but was cancelled as per telestroke attending. Labs reveal leukocytosis, +UTI. CTA concerning for acute subsegmental PE. CTH negative for acute findings. CTA H/n negative. Perfusion deficit in left temporo-occipital region without corresponding core infarct. ?AMS Likely seizure in the setting of infection, toxic metabolic encephalopathy r/o Stroke. MRI brain revealed No acute infarct, intracranial hemorrhage, or midline shift. Moderate-severe chronic microvascular ischemic changes, progressed since 1/9/2017. 89 yo Female w/PMhx of HTN, CAD s/p stent, COPD, hypothyroidism, was sent in from [facility] for worsening AMS. Recently admitted to [facility] a week ago for abdominal pain, was treated for colitis and d/c 8/19 to NH. On arrival to ED stroke code was activated but was cancelled as per telestroke attending. Labs reveal leukocytosis, +UTI. CTH, CTA negative. CT Perfusion revealed deficit in left temporo-occipital region without core infarct. MRI negative, moderate-severe chronic microvascular ischemic changes, progressed since 1/9/2017. ?AMS Likely seizure in the setting of infection, toxic metabolic encephalopathy r/o Stroke.

## 2022-08-25 LAB
ALBUMIN SERPL ELPH-MCNC: 2.9 G/DL — LOW (ref 3.5–5.2)
ALP SERPL-CCNC: 65 U/L — SIGNIFICANT CHANGE UP (ref 30–115)
ALT FLD-CCNC: 22 U/L — SIGNIFICANT CHANGE UP (ref 0–41)
ANION GAP SERPL CALC-SCNC: 8 MMOL/L — SIGNIFICANT CHANGE UP (ref 7–14)
AST SERPL-CCNC: 31 U/L — SIGNIFICANT CHANGE UP (ref 0–41)
BASOPHILS # BLD AUTO: 0.08 K/UL — SIGNIFICANT CHANGE UP (ref 0–0.2)
BASOPHILS NFR BLD AUTO: 0.8 % — SIGNIFICANT CHANGE UP (ref 0–1)
BILIRUB SERPL-MCNC: 0.4 MG/DL — SIGNIFICANT CHANGE UP (ref 0.2–1.2)
BUN SERPL-MCNC: 12 MG/DL — SIGNIFICANT CHANGE UP (ref 10–20)
CALCIUM SERPL-MCNC: 8.8 MG/DL — SIGNIFICANT CHANGE UP (ref 8.5–10.1)
CHLORIDE SERPL-SCNC: 97 MMOL/L — LOW (ref 98–110)
CO2 SERPL-SCNC: 29 MMOL/L — SIGNIFICANT CHANGE UP (ref 17–32)
CREAT SERPL-MCNC: 0.5 MG/DL — LOW (ref 0.7–1.5)
EGFR: 86 ML/MIN/1.73M2 — SIGNIFICANT CHANGE UP
EOSINOPHIL # BLD AUTO: 0.15 K/UL — SIGNIFICANT CHANGE UP (ref 0–0.7)
EOSINOPHIL NFR BLD AUTO: 1.5 % — SIGNIFICANT CHANGE UP (ref 0–8)
FOLATE SERPL-MCNC: 12.4 NG/ML — SIGNIFICANT CHANGE UP
GLUCOSE SERPL-MCNC: 98 MG/DL — SIGNIFICANT CHANGE UP (ref 70–99)
HCT VFR BLD CALC: 37.5 % — SIGNIFICANT CHANGE UP (ref 37–47)
HGB BLD-MCNC: 12.3 G/DL — SIGNIFICANT CHANGE UP (ref 12–16)
IMM GRANULOCYTES NFR BLD AUTO: 0.9 % — HIGH (ref 0.1–0.3)
LYMPHOCYTES # BLD AUTO: 0.83 K/UL — LOW (ref 1.2–3.4)
LYMPHOCYTES # BLD AUTO: 8.1 % — LOW (ref 20.5–51.1)
MAGNESIUM SERPL-MCNC: 1.8 MG/DL — SIGNIFICANT CHANGE UP (ref 1.8–2.4)
MCHC RBC-ENTMCNC: 31.1 PG — HIGH (ref 27–31)
MCHC RBC-ENTMCNC: 32.8 G/DL — SIGNIFICANT CHANGE UP (ref 32–37)
MCV RBC AUTO: 94.7 FL — SIGNIFICANT CHANGE UP (ref 81–99)
MONOCYTES # BLD AUTO: 1.3 K/UL — HIGH (ref 0.1–0.6)
MONOCYTES NFR BLD AUTO: 12.7 % — HIGH (ref 1.7–9.3)
NEUTROPHILS # BLD AUTO: 7.8 K/UL — HIGH (ref 1.4–6.5)
NEUTROPHILS NFR BLD AUTO: 76 % — HIGH (ref 42.2–75.2)
NRBC # BLD: 0 /100 WBCS — SIGNIFICANT CHANGE UP (ref 0–0)
PLATELET # BLD AUTO: 292 K/UL — SIGNIFICANT CHANGE UP (ref 130–400)
POTASSIUM SERPL-MCNC: 4.4 MMOL/L — SIGNIFICANT CHANGE UP (ref 3.5–5)
POTASSIUM SERPL-SCNC: 4.4 MMOL/L — SIGNIFICANT CHANGE UP (ref 3.5–5)
PROT SERPL-MCNC: 5.1 G/DL — LOW (ref 6–8)
RBC # BLD: 3.96 M/UL — LOW (ref 4.2–5.4)
RBC # FLD: 13.8 % — SIGNIFICANT CHANGE UP (ref 11.5–14.5)
SODIUM SERPL-SCNC: 134 MMOL/L — LOW (ref 135–146)
VIT B12 SERPL-MCNC: 854 PG/ML — SIGNIFICANT CHANGE UP (ref 232–1245)
WBC # BLD: 10.25 K/UL — SIGNIFICANT CHANGE UP (ref 4.8–10.8)
WBC # FLD AUTO: 10.25 K/UL — SIGNIFICANT CHANGE UP (ref 4.8–10.8)

## 2022-08-25 PROCEDURE — 99233 SBSQ HOSP IP/OBS HIGH 50: CPT

## 2022-08-25 PROCEDURE — 71045 X-RAY EXAM CHEST 1 VIEW: CPT | Mod: 26

## 2022-08-25 RX ORDER — ACYCLOVIR SODIUM 500 MG
500 VIAL (EA) INTRAVENOUS EVERY 8 HOURS
Refills: 0 | Status: DISCONTINUED | OUTPATIENT
Start: 2022-08-25 | End: 2022-08-27

## 2022-08-25 RX ADMIN — ENOXAPARIN SODIUM 50 MILLIGRAM(S): 100 INJECTION SUBCUTANEOUS at 17:39

## 2022-08-25 RX ADMIN — Medication 110 MILLIGRAM(S): at 22:44

## 2022-08-25 RX ADMIN — LATANOPROST 1 DROP(S): 0.05 SOLUTION/ DROPS OPHTHALMIC; TOPICAL at 23:48

## 2022-08-25 RX ADMIN — CEFTRIAXONE 100 MILLIGRAM(S): 500 INJECTION, POWDER, FOR SOLUTION INTRAMUSCULAR; INTRAVENOUS at 05:00

## 2022-08-25 RX ADMIN — SODIUM CHLORIDE 50 MILLILITER(S): 9 INJECTION, SOLUTION INTRAVENOUS at 04:43

## 2022-08-25 RX ADMIN — ENOXAPARIN SODIUM 50 MILLIGRAM(S): 100 INJECTION SUBCUTANEOUS at 05:00

## 2022-08-25 RX ADMIN — ISOSORBIDE DINITRATE 10 MILLIGRAM(S): 5 TABLET ORAL at 22:44

## 2022-08-25 RX ADMIN — Medication 25 MILLIGRAM(S): at 17:37

## 2022-08-25 RX ADMIN — Medication 25 MILLIGRAM(S): at 22:44

## 2022-08-25 NOTE — PATIENT PROFILE ADULT - FALL HARM RISK - HARM RISK INTERVENTIONS
Assistance with ambulation/Assistance OOB with selected safe patient handling equipment/Communicate Risk of Fall with Harm to all staff/Discuss with provider need for PT consult/Monitor gait and stability/Provide patient with walking aids - walker, cane, crutches/Reinforce activity limits and safety measures with patient and family/Tailored Fall Risk Interventions/Use of alarms - bed, chair and/or voice tab/Visual Cue: Yellow wristband and red socks/Bed in lowest position, wheels locked, appropriate side rails in place/Call bell, personal items and telephone in reach/Instruct patient to call for assistance before getting out of bed or chair/Non-slip footwear when patient is out of bed/[place] to call system/Physically safe environment - no spills, clutter or unnecessary equipment/Purposeful Proactive Rounding/Room/bathroom lighting operational, light cord in reach

## 2022-08-25 NOTE — PROGRESS NOTE ADULT - SUBJECTIVE AND OBJECTIVE BOX
CHIEF COMPLAINT:    Patient is a 89y old  Female who presents with a chief complaint of Lethargy     INTERVAL HPI/OVERNIGHT EVENTS:    Patient seen and examined at bedside. No acute overnight events occurred.    ROS: Unable to obtain.     Medications:  Standing  aspirin  chewable 81 milliGRAM(s) Oral daily  budesonide  80 MICROgram(s)/formoterol 4.5 MICROgram(s) Inhaler 2 Puff(s) Inhalation two times a day  cefTRIAXone   IVPB 2000 milliGRAM(s) IV Intermittent every 24 hours  clopidogrel Tablet 75 milliGRAM(s) Oral daily  enoxaparin Injectable 50 milliGRAM(s) SubCutaneous every 12 hours  hydrALAZINE 25 milliGRAM(s) Oral three times a day  isosorbide   dinitrate Tablet (ISORDIL) 10 milliGRAM(s) Oral three times a day  latanoprost 0.005% Ophthalmic Solution 1 Drop(s) Both EYES at bedtime  levothyroxine 25 MICROGram(s) Oral daily  lisinopril 20 milliGRAM(s) Oral daily  metoprolol succinate ER 25 milliGRAM(s) Oral daily  pantoprazole    Tablet 40 milliGRAM(s) Oral before breakfast  polyethylene glycol 3350 17 Gram(s) Oral daily  saccharomyces boulardii 250 milliGRAM(s) Oral two times a day  senna 2 Tablet(s) Oral at bedtime    PRN Meds  acetaminophen     Tablet .. 650 milliGRAM(s) Oral every 6 hours PRN  ALBUTerol    90 MICROgram(s) HFA Inhaler 2 Puff(s) Inhalation every 6 hours PRN  aluminum hydroxide/magnesium hydroxide/simethicone Suspension 30 milliLiter(s) Oral every 4 hours PRN  melatonin 3 milliGRAM(s) Oral at bedtime PRN  ondansetron Injectable 4 milliGRAM(s) IV Push every 8 hours PRN        Vital Signs:    T(F): 97.6 (22 @ 05:00), Max: 98.9 (22 @ 16:03)  HR: 81 (22 @ 05:00) (81 - 84)  BP: 162/74 (22 @ 05:00) (131/70 - 162/74)  RR: 17 (22 @ 05:00) (17 - 20)  SpO2: 95% (22 @ 05:00) (95% - 96%)  I&O's Summary    24 Aug 2022 07:01  -  25 Aug 2022 07:00  --------------------------------------------------------  IN: 450 mL / OUT: 600 mL / NET: -150 mL      Daily     Daily Weight in k (25 Aug 2022 05:00)  CAPILLARY BLOOD GLUCOSE          PHYSICAL EXAM:  GENERAL:  NAD  SKIN: No rashes or lesions  HEENT: Atraumatic. Normocephalic. Anicteric  NECK:  No JVD.   PULMONARY: Clear to ausculation bilaterally. No wheezing. No rales  CVS: Normal S1, S2. Regular rate and rhythm. No murmurs.  ABDOMEN/GI: Soft, Nontender, Nondistended; Bowel sounds are present  EXTREMITIES:  No edema B/L LE.  NEUROLOGIC:  Non participatory, follows minimal commands  PSYCH: lethargic      LABS:                        12.3   10.25 )-----------( 292      ( 25 Aug 2022 07:51 )             37.5     08-25    134<L>  |  97<L>  |  12  ----------------------------<  98  4.4   |  29  |  0.5<L>    Ca    8.8      25 Aug 2022 07:51  Mg     1.8     08-25    TPro  5.1<L>  /  Alb  2.9<L>  /  TBili  0.4  /  DBili  x   /  AST  31  /  ALT  22  /  AlkPhos  65  08-        Trop 0.01, CKMB --, CK --, 22 @ 20:17      Culture - Blood (collected 22 Aug 2022 20:53)  Source: .Blood Blood  Preliminary Report (24 Aug 2022 02:02):    No growth to date.    Culture - Urine (collected 22 Aug 2022 20:32)  Source: Clean Catch Clean Catch (Midstream)  Final Report (24 Aug 2022 07:22):    No growth    Culture - Blood (collected 22 Aug 2022 20:17)  Source: .Blood Blood  Preliminary Report (24 Aug 2022 02:02):    No growth to date.        RADIOLOGY & ADDITIONAL TESTS:  Imaging or report Personally Reviewed:  [ ] YES  [ ] NO -->no new images    Telemetry reviewed independently - NSR, no acute events  EKG reviewed independently -->no new EKGs    Consultant(s) Notes Reviewed:  [ ] YES  [ ] NO  Care Discussed with Consultants/Other Providers [ ] YES  [ ] NO    Case discussed with resident  Care discussed with pt

## 2022-08-25 NOTE — CHART NOTE - NSCHARTNOTEFT_GEN_A_CORE
Reviewed pt remotely,  RD screen low BMI (17.9) noted. Pt is 89 year old female, currently NPO, family and medical team discussing GOC including enteral feeds, possible NGT placement. Dietitian to follow-up in person tomorrow 8/26 for comprehensive initial nutrition assessment, pending diet advancement, NGT placement, and GOC.

## 2022-08-25 NOTE — CONSULT NOTE ADULT - ASSESSMENT
ASSESSMENT  89y F admitted with METABOLIC ENCEPHALOPATHY; ACUTE UTI; PULMONARY EMBOLISM    HPI:  89-year-old female w/ hx of Asthma, COPD, hyperactive airway, b/l chronic bronchiectasis, DL, HTN, CAD s/p PCI, hypothyroidism. Patient was recently admitted w/ cyclospora gastroenteritis & cystitis; stay was complicated by metabolic encephalopathy. Patient is presenting for worsening lethargy and decreased responsiveness. Patient unable to participate in history. In the ED, left sided droop was noted, code stroke was called. CT head, CTA, & CTP were done -> -ve for acute events CT showed acute b/l subsegmental PE w/o right heart strain -> s/p Lovenox 60  UA +ve  WBC 16, PMN 73.5%  lactate 2.2  s/p vanco 1.5 g & cefepime 2 g  PMHx: diverticulosis, scoliosis, umbilical hernia, chronic diarrhea (unclear if persistent) (hx of cyclospora treated w/ bactrim & Flagyl until 8/20/22)  PSHx: s/p hysterectomy, s/p cholecystectomy    PROBLEMS  # Acute PE in pt with hx metabolic encephalopathy & S/P Rx for Cyclospora  # Resolved leukocytosis & slightly improved mental status  8/22 B/C x2 negative  8/22 Urine C&S negative  # Hypothyroidism  TSH elevated 5.91; On levothyroxine 25; Free thyroxine 1.2  #Lactic acidosis    On cefTRIAXone   IVPB 2000 milliGRAM(s) IV Intermittent every 24 hours    PLAN  - Would hold off on LP  - Continue Ceftriaxone  - Monitor mental status  - Palliative Care F/U; problem swallowing

## 2022-08-25 NOTE — CONSULT NOTE ADULT - SUBJECTIVE AND OBJECTIVE BOX
CHIRAG HERNÁNDEZ  89y, Female  Allergy: No Known Allergies      CHIEF COMPLAINT: Lethargy (24 Aug 2022 20:15)      HPI:  89-year-old female w/ hx of Asthma, COPD, hyperactive airway, b/l chronic bronchiectasis, DL, HTN, CAD s/p PCI, hypothyroidism. Patient was recently admitted w/ cyclospora gastroenteritis & cystitis; stay was complicated by metabolic encephalopathy. Patient is presenting for worsening lethargy and decreased responsiveness. Patient unable to participate in history. Called daughter, did not .    In the ED, left sided droop was noted, code stroke was called. CT head, CTA, & CTP were done -> -ve for acute events    CT showed acute b/l subsegmental PE w/o right heart strain -> s/p Lovenox 60    UA +ve  WBC 16, PMN 73.5%  lactate 2.2  s/p vanco 1.5 g & cefepime 2 g    PMHx: diverticulosis, scoliosis, umbilical hernia, chronic diarrhea (unclear if persistent) (hx of cyclospora treated w/ bactrim & Flagyl until 8/20/22)  PSHx: s/p hysterectomy, s/p cholecystectomy    s/p Pfizer covid vaccine x 2 on 3/6/22 & 3/27/21    In the ED, VS: T 37.6 (rectal), HR 80, /84, RR 17, SpO2 96% on 3 L NC (was on 2 L at NH) (23 Aug 2022 03:53)    FAMILY HISTORY:  No pertinent family history in first degree relatives      PAST MEDICAL & SURGICAL HISTORY:  High blood cholesterol      Hypothyroid      Hypertension      COPD (chronic obstructive pulmonary disease)      CAD (coronary artery disease)      History of coronary artery stent placement      History of cholecystectomy      H/O abdominal hysterectomy      Cataract          SOCIAL HISTORY  Social History:  Denies smoking  Denies ETOH use  Denies drug use  Lives with family (04 Aug 2022 23:54)        ROS  unable to obtain    VITALS:  T(F): 97.6, Max: 98.9 (08-24-22 @ 16:03)  HR: 81  BP: 162/74  RR: 17Vital Signs Last 24 Hrs  T(C): 36.4 (25 Aug 2022 05:00), Max: 37.2 (24 Aug 2022 16:03)  T(F): 97.6 (25 Aug 2022 05:00), Max: 98.9 (24 Aug 2022 16:03)  HR: 81 (25 Aug 2022 05:00) (78 - 84)  BP: 162/74 (25 Aug 2022 05:00) (131/70 - 170/76)  BP(mean): --  RR: 17 (25 Aug 2022 05:00) (17 - 20)  SpO2: 95% (25 Aug 2022 05:00) (95% - 98%)    Parameters below as of 25 Aug 2022 05:00  Patient On (Oxygen Delivery Method): nasal cannula  O2 Flow (L/min): 3      PHYSICAL EXAM:  Gen: NAD, resting in bed  HEENT: Normocephalic, atraumatic  Neck: supple, no lymphadenopathy  CV: Regular rate & regular rhythm  Lungs: decreased BS at bases, no fremitus  Abdomen: Soft, BS present  Ext: Warm, well perfused  Neuro: non focal, awake  Skin: no rash, no erythema    TESTS & MEASUREMENTS:                        12.4   10.78 )-----------( 310      ( 24 Aug 2022 07:07 )             37.6     08-24    133<L>  |  97<L>  |  12  ----------------------------<  94  4.4   |  27  |  0.6<L>    Ca    9.5      24 Aug 2022 07:07  Mg     1.9     08-24    TPro  5.5<L>  /  Alb  3.0<L>  /  TBili  0.4  /  DBili  x   /  AST  31  /  ALT  22  /  AlkPhos  67  08-24      LIVER FUNCTIONS - ( 24 Aug 2022 07:07 )  Alb: 3.0 g/dL / Pro: 5.5 g/dL / ALK PHOS: 67 U/L / ALT: 22 U/L / AST: 31 U/L / GGT: x               Culture - Blood (collected 08-22-22 @ 20:53)  Source: .Blood Blood  Preliminary Report (08-24-22 @ 02:02):    No growth to date.    Culture - Urine (collected 08-22-22 @ 20:32)  Source: Clean Catch Clean Catch (Midstream)  Final Report (08-24-22 @ 07:22):    No growth    Culture - Blood (collected 08-22-22 @ 20:17)  Source: .Blood Blood  Preliminary Report (08-24-22 @ 02:02):    No growth to date.        Lactate, Blood: 1.1 mmol/L (08-24-22 @ 07:07)  Blood Gas Venous - Lactate: 2.20 mmol/L (08-22-22 @ 20:41)  Lactate, Blood: 1.8 mmol/L (08-22-22 @ 20:17)      INFECTIOUS DISEASES TESTING  MRSA PCR Result.: Negative (08-11-22 @ 00:00)      RADIOLOGY & ADDITIONAL TESTS:  I have personally reviewed the last Chest xray  CXR  Xray Chest 1 View AP/PA:   ACC: [id number] EXAM:  XR CHEST 1 VIEW                          PROCEDURE DATE:  08/22/2022          INTERPRETATION:  Clinical History / Reason for exam: Stroke code    Comparison : Chest radiograph dated 8/11/2022. CT chest abdomen and   pelvis fromthe same day.    Technique/Positioning: AP view of the chest..    Findings:    Support devices: None.    Cardiac/mediastinum/hilum: Unchanged.    Lung parenchyma/Pleura: Bilateral reticular opacities are similar to   8/11/2022. No pleural effusion orpneumothorax.    Skeleton/soft tissues: No acute abnormality seen.    Impression:  Bilateral reticular opacities are similar to 8/11/2022    --- End of Report ---            JUAN MENJIVAR MD; Attending Radiologist  This document has been electronically signed. Aug 23 2022  1:36PM (08-22-22 @ 22:31)      CT  CT Chest w/ IV Cont:   ACC: [id number] EXAM:  CT ABDOMEN AND PELVIS IC                        ACC: [id number] EXAM:  CT CHEST IC                          PROCEDURE DATE:  08/22/2022          INTERPRETATION:  CLINICAL STATEMENT: Leukocytosis, unknown source of   infection.    TECHNIQUE: Contiguous axial CT images were obtained from the thoracic   inlet to the pubic symphysis after administration of 70 cc of Omnipaque   350 IV contrast.  Oral contrast was not administered.  Reformatted images   in the coronal and sagittal planes were acquired. MIP reconstruction   images were also submitted for review.    COMPARISON CT: CT chest dated 6/10/2013, CT abdomen and pelvis dated   8/11/2022.    FINDINGS:    CHEST:    LUNGS/PLEURA/AIRWAYS: Central airways are patent. No focalconsolidation.   Basilar predominant fibrotic changes with bronchiectasis and   architectural distortion most prominent within the right middle lobe and   lingula. No definite evidence of honeycombing. Scattered emphysematous   changes. Trace right pleural effusion with adjacent compressive   atelectasis, additional scattered atelectatic changes. No suspicious   pulmonary nodule or dominant mass.    MEDIASTINUM/THORACIC NODES: Prominent right hilar lymph nodes measuring   up to 0.9 cm in the short axis which are decreased in size as compared   with prior exam. No lymphadenopathy by size criteria. Nodular thyroid   gland.    HEART/GREAT VESSELS: The heart is not visually enlarged. Trace   pericardial fluid. Ascending aorta is of normal caliber. Aortic and   coronary artery atherosclerosis. Main pulmonary artery is at the upper   limit of normal in caliber.    Small intraluminal filling defects, consistent with acute pulmonary   emboli, are noted in subsegmental branches of the right and left lower   lobe pulmonary arteries. (Right: 5/169; 5/190;6/191 / Left: 5/187; 6/203)   There is no evidence of right heart strain (RV:LV ratio <1; RV=4.4 cm and   LV = 5.0 cm)        ABDOMEN/PELVIS:    HEPATOBILIARY: No focal liver lesion.  Status post cholecystectomy with   expected postsurgical changes. Portal vein is patent.    SPLEEN: Unremarkable.    PANCREAS: Moderately atrophic..    ADRENAL GLANDS: Stable thickening of the left adrenal gland, unremarkable   right adrenal gland.    KIDNEYS: Symmetric renal enhancement. No hydronephrosis. Right renal   cysts and bilateral hypodensities too small to further characterize.   Contrast opacifies portions of the collecting systems.    ABDOMINOPELVIC NODES: No enlarged abdominal or pelvic lymph nodes.    PELVIC ORGANS: Contrast layers within the bladder that is distended up to   16.4 cm. Post hysterectomy.    PERITONEUM/MESENTERY/BOWEL: Diverticulosis without evidence of   diverticulitis. No intraperitoneal free air, ascites, or bowel   obstruction. The appendix is not definitively visualized however there is   no pericecal inflammation..    BONES/SOFT TISSUES: No acute osseous abnormality. Degenerative changes   most prominent at the visualized spine, with chronic moderate to severe   compression deformity of T12.  Scoliosis. Subcutaneous edema at the   lateral thighs/gluteal region which may be dependent in nature versus   related to bruising. Small fat-containing umbilical hernia.    VASCULAR: The abdominal aorta is of normal caliber.    IMPRESSION:    Small intraluminal filling defects, consistent with acute pulmonary   emboli, are noted in subsegmental branches of the right and left lower   lobe pulmonary arteries. (Right: 5/169; 5/190;6/191 / Left: 5/187; 6/203)   There is no evidence of right heart strain (RV:LV ratio <1; RV=4.4 cm and   LV = 5.0 cm)    No definite CT evidence of acute intra-abdominal or pelvic pathology.    Distended bladder measures up to 16.4 cm. Clinical correlation for   urinary retention is recommended.    Additional stable chronic findings, as above.        CARDIOLOGY TESTING  12 Lead ECG:   Diagnosis Line Normal sinus rhythm  Left axis deviation  Left ventricular hypertrophy with QRS widening and repolarization abnormality  Inferior infarct , age undetermined  Possible inferior infarct, age undetermined  Abnormal ECG    Confirmed by Eduardo Ruth ([id number]) on 8/23/20229:35:07 AM (08-22-22 @ 20:35)      MEDICATIONS  aspirin  chewable 81  budesonide  80 MICROgram(s)/formoterol 4.5 MICROgram(s) Inhaler 2  cefTRIAXone   IVPB 2000  clopidogrel Tablet 75  dextrose 5% + sodium chloride 0.45%. 1000  enoxaparin Injectable 50  hydrALAZINE 25  isosorbide   dinitrate Tablet (ISORDIL) 10  latanoprost 0.005% Ophthalmic Solution 1  levothyroxine 25  lisinopril 20  metoprolol succinate ER 25  pantoprazole    Tablet 40  polyethylene glycol 3350 17  saccharomyces boulardii 250  senna 2      ANTIBIOTICS:  cefTRIAXone   IVPB 2000 milliGRAM(s) IV Intermittent every 24 hours      All available historical data has been reviewed

## 2022-08-25 NOTE — PROGRESS NOTE ADULT - ASSESSMENT
89 yo F PMHx asthma, COPD, hyperactive airway, b/l chronic bronchiectasis, DLD, HTN, CAD s/p PCI, hypothyroidism presented for evaluation of worsening metabolic encephalopathy. Patient was recently admitted w/ cyclospora gastroenteritis & cystitis. Course was complicated by metabolic encephalopathy. Upon ER arrival pt found to have subsegmental PE and positive urinalysis.     Suspected Metabolic encephalopathy from suspected UTI   History of recent colitis as well as cyclospora PCR positive treated with bactrim & Flagyl until 8/20/22  - c/w Rocephin  - hold off LP as pt will not be able to tolerate it (cannot lie flat and is aspiration risk)  - considering starting empiric encephalopathy as mental status further deteriorating  - c/w saccharomyces boulardii    Subsegmental PE  - duplex shows peroneal thrombus  - c/w Lovenox, will switch to oral eliquis once pt able to take po consistently  - c/w telemetry for now while awaiting Echo  - c/w oxygen supplementation  - Echo pending    Dysphagia  - currently NPO  - family opting against PEG but do want a trial of NGT feeds  - speech and swallow daily assessment  - palliative following   - IV d5+1/2NS 70 cc/hr hydration     Nodular Thyroid  Hypothyroidism  - TSH mildly elevated but will hold off med for now    Lactic acidosis, resolving  - IV maintenance D5 1/2 NS 70 ml/hr    Metabolic alkalosis  - unclear source (not on diuresis or bicarb, not vomiting)  - resolving    Asthma  COPD  b/l chronic bronchiectasis  - c/w albuterol & Symbicort    DLD  HTN  CAD s/p PCI  - c/w isosorbide dinitrate, aspirin, Plavix, metoprolol  - c/w lisinopril    Urinary Retention?  - acevedo remains in place  - had bladder distension on CT     Diverticulosis  - avoid constipation    Weight loss  Hx abdominal pain  - patient refused outpatient colonoscopy & EGD    Trace right pleural effusion  Atelectasis  - incentive spirometry    Right hilar 9 mm LN (decreased in size)  - outpatient f/u    Constipation  - c/w Senna & Miralax    Glaucoma  - c/w latanoprost    # DVT PPx  - Lovenox    # GI PPx  - c/w Protonix    # Diet  - DNR, DNI    #Progress Note Handoff:  Pending (specify):  Improvement in mental status  Family discussion: I spoke with daughter at length regarding encephalopathy  Disposition: Home___/SNF_x__/Other________/Unknown at this time________

## 2022-08-25 NOTE — CHART NOTE - NSCHARTNOTEFT_GEN_A_CORE
PALLIATIVE MEDICINE INTERDISCIPLINARY TEAM NOTE    Provider:  [ x  ]Social Work   [   ]          [  x ] Initial visit [   ] Follow up    Family or contact name / phone #   Met with: [x   ] Patient:  patient was observed to be resting comfortably  [  x ] Family:  daughter, Magdalena Durant was at bedside.  [   ] Other:    Primary Language: [   ] English [   ] Other*:                      *Interpretation provided by:    SUPPORT DIAGNOSES            (Check all that apply)  [   ] Psychosocial spiritual assessment (PSSA)  [ x  ] EOL issues  [   ] Cultural / spiritual concerns  [   ] Pain / suffering  [  ] Dementia / AMS  [   ] Other:  [ x  ] AD issues  [ x  ] Grief / loss / sadness  [   ] Discharge issues  [  x ] Distress / coping    PSYCHOSOCIAL ASSESSMENT OF PATIENT         (Check all that apply)  [  x ] Initial Assessment            [   ] Reassessment          [   ] Not Applicable this visit    Pain/suffering acuity:  patietn appeared to be comfortable  [   ] None to mild (0-3)           [   ] Moderate (4-6)        [   ] High (7-10)    Mental Status:  [   ] Alert/oriented (x3)          [   ] Confused/Altered(x2/x1)         [ x  ] Non-resp    Functional status:  [   ] Independent w ADLs      [   ] Needs Assistance             [  x ] Bedbound/Full Care    Coping:  unable to assess  [   ] Coping well                     [   ] Coping w/difficulty            [   ] Poor coping    Support system:  [  x ] Strong                              [   ] Adequate                        [   ] Inadequate      Past history and medications for:     [ ] Anxiety       [ ] Depression    [ ] Sleep disorders     SPIRITUAL ASSESSMENT  Church/Spiritual practice: ___________________________    Role of organized Sabianism:  [   ] Important                     [   ] Some (fam tradition, cultural)               [   ] None    Effects on medical care:  [   ] Yes, _____________________________________                         [   ] None    Cultural/Anabaptist need:  [   ] Yes, _____________________________________                         [   ] None    Refer to Pastoral Care:  [   ] Yes           [   ] No, not at this time    SERVICE PROVIDED  [   ]PSSA                                                                             [   ]Discharge support / facilitation  [  x ]AD / goals of care counseling                                  [   ]EOL / death / bereavement counseling  [  x ]Counseling / support                                                [   ] Family meeting  [   ]Prayer / sacrament / ritual                                      [   ] Referral   [   ]Other                                                                       NOTE and Plan of Care (PoC):    Patient is a 89 year old female.  Patient was admitted from [facility], on 8/22/22, dx:  Metabolic Encephalopathy, Acute UTI, PE.  Patient has PMH of Asthma, COPD, Hyperactive airway, b/l chronic bronchiectasis, DL, HTN, CAD s/p PCI, Hypothyroidism.  Patient was recently discharged from the [facility] to [facility] [place] for rehab.  Patient presented to ED for worsening lethargy and decreased responsiveness.    Patient is known to Palliative Team from previous admission.   Patient was observed to be resting comfortably.  Daughter, Magdalena Durant, was at bedside.  Reviewed role of Palliative Care and provided information regarding CMO and Hospice.  Support rendered.

## 2022-08-25 NOTE — PROGRESS NOTE ADULT - SUBJECTIVE AND OBJECTIVE BOX
HPI: 89F with PMH of COPD, asthma, HTN, CAD s/p PCI, hypothyroidism, here after recent admission at [facility] for cyclospora gastroenteritis and cystitis and metabolic encephalopathy, discharged to [facility] and admitted here for worsening lethargy. Code stroke called on admission, with CT head negative, but CTA showing acute bilateral subsegmental PE, now on therapeutic lovenox. Patient did not pass speech and swallow, and palliative care called to help discuss nutritional GOC.    INTERVAL EVENTS:  8/25: patient less mentally alert today    ADVANCE DIRECTIVES:    DNR  MOLST  [ ]  Living Will  [ ]   DECISION MAKER(s):  [ ] Health Care Proxy(s)  [ ] Surrogate(s)  [ ] Guardian           Name(s): Phone Number(s):    BASELINE (I)ADL(s) (prior to admission):  [place]: [ ]Total  [ ] Moderate [ ]Dependent  Palliative Performance Status Version 2:         %    http://[email or web address].org/files/news/palliative_performance_scale_[email or web address].pdf    Allergies    No Known Allergies    Intolerances    MEDICATIONS  (STANDING):  acyclovir IVPB 500 milliGRAM(s) IV Intermittent every 8 hours  aspirin  chewable 81 milliGRAM(s) Oral daily  budesonide  80 MICROgram(s)/formoterol 4.5 MICROgram(s) Inhaler 2 Puff(s) Inhalation two times a day  cefTRIAXone   IVPB 2000 milliGRAM(s) IV Intermittent every 24 hours  clopidogrel Tablet 75 milliGRAM(s) Oral daily  enoxaparin Injectable 50 milliGRAM(s) SubCutaneous every 12 hours  hydrALAZINE 25 milliGRAM(s) Oral three times a day  isosorbide   dinitrate Tablet (ISORDIL) 10 milliGRAM(s) Oral three times a day  latanoprost 0.005% Ophthalmic Solution 1 Drop(s) Both EYES at bedtime  levothyroxine 25 MICROGram(s) Oral daily  lisinopril 20 milliGRAM(s) Oral daily  metoprolol succinate ER 25 milliGRAM(s) Oral daily  pantoprazole    Tablet 40 milliGRAM(s) Oral before breakfast  polyethylene glycol 3350 17 Gram(s) Oral daily  saccharomyces boulardii 250 milliGRAM(s) Oral two times a day  senna 2 Tablet(s) Oral at bedtime    MEDICATIONS  (PRN):  acetaminophen     Tablet .. 650 milliGRAM(s) Oral every 6 hours PRN Temp greater or equal to 38C (100.4F), Mild Pain (1 - 3)  ALBUTerol    90 MICROgram(s) HFA Inhaler 2 Puff(s) Inhalation every 6 hours PRN Shortness of Breath and/or Wheezing  aluminum hydroxide/magnesium hydroxide/simethicone Suspension 30 milliLiter(s) Oral every 4 hours PRN Dyspepsia  melatonin 3 milliGRAM(s) Oral at bedtime PRN Insomnia  ondansetron Injectable 4 milliGRAM(s) IV Push every 8 hours PRN Nausea and/or Vomiting    PRESENT SYMPTOMS: [ X]Unable to obtain due to poor mentation   Source if other than patient:  [ ]Family   [ ]Team     Pain: [ ]yes [ ]no  QOL impact -   Location -                    Aggravating factors -  Quality -  Radiation -  Timing-  Severity (0-10 scale):  Minimal acceptable level (0-10 scale):     CPOT:    https://www.[facility].org/getattachment/[email or web address]/Critical-Care-Pain-Observation-Tool-(CPOT)      PAIN AD Score: 0    http://geriatrictoolkit.[email or web address]/cog/painad.pdf (press ctrl +  left click to view)    Dyspnea:                           [ ]Mild [ ]Moderate [ ]Severe  Anxiety:                             [ ]Mild [ ]Moderate [ ]Severe  Fatigue:                             [ ]Mild [ ]Moderate [ ]Severe  Nausea:                             [ ]Mild [ ]Moderate [ ]Severe  Loss of appetite:              [ ]Mild [ ]Moderate [ ]Severe  Constipation:                    [ ]Mild [ ]Moderate [ ]Severe    Other Symptoms:  [ ]All other review of systems negative     Palliative Performance Status Version 2:         %    http://npcrc.org/files/news/palliative_performance_scale_ppsv2.pdf  PHYSICAL EXAM:  Vital Signs Last 24 Hrs  T(C): 36.4 (25 Aug 2022 05:00), Max: 37.2 (24 Aug 2022 16:03)  T(F): 97.6 (25 Aug 2022 05:00), Max: 98.9 (24 Aug 2022 16:03)  HR: 81 (25 Aug 2022 05:00) (81 - 84)  BP: 162/74 (25 Aug 2022 05:00) (131/70 - 162/74)  BP(mean): --  RR: 17 (25 Aug 2022 05:00) (17 - 20)  SpO2: 95% (25 Aug 2022 05:00) (95% - 96%)    Parameters below as of 25 Aug 2022 05:00  Patient On (Oxygen Delivery Method): nasal cannula  O2 Flow (L/min): 3    GENERAL:  [ ]Alert  [ ]Oriented x   [ ]Lethargic  [ ]Cachexia  [ ]Unarousable  [X ]Verbal  [ ]Non-Verbal  Behavioral:   [ ] Anxiety  [ ] Delirium [ ] Agitation [X ] Other calm  HEENT:  [ ]Normal   [X ]Dry mouth   [ ]ET Tube/Trach  [ ]Oral lesions  PULMONARY:   [ ]Clear [ ]Tachypnea  [ ]Audible excessive secretions [X] No labored breathing  [ ]Rhonchi        [ ]Right [ ]Left [ ]Bilateral  [ ]Crackles        [ ]Right [ ]Left [ ]Bilateral  [ ]Wheezing     [ ]Right [ ]Left [ ]Bilateral  [ ]Diminished breath sounds [ ]right [ ]left [ ]bilateral  CARDIOVASCULAR:    [ ]Regular [ ]Irregular [ ]Tachy  [ ]Brent [ ]Murmur [ ]Other  GASTROINTESTINAL:  [ ]Soft  [ X]Nondistended   [ ]+BS  [ ]Non tender [ ]Tender  [ ]PEG [ ]OGT/ NGT  Last BM:   GENITOURINARY:  [ X]Normal [ ] Incontinent   [ ]Oliguria/Anuria   [ ]Martines  MUSCULOSKELETAL:   [ ]Normal   [X ]Weakness  [ ]Bed/Wheelchair bound [ ]Edema  NEUROLOGIC:   [ ]No focal deficits  [ ]Cognitive impairment  [X ]Dysphagia [ ]Dysarthria [ ]Paresis [ ]Other   SKIN:   [ ]Normal   [X] Nonjaundiced [ ]Rash  [ ]Pressure ulcer(s)       Present on admission [ ]y [ ]n    CRITICAL CARE:  [ ] Shock Present  [ ]Septic [ ]Cardiogenic [ ]Neurologic [ ]Hypovolemic  [ ]  Vasopressors [ ]  Inotropes   [ ]Respiratory failure present [ ]Mechanical ventilation [ ]Non-invasive ventilatory support [ ]High flow  [ ]Acute  [ ]Chronic [ ]Hypoxic  [ ]Hypercarbic [ ]Other  [ ]Other organ failure     LABS: reviewed                                   12.3   10.25 )-----------( 292      ( 25 Aug 2022 07:51 )             37.5     08-25    134<L>  |  97<L>  |  12  ----------------------------<  98  4.4   |  29  |  0.5<L>    Ca    8.8      25 Aug 2022 07:51  Mg     1.8     08-25    RADIOLOGY & ADDITIONAL STUDIES:    MR head 8/23/22    No acute infarct, intracranial hemorrhage, or midline shift.    Moderate-severe chronic microvascular ischemic changes, significantly   progressed since 1/9/2017.    Moderate right/mild left mastoid effusion, nonspecific.    PROTEIN CALORIE MALNUTRITION PRESENT: [ ]mild [ ]moderate [ ]severe [ ]underweight [ ]morbid obesity  https://www.andeal.org/vault/2440/web/files/ONC/Table_Clinical%20Characteristics%20to%20Document%20Malnutrition-White%20JV%20et%20al%202012.pdf    Height (cm): 172.7 (08-22-22 @ 20:22), 172.7 (08-05-22 @ 03:26)  Weight (kg): 53.5 (08-23-22 @ 07:39), 72.3 (08-05-22 @ 03:26)  BMI (kg/m2): 17.9 (08-23-22 @ 07:39), 24.2 (08-22-22 @ 20:22), 24.2 (08-05-22 @ 03:26)    [ ]PPSV2 < or = to 30% [ ]significant weight loss  [ ]poor nutritional intake  [ ]anasarca      [ ]Artificial Nutrition      REFERRALS:   [ ]Chaplaincy  [ ]Hospice  [ ]Child Life  [ ]Social Work  [ ]Case management [ ]Holistic Therapy     Goals of Care Document:     ______________  Alex Carbone MD  Palliative Medicine  [facility]   of Geriatric and Palliative Medicine  [phone]

## 2022-08-25 NOTE — PROGRESS NOTE ADULT - SUBJECTIVE AND OBJECTIVE BOX
CHIRAG HERNÁNDEZ  89y  Female      Patient is a 89y old  Female who presents with a chief complaint of Lethargy (25 Aug 2022 12:10)      INTERVAL HPI/OVERNIGHT EVENTS:  No overnight events     REVIEW OF SYSTEMS:  Unobtainable     FAMILY HISTORY:  No pertinent family history in first degree relatives    T(C): 36.4 (08-25-22 @ 05:00), Max: 37.2 (08-24-22 @ 16:03)  HR: 81 (08-25-22 @ 05:00) (81 - 84)  BP: 162/74 (08-25-22 @ 05:00) (131/70 - 162/74)  RR: 17 (08-25-22 @ 05:00) (17 - 20)  SpO2: 95% (08-25-22 @ 05:00) (95% - 96%)  Wt(kg): --Vital Signs Last 24 Hrs  T(C): 36.4 (25 Aug 2022 05:00), Max: 37.2 (24 Aug 2022 16:03)  T(F): 97.6 (25 Aug 2022 05:00), Max: 98.9 (24 Aug 2022 16:03)  HR: 81 (25 Aug 2022 05:00) (81 - 84)  BP: 162/74 (25 Aug 2022 05:00) (131/70 - 162/74)  BP(mean): --  RR: 17 (25 Aug 2022 05:00) (17 - 20)  SpO2: 95% (25 Aug 2022 05:00) (95% - 96%)    Parameters below as of 25 Aug 2022 05:00  Patient On (Oxygen Delivery Method): nasal cannula  O2 Flow (L/min): 3      PHYSICAL EXAM:  GENERAL: NAD, well-groomed, well-developed  HEAD:  Atraumatic, Normocephalic  EYES: EOMI, PERRLA, conjunctiva and sclera clear  ENMT: No tonsillar erythema, exudates, or enlargement; Moist mucous membranes, Good dentition, No lesions  NECK: Supple, No JVD, Normal thyroid  NERVOUS SYSTEM:  Alert & Oriented X1, Good concentration; Motor Strength 5/5 B/L upper and lower extremities; DTRs 2+ intact and symmetric  CHEST/LUNG: Clear to percussion bilaterally; No rales, rhonchi, wheezing, or rubs  HEART: Regular rate and rhythm; No murmurs, rubs, or gallops  ABDOMEN: Soft, Nontender, Nondistended; Bowel sounds present  EXTREMITIES:  2+ Peripheral Pulses, No clubbing, cyanosis, or edema      Consultant(s) Notes Reviewed:  [x ] YES  [ ] NO  Care Discussed with Consultants/Other Providers [ x] YES  [ ] NO    LABS:          RADIOLOGY & ADDITIONAL TESTS:    Imaging Personally Reviewed:  [ ] YES  [ ] NO  acetaminophen     Tablet .. 650 milliGRAM(s) Oral every 6 hours PRN  acyclovir IVPB 500 milliGRAM(s) IV Intermittent every 8 hours  ALBUTerol    90 MICROgram(s) HFA Inhaler 2 Puff(s) Inhalation every 6 hours PRN  aluminum hydroxide/magnesium hydroxide/simethicone Suspension 30 milliLiter(s) Oral every 4 hours PRN  aspirin  chewable 81 milliGRAM(s) Oral daily  budesonide  80 MICROgram(s)/formoterol 4.5 MICROgram(s) Inhaler 2 Puff(s) Inhalation two times a day  cefTRIAXone   IVPB 2000 milliGRAM(s) IV Intermittent every 24 hours  clopidogrel Tablet 75 milliGRAM(s) Oral daily  enoxaparin Injectable 50 milliGRAM(s) SubCutaneous every 12 hours  hydrALAZINE 25 milliGRAM(s) Oral three times a day  isosorbide   dinitrate Tablet (ISORDIL) 10 milliGRAM(s) Oral three times a day  latanoprost 0.005% Ophthalmic Solution 1 Drop(s) Both EYES at bedtime  levothyroxine 25 MICROGram(s) Oral daily  lisinopril 20 milliGRAM(s) Oral daily  melatonin 3 milliGRAM(s) Oral at bedtime PRN  metoprolol succinate ER 25 milliGRAM(s) Oral daily  ondansetron Injectable 4 milliGRAM(s) IV Push every 8 hours PRN  pantoprazole    Tablet 40 milliGRAM(s) Oral before breakfast  polyethylene glycol 3350 17 Gram(s) Oral daily  saccharomyces boulardii 250 milliGRAM(s) Oral two times a day  senna 2 Tablet(s) Oral at bedtime      HEALTH ISSUES - PROBLEM Dx:  Pulmonary thromboembolism    Altered mental status    Dysphagia    Advanced care planning/counseling discussion    Encounter for palliative care           CHIRAG HERNÁNDEZ  89y  Female      Patient is a 89y old  Female who presents with a chief complaint of Lethargy (25 Aug 2022 12:10)      INTERVAL HPI/OVERNIGHT EVENTS:  No overnight events     REVIEW OF SYSTEMS:  Unobtainable     FAMILY HISTORY:  No pertinent family history in first degree relatives    T(C): 36.4 (08-25-22 @ 05:00), Max: 37.2 (08-24-22 @ 16:03)  HR: 81 (08-25-22 @ 05:00) (81 - 84)  BP: 162/74 (08-25-22 @ 05:00) (131/70 - 162/74)  RR: 17 (08-25-22 @ 05:00) (17 - 20)  SpO2: 95% (08-25-22 @ 05:00) (95% - 96%)  Wt(kg): --Vital Signs Last 24 Hrs  T(C): 36.4 (25 Aug 2022 05:00), Max: 37.2 (24 Aug 2022 16:03)  T(F): 97.6 (25 Aug 2022 05:00), Max: 98.9 (24 Aug 2022 16:03)  HR: 81 (25 Aug 2022 05:00) (81 - 84)  BP: 162/74 (25 Aug 2022 05:00) (131/70 - 162/74)  BP(mean): --  RR: 17 (25 Aug 2022 05:00) (17 - 20)  SpO2: 95% (25 Aug 2022 05:00) (95% - 96%)    Parameters below as of 25 Aug 2022 05:00  Patient On (Oxygen Delivery Method): nasal cannula  O2 Flow (L/min): 3      PHYSICAL EXAM:  GENERAL: NAD, well-groomed, well-developed  HEAD:  Atraumatic, Normocephalic  EYES: EOMI, PERRLA, conjunctiva and sclera clear  ENMT: No tonsillar erythema, exudates, or enlargement; Moist mucous membranes, Good dentition, No lesions  NECK: Supple, No JVD, Normal thyroid  NERVOUS SYSTEM:  Alert & Oriented X1, Good concentration; Motor Strength 5/5 B/L upper and lower extremities; DTRs 2+ intact and symmetric  CHEST/LUNG: Clear to percussion bilaterally; No rales, rhonchi, wheezing, or rubs  HEART: Regular rate and rhythm; No murmurs, rubs, or gallops  ABDOMEN: Soft, Nontender, Nondistended; Bowel sounds present  EXTREMITIES:  2+ Peripheral Pulses, No clubbing, cyanosis, or edema      Consultant(s) Notes Reviewed:  [x ] YES  [ ] NO  Care Discussed with Consultants/Other Providers [ x] YES  [ ] NO    .  LABS:                         12.3   10.25 )-----------( 292      ( 25 Aug 2022 07:51 )             37.5     08-25    134<L>  |  97<L>  |  12  ----------------------------<  98  4.4   |  29  |  0.5<L>    Ca    8.8      25 Aug 2022 07:51  Mg     1.8     08-25    TPro  5.1<L>  /  Alb  2.9<L>  /  TBili  0.4  /  DBili  x   /  AST  31  /  ALT  22  /  AlkPhos  65  08-25      RADIOLOGY, EKG & ADDITIONAL TESTS: Reviewed.         Imaging Personally Reviewed:  [ ] YES  [ ] NO  acetaminophen     Tablet .. 650 milliGRAM(s) Oral every 6 hours PRN  acyclovir IVPB 500 milliGRAM(s) IV Intermittent every 8 hours  ALBUTerol    90 MICROgram(s) HFA Inhaler 2 Puff(s) Inhalation every 6 hours PRN  aluminum hydroxide/magnesium hydroxide/simethicone Suspension 30 milliLiter(s) Oral every 4 hours PRN  aspirin  chewable 81 milliGRAM(s) Oral daily  budesonide  80 MICROgram(s)/formoterol 4.5 MICROgram(s) Inhaler 2 Puff(s) Inhalation two times a day  cefTRIAXone   IVPB 2000 milliGRAM(s) IV Intermittent every 24 hours  clopidogrel Tablet 75 milliGRAM(s) Oral daily  enoxaparin Injectable 50 milliGRAM(s) SubCutaneous every 12 hours  hydrALAZINE 25 milliGRAM(s) Oral three times a day  isosorbide   dinitrate Tablet (ISORDIL) 10 milliGRAM(s) Oral three times a day  latanoprost 0.005% Ophthalmic Solution 1 Drop(s) Both EYES at bedtime  levothyroxine 25 MICROGram(s) Oral daily  lisinopril 20 milliGRAM(s) Oral daily  melatonin 3 milliGRAM(s) Oral at bedtime PRN  metoprolol succinate ER 25 milliGRAM(s) Oral daily  ondansetron Injectable 4 milliGRAM(s) IV Push every 8 hours PRN  pantoprazole    Tablet 40 milliGRAM(s) Oral before breakfast  polyethylene glycol 3350 17 Gram(s) Oral daily  saccharomyces boulardii 250 milliGRAM(s) Oral two times a day  senna 2 Tablet(s) Oral at bedtime      HEALTH ISSUES - PROBLEM Dx:  89 yo F PMHx asthma, COPD, hyperactive airway, b/l chronic bronchiectasis, DLD, HTN, CAD s/p PCI, hypothyroidism presented for evaluation of worsening metabolic encephalopathy. Patient was recently admitted w/ cyclospora gastroenteritis & cystitis. Course was complicated by metabolic encephalopathy. Upon ER arrival pt found to have subsegmental PE and positive urinalysis.     #Encephalopathy - metabolic v infective   - Patient had a sudden drop in sensorium in the hospital stay at [facility].  - Stroke ruled out with CT and MRI   - awaiting complete metabolic work up   - LP deferred after discussion with ID  - NG tube inserted and feeds started   - Swallow assessment failed    - emperically started Acyclovir     #suspected UTI/ ? Urinary Retention  - acevedo remains in place  - had bladder distension on CT   - History of recent colitis as well as cyclospora PCR positive treated with bactrim & Flagyl until 8/20/22  - c/w Rocephin  - c/w saccharomyces boulardii    Subsegmental PE  - duplex shows peroneal thrombus  - c/w Lovenox, will switch to oral eliquis once pt able to take po consistently  - c/w oxygen supplementation  - Echo pending        Nodular Thyroid  Hypothyroidism  - TSH mildly elevated but will hold off med for now    #Lactic acidosis, resolved    #Metabolic alkalosis - resolved     #Asthma  #COPD  b/l chronic bronchiectasis  - c/w albuterol & Symbicort    #DLD  #HTN  #CAD s/p PCI  - c/w isosorbide dinitrate, aspirin, Plavix, metoprolol  - c/w lisinopril    Diverticulosis  - avoid constipation    Weight loss  Hx abdominal pain  - patient refused outpatient colonoscopy & EGD      Constipation  - c/w Senna & Miralax    Glaucoma  - c/w latanoprost    # DVT PPx  - Lovenox    # GI PPx  - c/w Protonix    # Diet  - DNR, DNI    #Progress Note Handoff:  Pending (specify):  Improvement in mental status

## 2022-08-25 NOTE — PATIENT PROFILE ADULT - FUNCTIONAL ASSESSMENT - BASIC MOBILITY 6.
1-calculated by average/Not able to assess (calculate score using [facility] averaging method)

## 2022-08-25 NOTE — PROGRESS NOTE ADULT - ASSESSMENT
89F with PMH of COPD, asthma, HTN, CAD s/p PCI, hypothyroidism, here after recent admission at [facility] for cyclospora gastroenteritis and cystitis and metabolic encephalopathy, discharged to [facility] and admitted here for worsening lethargy. Code stroke called on admission, with CT head negative, but CTA showing acute bilateral subsegmental PE, now on therapeutic lovenox. Patient did not pass speech and swallow, and palliative care called to help discuss nutritional GOC.

## 2022-08-25 NOTE — PROGRESS NOTE ADULT - PROBLEM SELECTOR PLAN 4
- goals are a trial of NGT, daughter aware of options of comfort measures if her clinical situation does not improve  - ongoing GOC

## 2022-08-25 NOTE — SWALLOW BEDSIDE ASSESSMENT ADULT - SLP PERTINENT HISTORY OF CURRENT PROBLEM
89 yo Female w/ PMhx of HTN, CAD s/p stent, COPD, hypothyroidism, was sent in from [facility] for worsening AMS. Recently admitted to [facility] a week ago for abdominal pain, was treated for colitis and d/c 8/19 to NH. On arrival to ED stroke code was activated but was cancelled as per telestroke attending. Labs reveal leukocytosis, +UTI. CTH, CTA negative. CT Perfusion revealed deficit in left temporo-occipital region without core infarct. MRI negative, moderate-severe chronic microvascular ischemic changes, progressed since 1/9/2017. ?AMS Likely seizure in the setting of infection, toxic metabolic encephalopathy r/o Stroke.

## 2022-08-26 LAB
ALBUMIN SERPL ELPH-MCNC: 3 G/DL — LOW (ref 3.5–5.2)
ALP SERPL-CCNC: 77 U/L — SIGNIFICANT CHANGE UP (ref 30–115)
ALT FLD-CCNC: 24 U/L — SIGNIFICANT CHANGE UP (ref 0–41)
ANION GAP SERPL CALC-SCNC: 11 MMOL/L — SIGNIFICANT CHANGE UP (ref 7–14)
ANION GAP SERPL CALC-SCNC: 9 MMOL/L — SIGNIFICANT CHANGE UP (ref 7–14)
AST SERPL-CCNC: 35 U/L — SIGNIFICANT CHANGE UP (ref 0–41)
BASOPHILS # BLD AUTO: 0.06 K/UL — SIGNIFICANT CHANGE UP (ref 0–0.2)
BASOPHILS NFR BLD AUTO: 0.4 % — SIGNIFICANT CHANGE UP (ref 0–1)
BILIRUB SERPL-MCNC: 0.4 MG/DL — SIGNIFICANT CHANGE UP (ref 0.2–1.2)
BUN SERPL-MCNC: 21 MG/DL — HIGH (ref 10–20)
BUN SERPL-MCNC: 29 MG/DL — HIGH (ref 10–20)
CALCIUM SERPL-MCNC: 8.9 MG/DL — SIGNIFICANT CHANGE UP (ref 8.5–10.1)
CALCIUM SERPL-MCNC: 9.1 MG/DL — SIGNIFICANT CHANGE UP (ref 8.5–10.1)
CHLORIDE SERPL-SCNC: 94 MMOL/L — LOW (ref 98–110)
CHLORIDE SERPL-SCNC: 94 MMOL/L — LOW (ref 98–110)
CO2 SERPL-SCNC: 23 MMOL/L — SIGNIFICANT CHANGE UP (ref 17–32)
CO2 SERPL-SCNC: 27 MMOL/L — SIGNIFICANT CHANGE UP (ref 17–32)
CREAT SERPL-MCNC: 0.9 MG/DL — SIGNIFICANT CHANGE UP (ref 0.7–1.5)
CREAT SERPL-MCNC: 1.4 MG/DL — SIGNIFICANT CHANGE UP (ref 0.7–1.5)
EGFR: 34 ML/MIN/1.73M2 — LOW
EGFR: 59 ML/MIN/1.73M2 — LOW
EOSINOPHIL # BLD AUTO: 0.09 K/UL — SIGNIFICANT CHANGE UP (ref 0–0.7)
EOSINOPHIL NFR BLD AUTO: 0.6 % — SIGNIFICANT CHANGE UP (ref 0–8)
GLUCOSE SERPL-MCNC: 113 MG/DL — HIGH (ref 70–99)
GLUCOSE SERPL-MCNC: 139 MG/DL — HIGH (ref 70–99)
HCT VFR BLD CALC: 38.1 % — SIGNIFICANT CHANGE UP (ref 37–47)
HGB BLD-MCNC: 12.4 G/DL — SIGNIFICANT CHANGE UP (ref 12–16)
IMM GRANULOCYTES NFR BLD AUTO: 0.8 % — HIGH (ref 0.1–0.3)
LYMPHOCYTES # BLD AUTO: 0.79 K/UL — LOW (ref 1.2–3.4)
LYMPHOCYTES # BLD AUTO: 5.2 % — LOW (ref 20.5–51.1)
MAGNESIUM SERPL-MCNC: 1.7 MG/DL — LOW (ref 1.8–2.4)
MCHC RBC-ENTMCNC: 31.2 PG — HIGH (ref 27–31)
MCHC RBC-ENTMCNC: 32.5 G/DL — SIGNIFICANT CHANGE UP (ref 32–37)
MCV RBC AUTO: 95.7 FL — SIGNIFICANT CHANGE UP (ref 81–99)
MONOCYTES # BLD AUTO: 1.76 K/UL — HIGH (ref 0.1–0.6)
MONOCYTES NFR BLD AUTO: 11.6 % — HIGH (ref 1.7–9.3)
NEUTROPHILS # BLD AUTO: 12.33 K/UL — HIGH (ref 1.4–6.5)
NEUTROPHILS NFR BLD AUTO: 81.4 % — HIGH (ref 42.2–75.2)
NRBC # BLD: 0 /100 WBCS — SIGNIFICANT CHANGE UP (ref 0–0)
PLATELET # BLD AUTO: 350 K/UL — SIGNIFICANT CHANGE UP (ref 130–400)
POTASSIUM SERPL-MCNC: 4.2 MMOL/L — SIGNIFICANT CHANGE UP (ref 3.5–5)
POTASSIUM SERPL-MCNC: 4.7 MMOL/L — SIGNIFICANT CHANGE UP (ref 3.5–5)
POTASSIUM SERPL-SCNC: 4.2 MMOL/L — SIGNIFICANT CHANGE UP (ref 3.5–5)
POTASSIUM SERPL-SCNC: 4.7 MMOL/L — SIGNIFICANT CHANGE UP (ref 3.5–5)
PROT SERPL-MCNC: 5.6 G/DL — LOW (ref 6–8)
RBC # BLD: 3.98 M/UL — LOW (ref 4.2–5.4)
RBC # FLD: 13.8 % — SIGNIFICANT CHANGE UP (ref 11.5–14.5)
SODIUM SERPL-SCNC: 128 MMOL/L — LOW (ref 135–146)
SODIUM SERPL-SCNC: 130 MMOL/L — LOW (ref 135–146)
WBC # BLD: 15.15 K/UL — HIGH (ref 4.8–10.8)
WBC # FLD AUTO: 15.15 K/UL — HIGH (ref 4.8–10.8)

## 2022-08-26 PROCEDURE — 99233 SBSQ HOSP IP/OBS HIGH 50: CPT

## 2022-08-26 RX ORDER — AMPICILLIN TRIHYDRATE 250 MG
2000 CAPSULE ORAL EVERY 8 HOURS
Refills: 0 | Status: DISCONTINUED | OUTPATIENT
Start: 2022-08-26 | End: 2022-08-30

## 2022-08-26 RX ORDER — MAGNESIUM SULFATE 500 MG/ML
2 VIAL (ML) INJECTION ONCE
Refills: 0 | Status: COMPLETED | OUTPATIENT
Start: 2022-08-26 | End: 2022-08-26

## 2022-08-26 RX ADMIN — ISOSORBIDE DINITRATE 10 MILLIGRAM(S): 5 TABLET ORAL at 05:50

## 2022-08-26 RX ADMIN — Medication 110 MILLIGRAM(S): at 21:48

## 2022-08-26 RX ADMIN — Medication 25 MILLIGRAM(S): at 15:55

## 2022-08-26 RX ADMIN — Medication 110 MILLIGRAM(S): at 05:29

## 2022-08-26 RX ADMIN — BUDESONIDE AND FORMOTEROL FUMARATE DIHYDRATE 2 PUFF(S): 160; 4.5 AEROSOL RESPIRATORY (INHALATION) at 21:16

## 2022-08-26 RX ADMIN — Medication 25 MILLIGRAM(S): at 05:50

## 2022-08-26 RX ADMIN — Medication 110 MILLIGRAM(S): at 15:39

## 2022-08-26 RX ADMIN — LATANOPROST 1 DROP(S): 0.05 SOLUTION/ DROPS OPHTHALMIC; TOPICAL at 21:17

## 2022-08-26 RX ADMIN — POLYETHYLENE GLYCOL 3350 17 GRAM(S): 17 POWDER, FOR SOLUTION ORAL at 11:27

## 2022-08-26 RX ADMIN — ISOSORBIDE DINITRATE 10 MILLIGRAM(S): 5 TABLET ORAL at 11:26

## 2022-08-26 RX ADMIN — CEFTRIAXONE 100 MILLIGRAM(S): 500 INJECTION, POWDER, FOR SOLUTION INTRAMUSCULAR; INTRAVENOUS at 04:16

## 2022-08-26 RX ADMIN — Medication 200 MILLIGRAM(S): at 21:16

## 2022-08-26 RX ADMIN — CLOPIDOGREL BISULFATE 75 MILLIGRAM(S): 75 TABLET, FILM COATED ORAL at 11:27

## 2022-08-26 RX ADMIN — Medication 25 GRAM(S): at 11:23

## 2022-08-26 RX ADMIN — Medication 650 MILLIGRAM(S): at 12:30

## 2022-08-26 RX ADMIN — ISOSORBIDE DINITRATE 10 MILLIGRAM(S): 5 TABLET ORAL at 15:54

## 2022-08-26 RX ADMIN — Medication 81 MILLIGRAM(S): at 11:26

## 2022-08-26 RX ADMIN — Medication 200 MILLIGRAM(S): at 11:56

## 2022-08-26 RX ADMIN — Medication 25 MICROGRAM(S): at 05:50

## 2022-08-26 RX ADMIN — Medication 650 MILLIGRAM(S): at 11:59

## 2022-08-26 RX ADMIN — PANTOPRAZOLE SODIUM 40 MILLIGRAM(S): 20 TABLET, DELAYED RELEASE ORAL at 05:51

## 2022-08-26 RX ADMIN — SENNA PLUS 2 TABLET(S): 8.6 TABLET ORAL at 21:16

## 2022-08-26 RX ADMIN — Medication 25 MILLIGRAM(S): at 21:17

## 2022-08-26 RX ADMIN — Medication 250 MILLIGRAM(S): at 05:50

## 2022-08-26 RX ADMIN — ENOXAPARIN SODIUM 50 MILLIGRAM(S): 100 INJECTION SUBCUTANEOUS at 05:51

## 2022-08-26 RX ADMIN — Medication 250 MILLIGRAM(S): at 17:24

## 2022-08-26 RX ADMIN — ENOXAPARIN SODIUM 50 MILLIGRAM(S): 100 INJECTION SUBCUTANEOUS at 17:23

## 2022-08-26 RX ADMIN — LISINOPRIL 20 MILLIGRAM(S): 2.5 TABLET ORAL at 05:53

## 2022-08-26 NOTE — DIETITIAN INITIAL EVALUATION ADULT - ORAL INTAKE PTA/DIET HISTORY
Unable to obtain, pt does not participate in nutrition interview, called pt's emergency contact with no answer.

## 2022-08-26 NOTE — PROGRESS NOTE ADULT - SUBJECTIVE AND OBJECTIVE BOX
HPI: 89F with PMH of COPD, asthma, HTN, CAD s/p PCI, hypothyroidism, here after recent admission at [facility] for cyclospora gastroenteritis and cystitis and metabolic encephalopathy, discharged to [facility] [place] and admitted here for worsening lethargy. Code stroke called on admission, with CT head negative, but CTA showing acute bilateral subsegmental PE, now on therapeutic lovenox. Patient did not pass speech and swallow, and palliative care called to help discuss nutritional GOC.    INTERVAL EVENTS:  8/25: patient less mentally alert today  8/26: patient remains with AMS, has NGT in place, appears comfortbale    ADVANCE DIRECTIVES:    DNR  MOLST  [ ]  Living Will  [ ]   DECISION MAKER(s):  [ ] Health Care Proxy(s)  [ ] Surrogate(s)  [ ] Guardian           Name(s): Phone Number(s): daughter    BASELINE (I)ADL(s) (prior to admission):  [place]: [ ]Total  [ ] Moderate [ ]Dependent  Palliative Performance Status Version 2:         %    http://npcrc.org/files/news/palliative_performance_scale_ppsv2.pdf    Allergies    No Known Allergies    Intolerances    MEDICATIONS  (STANDING):  acyclovir IVPB 500 milliGRAM(s) IV Intermittent every 8 hours  ampicillin  IVPB 2000 milliGRAM(s) IV Intermittent every 8 hours  aspirin  chewable 81 milliGRAM(s) Oral daily  budesonide  80 MICROgram(s)/formoterol 4.5 MICROgram(s) Inhaler 2 Puff(s) Inhalation two times a day  cefTRIAXone   IVPB 2000 milliGRAM(s) IV Intermittent every 24 hours  clopidogrel Tablet 75 milliGRAM(s) Oral daily  enoxaparin Injectable 50 milliGRAM(s) SubCutaneous every 12 hours  hydrALAZINE 25 milliGRAM(s) Oral three times a day  isosorbide   dinitrate Tablet (ISORDIL) 10 milliGRAM(s) Oral three times a day  latanoprost 0.005% Ophthalmic Solution 1 Drop(s) Both EYES at bedtime  levothyroxine 25 MICROGram(s) Oral daily  lisinopril 20 milliGRAM(s) Oral daily  metoprolol succinate ER 25 milliGRAM(s) Oral daily  pantoprazole    Tablet 40 milliGRAM(s) Oral before breakfast  polyethylene glycol 3350 17 Gram(s) Oral daily  saccharomyces boulardii 250 milliGRAM(s) Oral two times a day  senna 2 Tablet(s) Oral at bedtime    MEDICATIONS  (PRN):  acetaminophen     Tablet .. 650 milliGRAM(s) Oral every 6 hours PRN Temp greater or equal to 38C (100.4F), Mild Pain (1 - 3)  ALBUTerol    90 MICROgram(s) HFA Inhaler 2 Puff(s) Inhalation every 6 hours PRN Shortness of Breath and/or Wheezing  aluminum hydroxide/magnesium hydroxide/simethicone Suspension 30 milliLiter(s) Oral every 4 hours PRN Dyspepsia  melatonin 3 milliGRAM(s) Oral at bedtime PRN Insomnia  ondansetron Injectable 4 milliGRAM(s) IV Push every 8 hours PRN Nausea and/or Vomiting      PRESENT SYMPTOMS: [ X]Unable to obtain due to poor mentation   Source if other than patient:  [ ]Family   [ ]Team     Pain: [ ]yes [ ]no  QOL impact -   Location -                    Aggravating factors -  Quality -  Radiation -  Timing-  Severity (0-10 scale):  Minimal acceptable level (0-10 scale):     CPOT:    https://www.sccm.org/getattachment/fbc87y63-2k3y-[email or web address]/Critical-Care-Pain-Observation-Tool-(CPOT)      PAIN AD Score: 0    http://geriatrictoolkit.[place].[email or web address].pdf (press ctrl +  left click to view)    Dyspnea:                           [ ]Mild [ ]Moderate [ ]Severe  Anxiety:                             [ ]Mild [ ]Moderate [ ]Severe  Fatigue:                             [ ]Mild [ ]Moderate [ ]Severe  Nausea:                             [ ]Mild [ ]Moderate [ ]Severe  Loss of appetite:              [ ]Mild [ ]Moderate [ ]Severe  Constipation:                    [ ]Mild [ ]Moderate [ ]Severe    Other Symptoms:  [ ]All other review of systems negative     Palliative Performance Status Version 2:         %    http://npcrc.org/files/news/palliative_performance_scale_ppsv2.pdf  PHYSICAL EXAM:  Vital Signs Last 24 Hrs  T(C): 36.6 (26 Aug 2022 14:22), Max: 36.7 (25 Aug 2022 20:08)  T(F): 97.8 (26 Aug 2022 14:22), Max: 98.1 (25 Aug 2022 20:08)  HR: 83 (26 Aug 2022 14:22) (81 - 85)  BP: 91/53 (26 Aug 2022 14:22) (91/53 - 122/59)  BP(mean): --  RR: 18 (26 Aug 2022 14:22) (18 - 18)  SpO2: 96% (25 Aug 2022 19:02) (96% - 96%)    Parameters below as of 25 Aug 2022 19:02  Patient On (Oxygen Delivery Method): room air    GENERAL:  [ ]Alert  [ ]Oriented x   [ ]Lethargic  [ ]Cachexia  [ ]Unarousable  [X ]Verbal  [ ]Non-Verbal  Behavioral:   [ ] Anxiety  [ ] Delirium [ ] Agitation [X ] Other calm  HEENT:  [ ]Normal   [X ]Dry mouth   [ ]ET Tube/Trach  [ ]Oral lesions  PULMONARY:   [ ]Clear [ ]Tachypnea  [ ]Audible excessive secretions [X] No labored breathing  [ ]Rhonchi        [ ]Right [ ]Left [ ]Bilateral  [ ]Crackles        [ ]Right [ ]Left [ ]Bilateral  [ ]Wheezing     [ ]Right [ ]Left [ ]Bilateral  [ ]Diminished breath sounds [ ]right [ ]left [ ]bilateral  CARDIOVASCULAR:    [ ]Regular [ ]Irregular [ ]Tachy  [ ]Brent [ ]Murmur [ ]Other  GASTROINTESTINAL:  [ ]Soft  [ X]Nondistended   [ ]+BS  [ ]Non tender [ ]Tender  [ ]PEG [ ]OGT/ NGT  Last BM:   GENITOURINARY:  [ X]Normal [ ] Incontinent   [ ]Oliguria/Anuria   [ ][place]ines  MUSCULOSKELETAL:   [ ]Normal   [X ]Weakness  [ ]Bed/Wheelchair bound [ ]Edema  NEUROLOGIC:   [ ]No focal deficits  [ ]Cognitive impairment  [X ]Dysphagia [ ]Dysarthria [ ]Paresis [ ]Other   SKIN:   [ ]Normal   [X] Nonjaundiced [ ]Rash  [ ]Pressure ulcer(s)       Present on admission [ ]y [ ]n    CRITICAL CARE:  [ ] Shock Present  [ ]Septic [ ]Cardiogenic [ ]Neurologic [ ]Hypovolemic  [ ]  Vasopressors [ ]  Inotropes   [ ]Respiratory failure present [ ]Mechanical ventilation [ ]Non-invasive ventilatory support [ ]High flow  [ ]Acute  [ ]Chronic [ ]Hypoxic  [ ]Hypercarbic [ ]Other  [ ]Other organ failure     LABS: reviewed                                   12.4   15.15 )-----------( 350      ( 26 Aug 2022 07:19 )             38.1     08-26    130<L>  |  94<L>  |  21<H>  ----------------------------<  139<H>  4.2   |  27  |  0.9    Ca    8.9      26 Aug 2022 07:19  Mg     1.7     08-26    RADIOLOGY & ADDITIONAL STUDIES:    MR head 8/23/22    No acute infarct, intracranial hemorrhage, or midline shift.    Moderate-severe chronic microvascular ischemic changes, significantly   progressed since 1/9/2017.    Moderate right/mild left mastoid effusion, nonspecific.    PROTEIN CALORIE MALNUTRITION PRESENT: [ ]mild [ ]moderate [ ]severe [ ]underweight [ ]morbid obesity  https://www.andeal.org/vault/2440/web/files/ONC/Table_Clinical%20Characteristics%20to%20Document%20Malnutrition-White%20JV%20et%20al%202012.pdf    Height (cm): 172.7 (08-22-22 @ 20:22), 172.7 (08-05-22 @ 03:26)  Weight (kg): 53.5 (08-23-22 @ 07:39), 72.3 (08-05-22 @ 03:26)  BMI (kg/m2): 17.9 (08-23-22 @ 07:39), 24.2 (08-22-22 @ 20:22), 24.2 (08-05-22 @ 03:26)    [ ]PPSV2 < or = to 30% [ ]significant weight loss  [ ]poor nutritional intake  [ ]anasarca      [ ]Artificial Nutrition      REFERRALS:   [ ]Chaplaincy  [ ]Hospice  [ ]Child Life  [ ]Social Work  [ ]Case management [ ]Holistic Therapy     Goals of Care Document:     ______________  Alex Carbone MD  Palliative Medicine  [facility]   of Geriatric and Palliative Medicine  [phone]

## 2022-08-26 NOTE — DIETITIAN INITIAL EVALUATION ADULT - PERTINENT LABORATORY DATA
08-26    130<L>  |  94<L>  |  21<H>  ----------------------------<  139<H>  4.2   |  27  |  0.9    Ca    8.9      26 Aug 2022 07:19  Mg     1.7     08-26    TPro  5.6<L>  /  Alb  3.0<L>  /  TBili  0.4  /  DBili  x   /  AST  35  /  ALT  24  /  AlkPhos  77  08-26  A1C with Estimated Average Glucose Result: 4.8 % (08-24-22 @ 07:07)

## 2022-08-26 NOTE — PROGRESS NOTE ADULT - SUBJECTIVE AND OBJECTIVE BOX
CHIEF COMPLAINT:    Patient is a 89y old  Female who presents with a chief complaint of Lethargy     INTERVAL HPI/OVERNIGHT EVENTS:    Patient seen and examined at bedside. No acute overnight events occurred.    ROS: Unable to obtain.    Medications:  Standing  acyclovir IVPB 500 milliGRAM(s) IV Intermittent every 8 hours  ampicillin  IVPB 2000 milliGRAM(s) IV Intermittent every 8 hours  aspirin  chewable 81 milliGRAM(s) Oral daily  budesonide  80 MICROgram(s)/formoterol 4.5 MICROgram(s) Inhaler 2 Puff(s) Inhalation two times a day  cefTRIAXone   IVPB 2000 milliGRAM(s) IV Intermittent every 24 hours  clopidogrel Tablet 75 milliGRAM(s) Oral daily  enoxaparin Injectable 50 milliGRAM(s) SubCutaneous every 12 hours  hydrALAZINE 25 milliGRAM(s) Oral three times a day  isosorbide   dinitrate Tablet (ISORDIL) 10 milliGRAM(s) Oral three times a day  latanoprost 0.005% Ophthalmic Solution 1 Drop(s) Both EYES at bedtime  levothyroxine 25 MICROGram(s) Oral daily  lisinopril 20 milliGRAM(s) Oral daily  metoprolol succinate ER 25 milliGRAM(s) Oral daily  pantoprazole    Tablet 40 milliGRAM(s) Oral before breakfast  polyethylene glycol 3350 17 Gram(s) Oral daily  saccharomyces boulardii 250 milliGRAM(s) Oral two times a day  senna 2 Tablet(s) Oral at bedtime    PRN Meds  acetaminophen     Tablet .. 650 milliGRAM(s) Oral every 6 hours PRN  ALBUTerol    90 MICROgram(s) HFA Inhaler 2 Puff(s) Inhalation every 6 hours PRN  aluminum hydroxide/magnesium hydroxide/simethicone Suspension 30 milliLiter(s) Oral every 4 hours PRN  melatonin 3 milliGRAM(s) Oral at bedtime PRN  ondansetron Injectable 4 milliGRAM(s) IV Push every 8 hours PRN        Vital Signs:    T(F): 97.6 (22 @ 05:10), Max: 98.1 (22 @ 20:08)  HR: 85 (22 @ 05:10) (81 - 85)  BP: 121/55 (22 @ 05:10) (121/55 - 122/59)  RR: 18 (22 @ 05:10) (18 - 18)  SpO2: 96% (22 @ 19:02) (96% - 96%)  I&O's Summary    25 Aug 2022 07:01  -  26 Aug 2022 07:00  --------------------------------------------------------  IN: 325 mL / OUT: 900 mL / NET: -575 mL      Daily     Daily Weight in k (26 Aug 2022 05:10)  CAPILLARY BLOOD GLUCOSE          PHYSICAL EXAM:  GENERAL:  NAD  SKIN: No rashes or lesions  HEENT: Atraumatic. Normocephalic. Anicteric  NECK:  No JVD.   PULMONARY: Clear to ausculation bilaterally. No wheezing. No rales  CVS: Normal S1, S2. Regular rate and rhythm. No murmurs.  ABDOMEN/GI: Soft, Nontender, Nondistended; Bowel sounds are present  EXTREMITIES:  No edema B/L LE.  NEUROLOGIC:  No motor deficit.  PSYCH: Able to say name and shes in hospital. Doesn't answer any other questions      LABS:                        12.4   15.15 )-----------( 350      ( 26 Aug 2022 07:19 )             38.1         130<L>  |  94<L>  |  21<H>  ----------------------------<  139<H>  4.2   |  27  |  0.9    Ca    8.9      26 Aug 2022 07:19  Mg     1.7         TPro  5.6<L>  /  Alb  3.0<L>  /  TBili  0.4  /  DBili  x   /  AST  35  /  ALT  24  /  AlkPhos  77                RADIOLOGY & ADDITIONAL TESTS:  Imaging or report Personally Reviewed:  [ ] YES  [ ] NO -->no new images    Telemetry reviewed independently - NSR, no acute events  EKG reviewed independently -->no new EKGs    Consultant(s) Notes Reviewed:  [ ] YES  [ ] NO  Care Discussed with Consultants/Other Providers [ ] YES  [ ] NO    Case discussed with resident  Care discussed with pt

## 2022-08-26 NOTE — DIETITIAN INITIAL EVALUATION ADULT - NSFNSGIIOFT_GEN_A_CORE
Aide
  08-25-22 @ 07:01  -  08-26-22 @ 07:00  --------------------------------------------------------  OUT:  Total OUT: 0 mL    Total NET: 225 mL

## 2022-08-26 NOTE — PROGRESS NOTE ADULT - ASSESSMENT
89 yo F PMHx asthma, COPD, hyperactive airway, b/l chronic bronchiectasis, DLD, HTN, CAD s/p PCI, hypothyroidism presented for evaluation of worsening metabolic encephalopathy. Patient was recently admitted w/ cyclospora gastroenteritis & cystitis. Course was complicated by metabolic encephalopathy. Upon ER arrival pt found to have subsegmental PE and positive urinalysis.     Suspected Metabolic encephalopathy from suspected UTI   History of recent colitis as well as cyclospora PCR positive treated with bactrim & Flagyl until 8/20/22  - c/w Rocephin  - hold off LP as pt will not be able to tolerate it (cannot lie flat and is aspiration risk)  - trial acyclovir and ampicillin for empiric coverage as MRI, EEG negative, UCx negative  - c/w saccharomyces boulardii    Subsegmental PE  - duplex shows peroneal thrombus  - c/w Lovenox, will switch to oral eliquis once pt able to take po consistently  - c/w telemetry for now while awaiting Echo  - c/w oxygen supplementation  - Echo pending    Dysphagia  - currently NPO  - family opting against PEG but do want a trial of NGT feeds, NGT in place  - speech and swallow daily assessment  - palliative following       Nodular Thyroid  Hypothyroidism  - TSH mildly elevated but will hold off med for now    Lactic acidosis, resolving  - IV maintenance D5 1/2 NS 70 ml/hr    Metabolic alkalosis  - unclear source (not on diuresis or bicarb, not vomiting)  - resolving    Asthma  COPD  b/l chronic bronchiectasis  - c/w albuterol & Symbicort    DLD  HTN  CAD s/p PCI  - c/w isosorbide dinitrate, aspirin, Plavix, metoprolol  - c/w lisinopril    Urinary Retention?  - acevedo remains in place  - had bladder distension on CT     Diverticulosis  - avoid constipation    Weight loss  Hx abdominal pain  - patient refused outpatient colonoscopy & EGD    Trace right pleural effusion  Atelectasis  - incentive spirometry    Right hilar 9 mm LN (decreased in size)  - outpatient f/u    Constipation  - c/w Senna & Miralax    Glaucoma  - c/w latanoprost    # DVT PPx  - Lovenox    # GI PPx  - c/w Protonix    # Diet  - DNR, DNI    #Progress Note Handoff:  Pending (specify):  Improvement in mental status  Family discussion: I attempted to call daughter but phone number appears to be wrong, Will see if daughter at bedside later  Disposition: Home___/SNF_x__/Other________/Unknown at this time________

## 2022-08-26 NOTE — PROGRESS NOTE ADULT - ASSESSMENT
89 yo F PMHx asthma, COPD, hyperactive airway, b/l chronic bronchiectasis, DLD, HTN, CAD s/p PCI, hypothyroidism presented for evaluation of worsening metabolic encephalopathy. Patient was recently admitted w/ cyclospora gastroenteritis & cystitis. Course was complicated by metabolic encephalopathy. Upon ER arrival pt found to have subsegmental PE and positive urinalysis.     #Encephalopathy - metabolic v infective   - Patient had a sudden drop in sensorium in the hospital stay at [facility].  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 Urinary Retention  - acevedo remains in place  - had bladder distension on CT   - History of recent colitis as well as cyclospora PCR positive treated with bactrim & Flagyl until 8/20/22  - c/w Rocephin  - c/w saccharomyces boulardii    #Subsegmental PE  - duplex shows peroneal thrombus  - c/w Lovenox, will switch to oral eliquis once pt able to take po consistently  - c/w oxygen supplementation  - Echo pending    #Nodular Thyroid  - Hypothyroidism  - TSH mildly elevated but will hold off med for now    #Lactic acidosis, resolved    #Metabolic alkalosis - resolved     #Asthma  #COPD  b/l chronic bronchiectasis  - c/w albuterol & Symbicort    #DLD  #HTN  #CAD s/p PCI  - c/w isosorbide dinitrate, aspirin, Plavix, metoprolol  - c/w lisinopril    #Diverticulosis  - avoid constipation    #Weight loss  - Hx abdominal pain  - patient refused outpatient colonoscopy & EGD      #Constipation  - c/w Senna & Miralax    #Glaucoma  - c/w latanoprost    # DVT PPx  - Lovenox    # GI PPx  - c/w Protonix    # Diet  - DNR, DNI    #Progress Note Handoff:  Pending (specify):  Improvement in mental status

## 2022-08-26 NOTE — DIETITIAN INITIAL EVALUATION ADULT - ENTERAL
Bolus Jevity 1.2, 270 mL every 6 hours (1080 mL/24 hrs), provides 1296 kcal, 60 g pro, 872 mL free fluid. Provide additional 50 mL FWF before and after each bolus or per MD.

## 2022-08-26 NOTE — DIETITIAN INITIAL EVALUATION ADULT - COLLABORATION WITH OTHER PROVIDERS
Interventions: EN, coordination of care. Monitoring/evaluation: diet order, EN, GOC, weight, GI, NFPF.

## 2022-08-26 NOTE — PROGRESS NOTE ADULT - ASSESSMENT
89F with PMH of COPD, asthma, HTN, CAD s/p PCI, hypothyroidism, here after recent admission at [facility] for cyclospora gastroenteritis and cystitis and metabolic encephalopathy, discharged to [facility] and admitted here for worsening lethargy. Code stroke called on admission, with CT head negative, but CTA showing acute bilateral subsegmental PE, now on therapeutic lovenox. Patient did not pass speech and swallow, and palliative care called to help discuss nutritional GOC.

## 2022-08-26 NOTE — DIETITIAN INITIAL EVALUATION ADULT - NS FNS DIET ORDER
Diet, NPO with Tube Feed:   Tube Feeding Modality: Nasogastric  Jevity 1.2 Augustus  Total Volume for 24 Hours (mL): 900  Bolus  Total Volume of Bolus (mL):  225  Total # of Feeds: 4  Tube Feed Frequency: Every 6 hours   Tube Feed Start Time: 18:00  Bolus Feed Rate (mL per Hour): 100   Bolus Feed Duration (in Hours): 2.25  Free Water Flush  Bolus   Total Volume per Flush (mL): 100   Frequency: Every 4 Hours   Total Daily Volume of Flush (mL): 600  Free Water Flush Instructions:  50 before and after each feed (08-25-22 @ 16:26)

## 2022-08-26 NOTE — DIETITIAN INITIAL EVALUATION ADULT - PERTINENT MEDS FT
MEDICATIONS  (STANDING):  acyclovir IVPB 500 milliGRAM(s) IV Intermittent every 8 hours  ampicillin  IVPB 2000 milliGRAM(s) IV Intermittent every 8 hours  budesonide  80 MICROgram(s)/formoterol 4.5 MICROgram(s) Inhaler 2 Puff(s) Inhalation two times a day  cefTRIAXone   IVPB 2000 milliGRAM(s) IV Intermittent every 24 hours  clopidogrel Tablet 75 milliGRAM(s) Oral daily  enoxaparin Injectable 50 milliGRAM(s) SubCutaneous every 12 hours  hydrALAZINE 25 milliGRAM(s) Oral three times a day  isosorbide   dinitrate Tablet (ISORDIL) 10 milliGRAM(s) Oral three times a day  latanoprost 0.005% Ophthalmic Solution 1 Drop(s) Both EYES at bedtime  levothyroxine 25 MICROGram(s) Oral daily  lisinopril 20 milliGRAM(s) Oral daily  metoprolol succinate ER 25 milliGRAM(s) Oral daily  pantoprazole    Tablet 40 milliGRAM(s) Oral before breakfast  polyethylene glycol 3350 17 Gram(s) Oral daily  saccharomyces boulardii 250 milliGRAM(s) Oral two times a day  senna 2 Tablet(s) Oral at bedtime    MEDICATIONS  (PRN):  acetaminophen     Tablet .. 650 milliGRAM(s) Oral every 6 hours PRN Temp greater or equal to 38C (100.4F), Mild Pain (1 - 3)  ALBUTerol    90 MICROgram(s) HFA Inhaler 2 Puff(s) Inhalation every 6 hours PRN Shortness of Breath and/or Wheezing  aluminum hydroxide/magnesium hydroxide/simethicone Suspension 30 milliLiter(s) Oral every 4 hours PRN Dyspepsia  melatonin 3 milliGRAM(s) Oral at bedtime PRN Insomnia  ondansetron Injectable 4 milliGRAM(s) IV Push every 8 hours PRN Nausea and/or Vomiting

## 2022-08-26 NOTE — PROGRESS NOTE ADULT - SUBJECTIVE AND OBJECTIVE BOX
CHIRAG HERNÁNDEZ 89y Female  MRN#: [id number]   Hospital Day: 4d    HPI:  89-year-old female w/ hx of Asthma, COPD, hyperactive airway, b/l chronic bronchiectasis, DL, HTN, CAD s/p PCI, hypothyroidism. Patient was recently admitted w/ cyclospora gastroenteritis & cystitis; stay was complicated by metabolic encephalopathy. Patient is presenting for worsening lethargy and decreased responsiveness. Patient unable to participate in history. Called daughter, did not .    In the ED, left sided droop was noted, code stroke was called. CT head, CTA, & CTP were done -> -ve for acute events    CT showed acute b/l subsegmental PE w/o right heart strain -> s/p Lovenox 60    UA +ve  WBC 16, PMN 73.5%  lactate 2.2  s/p vanco 1.5 g & cefepime 2 g    PMHx: diverticulosis, scoliosis, umbilical hernia, chronic diarrhea (unclear if persistent) (hx of cyclospora treated w/ bactrim & Flagyl until 8/20/22)  PSHx: s/p hysterectomy, s/p cholecystectomy    s/p Pfizer covid vaccine x 2 on 3/6/22 & 3/27/21    In the ED, VS: T 37.6 (rectal), HR 80, /84, RR 17, SpO2 96% on 3 L NC (was on 2 L at NH) (23 Aug 2022 03:53)    SUBJECTIVE  Patient is a 89y old Female who presents with a chief complaint of Lethargy (25 Aug 2022 15:40)  Currently admitted to medicine with the primary diagnosis of Metabolic encephalopathy    INTERVAL HPI AND OVERNIGHT EVENTS:  Patient was examined and seen at bedside.     OBJECTIVE  PAST MEDICAL & SURGICAL HISTORY  High blood cholesterol    Hypothyroid    Hypertension    COPD (chronic obstructive pulmonary disease)    CAD (coronary artery disease)    History of coronary artery stent placement    History of cholecystectomy    H/O abdominal hysterectomy    Cataract    ALLERGIES:  No Known Allergies    MEDICATIONS:  STANDING MEDICATIONS  acyclovir IVPB 500 milliGRAM(s) IV Intermittent every 8 hours  ampicillin  IVPB 2000 milliGRAM(s) IV Intermittent every 8 hours  aspirin  chewable 81 milliGRAM(s) Oral daily  budesonide  80 MICROgram(s)/formoterol 4.5 MICROgram(s) Inhaler 2 Puff(s) Inhalation two times a day  cefTRIAXone   IVPB 2000 milliGRAM(s) IV Intermittent every 24 hours  clopidogrel Tablet 75 milliGRAM(s) Oral daily  enoxaparin Injectable 50 milliGRAM(s) SubCutaneous every 12 hours  hydrALAZINE 25 milliGRAM(s) Oral three times a day  isosorbide   dinitrate Tablet (ISORDIL) 10 milliGRAM(s) Oral three times a day  latanoprost 0.005% Ophthalmic Solution 1 Drop(s) Both EYES at bedtime  levothyroxine 25 MICROGram(s) Oral daily  lisinopril 20 milliGRAM(s) Oral daily  magnesium sulfate  IVPB 2 Gram(s) IV Intermittent once  metoprolol succinate ER 25 milliGRAM(s) Oral daily  pantoprazole    Tablet 40 milliGRAM(s) Oral before breakfast  polyethylene glycol 3350 17 Gram(s) Oral daily  saccharomyces boulardii 250 milliGRAM(s) Oral two times a day  senna 2 Tablet(s) Oral at bedtime    PRN MEDICATIONS  acetaminophen     Tablet .. 650 milliGRAM(s) Oral every 6 hours PRN  ALBUTerol    90 MICROgram(s) HFA Inhaler 2 Puff(s) Inhalation every 6 hours PRN  aluminum hydroxide/magnesium hydroxide/simethicone Suspension 30 milliLiter(s) Oral every 4 hours PRN  melatonin 3 milliGRAM(s) Oral at bedtime PRN  ondansetron Injectable 4 milliGRAM(s) IV Push every 8 hours PRN    VITAL SIGNS: Last 24 Hours  T(C): 36.4 (26 Aug 2022 05:10), Max: 36.7 (25 Aug 2022 20:08)  T(F): 97.6 (26 Aug 2022 05:10), Max: 98.1 (25 Aug 2022 20:08)  HR: 85 (26 Aug 2022 05:10) (81 - 85)  BP: 121/55 (26 Aug 2022 05:10) (121/55 - 122/59)  BP(mean): --  RR: 18 (26 Aug 2022 05:10) (18 - 18)  SpO2: 96% (25 Aug 2022 19:02) (96% - 96%)    LABS:                        12.4   15.15 )-----------( 350      ( 26 Aug 2022 07:19 )             38.1     08-26    130<L>  |  94<L>  |  21<H>  ----------------------------<  139<H>  4.2   |  27  |  0.9    Ca    8.9      26 Aug 2022 07:19  Mg     1.7     08-26    TPro  5.6<L>  /  Alb  3.0<L>  /  TBili  0.4  /  DBili  x   /  AST  35  /  ALT  24  /  AlkPhos  77  08-26    RADIOLOGY:    PHYSICAL EXAM:    GENERAL: NAD, well-groomed, well-developed  HEAD:  Atraumatic, Normocephalic  NECK: Supple, No JVD  NERVOUS SYSTEM: Somnolent  CHEST/LUNG: Clear to percussion bilaterally; No rales, rhonchi, wheezing, or rubs  HEART: Regular rate and rhythm; No murmurs, rubs, or gallops  ABDOMEN: Soft, Nondistended; Bowel sounds present  EXTREMITIES:  2+ Peripheral Pulses, No clubbing, cyanosis, or edema

## 2022-08-26 NOTE — DIETITIAN NUTRITION RISK NOTIFICATION - TREATMENT: THE FOLLOWING DIET HAS BEEN RECOMMENDED
Diet, NPO with Tube Feed:   Tube Feeding Modality: Nasogastric  Jevity 1.2 Augustus  Total Volume for 24 Hours (mL): 900  Bolus  Total Volume of Bolus (mL):  225  Total # of Feeds: 4  Tube Feed Frequency: Every 6 hours   Tube Feed Start Time: 18:00  Bolus Feed Rate (mL per Hour): 100   Bolus Feed Duration (in Hours): 2.25  Free Water Flush  Bolus   Total Volume per Flush (mL): 100   Frequency: Every 4 Hours   Total Daily Volume of Flush (mL): 600  Free Water Flush Instructions:  50 before and after each feed (08-25-22 @ 16:26) [Active]

## 2022-08-26 NOTE — DIETITIAN INITIAL EVALUATION ADULT - OTHER CALCULATIONS
x SF 1.2-1.5 (PCM, underweight, advanced age considered)= 6328-9846 kcal/day. Pro needs based on current body weight, 59-74 g/day (1.2-1.5 g/kg). Fluid needs, noted Na low, fluid per LIP.     Current TF order meets 77-96% kcal needs, 68-85% pro needs.

## 2022-08-27 LAB
ALBUMIN SERPL ELPH-MCNC: 2.9 G/DL — LOW (ref 3.5–5.2)
ALP SERPL-CCNC: 76 U/L — SIGNIFICANT CHANGE UP (ref 30–115)
ALT FLD-CCNC: 20 U/L — SIGNIFICANT CHANGE UP (ref 0–41)
ANION GAP SERPL CALC-SCNC: 12 MMOL/L — SIGNIFICANT CHANGE UP (ref 7–14)
AST SERPL-CCNC: 33 U/L — SIGNIFICANT CHANGE UP (ref 0–41)
BASOPHILS # BLD AUTO: 0.08 K/UL — SIGNIFICANT CHANGE UP (ref 0–0.2)
BASOPHILS NFR BLD AUTO: 0.8 % — SIGNIFICANT CHANGE UP (ref 0–1)
BILIRUB SERPL-MCNC: 0.3 MG/DL — SIGNIFICANT CHANGE UP (ref 0.2–1.2)
BUN SERPL-MCNC: 29 MG/DL — HIGH (ref 10–20)
CALCIUM SERPL-MCNC: 8.8 MG/DL — SIGNIFICANT CHANGE UP (ref 8.5–10.1)
CHLORIDE SERPL-SCNC: 97 MMOL/L — LOW (ref 98–110)
CO2 SERPL-SCNC: 25 MMOL/L — SIGNIFICANT CHANGE UP (ref 17–32)
CREAT ?TM UR-MCNC: 34 MG/DL — SIGNIFICANT CHANGE UP
CREAT SERPL-MCNC: 1.3 MG/DL — SIGNIFICANT CHANGE UP (ref 0.7–1.5)
EGFR: 38 ML/MIN/1.73M2 — LOW
EOSINOPHIL # BLD AUTO: 0.28 K/UL — SIGNIFICANT CHANGE UP (ref 0–0.7)
EOSINOPHIL NFR BLD AUTO: 2.8 % — SIGNIFICANT CHANGE UP (ref 0–8)
GLUCOSE BLDC GLUCOMTR-MCNC: 100 MG/DL — HIGH (ref 70–99)
GLUCOSE BLDC GLUCOMTR-MCNC: 85 MG/DL — SIGNIFICANT CHANGE UP (ref 70–99)
GLUCOSE BLDC GLUCOMTR-MCNC: 94 MG/DL — SIGNIFICANT CHANGE UP (ref 70–99)
GLUCOSE BLDC GLUCOMTR-MCNC: 99 MG/DL — SIGNIFICANT CHANGE UP (ref 70–99)
GLUCOSE SERPL-MCNC: 94 MG/DL — SIGNIFICANT CHANGE UP (ref 70–99)
HCT VFR BLD CALC: 35.2 % — LOW (ref 37–47)
HGB BLD-MCNC: 11.7 G/DL — LOW (ref 12–16)
IMM GRANULOCYTES NFR BLD AUTO: 0.8 % — HIGH (ref 0.1–0.3)
LYMPHOCYTES # BLD AUTO: 0.9 K/UL — LOW (ref 1.2–3.4)
LYMPHOCYTES # BLD AUTO: 8.9 % — LOW (ref 20.5–51.1)
MAGNESIUM SERPL-MCNC: 2.2 MG/DL — SIGNIFICANT CHANGE UP (ref 1.8–2.4)
MCHC RBC-ENTMCNC: 31.4 PG — HIGH (ref 27–31)
MCHC RBC-ENTMCNC: 33.2 G/DL — SIGNIFICANT CHANGE UP (ref 32–37)
MCV RBC AUTO: 94.4 FL — SIGNIFICANT CHANGE UP (ref 81–99)
MONOCYTES # BLD AUTO: 1.64 K/UL — HIGH (ref 0.1–0.6)
MONOCYTES NFR BLD AUTO: 16.3 % — HIGH (ref 1.7–9.3)
NEUTROPHILS # BLD AUTO: 7.09 K/UL — HIGH (ref 1.4–6.5)
NEUTROPHILS NFR BLD AUTO: 70.4 % — SIGNIFICANT CHANGE UP (ref 42.2–75.2)
NRBC # BLD: 0 /100 WBCS — SIGNIFICANT CHANGE UP (ref 0–0)
PLATELET # BLD AUTO: 268 K/UL — SIGNIFICANT CHANGE UP (ref 130–400)
POTASSIUM SERPL-MCNC: 5.4 MMOL/L — HIGH (ref 3.5–5)
POTASSIUM SERPL-SCNC: 5.4 MMOL/L — HIGH (ref 3.5–5)
PROT SERPL-MCNC: 5.3 G/DL — LOW (ref 6–8)
RBC # BLD: 3.73 M/UL — LOW (ref 4.2–5.4)
RBC # FLD: 14.1 % — SIGNIFICANT CHANGE UP (ref 11.5–14.5)
SODIUM SERPL-SCNC: 134 MMOL/L — LOW (ref 135–146)
SODIUM UR-SCNC: 36 MMOL/L — SIGNIFICANT CHANGE UP
WBC # BLD: 10.07 K/UL — SIGNIFICANT CHANGE UP (ref 4.8–10.8)
WBC # FLD AUTO: 10.07 K/UL — SIGNIFICANT CHANGE UP (ref 4.8–10.8)

## 2022-08-27 PROCEDURE — 99233 SBSQ HOSP IP/OBS HIGH 50: CPT

## 2022-08-27 RX ORDER — METOPROLOL TARTRATE 50 MG
25 TABLET ORAL DAILY
Refills: 0 | Status: DISCONTINUED | OUTPATIENT
Start: 2022-08-27 | End: 2022-08-29

## 2022-08-27 RX ORDER — CLOPIDOGREL BISULFATE 75 MG/1
75 TABLET, FILM COATED ORAL DAILY
Refills: 0 | Status: DISCONTINUED | OUTPATIENT
Start: 2022-08-27 | End: 2022-09-04

## 2022-08-27 RX ORDER — ASPIRIN/CALCIUM CARB/MAGNESIUM 324 MG
81 TABLET ORAL DAILY
Refills: 0 | Status: DISCONTINUED | OUTPATIENT
Start: 2022-08-27 | End: 2022-09-04

## 2022-08-27 RX ORDER — VANCOMYCIN HCL 1 G
750 VIAL (EA) INTRAVENOUS EVERY 24 HOURS
Refills: 0 | Status: DISCONTINUED | OUTPATIENT
Start: 2022-08-27 | End: 2022-08-30

## 2022-08-27 RX ORDER — METOPROLOL TARTRATE 50 MG
25 TABLET ORAL DAILY
Refills: 0 | Status: DISCONTINUED | OUTPATIENT
Start: 2022-08-27 | End: 2022-08-27

## 2022-08-27 RX ORDER — LISINOPRIL 2.5 MG/1
20 TABLET ORAL DAILY
Refills: 0 | Status: DISCONTINUED | OUTPATIENT
Start: 2022-08-27 | End: 2022-08-27

## 2022-08-27 RX ORDER — ISOSORBIDE DINITRATE 5 MG/1
10 TABLET ORAL THREE TIMES A DAY
Refills: 0 | Status: DISCONTINUED | OUTPATIENT
Start: 2022-08-27 | End: 2022-09-04

## 2022-08-27 RX ORDER — HYDRALAZINE HCL 50 MG
25 TABLET ORAL THREE TIMES A DAY
Refills: 0 | Status: DISCONTINUED | OUTPATIENT
Start: 2022-08-27 | End: 2022-09-06

## 2022-08-27 RX ORDER — VANCOMYCIN HCL 1 G
750 VIAL (EA) INTRAVENOUS EVERY 12 HOURS
Refills: 0 | Status: DISCONTINUED | OUTPATIENT
Start: 2022-08-27 | End: 2022-08-27

## 2022-08-27 RX ORDER — HYDRALAZINE HCL 50 MG
25 TABLET ORAL THREE TIMES A DAY
Refills: 0 | Status: DISCONTINUED | OUTPATIENT
Start: 2022-08-27 | End: 2022-08-27

## 2022-08-27 RX ORDER — SODIUM CHLORIDE 9 MG/ML
1000 INJECTION, SOLUTION INTRAVENOUS
Refills: 0 | Status: DISCONTINUED | OUTPATIENT
Start: 2022-08-27 | End: 2022-08-28

## 2022-08-27 RX ADMIN — ISOSORBIDE DINITRATE 10 MILLIGRAM(S): 5 TABLET ORAL at 15:15

## 2022-08-27 RX ADMIN — Medication 81 MILLIGRAM(S): at 15:13

## 2022-08-27 RX ADMIN — Medication 25 MILLIGRAM(S): at 15:13

## 2022-08-27 RX ADMIN — SENNA PLUS 2 TABLET(S): 8.6 TABLET ORAL at 21:09

## 2022-08-27 RX ADMIN — CLOPIDOGREL BISULFATE 75 MILLIGRAM(S): 75 TABLET, FILM COATED ORAL at 15:15

## 2022-08-27 RX ADMIN — Medication 25 MILLIGRAM(S): at 05:11

## 2022-08-27 RX ADMIN — ENOXAPARIN SODIUM 50 MILLIGRAM(S): 100 INJECTION SUBCUTANEOUS at 05:11

## 2022-08-27 RX ADMIN — Medication 250 MILLIGRAM(S): at 18:53

## 2022-08-27 RX ADMIN — Medication 250 MILLIGRAM(S): at 05:11

## 2022-08-27 RX ADMIN — LISINOPRIL 20 MILLIGRAM(S): 2.5 TABLET ORAL at 05:11

## 2022-08-27 RX ADMIN — Medication 200 MILLIGRAM(S): at 15:13

## 2022-08-27 RX ADMIN — SODIUM CHLORIDE 60 MILLILITER(S): 9 INJECTION, SOLUTION INTRAVENOUS at 12:32

## 2022-08-27 RX ADMIN — Medication 25 MICROGRAM(S): at 05:11

## 2022-08-27 RX ADMIN — POLYETHYLENE GLYCOL 3350 17 GRAM(S): 17 POWDER, FOR SOLUTION ORAL at 15:14

## 2022-08-27 RX ADMIN — LATANOPROST 1 DROP(S): 0.05 SOLUTION/ DROPS OPHTHALMIC; TOPICAL at 23:01

## 2022-08-27 RX ADMIN — Medication 200 MILLIGRAM(S): at 21:09

## 2022-08-27 RX ADMIN — ISOSORBIDE DINITRATE 10 MILLIGRAM(S): 5 TABLET ORAL at 05:11

## 2022-08-27 RX ADMIN — CEFTRIAXONE 100 MILLIGRAM(S): 500 INJECTION, POWDER, FOR SOLUTION INTRAMUSCULAR; INTRAVENOUS at 05:10

## 2022-08-27 RX ADMIN — Medication 110 MILLIGRAM(S): at 05:10

## 2022-08-27 RX ADMIN — Medication 200 MILLIGRAM(S): at 05:10

## 2022-08-27 RX ADMIN — Medication 25 MILLIGRAM(S): at 21:09

## 2022-08-27 RX ADMIN — ENOXAPARIN SODIUM 50 MILLIGRAM(S): 100 INJECTION SUBCUTANEOUS at 17:35

## 2022-08-27 NOTE — CONSULT NOTE ADULT - SUBJECTIVE AND OBJECTIVE BOX
89 yo F PMHx asthma, COPD, hyperactive airway, b/l chronic bronchiectasis, DLD, HTN, CAD s/p PCI, hypothyroidism presented for evaluation of worsening metabolic encephalopathy. Patient was recently admitted w/ cyclospora gastroenteritis & cystitis. Course was complicated by metabolic encephalopathy. Upon ER arrival pt found to have subsegmental PE and positive urinalysis.  PAST HISTORY  --------------------------------------------------------------------------------  PAST MEDICAL & SURGICAL HISTORY:  High blood cholesterol      Hypothyroid      Hypertension      COPD (chronic obstructive pulmonary disease)      CAD (coronary artery disease)      History of coronary artery stent placement      History of cholecystectomy      H/O abdominal hysterectomy      Cataract        FAMILY HISTORY:  No pertinent family history in first degree relatives      PAST SOCIAL HISTORY:    ALLERGIES & MEDICATIONS  --------------------------------------------------------------------------------  Allergies    No Known Allergies    Intolerances      Standing Inpatient Medications  acyclovir IVPB 500 milliGRAM(s) IV Intermittent every 8 hours  ampicillin  IVPB 2000 milliGRAM(s) IV Intermittent every 8 hours  aspirin  chewable 81 milliGRAM(s) Oral daily  budesonide  80 MICROgram(s)/formoterol 4.5 MICROgram(s) Inhaler 2 Puff(s) Inhalation two times a day  cefTRIAXone   IVPB 2000 milliGRAM(s) IV Intermittent every 24 hours  clopidogrel Tablet 75 milliGRAM(s) Oral daily  enoxaparin Injectable 50 milliGRAM(s) SubCutaneous every 12 hours  hydrALAZINE 25 milliGRAM(s) Oral three times a day  isosorbide   dinitrate Tablet (ISORDIL) 10 milliGRAM(s) Oral three times a day  latanoprost 0.005% Ophthalmic Solution 1 Drop(s) Both EYES at bedtime  levothyroxine 25 MICROGram(s) Oral daily  lisinopril 20 milliGRAM(s) Oral daily  metoprolol succinate ER 25 milliGRAM(s) Oral daily  pantoprazole    Tablet 40 milliGRAM(s) Oral before breakfast  polyethylene glycol 3350 17 Gram(s) Oral daily  saccharomyces boulardii 250 milliGRAM(s) Oral two times a day  senna 2 Tablet(s) Oral at bedtime    PRN Inpatient Medications  acetaminophen     Tablet .. 650 milliGRAM(s) Oral every 6 hours PRN  ALBUTerol    90 MICROgram(s) HFA Inhaler 2 Puff(s) Inhalation every 6 hours PRN  aluminum hydroxide/magnesium hydroxide/simethicone Suspension 30 milliLiter(s) Oral every 4 hours PRN  melatonin 3 milliGRAM(s) Oral at bedtime PRN  ondansetron Injectable 4 milliGRAM(s) IV Push every 8 hours PRN          VITALS/PHYSICAL EXAM  --------------------------------------------------------------------------------  T(C): 36.1 (08-27-22 @ 06:00), Max: 36.6 (08-26-22 @ 14:22)  HR: 80 (08-27-22 @ 06:00) (75 - 83)  BP: 150/75 (08-27-22 @ 06:00) (91/53 - 150/75)  RR: 18 (08-27-22 @ 06:00) (18 - 18)  SpO2: 95% (08-27-22 @ 06:00) (94% - 95%)  Wt(kg): --  Height (cm): 172.7 (08-26-22 @ 14:09)      08-26-22 @ 07:01  -  08-27-22 @ 07:00  --------------------------------------------------------  IN: 695 mL / OUT: 330 mL / NET: 365 mL      Physical Exam:  	Gen: NAD,  	HEENT: NGt  	Pulm: CTA B/L  	CV: S1S2; no rub  	Abd: +distended  	LE: no edema  	  LABS/STUDIES  --------------------------------------------------------------------------------              11.7   10.07 >-----------<  268      [08-27-22 @ 07:28]              35.2     134  |  97  |  29  ----------------------------<  94      [08-27-22 @ 07:28]  5.4   |  25  |  1.3        Ca     8.8     [08-27-22 @ 07:28]      Mg     2.2     [08-27-22 @ 07:28]    TPro  5.3  /  Alb  2.9  /  TBili  0.3  /  DBili  x   /  AST  33  /  ALT  20  /  AlkPhos  76  [08-27-22 @ 07:28]       < from: Xray Chest 1 View-PORTABLE IMMEDIATE (Xray Chest 1 View-PORTABLE IMMEDIATE .) (08.25.22 @ 14:04) >  Stable reticular markings. No consolidation or pneumothorax.    NG tube passed below the EG junction. Tip not visualized    < end of copied text >  < from: CT Abdomen and Pelvis w/ IV Cont (08.22.22 @ 20:14) >  Small intraluminal filling defects, consistent with acute pulmonary   emboli, are noted in subsegmental branches of the right and left lower   lobe pulmonary arteries. (Right: 5/169; 5/190;6/191 / Left: 5/187; 6/203)   There is no evidence of right heart strain (RV:LV ratio <1; RV=4.4 cm and   LV = 5.0 cm)    No definite CT evidence of acute intra-abdominal or pelvic pathology.    Distended bladder measures up to 16.4 cm. Clinical correlation for   urinary retention is recommended.    < end of copied text >            Creatinine Trend:  SCr 1.3 [08-27 @ 07:28]  SCr 1.4 [08-26 @ 21:36]  SCr 0.9 [08-26 @ 07:19]  SCr 0.5 [08-25 @ 07:51]  SCr 0.6 [08-24 @ 07:07]    Urinalysis - [08-22-22 @ 20:32]      Color Yellow / Appearance Slightly Turbid / SG 1.023 / pH 7.0      Gluc Negative / Ketone Negative  / Bili Negative / Urobili <2 mg/dL       Blood Moderate / Protein 30 mg/dL / Leuk Est Large / Nitrite Negative      RBC 44 / WBC 50 / Hyaline 52 / Gran  / Sq Epi  / Non Sq Epi 9 / Bacteria Negative      TSH 5.91      [08-24-22 @ 07:07]       89 yo F PMHx asthma, COPD, hyperactive airway, b/l chronic bronchiectasis, DLD, HTN, CAD s/p PCI, hypothyroidism presented for evaluation of worsening metabolic encephalopathy. Patient was recently admitted w/ cyclospora gastroenteritis & cystitis. Course was complicated by metabolic encephalopathy. Upon ER arrival pt found to have subsegmental PE and positive urinalysis.  called for ZAYDA   PAST HISTORY  --------------------------------------------------------------------------------  PAST MEDICAL & SURGICAL HISTORY:  High blood cholesterol      Hypothyroid      Hypertension      COPD (chronic obstructive pulmonary disease)      CAD (coronary artery disease)      History of coronary artery stent placement      History of cholecystectomy      H/O abdominal hysterectomy      Cataract        FAMILY HISTORY:  No pertinent family history in first degree relatives      PAST SOCIAL HISTORY:    ALLERGIES & MEDICATIONS  --------------------------------------------------------------------------------  Allergies    No Known Allergies    Intolerances      Standing Inpatient Medications  acyclovir IVPB 500 milliGRAM(s) IV Intermittent every 8 hours  ampicillin  IVPB 2000 milliGRAM(s) IV Intermittent every 8 hours  aspirin  chewable 81 milliGRAM(s) Oral daily  budesonide  80 MICROgram(s)/formoterol 4.5 MICROgram(s) Inhaler 2 Puff(s) Inhalation two times a day  cefTRIAXone   IVPB 2000 milliGRAM(s) IV Intermittent every 24 hours  clopidogrel Tablet 75 milliGRAM(s) Oral daily  enoxaparin Injectable 50 milliGRAM(s) SubCutaneous every 12 hours  hydrALAZINE 25 milliGRAM(s) Oral three times a day  isosorbide   dinitrate Tablet (ISORDIL) 10 milliGRAM(s) Oral three times a day  latanoprost 0.005% Ophthalmic Solution 1 Drop(s) Both EYES at bedtime  levothyroxine 25 MICROGram(s) Oral daily  lisinopril 20 milliGRAM(s) Oral daily  metoprolol succinate ER 25 milliGRAM(s) Oral daily  pantoprazole    Tablet 40 milliGRAM(s) Oral before breakfast  polyethylene glycol 3350 17 Gram(s) Oral daily  saccharomyces boulardii 250 milliGRAM(s) Oral two times a day  senna 2 Tablet(s) Oral at bedtime    PRN Inpatient Medications  acetaminophen     Tablet .. 650 milliGRAM(s) Oral every 6 hours PRN  ALBUTerol    90 MICROgram(s) HFA Inhaler 2 Puff(s) Inhalation every 6 hours PRN  aluminum hydroxide/magnesium hydroxide/simethicone Suspension 30 milliLiter(s) Oral every 4 hours PRN  melatonin 3 milliGRAM(s) Oral at bedtime PRN  ondansetron Injectable 4 milliGRAM(s) IV Push every 8 hours PRN          VITALS/PHYSICAL EXAM  --------------------------------------------------------------------------------  T(C): 36.1 (08-27-22 @ 06:00), Max: 36.6 (08-26-22 @ 14:22)  HR: 80 (08-27-22 @ 06:00) (75 - 83)  BP: 150/75 (08-27-22 @ 06:00) (91/53 - 150/75)  RR: 18 (08-27-22 @ 06:00) (18 - 18)  SpO2: 95% (08-27-22 @ 06:00) (94% - 95%)  Wt(kg): --  Height (cm): 172.7 (08-26-22 @ 14:09)      08-26-22 @ 07:01  -  08-27-22 @ 07:00  --------------------------------------------------------  IN: 695 mL / OUT: 330 mL / NET: 365 mL      Physical Exam:  	Gen: NAD,  	HEENT: NGt  	Pulm: CTA B/L  	CV: S1S2; no rub  	Abd: +distended  	LE: no edema  	  LABS/STUDIES  --------------------------------------------------------------------------------              11.7   10.07 >-----------<  268      [08-27-22 @ 07:28]              35.2     134  |  97  |  29  ----------------------------<  94      [08-27-22 @ 07:28]  5.4   |  25  |  1.3        Ca     8.8     [08-27-22 @ 07:28]      Mg     2.2     [08-27-22 @ 07:28]    TPro  5.3  /  Alb  2.9  /  TBili  0.3  /  DBili  x   /  AST  33  /  ALT  20  /  AlkPhos  76  [08-27-22 @ 07:28]       < from: Xray Chest 1 View-PORTABLE IMMEDIATE (Xray Chest 1 View-PORTABLE IMMEDIATE .) (08.25.22 @ 14:04) >  Stable reticular markings. No consolidation or pneumothorax.    NG tube passed below the EG junction. Tip not visualized    < end of copied text >  < from: CT Abdomen and Pelvis w/ IV Cont (08.22.22 @ 20:14) >  Small intraluminal filling defects, consistent with acute pulmonary   emboli, are noted in subsegmental branches of the right and left lower   lobe pulmonary arteries. (Right: 5/169; 5/190;6/191 / Left: 5/187; 6/203)   There is no evidence of right heart strain (RV:LV ratio <1; RV=4.4 cm and   LV = 5.0 cm)    No definite CT evidence of acute intra-abdominal or pelvic pathology.    Distended bladder measures up to 16.4 cm. Clinical correlation for   urinary retention is recommended.    < end of copied text >            Creatinine Trend:  SCr 1.3 [08-27 @ 07:28]  SCr 1.4 [08-26 @ 21:36]  SCr 0.9 [08-26 @ 07:19]  SCr 0.5 [08-25 @ 07:51]  SCr 0.6 [08-24 @ 07:07]    Urinalysis - [08-22-22 @ 20:32]      Color Yellow / Appearance Slightly Turbid / SG 1.023 / pH 7.0      Gluc Negative / Ketone Negative  / Bili Negative / Urobili <2 mg/dL       Blood Moderate / Protein 30 mg/dL / Leuk Est Large / Nitrite Negative      RBC 44 / WBC 50 / Hyaline 52 / Gran  / Sq Epi  / Non Sq Epi 9 / Bacteria Negative      TSH 5.91      [08-24-22 @ 07:07]

## 2022-08-27 NOTE — CONSULT NOTE ADULT - ASSESSMENT
89 yo F PMHx asthma, COPD, hyperactive airway, b/l chronic bronchiectasis, DLD, HTN, CAD s/p PCI, hypothyroidism presented for evaluation of worsening metabolic encephalopathy. Patient was recently admitted w/ cyclospora gastroenteritis & cystitis. Course was complicated by metabolic encephalopathy. Upon ER arrival pt found to have subsegmental PE and positive urinalysis.  called for ZAYDA   # ? prerenal ? ATN versus crystal induced on acyclovir   # check  FENA   # D/C lisinopril  # CT noted / check bladder sono   # K noted , d/c lisinopril/ feed : nepro , if remains elevated start lokelma 10 q24  #will start NS at 50 cc/h if  patient still needs the acyclovir , will observe closely for fluid overload  #check ph   #palliative care consult noted   # overall prognosis poor   # will follow    89 yo F PMHx asthma, COPD, hyperactive airway, b/l chronic bronchiectasis, DLD, HTN, CAD s/p PCI, hypothyroidism presented for evaluation of worsening metabolic encephalopathy. Patient was recently admitted w/ cyclospora gastroenteritis & cystitis. Course was complicated by metabolic encephalopathy. Upon ER arrival pt found to have subsegmental PE and positive urinalysis.  called for ZAYDA   # ? prerenal ? ATN versus crystal induced on acyclovir   # check  FENA   # D/C lisinopril  # CT noted / check bladder sono   # K noted , d/c lisinopril/ feed : nepro , if remains elevated start lokelma 10 q24  #will start NS at 50 cc/h if  patient still needs the acyclovir , will observe closely for fluid overload  #check ph   #if cr continues to increase change enoxaparin to heparin   #palliative care consult noted   # overall prognosis poor   # will follow

## 2022-08-27 NOTE — PROGRESS NOTE ADULT - SUBJECTIVE AND OBJECTIVE BOX
CHIEF COMPLAINT:    Patient is a 89y old  Female who presents with a chief complaint of Lethargy (27 Aug 2022 10:49)      INTERVAL HPI/OVERNIGHT EVENTS:    Patient seen and examined at bedside. No acute overnight events occurred.    ROS: All other systems are negative.    Medications:  Standing  acyclovir IVPB 500 milliGRAM(s) IV Intermittent every 8 hours  ampicillin  IVPB 2000 milliGRAM(s) IV Intermittent every 8 hours  aspirin  chewable 81 milliGRAM(s) Enteral Tube daily  budesonide  80 MICROgram(s)/formoterol 4.5 MICROgram(s) Inhaler 2 Puff(s) Inhalation two times a day  cefTRIAXone   IVPB 2000 milliGRAM(s) IV Intermittent every 24 hours  clopidogrel Tablet 75 milliGRAM(s) Enteral Tube daily  enoxaparin Injectable 50 milliGRAM(s) SubCutaneous every 12 hours  hydrALAZINE 25 milliGRAM(s) Oral three times a day  isosorbide   dinitrate Tablet (ISORDIL) 10 milliGRAM(s) Enteral Tube three times a day  lactated ringers. 1000 milliLiter(s) IV Continuous <Continuous>  latanoprost 0.005% Ophthalmic Solution 1 Drop(s) Both EYES at bedtime  levothyroxine 25 MICROGram(s) Oral daily  lisinopril 20 milliGRAM(s) Enteral Tube daily  metoprolol succinate ER 25 milliGRAM(s) Oral daily  pantoprazole    Tablet 40 milliGRAM(s) Oral before breakfast  polyethylene glycol 3350 17 Gram(s) Oral daily  saccharomyces boulardii 250 milliGRAM(s) Oral two times a day  senna 2 Tablet(s) Oral at bedtime    PRN Meds  acetaminophen     Tablet .. 650 milliGRAM(s) Oral every 6 hours PRN  ALBUTerol    90 MICROgram(s) HFA Inhaler 2 Puff(s) Inhalation every 6 hours PRN  aluminum hydroxide/magnesium hydroxide/simethicone Suspension 30 milliLiter(s) Oral every 4 hours PRN  melatonin 3 milliGRAM(s) Oral at bedtime PRN  ondansetron Injectable 4 milliGRAM(s) IV Push every 8 hours PRN        Vital Signs:    T(F): 96.4 (08-27-22 @ 13:02), Max: 97.9 (08-26-22 @ 20:03)  HR: 74 (08-27-22 @ 13:02) (74 - 80)  BP: 178/79 (08-27-22 @ 13:02) (122/59 - 178/79)  RR: 18 (08-27-22 @ 13:02) (18 - 18)  SpO2: 95% (08-27-22 @ 06:00) (94% - 95%)  I&O's Summary    26 Aug 2022 07:01  -  27 Aug 2022 07:00  --------------------------------------------------------  IN: 695 mL / OUT: 330 mL / NET: 365 mL      Daily     Daily   CAPILLARY BLOOD GLUCOSE      POCT Blood Glucose.: 94 mg/dL (27 Aug 2022 11:58)  POCT Blood Glucose.: 99 mg/dL (27 Aug 2022 06:31)  POCT Blood Glucose.: 85 mg/dL (27 Aug 2022 00:20)      PHYSICAL EXAM:  GENERAL:  NAD  SKIN: No rashes or lesions  HEENT: Atraumatic. Normocephalic. Anicteric  NECK:  No JVD.   PULMONARY: Clear to ausculation bilaterally. No wheezing. No rales  CVS: Normal S1, S2. Regular rate and rhythm. No murmurs.  ABDOMEN/GI: Soft, Nontender, Nondistended; Bowel sounds are present  EXTREMITIES:  No edema B/L LE.  NEUROLOGIC:  No motor deficit.  PSYCH: Alert & oriented x 3, normal affect      LABS:                        11.7   10.07 )-----------( 268      ( 27 Aug 2022 07:28 )             35.2     08-27    134<L>  |  97<L>  |  29<H>  ----------------------------<  94  5.4<H>   |  25  |  1.3    Ca    8.8      27 Aug 2022 07:28  Mg     2.2     08-27    TPro  5.3<L>  /  Alb  2.9<L>  /  TBili  0.3  /  DBili  x   /  AST  33  /  ALT  20  /  AlkPhos  76  08-27              RADIOLOGY & ADDITIONAL TESTS:  Imaging or report Personally Reviewed:  [ ] YES  [ ] NO -->no new images    Telemetry reviewed independently - NSR, no acute events  EKG reviewed independently -->no new EKGs    Consultant(s) Notes Reviewed:  [ ] YES  [ ] NO  Care Discussed with Consultants/Other Providers [ ] YES  [ ] NO    Case discussed with resident  Care discussed with pt         CHIEF COMPLAINT:    Patient is a 89y old  Female who presents with a chief complaint of Lethargy (27 Aug 2022 10:49)      INTERVAL HPI/OVERNIGHT EVENTS:    Patient seen and examined at bedside. No acute overnight events occurred.    ROS: Unable to obtain    Medications:  Standing  acyclovir IVPB 500 milliGRAM(s) IV Intermittent every 8 hours  ampicillin  IVPB 2000 milliGRAM(s) IV Intermittent every 8 hours  aspirin  chewable 81 milliGRAM(s) Enteral Tube daily  budesonide  80 MICROgram(s)/formoterol 4.5 MICROgram(s) Inhaler 2 Puff(s) Inhalation two times a day  cefTRIAXone   IVPB 2000 milliGRAM(s) IV Intermittent every 24 hours  clopidogrel Tablet 75 milliGRAM(s) Enteral Tube daily  enoxaparin Injectable 50 milliGRAM(s) SubCutaneous every 12 hours  hydrALAZINE 25 milliGRAM(s) Oral three times a day  isosorbide   dinitrate Tablet (ISORDIL) 10 milliGRAM(s) Enteral Tube three times a day  lactated ringers. 1000 milliLiter(s) IV Continuous <Continuous>  latanoprost 0.005% Ophthalmic Solution 1 Drop(s) Both EYES at bedtime  levothyroxine 25 MICROGram(s) Oral daily  lisinopril 20 milliGRAM(s) Enteral Tube daily  metoprolol succinate ER 25 milliGRAM(s) Oral daily  pantoprazole    Tablet 40 milliGRAM(s) Oral before breakfast  polyethylene glycol 3350 17 Gram(s) Oral daily  saccharomyces boulardii 250 milliGRAM(s) Oral two times a day  senna 2 Tablet(s) Oral at bedtime    PRN Meds  acetaminophen     Tablet .. 650 milliGRAM(s) Oral every 6 hours PRN  ALBUTerol    90 MICROgram(s) HFA Inhaler 2 Puff(s) Inhalation every 6 hours PRN  aluminum hydroxide/magnesium hydroxide/simethicone Suspension 30 milliLiter(s) Oral every 4 hours PRN  melatonin 3 milliGRAM(s) Oral at bedtime PRN  ondansetron Injectable 4 milliGRAM(s) IV Push every 8 hours PRN        Vital Signs:    T(F): 96.4 (08-27-22 @ 13:02), Max: 97.9 (08-26-22 @ 20:03)  HR: 74 (08-27-22 @ 13:02) (74 - 80)  BP: 178/79 (08-27-22 @ 13:02) (122/59 - 178/79)  RR: 18 (08-27-22 @ 13:02) (18 - 18)  SpO2: 95% (08-27-22 @ 06:00) (94% - 95%)  I&O's Summary    26 Aug 2022 07:01  -  27 Aug 2022 07:00  --------------------------------------------------------  IN: 695 mL / OUT: 330 mL / NET: 365 mL      POCT Blood Glucose.: 94 mg/dL (27 Aug 2022 11:58)  POCT Blood Glucose.: 99 mg/dL (27 Aug 2022 06:31)  POCT Blood Glucose.: 85 mg/dL (27 Aug 2022 00:20)      PHYSICAL EXAM:  GENERAL:  NAD  SKIN: No rashes or lesions  HEENT: Atraumatic. Normocephalic. Anicteric  NECK:  No JVD.   PULMONARY: Clear to ausculation bilaterally. No wheezing. No rales  CVS: Normal S1, S2. Regular rate and rhythm. No murmurs.  ABDOMEN/GI: Soft, Nontender, Nondistended; Bowel sounds are present  EXTREMITIES:  No edema B/L LE.  NEUROLOGIC: non particiaptory  PSYCH: lethargic      LABS:                        11.7   10.07 )-----------( 268      ( 27 Aug 2022 07:28 )             35.2     08-27    134<L>  |  97<L>  |  29<H>  ----------------------------<  94  5.4<H>   |  25  |  1.3    Ca    8.8      27 Aug 2022 07:28  Mg     2.2     08-27    TPro  5.3<L>  /  Alb  2.9<L>  /  TBili  0.3  /  DBili  x   /  AST  33  /  ALT  20  /  AlkPhos  76  08-27              RADIOLOGY & ADDITIONAL TESTS:  Imaging or report Personally Reviewed:  [ ] YES  [ ] NO -->no new images    Telemetry reviewed independently - NSR, no acute events  EKG reviewed independently -->no new EKGs    Consultant(s) Notes Reviewed:  [ ] YES  [ ] NO  Care Discussed with Consultants/Other Providers [ ] YES  [ ] NO    Case discussed with resident  Care discussed with pt

## 2022-08-27 NOTE — PROGRESS NOTE ADULT - ASSESSMENT
89 yo F PMHx asthma, COPD, hyperactive airway, b/l chronic bronchiectasis, DLD, HTN, CAD s/p PCI, hypothyroidism presented for evaluation of worsening metabolic encephalopathy. Patient was recently admitted w/ cyclospora gastroenteritis & cystitis. Course was complicated by metabolic encephalopathy. Upon ER arrival pt found to have subsegmental PE and positive urinalysis.     Suspected Metabolic encephalopathy from suspected UTI   History of recent colitis as well as cyclospora PCR positive treated with bactrim & Flagyl until 8/20/22  - hold off LP as pt will not be able to tolerate it (cannot lie flat and is aspiration risk)  - trial acyclovir and ampicillin for empiric coverage as MRI, EEG negative, UCx negative, add vanco, d/c rocephin  - c/w saccharomyces boulardii    ZAYDA  Oliguria  Hyperkalemia  - bladder scan show no residual  - acyclovir held  - check FeNa  - fluids started  - lokelma    Subsegmental PE  - duplex shows peroneal thrombus  - c/w Lovenox, will switch to oral eliquis once pt able to take po consistently  - c/w telemetry for now while awaiting Echo  - c/w oxygen supplementation  - Echo pending    Dysphagia  - currently NPO  - family opting against PEG but do want a trial of NGT feeds, NGT in place  - speech and swallow daily assessment  - palliative following       Nodular Thyroid  Hypothyroidism  - TSH mildly elevated but will hold off med for now    Lactic acidosis, resolving  - IV maintenance D5 1/2 NS 70 ml/hr    Metabolic alkalosis  - unclear source (not on diuresis or bicarb, not vomiting)  - resolving    Asthma  COPD  b/l chronic bronchiectasis  - c/w albuterol & Symbicort    DLD  HTN  CAD s/p PCI  - c/w isosorbide dinitrate, aspirin, Plavix, metoprolol  - c/w lisinopril --held for now due to ZAYDA    Urinary Retention?  - acevedo remains in place  - had bladder distension on CT   - bladder scan shows no retention but has oliguria    Diverticulosis  - avoid constipation    Weight loss  Hx abdominal pain  - patient refused outpatient colonoscopy & EGD    Trace right pleural effusion  Atelectasis  - incentive spirometry    Right hilar 9 mm LN (decreased in size)  - outpatient f/u    Constipation  - c/w Senna & Miralax    Glaucoma  - c/w latanoprost    # DVT PPx  - Lovenox -- if SCr continues to rise will need to change to heparin    # GI PPx  - c/w Protonix    # Diet  - DNR, DNI    #Progress Note Handoff:  Pending (specify):  Improvement in mental status  Family discussion: I spoke with daughter. her correct phone number is [phone] and her  Tomi is [phone]. She is aware of ZAYDA and continued lethargy--aware this could represent end of life. Daughter wants to continue NGT feeds for next few days and if no improvement will opt for CMO. In mean time she is interesting in speaking with hospice  Disposition: Home___/[id number]_x__/Other________/Unknown at this time________

## 2022-08-28 LAB
ALBUMIN SERPL ELPH-MCNC: 3 G/DL — LOW (ref 3.5–5.2)
ALP SERPL-CCNC: 81 U/L — SIGNIFICANT CHANGE UP (ref 30–115)
ALT FLD-CCNC: 16 U/L — SIGNIFICANT CHANGE UP (ref 0–41)
ANION GAP SERPL CALC-SCNC: 12 MMOL/L — SIGNIFICANT CHANGE UP (ref 7–14)
AST SERPL-CCNC: 22 U/L — SIGNIFICANT CHANGE UP (ref 0–41)
BASOPHILS # BLD AUTO: 0.09 K/UL — SIGNIFICANT CHANGE UP (ref 0–0.2)
BASOPHILS NFR BLD AUTO: 1 % — SIGNIFICANT CHANGE UP (ref 0–1)
BILIRUB SERPL-MCNC: 0.3 MG/DL — SIGNIFICANT CHANGE UP (ref 0.2–1.2)
BUN SERPL-MCNC: 24 MG/DL — HIGH (ref 10–20)
CALCIUM SERPL-MCNC: 9.1 MG/DL — SIGNIFICANT CHANGE UP (ref 8.5–10.1)
CHLORIDE SERPL-SCNC: 100 MMOL/L — SIGNIFICANT CHANGE UP (ref 98–110)
CO2 SERPL-SCNC: 26 MMOL/L — SIGNIFICANT CHANGE UP (ref 17–32)
CREAT SERPL-MCNC: 0.8 MG/DL — SIGNIFICANT CHANGE UP (ref 0.7–1.5)
CULTURE RESULTS: SIGNIFICANT CHANGE UP
CULTURE RESULTS: SIGNIFICANT CHANGE UP
EGFR: 67 ML/MIN/1.73M2 — SIGNIFICANT CHANGE UP
EOSINOPHIL # BLD AUTO: 0.43 K/UL — SIGNIFICANT CHANGE UP (ref 0–0.7)
EOSINOPHIL NFR BLD AUTO: 4.8 % — SIGNIFICANT CHANGE UP (ref 0–8)
GLUCOSE BLDC GLUCOMTR-MCNC: 105 MG/DL — HIGH (ref 70–99)
GLUCOSE BLDC GLUCOMTR-MCNC: 109 MG/DL — HIGH (ref 70–99)
GLUCOSE BLDC GLUCOMTR-MCNC: 112 MG/DL — HIGH (ref 70–99)
GLUCOSE BLDC GLUCOMTR-MCNC: 86 MG/DL — SIGNIFICANT CHANGE UP (ref 70–99)
GLUCOSE BLDC GLUCOMTR-MCNC: 86 MG/DL — SIGNIFICANT CHANGE UP (ref 70–99)
GLUCOSE SERPL-MCNC: 100 MG/DL — HIGH (ref 70–99)
HCT VFR BLD CALC: 36 % — LOW (ref 37–47)
HGB BLD-MCNC: 11.8 G/DL — LOW (ref 12–16)
IMM GRANULOCYTES NFR BLD AUTO: 0.9 % — HIGH (ref 0.1–0.3)
LYMPHOCYTES # BLD AUTO: 1.03 K/UL — LOW (ref 1.2–3.4)
LYMPHOCYTES # BLD AUTO: 11.6 % — LOW (ref 20.5–51.1)
MAGNESIUM SERPL-MCNC: 1.9 MG/DL — SIGNIFICANT CHANGE UP (ref 1.8–2.4)
MCHC RBC-ENTMCNC: 30.9 PG — SIGNIFICANT CHANGE UP (ref 27–31)
MCHC RBC-ENTMCNC: 32.8 G/DL — SIGNIFICANT CHANGE UP (ref 32–37)
MCV RBC AUTO: 94.2 FL — SIGNIFICANT CHANGE UP (ref 81–99)
MONOCYTES # BLD AUTO: 1.09 K/UL — HIGH (ref 0.1–0.6)
MONOCYTES NFR BLD AUTO: 12.2 % — HIGH (ref 1.7–9.3)
NEUTROPHILS # BLD AUTO: 6.19 K/UL — SIGNIFICANT CHANGE UP (ref 1.4–6.5)
NEUTROPHILS NFR BLD AUTO: 69.5 % — SIGNIFICANT CHANGE UP (ref 42.2–75.2)
NRBC # BLD: 0 /100 WBCS — SIGNIFICANT CHANGE UP (ref 0–0)
PLATELET # BLD AUTO: 282 K/UL — SIGNIFICANT CHANGE UP (ref 130–400)
POTASSIUM SERPL-MCNC: 4.6 MMOL/L — SIGNIFICANT CHANGE UP (ref 3.5–5)
POTASSIUM SERPL-SCNC: 4.6 MMOL/L — SIGNIFICANT CHANGE UP (ref 3.5–5)
PROT SERPL-MCNC: 5.3 G/DL — LOW (ref 6–8)
RBC # BLD: 3.82 M/UL — LOW (ref 4.2–5.4)
RBC # FLD: 14.1 % — SIGNIFICANT CHANGE UP (ref 11.5–14.5)
SODIUM SERPL-SCNC: 138 MMOL/L — SIGNIFICANT CHANGE UP (ref 135–146)
SPECIMEN SOURCE: SIGNIFICANT CHANGE UP
SPECIMEN SOURCE: SIGNIFICANT CHANGE UP
WBC # BLD: 8.91 K/UL — SIGNIFICANT CHANGE UP (ref 4.8–10.8)
WBC # FLD AUTO: 8.91 K/UL — SIGNIFICANT CHANGE UP (ref 4.8–10.8)

## 2022-08-28 PROCEDURE — 99232 SBSQ HOSP IP/OBS MODERATE 35: CPT

## 2022-08-28 PROCEDURE — 71045 X-RAY EXAM CHEST 1 VIEW: CPT | Mod: 26

## 2022-08-28 RX ORDER — BACITRACIN ZINC 500 UNIT/G
1 OINTMENT IN PACKET (EA) TOPICAL DAILY
Refills: 0 | Status: DISCONTINUED | OUTPATIENT
Start: 2022-08-28 | End: 2022-09-06

## 2022-08-28 RX ADMIN — Medication 1 APPLICATION(S): at 21:57

## 2022-08-28 RX ADMIN — ISOSORBIDE DINITRATE 10 MILLIGRAM(S): 5 TABLET ORAL at 12:29

## 2022-08-28 RX ADMIN — POLYETHYLENE GLYCOL 3350 17 GRAM(S): 17 POWDER, FOR SOLUTION ORAL at 12:29

## 2022-08-28 RX ADMIN — Medication 250 MILLIGRAM(S): at 17:54

## 2022-08-28 RX ADMIN — ENOXAPARIN SODIUM 50 MILLIGRAM(S): 100 INJECTION SUBCUTANEOUS at 05:38

## 2022-08-28 RX ADMIN — Medication 25 MILLIGRAM(S): at 14:44

## 2022-08-28 RX ADMIN — Medication 200 MILLIGRAM(S): at 05:31

## 2022-08-28 RX ADMIN — Medication 200 MILLIGRAM(S): at 14:45

## 2022-08-28 RX ADMIN — ENOXAPARIN SODIUM 50 MILLIGRAM(S): 100 INJECTION SUBCUTANEOUS at 17:54

## 2022-08-28 RX ADMIN — CLOPIDOGREL BISULFATE 75 MILLIGRAM(S): 75 TABLET, FILM COATED ORAL at 12:28

## 2022-08-28 RX ADMIN — ISOSORBIDE DINITRATE 10 MILLIGRAM(S): 5 TABLET ORAL at 05:23

## 2022-08-28 RX ADMIN — Medication 200 MILLIGRAM(S): at 21:57

## 2022-08-28 RX ADMIN — Medication 25 MILLIGRAM(S): at 21:56

## 2022-08-28 RX ADMIN — SENNA PLUS 2 TABLET(S): 8.6 TABLET ORAL at 21:57

## 2022-08-28 RX ADMIN — Medication 25 MICROGRAM(S): at 05:24

## 2022-08-28 RX ADMIN — Medication 81 MILLIGRAM(S): at 12:29

## 2022-08-28 RX ADMIN — ISOSORBIDE DINITRATE 10 MILLIGRAM(S): 5 TABLET ORAL at 17:54

## 2022-08-28 RX ADMIN — LATANOPROST 1 DROP(S): 0.05 SOLUTION/ DROPS OPHTHALMIC; TOPICAL at 21:56

## 2022-08-28 RX ADMIN — Medication 25 MILLIGRAM(S): at 05:23

## 2022-08-28 RX ADMIN — Medication 25 MILLIGRAM(S): at 05:24

## 2022-08-28 NOTE — PROGRESS NOTE ADULT - ASSESSMENT
89 yo F PMHx asthma, COPD, hyperactive airway, b/l chronic bronchiectasis, DLD, HTN, CAD s/p PCI, hypothyroidism presented for evaluation of worsening metabolic encephalopathy. Patient was recently admitted w/ cyclospora gastroenteritis & cystitis. Course was complicated by metabolic encephalopathy. Upon ER arrival pt found to have subsegmental PE and positive urinalysis.     Suspected Metabolic encephalopathy from suspected UTI   History of recent colitis as well as cyclospora PCR positive treated with bactrim & Flagyl until 8/20/22  - hold off LP as pt will not be able to tolerate it (cannot lie flat and is aspiration risk)  - pt seems to have improved the most with ampicillin   - MRI, EEG negative, UCx negative,  - c/w vancomycin  - acyclovir held  - c/w saccharomyces boulardii    ZAYDA  Oliguria  Hyperkalemia  - resolved  - bladder scan show no residual  - continue to hold acyclovir  - fluids stopped    Left opacity  - vancomycin    Subsegmental PE  - duplex shows peroneal thrombus  - c/w Lovenox, will switch to oral eliquis once pt able to take po consistently  - c/w telemetry for now while awaiting Echo  - c/w oxygen supplementation  - Echo pending    Dysphagia  - currently NPO  - family opting against PEG but do want a trial of NGT feeds, NGT in place  - speech and swallow daily assessment  - palliative following       Nodular Thyroid  Hypothyroidism  - TSH mildly elevated but will hold off med for now    Lactic acidosis, resolving  - IV maintenance D5 1/2 NS 70 ml/hr    Metabolic alkalosis  - unclear source (not on diuresis or bicarb, not vomiting)  - resolving    Asthma  COPD  b/l chronic bronchiectasis  - c/w albuterol & Symbicort    DLD  HTN  CAD s/p PCI  - c/w isosorbide dinitrate, aspirin, Plavix, metoprolol  - c/w lisinopril --held for now due to ZAYDA    Urinary Retention?  - acevedo remains in place  - had bladder distension on CT   - bladder scan shows no retention but has oliguria    Diverticulosis  - avoid constipation    Weight loss  Hx abdominal pain  - patient refused outpatient colonoscopy & EGD    Trace right pleural effusion  Atelectasis  - incentive spirometry    Right hilar 9 mm LN (decreased in size)  - outpatient f/u    Constipation  - c/w Senna & Miralax    Glaucoma  - c/w latanoprost    # DVT PPx  - Lovenox -- if SCr continues to rise will need to change to heparin    # GI PPx  - c/w Protonix    # Diet  - DNR, DNI    #Progress Note Handoff:  Pending (specify):  Improvement in mental status  Family discussion: I spoke with daughter. her correct phone number is [phone] and her  Tomi is [phone]. As per daughter pt is back to baseline today. Unclear what caused AMS. Pt seems to have responded to ampicillin so could have been possible Listeria mengitis?  Disposition: Home___/SNF_x__/Other________/Unknown at this time________

## 2022-08-28 NOTE — PROGRESS NOTE ADULT - SUBJECTIVE AND OBJECTIVE BOX
CHIRAG HERNÁNDEZ  89y  Female      Patient is a 89y old  Female who presents with a chief complaint of Lethargy (27 Aug 2022 15:21)      INTERVAL HPI/OVERNIGHT EVENTS:  No events     REVIEW OF SYSTEMS:  Unobtainable       T(C): 36.1 (08-28-22 @ 05:08), Max: 36.1 (08-28-22 @ 05:08)  HR: 71 (08-28-22 @ 05:08) (71 - 74)  BP: 169/74 (08-28-22 @ 05:08) (169/74 - 178/79)  RR: 18 (08-28-22 @ 05:08) (18 - 18)  SpO2: --  Wt(kg): --Vital Signs Last 24 Hrs  T(C): 36.1 (28 Aug 2022 05:08), Max: 36.1 (28 Aug 2022 05:08)  T(F): 97 (28 Aug 2022 05:08), Max: 97 (28 Aug 2022 05:08)  HR: 71 (28 Aug 2022 05:08) (71 - 74)  BP: 169/74 (28 Aug 2022 05:08) (169/74 - 178/79)  BP(mean): --  RR: 18 (28 Aug 2022 05:08) (18 - 18)  SpO2: --        PHYSICAL EXAM:  GENERAL: NAD, well-groomed, well-developed  HEAD:  Atraumatic, Normocephalic  NECK: Supple, No JVD, Normal thyroid  NERVOUS SYSTEM:  Alert & Oriented x 1, following commands but low motor strength - generalized   CHEST/LUNG: Clear to percussion bilaterally; No rales, rhonchi, wheezing, or rubs  HEART: Regular rate and rhythm; No murmurs, rubs, or gallops  ABDOMEN: Soft, Nontender, Nondistended; Bowel sounds present  EXTREMITIES:  2+ Peripheral Pulses, No clubbing, cyanosis, or edema      Consultant(s) Notes Reviewed:  [x ] YES  [ ] NO  Care Discussed with Consultants/Other Providers [ x] YES  [ ] NO    .  LABS:                         11.7   10.07 )-----------( 268      ( 27 Aug 2022 07:28 )             35.2     08-27    134<L>  |  97<L>  |  29<H>  ----------------------------<  94  5.4<H>   |  25  |  1.3    Ca    8.8      27 Aug 2022 07:28  Mg     2.2     08-27    TPro  5.3<L>  /  Alb  2.9<L>  /  TBili  0.3  /  DBili  x   /  AST  33  /  ALT  20  /  AlkPhos  76  08-27        RADIOLOGY, EKG & ADDITIONAL TESTS: Reviewed.         Imaging Personally Reviewed:  [ ] YES  [ ] NO  acetaminophen     Tablet .. 650 milliGRAM(s) Oral every 6 hours PRN  ALBUTerol    90 MICROgram(s) HFA Inhaler 2 Puff(s) Inhalation every 6 hours PRN  aluminum hydroxide/magnesium hydroxide/simethicone Suspension 30 milliLiter(s) Oral every 4 hours PRN  ampicillin  IVPB 2000 milliGRAM(s) IV Intermittent every 8 hours  aspirin  chewable 81 milliGRAM(s) Enteral Tube daily  budesonide  80 MICROgram(s)/formoterol 4.5 MICROgram(s) Inhaler 2 Puff(s) Inhalation two times a day  clopidogrel Tablet 75 milliGRAM(s) Enteral Tube daily  enoxaparin Injectable 50 milliGRAM(s) SubCutaneous every 12 hours  hydrALAZINE 25 milliGRAM(s) Oral three times a day  isosorbide   dinitrate Tablet (ISORDIL) 10 milliGRAM(s) Enteral Tube three times a day  lactated ringers. 1000 milliLiter(s) IV Continuous <Continuous>  latanoprost 0.005% Ophthalmic Solution 1 Drop(s) Both EYES at bedtime  levothyroxine 25 MICROGram(s) Oral daily  melatonin 3 milliGRAM(s) Oral at bedtime PRN  metoprolol succinate ER 25 milliGRAM(s) Oral daily  ondansetron Injectable 4 milliGRAM(s) IV Push every 8 hours PRN  pantoprazole    Tablet 40 milliGRAM(s) Oral before breakfast  polyethylene glycol 3350 17 Gram(s) Oral daily  saccharomyces boulardii 250 milliGRAM(s) Oral two times a day  senna 2 Tablet(s) Oral at bedtime  vancomycin  IVPB 750 milliGRAM(s) IV Intermittent every 24 hours      HEALTH ISSUES - PROBLEM Dx:  89 yo F PMHx asthma, COPD, hyperactive airway, b/l chronic bronchiectasis, DLD, HTN, CAD s/p PCI, hypothyroidism presented for evaluation of worsening metabolic encephalopathy. Patient was recently admitted w/ cyclospora gastroenteritis & cystitis. Course was complicated by metabolic encephalopathy. Upon ER arrival pt found to have subsegmental PE and positive urinalysis.     #Encephalopathy - metabolic v infective   - Patient had a sudden drop in sensorium in the hospital stay at [facility].  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 Urinary Retention  - acevedo remains in place  - had bladder distension on CT   - History of recent colitis as well as cyclospora PCR positive treated with bactrim & Flagyl until 8/20/22  - c/w Rocephin  - c/w saccharomyces boulardii    #Subsegmental PE  - duplex shows peroneal thrombus  - c/w Lovenox, will switch to oral eliquis once pt able to take po consistently  - c/w oxygen supplementation  - Echo pending    #Nodular Thyroid  - Hypothyroidism  - TSH mildly elevated but will hold off med for now    #Lactic acidosis, resolved    #Metabolic alkalosis - resolved     #Asthma  #COPD  b/l chronic bronchiectasis  - c/w albuterol & Symbicort    #DLD  #HTN  #CAD s/p PCI  - c/w isosorbide dinitrate, aspirin, Plavix, metoprolol  - withheld lisinopril due to ZAYDA     #Diverticulosis  - avoid constipation    #Weight loss  - Hx abdominal pain  - patient refused outpatient colonoscopy & EGD      #Constipation  - c/w Senna & Miralax    #Glaucoma  - c/w latanoprost    # DVT PPx  - Lovenox    # GI PPx  - c/w Protonix    # Diet  - DNR, DNI    #Progress Note Handoff:  Pending (specify):  Improvement in mental status             CHIRAG HERNÁNDEZ  89y  Female      Patient is a 89y old  Female who presents with a chief complaint of Lethargy (27 Aug 2022 15:21)      INTERVAL HPI/OVERNIGHT EVENTS:  No events     REVIEW OF SYSTEMS:  Patint's sensorium better. Says she has no complaints.    T(C): 36.1 (08-28-22 @ 05:08), Max: 36.1 (08-28-22 @ 05:08)  HR: 71 (08-28-22 @ 05:08) (71 - 74)  BP: 169/74 (08-28-22 @ 05:08) (169/74 - 178/79)  RR: 18 (08-28-22 @ 05:08) (18 - 18)  SpO2: --  Wt(kg): --Vital Signs Last 24 Hrs  T(C): 36.1 (28 Aug 2022 05:08), Max: 36.1 (28 Aug 2022 05:08)  T(F): 97 (28 Aug 2022 05:08), Max: 97 (28 Aug 2022 05:08)  HR: 71 (28 Aug 2022 05:08) (71 - 74)  BP: 169/74 (28 Aug 2022 05:08) (169/74 - 178/79)  BP(mean): --  RR: 18 (28 Aug 2022 05:08) (18 - 18)  SpO2: --        PHYSICAL EXAM:  GENERAL: NAD, well-groomed, well-developed  HEAD:  Atraumatic, Normocephalic  NECK: Supple, No JVD, Normal thyroid  NERVOUS SYSTEM:  Alert & Oriented x 2, following commands but low motor strength - generalized   CHEST/LUNG: Clear to percussion bilaterally; No rales, rhonchi, wheezing, or rubs  HEART: Regular rate and rhythm; No murmurs, rubs, or gallops  ABDOMEN: Soft, Nontender, Nondistended; Bowel sounds present  EXTREMITIES:  2+ Peripheral Pulses, No clubbing, cyanosis, or edema      Consultant(s) Notes Reviewed:  [x ] YES  [ ] NO  Care Discussed with Consultants/Other Providers [ x] YES  [ ] NO    .  LABS:                         11.7   10.07 )-----------( 268      ( 27 Aug 2022 07:28 )             35.2     08-27    134<L>  |  97<L>  |  29<H>  ----------------------------<  94  5.4<H>   |  25  |  1.3    Ca    8.8      27 Aug 2022 07:28  Mg     2.2     08-27    TPro  5.3<L>  /  Alb  2.9<L>  /  TBili  0.3  /  DBili  x   /  AST  33  /  ALT  20  /  AlkPhos  76  08-27      RADIOLOGY, EKG & ADDITIONAL TESTS: Reviewed.       Imaging Personally Reviewed:  [ ] YES  [ ] NO  acetaminophen     Tablet .. 650 milliGRAM(s) Oral every 6 hours PRN  ALBUTerol    90 MICROgram(s) HFA Inhaler 2 Puff(s) Inhalation every 6 hours PRN  aluminum hydroxide/magnesium hydroxide/simethicone Suspension 30 milliLiter(s) Oral every 4 hours PRN  ampicillin  IVPB 2000 milliGRAM(s) IV Intermittent every 8 hours  aspirin  chewable 81 milliGRAM(s) Enteral Tube daily  budesonide  80 MICROgram(s)/formoterol 4.5 MICROgram(s) Inhaler 2 Puff(s) Inhalation two times a day  clopidogrel Tablet 75 milliGRAM(s) Enteral Tube daily  enoxaparin Injectable 50 milliGRAM(s) SubCutaneous every 12 hours  hydrALAZINE 25 milliGRAM(s) Oral three times a day  isosorbide   dinitrate Tablet (ISORDIL) 10 milliGRAM(s) Enteral Tube three times a day  lactated ringers. 1000 milliLiter(s) IV Continuous <Continuous>  latanoprost 0.005% Ophthalmic Solution 1 Drop(s) Both EYES at bedtime  levothyroxine 25 MICROGram(s) Oral daily  melatonin 3 milliGRAM(s) Oral at bedtime PRN  metoprolol succinate ER 25 milliGRAM(s) Oral daily  ondansetron Injectable 4 milliGRAM(s) IV Push every 8 hours PRN  pantoprazole    Tablet 40 milliGRAM(s) Oral before breakfast  polyethylene glycol 3350 17 Gram(s) Oral daily  saccharomyces boulardii 250 milliGRAM(s) Oral two times a day  senna 2 Tablet(s) Oral at bedtime  vancomycin  IVPB 750 milliGRAM(s) IV Intermittent every 24 hours      HEALTH ISSUES - PROBLEM Dx:  89 yo F PMHx asthma, COPD, hyperactive airway, b/l chronic bronchiectasis, DLD, HTN, CAD s/p PCI, hypothyroidism presented for evaluation of worsening metabolic encephalopathy. Patient was recently admitted w/ cyclospora gastroenteritis & cystitis. Course was complicated by metabolic encephalopathy. Upon ER arrival pt found to have subsegmental PE and positive urinalysis.     #Encephalopathy - metabolic v infective   - Patient able to answer questions - oriented to person and place, not time.   - Patient had a sudden drop in sensorium in the hospital stay at [facility].  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 Urinary Retention  - acevedo remains in place  - had bladder distension on CT   - History of recent colitis as well as cyclospora PCR positive treated with bactrim & Flagyl until 8/20/22  - c/w Rocephin  - c/w saccharomyces boulardii    #Subsegmental PE  - duplex shows peroneal thrombus  - c/w Lovenox, will switch to oral eliquis once pt able to take po consistently  - c/w oxygen supplementation  - Echo pending    #Nodular Thyroid  - Hypothyroidism  - TSH mildly elevated but will hold off med for now    #Lactic acidosis, resolved    #Metabolic alkalosis - resolved     #Asthma  #COPD  b/l chronic bronchiectasis  - c/w albuterol & Symbicort    #DLD  #HTN  #CAD s/p PCI  - c/w isosorbide dinitrate, aspirin, Plavix, metoprolol  - withheld lisinopril due to ZAYDA     #Diverticulosis  - avoid constipation    #Weight loss  - Hx abdominal pain  - patient refused outpatient colonoscopy & EGD      #Constipation  - c/w Senna & Miralax    #Glaucoma  - c/w latanoprost    # DVT PPx  - Lovenox    # GI PPx  - c/w Protonix    # Diet  - DNR, DNI    #Progress Note Handoff:  Pending (specify):  Improvement in mental status

## 2022-08-28 NOTE — PROGRESS NOTE ADULT - SUBJECTIVE AND OBJECTIVE BOX
[facility] FOLLOW UP NOTE  --------------------------------------------------------------------------------  Chief Complaint:    24 hour events/subjective:        PAST HISTORY  --------------------------------------------------------------------------------  No significant changes to PMH, PSH, FHx, SHx, unless otherwise noted    ALLERGIES & MEDICATIONS  --------------------------------------------------------------------------------  Allergies    No Known Allergies    Intolerances      Standing Inpatient Medications  ampicillin  IVPB 2000 milliGRAM(s) IV Intermittent every 8 hours  aspirin  chewable 81 milliGRAM(s) Enteral Tube daily  budesonide  80 MICROgram(s)/formoterol 4.5 MICROgram(s) Inhaler 2 Puff(s) Inhalation two times a day  clopidogrel Tablet 75 milliGRAM(s) Enteral Tube daily  enoxaparin Injectable 50 milliGRAM(s) SubCutaneous every 12 hours  hydrALAZINE 25 milliGRAM(s) Oral three times a day  isosorbide   dinitrate Tablet (ISORDIL) 10 milliGRAM(s) Enteral Tube three times a day  latanoprost 0.005% Ophthalmic Solution 1 Drop(s) Both EYES at bedtime  levothyroxine 25 MICROGram(s) Oral daily  metoprolol succinate ER 25 milliGRAM(s) Oral daily  pantoprazole    Tablet 40 milliGRAM(s) Oral before breakfast  polyethylene glycol 3350 17 Gram(s) Oral daily  saccharomyces boulardii 250 milliGRAM(s) Oral two times a day  senna 2 Tablet(s) Oral at bedtime  vancomycin  IVPB 750 milliGRAM(s) IV Intermittent every 24 hours    PRN Inpatient Medications  acetaminophen     Tablet .. 650 milliGRAM(s) Oral every 6 hours PRN  ALBUTerol    90 MICROgram(s) HFA Inhaler 2 Puff(s) Inhalation every 6 hours PRN  aluminum hydroxide/magnesium hydroxide/simethicone Suspension 30 milliLiter(s) Oral every 4 hours PRN  melatonin 3 milliGRAM(s) Oral at bedtime PRN  ondansetron Injectable 4 milliGRAM(s) IV Push every 8 hours PRN      REVIEW OF SYSTEMS  --------------------------------------------------------------------------------  Gen: No weight changes, fatigue, fevers/chills, weakness  Skin: No rashes  Head/Eyes/Ears/Mouth: No headache; Normal hearing; Normal vision w/o blurriness; No sinus pain/discomfort, sore throat  Respiratory: No dyspnea, cough, wheezing, hemoptysis  CV: No chest pain, PND, orthopnea  GI: No abdominal pain, diarrhea, constipation, nausea, vomiting, melena, hematochezia  : No increased frequency, dysuria, hematuria, nocturia  MSK: No joint pain/swelling; no back pain; no edema  Neuro: No dizziness/lightheadedness, weakness, seizures, numbness, tingling  Heme: No easy bruising or bleeding  Endo: No heat/cold intolerance  Psych: No significant nervousness, anxiety, stress, depression    All other systems were reviewed and are negative, except as noted.    VITALS/PHYSICAL EXAM  --------------------------------------------------------------------------------  T(C): 36.1 (08-28-22 @ 05:08), Max: 36.1 (08-28-22 @ 05:08)  HR: 71 (08-28-22 @ 05:08) (71 - 74)  BP: 169/74 (08-28-22 @ 05:08) (169/74 - 178/79)  RR: 18 (08-28-22 @ 05:08) (18 - 18)  SpO2: --  Wt(kg): --  Height (cm): 172.7 (08-26-22 @ 14:09)  Weight (kg): 53 (08-28-22 @ 05:08)  BMI (kg/m2): 17.8 (08-28-22 @ 05:08)  BSA (m2): 1.63 (08-28-22 @ 05:08)      08-27-22 @ 07:01  -  08-28-22 @ 07:00  --------------------------------------------------------  IN: 2510 mL / OUT: 2500 mL / NET: 10 mL    08-28-22 @ 07:01  -  08-28-22 @ 11:55  --------------------------------------------------------  IN: 110 mL / OUT: 0 mL / NET: 110 mL      Physical Exam:  	Gen: NAD, well-appearing  	HEENT: PERRL, supple neck, clear oropharynx  	Pulm: CTA B/L  	CV: RRR, S1S2; no rub  	Back: No spinal or CVA tenderness; no sacral edema  	Abd: +BS, soft, nontender/nondistended  	: No suprapubic tenderness  	UE: Warm, FROM, no clubbing, intact strength; no edema; no asterixis  	LE: Warm, FROM, no clubbing, intact strength; no edema  	Neuro: No focal deficits, intact gait  	Psych: Normal affect and mood  	Skin: Warm, without rashes  	Vascular access:    LABS/STUDIES  --------------------------------------------------------------------------------              11.8   8.91  >-----------<  282      [08-28-22 @ 07:11]              36.0     138  |  100  |  24  ----------------------------<  100      [08-28-22 @ 07:11]  4.6   |  26  |  0.8        Ca     9.1     [08-28-22 @ 07:11]      Mg     1.9     [08-28-22 @ 07:11]    TPro  5.3  /  Alb  3.0  /  TBili  0.3  /  DBili  x   /  AST  22  /  ALT  16  /  AlkPhos  81  [08-28-22 @ 07:11]          Creatinine Trend:  SCr 0.8 [08-28 @ 07:11]  SCr 1.3 [08-27 @ 07:28]  SCr 1.4 [08-26 @ 21:36]  SCr 0.9 [08-26 @ 07:19]  SCr 0.5 [08-25 @ 07:51]    Urinalysis - [08-22-22 @ 20:32]      Color Yellow / Appearance Slightly Turbid / SG 1.023 / pH 7.0      Gluc Negative / Ketone Negative  / Bili Negative / Urobili <2 mg/dL       Blood Moderate / Protein 30 mg/dL / Leuk Est Large / Nitrite Negative      RBC 44 / WBC 50 / Hyaline 52 / Gran  / Sq Epi  / Non Sq Epi 9 / Bacteria Negative    Urine Creatinine 34      [08-27-22 @ 18:33]  Urine Sodium 36.0      [08-27-22 @ 18:33]    TSH 5.91      [08-24-22 @ 07:07]

## 2022-08-28 NOTE — PROGRESS NOTE ADULT - SUBJECTIVE AND OBJECTIVE BOX
CHIEF COMPLAINT:    Patient is a 89y old  Female who presents with a chief complaint of Lethargy     INTERVAL HPI/OVERNIGHT EVENTS:    Patient seen and examined at bedside. No acute overnight events occurred.    ROS: Unable to obtain. All other systems are negative.    Medications:  Standing  ampicillin  IVPB 2000 milliGRAM(s) IV Intermittent every 8 hours  aspirin  chewable 81 milliGRAM(s) Enteral Tube daily  budesonide  80 MICROgram(s)/formoterol 4.5 MICROgram(s) Inhaler 2 Puff(s) Inhalation two times a day  clopidogrel Tablet 75 milliGRAM(s) Enteral Tube daily  enoxaparin Injectable 50 milliGRAM(s) SubCutaneous every 12 hours  hydrALAZINE 25 milliGRAM(s) Oral three times a day  isosorbide   dinitrate Tablet (ISORDIL) 10 milliGRAM(s) Enteral Tube three times a day  latanoprost 0.005% Ophthalmic Solution 1 Drop(s) Both EYES at bedtime  levothyroxine 25 MICROGram(s) Oral daily  metoprolol succinate ER 25 milliGRAM(s) Oral daily  pantoprazole    Tablet 40 milliGRAM(s) Oral before breakfast  polyethylene glycol 3350 17 Gram(s) Oral daily  saccharomyces boulardii 250 milliGRAM(s) Oral two times a day  senna 2 Tablet(s) Oral at bedtime  vancomycin  IVPB 750 milliGRAM(s) IV Intermittent every 24 hours    PRN Meds  acetaminophen     Tablet .. 650 milliGRAM(s) Oral every 6 hours PRN  ALBUTerol    90 MICROgram(s) HFA Inhaler 2 Puff(s) Inhalation every 6 hours PRN  aluminum hydroxide/magnesium hydroxide/simethicone Suspension 30 milliLiter(s) Oral every 4 hours PRN  melatonin 3 milliGRAM(s) Oral at bedtime PRN  ondansetron Injectable 4 milliGRAM(s) IV Push every 8 hours PRN        Vital Signs:    T(F): 97 (08-28-22 @ 05:08), Max: 97 (08-28-22 @ 05:08)  HR: 71 (08-28-22 @ 05:08) (71 - 74)  BP: 169/74 (08-28-22 @ 05:08) (169/74 - 178/79)  RR: 18 (08-28-22 @ 05:08) (18 - 18)  SpO2: --  I&O's Summary    27 Aug 2022 07:01  -  28 Aug 2022 07:00  --------------------------------------------------------  IN: 2510 mL / OUT: 2500 mL / NET: 10 mL    28 Aug 2022 07:01  -  28 Aug 2022 10:43  --------------------------------------------------------  IN: 110 mL / OUT: 0 mL / NET: 110 mL      Daily     Daily   CAPILLARY BLOOD GLUCOSE      POCT Blood Glucose.: 105 mg/dL (28 Aug 2022 06:24)  POCT Blood Glucose.: 86 mg/dL (28 Aug 2022 00:42)  POCT Blood Glucose.: 100 mg/dL (27 Aug 2022 17:42)  POCT Blood Glucose.: 94 mg/dL (27 Aug 2022 11:58)      PHYSICAL EXAM:  GENERAL:  NAD  SKIN: No rashes or lesions  HEENT: Atraumatic. Normocephalic. Anicteric  NECK:  No JVD.   PULMONARY: Clear to ausculation bilaterally. No wheezing. No rales  CVS: Normal S1, S2. Regular rate and rhythm. No murmurs.  ABDOMEN/GI: Soft, Nontender, Nondistended; Bowel sounds are present  EXTREMITIES:  No edema B/L LE.  NEUROLOGIC:  No motor deficit.  PSYCH: Alert & oriented to place      LABS:                        11.8   8.91  )-----------( 282      ( 28 Aug 2022 07:11 )             36.0     08-28    138  |  100  |  24<H>  ----------------------------<  100<H>  4.6   |  26  |  0.8    Ca    9.1      28 Aug 2022 07:11  Mg     1.9     08-28    TPro  5.3<L>  /  Alb  3.0<L>  /  TBili  0.3  /  DBili  x   /  AST  22  /  ALT  16  /  AlkPhos  81  08-28          RADIOLOGY & ADDITIONAL TESTS:  Imaging or report Personally Reviewed:  [ ] YES  [ ] NO -->no new images    Telemetry reviewed independently - SVT this morning  EKG reviewed independently -->no new EKGs    Consultant(s) Notes Reviewed:  [ ] YES  [ ] NO  Care Discussed with Consultants/Other Providers [ ] YES  [ ] NO    Case discussed with resident  Care discussed with pt

## 2022-08-28 NOTE — PROGRESS NOTE ADULT - ASSESSMENT
pt admitted with metabolic encephalopathy , improving now , creatinine at baseline 3.8 , K 4.6 , Na 138 , on cyclospora

## 2022-08-29 LAB
ALBUMIN SERPL ELPH-MCNC: 2.9 G/DL — LOW (ref 3.5–5.2)
ALP SERPL-CCNC: 75 U/L — SIGNIFICANT CHANGE UP (ref 30–115)
ALT FLD-CCNC: 20 U/L — SIGNIFICANT CHANGE UP (ref 0–41)
ANION GAP SERPL CALC-SCNC: 11 MMOL/L — SIGNIFICANT CHANGE UP (ref 7–14)
AST SERPL-CCNC: 24 U/L — SIGNIFICANT CHANGE UP (ref 0–41)
BASOPHILS # BLD AUTO: 0.1 K/UL — SIGNIFICANT CHANGE UP (ref 0–0.2)
BASOPHILS NFR BLD AUTO: 1.1 % — HIGH (ref 0–1)
BILIRUB SERPL-MCNC: 0.4 MG/DL — SIGNIFICANT CHANGE UP (ref 0.2–1.2)
BUN SERPL-MCNC: 22 MG/DL — HIGH (ref 10–20)
CALCIUM SERPL-MCNC: 9 MG/DL — SIGNIFICANT CHANGE UP (ref 8.5–10.1)
CELIAC DISEASE INTERPRETATION: SIGNIFICANT CHANGE UP
CELIAC GENE PAIRS PRESENT: YES — SIGNIFICANT CHANGE UP
CHLORIDE SERPL-SCNC: 99 MMOL/L — SIGNIFICANT CHANGE UP (ref 98–110)
CO2 SERPL-SCNC: 26 MMOL/L — SIGNIFICANT CHANGE UP (ref 17–32)
CREAT SERPL-MCNC: 0.6 MG/DL — LOW (ref 0.7–1.5)
DQ ALPHA 1: SIGNIFICANT CHANGE UP
DQ BETA 1: SIGNIFICANT CHANGE UP
EGFR: 82 ML/MIN/1.73M2 — SIGNIFICANT CHANGE UP
EOSINOPHIL # BLD AUTO: 0.36 K/UL — SIGNIFICANT CHANGE UP (ref 0–0.7)
EOSINOPHIL NFR BLD AUTO: 4 % — SIGNIFICANT CHANGE UP (ref 0–8)
GLUCOSE BLDC GLUCOMTR-MCNC: 100 MG/DL — HIGH (ref 70–99)
GLUCOSE BLDC GLUCOMTR-MCNC: 104 MG/DL — HIGH (ref 70–99)
GLUCOSE BLDC GLUCOMTR-MCNC: 91 MG/DL — SIGNIFICANT CHANGE UP (ref 70–99)
GLUCOSE SERPL-MCNC: 92 MG/DL — SIGNIFICANT CHANGE UP (ref 70–99)
HCT VFR BLD CALC: 33.3 % — LOW (ref 37–47)
HGB BLD-MCNC: 11.3 G/DL — LOW (ref 12–16)
IMM GRANULOCYTES NFR BLD AUTO: 1.1 % — HIGH (ref 0.1–0.3)
IMMUNOGLOBULIN A (IGA), S: 179 MG/DL — SIGNIFICANT CHANGE UP (ref 61–356)
LYMPHOCYTES # BLD AUTO: 1.78 K/UL — SIGNIFICANT CHANGE UP (ref 1.2–3.4)
LYMPHOCYTES # BLD AUTO: 20 % — LOW (ref 20.5–51.1)
MAGNESIUM SERPL-MCNC: 1.7 MG/DL — LOW (ref 1.8–2.4)
MCHC RBC-ENTMCNC: 32.2 PG — HIGH (ref 27–31)
MCHC RBC-ENTMCNC: 33.9 G/DL — SIGNIFICANT CHANGE UP (ref 32–37)
MCV RBC AUTO: 94.9 FL — SIGNIFICANT CHANGE UP (ref 81–99)
MONOCYTES # BLD AUTO: 0.99 K/UL — HIGH (ref 0.1–0.6)
MONOCYTES NFR BLD AUTO: 11.1 % — HIGH (ref 1.7–9.3)
NEUTROPHILS # BLD AUTO: 5.59 K/UL — SIGNIFICANT CHANGE UP (ref 1.4–6.5)
NEUTROPHILS NFR BLD AUTO: 62.7 % — SIGNIFICANT CHANGE UP (ref 42.2–75.2)
NRBC # BLD: 0 /100 WBCS — SIGNIFICANT CHANGE UP (ref 0–0)
PLATELET # BLD AUTO: 296 K/UL — SIGNIFICANT CHANGE UP (ref 130–400)
POTASSIUM SERPL-MCNC: 4.6 MMOL/L — SIGNIFICANT CHANGE UP (ref 3.5–5)
POTASSIUM SERPL-SCNC: 4.6 MMOL/L — SIGNIFICANT CHANGE UP (ref 3.5–5)
PROT SERPL-MCNC: 5.2 G/DL — LOW (ref 6–8)
RBC # BLD: 3.51 M/UL — LOW (ref 4.2–5.4)
RBC # FLD: 14.5 % — SIGNIFICANT CHANGE UP (ref 11.5–14.5)
SODIUM SERPL-SCNC: 136 MMOL/L — SIGNIFICANT CHANGE UP (ref 135–146)
WBC # BLD: 8.92 K/UL — SIGNIFICANT CHANGE UP (ref 4.8–10.8)
WBC # FLD AUTO: 8.92 K/UL — SIGNIFICANT CHANGE UP (ref 4.8–10.8)

## 2022-08-29 PROCEDURE — 99233 SBSQ HOSP IP/OBS HIGH 50: CPT

## 2022-08-29 RX ORDER — PANTOPRAZOLE SODIUM 20 MG/1
40 TABLET, DELAYED RELEASE ORAL
Refills: 0 | Status: DISCONTINUED | OUTPATIENT
Start: 2022-08-29 | End: 2022-09-02

## 2022-08-29 RX ORDER — METOPROLOL TARTRATE 50 MG
12.5 TABLET ORAL
Refills: 0 | Status: DISCONTINUED | OUTPATIENT
Start: 2022-08-29 | End: 2022-09-06

## 2022-08-29 RX ORDER — CHLORHEXIDINE GLUCONATE 213 G/1000ML
1 SOLUTION TOPICAL DAILY
Refills: 0 | Status: DISCONTINUED | OUTPATIENT
Start: 2022-08-29 | End: 2022-09-06

## 2022-08-29 RX ORDER — MAGNESIUM SULFATE 500 MG/ML
2 VIAL (ML) INJECTION ONCE
Refills: 0 | Status: COMPLETED | OUTPATIENT
Start: 2022-08-29 | End: 2022-08-29

## 2022-08-29 RX ADMIN — LATANOPROST 1 DROP(S): 0.05 SOLUTION/ DROPS OPHTHALMIC; TOPICAL at 22:28

## 2022-08-29 RX ADMIN — Medication 12.5 MILLIGRAM(S): at 17:12

## 2022-08-29 RX ADMIN — Medication 1 APPLICATION(S): at 14:13

## 2022-08-29 RX ADMIN — BUDESONIDE AND FORMOTEROL FUMARATE DIHYDRATE 2 PUFF(S): 160; 4.5 AEROSOL RESPIRATORY (INHALATION) at 22:30

## 2022-08-29 RX ADMIN — Medication 200 MILLIGRAM(S): at 13:25

## 2022-08-29 RX ADMIN — Medication 25 MICROGRAM(S): at 05:52

## 2022-08-29 RX ADMIN — Medication 25 MILLIGRAM(S): at 22:02

## 2022-08-29 RX ADMIN — Medication 25 GRAM(S): at 08:47

## 2022-08-29 RX ADMIN — Medication 12.5 MILLIGRAM(S): at 06:53

## 2022-08-29 RX ADMIN — Medication 250 MILLIGRAM(S): at 17:12

## 2022-08-29 RX ADMIN — ENOXAPARIN SODIUM 50 MILLIGRAM(S): 100 INJECTION SUBCUTANEOUS at 05:53

## 2022-08-29 RX ADMIN — CHLORHEXIDINE GLUCONATE 1 APPLICATION(S): 213 SOLUTION TOPICAL at 14:13

## 2022-08-29 RX ADMIN — PANTOPRAZOLE SODIUM 40 MILLIGRAM(S): 20 TABLET, DELAYED RELEASE ORAL at 11:47

## 2022-08-29 RX ADMIN — ISOSORBIDE DINITRATE 10 MILLIGRAM(S): 5 TABLET ORAL at 05:53

## 2022-08-29 RX ADMIN — Medication 25 MILLIGRAM(S): at 05:53

## 2022-08-29 RX ADMIN — ISOSORBIDE DINITRATE 10 MILLIGRAM(S): 5 TABLET ORAL at 17:11

## 2022-08-29 RX ADMIN — ISOSORBIDE DINITRATE 10 MILLIGRAM(S): 5 TABLET ORAL at 11:39

## 2022-08-29 RX ADMIN — Medication 81 MILLIGRAM(S): at 11:39

## 2022-08-29 RX ADMIN — Medication 25 MILLIGRAM(S): at 13:25

## 2022-08-29 RX ADMIN — Medication 200 MILLIGRAM(S): at 05:53

## 2022-08-29 RX ADMIN — ENOXAPARIN SODIUM 50 MILLIGRAM(S): 100 INJECTION SUBCUTANEOUS at 17:13

## 2022-08-29 RX ADMIN — SENNA PLUS 2 TABLET(S): 8.6 TABLET ORAL at 22:02

## 2022-08-29 RX ADMIN — POLYETHYLENE GLYCOL 3350 17 GRAM(S): 17 POWDER, FOR SOLUTION ORAL at 11:47

## 2022-08-29 RX ADMIN — Medication 200 MILLIGRAM(S): at 21:58

## 2022-08-29 RX ADMIN — CLOPIDOGREL BISULFATE 75 MILLIGRAM(S): 75 TABLET, FILM COATED ORAL at 11:39

## 2022-08-29 RX ADMIN — Medication 250 MILLIGRAM(S): at 18:06

## 2022-08-29 NOTE — PROGRESS NOTE ADULT - SUBJECTIVE AND OBJECTIVE BOX
SUBJECTIVE:    Patient is a 89y old Female who presents with a chief complaint of Lethargy (28 Aug 2022 11:55)    Currently admitted to medicine with the primary diagnosis of:    Today is hospital day 7d.     Overnight Events:     No significant overnight events    PAST MEDICAL & SURGICAL HISTORY  High blood cholesterol    Hypothyroid    Hypertension    COPD (chronic obstructive pulmonary disease)    CAD (coronary artery disease)    History of coronary artery stent placement    History of cholecystectomy    H/O abdominal hysterectomy    Cataract      SOCIAL HISTORY:  No significant social hx    ALLERGIES:  No Known Allergies    MEDICATIONS:  STANDING MEDICATIONS  ampicillin  IVPB 2000 milliGRAM(s) IV Intermittent every 8 hours  aspirin  chewable 81 milliGRAM(s) Enteral Tube daily  BACItracin   Ointment 1 Application(s) Topical daily  budesonide  80 MICROgram(s)/formoterol 4.5 MICROgram(s) Inhaler 2 Puff(s) Inhalation two times a day  chlorhexidine 2% Cloths 1 Application(s) Topical daily  clopidogrel Tablet 75 milliGRAM(s) Enteral Tube daily  enoxaparin Injectable 50 milliGRAM(s) SubCutaneous every 12 hours  hydrALAZINE 25 milliGRAM(s) Oral three times a day  isosorbide   dinitrate Tablet (ISORDIL) 10 milliGRAM(s) Enteral Tube three times a day  latanoprost 0.005% Ophthalmic Solution 1 Drop(s) Both EYES at bedtime  levothyroxine 25 MICROGram(s) Oral daily  metoprolol tartrate 12.5 milliGRAM(s) Oral two times a day  pantoprazole   Suspension 40 milliGRAM(s) Oral before breakfast  polyethylene glycol 3350 17 Gram(s) Oral daily  saccharomyces boulardii 250 milliGRAM(s) Oral two times a day  senna 2 Tablet(s) Oral at bedtime  vancomycin  IVPB 750 milliGRAM(s) IV Intermittent every 24 hours    PRN MEDICATIONS  acetaminophen     Tablet .. 650 milliGRAM(s) Oral every 6 hours PRN  ALBUTerol    90 MICROgram(s) HFA Inhaler 2 Puff(s) Inhalation every 6 hours PRN  aluminum hydroxide/magnesium hydroxide/simethicone Suspension 30 milliLiter(s) Oral every 4 hours PRN  melatonin 3 milliGRAM(s) Oral at bedtime PRN  ondansetron Injectable 4 milliGRAM(s) IV Push every 8 hours PRN    VITALS:   ICU Vital Signs Last 24 Hrs  T(C): 37.1 (28 Aug 2022 20:55), Max: 37.1 (28 Aug 2022 20:55)  T(F): 98.8 (28 Aug 2022 20:55), Max: 98.8 (28 Aug 2022 20:55)  HR: 75 (29 Aug 2022 04:49) (74 - 79)  BP: 164/72 (29 Aug 2022 04:49) (125/60 - 164/72)  RR: 18 (28 Aug 2022 20:55) (18 - 18)  SpO2: 96% (29 Aug 2022 08:56) (96% - 96%)    O2 Parameters below as of 29 Aug 2022 08:56  Patient On (Oxygen Delivery Method): room air      LABS:                        11.3   8.92  )-----------( 296      ( 29 Aug 2022 06:21 )             33.3     08-29    136  |  99  |  22<H>  ----------------------------<  92  4.6   |  26  |  0.6<L>    Ca    9.0      29 Aug 2022 06:21  Mg     1.7     08-29    TPro  5.2<L>  /  Alb  2.9<L>  /  TBili  0.4  /  DBili  x   /  AST  24  /  ALT  20  /  AlkPhos  75  08-29      RADIOLOGY:    < from: Xray Chest 1 View- PORTABLE-Routine (Xray Chest 1 View- PORTABLE-Routine .) (08.28.22 @ 09:34) >    ACC: [id number] EXAM:  XR CHEST PORTABLE ROUTINE 1V                          PROCEDURE DATE:  08/28/2022      INTERPRETATION:  Clinical History / Reason for exam: Bilateral crackles    Impression:  Increased left basilar opacity/effusion    --- End of Report ---        PHYSICAL EXAM:  GEN:   LUNGS:   HEART:   ABD:   EXT:   NEURO:      SUBJECTIVE:    89F PMHx COPD, asthma, b/l chronic bronchiectasis, DLD, HTN, CAD s/p PCI, hypothyroidism presented for worsening lethargy and decreased responsiveness. Patient unable to participate in history. Called daughter, did not . Patient is vaccinated for COVID x2, no booster.    Patient was recently admitted w/ cyclospora gastroenteritis & cystitis (tx w/ bactrim and flagyl) and stay was complicated by metabolic encephalopathy.     In the ED, VS: T 37.6 (rectal), HR 80, /84, RR 17, SpO2 96% on 3 L NC (was on 2 L at NH).    UA +ve  WBC 16, PMN 73.5%  lactate 2.2  s/p vanco 1.5 g & cefepime 2 g    Left sided droop was noted, code stroke was called. CT head, A/P performed -> negative for acute events  CT Chest showed acute b/l subsegmental PE w/o right heart strain -> s/p Lovenox 60    Today is hospital day 7d.     Overnight Events:     No significant overnight events    PAST MEDICAL & SURGICAL HISTORY  High blood cholesterol    Hypothyroid    Hypertension    COPD (chronic obstructive pulmonary disease)    CAD (coronary artery disease)    History of coronary artery stent placement    History of cholecystectomy    H/O abdominal hysterectomy    Cataract      SOCIAL HISTORY:  No significant social hx    ALLERGIES:  No Known Allergies    MEDICATIONS:  STANDING MEDICATIONS  ampicillin  IVPB 2000 milliGRAM(s) IV Intermittent every 8 hours  aspirin  chewable 81 milliGRAM(s) Enteral Tube daily  BACItracin   Ointment 1 Application(s) Topical daily  budesonide  80 MICROgram(s)/formoterol 4.5 MICROgram(s) Inhaler 2 Puff(s) Inhalation two times a day  chlorhexidine 2% Cloths 1 Application(s) Topical daily  clopidogrel Tablet 75 milliGRAM(s) Enteral Tube daily  enoxaparin Injectable 50 milliGRAM(s) SubCutaneous every 12 hours  hydrALAZINE 25 milliGRAM(s) Oral three times a day  isosorbide   dinitrate Tablet (ISORDIL) 10 milliGRAM(s) Enteral Tube three times a day  latanoprost 0.005% Ophthalmic Solution 1 Drop(s) Both EYES at bedtime  levothyroxine 25 MICROGram(s) Oral daily  metoprolol tartrate 12.5 milliGRAM(s) Oral two times a day  pantoprazole   Suspension 40 milliGRAM(s) Oral before breakfast  polyethylene glycol 3350 17 Gram(s) Oral daily  saccharomyces boulardii 250 milliGRAM(s) Oral two times a day  senna 2 Tablet(s) Oral at bedtime  vancomycin  IVPB 750 milliGRAM(s) IV Intermittent every 24 hours    PRN MEDICATIONS  acetaminophen     Tablet .. 650 milliGRAM(s) Oral every 6 hours PRN  ALBUTerol    90 MICROgram(s) HFA Inhaler 2 Puff(s) Inhalation every 6 hours PRN  aluminum hydroxide/magnesium hydroxide/simethicone Suspension 30 milliLiter(s) Oral every 4 hours PRN  melatonin 3 milliGRAM(s) Oral at bedtime PRN  ondansetron Injectable 4 milliGRAM(s) IV Push every 8 hours PRN    VITALS:   ICU Vital Signs Last 24 Hrs  T(C): 37.1 (28 Aug 2022 20:55), Max: 37.1 (28 Aug 2022 20:55)  T(F): 98.8 (28 Aug 2022 20:55), Max: 98.8 (28 Aug 2022 20:55)  HR: 75 (29 Aug 2022 04:49) (74 - 79)  BP: 164/72 (29 Aug 2022 04:49) (125/60 - 164/72)  RR: 18 (28 Aug 2022 20:55) (18 - 18)  SpO2: 96% (29 Aug 2022 08:56) (96% - 96%)    O2 Parameters below as of 29 Aug 2022 08:56  Patient On (Oxygen Delivery Method): room air      LABS:                        11.3   8.92  )-----------( 296      ( 29 Aug 2022 06:21 )             33.3     08-29    136  |  99  |  22<H>  ----------------------------<  92  4.6   |  26  |  0.6<L>    Ca    9.0      29 Aug 2022 06:21  Mg     1.7     08-29    TPro  5.2<L>  /  Alb  2.9<L>  /  TBili  0.4  /  DBili  x   /  AST  24  /  ALT  20  /  AlkPhos  75  08-29      RADIOLOGY:    < from: Xray Chest 1 View- PORTABLE-Routine (Xray Chest 1 View- PORTABLE-Routine .) (08.28.22 @ 09:34) >    ACC: [id number] EXAM:  XR CHEST PORTABLE ROUTINE 1V                          PROCEDURE DATE:  08/28/2022      INTERPRETATION:  Clinical History / Reason for exam: Bilateral crackles    Impression:  Increased left basilar opacity/effusion    --- End of Report ---        PHYSICAL EXAM:  GEN: in NAD, ill appearing  LUNGS: clear to ascultation b/l  HEART: normal rate and rhythm  ABD: soft, nontender, nondistended  EXT: no LE edema b/l  NEURO: A&Ox2/3

## 2022-08-29 NOTE — PROGRESS NOTE ADULT - ASSESSMENT
89 yo F PMHx asthma, COPD, hyperactive airway, b/l chronic bronchiectasis, DLD, HTN, CAD s/p PCI, hypothyroidism presented for evaluation of worsening metabolic encephalopathy. Patient was recently admitted w/ cyclospora gastroenteritis & cystitis. Course was complicated by metabolic encephalopathy. Upon ER arrival pt found to have subsegmental PE and positive urinalysis.     #Suspected Metabolic encephalopathy likely 2/2 suspected UTI  #H/O recent colitis (cyclospora PCR positive) treated w/ bactrim & Flagyl until 8/20/22  - hold off LP as pt will not be able to tolerate it (cannot lie flat and is aspiration risk)  - MRI, EEG negative  - BCx, UCx negative  - c/w ampicillin 2gram IV Q8  - c/w vancomycin 750mg IV q24  - acyclovir held  - Patient improved the most with ampicillin  - c/w saccharomyces boulardii  - pt was able to participate with speech and swallow--pt has ability to swallow but really needs to be coached and reminded to swallow--S&S recommended this as comfort feeds. Will keep NGT in for now and encourage PO intake    ZAYDA  Oliguria  Hyperkalemia  - resolved  - bladder scan show no residual  - continue to hold acyclovir  - fluids stopped    Left opacity  - vancomycin    Subsegmental PE  - duplex shows peroneal thrombus  - c/w Lovenox, will switch to oral eliquis once pt able to take po consistently  - c/w telemetry for now while awaiting Echo  - c/w oxygen supplementation  - Echo pending    Nodular Thyroid  Hypothyroidism  - TSH mildly elevated but will hold off med for now    Lactic acidosis, resolving  - IV maintenance D5 1/2 NS 70 ml/hr    Metabolic alkalosis  - unclear source (not on diuresis or bicarb, not vomiting)  - resolved    Asthma  COPD  b/l chronic bronchiectasis  - c/w albuterol & Symbicort    DLD  HTN  CAD s/p PCI  - c/w isosorbide dinitrate, aspirin, Plavix, metoprolol  - c/w lisinopril --held for now due to ZAYDA    Urinary Retention?  - acevedo remains in place  - had bladder distension on CT   - bladder scan shows no retention but has oliguria    Diverticulosis  - avoid constipation    Weight loss  Hx abdominal pain  - patient refused outpatient colonoscopy & EGD    Trace right pleural effusion  Atelectasis  - incentive spirometry    Right hilar 9 mm LN (decreased in size)  - outpatient f/u    Constipation  - c/w Senna & Miralax    Glaucoma  - c/w latanoprost    # DVT PPx  - Lovenox -- if SCr continues to rise will need to change to heparin    # GI PPx  - c/w Protonix    # Diet  - DNR, DNI   89 yo F PMHx asthma, COPD, hyperactive airway, b/l chronic bronchiectasis, DLD, HTN, CAD s/p PCI, hypothyroidism presented for evaluation of worsening metabolic encephalopathy. Patient was recently admitted w/ cyclospora gastroenteritis & cystitis. Course was complicated by metabolic encephalopathy. Upon ER arrival pt found to have subsegmental PE and positive urinalysis.     #Suspected Metabolic encephalopathy likely 2/2 suspected UTI  #H/O recent colitis (cyclospora PCR positive) treated w/ bactrim & Flagyl until 8/20/22  - hold off LP as pt will not be able to tolerate it (cannot lie flat and is aspiration risk)  - MRI, EEG negative  - BCx, UCx negative  - c/w ampicillin 2gram IV Q8  - c/w vancomycin 750mg IV q24  - acyclovir held  - Patient improved the most with ampicillin, c/w saccharomyces boulardii  - Speech and Swallow: recommended this as comfort feeds, will keep NGT in for now and encourage PO intake    #ZAYDA  #Oliguria  #Hyperkalemia  - resolved  - bladder scan showed no residual  - continue to hold acyclovir  - d/hamida IVFs    #Left opacity  - vancomycin    Subsegmental PE  - duplex shows peroneal thrombus  - c/w Lovenox, will switch to oral eliquis once pt able to take po consistently  - c/w telemetry for now while awaiting Echo  - c/w oxygen supplementation  - Echo pending    Nodular Thyroid  Hypothyroidism  - TSH mildly elevated but will hold off med for now    Lactic acidosis, resolving  - IV maintenance D5 1/2 NS 70 ml/hr    Metabolic alkalosis  - unclear source (not on diuresis or bicarb, not vomiting)  - resolved    Asthma  COPD  b/l chronic bronchiectasis  - c/w albuterol & Symbicort    DLD  HTN  CAD s/p PCI  - c/w isosorbide dinitrate, aspirin, Plavix, metoprolol  - c/w lisinopril --held for now due to ZAYDA    Urinary Retention?  - acevedo remains in place  - had bladder distension on CT   - bladder scan shows no retention but has oliguria    Diverticulosis  - avoid constipation    Weight loss  Hx abdominal pain  - patient refused outpatient colonoscopy & EGD    Trace right pleural effusion  Atelectasis  - incentive spirometry    Right hilar 9 mm LN (decreased in size)  - outpatient f/u    Constipation  - c/w Senna & Miralax    Glaucoma  - c/w latanoprost    # DVT PPx  - Lovenox -- if SCr continues to rise will need to change to heparin    # GI PPx  - c/w Protonix    # Diet  - DNR, DNI   89 yo F PMHx asthma, COPD, hyperactive airway, b/l chronic bronchiectasis, DLD, HTN, CAD s/p PCI, hypothyroidism presented for evaluation of worsening metabolic encephalopathy. Patient was recently admitted w/ cyclospora gastroenteritis & cystitis. Course was complicated by metabolic encephalopathy. Upon ER arrival pt found to have subsegmental PE and positive urinalysis.     #Suspected Metabolic encephalopathy likely 2/2 suspected UTI  #H/O recent colitis (cyclospora PCR positive) treated w/ bactrim & Flagyl until 8/20/22  - hold off LP as pt will not be able to tolerate it (cannot lie flat and is aspiration risk)  - MRI, EEG negative  - BCx, UCx negative  - c/w ampicillin 2gram IV Q8  - c/w vancomycin 750mg IV q24  - acyclovir held  - Patient improved the most with ampicillin, c/w saccharomyces boulardii  - Speech and Swallow: recommended this as comfort feeds, will keep NGT in for now and encourage PO intake    #Hypomagnesemia  - Mg 1.7  - repleting    #ZAYDA  #Oliguria  #Hyperkalemia  - resolved  - bladder scan showed no residual  - continue to hold acyclovir  - d/hamida IVFs    #Left Lung Opacity  - CXR showed increased left basilar opacity/effusion  - c/w vancomycin 750mg IV q24    #Subsegmental PE R/L Lower Lobe  - duplex shows peroneal thrombus  - CTA showed small intraluminal filling defects noted in subsegmental branches of L/R lower lobe pulm arteries consistent w/ acute pulmonary emboli (no evidence of right heart strain)  - c/w Lovenox, will switch to oral Eliquis once pt able to take po consistently  - c/w telemetry for now while awaiting Echo  - c/w oxygen supplementation  - Echo pending (discontinue)    #Nodular Thyroid  #Hypothyroidism  - TSH mildly elevated but will hold off med for now    #Lactic acidosis, resolving  - IV maintenance D5 1/2 NS 70 ml/hr    #Metabolic alkalosis  - unclear source (not on diuresis or bicarb, not vomiting)  - resolved    #Asthma  #COPD  #b/l chronic bronchiectasis  - c/w albuterol & Symbicort    #DLD  #HTN  #CAD s/p PCI  - c/w isosorbide dinitrate, aspirin, Plavix, metoprolol  - Metoprolol changed from ER 25mg to 12.5mg BID  - c/w lisinopril --held for now due to ZAYDA    #Suspected Urinary Retention  - acevedo remains  - had bladder distension on CT   - bladder scan shows no retention but has oliguria    #Diverticulosis  - avoid constipation    #Weight loss  #Hx abdominal pain  - patient refused outpatient colonoscopy & EGD    #Trace right pleural effusion  #Atelectasis  - encourage incentive spirometry    #Right hilar 9 mm LN (decreased in size)  - outpatient f/u    #Constipation  - c/w Senna & Miralax    #Glaucoma  - c/w latanoprost    #Misc  - DVT PPx: Lovenox -- if SCr continues to rise will need to change to heparin  - GI PPx: Protonix  - Code Status: DNR, DNI  - Dispo: d/c tele

## 2022-08-29 NOTE — PROGRESS NOTE ADULT - SUBJECTIVE AND OBJECTIVE BOX
HPI: 89F with PMH of COPD, asthma, HTN, CAD s/p PCI, hypothyroidism, here after recent admission at [facility] for cyclospora gastroenteritis and cystitis and metabolic encephalopathy, discharged to [facility] and admitted here for worsening lethargy. Code stroke called on admission, with CT head negative, but CTA showing acute bilateral subsegmental PE, now on therapeutic lovenox. Patient did not pass speech and swallow, and palliative care called to help discuss nutritional GOC.    INTERVAL EVENTS:  8/25: patient less mentally alert today  8/26: patient remains with AMS, has NGT in place, appears comfortbale  8/29: patient more alert, denies any complaints    ADVANCE DIRECTIVES:    DNR  MOLST  [ ]  Living Will  [ ]   DECISION MAKER(s):  [ ] Health Care Proxy(s)  [ ] Surrogate(s)  [ ] Guardian           Name(s): Phone Number(s): daughter    BASELINE (I)ADL(s) (prior to admission):  [place]: [ ]Total  [ ] Moderate [ ]Dependent  Palliative Performance Status Version 2:         %    http://npcrc.org/files/news/palliative_performance_scale_ppsv2.pdf    Allergies    No Known Allergies    Intolerances    MEDICATIONS  (STANDING):  ampicillin  IVPB 2000 milliGRAM(s) IV Intermittent every 8 hours  aspirin  chewable 81 milliGRAM(s) Enteral Tube daily  BACItracin   Ointment 1 Application(s) Topical daily  budesonide  80 MICROgram(s)/formoterol 4.5 MICROgram(s) Inhaler 2 Puff(s) Inhalation two times a day  chlorhexidine 2% Cloths 1 Application(s) Topical daily  clopidogrel Tablet 75 milliGRAM(s) Enteral Tube daily  enoxaparin Injectable 50 milliGRAM(s) SubCutaneous every 12 hours  hydrALAZINE 25 milliGRAM(s) Oral three times a day  isosorbide   dinitrate Tablet (ISORDIL) 10 milliGRAM(s) Enteral Tube three times a day  latanoprost 0.005% Ophthalmic Solution 1 Drop(s) Both EYES at bedtime  levothyroxine 25 MICROGram(s) Oral daily  metoprolol tartrate 12.5 milliGRAM(s) Oral two times a day  pantoprazole   Suspension 40 milliGRAM(s) Oral before breakfast  polyethylene glycol 3350 17 Gram(s) Oral daily  saccharomyces boulardii 250 milliGRAM(s) Oral two times a day  senna 2 Tablet(s) Oral at bedtime  vancomycin  IVPB 750 milliGRAM(s) IV Intermittent every 24 hours    MEDICATIONS  (PRN):  acetaminophen     Tablet .. 650 milliGRAM(s) Oral every 6 hours PRN Temp greater or equal to 38C (100.4F), Mild Pain (1 - 3)  ALBUTerol    90 MICROgram(s) HFA Inhaler 2 Puff(s) Inhalation every 6 hours PRN Shortness of Breath and/or Wheezing  aluminum hydroxide/magnesium hydroxide/simethicone Suspension 30 milliLiter(s) Oral every 4 hours PRN Dyspepsia  melatonin 3 milliGRAM(s) Oral at bedtime PRN Insomnia  ondansetron Injectable 4 milliGRAM(s) IV Push every 8 hours PRN Nausea and/or Vomiting    PRESENT SYMPTOMS: [ ]Unable to obtain due to poor mentation   Source if other than patient:  [ ]Family   [ ]Team     Pain: [ ]yes [ X]no  QOL impact -   Location -                    Aggravating factors -  Quality -  Radiation -  Timing-  Severity (0-10 scale):  Minimal acceptable level (0-10 scale):     CPOT:    https://www.sccm.org/getattachment/mwl17u12-7z5t-1v9o-2a5n-9015b1640a3n/Critical-Care-Pain-Observation-Tool-(CPOT)      PAIN AD Score: 0    http://geriatrictoolkit.[place].[email or web address]/cog/painad.pdf (press ctrl +  left click to view)    Dyspnea:                           [ ]Mild [ ]Moderate [ ]Severe  Anxiety:                             [ ]Mild [ ]Moderate [ ]Severe  Fatigue:                             [ ]Mild [ ]Moderate [ ]Severe  Nausea:                             [ ]Mild [ ]Moderate [ ]Severe  Loss of appetite:              [ ]Mild [ ]Moderate [ ]Severe  Constipation:                    [ ]Mild [ ]Moderate [ ]Severe    Other Symptoms:  [X ]All other review of systems negative     Palliative Performance Status Version 2:         %    http://npcrc.org/files/news/palliative_performance_scale_ppsv2.pdf  PHYSICAL EXAM:  Vital Signs Last 24 Hrs  T(C): 37.1 (28 Aug 2022 20:55), Max: 37.1 (28 Aug 2022 20:55)  T(F): 98.8 (28 Aug 2022 20:55), Max: 98.8 (28 Aug 2022 20:55)  HR: 75 (29 Aug 2022 04:49) (74 - 75)  BP: 164/72 (29 Aug 2022 04:49) (125/60 - 164/72)  BP(mean): --  RR: 18 (28 Aug 2022 20:55) (18 - 18)  SpO2: 96% (29 Aug 2022 08:56) (96% - 96%)    Parameters below as of 29 Aug 2022 08:56  Patient On (Oxygen Delivery Method): room air        GENERAL:  [ ]Alert  [ ]Oriented x   [ ]Lethargic  [ ]Cachexia  [ ]Unarousable  [X ]Verbal  [ ]Non-Verbal  Behavioral:   [ ] Anxiety  [ ] Delirium [ ] Agitation [X ] Other calm  HEENT:  [ ]Normal   [X ]Dry mouth   [ ]ET Tube/Trach  [ ]Oral lesions  PULMONARY:   [ ]Clear [ ]Tachypnea  [ ]Audible excessive secretions [X] No labored breathing  [ ]Rhonchi        [ ]Right [ ]Left [ ]Bilateral  [ ]Crackles        [ ]Right [ ]Left [ ]Bilateral  [ ]Wheezing     [ ]Right [ ]Left [ ]Bilateral  [ ]Diminished breath sounds [ ]right [ ]left [ ]bilateral  CARDIOVASCULAR:    [ ]Regular [ ]Irregular [ ]Tachy  [ ]Brent [ ]Murmur [ ]Other  GASTROINTESTINAL:  [ ]Soft  [ X]Nondistended   [ ]+BS  [ ]Non tender [ ]Tender  [ ]PEG [ X]OGT/ NGT  Last BM:   GENITOURINARY:  [ X]Normal [ ] Incontinent   [ ]Oliguria/Anuria   [ ][place]  MUSCULOSKELETAL:   [ ]Normal   [X ]Weakness  [ ]Bed/Wheelchair bound [ ]Edema  NEUROLOGIC:   [ ]No focal deficits  [ ]Cognitive impairment  [X ]Dysphagia [ ]Dysarthria [ ]Paresis [ ]Other   SKIN:   [ ]Normal   [X] Nonjaundiced [ ]Rash  [ ]Pressure ulcer(s)       Present on admission [ ]y [ ]n    CRITICAL CARE:  [ ] Shock Present  [ ]Septic [ ]Cardiogenic [ ]Neurologic [ ]Hypovolemic  [ ]  Vasopressors [ ]  Inotropes   [ ]Respiratory failure present [ ]Mechanical ventilation [ ]Non-invasive ventilatory support [ ]High flow  [ ]Acute  [ ]Chronic [ ]Hypoxic  [ ]Hypercarbic [ ]Other  [ ]Other organ failure     LABS: reviewed                                   11.3   8.92  )-----------( 296      ( 29 Aug 2022 06:21 )             33.3     08-29    136  |  99  |  22<H>  ----------------------------<  92  4.6   |  26  |  0.6<L>    Ca    9.0      29 Aug 2022 06:21  Mg     1.7     08-29    RADIOLOGY & ADDITIONAL STUDIES:    MR head 8/23/22    No acute infarct, intracranial hemorrhage, or midline shift.    Moderate-severe chronic microvascular ischemic changes, significantly   progressed since 1/9/2017.    Moderate right/mild left mastoid effusion, nonspecific.    PROTEIN CALORIE MALNUTRITION PRESENT: [ ]mild [ ]moderate [ ]severe [ ]underweight [ ]morbid obesity  https://www.andeal.org/vault/2440/web/files/ONC/Table_Clinical%20Characteristics%20to%20Document%20Malnutrition-White%20JV%20et%20al%202012.pdf    Height (cm): 172.7 (08-22-22 @ 20:22), 172.7 (08-05-22 @ 03:26)  Weight (kg): 53.5 (08-23-22 @ 07:39), 72.3 (08-05-22 @ 03:26)  BMI (kg/m2): 17.9 (08-23-22 @ 07:39), 24.2 (08-22-22 @ 20:22), 24.2 (08-05-22 @ 03:26)    [ ]PPSV2 < or = to 30% [ ]significant weight loss  [ ]poor nutritional intake  [ ]anasarca      [ ]Artificial Nutrition      REFERRALS:   [ ]Chaplaincy  [ ]Hospice  [ ]Child Life  [ ]Social Work  [ ]Case management [ ]Holistic Therapy     Goals of Care Document:     ______________  Alex Carbone MD  Palliative Medicine  [facility]   of Geriatric and Palliative Medicine  [phone]

## 2022-08-29 NOTE — PROGRESS NOTE ADULT - SUBJECTIVE AND OBJECTIVE BOX
CHIEF COMPLAINT:    Patient is a 89y old  Female who presents with a chief complaint of Lethargy     INTERVAL HPI/OVERNIGHT EVENTS:    Patient seen and examined at bedside. No acute overnight events occurred.    ROS: Denies sob, chest pain. All other systems are negative.    Medications:  Standing  ampicillin  IVPB 2000 milliGRAM(s) IV Intermittent every 8 hours  aspirin  chewable 81 milliGRAM(s) Enteral Tube daily  BACItracin   Ointment 1 Application(s) Topical daily  budesonide  80 MICROgram(s)/formoterol 4.5 MICROgram(s) Inhaler 2 Puff(s) Inhalation two times a day  chlorhexidine 2% Cloths 1 Application(s) Topical daily  clopidogrel Tablet 75 milliGRAM(s) Enteral Tube daily  enoxaparin Injectable 50 milliGRAM(s) SubCutaneous every 12 hours  hydrALAZINE 25 milliGRAM(s) Oral three times a day  isosorbide   dinitrate Tablet (ISORDIL) 10 milliGRAM(s) Enteral Tube three times a day  latanoprost 0.005% Ophthalmic Solution 1 Drop(s) Both EYES at bedtime  levothyroxine 25 MICROGram(s) Oral daily  metoprolol tartrate 12.5 milliGRAM(s) Oral two times a day  pantoprazole   Suspension 40 milliGRAM(s) Oral before breakfast  polyethylene glycol 3350 17 Gram(s) Oral daily  saccharomyces boulardii 250 milliGRAM(s) Oral two times a day  senna 2 Tablet(s) Oral at bedtime  vancomycin  IVPB 750 milliGRAM(s) IV Intermittent every 24 hours    PRN Meds  acetaminophen     Tablet .. 650 milliGRAM(s) Oral every 6 hours PRN  ALBUTerol    90 MICROgram(s) HFA Inhaler 2 Puff(s) Inhalation every 6 hours PRN  aluminum hydroxide/magnesium hydroxide/simethicone Suspension 30 milliLiter(s) Oral every 4 hours PRN  melatonin 3 milliGRAM(s) Oral at bedtime PRN  ondansetron Injectable 4 milliGRAM(s) IV Push every 8 hours PRN        Vital Signs:    T(F): 98.8 (08-28-22 @ 20:55), Max: 98.8 (08-28-22 @ 20:55)  HR: 75 (08-29-22 @ 04:49) (74 - 79)  BP: 164/72 (08-29-22 @ 04:49) (125/60 - 164/72)  RR: 18 (08-28-22 @ 20:55) (18 - 18)  SpO2: 96% (08-29-22 @ 08:56) (96% - 96%)  I&O's Summary    28 Aug 2022 07:01  -  29 Aug 2022 07:00  --------------------------------------------------------  IN: 1540 mL / OUT: 1375 mL / NET: 165 mL    29 Aug 2022 07:01  -  29 Aug 2022 13:14  --------------------------------------------------------  IN: 370 mL / OUT: 0 mL / NET: 370 mL      POCT Blood Glucose.: 100 mg/dL (29 Aug 2022 11:57)  POCT Blood Glucose.: 104 mg/dL (29 Aug 2022 07:41)  POCT Blood Glucose.: 91 mg/dL (29 Aug 2022 06:10)  POCT Blood Glucose.: 86 mg/dL (28 Aug 2022 23:58)  POCT Blood Glucose.: 112 mg/dL (28 Aug 2022 18:14)      PHYSICAL EXAM:  GENERAL:  NAD  SKIN: No rashes or lesions  HEENT: Atraumatic. Normocephalic. Anicteric  NECK:  No JVD.   PULMONARY: Clear to ausculation bilaterally. No wheezing. No rales  CVS: Normal S1, S2. Regular rate and rhythm. No murmurs.  ABDOMEN/GI: Soft, Nontender, Nondistended; Bowel sounds are present  EXTREMITIES:  No edema B/L LE.  NEUROLOGIC:  Non partcipatory.  PSYCH: Alert & oriented x 3,       LABS:                        11.3   8.92  )-----------( 296      ( 29 Aug 2022 06:21 )             33.3     08-29    136  |  99  |  22<H>  ----------------------------<  92  4.6   |  26  |  0.6<L>    Ca    9.0      29 Aug 2022 06:21  Mg     1.7     08-29    TPro  5.2<L>  /  Alb  2.9<L>  /  TBili  0.4  /  DBili  x   /  AST  24  /  ALT  20  /  AlkPhos  75  08-29      RADIOLOGY & ADDITIONAL TESTS:  Imaging or report Personally Reviewed:  [ ] YES  [ ] NO -->no new images    Telemetry reviewed independently - NSR, no acute events  EKG reviewed independently -->no new EKGs    Consultant(s) Notes Reviewed:  [ ] YES  [ ] NO  Care Discussed with Consultants/Other Providers [ ] YES  [ ] NO    Case discussed with resident  Care discussed with pt

## 2022-08-29 NOTE — SWALLOW BEDSIDE ASSESSMENT ADULT - SLP PERTINENT HISTORY OF CURRENT PROBLEM
89 yo Female w/ PMhx of HTN, CAD s/p stent, COPD, hypothyroidism, was sent in from [facility] for worsening AMS. Recently admitted to [facility] a week ago for abdominal pain, was treated for colitis and d/c 8/19 to NH. On arrival to ED stroke code was activated but was cancelled as per telestroke attending. Labs reveal leukocytosis, +UTI. CTH, CTA negative. CT Perfusion revealed deficit in left temporo-occipital region without core infarct. MRI negative, moderate-severe chronic microvascular ischemic changes, progressed since 1/9/2017. ?AMS Likely seizure in the setting of infection, toxic metabolic encephalopathy r/o Stroke .Pt seems to have responded to ampicillin so could have been possible Listeria meningitis?

## 2022-08-29 NOTE — SWALLOW BEDSIDE ASSESSMENT ADULT - COMMENTS
Pt is known to ST services from [facility]. Recent VFSS 8/16 w/ silent aspiration +swallow improved w/ use of chin tuck, however pt w/ poor carryover of use of chin tuck.
Pt's daughter reports her mother was independent in all ADLs and was tolerating a regular diet w/ thin liquids 2 weeks prior to admission.
Pt's daughter reports her mother was independent in all ADLs and was tolerating a regular diet w/ thin liquids 2 weeks prior to admission.  Pt'd daughter reports wishes for NGT.

## 2022-08-29 NOTE — PROGRESS NOTE ADULT - ASSESSMENT
89F with PMH of COPD, asthma, HTN, CAD s/p PCI, hypothyroidism, here after recent admission at [facility] for cyclospora gastroenteritis and cystitis and metabolic encephalopathy, discharged to [facility] and admitted here for worsening lethargy. Code stroke called on admission, with CT head negative, but CTA showing acute bilateral subsegmental PE, now on therapeutic lovenox. Patient did not pass speech and swallow, and palliative care called to help discuss nutritional GOC.

## 2022-08-29 NOTE — CHART NOTE - NSCHARTNOTEFT_GEN_A_CORE
Patient was observed to be resting.  T/C to daughter, Magdalena Xie:  reviewed options for Hospice and provided information regarding CMO in hospital.  Support rendered.

## 2022-08-29 NOTE — PROGRESS NOTE ADULT - ASSESSMENT
89 yo F PMHx asthma, COPD, hyperactive airway, b/l chronic bronchiectasis, DLD, HTN, CAD s/p PCI, hypothyroidism presented for evaluation of worsening metabolic encephalopathy. Patient was recently admitted w/ cyclospora gastroenteritis & cystitis. Course was complicated by metabolic encephalopathy. Upon ER arrival pt found to have subsegmental PE and positive urinalysis.     Suspected Metabolic encephalopathy from suspected UTI   History of recent colitis as well as cyclospora PCR positive treated with bactrim & Flagyl until 8/20/22  - hold off LP as pt will not be able to tolerate it (cannot lie flat and is aspiration risk)  - pt seems to have improved the most with ampicillin   - MRI, EEG negative, UCx negative,  - c/w vancomycin  - acyclovir held  - c/w saccharomyces boulardii  - pt was able to participate with speech and swallow--pt has ability to swallow but really needs to be coached and reminded to swallow--S&S recommended this as comfort feeds. Will keep NGT in for now and encourage PO intake    ZAYDA  Oliguria  Hyperkalemia  - resolved  - bladder scan show no residual  - continue to hold acyclovir  - fluids stopped    Left opacity  - vancomycin    Subsegmental PE  - duplex shows peroneal thrombus  - c/w Lovenox, will switch to oral eliquis once pt able to take po consistently  - c/w telemetry for now while awaiting Echo  - c/w oxygen supplementation  - Echo pending    Nodular Thyroid  Hypothyroidism  - TSH mildly elevated but will hold off med for now    Lactic acidosis, resolving  - IV maintenance D5 1/2 NS 70 ml/hr    Metabolic alkalosis  - unclear source (not on diuresis or bicarb, not vomiting)  - resolved    Asthma  COPD  b/l chronic bronchiectasis  - c/w albuterol & Symbicort    DLD  HTN  CAD s/p PCI  - c/w isosorbide dinitrate, aspirin, Plavix, metoprolol  - c/w lisinopril --held for now due to ZAYDA    Urinary Retention?  - acevedo remains in place  - had bladder distension on CT   - bladder scan shows no retention but has oliguria    Diverticulosis  - avoid constipation    Weight loss  Hx abdominal pain  - patient refused outpatient colonoscopy & EGD    Trace right pleural effusion  Atelectasis  - incentive spirometry    Right hilar 9 mm LN (decreased in size)  - outpatient f/u    Constipation  - c/w Senna & Miralax    Glaucoma  - c/w latanoprost    # DVT PPx  - Lovenox -- if SCr continues to rise will need to change to heparin    # GI PPx  - c/w Protonix    # Diet  - DNR, DNI    #Progress Note Handoff:  Pending (specify):  Improvement in mental status  Family discussion: I spoke with daughter. her correct phone number is [phone] and her  Tomi is [phone]. As per daughter pt is back to baseline today. Unclear what caused AMS. Pt seems to have responded to ampicillin so could have been possible Listeria mengitis?  Disposition: Home___/SNF_x__/Other________/Unknown at this time________

## 2022-08-30 DIAGNOSIS — I10 ESSENTIAL (PRIMARY) HYPERTENSION: ICD-10-CM

## 2022-08-30 DIAGNOSIS — E16.2 HYPOGLYCEMIA, UNSPECIFIED: ICD-10-CM

## 2022-08-30 DIAGNOSIS — Z95.5 PRESENCE OF CORONARY ANGIOPLASTY IMPLANT AND GRAFT: ICD-10-CM

## 2022-08-30 DIAGNOSIS — E66.9 OBESITY, UNSPECIFIED: ICD-10-CM

## 2022-08-30 DIAGNOSIS — E87.6 HYPOKALEMIA: ICD-10-CM

## 2022-08-30 DIAGNOSIS — Z66 DO NOT RESUSCITATE: ICD-10-CM

## 2022-08-30 DIAGNOSIS — N13.4 HYDROURETER: ICD-10-CM

## 2022-08-30 DIAGNOSIS — R65.10 SYSTEMIC INFLAMMATORY RESPONSE SYNDROME (SIRS) OF NON-INFECTIOUS ORIGIN WITHOUT ACUTE ORGAN DYSFUNCTION: ICD-10-CM

## 2022-08-30 DIAGNOSIS — I25.10 ATHEROSCLEROTIC HEART DISEASE OF NATIVE CORONARY ARTERY WITHOUT ANGINA PECTORIS: ICD-10-CM

## 2022-08-30 DIAGNOSIS — N30.00 ACUTE CYSTITIS WITHOUT HEMATURIA: ICD-10-CM

## 2022-08-30 DIAGNOSIS — E03.9 HYPOTHYROIDISM, UNSPECIFIED: ICD-10-CM

## 2022-08-30 DIAGNOSIS — J44.9 CHRONIC OBSTRUCTIVE PULMONARY DISEASE, UNSPECIFIED: ICD-10-CM

## 2022-08-30 DIAGNOSIS — E83.42 HYPOMAGNESEMIA: ICD-10-CM

## 2022-08-30 DIAGNOSIS — B96.4 PROTEUS (MIRABILIS) (MORGANII) AS THE CAUSE OF DISEASES CLASSIFIED ELSEWHERE: ICD-10-CM

## 2022-08-30 DIAGNOSIS — R13.10 DYSPHAGIA, UNSPECIFIED: ICD-10-CM

## 2022-08-30 DIAGNOSIS — E86.0 DEHYDRATION: ICD-10-CM

## 2022-08-30 DIAGNOSIS — G93.41 METABOLIC ENCEPHALOPATHY: ICD-10-CM

## 2022-08-30 DIAGNOSIS — A09 INFECTIOUS GASTROENTERITIS AND COLITIS, UNSPECIFIED: ICD-10-CM

## 2022-08-30 DIAGNOSIS — R10.9 UNSPECIFIED ABDOMINAL PAIN: ICD-10-CM

## 2022-08-30 LAB
ALBUMIN SERPL ELPH-MCNC: 3 G/DL — LOW (ref 3.5–5.2)
ALP SERPL-CCNC: 79 U/L — SIGNIFICANT CHANGE UP (ref 30–115)
ALT FLD-CCNC: 27 U/L — SIGNIFICANT CHANGE UP (ref 0–41)
ANION GAP SERPL CALC-SCNC: 12 MMOL/L — SIGNIFICANT CHANGE UP (ref 7–14)
ANION GAP SERPL CALC-SCNC: 8 MMOL/L — SIGNIFICANT CHANGE UP (ref 7–14)
AST SERPL-CCNC: 37 U/L — SIGNIFICANT CHANGE UP (ref 0–41)
BASOPHILS # BLD AUTO: 0.11 K/UL — SIGNIFICANT CHANGE UP (ref 0–0.2)
BASOPHILS NFR BLD AUTO: 1.1 % — HIGH (ref 0–1)
BILIRUB SERPL-MCNC: 0.5 MG/DL — SIGNIFICANT CHANGE UP (ref 0.2–1.2)
BUN SERPL-MCNC: 18 MG/DL — SIGNIFICANT CHANGE UP (ref 10–20)
BUN SERPL-MCNC: 20 MG/DL — SIGNIFICANT CHANGE UP (ref 10–20)
CALCIUM SERPL-MCNC: 8.9 MG/DL — SIGNIFICANT CHANGE UP (ref 8.5–10.1)
CALCIUM SERPL-MCNC: 9 MG/DL — SIGNIFICANT CHANGE UP (ref 8.5–10.1)
CHLORIDE SERPL-SCNC: 97 MMOL/L — LOW (ref 98–110)
CHLORIDE SERPL-SCNC: 99 MMOL/L — SIGNIFICANT CHANGE UP (ref 98–110)
CO2 SERPL-SCNC: 23 MMOL/L — SIGNIFICANT CHANGE UP (ref 17–32)
CO2 SERPL-SCNC: 26 MMOL/L — SIGNIFICANT CHANGE UP (ref 17–32)
CREAT SERPL-MCNC: 0.5 MG/DL — LOW (ref 0.7–1.5)
CREAT SERPL-MCNC: 0.6 MG/DL — LOW (ref 0.7–1.5)
EGFR: 82 ML/MIN/1.73M2 — SIGNIFICANT CHANGE UP
EGFR: 86 ML/MIN/1.73M2 — SIGNIFICANT CHANGE UP
EOSINOPHIL # BLD AUTO: 0.38 K/UL — SIGNIFICANT CHANGE UP (ref 0–0.7)
EOSINOPHIL NFR BLD AUTO: 3.7 % — SIGNIFICANT CHANGE UP (ref 0–8)
GLUCOSE BLDC GLUCOMTR-MCNC: 102 MG/DL — HIGH (ref 70–99)
GLUCOSE BLDC GLUCOMTR-MCNC: 109 MG/DL — HIGH (ref 70–99)
GLUCOSE BLDC GLUCOMTR-MCNC: 98 MG/DL — SIGNIFICANT CHANGE UP (ref 70–99)
GLUCOSE BLDC GLUCOMTR-MCNC: 99 MG/DL — SIGNIFICANT CHANGE UP (ref 70–99)
GLUCOSE SERPL-MCNC: 90 MG/DL — SIGNIFICANT CHANGE UP (ref 70–99)
GLUCOSE SERPL-MCNC: 91 MG/DL — SIGNIFICANT CHANGE UP (ref 70–99)
HCT VFR BLD CALC: 34.6 % — LOW (ref 37–47)
HGB BLD-MCNC: 11.4 G/DL — LOW (ref 12–16)
IMM GRANULOCYTES NFR BLD AUTO: 1.1 % — HIGH (ref 0.1–0.3)
LYMPHOCYTES # BLD AUTO: 1.69 K/UL — SIGNIFICANT CHANGE UP (ref 1.2–3.4)
LYMPHOCYTES # BLD AUTO: 16.3 % — LOW (ref 20.5–51.1)
MAGNESIUM SERPL-MCNC: 1.7 MG/DL — LOW (ref 1.8–2.4)
MCHC RBC-ENTMCNC: 31.6 PG — HIGH (ref 27–31)
MCHC RBC-ENTMCNC: 32.9 G/DL — SIGNIFICANT CHANGE UP (ref 32–37)
MCV RBC AUTO: 95.8 FL — SIGNIFICANT CHANGE UP (ref 81–99)
MONOCYTES # BLD AUTO: 1.04 K/UL — HIGH (ref 0.1–0.6)
MONOCYTES NFR BLD AUTO: 10 % — HIGH (ref 1.7–9.3)
NEUTROPHILS # BLD AUTO: 7.04 K/UL — HIGH (ref 1.4–6.5)
NEUTROPHILS NFR BLD AUTO: 67.8 % — SIGNIFICANT CHANGE UP (ref 42.2–75.2)
NRBC # BLD: 0 /100 WBCS — SIGNIFICANT CHANGE UP (ref 0–0)
PLATELET # BLD AUTO: 246 K/UL — SIGNIFICANT CHANGE UP (ref 130–400)
POTASSIUM SERPL-MCNC: 5.1 MMOL/L — HIGH (ref 3.5–5)
POTASSIUM SERPL-MCNC: 5.2 MMOL/L — HIGH (ref 3.5–5)
POTASSIUM SERPL-SCNC: 5.1 MMOL/L — HIGH (ref 3.5–5)
POTASSIUM SERPL-SCNC: 5.2 MMOL/L — HIGH (ref 3.5–5)
PROT SERPL-MCNC: 5.4 G/DL — LOW (ref 6–8)
RBC # BLD: 3.61 M/UL — LOW (ref 4.2–5.4)
RBC # FLD: 14.5 % — SIGNIFICANT CHANGE UP (ref 11.5–14.5)
SODIUM SERPL-SCNC: 132 MMOL/L — LOW (ref 135–146)
SODIUM SERPL-SCNC: 133 MMOL/L — LOW (ref 135–146)
WBC # BLD: 10.37 K/UL — SIGNIFICANT CHANGE UP (ref 4.8–10.8)
WBC # FLD AUTO: 10.37 K/UL — SIGNIFICANT CHANGE UP (ref 4.8–10.8)

## 2022-08-30 PROCEDURE — 71045 X-RAY EXAM CHEST 1 VIEW: CPT | Mod: 26

## 2022-08-30 PROCEDURE — 99232 SBSQ HOSP IP/OBS MODERATE 35: CPT

## 2022-08-30 PROCEDURE — 99233 SBSQ HOSP IP/OBS HIGH 50: CPT

## 2022-08-30 RX ORDER — MAGNESIUM SULFATE 500 MG/ML
2 VIAL (ML) INJECTION
Refills: 0 | Status: COMPLETED | OUTPATIENT
Start: 2022-08-30 | End: 2022-08-30

## 2022-08-30 RX ORDER — ROBINUL 0.2 MG/ML
0.4 INJECTION INTRAMUSCULAR; INTRAVENOUS EVERY 6 HOURS
Refills: 0 | Status: DISCONTINUED | OUTPATIENT
Start: 2022-08-30 | End: 2022-09-06

## 2022-08-30 RX ADMIN — ENOXAPARIN SODIUM 50 MILLIGRAM(S): 100 INJECTION SUBCUTANEOUS at 05:40

## 2022-08-30 RX ADMIN — ENOXAPARIN SODIUM 50 MILLIGRAM(S): 100 INJECTION SUBCUTANEOUS at 17:08

## 2022-08-30 RX ADMIN — ISOSORBIDE DINITRATE 10 MILLIGRAM(S): 5 TABLET ORAL at 11:22

## 2022-08-30 RX ADMIN — Medication 25 MILLIGRAM(S): at 21:27

## 2022-08-30 RX ADMIN — ISOSORBIDE DINITRATE 10 MILLIGRAM(S): 5 TABLET ORAL at 16:52

## 2022-08-30 RX ADMIN — Medication 25 MILLIGRAM(S): at 05:41

## 2022-08-30 RX ADMIN — CHLORHEXIDINE GLUCONATE 1 APPLICATION(S): 213 SOLUTION TOPICAL at 11:23

## 2022-08-30 RX ADMIN — CLOPIDOGREL BISULFATE 75 MILLIGRAM(S): 75 TABLET, FILM COATED ORAL at 11:21

## 2022-08-30 RX ADMIN — POLYETHYLENE GLYCOL 3350 17 GRAM(S): 17 POWDER, FOR SOLUTION ORAL at 11:22

## 2022-08-30 RX ADMIN — ISOSORBIDE DINITRATE 10 MILLIGRAM(S): 5 TABLET ORAL at 05:41

## 2022-08-30 RX ADMIN — Medication 1 APPLICATION(S): at 11:22

## 2022-08-30 RX ADMIN — Medication 12.5 MILLIGRAM(S): at 05:41

## 2022-08-30 RX ADMIN — Medication 81 MILLIGRAM(S): at 11:21

## 2022-08-30 RX ADMIN — Medication 200 MILLIGRAM(S): at 05:47

## 2022-08-30 RX ADMIN — ROBINUL 0.4 MILLIGRAM(S): 0.2 INJECTION INTRAMUSCULAR; INTRAVENOUS at 12:16

## 2022-08-30 RX ADMIN — LATANOPROST 1 DROP(S): 0.05 SOLUTION/ DROPS OPHTHALMIC; TOPICAL at 21:36

## 2022-08-30 RX ADMIN — Medication 12.5 MILLIGRAM(S): at 16:51

## 2022-08-30 RX ADMIN — PANTOPRAZOLE SODIUM 40 MILLIGRAM(S): 20 TABLET, DELAYED RELEASE ORAL at 05:43

## 2022-08-30 RX ADMIN — Medication 25 MICROGRAM(S): at 05:41

## 2022-08-30 RX ADMIN — SENNA PLUS 2 TABLET(S): 8.6 TABLET ORAL at 21:26

## 2022-08-30 RX ADMIN — Medication 25 MILLIGRAM(S): at 14:29

## 2022-08-30 RX ADMIN — Medication 25 GRAM(S): at 12:27

## 2022-08-30 NOTE — PROGRESS NOTE ADULT - SUBJECTIVE AND OBJECTIVE BOX
CHIEF COMPLAINT:    Patient is a 89y old  Female who presents with a chief complaint of Lethargy       INTERVAL HPI/OVERNIGHT EVENTS:    Patient seen and examined at bedside. No acute overnight events occurred.    ROS: Unable to obtain.     Medications:  Standing  aspirin  chewable 81 milliGRAM(s) Enteral Tube daily  BACItracin   Ointment 1 Application(s) Topical daily  budesonide  80 MICROgram(s)/formoterol 4.5 MICROgram(s) Inhaler 2 Puff(s) Inhalation two times a day  chlorhexidine 2% Cloths 1 Application(s) Topical daily  clopidogrel Tablet 75 milliGRAM(s) Enteral Tube daily  enoxaparin Injectable 50 milliGRAM(s) SubCutaneous every 12 hours  hydrALAZINE 25 milliGRAM(s) Oral three times a day  isosorbide   dinitrate Tablet (ISORDIL) 10 milliGRAM(s) Enteral Tube three times a day  latanoprost 0.005% Ophthalmic Solution 1 Drop(s) Both EYES at bedtime  levothyroxine 25 MICROGram(s) Oral daily  metoprolol tartrate 12.5 milliGRAM(s) Oral two times a day  pantoprazole   Suspension 40 milliGRAM(s) Oral before breakfast  polyethylene glycol 3350 17 Gram(s) Oral daily  saccharomyces boulardii 250 milliGRAM(s) Oral two times a day  senna 2 Tablet(s) Oral at bedtime    PRN Meds  acetaminophen     Tablet .. 650 milliGRAM(s) Oral every 6 hours PRN  ALBUTerol    90 MICROgram(s) HFA Inhaler 2 Puff(s) Inhalation every 6 hours PRN  aluminum hydroxide/magnesium hydroxide/simethicone Suspension 30 milliLiter(s) Oral every 4 hours PRN  glycopyrrolate Injectable 0.4 milliGRAM(s) IV Push every 6 hours PRN  melatonin 3 milliGRAM(s) Oral at bedtime PRN  ondansetron Injectable 4 milliGRAM(s) IV Push every 8 hours PRN        Vital Signs:    T(F): 97.1 (08-30-22 @ 13:07), Max: 97.6 (08-29-22 @ 20:26)  HR: 92 (08-30-22 @ 13:07) (73 - 92)  BP: 128/60 (08-30-22 @ 13:07) (119/59 - 178/77)  RR: 18 (08-30-22 @ 13:07) (18 - 18)  SpO2: 95% (08-30-22 @ 10:55) (95% - 97%)  I&O's Summary    29 Aug 2022 07:01  -  30 Aug 2022 07:00  --------------------------------------------------------  IN: 1410 mL / OUT: 1260 mL / NET: 150 mL    30 Aug 2022 07:01  -  30 Aug 2022 14:09  --------------------------------------------------------  IN: 520 mL / OUT: 0 mL / NET: 520 mL    POCT Blood Glucose.: 109 mg/dL (30 Aug 2022 12:00)  POCT Blood Glucose.: 99 mg/dL (30 Aug 2022 07:01)  POCT Blood Glucose.: 98 mg/dL (30 Aug 2022 00:26)      PHYSICAL EXAM:  GENERAL:  NAD  SKIN: No rashes or lesions  HEENT: Atraumatic. Normocephalic. Anicteric  NECK:  No JVD.   PULMONARY: Clear to ausculation bilaterally. No wheezing. No rales  CVS: Normal S1, S2. Regular rate and rhythm. No murmurs.  ABDOMEN/GI: Soft, Nontender, Nondistended; Bowel sounds are present  EXTREMITIES:  No edema B/L LE.  NEUROLOGIC:  No motor deficit.  PSYCH: Alert & oriented x 3, normal affect      LABS:                        11.4   10.37 )-----------( 246      ( 30 Aug 2022 07:10 )             34.6     08-30    132<L>  |  97<L>  |  20  ----------------------------<  91  5.2<H>   |  23  |  0.6<L>    Ca    8.9      30 Aug 2022 07:10  Mg     1.7     08-30    TPro  5.4<L>  /  Alb  3.0<L>  /  TBili  0.5  /  DBili  x   /  AST  37  /  ALT  27  /  AlkPhos  79  08-30        RADIOLOGY & ADDITIONAL TESTS:  Imaging or report Personally Reviewed:  [ ] YES  [ ] NO -->no new images    Telemetry reviewed independently - NSR, no acute events  EKG reviewed independently -->no new EKGs    Consultant(s) Notes Reviewed:  [ ] YES  [ ] NO  Care Discussed with Consultants/Other Providers [ ] YES  [ ] NO    Case discussed with resident  Care discussed with pt

## 2022-08-30 NOTE — CHART NOTE - NSCHARTNOTEFT_GEN_A_CORE
Patient was observed to be having difficulty with secretions.  Writer informed Dr. Carbone.  Daughter, Magdalena was at bedside.  Daughter expressed concerns with lack of assistance with applying for Medicaid.  Writer will research resources within [facility].  Support rendered.

## 2022-08-30 NOTE — HOSPICE CARE NOTE - CONVESATION DETAILS
Follow up phone call to patient's daughter Magdalena. Daughter requesting that patient go to nursing home for hospice services. Left message for CARTER Jones to send out NOE.

## 2022-08-30 NOTE — PROGRESS NOTE ADULT - ASSESSMENT
89 yo F PMHx asthma, COPD, hyperactive airway, b/l chronic bronchiectasis, DLD, HTN, CAD s/p PCI, hypothyroidism presented for evaluation of worsening metabolic encephalopathy. Patient was recently admitted w/ cyclospora gastroenteritis & cystitis. Course was complicated by metabolic encephalopathy. Upon ER arrival pt found to have subsegmental PE and positive urinalysis.     Suspected Metabolic encephalopathy from suspected UTI   History of recent colitis as well as cyclospora PCR positive treated with bactrim & Flagyl until 8/20/22  - hold off LP as pt will not be able to tolerate it (cannot lie flat and is aspiration risk)  - pt seems to have improved the most with ampicillin   - MRI, EEG negative, UCx negative,  - c/w vancomycin  - acyclovir held  - c/w saccharomyces boulardii  - pt was able to participate with speech and swallow--pt has ability to swallow but really needs to be coached and reminded to swallow--S&S recommended this as comfort feeds. Will keep NGT in for now and encourage PO intake    ZAYDA  Oliguria  Hyperkalemia  - resolved  - bladder scan show no residual  - continue to hold acyclovir  - fluids stopped    Left opacity  - vancomycin    Subsegmental PE  - duplex shows peroneal thrombus  - c/w Lovenox, will switch to oral eliquis once pt able to take po consistently  - c/w telemetry for now while awaiting Echo  - c/w oxygen supplementation  - Echo pending    Nodular Thyroid  Hypothyroidism  - TSH mildly elevated but will hold off med for now    Lactic acidosis  - resolved    Metabolic alkalosis  - unclear source (not on diuresis or bicarb, not vomiting)  - resolved    Asthma  COPD  b/l chronic bronchiectasis  - c/w albuterol & Symbicort    DLD  HTN  CAD s/p PCI  - c/w isosorbide dinitrate, aspirin, Plavix, metoprolol  - c/w lisinopril --held for now due to ZAYDA    Urinary Retention?  - acevedo remains in place  - had bladder distension on CT   - bladder scan shows no retention but has oliguria    Diverticulosis  - avoid constipation    Weight loss  Hx abdominal pain  - patient refused outpatient colonoscopy & EGD    Trace right pleural effusion  Atelectasis  - incentive spirometry    Right hilar 9 mm LN (decreased in size)  - outpatient f/u    Constipation  - c/w Senna & Miralax    Glaucoma  - c/w latanoprost    # DVT PPx  # GI PPx  DNR, DNI    #Progress Note Handoff:  Pending (specify):  Improvement in mental status  Family discussion: I spoke with daughter. her correct phone number is [phone] and her  Tomi is [phone]. As per daughter pt is back to baseline today. Unclear what caused AMS. Pt seems to have responded to ampicillin so could have been possible Listeria mengitis?  Disposition: Home___/SNF_x__/Other________/Unknown at this time________

## 2022-08-30 NOTE — PROGRESS NOTE ADULT - SUBJECTIVE AND OBJECTIVE BOX
SUBJECTIVE:    Patient is a 89y old Female who presents with a chief complaint of Lethargy (29 Aug 2022 14:49)    Currently admitted to medicine with the primary diagnosis of:    Today is hospital day 8d.     Overnight Events:     No significant events overnight    PAST MEDICAL & SURGICAL HISTORY  High blood cholesterol    Hypothyroid    Hypertension    COPD (chronic obstructive pulmonary disease)    CAD (coronary artery disease)    History of coronary artery stent placement    History of cholecystectomy    H/O abdominal hysterectomy    Cataract      SOCIAL HISTORY:  No significant social hx    ALLERGIES:  No Known Allergies    MEDICATIONS:  STANDING MEDICATIONS  aspirin  chewable 81 milliGRAM(s) Enteral Tube daily  BACItracin   Ointment 1 Application(s) Topical daily  budesonide  80 MICROgram(s)/formoterol 4.5 MICROgram(s) Inhaler 2 Puff(s) Inhalation two times a day  chlorhexidine 2% Cloths 1 Application(s) Topical daily  clopidogrel Tablet 75 milliGRAM(s) Enteral Tube daily  enoxaparin Injectable 50 milliGRAM(s) SubCutaneous every 12 hours  hydrALAZINE 25 milliGRAM(s) Oral three times a day  isosorbide   dinitrate Tablet (ISORDIL) 10 milliGRAM(s) Enteral Tube three times a day  latanoprost 0.005% Ophthalmic Solution 1 Drop(s) Both EYES at bedtime  levothyroxine 25 MICROGram(s) Oral daily  metoprolol tartrate 12.5 milliGRAM(s) Oral two times a day  pantoprazole   Suspension 40 milliGRAM(s) Oral before breakfast  polyethylene glycol 3350 17 Gram(s) Oral daily  saccharomyces boulardii 250 milliGRAM(s) Oral two times a day  senna 2 Tablet(s) Oral at bedtime    PRN MEDICATIONS  acetaminophen     Tablet .. 650 milliGRAM(s) Oral every 6 hours PRN  ALBUTerol    90 MICROgram(s) HFA Inhaler 2 Puff(s) Inhalation every 6 hours PRN  aluminum hydroxide/magnesium hydroxide/simethicone Suspension 30 milliLiter(s) Oral every 4 hours PRN  glycopyrrolate Injectable 0.4 milliGRAM(s) IV Push every 6 hours PRN  melatonin 3 milliGRAM(s) Oral at bedtime PRN  ondansetron Injectable 4 milliGRAM(s) IV Push every 8 hours PRN    VITALS:   ICU Vital Signs Last 24 Hrs  T(C): 36.1 (30 Aug 2022 04:29), Max: 36.4 (29 Aug 2022 15:10)  T(F): 97 (30 Aug 2022 04:29), Max: 97.6 (29 Aug 2022 20:26)  HR: 83 (30 Aug 2022 06:13) (73 - 83)  BP: 132/61 (30 Aug 2022 06:13) (119/59 - 178/77)  RR: 18 (30 Aug 2022 04:29) (18 - 18)  SpO2: 95% (30 Aug 2022 10:55) (95% - 97%)    O2 Parameters below as of 30 Aug 2022 10:55  Patient On (Oxygen Delivery Method): room air      LABS:                        11.4   10.37 )-----------( 246      ( 30 Aug 2022 07:10 )             34.6     08-30    132<L>  |  97<L>  |  20  ----------------------------<  91  5.2<H>   |  23  |  0.6<L>    Ca    8.9      30 Aug 2022 07:10  Mg     1.7     08-30    TPro  5.4<L>  /  Alb  3.0<L>  /  TBili  0.5  /  DBili  x   /  AST  37  /  ALT  27  /  AlkPhos  79  08-30      RADIOLOGY:    < from: Xray Chest 1 View- PORTABLE-Urgent (Xray Chest 1 View- PORTABLE-Urgent .) (08.30.22 @ 08:32) >    ACC: [id number] EXAM:  XR CHEST PORTABLE URGENT 1V                          PROCEDURE DATE:  08/30/2022      INTERPRETATION:  Clinical History / Reason for exam: Catheter placement.    Support devices: Telemetry leads are seen. Enteric catheter extends below   the hemidiaphragm.    Impression:    Low lung volumes leads to magnification of the pulmonary interstitium.   Support devices as described.    --- End of Report ---        PHYSICAL EXAM:  GEN:   LUNGS:   HEART:   ABD:   EXT:   NEURO:       Lines:  NG tube: in place, confirmed by CXR  [place] Catheter:   Indwelling Urethral Catheter:     Connect To:  Straight Drainage/Gravity    Indication:  Improved Comfort Care (08-30-22 @ 00:53) (not performed) [Active]  Indwelling Urethral Catheter:     Connect To:  Straight Drainage/Gravity    Indication:  Improved Comfort Care (08-28-22 @ 22:03) (not performed) [Active]  Indwelling Urethral Catheter:     Connect To:  Straight Drainage/Gravity    Indication:  Improved Comfort Care (08-25-22 @ 02:08) (not performed) [Active]

## 2022-08-30 NOTE — PROGRESS NOTE ADULT - SUBJECTIVE AND OBJECTIVE BOX
CHIEF COMPLAINT:    Patient is a 89y old  Female who presents with a chief complaint of Lethargy     INTERVAL HPI/OVERNIGHT EVENTS:    Patient seen and examined at bedside. No acute overnight events occurred.    ROS: Denies pain. All other systems are negative.    Medications:  Standing  aspirin  chewable 81 milliGRAM(s) Enteral Tube daily  BACItracin   Ointment 1 Application(s) Topical daily  budesonide  80 MICROgram(s)/formoterol 4.5 MICROgram(s) Inhaler 2 Puff(s) Inhalation two times a day  chlorhexidine 2% Cloths 1 Application(s) Topical daily  clopidogrel Tablet 75 milliGRAM(s) Enteral Tube daily  enoxaparin Injectable 50 milliGRAM(s) SubCutaneous every 12 hours  hydrALAZINE 25 milliGRAM(s) Oral three times a day  isosorbide   dinitrate Tablet (ISORDIL) 10 milliGRAM(s) Enteral Tube three times a day  latanoprost 0.005% Ophthalmic Solution 1 Drop(s) Both EYES at bedtime  levothyroxine 25 MICROGram(s) Oral daily  metoprolol tartrate 12.5 milliGRAM(s) Oral two times a day  pantoprazole   Suspension 40 milliGRAM(s) Oral before breakfast  polyethylene glycol 3350 17 Gram(s) Oral daily  saccharomyces boulardii 250 milliGRAM(s) Oral two times a day  senna 2 Tablet(s) Oral at bedtime    PRN Meds  acetaminophen     Tablet .. 650 milliGRAM(s) Oral every 6 hours PRN  ALBUTerol    90 MICROgram(s) HFA Inhaler 2 Puff(s) Inhalation every 6 hours PRN  aluminum hydroxide/magnesium hydroxide/simethicone Suspension 30 milliLiter(s) Oral every 4 hours PRN  glycopyrrolate Injectable 0.4 milliGRAM(s) IV Push every 6 hours PRN  melatonin 3 milliGRAM(s) Oral at bedtime PRN  ondansetron Injectable 4 milliGRAM(s) IV Push every 8 hours PRN        Vital Signs:    T(F): 97 (08-30-22 @ 04:29), Max: 97.6 (08-29-22 @ 20:26)  HR: 75 (08-30-22 @ 11:19) (73 - 83)  BP: 135/63 (08-30-22 @ 11:19) (119/59 - 178/77)  RR: 18 (08-30-22 @ 04:29) (18 - 18)  SpO2: 95% (08-30-22 @ 10:55) (95% - 97%)  I&O's Summary    29 Aug 2022 07:01  -  30 Aug 2022 07:00  --------------------------------------------------------  IN: 1410 mL / OUT: 1260 mL / NET: 150 mL      POCT Blood Glucose.: 109 mg/dL (30 Aug 2022 12:00)  POCT Blood Glucose.: 99 mg/dL (30 Aug 2022 07:01)  POCT Blood Glucose.: 98 mg/dL (30 Aug 2022 00:26)      PHYSICAL EXAM:  GENERAL:  NAD  SKIN: No rashes or lesions  HEENT: Atraumatic. Normocephalic. Anicteric, NGT in place  NECK:  No JVD.   PULMONARY: Clear to ausculation bilaterally. No wheezing. No rales  CVS: Normal S1, S2. Regular rate and rhythm. No murmurs.  ABDOMEN/GI: Soft, Nontender, Nondistended; Bowel sounds are present  EXTREMITIES:  No edema B/L LE.  NEUROLOGIC:  Non participatory  PSYCH: Alert & oriented x 3, somnolent      LABS:                        11.4   10.37 )-----------( 246      ( 30 Aug 2022 07:10 )             34.6     08-30    132<L>  |  97<L>  |  20  ----------------------------<  91  5.2<H>   |  23  |  0.6<L>    Ca    8.9      30 Aug 2022 07:10  Mg     1.7     08-30    TPro  5.4<L>  /  Alb  3.0<L>  /  TBili  0.5  /  DBili  x   /  AST  37  /  ALT  27  /  AlkPhos  79  08-30      RADIOLOGY & ADDITIONAL TESTS:  Imaging or report Personally Reviewed:  [ ] YES  [ ] NO -->no new images    Telemetry reviewed independently - NSR, no acute events  EKG reviewed independently -->no new EKGs    Consultant(s) Notes Reviewed:  [ ] YES  [ ] NO  Care Discussed with Consultants/Other Providers [ ] YES  [ ] NO    Case discussed with resident  Care discussed with pt

## 2022-08-30 NOTE — HOSPICE CARE NOTE - CONVESATION DETAILS
Hospice referral completed. Phone call to patient's daughter Magdalena, reviewed hospice services at home vs nursing home vs [facility]. Daughter is confused as to where her mom would receive hospice services. She is unable to care for her mom at home alone and would require hiring an aide and she would require a financial screen for placement somewhere. Hospice to remain available upon discharge.

## 2022-08-30 NOTE — PROGRESS NOTE ADULT - ASSESSMENT
89F with PMH of COPD, asthma, HTN, CAD s/p PCI, hypothyroidism, here after recent admission at [facility] for cyclospora gastroenteritis and cystitis and metabolic encephalopathy, discharged to [facility] and admitted here for worsening lethargy. Code stroke called on admission, with CT head negative, but CTA showing acute bilateral subsegmental PE, now on therapeutic lovenox. Patient did not pass speech and swallow, and palliative care called to help discuss nutritional GOC.

## 2022-08-30 NOTE — PROGRESS NOTE ADULT - SUBJECTIVE AND OBJECTIVE BOX
HPI: 89F with PMH of COPD, asthma, HTN, CAD s/p PCI, hypothyroidism, here after recent admission at [facility] for cyclospora gastroenteritis and cystitis and metabolic encephalopathy, discharged to [facility] and admitted here for worsening lethargy. Code stroke called on admission, with CT head negative, but CTA showing acute bilateral subsegmental PE, now on therapeutic lovenox. Patient did not pass speech and swallow, and palliative care called to help discuss nutritional GOC.    INTERVAL EVENTS:  8/25: patient less mentally alert today  8/26: patient remains with AMS, has NGT in place, appears comfortbale  8/29: patient more alert, denies any complaints  8/30: patient less alert, NGT in place, daughter at bedside    ADVANCE DIRECTIVES:    DNR  MOLST  [ ]  Living Will  [ ]   DECISION MAKER(s):  [ ] Health Care Proxy(s)  [ ] Surrogate(s)  [ ] Guardian           Name(s): Phone Number(s): daughter    BASELINE (I)ADL(s) (prior to admission):  [place]: [ ]Total  [ ] Moderate [ ]Dependent  Palliative Performance Status Version 2:         %    http://npcrc.org/files/news/palliative_performance_scale_ppsv2.pdf    Allergies    No Known Allergies    Intolerances    MEDICATIONS  (STANDING):  aspirin  chewable 81 milliGRAM(s) Enteral Tube daily  BACItracin   Ointment 1 Application(s) Topical daily  budesonide  80 MICROgram(s)/formoterol 4.5 MICROgram(s) Inhaler 2 Puff(s) Inhalation two times a day  chlorhexidine 2% Cloths 1 Application(s) Topical daily  clopidogrel Tablet 75 milliGRAM(s) Enteral Tube daily  enoxaparin Injectable 50 milliGRAM(s) SubCutaneous every 12 hours  hydrALAZINE 25 milliGRAM(s) Oral three times a day  isosorbide   dinitrate Tablet (ISORDIL) 10 milliGRAM(s) Enteral Tube three times a day  latanoprost 0.005% Ophthalmic Solution 1 Drop(s) Both EYES at bedtime  levothyroxine 25 MICROGram(s) Oral daily  metoprolol tartrate 12.5 milliGRAM(s) Oral two times a day  pantoprazole   Suspension 40 milliGRAM(s) Oral before breakfast  polyethylene glycol 3350 17 Gram(s) Oral daily  saccharomyces boulardii 250 milliGRAM(s) Oral two times a day  senna 2 Tablet(s) Oral at bedtime    MEDICATIONS  (PRN):  acetaminophen     Tablet .. 650 milliGRAM(s) Oral every 6 hours PRN Temp greater or equal to 38C (100.4F), Mild Pain (1 - 3)  ALBUTerol    90 MICROgram(s) HFA Inhaler 2 Puff(s) Inhalation every 6 hours PRN Shortness of Breath and/or Wheezing  aluminum hydroxide/magnesium hydroxide/simethicone Suspension 30 milliLiter(s) Oral every 4 hours PRN Dyspepsia  glycopyrrolate Injectable 0.4 milliGRAM(s) IV Push every 6 hours PRN secretions  melatonin 3 milliGRAM(s) Oral at bedtime PRN Insomnia  ondansetron Injectable 4 milliGRAM(s) IV Push every 8 hours PRN Nausea and/or Vomiting      PRESENT SYMPTOMS: [ ]Unable to obtain due to poor mentation   Source if other than patient:  [ ]Family   [ ]Team     Pain: [ ]yes [ X]no  QOL impact -   Location -                    Aggravating factors -  Quality -  Radiation -  Timing-  Severity (0-10 scale):  Minimal acceptable level (0-10 scale):     CPOT:    https://www.[facility].org/getattachment/zfr31v64-8p1p-8z2w-1l4d-4346v8660y1o/Critical-Care-Pain-Observation-Tool-(CPOT)      PAIN AD Score: 0    [email or web address].[email or web address]/painad.pdf (press ctrl +  left click to view)    Dyspnea:                           [ ]Mild [ ]Moderate [ ]Severe  Anxiety:                             [ ]Mild [ ]Moderate [ ]Severe  Fatigue:                             [ ]Mild [ ]Moderate [ ]Severe  Nausea:                             [ ]Mild [ ]Moderate [ ]Severe  Loss of appetite:              [ ]Mild [ ]Moderate [ ]Severe  Constipation:                    [ ]Mild [ ]Moderate [ ]Severe    Other Symptoms:  [X ]All other review of systems negative     Palliative Performance Status Version 2:         %    http://npcrc.org/files/news/palliative_performance_scale_ppsv2.pdf  PHYSICAL EXAM:  Vital Signs Last 24 Hrs  T(C): 36.2 (30 Aug 2022 13:07), Max: 36.4 (29 Aug 2022 15:10)  T(F): 97.1 (30 Aug 2022 13:07), Max: 97.6 (29 Aug 2022 20:26)  HR: 92 (30 Aug 2022 13:07) (73 - 92)  BP: 128/60 (30 Aug 2022 13:07) (119/59 - 178/77)  BP(mean): --  RR: 18 (30 Aug 2022 13:07) (18 - 18)  SpO2: 95% (30 Aug 2022 10:55) (95% - 97%)    Parameters below as of 30 Aug 2022 10:55  Patient On (Oxygen Delivery Method): room air          GENERAL:  [ ]Alert  [ ]Oriented x   [ ]Lethargic  [ ]Cachexia  [ ]Unarousable  [X ]Verbal  [ ]Non-Verbal  Behavioral:   [ ] Anxiety  [ ] Delirium [ ] Agitation [X ] Other calm  HEENT:  [ ]Normal   [X ]Dry mouth   [ ]ET Tube/Trach  [ ]Oral lesions  PULMONARY:   [ ]Clear [ ]Tachypnea  [ ]Audible excessive secretions [X] No labored breathing  [ ]Rhonchi        [ ]Right [ ]Left [ ]Bilateral  [ ]Crackles        [ ]Right [ ]Left [ ]Bilateral  [ ]Wheezing     [ ]Right [ ]Left [ ]Bilateral  [ ]Diminished breath sounds [ ]right [ ]left [ ]bilateral  CARDIOVASCULAR:    [ ]Regular [ ]Irregular [ ]Tachy  [ ]Brent [ ]Murmur [ ]Other  GASTROINTESTINAL:  [ ]Soft  [ X]Nondistended   [ ]+BS  [ ]Non tender [ ]Tender  [ ]PEG [ X]OGT/ NGT  Last BM:   GENITOURINARY:  [ X]Normal [ ] Incontinent   [ ]Oliguria/Anuria   [ ][place]  MUSCULOSKELETAL:   [ ]Normal   [X ]Weakness  [ ]Bed/Wheelchair bound [ ]Edema  NEUROLOGIC:   [ ]No focal deficits  [ ]Cognitive impairment  [X ]Dysphagia [ ]Dysarthria [ ]Paresis [ ]Other   SKIN:   [ ]Normal   [X] Nonjaundiced [ ]Rash  [ ]Pressure ulcer(s)       Present on admission [ ]y [ ]n    CRITICAL CARE:  [ ] Shock Present  [ ]Septic [ ]Cardiogenic [ ]Neurologic [ ]Hypovolemic  [ ]  Vasopressors [ ]  Inotropes   [ ]Respiratory failure present [ ]Mechanical ventilation [ ]Non-invasive ventilatory support [ ]High flow  [ ]Acute  [ ]Chronic [ ]Hypoxic  [ ]Hypercarbic [ ]Other  [ ]Other organ failure     LABS: reviewed                                   11.4   10.37 )-----------( 246      ( 30 Aug 2022 07:10 )             34.6     08-30    132<L>  |  97<L>  |  20  ----------------------------<  91  5.2<H>   |  23  |  0.6<L>    Ca    8.9      30 Aug 2022 07:10  Mg     1.7     08-30        RADIOLOGY & ADDITIONAL STUDIES:    MR head 8/23/22    No acute infarct, intracranial hemorrhage, or midline shift.    Moderate-severe chronic microvascular ischemic changes, significantly   progressed since 1/9/2017.    Moderate right/mild left mastoid effusion, nonspecific.    PROTEIN CALORIE MALNUTRITION PRESENT: [ ]mild [ ]moderate [ ]severe [ ]underweight [ ]morbid obesity  https://www.andeal.org/vault/2440/web/files/ONC/Table_Clinical%20Characteristics%20to%20Document%20Malnutrition-White%20JV%20et%20al%202012.pdf    Height (cm): 172.7 (08-22-22 @ 20:22), 172.7 (08-05-22 @ 03:26)  Weight (kg): 53.5 (08-23-22 @ 07:39), 72.3 (08-05-22 @ 03:26)  BMI (kg/m2): 17.9 (08-23-22 @ 07:39), 24.2 (08-22-22 @ 20:22), 24.2 (08-05-22 @ 03:26)    [ ]PPSV2 < or = to 30% [ ]significant weight loss  [ ]poor nutritional intake  [ ]anasarca      [ ]Artificial Nutrition      REFERRALS:   [ ]Chaplaincy  [ ]Hospice  [ ]Child Life  [ ]Social Work  [ ]Case management [ ]Holistic Therapy     Goals of Care Document:     ______________  Alex Carbone MD  Palliative Medicine  [facility]   of Geriatric and Palliative Medicine  [phone]

## 2022-08-30 NOTE — CHART NOTE - NSCHARTNOTEFT_GEN_A_CORE
Registered Dietitian Follow-Up     Patient Profile Reviewed                           Yes [x]   No []     Nutrition History Previously Obtained        Yes []  No [x]       Pertinent Subjective Information: Per daughter, pt with reduced appetite & po intake over the past "few years", estimated 2-3 years with increased weakness & decreased ADLs. Reportedly has been consuming 2-3 small meals daily, ~50% of the size of meal trays provided in the hospital. Pt was consuming regular diet with thin liquids PTP. Po intake reportedly reduced further week prior to admission d/t increasing lethargy; at this point, pt was consuming <25% of meals. No supplements reported. No food preferences related to culture/[employer]. UBW unknown, however suspects significant unintentional wt loss.     Pertinent Medical Interventions: Lethargy noted upon admission. Noted to have presented for evaluation of worsening metabolic encephalopathy. H/O recent colitis. SLP recommended this as comfort feeds, per progress notes, plan to keep NGT in for now and encourage PO intake. Noted pt has ability to swallow but really needs to be coached and reminded to swallow. Plan to trial 5-7 days of NG tube feeds. Per progress notes, SLP will reevaluate this week, but daughter would not want PEG and is aware of comfort care option, likely hospice at SNF. Repleting Mg for Hypomagnesemia. Lactic acidosis, resolving - IV maintenance D5 1/2 NS 70 ml/hr. On bowel regimen for constipation. Diverticulosis noted. Hyperkalemia - will monitor; will avoid any dietary restrictions at this time.     Diet order: Jevity 1.2 at 270 mL q6hrs with 50 mL pre & post flushes. Regimen at goal to provide 1296 kcal, 60 g protein, 1275 mL free H2O.    Anthropometrics:  Height (cm): 172.7 (22 @ 14:09)  Weight (kg): 53 (22 @ 05:08)  BMI (kg/m2): 17.8 (22 @ 05:08)  IBW:     Daily Weight in k (), Weight in k (), Weight in k.5 ()    Wt loss observed from initial wt 53.5 kg (), however latest wt 53 kg () may be fluid related wt gain; edema noted. Pt with inadequate energy intake & wt gain is not expected to be accurate at this time.    MEDICATIONS  (STANDING):  aspirin  chewable 81 milliGRAM(s) Enteral Tube daily  BACItracin   Ointment 1 Application(s) Topical daily  budesonide  80 MICROgram(s)/formoterol 4.5 MICROgram(s) Inhaler 2 Puff(s) Inhalation two times a day  chlorhexidine 2% Cloths 1 Application(s) Topical daily  clopidogrel Tablet 75 milliGRAM(s) Enteral Tube daily  enoxaparin Injectable 50 milliGRAM(s) SubCutaneous every 12 hours  hydrALAZINE 25 milliGRAM(s) Oral three times a day  isosorbide   dinitrate Tablet (ISORDIL) 10 milliGRAM(s) Enteral Tube three times a day  latanoprost 0.005% Ophthalmic Solution 1 Drop(s) Both EYES at bedtime  levothyroxine 25 MICROGram(s) Oral daily  metoprolol tartrate 12.5 milliGRAM(s) Oral two times a day  pantoprazole   Suspension 40 milliGRAM(s) Oral before breakfast  polyethylene glycol 3350 17 Gram(s) Oral daily  saccharomyces boulardii 250 milliGRAM(s) Oral two times a day  senna 2 Tablet(s) Oral at bedtime    MEDICATIONS  (PRN):  acetaminophen     Tablet .. 650 milliGRAM(s) Oral every 6 hours PRN Temp greater or equal to 38C (100.4F), Mild Pain (1 - 3)  ALBUTerol    90 MICROgram(s) HFA Inhaler 2 Puff(s) Inhalation every 6 hours PRN Shortness of Breath and/or Wheezing  aluminum hydroxide/magnesium hydroxide/simethicone Suspension 30 milliLiter(s) Oral every 4 hours PRN Dyspepsia  glycopyrrolate Injectable 0.4 milliGRAM(s) IV Push every 6 hours PRN secretions  melatonin 3 milliGRAM(s) Oral at bedtime PRN Insomnia  ondansetron Injectable 4 milliGRAM(s) IV Push every 8 hours PRN Nausea and/or Vomiting    Pertinent Labs:  @ 07:10: Na 132<L>, BUN 20, Cr 0.6<L>, BG 91, K+ 5.2<H>, Phos --, Mg 1.7<L>, Alk Phos 79, ALT/SGPT 27, AST/SGOT 37    Finger Sticks:  POCT Blood Glucose.: 102 mg/dL ( @ 16:34)  POCT Blood Glucose.: 109 mg/dL ( @ 12:00)  POCT Blood Glucose.: 99 mg/dL ( @ 07:01)  POCT Blood Glucose.: 98 mg/dL ( @ 00:26)    Physical Findings:  - Appearance: confused, disoriented at time of RD visit  - GI function: last BM ; on bowel regimen. No nausea/vomiting reported. No abd distention noted at this time.  - Tubes: NG tube  - Oral/Mouth cavity: per  SLP eval: "+cognition interferes w/ po acceptance/tolerance, +moderate oral dysphagia w/o overt s/s of pharyngeal dysphagia Pt w/ known h/o silent aspiration on VFSS , benefitted from use of chin tuck which she is unable to achieve at this time. Aspiration risk remains high". Recommendation: "safest means of nutrition and hydration is non-oral, family declined long term non-oral means via PEG. Suggest comfort feeds of puree and mildly thick liquids w/ family knowing and understanding the risks for aspiration/PNA and respiratory compromise".  - Skin: 2+ edema (B/L leg), 3+ edema (R hand, R arm); wound to sacrum spine; no pressure injury noted at this time     Nutrition Requirements:  Weight Used: 48.9 kg per  RD assessment - lowest wt this admit; remains appropriate as latest wt is suspected to be r/t fluid related wt shifts     Estimated Energy Needs    Continue [x]  Adjust []   x SF 1.2-1.5 (PCM, underweight, advanced age considered)= 1751-3557 kcal/day.        Estimated Protein Needs    Continue [x]  Adjust []  59-74 g/day (1.2-1.5 g/kg)        Estimated Fluid Needs        Continue []  Adjust [x]  978-1223 mL/day (20-25 mL/kg ABW)     Nutrient Intake: EN regimen at goal provides 100% kcal & 100% protein needs at goal. Reviewed tube feed intake records. Pt has been receiving 75% of tube feed at goal. No reported issues related to tolerance of enteral nutrition at this time. Discussed with RN goal volume needed to maintain estimated nutrient needs. Plan for comfort feeds as well, however at this time po intake suspected to be <25% if any comfort feeds are consumed. Will maintain current tube feed rate as it is not expected that pt will exceed estimated nutrient needs at this time. Will monitor goals of care.     [x] Previous Nutrition Diagnosis: Severe protein-calorie malnutrition            [x] Ongoing          [] Resolved    Nutrition Intervention: Meals & Snacks + Enteral Nutrition      Goal/Expected Outcome: Pt to meet and maintain tolerance to nutrition support regimen, meeting >85% & <105% of estimated nutrient needs as appropriate with goals of care within the next 4 days. Pt at high nutrition risk.     Indicator/Monitoring: Diet order, energy intake, tube feed intake & tolerance, BM, skin, weight, labs, goals of care.    Recommendation: Order a three-day calorie count and document po intake over the next 3 days. Continue providing Jevity 1.2 at 270 mL q6hrs with 50 mL pre & post flushes. Regimen at goal to provide 1296 kcal, 60 g protein, 1275 mL free H2O. Be sure to provide goal volume of 1080 mL Jevity 1.2 daily.    Discuss with Dr. Alexandria [phone]

## 2022-08-30 NOTE — PROGRESS NOTE ADULT - ASSESSMENT
89 yo F PMHx asthma, COPD, hyperactive airway, b/l chronic bronchiectasis, DLD, HTN, CAD s/p PCI, hypothyroidism presented for evaluation of worsening metabolic encephalopathy. Patient was recently admitted w/ cyclospora gastroenteritis & cystitis. Course was complicated by metabolic encephalopathy. Upon ER arrival pt found to have subsegmental PE and positive urinalysis.     #Suspected Metabolic encephalopathy likely 2/2 suspected UTI  #H/O recent colitis (cyclospora PCR positive) treated w/ bactrim & Flagyl until 8/20/22  - hold off LP as pt will not be able to tolerate it (cannot lie flat and is aspiration risk)  - MRI, EEG negative  - BCx, UCx negative  - s/p ampicillin 2gram IV Q8  - s/p vancomycin 750mg IV q24  - acyclovir held  - Patient improved the most with ampicillin, c/w saccharomyces boulardii  - monitor off Abxs for now  - Speech and Swallow: recommended this as comfort feeds, will keep NGT in for now and encourage PO intake    #Hypomagnesemia  - Mg 1.7  - repleting    #Hyperkalemia  - hemolyzed 5.2  - repeat BMP at 4PM    #ZAYDA (resolved)  #Oliguria  - bladder scan showed no residual  - continue to hold acyclovir  - d/hamida IVFs    #Left Lung Opacity  - CXR showed increased left basilar opacity/effusion  - d/hamida vancomycin 750mg IV q24    #Subsegmental PE R/L Lower Lobe  - duplex shows peroneal thrombus  - CTA showed small intraluminal filling defects noted in subsegmental branches of L/R lower lobe pulm arteries consistent w/ acute pulmonary emboli (no evidence of right heart strain)  - c/w Lovenox, will switch to oral Eliquis once pt able to take po consistently  - c/w telemetry  - c/w oxygen supplementation    #Nodular Thyroid  #Hypothyroidism  - TSH mildly elevated but will hold off med for now    #Lactic acidosis, resolving  - IV maintenance D5 1/2 NS 70 ml/hr    #Metabolic alkalosis  - unclear source (not on diuresis or bicarb, not vomiting)  - resolved    #Asthma  #COPD  #b/l chronic bronchiectasis  - c/w albuterol & Symbicort    #DLD  #HTN  #CAD s/p PCI  - c/w isosorbide dinitrate, aspirin, Plavix, metoprolol  - Metoprolol changed from ER 25mg to 12.5mg BID  - c/w lisinopril --held for now due to ZAYDA    #Suspected Urinary Retention  - acevedo remains  - had bladder distension on CT   - bladder scan shows no retention but has oliguria    #Diverticulosis  - avoid constipation    #Weight loss  #Hx abdominal pain  - patient refused outpatient colonoscopy & EGD    #Trace right pleural effusion  #Atelectasis  - encourage incentive spirometry    #Right hilar 9 mm LN (decreased in size)  - outpatient f/u    #Constipation  - c/w Senna & Miralax    #Glaucoma  - c/w latanoprost    #Misc  - DVT PPx: Lovenox -- if SCr continues to rise will need to change to heparin  - GI PPx: Protonix  - Code Status: DNR, DNI  - Dispo: d/c tele  - Hospice note in 89 yo F PMHx asthma, COPD, hyperactive airway, b/l chronic bronchiectasis, DLD, HTN, CAD s/p PCI, hypothyroidism presented for evaluation of worsening metabolic encephalopathy. Patient was recently admitted w/ cyclospora gastroenteritis & cystitis. Course was complicated by metabolic encephalopathy. Upon ER arrival pt found to have subsegmental PE and positive urinalysis.     #Suspected Metabolic encephalopathy likely 2/2 suspected UTI  #H/O recent colitis (cyclospora PCR positive) treated w/ bactrim & Flagyl until 8/20/22  - hold off LP as pt will not be able to tolerate it (cannot lie flat and is aspiration risk)  - MRI, EEG negative  - BCx, UCx negative  - s/p ampicillin 2gram IV Q8  - s/p vancomycin 750mg IV q24  - acyclovir held  - Patient improved the most with ampicillin, c/w saccharomyces boulardii  - monitor off Abxs for now  - Speech and Swallow: recommended this as comfort feeds, will keep NGT in for now and encourage PO intake    #Hypomagnesemia  - Mg 1.7  - repleting    #Hyperkalemia  - hemolyzed 5.2  - repeat BMP at 4PM    #ZAYDA (resolved)  #Oliguria  #Hyponatremia  - bladder scan showed no residual  - continue to hold acyclovir  - d/hamida IVFs  - Na 132  - F/U 4PM BMP    #Left Lung Opacity  - CXR showed increased left basilar opacity/effusion  - d/hamida vancomycin 750mg IV q24    #Subsegmental PE R/L Lower Lobe  - duplex shows peroneal thrombus  - CTA showed small intraluminal filling defects noted in subsegmental branches of L/R lower lobe pulm arteries consistent w/ acute pulmonary emboli (no evidence of right heart strain)  - c/w Lovenox, will switch to oral Eliquis once pt able to take po consistently  - c/w telemetry  - c/w oxygen supplementation    #Nodular Thyroid  #Hypothyroidism  - TSH mildly elevated but will hold off med for now    #Lactic acidosis, resolving  - IV maintenance D5 1/2 NS 70 ml/hr    #Metabolic alkalosis  - unclear source (not on diuresis or bicarb, not vomiting)  - resolved    #Asthma  #COPD  #b/l chronic bronchiectasis  - c/w albuterol & Symbicort    #DLD  #HTN  #CAD s/p PCI  - c/w isosorbide dinitrate, aspirin, Plavix, metoprolol  - Metoprolol changed from ER 25mg to 12.5mg BID  - c/w lisinopril --held for now due to ZAYDA    #Suspected Urinary Retention  - acevedo remains  - had bladder distension on CT   - bladder scan shows no retention but has oliguria    #Diverticulosis  - avoid constipation    #Weight loss  #Hx abdominal pain  - patient refused outpatient colonoscopy & EGD    #Trace right pleural effusion  #Atelectasis  - encourage incentive spirometry    #Right hilar 9 mm LN (decreased in size)  - outpatient f/u    #Constipation  - c/w Senna & Miralax    #Glaucoma  - c/w latanoprost    #Misc  - DVT PPx: Lovenox -- if SCr continues to rise will need to change to heparin  - GI PPx: Protonix  - Code Status: DNR, DNI  - Dispo: d/c tele  - Hospice note in

## 2022-08-30 NOTE — PROGRESS NOTE ADULT - ASSESSMENT
89 yo F PMHx asthma, COPD, hyperactive airway, b/l chronic bronchiectasis, DLD, HTN, CAD s/p PCI, hypothyroidism presented for evaluation of worsening metabolic encephalopathy. Patient was recently admitted w/ cyclospora gastroenteritis & cystitis. Course was complicated by metabolic encephalopathy. Upon ER arrival pt found to have subsegmental PE and positive urinalysis.     Suspected Metabolic encephalopathy from suspected UTI   History of recent colitis as well as cyclospora PCR positive treated with bactrim & Flagyl until 8/20/22  - hold off LP as pt will not be able to tolerate it (cannot lie flat and is aspiration risk)  - pt seems to have improved the most with ampicillin   - MRI, EEG negative, UCx negative,  - c/w vancomycin  - acyclovir held  - c/w saccharomyces boulardii  - pt was able to participate with speech and swallow--pt has ability to swallow but really needs to be coached and reminded to swallow--S&S recommended this as comfort feeds. Will keep NGT in for now and encourage PO intake    ZAYDA  Oliguria  Hyperkalemia  - resolved  - bladder scan show no residual  - continue to hold acyclovir  - fluids stopped    Left opacity  - vancomycin    Subsegmental PE  - duplex shows peroneal thrombus  - c/w Lovenox, will switch to oral eliquis once pt able to take po consistently  - c/w telemetry for now while awaiting Echo  - c/w oxygen supplementation PRN      Nodular Thyroid  Hypothyroidism  - TSH mildly elevated but will hold off med for now    Lactic acidosis, resolving  - resolved    Metabolic alkalosis  - unclear source (not on diuresis or bicarb, not vomiting)  - resolved    Asthma  COPD  b/l chronic bronchiectasis  - c/w albuterol & Symbicort    DLD  HTN  CAD s/p PCI  - c/w isosorbide dinitrate, aspirin, Plavix, metoprolol  - c/w lisinopril --held for now due to ZAYDA    Urinary Retention?  - acevedo remains in place  - had bladder distension on CT   - bladder scan shows no retention but has oliguria    Diverticulosis  - avoid constipation    Weight loss  Hx abdominal pain  - patient refused outpatient colonoscopy & EGD    Trace right pleural effusion  Atelectasis  - incentive spirometry    Right hilar 9 mm LN (decreased in size)  - outpatient f/u    Constipation  - c/w Senna & Miralax    Glaucoma  - c/w latanoprost    # DVT PPx  # GI PPx  - c/w Protonix  - DNR, DNI    #Progress Note Handoff:  Pending (specify):  Improvement in mental status/hospice  Family discussion: I spoke with daughter. her correct phone number is [phone] and her  Tomi is [phone]. Family opting for hospice treatment--awaiting NOE for Hospice in SNF  Disposition: Home___/SNF_x__/Other________/Unknown at this time________

## 2022-08-31 LAB
ALBUMIN SERPL ELPH-MCNC: 3 G/DL — LOW (ref 3.5–5.2)
ALP SERPL-CCNC: 79 U/L — SIGNIFICANT CHANGE UP (ref 30–115)
ALT FLD-CCNC: 29 U/L — SIGNIFICANT CHANGE UP (ref 0–41)
ANION GAP SERPL CALC-SCNC: 7 MMOL/L — SIGNIFICANT CHANGE UP (ref 7–14)
AST SERPL-CCNC: 28 U/L — SIGNIFICANT CHANGE UP (ref 0–41)
BASOPHILS # BLD AUTO: 0.09 K/UL — SIGNIFICANT CHANGE UP (ref 0–0.2)
BASOPHILS NFR BLD AUTO: 1 % — SIGNIFICANT CHANGE UP (ref 0–1)
BILIRUB SERPL-MCNC: 0.5 MG/DL — SIGNIFICANT CHANGE UP (ref 0.2–1.2)
BUN SERPL-MCNC: 17 MG/DL — SIGNIFICANT CHANGE UP (ref 10–20)
CALCIUM SERPL-MCNC: 9.2 MG/DL — SIGNIFICANT CHANGE UP (ref 8.5–10.1)
CHLORIDE SERPL-SCNC: 102 MMOL/L — SIGNIFICANT CHANGE UP (ref 98–110)
CO2 SERPL-SCNC: 27 MMOL/L — SIGNIFICANT CHANGE UP (ref 17–32)
CREAT SERPL-MCNC: 0.5 MG/DL — LOW (ref 0.7–1.5)
EGFR: 86 ML/MIN/1.73M2 — SIGNIFICANT CHANGE UP
EOSINOPHIL # BLD AUTO: 0.39 K/UL — SIGNIFICANT CHANGE UP (ref 0–0.7)
EOSINOPHIL NFR BLD AUTO: 4.2 % — SIGNIFICANT CHANGE UP (ref 0–8)
GLUCOSE SERPL-MCNC: 91 MG/DL — SIGNIFICANT CHANGE UP (ref 70–99)
HCT VFR BLD CALC: 34.3 % — LOW (ref 37–47)
HGB BLD-MCNC: 11.2 G/DL — LOW (ref 12–16)
IMM GRANULOCYTES NFR BLD AUTO: 1.3 % — HIGH (ref 0.1–0.3)
LYMPHOCYTES # BLD AUTO: 1.53 K/UL — SIGNIFICANT CHANGE UP (ref 1.2–3.4)
LYMPHOCYTES # BLD AUTO: 16.5 % — LOW (ref 20.5–51.1)
MAGNESIUM SERPL-MCNC: 1.9 MG/DL — SIGNIFICANT CHANGE UP (ref 1.8–2.4)
MCHC RBC-ENTMCNC: 31.6 PG — HIGH (ref 27–31)
MCHC RBC-ENTMCNC: 32.7 G/DL — SIGNIFICANT CHANGE UP (ref 32–37)
MCV RBC AUTO: 96.9 FL — SIGNIFICANT CHANGE UP (ref 81–99)
MONOCYTES # BLD AUTO: 1.13 K/UL — HIGH (ref 0.1–0.6)
MONOCYTES NFR BLD AUTO: 12.2 % — HIGH (ref 1.7–9.3)
NEUTROPHILS # BLD AUTO: 5.99 K/UL — SIGNIFICANT CHANGE UP (ref 1.4–6.5)
NEUTROPHILS NFR BLD AUTO: 64.8 % — SIGNIFICANT CHANGE UP (ref 42.2–75.2)
NRBC # BLD: 0 /100 WBCS — SIGNIFICANT CHANGE UP (ref 0–0)
PLATELET # BLD AUTO: 310 K/UL — SIGNIFICANT CHANGE UP (ref 130–400)
POTASSIUM SERPL-MCNC: 4.8 MMOL/L — SIGNIFICANT CHANGE UP (ref 3.5–5)
POTASSIUM SERPL-SCNC: 4.8 MMOL/L — SIGNIFICANT CHANGE UP (ref 3.5–5)
PROT SERPL-MCNC: 5.3 G/DL — LOW (ref 6–8)
RBC # BLD: 3.54 M/UL — LOW (ref 4.2–5.4)
RBC # FLD: 14.6 % — HIGH (ref 11.5–14.5)
SODIUM SERPL-SCNC: 136 MMOL/L — SIGNIFICANT CHANGE UP (ref 135–146)
WBC # BLD: 9.25 K/UL — SIGNIFICANT CHANGE UP (ref 4.8–10.8)
WBC # FLD AUTO: 9.25 K/UL — SIGNIFICANT CHANGE UP (ref 4.8–10.8)

## 2022-08-31 PROCEDURE — 99233 SBSQ HOSP IP/OBS HIGH 50: CPT

## 2022-08-31 PROCEDURE — 99232 SBSQ HOSP IP/OBS MODERATE 35: CPT

## 2022-08-31 RX ADMIN — LATANOPROST 1 DROP(S): 0.05 SOLUTION/ DROPS OPHTHALMIC; TOPICAL at 21:26

## 2022-08-31 RX ADMIN — ENOXAPARIN SODIUM 50 MILLIGRAM(S): 100 INJECTION SUBCUTANEOUS at 05:13

## 2022-08-31 RX ADMIN — SENNA PLUS 2 TABLET(S): 8.6 TABLET ORAL at 21:26

## 2022-08-31 RX ADMIN — Medication 25 MILLIGRAM(S): at 21:26

## 2022-08-31 RX ADMIN — Medication 25 MILLIGRAM(S): at 14:20

## 2022-08-31 RX ADMIN — ENOXAPARIN SODIUM 50 MILLIGRAM(S): 100 INJECTION SUBCUTANEOUS at 17:27

## 2022-08-31 RX ADMIN — Medication 12.5 MILLIGRAM(S): at 05:13

## 2022-08-31 RX ADMIN — Medication 25 MILLIGRAM(S): at 05:14

## 2022-08-31 RX ADMIN — PANTOPRAZOLE SODIUM 40 MILLIGRAM(S): 20 TABLET, DELAYED RELEASE ORAL at 05:15

## 2022-08-31 RX ADMIN — ISOSORBIDE DINITRATE 10 MILLIGRAM(S): 5 TABLET ORAL at 05:13

## 2022-08-31 RX ADMIN — Medication 25 MICROGRAM(S): at 05:14

## 2022-08-31 RX ADMIN — ISOSORBIDE DINITRATE 10 MILLIGRAM(S): 5 TABLET ORAL at 17:31

## 2022-08-31 RX ADMIN — POLYETHYLENE GLYCOL 3350 17 GRAM(S): 17 POWDER, FOR SOLUTION ORAL at 11:40

## 2022-08-31 RX ADMIN — Medication 12.5 MILLIGRAM(S): at 17:31

## 2022-08-31 RX ADMIN — Medication 81 MILLIGRAM(S): at 11:46

## 2022-08-31 RX ADMIN — CLOPIDOGREL BISULFATE 75 MILLIGRAM(S): 75 TABLET, FILM COATED ORAL at 11:41

## 2022-08-31 RX ADMIN — ISOSORBIDE DINITRATE 10 MILLIGRAM(S): 5 TABLET ORAL at 12:00

## 2022-08-31 RX ADMIN — CHLORHEXIDINE GLUCONATE 1 APPLICATION(S): 213 SOLUTION TOPICAL at 11:41

## 2022-08-31 RX ADMIN — Medication 1 APPLICATION(S): at 11:40

## 2022-08-31 NOTE — PROGRESS NOTE ADULT - SUBJECTIVE AND OBJECTIVE BOX
HPI    89-year-old female w/ hx of Asthma, COPD, hyperactive airway, b/l chronic bronchiectasis, DL, HTN, CAD s/p PCI, hypothyroidism. Patient was recently admitted w/ cyclospora gastroenteritis & cystitis; stay was complicated by metabolic encephalopathy. Patient is presenting for worsening lethargy and decreased responsiveness. Patient unable to participate in history. Called daughter, did not .    In the ED, left sided droop was noted, code stroke was called. CT head, CTA, & CTP were done -> -ve for acute events    CT showed acute b/l subsegmental PE w/o right heart strain -> s/p Lovenox 60    UA +ve  WBC 16, PMN 73.5%  lactate 2.2  s/p vanco 1.5 g & cefepime 2 g    PMHx: diverticulosis, scoliosis, umbilical hernia, chronic diarrhea (unclear if persistent) (hx of cyclospora treated w/ bactrim & Flagyl until 8/20/22)  PSHx: s/p hysterectomy, s/p cholecystectomy    s/p Pfizer covid vaccine x 2 on 3/6/22 & 3/27/21    In the ED, VS: T 37.6 (rectal), HR 80, /84, RR 17, SpO2 96% on 3 L NC (was on 2 L at NH)      OBJECTIVE  PAST MEDICAL & SURGICAL HISTORY  High blood cholesterol    Hypothyroid    Hypertension    COPD (chronic obstructive pulmonary disease)    CAD (coronary artery disease)    History of coronary artery stent placement    History of cholecystectomy    H/O abdominal hysterectomy    Cataract                                                -----------------------------------------------------------    REVIEW OF SYSTEMS:  CONSTITUTIONAL: No fever, weight loss, or fatigue  EYES: No eye pain, visual disturbances, or discharge  ENMT:  No difficulty hearing, tinnitus, vertigo; No sinus or throat pain  NECK: No pain or stiffness  BREASTS: No pain, masses, or nipple discharge  RESPIRATORY: No cough, wheezing, chills or hemoptysis; No shortness of breath  CARDIOVASCULAR: No chest pain, palpitations, dizziness, or leg swelling  GASTROINTESTINAL: No abdominal or epigastric pain. No nausea, vomiting, or hematemesis; No diarrhea or constipation. No melena or hematochezia.  GENITOURINARY: No dysuria, frequency, hematuria, or incontinence  NEUROLOGICAL: No headaches, memory loss, loss of strength, numbness, or tremors  SKIN: No itching, burning, rashes, or lesions   LYMPH NODES: No enlarged glands  ENDOCRINE: No heat or cold intolerance; No hair loss  MUSCULOSKELETAL: No joint pain or swelling; No muscle, back, or extremity pain  PSYCHIATRIC: No depression, anxiety, mood swings, or difficulty sleeping  HEME/LYMPH: No easy bruising, or bleeding gums  ALLERY AND IMMUNOLOGIC: No hives or eczema                                            ------------------------------------------------------------    ALLERGIES:  No Known Allergies                                            ------------------------------------------------------------    HOME MEDICATIONS  Home Medications:  aspirin 81 mg oral tablet, chewable: 1 tab(s) orally once a day (23 Aug 2022 04:41)  CLOPIDOGREL 75 MG TABLET: 1 each orally once a day (23 Aug 2022 04:41)  hydrALAZINE 25 mg oral tablet: 1 tab(s) orally 3 times a day (23 Aug 2022 04:41)  isosorbide dinitrate 10 mg oral tablet: 1 tab(s) orally once a day (23 Aug 2022 04:41)  latanoprost 0.005% ophthalmic solution: 1 drop(s) to each affected eye once a day (in the evening) (23 Aug 2022 04:41)  levothyroxine 25 mcg (0.025 mg) oral tablet: 1 tab(s) orally once a day (23 Aug 2022 04:41)  lisinopril 20 mg oral tablet: 1 tab(s) orally once a day (23 Aug 2022 04:41)  metoprolol succinate 25 mg oral tablet, extended release: 1  orally once a day (23 Aug 2022 04:41)  pantoprazole 20 mg oral delayed release tablet: 1 tab(s) orally once a day (23 Aug 2022 04:41)  polyethylene glycol 3350 oral powder for reconstitution: 17 gram(s) orally once a day (23 Aug 2022 04:41)  saccharomyces boulardii lyo 250 mg oral capsule: 1 cap(s) orally 2 times a day (23 Aug 2022 04:41)  senna leaf extract oral tablet: 2 tab(s) orally once a day (at bedtime) (23 Aug 2022 04:41)                           MEDICATIONS:  STANDING MEDICATIONS  aspirin  chewable 81 milliGRAM(s) Enteral Tube daily  BACItracin   Ointment 1 Application(s) Topical daily  budesonide  80 MICROgram(s)/formoterol 4.5 MICROgram(s) Inhaler 2 Puff(s) Inhalation two times a day  chlorhexidine 2% Cloths 1 Application(s) Topical daily  clopidogrel Tablet 75 milliGRAM(s) Enteral Tube daily  enoxaparin Injectable 50 milliGRAM(s) SubCutaneous every 12 hours  hydrALAZINE 25 milliGRAM(s) Oral three times a day  isosorbide   dinitrate Tablet (ISORDIL) 10 milliGRAM(s) Enteral Tube three times a day  latanoprost 0.005% Ophthalmic Solution 1 Drop(s) Both EYES at bedtime  levothyroxine 25 MICROGram(s) Oral daily  metoprolol tartrate 12.5 milliGRAM(s) Oral two times a day  pantoprazole   Suspension 40 milliGRAM(s) Oral before breakfast  polyethylene glycol 3350 17 Gram(s) Oral daily  saccharomyces boulardii 250 milliGRAM(s) Oral two times a day  senna 2 Tablet(s) Oral at bedtime    PRN MEDICATIONS  acetaminophen     Tablet .. 650 milliGRAM(s) Oral every 6 hours PRN  ALBUTerol    90 MICROgram(s) HFA Inhaler 2 Puff(s) Inhalation every 6 hours PRN  aluminum hydroxide/magnesium hydroxide/simethicone Suspension 30 milliLiter(s) Oral every 4 hours PRN  glycopyrrolate Injectable 0.4 milliGRAM(s) IV Push every 6 hours PRN  melatonin 3 milliGRAM(s) Oral at bedtime PRN  ondansetron Injectable 4 milliGRAM(s) IV Push every 8 hours PRN                                            ------------------------------------------------------------  VITAL SIGNS: Last 24 Hours  T(C): 35.7 (31 Aug 2022 07:44), Max: 36.3 (31 Aug 2022 03:19)  T(F): 96.2 (31 Aug 2022 07:44), Max: 97.4 (31 Aug 2022 03:19)  HR: 71 (31 Aug 2022 07:44) (71 - 92)  BP: 172/71 (31 Aug 2022 07:44) (128/60 - 172/71)  BP(mean): --  RR: 18 (31 Aug 2022 07:44) (18 - 18)  SpO2: 95% (30 Aug 2022 10:55) (95% - 95%)      08-30-22 @ 07:01  -  08-31-22 @ 07:00  --------------------------------------------------------  IN: 1680 mL / OUT: 1000 mL / NET: 680 mL                                             --------------------------------------------------------------  LABS:                        11.2   9.25  )-----------( 310      ( 31 Aug 2022 06:22 )             34.3     08-31    136  |  102  |  17  ----------------------------<  91  4.8   |  27  |  0.5<L>    Ca    9.2      31 Aug 2022 06:22  Mg     1.9     08-31    TPro  5.3<L>  /  Alb  3.0<L>  /  TBili  0.5  /  DBili  x   /  AST  28  /  ALT  29  /  AlkPhos  79  08-31                                                              -------------------------------------------------------------  RADIOLOGY:                                            --------------------------------------------------------------    PHYSICAL EXAM:  GENERAL: NAD, well-groomed, well-developed  HEAD:  Atraumatic, Normocephalic  EYES: EOMI, PERRLA, conjunctiva and sclera clear  ENMT: No tonsillar erythema, exudates, or enlargement; Moist mucous membranes, Good dentition, No lesions  NECK: Supple, No JVD, Normal thyroid  NERVOUS SYSTEM:  Alert & Oriented X3, Good concentration; Motor Strength 5/5 B/L upper and lower extremities; DTRs 2+ intact and symmetric  CHEST/LUNG: Clear to auscultation bilaterally; No rales, rhonchi, wheezing, or rubs  HEART: Regular rate and rhythm; No murmurs, rubs, or gallops  ABDOMEN: Soft, Nontender, Nondistended; Bowel sounds present  EXTREMITIES:  2+ Peripheral Pulses, No clubbing, cyanosis, or edema  LYMPH: No lymphadenopathy noted  SKIN: No rashes or lesions                                           --------------------------------------------------------------    ASSESSEMENT and PLAN

## 2022-08-31 NOTE — PROGRESS NOTE ADULT - ASSESSMENT
89F with PMH of COPD, asthma, HTN, CAD s/p PCI, hypothyroidism, here after recent admission at [facility] for cyclospora gastroenteritis and cystitis and metabolic encephalopathy, discharged to [facility] and admitted here for worsening lethargy. Code stroke called on admission, with CT head negative, but CTA showing acute bilateral subsegmental PE, now on therapeutic lovenox. Patient did not pass speech and swallow, and palliative care called to help discuss nutritional GOC.

## 2022-08-31 NOTE — PROGRESS NOTE ADULT - SUBJECTIVE AND OBJECTIVE BOX
CHIRAG HERNÁNDEZ  89y, Female  Allergy: No Known Allergies    Hospital Day: 9d    Patient seen and examined earlier today.     PMH/PSH:  PAST MEDICAL & SURGICAL HISTORY:  High blood cholesterol      Hypothyroid      Hypertension      COPD (chronic obstructive pulmonary disease)      CAD (coronary artery disease)      History of coronary artery stent placement      History of cholecystectomy      H/O abdominal hysterectomy      Cataract          LAST 24-Hr EVENTS:    VITALS:  T(F): 96.2 (08-31-22 @ 07:44), Max: 97.4 (08-31-22 @ 03:19)  HR: 73 (08-31-22 @ 10:29)  BP: 175/77 (08-31-22 @ 10:29) (152/65 - 175/77)  RR: 18 (08-31-22 @ 07:44)  SpO2: --          TESTS & MEASUREMENTS:  Weight/BMI  53 (08-28-22 @ 05:08)  17.8 (08-28-22 @ 05:08)    08-29-22 @ 07:01  -  08-30-22 @ 07:00  --------------------------------------------------------  IN: 1410 mL / OUT: 1260 mL / NET: 150 mL    08-30-22 @ 07:01  -  08-31-22 @ 07:00  --------------------------------------------------------  IN: 1680 mL / OUT: 1000 mL / NET: 680 mL                            11.2   9.25  )-----------( 310      ( 31 Aug 2022 06:22 )             34.3         08-31    136  |  102  |  17  ----------------------------<  91  4.8   |  27  |  0.5<L>    Ca    9.2      31 Aug 2022 06:22  Mg     1.9     08-31    TPro  5.3<L>  /  Alb  3.0<L>  /  TBili  0.5  /  DBili  x   /  AST  28  /  ALT  29  /  AlkPhos  79  08-31    LIVER FUNCTIONS - ( 31 Aug 2022 06:22 )  Alb: 3.0 g/dL / Pro: 5.3 g/dL / ALK PHOS: 79 U/L / ALT: 29 U/L / AST: 28 U/L / GGT: x                           COVID-19 PCR: NotDetec (08-29-22 @ 06:30)        A1C with Estimated Average Glucose Result: 4.8 % (08-24-22 @ 07:07)      Indwelling Urethral Catheter:     Connect To:  Straight Drainage/Gravity    Indication:  Improved Comfort Care (08-31-22 @ 06:08) (not performed)  Indwelling Urethral Catheter:     Connect To:  Straight Drainage/Gravity    Indication:  Improved Comfort Care (08-30-22 @ 00:53) (not performed)      RADIOLOGY, ECG, & ADDITIONAL TESTS:  12 Lead ECG:   Ventricular Rate 81 BPM    Atrial Rate 81 BPM    P-R Interval 148 ms    QRS Duration 134 ms    Q-T Interval 446 ms    QTC Calculation(Bazett) 518 ms    P Axis 12 degrees    R Axis -64 degrees    T Axis 86 degrees    Diagnosis Line Normal sinus rhythm  Left axis deviation  Left ventricular hypertrophy with QRS widening and repolarization abnormality  Inferior infarct , age undetermined  Possible inferior infarct, age undetermined  Abnormal ECG    Confirmed by Eduardo Ruth ([id number]) on 8/23/20229:35:07 AM (08-22-22 @ 20:35)      RECENT DIAGNOSTIC ORDERS:      MEDICATIONS:  MEDICATIONS  (STANDING):  aspirin  chewable 81 milliGRAM(s) Enteral Tube daily  BACItracin   Ointment 1 Application(s) Topical daily  budesonide  80 MICROgram(s)/formoterol 4.5 MICROgram(s) Inhaler 2 Puff(s) Inhalation two times a day  chlorhexidine 2% Cloths 1 Application(s) Topical daily  clopidogrel Tablet 75 milliGRAM(s) Enteral Tube daily  enoxaparin Injectable 50 milliGRAM(s) SubCutaneous every 12 hours  hydrALAZINE 25 milliGRAM(s) Oral three times a day  isosorbide   dinitrate Tablet (ISORDIL) 10 milliGRAM(s) Enteral Tube three times a day  latanoprost 0.005% Ophthalmic Solution 1 Drop(s) Both EYES at bedtime  levothyroxine 25 MICROGram(s) Oral daily  metoprolol tartrate 12.5 milliGRAM(s) Oral two times a day  pantoprazole   Suspension 40 milliGRAM(s) Oral before breakfast  polyethylene glycol 3350 17 Gram(s) Oral daily  saccharomyces boulardii 250 milliGRAM(s) Oral two times a day  senna 2 Tablet(s) Oral at bedtime    MEDICATIONS  (PRN):  acetaminophen     Tablet .. 650 milliGRAM(s) Oral every 6 hours PRN Temp greater or equal to 38C (100.4F), Mild Pain (1 - 3)  ALBUTerol    90 MICROgram(s) HFA Inhaler 2 Puff(s) Inhalation every 6 hours PRN Shortness of Breath and/or Wheezing  aluminum hydroxide/magnesium hydroxide/simethicone Suspension 30 milliLiter(s) Oral every 4 hours PRN Dyspepsia  glycopyrrolate Injectable 0.4 milliGRAM(s) IV Push every 6 hours PRN secretions  melatonin 3 milliGRAM(s) Oral at bedtime PRN Insomnia  ondansetron Injectable 4 milliGRAM(s) IV Push every 8 hours PRN Nausea and/or Vomiting      HOME MEDICATIONS:  albuterol 90 mcg/inh inhalation aerosol (08-23)  aspirin 81 mg oral tablet, chewable (08-23)  budesonide-formoterol 80 mcg-4.5 mcg/inh inhalation aerosol (08-23)  CLOPIDOGREL 75 MG TABLET (08-23)  hydrALAZINE 25 mg oral tablet (08-23)  isosorbide dinitrate 10 mg oral tablet (08-23)  latanoprost 0.005% ophthalmic solution (08-23)  levothyroxine 25 mcg (0.025 mg) oral tablet (08-23)  lisinopril 20 mg oral tablet (08-23)  metoprolol succinate 25 mg oral tablet, extended release (08-23)  pantoprazole 20 mg oral delayed release tablet (08-23)  polyethylene glycol 3350 oral powder for reconstitution (08-23)  saccharomyces boulardii lyo 250 mg oral capsule (08-23)  senna leaf extract oral tablet (08-23)      PHYSICAL EXAM:  GENERAL:  NAD  SKIN: No rashes or lesions  HEENT: Atraumatic. Normocephalic. Anicteric  NECK:  No JVD.   PULMONARY: Clear to ausculation bilaterally. No wheezing. No rales  CVS: Normal S1, S2. Regular rate and rhythm. No murmurs.  ABDOMEN/GI: Soft, Nontender, Nondistended; Bowel sounds are present  EXTREMITIES:  No edema B/L LE.  NEUROLOGIC:  No motor deficit.  PSYCH: Alert & oriented x 3, normal affect

## 2022-08-31 NOTE — PROGRESS NOTE ADULT - ASSESSMENT
89 yo F PMHx asthma, COPD, hyperactive airway, b/l chronic bronchiectasis, DLD, HTN, CAD s/p PCI, hypothyroidism presented for evaluation of worsening metabolic encephalopathy. Patient was recently admitted w/ cyclospora gastroenteritis & cystitis. Course was complicated by metabolic encephalopathy. Upon ER arrival pt found to have subsegmental PE and positive urinalysis.     #Suspected Metabolic encephalopathy from suspected UTI   History of recent colitis as well as cyclospora PCR positive treated with bactrim & Flagyl until 8/20/22  - hold off LP as pt will not be able to tolerate it (cannot lie flat and is aspiration risk)  - pt seems to have improved the most with ampicillin   - MRI, EEG negative, UCx negative,  - c/w vancomycin  - acyclovir held  - c/w saccharomyces boulardii  - pt was able to participate with speech and swallow--pt has ability to swallow but really needs to be coached and reminded to swallow--S&S recommended this as comfort feeds. Will keep NGT in for now and encourage PO intake    ZAYDA  Oliguria  Hyperkalemia  - resolved  - bladder scan show no residual  - continue to hold acyclovir  - fluids stopped    Left opacity  - vancomycin    Subsegmental PE  - duplex shows peroneal thrombus  - c/w Lovenox, will switch to oral eliquis once pt able to take po consistently  - c/w telemetry for now while awaiting Echo  - c/w oxygen supplementation PRN      Nodular Thyroid  Hypothyroidism  - TSH mildly elevated but will hold off med for now    Lactic acidosis, resolving  - resolved    Metabolic alkalosis  - unclear source (not on diuresis or bicarb, not vomiting)  - resolved    Asthma  COPD  b/l chronic bronchiectasis  - c/w albuterol & Symbicort    DLD  HTN  CAD s/p PCI  - c/w isosorbide dinitrate, aspirin, Plavix, metoprolol  - c/w lisinopril --held for now due to ZAYDA    Urinary Retention?  - acevedo remains in place  - had bladder distension on CT   - bladder scan shows no retention but has oliguria    Diverticulosis  - avoid constipation    Weight loss  Hx abdominal pain  - patient refused outpatient colonoscopy & EGD    Trace right pleural effusion  Atelectasis  - incentive spirometry    Right hilar 9 mm LN (decreased in size)  - outpatient f/u    Constipation  - c/w Senna & Miralax    Glaucoma  - c/w latanoprost    # DVT PPx  # GI PPx  - c/w Protonix  - DNR, DNI    Pending (specify):  Improvement in mental status/hospice  Family discussion: Family opting for hospice treatment--awaiting NOE for Hospice in SNF  Disposition: SNF

## 2022-08-31 NOTE — CHART NOTE - NSCHARTNOTEFT_GEN_A_CORE
Patient reported that she was comfortable.  Patient expressed frustration with not being able to eat.  Support rendered.

## 2022-08-31 NOTE — PROGRESS NOTE ADULT - SUBJECTIVE AND OBJECTIVE BOX
HPI: 89F with PMH of COPD, asthma, HTN, CAD s/p PCI, hypothyroidism, here after recent admission at [facility] for cyclospora gastroenteritis and cystitis and metabolic encephalopathy, discharged to [facility] and admitted here for worsening lethargy. Code stroke called on admission, with CT head negative, but CTA showing acute bilateral subsegmental PE, now on therapeutic lovenox. Patient did not pass speech and swallow, and palliative care called to help discuss nutritional GOC.    INTERVAL EVENTS:  8/25: patient less mentally alert today  8/26: patient remains with AMS, has NGT in place, appears comfortbale  8/29: patient more alert, denies any complaints  8/30: patient less alert, NGT in place, daughter at bedside  8/31: patient conversant, denies any issues    ADVANCE DIRECTIVES:    DNR  MOLST  [ ]  Living Will  [ ]   DECISION MAKER(s):  [ ] Health Care Proxy(s)  [ ] Surrogate(s)  [ ] Guardian           Name(s): Phone Number(s): daughter    BASELINE (I)ADL(s) (prior to admission):  [place]: [ ]Total  [ ] Moderate [ ]Dependent  Palliative Performance Status Version 2:         %    http://npcrc.org/files/news/palliative_performance_scale_ppsv2.pdf    Allergies    No Known Allergies    Intolerances    MEDICATIONS  (STANDING):  aspirin  chewable 81 milliGRAM(s) Enteral Tube daily  BACItracin   Ointment 1 Application(s) Topical daily  budesonide  80 MICROgram(s)/formoterol 4.5 MICROgram(s) Inhaler 2 Puff(s) Inhalation two times a day  chlorhexidine 2% Cloths 1 Application(s) Topical daily  clopidogrel Tablet 75 milliGRAM(s) Enteral Tube daily  enoxaparin Injectable 50 milliGRAM(s) SubCutaneous every 12 hours  hydrALAZINE 25 milliGRAM(s) Oral three times a day  isosorbide   dinitrate Tablet (ISORDIL) 10 milliGRAM(s) Enteral Tube three times a day  latanoprost 0.005% Ophthalmic Solution 1 Drop(s) Both EYES at bedtime  levothyroxine 25 MICROGram(s) Oral daily  metoprolol tartrate 12.5 milliGRAM(s) Oral two times a day  pantoprazole   Suspension 40 milliGRAM(s) Oral before breakfast  polyethylene glycol 3350 17 Gram(s) Oral daily  saccharomyces boulardii 250 milliGRAM(s) Oral two times a day  senna 2 Tablet(s) Oral at bedtime    MEDICATIONS  (PRN):  acetaminophen     Tablet .. 650 milliGRAM(s) Oral every 6 hours PRN Temp greater or equal to 38C (100.4F), Mild Pain (1 - 3)  ALBUTerol    90 MICROgram(s) HFA Inhaler 2 Puff(s) Inhalation every 6 hours PRN Shortness of Breath and/or Wheezing  aluminum hydroxide/magnesium hydroxide/simethicone Suspension 30 milliLiter(s) Oral every 4 hours PRN Dyspepsia  glycopyrrolate Injectable 0.4 milliGRAM(s) IV Push every 6 hours PRN secretions  melatonin 3 milliGRAM(s) Oral at bedtime PRN Insomnia  ondansetron Injectable 4 milliGRAM(s) IV Push every 8 hours PRN Nausea and/or Vomiting      PRESENT SYMPTOMS: [ ]Unable to obtain due to poor mentation   Source if other than patient:  [ ]Family   [ ]Team     Pain: [ ]yes [ X]no  QOL impact -   Location -                    Aggravating factors -  Quality -  Radiation -  Timing-  Severity (0-10 scale):  Minimal acceptable level (0-10 scale):     CPOT:    https://www.[email or web address]/getattachment/[email or web address]/Critical-Care-Pain-Observation-Tool-(CPOT)      PAIN AD Score: 0    http://geriatrictoolkit.[place].[facility]/cog/painad.pdf (press ctrl +  left click to view)    Dyspnea:                           [ ]Mild [ ]Moderate [ ]Severe  Anxiety:                             [ ]Mild [ ]Moderate [ ]Severe  Fatigue:                             [ ]Mild [ ]Moderate [ ]Severe  Nausea:                             [ ]Mild [ ]Moderate [ ]Severe  Loss of appetite:              [ ]Mild [ ]Moderate [ ]Severe  Constipation:                    [ ]Mild [ ]Moderate [ ]Severe    Other Symptoms:  [X ]All other review of systems negative     Palliative Performance Status Version 2:         %    http://[facility].org/files/news/palliative_performance_scale_ppsv2.pdf    PHYSICAL EXAM:  Vital Signs Last 24 Hrs  T(C): 35.7 (31 Aug 2022 07:44), Max: 36.3 (31 Aug 2022 03:19)  T(F): 96.2 (31 Aug 2022 07:44), Max: 97.4 (31 Aug 2022 03:19)  HR: 74 (31 Aug 2022 14:00) (71 - 75)  BP: 146/88 (31 Aug 2022 14:00) (146/88 - 175/77)  BP(mean): --  RR: 18 (31 Aug 2022 07:44) (18 - 18)  SpO2: --            GENERAL:  [ ]Alert  [ ]Oriented x   [ ]Lethargic  [ ]Cachexia  [ ]Unarousable  [X ]Verbal  [ ]Non-Verbal  Behavioral:   [ ] Anxiety  [ ] Delirium [ ] Agitation [X ] Other calm  HEENT:  [ ]Normal   [X ]Dry mouth   [ ]ET Tube/Trach  [ ]Oral lesions  PULMONARY:   [ ]Clear [ ]Tachypnea  [ ]Audible excessive secretions [X] No labored breathing  [ ]Rhonchi        [ ]Right [ ]Left [ ]Bilateral  [ ]Crackles        [ ]Right [ ]Left [ ]Bilateral  [ ]Wheezing     [ ]Right [ ]Left [ ]Bilateral  [ ]Diminished breath sounds [ ]right [ ]left [ ]bilateral  CARDIOVASCULAR:    [ ]Regular [ ]Irregular [ ]Tachy  [ ]Brent [ ]Murmur [ ]Other  GASTROINTESTINAL:  [ ]Soft  [ X]Nondistended   [ ]+BS  [ ]Non tender [ ]Tender  [ ]PEG [ X]OGT/ NGT  Last BM:   GENITOURINARY:  [ X]Normal [ ] Incontinent   [ ]Oliguria/Anuria   [ ]Martines  MUSCULOSKELETAL:   [ ]Normal   [X ]Weakness  [ ]Bed/Wheelchair bound [ ]Edema  NEUROLOGIC:   [ ]No focal deficits  [ ]Cognitive impairment  [X ]Dysphagia [ ]Dysarthria [ ]Paresis [ ]Other   SKIN:   [ ]Normal   [X] Nonjaundiced [ ]Rash  [ ]Pressure ulcer(s)       Present on admission [ ]y [ ]n    CRITICAL CARE:  [ ] Shock Present  [ ]Septic [ ]Cardiogenic [ ]Neurologic [ ]Hypovolemic  [ ]  Vasopressors [ ]  Inotropes   [ ]Respiratory failure present [ ]Mechanical ventilation [ ]Non-invasive ventilatory support [ ]High flow  [ ]Acute  [ ]Chronic [ ]Hypoxic  [ ]Hypercarbic [ ]Other  [ ]Other organ failure     LABS: reviewed                          11.2   9.25  )-----------( 310      ( 31 Aug 2022 06:22 )             34.3     08-31    136  |  102  |  17  ----------------------------<  91  4.8   |  27  |  0.5<L>    Ca    9.2      31 Aug 2022 06:22  Mg     1.9     08-31        RADIOLOGY & ADDITIONAL STUDIES:    MR head 8/23/22    No acute infarct, intracranial hemorrhage, or midline shift.    Moderate-severe chronic microvascular ischemic changes, significantly   progressed since 1/9/2017.    Moderate right/mild left mastoid effusion, nonspecific.    PROTEIN CALORIE MALNUTRITION PRESENT: [ ]mild [ ]moderate [ ]severe [ ]underweight [ ]morbid obesity  https://www.andeal.org/vault/2440/web/files/ONC/Table_Clinical%20Characteristics%20to%20Document%20Malnutrition-White%20JV%20et%20al%202012.pdf    Height (cm): 172.7 (08-22-22 @ 20:22), 172.7 (08-05-22 @ 03:26)  Weight (kg): 53.5 (08-23-22 @ 07:39), 72.3 (08-05-22 @ 03:26)  BMI (kg/m2): 17.9 (08-23-22 @ 07:39), 24.2 (08-22-22 @ 20:22), 24.2 (08-05-22 @ 03:26)    [ ]PPSV2 < or = to 30% [ ]significant weight loss  [ ]poor nutritional intake  [ ]anasarca      [ ]Artificial Nutrition      REFERRALS:   [ ]Chaplaincy  [ ]Hospice  [ ]Child Life  [ ]Social Work  [ ]Case management [ ]Holistic Therapy     Goals of Care Document:     ______________  Alex Carbone MD  Palliative Medicine  [facility]   of Geriatric and Palliative Medicine  [phone]

## 2022-08-31 NOTE — HOSPICE CARE NOTE - CONVESATION DETAILS
Follow up call placed to patient's daughter Magdalena to further review hospice services at home, in a SNF and at the [facility]. Magdalena overwhelmed at this time and is requesting time to discuss overnight as she is not certain of what direction she wants to go in for hospice services. Update provided to [facility]. [facility]ospice to provide update Thursday.

## 2022-08-31 NOTE — PROGRESS NOTE ADULT - SUBJECTIVE AND OBJECTIVE BOX
OBJECTIVE  PAST MEDICAL & SURGICAL HISTORY  High blood cholesterol    Hypothyroid    Hypertension    COPD (chronic obstructive pulmonary disease)    CAD (coronary artery disease)    History of coronary artery stent placement    History of cholecystectomy    H/O abdominal hysterectomy    Cataract                                                -----------------------------------------------------------    REVIEW OF SYSTEMS:  CONSTITUTIONAL: No fever, weight loss, or fatigue  EYES: No eye pain, visual disturbances, or discharge  ENMT:  No difficulty hearing, tinnitus, vertigo; No sinus or throat pain  NECK: No pain or stiffness  BREASTS: No pain, masses, or nipple discharge  RESPIRATORY: No cough, wheezing, chills or hemoptysis; No shortness of breath  CARDIOVASCULAR: No chest pain, palpitations, dizziness, or leg swelling  GASTROINTESTINAL: No abdominal or epigastric pain. No nausea, vomiting, or hematemesis; No diarrhea or constipation. No melena or hematochezia.  GENITOURINARY: No dysuria, frequency, hematuria, or incontinence  NEUROLOGICAL: No headaches, memory loss, loss of strength, numbness, or tremors  SKIN: No itching, burning, rashes, or lesions   LYMPH NODES: No enlarged glands  ENDOCRINE: No heat or cold intolerance; No hair loss  MUSCULOSKELETAL: No joint pain or swelling; No muscle, back, or extremity pain  PSYCHIATRIC: No depression, anxiety, mood swings, or difficulty sleeping  HEME/LYMPH: No easy bruising, or bleeding gums  ALLERY AND IMMUNOLOGIC: No hives or eczema                                            ------------------------------------------------------------    ALLERGIES:  No Known Allergies                                            ------------------------------------------------------------    HOME MEDICATIONS  Home Medications:  aspirin 81 mg oral tablet, chewable: 1 tab(s) orally once a day (23 Aug 2022 04:41)  CLOPIDOGREL 75 MG TABLET: 1 each orally once a day (23 Aug 2022 04:41)  hydrALAZINE 25 mg oral tablet: 1 tab(s) orally 3 times a day (23 Aug 2022 04:41)  isosorbide dinitrate 10 mg oral tablet: 1 tab(s) orally once a day (23 Aug 2022 04:41)  latanoprost 0.005% ophthalmic solution: 1 drop(s) to each affected eye once a day (in the evening) (23 Aug 2022 04:41)  levothyroxine 25 mcg (0.025 mg) oral tablet: 1 tab(s) orally once a day (23 Aug 2022 04:41)  lisinopril 20 mg oral tablet: 1 tab(s) orally once a day (23 Aug 2022 04:41)  metoprolol succinate 25 mg oral tablet, extended release: 1  orally once a day (23 Aug 2022 04:41)  pantoprazole 20 mg oral delayed release tablet: 1 tab(s) orally once a day (23 Aug 2022 04:41)  polyethylene glycol 3350 oral powder for reconstitution: 17 gram(s) orally once a day (23 Aug 2022 04:41)  saccharomyces boulardii lyo 250 mg oral capsule: 1 cap(s) orally 2 times a day (23 Aug 2022 04:41)  senna leaf extract oral tablet: 2 tab(s) orally once a day (at bedtime) (23 Aug 2022 04:41)                           MEDICATIONS:  STANDING MEDICATIONS  aspirin  chewable 81 milliGRAM(s) Enteral Tube daily  BACItracin   Ointment 1 Application(s) Topical daily  budesonide  80 MICROgram(s)/formoterol 4.5 MICROgram(s) Inhaler 2 Puff(s) Inhalation two times a day  chlorhexidine 2% Cloths 1 Application(s) Topical daily  clopidogrel Tablet 75 milliGRAM(s) Enteral Tube daily  enoxaparin Injectable 50 milliGRAM(s) SubCutaneous every 12 hours  hydrALAZINE 25 milliGRAM(s) Oral three times a day  isosorbide   dinitrate Tablet (ISORDIL) 10 milliGRAM(s) Enteral Tube three times a day  latanoprost 0.005% Ophthalmic Solution 1 Drop(s) Both EYES at bedtime  levothyroxine 25 MICROGram(s) Oral daily  metoprolol tartrate 12.5 milliGRAM(s) Oral two times a day  pantoprazole   Suspension 40 milliGRAM(s) Oral before breakfast  polyethylene glycol 3350 17 Gram(s) Oral daily  saccharomyces boulardii 250 milliGRAM(s) Oral two times a day  senna 2 Tablet(s) Oral at bedtime    PRN MEDICATIONS  acetaminophen     Tablet .. 650 milliGRAM(s) Oral every 6 hours PRN  ALBUTerol    90 MICROgram(s) HFA Inhaler 2 Puff(s) Inhalation every 6 hours PRN  aluminum hydroxide/magnesium hydroxide/simethicone Suspension 30 milliLiter(s) Oral every 4 hours PRN  glycopyrrolate Injectable 0.4 milliGRAM(s) IV Push every 6 hours PRN  melatonin 3 milliGRAM(s) Oral at bedtime PRN  ondansetron Injectable 4 milliGRAM(s) IV Push every 8 hours PRN                                            ------------------------------------------------------------  VITAL SIGNS: Last 24 Hours  T(C): 35.7 (31 Aug 2022 07:44), Max: 36.3 (31 Aug 2022 03:19)  T(F): 96.2 (31 Aug 2022 07:44), Max: 97.4 (31 Aug 2022 03:19)  HR: 74 (31 Aug 2022 14:00) (71 - 75)  BP: 146/88 (31 Aug 2022 14:00) (146/88 - 175/77)  BP(mean): --  RR: 18 (31 Aug 2022 07:44) (18 - 18)  SpO2: --      08-30-22 @ 07:01  -  08-31-22 @ 07:00  --------------------------------------------------------  IN: 1680 mL / OUT: 1000 mL / NET: 680 mL    08-31-22 @ 07:01  -  08-31-22 @ 15:51  --------------------------------------------------------  IN: 0 mL / OUT: 1300 mL / NET: -1300 mL                                             --------------------------------------------------------------  LABS:                        11.2   9.25  )-----------( 310      ( 31 Aug 2022 06:22 )             34.3     08-31    136  |  102  |  17  ----------------------------<  91  4.8   |  27  |  0.5<L>    Ca    9.2      31 Aug 2022 06:22  Mg     1.9     08-31    TPro  5.3<L>  /  Alb  3.0<L>  /  TBili  0.5  /  DBili  x   /  AST  28  /  ALT  29  /  AlkPhos  79  08-31                                                -------------------------------------------------------------  RADIOLOGY:                                            --------------------------------------------------------------    PHYSICAL EXAM:  GENERAL: NAD, well-groomed, well-developed  HEAD:  Atraumatic, Normocephalic  EYES: EOMI, PERRLA, conjunctiva and sclera clear  ENMT: No tonsillar erythema, exudates, or enlargement; Moist mucous membranes, Good dentition, No lesions  NECK: Supple, No JVD, Normal thyroid  NERVOUS SYSTEM:  Alert & Oriented X3, Good concentration; Motor Strength 5/5 B/L upper and lower extremities; DTRs 2+ intact and symmetric  CHEST/LUNG: Clear to auscultation bilaterally; No rales, rhonchi, wheezing, or rubs  HEART: Regular rate and rhythm; No murmurs, rubs, or gallops  ABDOMEN: Soft, Nontender, Nondistended; Bowel sounds present  EXTREMITIES:  2+ Peripheral Pulses, No clubbing, cyanosis, or edema  LYMPH: No lymphadenopathy noted  SKIN: No rashes or lesions                                           --------------------------------------------------------------      ASSESSMENT and PLAN    89 yo F PMHx asthma, COPD, hyperactive airway, b/l chronic bronchiectasis, DLD, HTN, CAD s/p PCI, hypothyroidism presented for evaluation of worsening metabolic encephalopathy. Patient was recently admitted w/ cyclospora gastroenteritis & cystitis. Course was complicated by metabolic encephalopathy. Upon ER arrival pt found to have subsegmental PE and positive urinalysis. Patient cognitive status improved today, oriented to self, place and year. Family still considering hospice.     #Suspected Metabolic encephalopathy from suspected UTI  - History of recent colitis as well as cyclospora PCR positive treated with bactrim & Flagyl until 8/20/22  - Not a candidate for LP at this time due to aspiration risk if she is lying flat.  - Improved cognitive status today A&O x3   - MRI, EEG negative, UCx negative,  - c/w vancomycin  - acyclovir held  - c/w saccharomyces boulardii  - Speech and swallow re-evaluation requested given improved mental status    #ZAYDA  - resolved      Left opacity  - vancomycin    Subsegmental PE  - duplex shows peroneal thrombus  - c/w Lovenox, will switch to oral eliquis once pt able to take po consistently  - c/w telemetry for now while awaiting Echo  - c/w oxygen supplementation PRN      Nodular Thyroid  Hypothyroidism  - TSH mildly elevated but will hold off med for now    Lactic acidosis, resolving  - resolved    Metabolic alkalosis  - unclear source (not on diuresis or bicarb, not vomiting)  - resolved    Asthma  COPD  b/l chronic bronchiectasis  - c/w albuterol & Symbicort    DLD  HTN  CAD s/p PCI  - c/w isosorbide dinitrate, aspirin, Plavix, metoprolol  - c/w lisinopril --held for now due to ZAYDA    Urinary Retention?  - acevedo remains in place  - had bladder distension on CT   - bladder scan shows no retention but has oliguria    Diverticulosis  - avoid constipation    Weight loss  Hx abdominal pain  - patient refused outpatient colonoscopy & EGD    Trace right pleural effusion  Atelectasis  - incentive spirometry    Right hilar 9 mm LN (decreased in size)  - outpatient f/u    Constipation  - c/w Senna & Miralax    Glaucoma  - c/w latanoprost    # DVT PPx  # GI PPx  - c/w Protonix  - DNR, DNI    Pending (specify):  Improvement in mental status/hospice  Family discussion: Family opting for hospice treatment--awaiting NOE for Hospice in SNF  Disposition: SNF     Nutritional Assessment:  · Nutritional Assessment	This patient has been assessed with a concern for Malnutrition and has been determined to have a diagnosis/diagnoses of Severe protein-calorie malnutrition and Underweight (BMI < 19).    This patient is being managed with:   Diet NPO with Tube Feed-  Tube Feeding Modality: Nasogastric  Jevity 1.2 Augustus  Total Volume for 24 Hours (mL): 1080  Bolus  Total Volume of Bolus (mL):  270  Total # of Feeds: 4  Tube Feed Frequency: Every 6 hours   Tube Feed Start Time: 00:00  Bolus Feed Rate (mL per Hour): 270   Bolus Feed Duration (in Hours): 1  Free Water Flush Instructions:  50 mL before and after each bolus feed  Entered: Aug 26 2022  2:39PM             OBJECTIVE  PAST MEDICAL & SURGICAL HISTORY  High blood cholesterol    Hypothyroid    Hypertension    COPD (chronic obstructive pulmonary disease)    CAD (coronary artery disease)    History of coronary artery stent placement    History of cholecystectomy    H/O abdominal hysterectomy    Cataract                                                -----------------------------------------------------------    REVIEW OF SYSTEMS:  CONSTITUTIONAL: No fever, weight loss, or fatigue  EYES: No eye pain, visual disturbances, or discharge  ENMT:  No difficulty hearing, tinnitus, vertigo; No sinus or throat pain  NECK: No pain or stiffness  BREASTS: No pain, masses, or nipple discharge  RESPIRATORY: No cough, wheezing, chills or hemoptysis; No shortness of breath  CARDIOVASCULAR: No chest pain, palpitations, dizziness, or leg swelling  GASTROINTESTINAL: No abdominal or epigastric pain. No nausea, vomiting, or hematemesis; No diarrhea or constipation. No melena or hematochezia.  GENITOURINARY: No dysuria, frequency, hematuria, or incontinence  NEUROLOGICAL: No headaches, memory loss, loss of strength, numbness, or tremors  SKIN: No itching, burning, rashes, or lesions   LYMPH NODES: No enlarged glands  ENDOCRINE: No heat or cold intolerance; No hair loss  MUSCULOSKELETAL: No joint pain or swelling; No muscle, back, or extremity pain  PSYCHIATRIC: No depression, anxiety, mood swings, or difficulty sleeping  HEME/LYMPH: No easy bruising, or bleeding gums  ALLERY AND IMMUNOLOGIC: No hives or eczema                                            ------------------------------------------------------------    ALLERGIES:  No Known Allergies                                            ------------------------------------------------------------    HOME MEDICATIONS  Home Medications:  aspirin 81 mg oral tablet, chewable: 1 tab(s) orally once a day (23 Aug 2022 04:41)  CLOPIDOGREL 75 MG TABLET: 1 each orally once a day (23 Aug 2022 04:41)  hydrALAZINE 25 mg oral tablet: 1 tab(s) orally 3 times a day (23 Aug 2022 04:41)  isosorbide dinitrate 10 mg oral tablet: 1 tab(s) orally once a day (23 Aug 2022 04:41)  latanoprost 0.005% ophthalmic solution: 1 drop(s) to each affected eye once a day (in the evening) (23 Aug 2022 04:41)  levothyroxine 25 mcg (0.025 mg) oral tablet: 1 tab(s) orally once a day (23 Aug 2022 04:41)  lisinopril 20 mg oral tablet: 1 tab(s) orally once a day (23 Aug 2022 04:41)  metoprolol succinate 25 mg oral tablet, extended release: 1  orally once a day (23 Aug 2022 04:41)  pantoprazole 20 mg oral delayed release tablet: 1 tab(s) orally once a day (23 Aug 2022 04:41)  polyethylene glycol 3350 oral powder for reconstitution: 17 gram(s) orally once a day (23 Aug 2022 04:41)  saccharomyces boulardii lyo 250 mg oral capsule: 1 cap(s) orally 2 times a day (23 Aug 2022 04:41)  senna leaf extract oral tablet: 2 tab(s) orally once a day (at bedtime) (23 Aug 2022 04:41)                           MEDICATIONS:  STANDING MEDICATIONS  aspirin  chewable 81 milliGRAM(s) Enteral Tube daily  BACItracin   Ointment 1 Application(s) Topical daily  budesonide  80 MICROgram(s)/formoterol 4.5 MICROgram(s) Inhaler 2 Puff(s) Inhalation two times a day  chlorhexidine 2% Cloths 1 Application(s) Topical daily  clopidogrel Tablet 75 milliGRAM(s) Enteral Tube daily  enoxaparin Injectable 50 milliGRAM(s) SubCutaneous every 12 hours  hydrALAZINE 25 milliGRAM(s) Oral three times a day  isosorbide   dinitrate Tablet (ISORDIL) 10 milliGRAM(s) Enteral Tube three times a day  latanoprost 0.005% Ophthalmic Solution 1 Drop(s) Both EYES at bedtime  levothyroxine 25 MICROGram(s) Oral daily  metoprolol tartrate 12.5 milliGRAM(s) Oral two times a day  pantoprazole   Suspension 40 milliGRAM(s) Oral before breakfast  polyethylene glycol 3350 17 Gram(s) Oral daily  saccharomyces boulardii 250 milliGRAM(s) Oral two times a day  senna 2 Tablet(s) Oral at bedtime    PRN MEDICATIONS  acetaminophen     Tablet .. 650 milliGRAM(s) Oral every 6 hours PRN  ALBUTerol    90 MICROgram(s) HFA Inhaler 2 Puff(s) Inhalation every 6 hours PRN  aluminum hydroxide/magnesium hydroxide/simethicone Suspension 30 milliLiter(s) Oral every 4 hours PRN  glycopyrrolate Injectable 0.4 milliGRAM(s) IV Push every 6 hours PRN  melatonin 3 milliGRAM(s) Oral at bedtime PRN  ondansetron Injectable 4 milliGRAM(s) IV Push every 8 hours PRN                                            ------------------------------------------------------------  VITAL SIGNS: Last 24 Hours  T(C): 35.7 (31 Aug 2022 07:44), Max: 36.3 (31 Aug 2022 03:19)  T(F): 96.2 (31 Aug 2022 07:44), Max: 97.4 (31 Aug 2022 03:19)  HR: 74 (31 Aug 2022 14:00) (71 - 75)  BP: 146/88 (31 Aug 2022 14:00) (146/88 - 175/77)  BP(mean): --  RR: 18 (31 Aug 2022 07:44) (18 - 18)  SpO2: --      08-30-22 @ 07:01  -  08-31-22 @ 07:00  --------------------------------------------------------  IN: 1680 mL / OUT: 1000 mL / NET: 680 mL    08-31-22 @ 07:01  -  08-31-22 @ 15:51  --------------------------------------------------------  IN: 0 mL / OUT: 1300 mL / NET: -1300 mL                                             --------------------------------------------------------------  LABS:                        11.2   9.25  )-----------( 310      ( 31 Aug 2022 06:22 )             34.3     08-31    136  |  102  |  17  ----------------------------<  91  4.8   |  27  |  0.5<L>    Ca    9.2      31 Aug 2022 06:22  Mg     1.9     08-31    TPro  5.3<L>  /  Alb  3.0<L>  /  TBili  0.5  /  DBili  x   /  AST  28  /  ALT  29  /  AlkPhos  79  08-31                                                -------------------------------------------------------------  RADIOLOGY:                                            --------------------------------------------------------------    PHYSICAL EXAM:  GENERAL: NAD, well-groomed, well-developed  HEAD:  Atraumatic, Normocephalic  EYES: EOMI, PERRLA, conjunctiva and sclera clear  ENMT: No tonsillar erythema, exudates, or enlargement; Moist mucous membranes, Good dentition, No lesions  NECK: Supple, No JVD, Normal thyroid  NERVOUS SYSTEM:  Alert & Oriented X3, Good concentration; Motor Strength 5/5 B/L upper and lower extremities; DTRs 2+ intact and symmetric  CHEST/LUNG: Clear to auscultation bilaterally; No rales, rhonchi, wheezing, or rubs  HEART: Regular rate and rhythm; No murmurs, rubs, or gallops  ABDOMEN: Soft, Nontender, Nondistended; Bowel sounds present  EXTREMITIES:  2+ Peripheral Pulses, No clubbing, cyanosis, or edema  LYMPH: No lymphadenopathy noted  SKIN: No rashes or lesions                                           --------------------------------------------------------------      ASSESSMENT and PLAN    89 yo F PMHx asthma, COPD, hyperactive airway, b/l chronic bronchiectasis, DLD, HTN, CAD s/p PCI, hypothyroidism presented for evaluation of worsening metabolic encephalopathy. Patient was recently admitted w/ cyclospora gastroenteritis & cystitis. Course was complicated by metabolic encephalopathy. Upon ER arrival pt found to have subsegmental PE and positive urinalysis. Patient cognitive status improved today, oriented to self, place and year. Family still considering hospice.     #Suspected Metabolic encephalopathy from suspected UTI  - History of recent colitis as well as cyclospora PCR positive treated with bactrim & Flagyl until 8/20/22  - Not a candidate for LP at this time due to aspiration risk if she is lying flat.  - Improved cognitive status today A&O x3   - MRI, EEG negative, UCx negative,  - c/w vancomycin  - acyclovir held  - c/w saccharomyces boulardii  - Speech and swallow re-evaluation requested given improved mental status    #ZAYDA  - resolved    #Subsegmental PE  - duplex shows peroneal thrombus  - c/w Lovenox, will switch to oral eliquis if pt able to take po consistently      Pending (specify):  Improvement in mental status/hospice  Family discussion: Family opting for hospice treatment--awaiting NOE for Hospice in SNF  Disposition: SNF

## 2022-09-01 LAB
ANION GAP SERPL CALC-SCNC: 7 MMOL/L — SIGNIFICANT CHANGE UP (ref 7–14)
BASOPHILS # BLD AUTO: 0.1 K/UL — SIGNIFICANT CHANGE UP (ref 0–0.2)
BASOPHILS NFR BLD AUTO: 1.2 % — HIGH (ref 0–1)
BUN SERPL-MCNC: 16 MG/DL — SIGNIFICANT CHANGE UP (ref 10–20)
CALCIUM SERPL-MCNC: 9.3 MG/DL — SIGNIFICANT CHANGE UP (ref 8.5–10.1)
CHLORIDE SERPL-SCNC: 103 MMOL/L — SIGNIFICANT CHANGE UP (ref 98–110)
CO2 SERPL-SCNC: 27 MMOL/L — SIGNIFICANT CHANGE UP (ref 17–32)
CREAT SERPL-MCNC: 0.5 MG/DL — LOW (ref 0.7–1.5)
EGFR: 86 ML/MIN/1.73M2 — SIGNIFICANT CHANGE UP
EOSINOPHIL # BLD AUTO: 0.5 K/UL — SIGNIFICANT CHANGE UP (ref 0–0.7)
EOSINOPHIL NFR BLD AUTO: 5.9 % — SIGNIFICANT CHANGE UP (ref 0–8)
GLUCOSE SERPL-MCNC: 86 MG/DL — SIGNIFICANT CHANGE UP (ref 70–99)
HCT VFR BLD CALC: 34.2 % — LOW (ref 37–47)
HGB BLD-MCNC: 11.1 G/DL — LOW (ref 12–16)
IMM GRANULOCYTES NFR BLD AUTO: 1.1 % — HIGH (ref 0.1–0.3)
LYMPHOCYTES # BLD AUTO: 1.89 K/UL — SIGNIFICANT CHANGE UP (ref 1.2–3.4)
LYMPHOCYTES # BLD AUTO: 22.4 % — SIGNIFICANT CHANGE UP (ref 20.5–51.1)
MAGNESIUM SERPL-MCNC: 1.6 MG/DL — LOW (ref 1.8–2.4)
MCHC RBC-ENTMCNC: 31.4 PG — HIGH (ref 27–31)
MCHC RBC-ENTMCNC: 32.5 G/DL — SIGNIFICANT CHANGE UP (ref 32–37)
MCV RBC AUTO: 96.6 FL — SIGNIFICANT CHANGE UP (ref 81–99)
MONOCYTES # BLD AUTO: 1.07 K/UL — HIGH (ref 0.1–0.6)
MONOCYTES NFR BLD AUTO: 12.7 % — HIGH (ref 1.7–9.3)
NEUTROPHILS # BLD AUTO: 4.8 K/UL — SIGNIFICANT CHANGE UP (ref 1.4–6.5)
NEUTROPHILS NFR BLD AUTO: 56.7 % — SIGNIFICANT CHANGE UP (ref 42.2–75.2)
NRBC # BLD: 0 /100 WBCS — SIGNIFICANT CHANGE UP (ref 0–0)
PLATELET # BLD AUTO: 302 K/UL — SIGNIFICANT CHANGE UP (ref 130–400)
POTASSIUM SERPL-MCNC: 4.9 MMOL/L — SIGNIFICANT CHANGE UP (ref 3.5–5)
POTASSIUM SERPL-SCNC: 4.9 MMOL/L — SIGNIFICANT CHANGE UP (ref 3.5–5)
RBC # BLD: 3.54 M/UL — LOW (ref 4.2–5.4)
RBC # FLD: 14.8 % — HIGH (ref 11.5–14.5)
SODIUM SERPL-SCNC: 137 MMOL/L — SIGNIFICANT CHANGE UP (ref 135–146)
WBC # BLD: 8.45 K/UL — SIGNIFICANT CHANGE UP (ref 4.8–10.8)
WBC # FLD AUTO: 8.45 K/UL — SIGNIFICANT CHANGE UP (ref 4.8–10.8)

## 2022-09-01 PROCEDURE — 99232 SBSQ HOSP IP/OBS MODERATE 35: CPT

## 2022-09-01 PROCEDURE — 99233 SBSQ HOSP IP/OBS HIGH 50: CPT

## 2022-09-01 RX ORDER — MAGNESIUM SULFATE 500 MG/ML
2 VIAL (ML) INJECTION
Refills: 0 | Status: COMPLETED | OUTPATIENT
Start: 2022-09-01 | End: 2022-09-01

## 2022-09-01 RX ADMIN — PANTOPRAZOLE SODIUM 40 MILLIGRAM(S): 20 TABLET, DELAYED RELEASE ORAL at 06:34

## 2022-09-01 RX ADMIN — SENNA PLUS 2 TABLET(S): 8.6 TABLET ORAL at 21:48

## 2022-09-01 RX ADMIN — ENOXAPARIN SODIUM 50 MILLIGRAM(S): 100 INJECTION SUBCUTANEOUS at 18:13

## 2022-09-01 RX ADMIN — Medication 81 MILLIGRAM(S): at 12:09

## 2022-09-01 RX ADMIN — ENOXAPARIN SODIUM 50 MILLIGRAM(S): 100 INJECTION SUBCUTANEOUS at 06:33

## 2022-09-01 RX ADMIN — LATANOPROST 1 DROP(S): 0.05 SOLUTION/ DROPS OPHTHALMIC; TOPICAL at 21:51

## 2022-09-01 RX ADMIN — Medication 25 MILLIGRAM(S): at 21:48

## 2022-09-01 RX ADMIN — CLOPIDOGREL BISULFATE 75 MILLIGRAM(S): 75 TABLET, FILM COATED ORAL at 12:09

## 2022-09-01 RX ADMIN — Medication 1 APPLICATION(S): at 12:10

## 2022-09-01 RX ADMIN — Medication 25 GRAM(S): at 10:05

## 2022-09-01 RX ADMIN — ISOSORBIDE DINITRATE 10 MILLIGRAM(S): 5 TABLET ORAL at 06:33

## 2022-09-01 RX ADMIN — ISOSORBIDE DINITRATE 10 MILLIGRAM(S): 5 TABLET ORAL at 15:20

## 2022-09-01 RX ADMIN — Medication 25 GRAM(S): at 12:13

## 2022-09-01 RX ADMIN — CHLORHEXIDINE GLUCONATE 1 APPLICATION(S): 213 SOLUTION TOPICAL at 12:09

## 2022-09-01 RX ADMIN — Medication 25 MILLIGRAM(S): at 06:33

## 2022-09-01 RX ADMIN — ISOSORBIDE DINITRATE 10 MILLIGRAM(S): 5 TABLET ORAL at 12:09

## 2022-09-01 RX ADMIN — Medication 25 MICROGRAM(S): at 06:34

## 2022-09-01 RX ADMIN — Medication 12.5 MILLIGRAM(S): at 06:34

## 2022-09-01 RX ADMIN — Medication 25 MILLIGRAM(S): at 15:18

## 2022-09-01 NOTE — SWALLOW BEDSIDE ASSESSMENT ADULT - NS ASR SWALLOW FINDINGS DISCUS
Adequate: hears normal conversation without difficulty
Physician/Nursing/Patient
Physician/Nursing/Patient
RN MD Pia at ext [phone] made aware./Physician/Nursing
RN Pia KENNEDY made aware./Physician/Nursing

## 2022-09-01 NOTE — SWALLOW BEDSIDE ASSESSMENT ADULT - PHARYNGEAL PHASE
w/ mildly thick/Absent laryngeal elevation/Cough post oral intake
responded to verbal cues to use dry swallows, unable to achieve a chin tuck/Delayed pharyngeal swallow
w/ mildly thick liquids/Cough post oral intake
Within functional limits

## 2022-09-01 NOTE — PHYSICAL THERAPY INITIAL EVALUATION ADULT - ADDITIONAL COMMENTS
Prior to hospitalization in the beginning of August, patient was independent in ADL's and ambulation using RW due to hx of LBP. Patient lives in the basement apartment, no steps to enter home. Daughter lives upstairs.

## 2022-09-01 NOTE — SWALLOW BEDSIDE ASSESSMENT ADULT - NS SPL SWALLOW CLINIC TRIAL FT
+toleration of thin, mildly thick and puree w/o overt s/s of penetration/aspiration
Mild oral dysphagia with mildly thick liquids +overt s/s of aspiration with small amounts of mildly thick liquids.
+overt s/s of aspiration with small amounts of mildly thick liquids.
+cognition interferes w/ po acceptance/tolerance, +moderate oral dysphagia w/o overt s/s of pharyngeal dysphagia Pt w/ known h/o silent aspiration on VFSS 8/16, benefitted from use of chin tuck which she is unable to achieve at this time. Aspiration risk remains high

## 2022-09-01 NOTE — PROGRESS NOTE ADULT - SUBJECTIVE AND OBJECTIVE BOX
OBJECTIVE  PAST MEDICAL & SURGICAL HISTORY  High blood cholesterol    Hypothyroid    Hypertension    COPD (chronic obstructive pulmonary disease)    CAD (coronary artery disease)    History of coronary artery stent placement    History of cholecystectomy    H/O abdominal hysterectomy    Cataract                                                -----------------------------------------------------------    REVIEW OF SYSTEMS:  CONSTITUTIONAL: No fever, weight loss, or fatigue  EYES: No eye pain, visual disturbances, or discharge  ENMT:  No difficulty hearing, tinnitus, vertigo; No sinus or throat pain  NECK: No pain or stiffness  BREASTS: No pain, masses, or nipple discharge  RESPIRATORY: No cough, wheezing, chills or hemoptysis; No shortness of breath  CARDIOVASCULAR: No chest pain, palpitations, dizziness, or leg swelling  GASTROINTESTINAL: No abdominal or epigastric pain. No nausea, vomiting, or hematemesis; No diarrhea or constipation. No melena or hematochezia.  GENITOURINARY: No dysuria, frequency, hematuria, or incontinence  NEUROLOGICAL: No headaches, memory loss, loss of strength, numbness, or tremors  SKIN: No itching, burning, rashes, or lesions   LYMPH NODES: No enlarged glands  ENDOCRINE: No heat or cold intolerance; No hair loss  MUSCULOSKELETAL: No joint pain or swelling; No muscle, back, or extremity pain  PSYCHIATRIC: No depression, anxiety, mood swings, or difficulty sleeping  HEME/LYMPH: No easy bruising, or bleeding gums  ALLERY AND IMMUNOLOGIC: No hives or eczema                                            ------------------------------------------------------------    ALLERGIES:  No Known Allergies                                            ------------------------------------------------------------    HOME MEDICATIONS  Home Medications:  aspirin 81 mg oral tablet, chewable: 1 tab(s) orally once a day (23 Aug 2022 04:41)  CLOPIDOGREL 75 MG TABLET: 1 each orally once a day (23 Aug 2022 04:41)  hydrALAZINE 25 mg oral tablet: 1 tab(s) orally 3 times a day (23 Aug 2022 04:41)  isosorbide dinitrate 10 mg oral tablet: 1 tab(s) orally once a day (23 Aug 2022 04:41)  latanoprost 0.005% ophthalmic solution: 1 drop(s) to each affected eye once a day (in the evening) (23 Aug 2022 04:41)  levothyroxine 25 mcg (0.025 mg) oral tablet: 1 tab(s) orally once a day (23 Aug 2022 04:41)  lisinopril 20 mg oral tablet: 1 tab(s) orally once a day (23 Aug 2022 04:41)  metoprolol succinate 25 mg oral tablet, extended release: 1  orally once a day (23 Aug 2022 04:41)  pantoprazole 20 mg oral delayed release tablet: 1 tab(s) orally once a day (23 Aug 2022 04:41)  polyethylene glycol 3350 oral powder for reconstitution: 17 gram(s) orally once a day (23 Aug 2022 04:41)  saccharomyces boulardii lyo 250 mg oral capsule: 1 cap(s) orally 2 times a day (23 Aug 2022 04:41)  senna leaf extract oral tablet: 2 tab(s) orally once a day (at bedtime) (23 Aug 2022 04:41)                           MEDICATIONS:  STANDING MEDICATIONS  aspirin  chewable 81 milliGRAM(s) Enteral Tube daily  BACItracin   Ointment 1 Application(s) Topical daily  budesonide  80 MICROgram(s)/formoterol 4.5 MICROgram(s) Inhaler 2 Puff(s) Inhalation two times a day  chlorhexidine 2% Cloths 1 Application(s) Topical daily  clopidogrel Tablet 75 milliGRAM(s) Enteral Tube daily  enoxaparin Injectable 50 milliGRAM(s) SubCutaneous every 12 hours  hydrALAZINE 25 milliGRAM(s) Oral three times a day  isosorbide   dinitrate Tablet (ISORDIL) 10 milliGRAM(s) Enteral Tube three times a day  latanoprost 0.005% Ophthalmic Solution 1 Drop(s) Both EYES at bedtime  levothyroxine 25 MICROGram(s) Oral daily  metoprolol tartrate 12.5 milliGRAM(s) Oral two times a day  pantoprazole   Suspension 40 milliGRAM(s) Oral before breakfast  polyethylene glycol 3350 17 Gram(s) Oral daily  saccharomyces boulardii 250 milliGRAM(s) Oral two times a day  senna 2 Tablet(s) Oral at bedtime    PRN MEDICATIONS  acetaminophen     Tablet .. 650 milliGRAM(s) Oral every 6 hours PRN  ALBUTerol    90 MICROgram(s) HFA Inhaler 2 Puff(s) Inhalation every 6 hours PRN  aluminum hydroxide/magnesium hydroxide/simethicone Suspension 30 milliLiter(s) Oral every 4 hours PRN  glycopyrrolate Injectable 0.4 milliGRAM(s) IV Push every 6 hours PRN  melatonin 3 milliGRAM(s) Oral at bedtime PRN  ondansetron Injectable 4 milliGRAM(s) IV Push every 8 hours PRN                                            ------------------------------------------------------------  VITAL SIGNS: Last 24 Hours  T(C): 36.1 (01 Sep 2022 07:43), Max: 36.3 (01 Sep 2022 00:00)  T(F): 96.9 (01 Sep 2022 07:43), Max: 97.4 (01 Sep 2022 00:00)  HR: 70 (01 Sep 2022 07:43) (65 - 74)  BP: 130/59 (01 Sep 2022 07:43) (121/59 - 175/77)  BP(mean): --  RR: 18 (01 Sep 2022 07:43) (18 - 18)  SpO2: 96% (01 Sep 2022 07:43) (96% - 96%)      08-31-22 @ 07:01  -  09-01-22 @ 07:00  --------------------------------------------------------  IN: 1280 mL / OUT: 2300 mL / NET: -1020 mL                                             --------------------------------------------------------------  LABS:                        11.1   8.45  )-----------( 302      ( 01 Sep 2022 05:43 )             34.2     09-01    137  |  103  |  16  ----------------------------<  86  4.9   |  27  |  0.5<L>    Ca    9.3      01 Sep 2022 05:43  Mg     1.6     09-01    TPro  5.3<L>  /  Alb  3.0<L>  /  TBili  0.5  /  DBili  x   /  AST  28  /  ALT  29  /  AlkPhos  79  08-31                                                              -------------------------------------------------------------  RADIOLOGY:                                            --------------------------------------------------------------    PHYSICAL EXAM:  GENERAL: NAD, well-groomed, well-developed  HEAD:  Atraumatic, Normocephalic  EYES: EOMI, PERRLA, conjunctiva and sclera clear  ENMT: No tonsillar erythema, exudates, or enlargement; Moist mucous membranes, Good dentition, No lesions  NECK: Supple, No JVD, Normal thyroid  NERVOUS SYSTEM:  Alert & Oriented X3, Good concentration; Motor Strength 5/5 B/L upper and lower extremities; DTRs 2+ intact and symmetric  CHEST/LUNG: Clear to auscultation bilaterally; No rales, rhonchi, wheezing, or rubs  HEART: Regular rate and rhythm; No murmurs, rubs, or gallops  ABDOMEN: Soft, Nontender, Nondistended; Bowel sounds present  EXTREMITIES:  2+ Peripheral Pulses, No clubbing, cyanosis, or edema  LYMPH: No lymphadenopathy noted  SKIN: No rashes or lesions                                           --------------------------------------------------------------        ASSESSMENT and PLAN    89 yo F PMHx asthma, COPD, hyperactive airway, b/l chronic bronchiectasis, DLD, HTN, CAD s/p PCI, hypothyroidism presented for evaluation of worsening metabolic encephalopathy. Patient was recently admitted w/ cyclospora gastroenteritis & cystitis. Course was complicated by metabolic encephalopathy. Upon ER arrival pt found to have subsegmental PE and positive urinalysis. Patient cognitive status improved, oriented to self, place and year. Family still considering hospice.     #Suspected Metabolic encephalopathy from suspected UTI  - History of recent colitis as well as cyclospora PCR positive treated with bactrim & Flagyl until 8/20/22  - Not a candidate for LP at this time due to aspiration risk if she is lying flat.  - Improved cognitive status today A&O x3   - MRI, EEG negative, UCx negative,  - c/w vancomycin  - acyclovir held  - c/w saccharomyces boulardii  - Speech and swallow re-evaluation requested given improved mental status. Spoke with the S&S swallow team today, and updated them on patient's improved cognitive status.    #ZAYDA  - resolved    #Subsegmental PE  - duplex shows peroneal thrombus  - c/w Lovenox, will switch to oral eliquis if pt able to take po consistently      Pending (specify):  Improvement in mental status/hospice  Family discussion: Family opting for hospice treatment--awaiting NOE for Hospice in SNF  Disposition: SNF                            OBJECTIVE  PAST MEDICAL & SURGICAL HISTORY  High blood cholesterol    Hypothyroid    Hypertension    COPD (chronic obstructive pulmonary disease)    CAD (coronary artery disease)    History of coronary artery stent placement    History of cholecystectomy    H/O abdominal hysterectomy    Cataract                                                -----------------------------------------------------------    REVIEW OF SYSTEMS:  CONSTITUTIONAL: No fever, weight loss, or fatigue  EYES: No eye pain, visual disturbances, or discharge  ENMT:  No difficulty hearing, tinnitus, vertigo; No sinus or throat pain  NECK: No pain or stiffness  BREASTS: No pain, masses, or nipple discharge  RESPIRATORY: No cough, wheezing, chills or hemoptysis; No shortness of breath  CARDIOVASCULAR: No chest pain, palpitations, dizziness, or leg swelling  GASTROINTESTINAL: No abdominal or epigastric pain. No nausea, vomiting, or hematemesis; No diarrhea or constipation. No melena or hematochezia.  GENITOURINARY: No dysuria, frequency, hematuria, or incontinence  NEUROLOGICAL: No headaches, memory loss, loss of strength, numbness, or tremors  SKIN: No itching, burning, rashes, or lesions   LYMPH NODES: No enlarged glands  ENDOCRINE: No heat or cold intolerance; No hair loss  MUSCULOSKELETAL: No joint pain or swelling; No muscle, back, or extremity pain  PSYCHIATRIC: No depression, anxiety, mood swings, or difficulty sleeping  HEME/LYMPH: No easy bruising, or bleeding gums  ALLERY AND IMMUNOLOGIC: No hives or eczema                                            ------------------------------------------------------------    ALLERGIES:  No Known Allergies                                            ------------------------------------------------------------    HOME MEDICATIONS  Home Medications:  aspirin 81 mg oral tablet, chewable: 1 tab(s) orally once a day (23 Aug 2022 04:41)  CLOPIDOGREL 75 MG TABLET: 1 each orally once a day (23 Aug 2022 04:41)  hydrALAZINE 25 mg oral tablet: 1 tab(s) orally 3 times a day (23 Aug 2022 04:41)  isosorbide dinitrate 10 mg oral tablet: 1 tab(s) orally once a day (23 Aug 2022 04:41)  latanoprost 0.005% ophthalmic solution: 1 drop(s) to each affected eye once a day (in the evening) (23 Aug 2022 04:41)  levothyroxine 25 mcg (0.025 mg) oral tablet: 1 tab(s) orally once a day (23 Aug 2022 04:41)  lisinopril 20 mg oral tablet: 1 tab(s) orally once a day (23 Aug 2022 04:41)  metoprolol succinate 25 mg oral tablet, extended release: 1  orally once a day (23 Aug 2022 04:41)  pantoprazole 20 mg oral delayed release tablet: 1 tab(s) orally once a day (23 Aug 2022 04:41)  polyethylene glycol 3350 oral powder for reconstitution: 17 gram(s) orally once a day (23 Aug 2022 04:41)  saccharomyces boulardii lyo 250 mg oral capsule: 1 cap(s) orally 2 times a day (23 Aug 2022 04:41)  senna leaf extract oral tablet: 2 tab(s) orally once a day (at bedtime) (23 Aug 2022 04:41)                           MEDICATIONS:  STANDING MEDICATIONS  aspirin  chewable 81 milliGRAM(s) Enteral Tube daily  BACItracin   Ointment 1 Application(s) Topical daily  budesonide  80 MICROgram(s)/formoterol 4.5 MICROgram(s) Inhaler 2 Puff(s) Inhalation two times a day  chlorhexidine 2% Cloths 1 Application(s) Topical daily  clopidogrel Tablet 75 milliGRAM(s) Enteral Tube daily  enoxaparin Injectable 50 milliGRAM(s) SubCutaneous every 12 hours  hydrALAZINE 25 milliGRAM(s) Oral three times a day  isosorbide   dinitrate Tablet (ISORDIL) 10 milliGRAM(s) Enteral Tube three times a day  latanoprost 0.005% Ophthalmic Solution 1 Drop(s) Both EYES at bedtime  levothyroxine 25 MICROGram(s) Oral daily  metoprolol tartrate 12.5 milliGRAM(s) Oral two times a day  pantoprazole   Suspension 40 milliGRAM(s) Oral before breakfast  polyethylene glycol 3350 17 Gram(s) Oral daily  saccharomyces boulardii 250 milliGRAM(s) Oral two times a day  senna 2 Tablet(s) Oral at bedtime    PRN MEDICATIONS  acetaminophen     Tablet .. 650 milliGRAM(s) Oral every 6 hours PRN  ALBUTerol    90 MICROgram(s) HFA Inhaler 2 Puff(s) Inhalation every 6 hours PRN  aluminum hydroxide/magnesium hydroxide/simethicone Suspension 30 milliLiter(s) Oral every 4 hours PRN  glycopyrrolate Injectable 0.4 milliGRAM(s) IV Push every 6 hours PRN  melatonin 3 milliGRAM(s) Oral at bedtime PRN  ondansetron Injectable 4 milliGRAM(s) IV Push every 8 hours PRN                                            ------------------------------------------------------------  VITAL SIGNS: Last 24 Hours  T(C): 36.1 (01 Sep 2022 07:43), Max: 36.3 (01 Sep 2022 00:00)  T(F): 96.9 (01 Sep 2022 07:43), Max: 97.4 (01 Sep 2022 00:00)  HR: 70 (01 Sep 2022 07:43) (65 - 74)  BP: 130/59 (01 Sep 2022 07:43) (121/59 - 175/77)  BP(mean): --  RR: 18 (01 Sep 2022 07:43) (18 - 18)  SpO2: 96% (01 Sep 2022 07:43) (96% - 96%)      08-31-22 @ 07:01  -  09-01-22 @ 07:00  --------------------------------------------------------  IN: 1280 mL / OUT: 2300 mL / NET: -1020 mL                                             --------------------------------------------------------------  LABS:                        11.1   8.45  )-----------( 302      ( 01 Sep 2022 05:43 )             34.2     09-01    137  |  103  |  16  ----------------------------<  86  4.9   |  27  |  0.5<L>    Ca    9.3      01 Sep 2022 05:43  Mg     1.6     09-01    TPro  5.3<L>  /  Alb  3.0<L>  /  TBili  0.5  /  DBili  x   /  AST  28  /  ALT  29  /  AlkPhos  79  08-31                                                              -------------------------------------------------------------  RADIOLOGY:                                            --------------------------------------------------------------    PHYSICAL EXAM:  GENERAL: NAD, well-groomed, well-developed  HEAD:  Atraumatic, Normocephalic  EYES: EOMI, PERRLA, conjunctiva and sclera clear  ENMT: No tonsillar erythema, exudates, or enlargement; Moist mucous membranes, Good dentition, No lesions  NECK: Supple, No JVD, Normal thyroid  NERVOUS SYSTEM:  Alert & Oriented X3, Good concentration; Motor Strength 5/5 B/L upper and lower extremities; DTRs 2+ intact and symmetric  CHEST/LUNG: Clear to auscultation bilaterally; No rales, rhonchi, wheezing, or rubs  HEART: Regular rate and rhythm; No murmurs, rubs, or gallops  ABDOMEN: Soft, Nontender, Nondistended; Bowel sounds present  EXTREMITIES:  2+ Peripheral Pulses, No clubbing, cyanosis, or edema  LYMPH: No lymphadenopathy noted  SKIN: No rashes or lesions                                           --------------------------------------------------------------        ASSESSMENT and PLAN    89 yo F PMHx asthma, COPD, hyperactive airway, b/l chronic bronchiectasis, DLD, HTN, CAD s/p PCI, hypothyroidism presented for evaluation of worsening metabolic encephalopathy. Patient was recently admitted w/ cyclospora gastroenteritis & cystitis. Course was complicated by metabolic encephalopathy. Upon ER arrival pt found to have subsegmental PE and positive urinalysis. Patient cognitive status improved, oriented to self, place and year. Speech and swallow will re-evaluate today given the change in mental status. Family still considering hospice VS SNF.     #Suspected Metabolic encephalopathy from suspected UTI  - History of recent colitis as well as cyclospora PCR positive treated with bactrim & Flagyl until 8/20/22  - Not a candidate for LP at this time due to aspiration risk if she is lying flat.  - Improved cognitive status today A&O x3   - MRI, EEG negative, UCx negative,  - acyclovir held  - c/w saccharomyces boulardii  - Speech and swallow re-evaluation requested given improved mental status. Spoke with the S&S swallow team today, and updated them on patient's improved cognitive status.    #ZAYDA  - resolved    #Subsegmental PE  - duplex shows peroneal thrombus  - c/w Lovenox, will switch to oral eliquis if pt able to take po consistently    #HTN  - c/w Metoprolol 12.5mg BID  - c/w hydralazine 25mg TID      Pending (specify):  Improvement in mental status/hospice  Family discussion: Family opting for hospice treatment--awaiting ONE for Hospice in SNF  Disposition: SNF                            OBJECTIVE  PAST MEDICAL & SURGICAL HISTORY  High blood cholesterol    Hypothyroid    Hypertension    COPD (chronic obstructive pulmonary disease)    CAD (coronary artery disease)    History of coronary artery stent placement    History of cholecystectomy    H/O abdominal hysterectomy    Cataract                                                -----------------------------------------------------------    REVIEW OF SYSTEMS:  CONSTITUTIONAL: No fever, weight loss, or fatigue  EYES: No eye pain, visual disturbances, or discharge  ENMT:  No difficulty hearing, tinnitus, vertigo; No sinus or throat pain  NECK: No pain or stiffness  BREASTS: No pain, masses, or nipple discharge  RESPIRATORY: No cough, wheezing, chills or hemoptysis; No shortness of breath  CARDIOVASCULAR: No chest pain, palpitations, dizziness, or leg swelling  GASTROINTESTINAL: No abdominal or epigastric pain. No nausea, vomiting, or hematemesis; No diarrhea or constipation. No melena or hematochezia.  GENITOURINARY: No dysuria, frequency, hematuria, or incontinence  NEUROLOGICAL: No headaches, memory loss, loss of strength, numbness, or tremors  SKIN: No itching, burning, rashes, or lesions   LYMPH NODES: No enlarged glands  ENDOCRINE: No heat or cold intolerance; No hair loss  MUSCULOSKELETAL: No joint pain or swelling; No muscle, back, or extremity pain  PSYCHIATRIC: No depression, anxiety, mood swings, or difficulty sleeping  HEME/LYMPH: No easy bruising, or bleeding gums  ALLERY AND IMMUNOLOGIC: No hives or eczema                                            ------------------------------------------------------------    ALLERGIES:  No Known Allergies                                            ------------------------------------------------------------    HOME MEDICATIONS  Home Medications:  aspirin 81 mg oral tablet, chewable: 1 tab(s) orally once a day (23 Aug 2022 04:41)  CLOPIDOGREL 75 MG TABLET: 1 each orally once a day (23 Aug 2022 04:41)  hydrALAZINE 25 mg oral tablet: 1 tab(s) orally 3 times a day (23 Aug 2022 04:41)  isosorbide dinitrate 10 mg oral tablet: 1 tab(s) orally once a day (23 Aug 2022 04:41)  latanoprost 0.005% ophthalmic solution: 1 drop(s) to each affected eye once a day (in the evening) (23 Aug 2022 04:41)  levothyroxine 25 mcg (0.025 mg) oral tablet: 1 tab(s) orally once a day (23 Aug 2022 04:41)  lisinopril 20 mg oral tablet: 1 tab(s) orally once a day (23 Aug 2022 04:41)  metoprolol succinate 25 mg oral tablet, extended release: 1  orally once a day (23 Aug 2022 04:41)  pantoprazole 20 mg oral delayed release tablet: 1 tab(s) orally once a day (23 Aug 2022 04:41)  polyethylene glycol 3350 oral powder for reconstitution: 17 gram(s) orally once a day (23 Aug 2022 04:41)  saccharomyces boulardii lyo 250 mg oral capsule: 1 cap(s) orally 2 times a day (23 Aug 2022 04:41)  senna leaf extract oral tablet: 2 tab(s) orally once a day (at bedtime) (23 Aug 2022 04:41)                           MEDICATIONS:  STANDING MEDICATIONS  aspirin  chewable 81 milliGRAM(s) Enteral Tube daily  BACItracin   Ointment 1 Application(s) Topical daily  budesonide  80 MICROgram(s)/formoterol 4.5 MICROgram(s) Inhaler 2 Puff(s) Inhalation two times a day  chlorhexidine 2% Cloths 1 Application(s) Topical daily  clopidogrel Tablet 75 milliGRAM(s) Enteral Tube daily  enoxaparin Injectable 50 milliGRAM(s) SubCutaneous every 12 hours  hydrALAZINE 25 milliGRAM(s) Oral three times a day  isosorbide   dinitrate Tablet (ISORDIL) 10 milliGRAM(s) Enteral Tube three times a day  latanoprost 0.005% Ophthalmic Solution 1 Drop(s) Both EYES at bedtime  levothyroxine 25 MICROGram(s) Oral daily  metoprolol tartrate 12.5 milliGRAM(s) Oral two times a day  pantoprazole   Suspension 40 milliGRAM(s) Oral before breakfast  polyethylene glycol 3350 17 Gram(s) Oral daily  saccharomyces boulardii 250 milliGRAM(s) Oral two times a day  senna 2 Tablet(s) Oral at bedtime    PRN MEDICATIONS  acetaminophen     Tablet .. 650 milliGRAM(s) Oral every 6 hours PRN  ALBUTerol    90 MICROgram(s) HFA Inhaler 2 Puff(s) Inhalation every 6 hours PRN  aluminum hydroxide/magnesium hydroxide/simethicone Suspension 30 milliLiter(s) Oral every 4 hours PRN  glycopyrrolate Injectable 0.4 milliGRAM(s) IV Push every 6 hours PRN  melatonin 3 milliGRAM(s) Oral at bedtime PRN  ondansetron Injectable 4 milliGRAM(s) IV Push every 8 hours PRN                                            ------------------------------------------------------------  VITAL SIGNS: Last 24 Hours  T(C): 36.1 (01 Sep 2022 07:43), Max: 36.3 (01 Sep 2022 00:00)  T(F): 96.9 (01 Sep 2022 07:43), Max: 97.4 (01 Sep 2022 00:00)  HR: 70 (01 Sep 2022 07:43) (65 - 74)  BP: 130/59 (01 Sep 2022 07:43) (121/59 - 175/77)  BP(mean): --  RR: 18 (01 Sep 2022 07:43) (18 - 18)  SpO2: 96% (01 Sep 2022 07:43) (96% - 96%)      08-31-22 @ 07:01  -  09-01-22 @ 07:00  --------------------------------------------------------  IN: 1280 mL / OUT: 2300 mL / NET: -1020 mL                                             --------------------------------------------------------------  LABS:                        11.1   8.45  )-----------( 302      ( 01 Sep 2022 05:43 )             34.2     09-01    137  |  103  |  16  ----------------------------<  86  4.9   |  27  |  0.5<L>    Ca    9.3      01 Sep 2022 05:43  Mg     1.6     09-01    TPro  5.3<L>  /  Alb  3.0<L>  /  TBili  0.5  /  DBili  x   /  AST  28  /  ALT  29  /  AlkPhos  79  08-31                                                              -------------------------------------------------------------  RADIOLOGY:                                            --------------------------------------------------------------    PHYSICAL EXAM:  GENERAL: NAD, well-groomed, well-developed  HEAD:  Atraumatic, Normocephalic  EYES: EOMI, PERRLA, conjunctiva and sclera clear  ENMT: No tonsillar erythema, exudates, or enlargement; Moist mucous membranes, Good dentition, No lesions  NECK: Supple, No JVD, Normal thyroid  NERVOUS SYSTEM:  Alert & Oriented X3, Good concentration; Motor Strength 5/5 B/L upper and lower extremities; DTRs 2+ intact and symmetric  CHEST/LUNG: Clear to auscultation bilaterally; No rales, rhonchi, wheezing, or rubs  HEART: Regular rate and rhythm; No murmurs, rubs, or gallops  ABDOMEN: Soft, Nontender, Nondistended; Bowel sounds present  EXTREMITIES:  2+ Peripheral Pulses, No clubbing, cyanosis, or edema  LYMPH: No lymphadenopathy noted  SKIN: No rashes or lesions                                           --------------------------------------------------------------        ASSESSMENT and PLAN    89 yo F PMHx asthma, COPD, hyperactive airway, b/l chronic bronchiectasis, DLD, HTN, CAD s/p PCI, hypothyroidism presented for evaluation of worsening metabolic encephalopathy. Patient was recently admitted w/ cyclospora gastroenteritis & cystitis. Course was complicated by metabolic encephalopathy. Upon ER arrival pt found to have subsegmental PE and positive urinalysis. Patient cognitive status improved, oriented to self, place and year. Patient passed speech and swallow today, diet advanced to liquid/puree. Family updated bedside.     #Suspected Metabolic encephalopathy from suspected UTI  - History of recent colitis as well as cyclospora PCR positive treated with bactrim & Flagyl until 8/20/22  - Not a candidate for LP at this time due to aspiration risk if she is lying flat.  - Improved cognitive status today A&O x3   - MRI, EEG negative, UCx negative,  - acyclovir held  - c/w saccharomyces boulardii  - Speech and swallow re-evaluation requested given improved mental status. Spoke with the S&S swallow team today, and updated them on patient's improved cognitive status.  - Patient passed S&S today. Started on a 1:1 clear liquid diet, will transition to puree tomorrow  - Calory count started    #ZAYDA  - resolved    #Subsegmental PE  - duplex shows peroneal thrombus  - c/w Lovenox, will switch to oral eliquis if pt able to take po consistently    #HTN  - c/w Metoprolol 12.5mg BID  - c/w hydralazine 25mg TID    #Disposition  Family discussion: Family opting for hospice treatment--awaiting NOE for Hospice in SNF  Disposition: SNF

## 2022-09-01 NOTE — SWALLOW BEDSIDE ASSESSMENT ADULT - MODE OF PRESENTATION
spoon/fed by clinician
cup/spoon/straw/fed by clinician

## 2022-09-01 NOTE — SWALLOW BEDSIDE ASSESSMENT ADULT - ORAL PHASE
bolus hold, decreased awareness, responds to verbal cues to swallow/Decreased anterior-posterior movement of the bolus/Delayed oral transit time
Delayed oral transit time

## 2022-09-01 NOTE — SWALLOW BEDSIDE ASSESSMENT ADULT - ORAL PREPARATORY PHASE
2' decreased labial seal/Anterior loss of bolus
2' decreased labial seal/Anterior loss of bolus
no spoon stripping/Reduced oral grading
Within functional limits

## 2022-09-01 NOTE — SWALLOW BEDSIDE ASSESSMENT ADULT - SWALLOW EVAL: RECOMMENDED DIET
safest means of nutrition and hydration is non-oral, family declined long term non-oral means via PEG. Suggest comfort feeds of puree and mildly thick liquids w/ family knowing and understanding the risks for aspiration/PNA and respiratory compromise
NPO with alternate means for nutrition/ hydration
puree diet w/ thin liquids
NPO with alternate means for nutrition/ hydration

## 2022-09-01 NOTE — PHYSICAL THERAPY INITIAL EVALUATION ADULT - PERTINENT HX OF CURRENT PROBLEM, REHAB EVAL
89-year-old female w/ hx of Asthma, COPD, hyperactive airway, b/l chronic bronchiectasis, DL, HTN, CAD s/p PCI, hypothyroidism. Patient was recently admitted w/ cyclospora gastroenteritis & cystitis; stay was complicated by metabolic encephalopathy. Patient is presenting for worsening lethargy and decreased responsiveness.

## 2022-09-01 NOTE — SWALLOW BEDSIDE ASSESSMENT ADULT - SLP GENERAL OBSERVATIONS
Pt received in bed awake and alert NGT in situ, oral care provided, Pt oriented to place, time and self, +cooperative
Pt. received lethargic, ox1, min verbal output, +dysarthric speech, +gen weakness, room air. Accompanied by daughter at bedside.
Pt received in bed awake, NGT in situ, open mouth posture, dry oral cavity, dried secretions t/o, aggressive oral care provided.
Pt. received awake, ox2, min verbal output, +dysarthric speech, +gen weakness, room air. Accompanied by daughter at bedside.

## 2022-09-01 NOTE — PHYSICAL THERAPY INITIAL EVALUATION ADULT - GENERAL OBSERVATIONS, REHAB EVAL
Patient encountered lying in bed (+) NGT. Daughter present I room. Agreed to participate in therapy.

## 2022-09-01 NOTE — CHART NOTE - NSCHARTNOTEFT_GEN_A_CORE
Patient expressed frustration with NG Tube.  Support rendered. T/C to daughter Magdalena:  writer informed daughter of same.  Daughter will discuss removal of NG tube with medical team.

## 2022-09-01 NOTE — SWALLOW BEDSIDE ASSESSMENT ADULT - SWALLOW EVAL: DIAGNOSIS
Pt. continues to present with +overt s/s of aspiration with small amounts of mildly thick liquids. Further PO trials discontinued 2' high aspiration risk
+oral care provided, +toleration of thin, mildly thick and puree w/o overt s/s of penetration/aspiration
Mild oral dysphagia with mildly thick liquids +overt s/s of aspiration with small amounts of mildly thick liquids.
+cognition interferes w/ po acceptance/tolerance, +moderate oral dysphagia w/o overt s/s of pharyngeal dysphagia Pt w/ known h/o silent aspiration on VFSS 8/16, benefitted from use of chin tuck which she is unable to achieve at this time. Aspiration risk remains high

## 2022-09-01 NOTE — SWALLOW BEDSIDE ASSESSMENT ADULT - SWALLOW EVAL: RECOMMENDED FEEDING/EATING TECHNIQUES
chin tuck if able to achieve/oral hygiene/position upright (90 degrees)
oral hygiene/position upright (90 degrees)/small sips/bites

## 2022-09-01 NOTE — PROGRESS NOTE ADULT - ASSESSMENT
89F with PMH of COPD, asthma, HTN, CAD s/p PCI, hypothyroidism, here after recent admission at [facility] for cyclospora gastroenteritis and cystitis and metabolic encephalopathy, discharged to [facility] and admitted here for worsening lethargy. Code stroke called on admission, with CT head negative, but CTA showing acute bilateral subsegmental PE, now on therapeutic lovenox. Patient did not pass speech and swallow, and palliative care called to help discuss nutritional GOC.

## 2022-09-01 NOTE — PROGRESS NOTE ADULT - SUBJECTIVE AND OBJECTIVE BOX
HPI: 89F with PMH of COPD, asthma, HTN, CAD s/p PCI, hypothyroidism, here after recent admission at [facility] for cyclospora gastroenteritis and cystitis and metabolic encephalopathy, discharged to [facility] and admitted here for worsening lethargy. Code stroke called on admission, with CT head negative, but CTA showing acute bilateral subsegmental PE, now on therapeutic lovenox. Patient did not pass speech and swallow, and palliative care called to help discuss nutritional GOC.    INTERVAL EVENTS:  8/25: patient less mentally alert today  8/26: patient remains with AMS, has NGT in place, appears comfortbale  8/29: patient more alert, denies any complaints  8/30: patient less alert, NGT in place, daughter at bedside  8/31: patient conversant, denies any issues  9/1: patient comfortable, NGT in place, S&S eval underway    ADVANCE DIRECTIVES:    DNR  MOLST  [ ]  Living Will  [ ]   DECISION MAKER(s):  [ ] Health Care Proxy(s)  [ ] Surrogate(s)  [ ] Guardian           Name(s): Phone Number(s): daughter    BASELINE (I)ADL(s) (prior to admission):  [place]: [ ]Total  [ ] Moderate [ ]Dependent  Palliative Performance Status Version 2:         %    http://npcrc.org/files/news/palliative_performance_scale_ppsv2.pdf    Allergies    No Known Allergies    Intolerances    MEDICATIONS  (STANDING):  aspirin  chewable 81 milliGRAM(s) Enteral Tube daily  BACItracin   Ointment 1 Application(s) Topical daily  budesonide  80 MICROgram(s)/formoterol 4.5 MICROgram(s) Inhaler 2 Puff(s) Inhalation two times a day  chlorhexidine 2% Cloths 1 Application(s) Topical daily  clopidogrel Tablet 75 milliGRAM(s) Enteral Tube daily  enoxaparin Injectable 50 milliGRAM(s) SubCutaneous every 12 hours  hydrALAZINE 25 milliGRAM(s) Oral three times a day  isosorbide   dinitrate Tablet (ISORDIL) 10 milliGRAM(s) Enteral Tube three times a day  latanoprost 0.005% Ophthalmic Solution 1 Drop(s) Both EYES at bedtime  levothyroxine 25 MICROGram(s) Oral daily  metoprolol tartrate 12.5 milliGRAM(s) Oral two times a day  pantoprazole   Suspension 40 milliGRAM(s) Oral before breakfast  polyethylene glycol 3350 17 Gram(s) Oral daily  saccharomyces boulardii 250 milliGRAM(s) Oral two times a day  senna 2 Tablet(s) Oral at bedtime    MEDICATIONS  (PRN):  acetaminophen     Tablet .. 650 milliGRAM(s) Oral every 6 hours PRN Temp greater or equal to 38C (100.4F), Mild Pain (1 - 3)  ALBUTerol    90 MICROgram(s) HFA Inhaler 2 Puff(s) Inhalation every 6 hours PRN Shortness of Breath and/or Wheezing  aluminum hydroxide/magnesium hydroxide/simethicone Suspension 30 milliLiter(s) Oral every 4 hours PRN Dyspepsia  glycopyrrolate Injectable 0.4 milliGRAM(s) IV Push every 6 hours PRN secretions  melatonin 3 milliGRAM(s) Oral at bedtime PRN Insomnia  ondansetron Injectable 4 milliGRAM(s) IV Push every 8 hours PRN Nausea and/or Vomiting        PRESENT SYMPTOMS: [ ]Unable to obtain due to poor mentation   Source if other than patient:  [ ]Family   [ ]Team     Pain: [ ]yes [ X]no  QOL impact -   Location -                    Aggravating factors -  Quality -  Radiation -  Timing-  Severity (0-10 scale):  Minimal acceptable level (0-10 scale):     CPOT:    https://www.[facility].org/getattachment/[email or web address]/Critical-Care-Pain-Observation-Tool-(CPOT)      PAIN AD Score: 0    http://geriatrictoolkit.[place].[facility]/cog/painad.pdf (press ctrl +  left click to view)    Dyspnea:                           [ ]Mild [ ]Moderate [ ]Severe  Anxiety:                             [ ]Mild [ ]Moderate [ ]Severe  Fatigue:                             [ ]Mild [ ]Moderate [ ]Severe  Nausea:                             [ ]Mild [ ]Moderate [ ]Severe  Loss of appetite:              [ ]Mild [ ]Moderate [ ]Severe  Constipation:                    [ ]Mild [ ]Moderate [ ]Severe    Other Symptoms:  [X ]All other review of systems negative     Palliative Performance Status Version 2:         %    http://npcrc.org/files/news/palliative_performance_scale_ppsv2.pdf    PHYSICAL EXAM:  Vital Signs Last 24 Hrs  T(C): 36.1 (01 Sep 2022 07:43), Max: 36.3 (01 Sep 2022 00:00)  T(F): 96.9 (01 Sep 2022 07:43), Max: 97.4 (01 Sep 2022 00:00)  HR: 72 (01 Sep 2022 12:24) (65 - 72)  BP: 161/68 (01 Sep 2022 12:24) (121/59 - 161/68)  BP(mean): --  RR: 18 (01 Sep 2022 07:43) (18 - 18)  SpO2: 96% (01 Sep 2022 07:43) (96% - 96%)    Parameters below as of 01 Sep 2022 07:43  Patient On (Oxygen Delivery Method): room air        GENERAL:  [ ]Alert  [ ]Oriented x   [ ]Lethargic  [ ]Cachexia  [ ]Unarousable  [X ]Verbal  [ ]Non-Verbal  Behavioral:   [ ] Anxiety  [ ] Delirium [ ] Agitation [X ] Other calm  HEENT:  [ ]Normal   [X ]Dry mouth   [ ]ET Tube/Trach  [ ]Oral lesions  PULMONARY:   [ ]Clear [ ]Tachypnea  [ ]Audible excessive secretions [X] No labored breathing  [ ]Rhonchi        [ ]Right [ ]Left [ ]Bilateral  [ ]Crackles        [ ]Right [ ]Left [ ]Bilateral  [ ]Wheezing     [ ]Right [ ]Left [ ]Bilateral  [ ]Diminished breath sounds [ ]right [ ]left [ ]bilateral  CARDIOVASCULAR:    [ ]Regular [ ]Irregular [ ]Tachy  [ ]Brent [ ]Murmur [ ]Other  GASTROINTESTINAL:  [ ]Soft  [ X]Nondistended   [ ]+BS  [ ]Non tender [ ]Tender  [ ]PEG [ X]OGT/ NGT  Last BM:   GENITOURINARY:  [ X]Normal [ ] Incontinent   [ ]Oliguria/Anuria   [ ]Martines  MUSCULOSKELETAL:   [ ]Normal   [X ]Weakness  [ ]Bed/Wheelchair bound [ ]Edema  NEUROLOGIC:   [ ]No focal deficits  [ ]Cognitive impairment  [X ]Dysphagia [ ]Dysarthria [ ]Paresis [ ]Other   SKIN:   [ ]Normal   [X] Nonjaundiced [ ]Rash  [ ]Pressure ulcer(s)       Present on admission [ ]y [ ]n    CRITICAL CARE:  [ ] Shock Present  [ ]Septic [ ]Cardiogenic [ ]Neurologic [ ]Hypovolemic  [ ]  Vasopressors [ ]  Inotropes   [ ]Respiratory failure present [ ]Mechanical ventilation [ ]Non-invasive ventilatory support [ ]High flow  [ ]Acute  [ ]Chronic [ ]Hypoxic  [ ]Hypercarbic [ ]Other  [ ]Other organ failure     LABS: reviewed                          11.1   8.45  )-----------( 302      ( 01 Sep 2022 05:43 )             34.2     09-01    137  |  103  |  16  ----------------------------<  86  4.9   |  27  |  0.5<L>    Ca    9.3      01 Sep 2022 05:43  Mg     1.6     09-01    RADIOLOGY & ADDITIONAL STUDIES:    MR head 8/23/22    No acute infarct, intracranial hemorrhage, or midline shift.    Moderate-severe chronic microvascular ischemic changes, significantly   progressed since 1/9/2017.    Moderate right/mild left mastoid effusion, nonspecific.    PROTEIN CALORIE MALNUTRITION PRESENT: [ ]mild [ ]moderate [ ]severe [ ]underweight [ ]morbid obesity  https://www.andeal.org/vault/2440/web/files/ONC/Table_Clinical%20Characteristics%20to%20Document%20Malnutrition-White%20JV%20et%20al%202012.pdf    Height (cm): 172.7 (08-22-22 @ 20:22), 172.7 (08-05-22 @ 03:26)  Weight (kg): 53.5 (08-23-22 @ 07:39), 72.3 (08-05-22 @ 03:26)  BMI (kg/m2): 17.9 (08-23-22 @ 07:39), 24.2 (08-22-22 @ 20:22), 24.2 (08-05-22 @ 03:26)    [ ]PPSV2 < or = to 30% [ ]significant weight loss  [ ]poor nutritional intake  [ ]anasarca      [ ]Artificial Nutrition      REFERRALS:   [ ]Chaplaincy  [ ]Hospice  [ ]Child Life  [ ]Social Work  [ ]Case management [ ]Holistic Therapy     Goals of Care Document:     ______________  Alex Carbone MD  Palliative Medicine  [facility]   of Geriatric and Palliative Medicine  [phone]

## 2022-09-01 NOTE — PHYSICAL THERAPY INITIAL EVALUATION ADULT - LEVEL OF INDEPENDENCE: SIT/STAND, REHAB EVAL
Sit-stand attempted however, patient fearful of falling, leaning backward, unable to lift buttocks off the bed. Recommend Sara lift transfers/unable to perform

## 2022-09-01 NOTE — SWALLOW BEDSIDE ASSESSMENT ADULT - SPECIFY REASON(S)
Dysphagia evaluation
Dysphagia f/u
dysphagia re-eval, new orders received, spoke w/ MD, pt now oriented X4, +significantly improved cognition
dysphagia f/u/ re-eval, reportedly more awake

## 2022-09-01 NOTE — SWALLOW BEDSIDE ASSESSMENT ADULT - CONSISTENCIES ADMINISTERED
4oz/mildly thick/pureed
2tsp mildly thick liquids./mildly thick
4tsp of mildly thick liquids./mildly thick
8oz, no solids as pt is edentulous and dentures not at b/s/thin liquid/mildly thick/pureed

## 2022-09-02 ENCOUNTER — TRANSCRIPTION ENCOUNTER (OUTPATIENT)
Age: 87
End: 2022-09-02

## 2022-09-02 LAB
ANION GAP SERPL CALC-SCNC: 11 MMOL/L — SIGNIFICANT CHANGE UP (ref 7–14)
BASOPHILS # BLD AUTO: 0.11 K/UL — SIGNIFICANT CHANGE UP (ref 0–0.2)
BASOPHILS NFR BLD AUTO: 1.1 % — HIGH (ref 0–1)
BUN SERPL-MCNC: 17 MG/DL — SIGNIFICANT CHANGE UP (ref 10–20)
CALCIUM SERPL-MCNC: 9.3 MG/DL — SIGNIFICANT CHANGE UP (ref 8.5–10.1)
CHLORIDE SERPL-SCNC: 102 MMOL/L — SIGNIFICANT CHANGE UP (ref 98–110)
CO2 SERPL-SCNC: 25 MMOL/L — SIGNIFICANT CHANGE UP (ref 17–32)
CREAT SERPL-MCNC: 0.5 MG/DL — LOW (ref 0.7–1.5)
EGFR: 86 ML/MIN/1.73M2 — SIGNIFICANT CHANGE UP
EOSINOPHIL # BLD AUTO: 0.23 K/UL — SIGNIFICANT CHANGE UP (ref 0–0.7)
EOSINOPHIL NFR BLD AUTO: 2.3 % — SIGNIFICANT CHANGE UP (ref 0–8)
GLUCOSE SERPL-MCNC: 107 MG/DL — HIGH (ref 70–99)
HCT VFR BLD CALC: 37 % — SIGNIFICANT CHANGE UP (ref 37–47)
HGB BLD-MCNC: 12.1 G/DL — SIGNIFICANT CHANGE UP (ref 12–16)
IMM GRANULOCYTES NFR BLD AUTO: 1.5 % — HIGH (ref 0.1–0.3)
LYMPHOCYTES # BLD AUTO: 1.32 K/UL — SIGNIFICANT CHANGE UP (ref 1.2–3.4)
LYMPHOCYTES # BLD AUTO: 13 % — LOW (ref 20.5–51.1)
MAGNESIUM SERPL-MCNC: 2.1 MG/DL — SIGNIFICANT CHANGE UP (ref 1.8–2.4)
MCHC RBC-ENTMCNC: 32.4 PG — HIGH (ref 27–31)
MCHC RBC-ENTMCNC: 32.7 G/DL — SIGNIFICANT CHANGE UP (ref 32–37)
MCV RBC AUTO: 98.9 FL — SIGNIFICANT CHANGE UP (ref 81–99)
MONOCYTES # BLD AUTO: 0.89 K/UL — HIGH (ref 0.1–0.6)
MONOCYTES NFR BLD AUTO: 8.7 % — SIGNIFICANT CHANGE UP (ref 1.7–9.3)
NEUTROPHILS # BLD AUTO: 7.49 K/UL — HIGH (ref 1.4–6.5)
NEUTROPHILS NFR BLD AUTO: 73.4 % — SIGNIFICANT CHANGE UP (ref 42.2–75.2)
NRBC # BLD: 0 /100 WBCS — SIGNIFICANT CHANGE UP (ref 0–0)
PLATELET # BLD AUTO: 310 K/UL — SIGNIFICANT CHANGE UP (ref 130–400)
POTASSIUM SERPL-MCNC: 4.9 MMOL/L — SIGNIFICANT CHANGE UP (ref 3.5–5)
POTASSIUM SERPL-SCNC: 4.9 MMOL/L — SIGNIFICANT CHANGE UP (ref 3.5–5)
RBC # BLD: 3.74 M/UL — LOW (ref 4.2–5.4)
RBC # FLD: 15.1 % — HIGH (ref 11.5–14.5)
SODIUM SERPL-SCNC: 138 MMOL/L — SIGNIFICANT CHANGE UP (ref 135–146)
WBC # BLD: 10.19 K/UL — SIGNIFICANT CHANGE UP (ref 4.8–10.8)
WBC # FLD AUTO: 10.19 K/UL — SIGNIFICANT CHANGE UP (ref 4.8–10.8)

## 2022-09-02 PROCEDURE — 99232 SBSQ HOSP IP/OBS MODERATE 35: CPT

## 2022-09-02 RX ORDER — PANTOPRAZOLE SODIUM 20 MG/1
40 TABLET, DELAYED RELEASE ORAL
Refills: 0 | Status: DISCONTINUED | OUTPATIENT
Start: 2022-09-02 | End: 2022-09-06

## 2022-09-02 RX ADMIN — Medication 81 MILLIGRAM(S): at 14:21

## 2022-09-02 RX ADMIN — ISOSORBIDE DINITRATE 10 MILLIGRAM(S): 5 TABLET ORAL at 14:22

## 2022-09-02 RX ADMIN — Medication 25 MICROGRAM(S): at 05:32

## 2022-09-02 RX ADMIN — Medication 25 MILLIGRAM(S): at 14:22

## 2022-09-02 RX ADMIN — ENOXAPARIN SODIUM 50 MILLIGRAM(S): 100 INJECTION SUBCUTANEOUS at 17:39

## 2022-09-02 RX ADMIN — ISOSORBIDE DINITRATE 10 MILLIGRAM(S): 5 TABLET ORAL at 21:47

## 2022-09-02 RX ADMIN — CHLORHEXIDINE GLUCONATE 1 APPLICATION(S): 213 SOLUTION TOPICAL at 14:22

## 2022-09-02 RX ADMIN — ISOSORBIDE DINITRATE 10 MILLIGRAM(S): 5 TABLET ORAL at 05:32

## 2022-09-02 RX ADMIN — Medication 12.5 MILLIGRAM(S): at 17:39

## 2022-09-02 RX ADMIN — Medication 12.5 MILLIGRAM(S): at 05:32

## 2022-09-02 RX ADMIN — PANTOPRAZOLE SODIUM 40 MILLIGRAM(S): 20 TABLET, DELAYED RELEASE ORAL at 08:16

## 2022-09-02 RX ADMIN — CLOPIDOGREL BISULFATE 75 MILLIGRAM(S): 75 TABLET, FILM COATED ORAL at 14:22

## 2022-09-02 RX ADMIN — Medication 250 MILLIGRAM(S): at 17:39

## 2022-09-02 RX ADMIN — POLYETHYLENE GLYCOL 3350 17 GRAM(S): 17 POWDER, FOR SOLUTION ORAL at 14:24

## 2022-09-02 RX ADMIN — Medication 650 MILLIGRAM(S): at 15:45

## 2022-09-02 RX ADMIN — LATANOPROST 1 DROP(S): 0.05 SOLUTION/ DROPS OPHTHALMIC; TOPICAL at 21:47

## 2022-09-02 RX ADMIN — Medication 25 MILLIGRAM(S): at 05:32

## 2022-09-02 RX ADMIN — ENOXAPARIN SODIUM 50 MILLIGRAM(S): 100 INJECTION SUBCUTANEOUS at 05:31

## 2022-09-02 RX ADMIN — Medication 25 MILLIGRAM(S): at 21:47

## 2022-09-02 RX ADMIN — SENNA PLUS 2 TABLET(S): 8.6 TABLET ORAL at 21:48

## 2022-09-02 RX ADMIN — Medication 250 MILLIGRAM(S): at 05:32

## 2022-09-02 NOTE — PROGRESS NOTE ADULT - SUBJECTIVE AND OBJECTIVE BOX
HPI: 89F with PMH of COPD, asthma, HTN, CAD s/p PCI, hypothyroidism, here after recent admission at [facility] for cyclospora gastroenteritis and cystitis and metabolic encephalopathy, discharged to [facility] and admitted here for worsening lethargy. Code stroke called on admission, with CT head negative, but CTA showing acute bilateral subsegmental PE, now on therapeutic lovenox. Patient did not pass speech and swallow, and palliative care called to help discuss nutritional GOC.    INTERVAL EVENTS:  8/25: patient less mentally alert today  8/26: patient remains with AMS, has NGT in place, appears comfortbale  8/29: patient more alert, denies any complaints  8/30: patient less alert, NGT in place, daughter at bedside  8/31: patient conversant, denies any issues  9/1: patient comfortable, NGT in place, S&S eval underway  9/2: patient comfortable, eating, NGT removed    ADVANCE DIRECTIVES:    DNR  MOLST  [ ]  Living Will  [ ]   DECISION MAKER(s):  [ ] Health Care Proxy(s)  [ ] Surrogate(s)  [ ] Guardian           Name(s): Phone Number(s): daughter    BASELINE (I)ADL(s) (prior to admission):  [place]: [ ]Total  [ ] Moderate [ ]Dependent  Palliative Performance Status Version 2:         %    http://npcrc.org/files/news/palliative_performance_scale_ppsv2.pdf    Allergies    No Known Allergies    Intolerances    MEDICATIONS  (STANDING):  aspirin  chewable 81 milliGRAM(s) Enteral Tube daily  BACItracin   Ointment 1 Application(s) Topical daily  budesonide  80 MICROgram(s)/formoterol 4.5 MICROgram(s) Inhaler 2 Puff(s) Inhalation two times a day  chlorhexidine 2% Cloths 1 Application(s) Topical daily  clopidogrel Tablet 75 milliGRAM(s) Enteral Tube daily  enoxaparin Injectable 50 milliGRAM(s) SubCutaneous every 12 hours  hydrALAZINE 25 milliGRAM(s) Oral three times a day  isosorbide   dinitrate Tablet (ISORDIL) 10 milliGRAM(s) Enteral Tube three times a day  latanoprost 0.005% Ophthalmic Solution 1 Drop(s) Both EYES at bedtime  levothyroxine 25 MICROGram(s) Oral daily  metoprolol tartrate 12.5 milliGRAM(s) Oral two times a day  pantoprazole    Tablet 40 milliGRAM(s) Oral before breakfast  polyethylene glycol 3350 17 Gram(s) Oral daily  saccharomyces boulardii 250 milliGRAM(s) Oral two times a day  senna 2 Tablet(s) Oral at bedtime    MEDICATIONS  (PRN):  acetaminophen     Tablet .. 650 milliGRAM(s) Oral every 6 hours PRN Temp greater or equal to 38C (100.4F), Mild Pain (1 - 3)  ALBUTerol    90 MICROgram(s) HFA Inhaler 2 Puff(s) Inhalation every 6 hours PRN Shortness of Breath and/or Wheezing  aluminum hydroxide/magnesium hydroxide/simethicone Suspension 30 milliLiter(s) Oral every 4 hours PRN Dyspepsia  glycopyrrolate Injectable 0.4 milliGRAM(s) IV Push every 6 hours PRN secretions  melatonin 3 milliGRAM(s) Oral at bedtime PRN Insomnia  ondansetron Injectable 4 milliGRAM(s) IV Push every 8 hours PRN Nausea and/or Vomiting    PRESENT SYMPTOMS: [ ]Unable to obtain due to poor mentation   Source if other than patient:  [ ]Family   [ ]Team     Pain: [ ]yes [ X]no  QOL impact -   Location -                    Aggravating factors -  Quality -  Radiation -  Timing-  Severity (0-10 scale):  Minimal acceptable level (0-10 scale):     CPOT:    https://www.[facility].org/getattachment/[email or web address]/Critical-Care-Pain-Observation-Tool-(CPOT)      PAIN AD Score: 0    http://geriatrictoolkit.[place].[facility]/cog/painad.pdf (press ctrl +  left click to view)    Dyspnea:                           [ ]Mild [ ]Moderate [ ]Severe  Anxiety:                             [ ]Mild [ ]Moderate [ ]Severe  Fatigue:                             [ ]Mild [ ]Moderate [ ]Severe  Nausea:                             [ ]Mild [ ]Moderate [ ]Severe  Loss of appetite:              [ ]Mild [ ]Moderate [ ]Severe  Constipation:                    [ ]Mild [ ]Moderate [ ]Severe    Other Symptoms:  [X ]All other review of systems negative     Palliative Performance Status Version 2:         %    http://npcrc.org/files/news/palliative_performance_scale_ppsv2.pdf    PHYSICAL EXAM:  Vital Signs Last 24 Hrs  T(C): 36.5 (02 Sep 2022 08:56), Max: 36.8 (01 Sep 2022 17:23)  T(F): 97.7 (02 Sep 2022 08:56), Max: 98.2 (01 Sep 2022 17:23)  HR: 75 (02 Sep 2022 15:17) (70 - 78)  BP: 152/69 (02 Sep 2022 15:17) (102/51 - 176/77)  BP(mean): 109 (02 Sep 2022 04:50) (109 - 109)  RR: 18 (02 Sep 2022 15:17) (18 - 18)  SpO2: 97% (02 Sep 2022 15:17) (96% - 98%)    Parameters below as of 02 Sep 2022 15:17  Patient On (Oxygen Delivery Method): room air    GENERAL:  [ ]Alert  [ ]Oriented x   [ ]Lethargic  [ ]Cachexia  [ ]Unarousable  [X ]Verbal  [ ]Non-Verbal  Behavioral:   [ ] Anxiety  [ ] Delirium [ ] Agitation [X ] Other calm  HEENT:  [ ]Normal   [X ]Dry mouth   [ ]ET Tube/Trach  [ ]Oral lesions  PULMONARY:   [ ]Clear [ ]Tachypnea  [ ]Audible excessive secretions [X] No labored breathing  [ ]Rhonchi        [ ]Right [ ]Left [ ]Bilateral  [ ]Crackles        [ ]Right [ ]Left [ ]Bilateral  [ ]Wheezing     [ ]Right [ ]Left [ ]Bilateral  [ ]Diminished breath sounds [ ]right [ ]left [ ]bilateral  CARDIOVASCULAR:    [ ]Regular [ ]Irregular [ ]Tachy  [ ]Brent [ ]Murmur [ ]Other  GASTROINTESTINAL:  [ ]Soft  [ X]Nondistended   [ ]+BS  [ ]Non tender [ ]Tender  [ ]PEG [ X]OGT/ NGT  Last BM:   GENITOURINARY:  [ X]Normal [ ] Incontinent   [ ]Oliguria/Anuria   [ ]Martines  MUSCULOSKELETAL:   [ ]Normal   [X ]Weakness  [ ]Bed/Wheelchair bound [ ]Edema  NEUROLOGIC:   [ ]No focal deficits  [ ]Cognitive impairment  [X ]Dysphagia [ ]Dysarthria [ ]Paresis [ ]Other   SKIN:   [ ]Normal   [X] Nonjaundiced [ ]Rash  [ ]Pressure ulcer(s)       Present on admission [ ]y [ ]n    CRITICAL CARE:  [ ] Shock Present  [ ]Septic [ ]Cardiogenic [ ]Neurologic [ ]Hypovolemic  [ ]  Vasopressors [ ]  Inotropes   [ ]Respiratory failure present [ ]Mechanical ventilation [ ]Non-invasive ventilatory support [ ]High flow  [ ]Acute  [ ]Chronic [ ]Hypoxic  [ ]Hypercarbic [ ]Other  [ ]Other organ failure     LABS: reviewed                          12.1   10.19 )-----------( 310      ( 02 Sep 2022 08:42 )             37.0     09-02    138  |  102  |  17  ----------------------------<  107<H>  4.9   |  25  |  0.5<L>    Ca    9.3      02 Sep 2022 08:42  Mg     2.1     09-02      RADIOLOGY & ADDITIONAL STUDIES:    MR head 8/23/22    No acute infarct, intracranial hemorrhage, or midline shift.    Moderate-severe chronic microvascular ischemic changes, significantly   progressed since 1/9/2017.    Moderate right/mild left mastoid effusion, nonspecific.    PROTEIN CALORIE MALNUTRITION PRESENT: [ ]mild [ ]moderate [ ]severe [ ]underweight [ ]morbid obesity  https://www.andeal.org/vault/2440/web/files/ONC/Table_Clinical%20Characteristics%20to%20Document%20Malnutrition-White%20JV%20et%20al%202012.pdf    Height (cm): 172.7 (08-22-22 @ 20:22), 172.7 (08-05-22 @ 03:26)  Weight (kg): 53.5 (08-23-22 @ 07:39), 72.3 (08-05-22 @ 03:26)  BMI (kg/m2): 17.9 (08-23-22 @ 07:39), 24.2 (08-22-22 @ 20:22), 24.2 (08-05-22 @ 03:26)    [ ]PPSV2 < or = to 30% [ ]significant weight loss  [ ]poor nutritional intake  [ ]anasarca      [ ]Artificial Nutrition      REFERRALS:   [ ]Chaplaincy  [ ]Hospice  [ ]Child Life  [ ]Social Work  [ ]Case management [ ]Holistic Therapy     Goals of Care Document:     ______________  Alex Carbone MD  Palliative Medicine  [facility]   of Geriatric and Palliative Medicine  [phone]

## 2022-09-02 NOTE — PROGRESS NOTE ADULT - ASSESSMENT
89 yo F PMHx asthma, COPD, hyperactive airway, b/l chronic bronchiectasis, DLD, HTN, CAD s/p PCI, hypothyroidism presented for evaluation of worsening metabolic encephalopathy. Patient was recently admitted w/ cyclospora gastroenteritis & cystitis. Course was complicated by metabolic encephalopathy. Upon ER arrival pt found to have subsegmental PE and positive urinalysis. Patient cognitive status improved, oriented to self, place and year. Patient passed speech and swallow today, diet advanced to liquid/puree. Family updated bedside.     #Suspected Metabolic encephalopathy from suspected UTI  History of recent colitis as well as cyclospora PCR positive treated with bactrim & Flagyl until 8/20/22  Not a candidate for LP at this time due to aspiration risk if she is lying flat.  Improved cognitive status today A&O x3   MRI, EEG negative, UCx negative,  acyclovir held  c/w saccharomyces boulardii  Speech and swallow re-evaluation requested given improved mental status. Spoke with the S&S swallow team today, and updated them on patient's improved cognitive status.  Patient passed S&S today. Started on a 1:1 clear liquid diet, will transition to puree tomorrow  Calorie count started - if low caloric intake then will speak with family regarding hospice     #ZAYDA  resolved    #Subsegmental PE  duplex shows peroneal thrombus  c/w Lovenox, will switch to oral eliquis if pt able to take po consistently    #HTN  c/w Metoprolol 12.5mg BID  c/w hydralazine 25mg TID    #Disposition  Pending: Calorie Count   Family discussion: Family opting for hospice treatment--awaiting NOE for Hospice in SNF  Disposition: SNF

## 2022-09-02 NOTE — PROGRESS NOTE ADULT - PROBLEM SELECTOR PLAN 1
- s/p abx  - monitor mental status
- patient with a worse mental status today, continue to monitor  - c/w IV ampcillin for empiric coverage, high risk, monitor counts; cxr without clear PNA, bcx, ucx NGTD
- patient with a worse mental status today, continue to monitor  - c/w IV ceftriaxone, high risk, monitor counts; ucx NGTD
- s/p abx  - monitor mental status
- c/w IV ampcillin for empiric coverage, IV vanco, monitor levels, high risk, monitor counts; cxr without clear PNA, bcx, ucx NGTD  - improvement in mental status today
- s/p abx  - monitor mental status
- s/p abx  - monitor mental status

## 2022-09-02 NOTE — DISCHARGE NOTE PROVIDER - CARE PROVIDERS DIRECT ADDRESSES
[email or web address]

## 2022-09-02 NOTE — PROGRESS NOTE ADULT - ASSESSMENT
89F with PMH of COPD, asthma, HTN, CAD s/p PCI, hypothyroidism, here after recent admission at [facility] for cyclospora gastroenteritis and cystitis and metabolic encephalopathy, discharged to [facility] and admitted here for worsening lethargy. Code stroke called on admission, with CT head negative, but CTA showing acute bilateral subsegmental PE, now on therapeutic lovenox. Patient did not pass speech and swallow, and palliative care called to help discuss nutritional GOC.

## 2022-09-02 NOTE — DISCHARGE NOTE PROVIDER - HOSPITAL COURSE
HPI:  89-year-old female w/ hx of Asthma, COPD, hyperactive airway, b/l chronic bronchiectasis, DL, HTN, CAD s/p PCI, hypothyroidism. Patient was recently admitted w/ cyclospora gastroenteritis & cystitis; stay was complicated by metabolic encephalopathy. Patient is presenting for worsening lethargy and decreased responsiveness. Patient unable to participate in history. Called daughter, did not .    In the ED, left sided droop was noted, code stroke was called. CT head, CTA, & CTP were done -> -ve for acute events    CT showed acute b/l subsegmental PE w/o right heart strain -> s/p Lovenox 60    UA +ve  WBC 16, PMN 73.5%  lactate 2.2  s/p vanco 1.5 g & cefepime 2 g    PMHx: diverticulosis, scoliosis, umbilical hernia, chronic diarrhea (unclear if persistent) (hx of cyclospora treated w/ bactrim & Flagyl until 8/20/22)  PSHx: s/p hysterectomy, s/p cholecystectomy    s/p Pfizer covid vaccine x 2 on 3/6/22 & 3/27/21    In the ED, VS: T 37.6 (rectal), HR 80, /84, RR 17, SpO2 96% on 3 L NC (was on 2 L at NH)      Hospital Course:    Patient was admitted for HPI:  89-year-old female w/ hx of Asthma, COPD, hyperactive airway, b/l chronic bronchiectasis, DL, HTN, CAD s/p PCI, hypothyroidism. Patient was recently admitted w/ cyclospora gastroenteritis & cystitis; stay was complicated by metabolic encephalopathy. Patient is presenting for worsening lethargy and decreased responsiveness. Patient unable to participate in history. Called daughter, did not .    In the ED, left sided droop was noted, code stroke was called. CT head, CTA, & CTP were done -> -ve for acute events    CT showed acute b/l subsegmental PE w/o right heart strain -> s/p Lovenox 60    UA +ve  WBC 16, PMN 73.5%  lactate 2.2  s/p vanco 1.5 g & cefepime 2 g    PMHx: diverticulosis, scoliosis, umbilical hernia, chronic diarrhea (unclear if persistent) (hx of cyclospora treated w/ bactrim & Flagyl until 8/20/22)  PSHx: s/p hysterectomy, s/p cholecystectomy    s/p Pfizer covid vaccine x 2 on 3/6/22 & 3/27/21    In the ED, VS: T 37.6 (rectal), HR 80, /84, RR 17, SpO2 96% on 3 L NC (was on 2 L at NH)      Hospital Course:    Patient was admitted for AMS NIH score 20, code stroke initially called, then cancelled. CTH negative for acute findings, CTA H/N negative for occlusion or stenosis, perfusion deficit in L temporo-occipital region without corresponding core infarct. Found to have subsegmental PE, started on therapeutic AC. NGT placed for feeds in setting of acute AMS and lethargy. ID consulted, continued on Rocephin for UTI, hold off on LP. Palliative team consulted. Lethargy improved on course of abx and patient passed S/S eval, transitioned to puree diet. patient was evaluated with PT and will be set up to go to STR with hospice. Patient is clinically improved and stable for discharge.    HPI:  89-year-old female w/ hx of Asthma, COPD, hyperactive airway, b/l chronic bronchiectasis, DL, HTN, CAD s/p PCI, hypothyroidism. Patient was recently admitted w/ cyclospora gastroenteritis & cystitis; stay was complicated by metabolic encephalopathy. Patient is presenting for worsening lethargy and decreased responsiveness. Patient unable to participate in history. Called daughter, did not .    In the ED, left sided droop was noted, code stroke was called. CT head, CTA, & CTP were done -> -ve for acute events    CT showed acute b/l subsegmental PE w/o right heart strain -> s/p Lovenox 60    UA +ve  WBC 16, PMN 73.5%  lactate 2.2  s/p vanco 1.5 g & cefepime 2 g    PMHx: diverticulosis, scoliosis, umbilical hernia, chronic diarrhea (unclear if persistent) (hx of cyclospora treated w/ bactrim & Flagyl until 8/20/22)  PSHx: s/p hysterectomy, s/p cholecystectomy    s/p Pfizer covid vaccine x 2 on 3/6/22 & 3/27/21    In the ED, VS: T 37.6 (rectal), HR 80, /84, RR 17, SpO2 96% on 3 L NC (was on 2 L at NH)      Hospital Course:    Patient was admitted for AMS NIH score 20, code stroke initially called, then cancelled. CTH negative for acute findings, CTA H/N negative for occlusion or stenosis, perfusion deficit in L temporo-occipital region without corresponding core infarct. Found to have subsegmental PE, started on therapeutic AC. NGT placed for feeds in setting of acute AMS and lethargy. ID consulted, continued on Rocephin for UTI, hold off on LP. Palliative team consulted, initially planned for hospice, unsure whether home or STR. Lethargy improved on course of abx and patient passed S/S eval, transitioned to puree diet. patient was evaluated with PT and will be set up to go to STR. Patient is clinically improved and stable for discharge.    HPI:  89-year-old female w/ hx of Asthma, COPD, hyperactive airway, b/l chronic bronchiectasis, DL, HTN, CAD s/p PCI, hypothyroidism. Patient was recently admitted w/ cyclospora gastroenteritis & cystitis; stay was complicated by metabolic encephalopathy. Patient is presenting for worsening lethargy and decreased responsiveness. Patient unable to participate in history. Called daughter, did not .    In the ED, left sided droop was noted, code stroke was called. CT head, CTA, & CTP were done -> -ve for acute events    CT showed acute b/l subsegmental PE w/o right heart strain -> s/p Lovenox 60    UA +ve  WBC 16, PMN 73.5%  lactate 2.2  s/p vanco 1.5 g & cefepime 2 g    PMHx: diverticulosis, scoliosis, umbilical hernia, chronic diarrhea (unclear if persistent) (hx of cyclospora treated w/ bactrim & Flagyl until 8/20/22)  PSHx: s/p hysterectomy, s/p cholecystectomy    s/p Pfizer covid vaccine x 2 on 3/6/22 & 3/27/21    In the ED, VS: T 37.6 (rectal), HR 80, /84, RR 17, SpO2 96% on 3 L NC (was on 2 L at NH)      Hospital Course:    Patient was admitted for AMS NIH score 20, code stroke initially called, then cancelled. CTH negative for acute findings, CTA H/N negative for occlusion or stenosis, perfusion deficit in L temporo-occipital region without corresponding core infarct. Found to have subsegmental PE, started on therapeutic AC. NGT placed for feeds in setting of acute AMS and lethargy. ID consulted, continued on Rocephin for UTI, hold off on LP. Palliative team consulted, initially planned for hospice, unsure whether home or STR. Lethargy improved on course of abx and patient passed S/S eval, transitioned to puree diet (tolerating well). patient was evaluated with PT and will be set up to go to STR. Patient is clinically improved and stable for discharge.

## 2022-09-02 NOTE — PROGRESS NOTE ADULT - PROBLEM SELECTOR PLAN 4
- d/w daughter, she is planning to file for medicaid  - ongoing dispo planning - d/w daughter, she is planning to file for medicaid  - ongoing dispo planning, currently plants are STR, daughter deferred hospice

## 2022-09-02 NOTE — PROGRESS NOTE ADULT - PROBLEM SELECTOR PLAN 2
- c/w lovenox BID

## 2022-09-02 NOTE — DISCHARGE NOTE PROVIDER - NSDCCPCAREPLAN_GEN_ALL_CORE_FT
PRINCIPAL DISCHARGE DIAGNOSIS  Diagnosis: Metabolic encephalopathy  Assessment and Plan of Treatment: Metabolic encephaopathy (ME) is one of the most common and broadly defined, diagnoses encountered by physicians in the neurological fields. ME is a group of neurologic disorders characterized by an alteration in mental status caused by the direct physiological consequences of a general underlying medical condition and/or pharmacogenetics. Clinical presentation can vary from subtle executive dysfunction to coma with decorticate or decerebrate posturing. It is believe this is likely due to an acute urinary tract infection. You were treated with antibiotics.      SECONDARY DISCHARGE DIAGNOSES  Diagnosis: Acute UTI  Assessment and Plan of Treatment:   You were noted either on arrival or during this hospitalization to have a Urinary Tract Infection. You may have already been treated and completed the antibiotics, please refer to the list of medications present on your discharge paperwork. If you notice that there are antibiotics listed, these may be to treat your infection, be sure to complete taking the full course, whether you have symptoms or not, as prescribed.  While taking antibiotics, you may benefit from taking a probiotic such as florastore to help to try and prevent an infectious type of diarrhea known as C Diff. If you notice that you begin having severe watery diarrhea, more than 4-5 episodes a day, please see your Primary Care Doctor or come to the ER to have your stool tested for this infection.   It is not necessary to repeat a urine test to see if the infection is gone, it is assumed that after treatment it should have resolved. However, if you continue to have symptoms, please see your Primary Care doctor or return to the ER.      Diagnosis: Pulmonary embolism  Assessment and Plan of Treatment: During this hospitalization, you were diagnosed with a pulmonary embolsim. In your case, you have a  deep vein thrombosis (DVT) which is a blood clot in a large vein deep in a leg, arm, or elsewhere in the body. The clot can separate from the vein, travel to the lungs and cut off blood flow. This is a pulmonary embolism (PE). Pulmonary embolism is very serious. Both the prevention and the treatment are similar for DVT and PE.   To help prevent more blood clots from forming, please see your primary care doctor within one week of discharge, and please take your medicines exactly as instructed. Don’t skip doses. You have been prescribed a medication to thin the blood, so that more clots do not form.  Have all lab tests as recommended. This is very important when you take medicines to prevent blood clots. Make sure you stay active and walk for at least 30 minutes every day. When sitting for long periods of time, move your knees, ankles, feet, and toes.    If you smoke, get help to quit. Stay at a healthy weight. Try to exercise at least 30 minutes on most days. Before starting an exercise program, talk with your primary care provider. When traveling by car, make frequent stops to get up and move around. On long airplane rides, get up and move around when possible. If you can’t get up, wiggle your toes, move your ankles and tighten your calves to keep your blood moving.  Seek immediate medical care if you have pain, swelling, and redness in your leg, arm, or other body area. These symptoms may mean another blood clot. Also call your healthcare provider if you have signs and symptoms of bleeding, like blood in your urine, bleeding with bowel movements, or bleeding from the nose, gums, a cut, or vagina. Call 911 if you have symptoms of a blood clot in the lungs including: Chest pain, trouble breathing, coughing blood, fast heartbeat, heavy or uncontrolled bleeding.

## 2022-09-02 NOTE — PROGRESS NOTE ADULT - PROBLEM SELECTOR PROBLEM 2
Pulmonary thromboembolism

## 2022-09-02 NOTE — DISCHARGE NOTE PROVIDER - CARE PROVIDER_API CALL
Neo Guillory (DO)  Medicine  [address] [place] [address], [address] [place] [address]  Phone: [phone]  Fax: [phone]  Follow Up Time: 2 weeks   Advancement Flap (Single) Text: Given the location of the defect and the proximity to free margins a single advancement flap was deemed most appropriate.  Using a sterile surgical marker, an appropriate advancement flap was drawn incorporating the defect and placing the expected incisions within the relaxed skin tension lines where possible.    The area thus outlined was incised deep to adipose tissue with a #[id number] scalpel blade.  The skin margins were undermined to an appropriate distance in all directions utilizing iris scissors.

## 2022-09-02 NOTE — PROGRESS NOTE ADULT - PROBLEM SELECTOR PLAN 3
- s/p NGT for trial of 5-7 days  - will reevaluate this week, but daughter would not want PEG and is aware of comfort care option
- passed S&S, can have puree with thin liquids  - mental status improved  - d/c NGT
- passed S&S, can have puree with thin liquids  - mental status improved  - s/p NGT
- s/p NGT, to end trial this week  - will reevaluate this week, but daughter would not want PEG and is aware of comfort care option, is in d/w hospice
- s/p NGT for trial of 5-7 days  - will reevaluate mid-next week, but daughter would not want PEG and is aware of comfort care option
- d/w daughter today, she is agreeable to NGT placement, to be done today for 5-7 day trial
- s/p NGT for trial of 5-7 days  - will reevaluate this week, but daughter would not want PEG and is aware of comfort care option, likely hospice at SNF

## 2022-09-02 NOTE — DISCHARGE NOTE PROVIDER - NSDCFUSCHEDAPPT_GEN_ALL_CORE_FT
[facility]  CARDIOLOGY [address] [place] [address]  Scheduled Appointment: 09/07/2022

## 2022-09-02 NOTE — DISCHARGE NOTE PROVIDER - NSDCMRMEDTOKEN_GEN_ALL_CORE_FT
albuterol 90 mcg/inh inhalation aerosol: 2 puff(s) inhaled every 6 hours   aspirin 81 mg oral tablet, chewable: 1 tab(s) orally once a day  budesonide-formoterol 80 mcg-4.5 mcg/inh inhalation aerosol: 2 puff(s) inhaled 2 times a day   CLOPIDOGREL 75 MG TABLET: 1 each orally once a day  hydrALAZINE 25 mg oral tablet: 1 tab(s) orally 3 times a day  isosorbide dinitrate 10 mg oral tablet: 1 tab(s) orally once a day  latanoprost 0.005% ophthalmic solution: 1 drop(s) to each affected eye once a day (in the evening)  levothyroxine 25 mcg (0.025 mg) oral tablet: 1 tab(s) orally once a day  lisinopril 20 mg oral tablet: 1 tab(s) orally once a day  metoprolol succinate 25 mg oral tablet, extended release: 1  orally once a day  pantoprazole 20 mg oral delayed release tablet: 1 tab(s) orally once a day  polyethylene glycol 3350 oral powder for reconstitution: 17 gram(s) orally once a day  saccharomyces boulardii lyo 250 mg oral capsule: 1 cap(s) orally 2 times a day  senna leaf extract oral tablet: 2 tab(s) orally once a day (at bedtime)   albuterol 90 mcg/inh inhalation aerosol: 2 puff(s) inhaled every 6 hours   apixaban 5 mg oral tablet: 2 tab(s) orally every 12 hours from 9/3 to 9/10 then 1 tab every 12 hours thereafter  aspirin 81 mg oral tablet, chewable: 1 tab(s) orally once a day  budesonide-formoterol 80 mcg-4.5 mcg/inh inhalation aerosol: 2 puff(s) inhaled 2 times a day   CLOPIDOGREL 75 MG TABLET: 1 each orally once a day  hydrALAZINE 25 mg oral tablet: 1 tab(s) orally 3 times a day  isosorbide dinitrate 10 mg oral tablet: 1 tab(s) orally once a day  latanoprost 0.005% ophthalmic solution: 1 drop(s) to each affected eye once a day (in the evening)  levothyroxine 25 mcg (0.025 mg) oral tablet: 1 tab(s) orally once a day  lisinopril 20 mg oral tablet: 1 tab(s) orally once a day  metoprolol succinate 25 mg oral tablet, extended release: 1  orally once a day  pantoprazole 20 mg oral delayed release tablet: 1 tab(s) orally once a day  polyethylene glycol 3350 oral powder for reconstitution: 17 gram(s) orally once a day  saccharomyces boulardii lyo 250 mg oral capsule: 1 cap(s) orally 2 times a day  senna leaf extract oral tablet: 2 tab(s) orally once a day (at bedtime)

## 2022-09-02 NOTE — PROGRESS NOTE ADULT - PROBLEM SELECTOR PLAN 5
- will follow  ______________  Alex Carbone MD  Palliative Medicine  [facility]   of Geriatric and Palliative Medicine  [phone]
- will follow  ______________  Alex Carbone MD  Palliative Medicine  [facility]   of Geriatric and Palliative Medicine  [phone]
- will follow  ______________  Alex Carbone MD  Palliative Medicine  [facility]   of Geriatric and Palliative Medicine  [phone]
- will follow  ______________  Alex Carbone MD  Palliative Medicine  [facility]   of Geriatric and Palliative Medicine  [phone]
- will medically sign off, palliative LCSW to follow with family for support  ______________  Alex Carbone MD  Palliative Medicine  [facility]   of Geriatric and Palliative Medicine  [phone]
- will follow  ______________  Alex Carbone MD  Palliative Medicine  [facility]   of Geriatric and Palliative Medicine  [phone]
- will follow  ______________  Alex Carbone MD  Palliative Medicine  [facility]   of Geriatric and Palliative Medicine  [phone]

## 2022-09-02 NOTE — PROGRESS NOTE ADULT - SUBJECTIVE AND OBJECTIVE BOX
CHIRAG HERNÁNDEZ  89y, Female  Allergy: No Known Allergies    Hospital Day: 11d    Patient seen and examined earlier today. AAOx3.  No complaints at this time.  no acute events overnight.  Pt reports that she has been eating well.      PMH/PSH:  PAST MEDICAL & SURGICAL HISTORY:  High blood cholesterol      Hypothyroid      Hypertension      COPD (chronic obstructive pulmonary disease)      CAD (coronary artery disease)      History of coronary artery stent placement      History of cholecystectomy      H/O abdominal hysterectomy      Cataract          LAST 24-Hr EVENTS:    VITALS:  T(F): 97.7 (09-02-22 @ 08:56), Max: 98.2 (09-01-22 @ 17:23)  HR: 70 (09-02-22 @ 08:56)  BP: 176/77 (09-02-22 @ 08:56) (102/51 - 176/77)  RR: 18 (09-02-22 @ 08:56)  SpO2: 98% (09-02-22 @ 08:56)          TESTS & MEASUREMENTS:  Weight/BMI      08-31-22 @ 07:01  -  09-01-22 @ 07:00  --------------------------------------------------------  IN: 1280 mL / OUT: 2300 mL / NET: -1020 mL    09-01-22 @ 07:01  -  09-02-22 @ 07:00  --------------------------------------------------------  IN: 0 mL / OUT: 300 mL / NET: -300 mL    09-02-22 @ 07:01  -  09-02-22 @ 11:25  --------------------------------------------------------  IN: 0 mL / OUT: 50 mL / NET: -50 mL                            12.1   10.19 )-----------( 310      ( 02 Sep 2022 08:42 )             37.0         09-02    138  |  102  |  17  ----------------------------<  107<H>  4.9   |  25  |  0.5<L>    Ca    9.3      02 Sep 2022 08:42  Mg     2.1     09-02                        COVID-19 PCR: NotDetec (08-29-22 @ 06:30)        A1C with Estimated Average Glucose Result: 4.8 % (08-24-22 @ 07:07)      Indwelling Urethral Catheter:     Connect To:  Straight Drainage/Gravity    Indication:  Improved Comfort Care (09-01-22 @ 17:03) (not performed)  Indwelling Urethral Catheter:     Connect To:  Straight Drainage/Gravity    Indication:  Improved Comfort Care (08-31-22 @ 06:08) (not performed)      RADIOLOGY, ECG, & ADDITIONAL TESTS:  12 Lead ECG:   Ventricular Rate 81 BPM    Atrial Rate 81 BPM    P-R Interval 148 ms    QRS Duration 134 ms    Q-T Interval 446 ms    QTC Calculation(Bazett) 518 ms    P Axis 12 degrees    R Axis -64 degrees    T Axis 86 degrees    Diagnosis Line Normal sinus rhythm  Left axis deviation  Left ventricular hypertrophy with QRS widening and repolarization abnormality  Inferior infarct , age undetermined  Possible inferior infarct, age undetermined  Abnormal ECG    Confirmed by Eduardo Ruth ([id number]) on 8/23/20229:35:07 AM (08-22-22 @ 20:35)      RECENT DIAGNOSTIC ORDERS:  Diet, Pureed (09-02-22 @ 09:09)      MEDICATIONS:  MEDICATIONS  (STANDING):  aspirin  chewable 81 milliGRAM(s) Enteral Tube daily  BACItracin   Ointment 1 Application(s) Topical daily  budesonide  80 MICROgram(s)/formoterol 4.5 MICROgram(s) Inhaler 2 Puff(s) Inhalation two times a day  chlorhexidine 2% Cloths 1 Application(s) Topical daily  clopidogrel Tablet 75 milliGRAM(s) Enteral Tube daily  enoxaparin Injectable 50 milliGRAM(s) SubCutaneous every 12 hours  hydrALAZINE 25 milliGRAM(s) Oral three times a day  isosorbide   dinitrate Tablet (ISORDIL) 10 milliGRAM(s) Enteral Tube three times a day  latanoprost 0.005% Ophthalmic Solution 1 Drop(s) Both EYES at bedtime  levothyroxine 25 MICROGram(s) Oral daily  metoprolol tartrate 12.5 milliGRAM(s) Oral two times a day  pantoprazole    Tablet 40 milliGRAM(s) Oral before breakfast  polyethylene glycol 3350 17 Gram(s) Oral daily  saccharomyces boulardii 250 milliGRAM(s) Oral two times a day  senna 2 Tablet(s) Oral at bedtime    MEDICATIONS  (PRN):  acetaminophen     Tablet .. 650 milliGRAM(s) Oral every 6 hours PRN Temp greater or equal to 38C (100.4F), Mild Pain (1 - 3)  ALBUTerol    90 MICROgram(s) HFA Inhaler 2 Puff(s) Inhalation every 6 hours PRN Shortness of Breath and/or Wheezing  aluminum hydroxide/magnesium hydroxide/simethicone Suspension 30 milliLiter(s) Oral every 4 hours PRN Dyspepsia  glycopyrrolate Injectable 0.4 milliGRAM(s) IV Push every 6 hours PRN secretions  melatonin 3 milliGRAM(s) Oral at bedtime PRN Insomnia  ondansetron Injectable 4 milliGRAM(s) IV Push every 8 hours PRN Nausea and/or Vomiting      HOME MEDICATIONS:  albuterol 90 mcg/inh inhalation aerosol (08-23)  aspirin 81 mg oral tablet, chewable (08-23)  budesonide-formoterol 80 mcg-4.5 mcg/inh inhalation aerosol (08-23)  CLOPIDOGREL 75 MG TABLET (08-23)  hydrALAZINE 25 mg oral tablet (08-23)  isosorbide dinitrate 10 mg oral tablet (08-23)  latanoprost 0.005% ophthalmic solution (08-23)  levothyroxine 25 mcg (0.025 mg) oral tablet (08-23)  lisinopril 20 mg oral tablet (08-23)  metoprolol succinate 25 mg oral tablet, extended release (08-23)  pantoprazole 20 mg oral delayed release tablet (08-23)  polyethylene glycol 3350 oral powder for reconstitution (08-23)  saccharomyces boulardii lyo 250 mg oral capsule (08-23)  senna leaf extract oral tablet (08-23)      PHYSICAL EXAM:  GENERAL: NAD, well-groomed, well-developed  HEAD:  Atraumatic, Normocephalic  EYES: EOMI, PERRLA, conjunctiva and sclera clear  ENMT: No tonsillar erythema, exudates, or enlargement; Moist mucous membranes, Good dentition, No lesions  NECK: Supple, No JVD, Normal thyroid  NERVOUS SYSTEM:  Alert & Oriented X3, Good concentration; Motor Strength 5/5 B/L upper and lower extremities; DTRs 2+ intact and symmetric  CHEST/LUNG: Clear to auscultation bilaterally; No rales, rhonchi, wheezing, or rubs  HEART: Regular rate and rhythm; No murmurs, rubs, or gallops  ABDOMEN: Soft, Nontender, Nondistended; Bowel sounds present  EXTREMITIES:  2+ Peripheral Pulses, No clubbing, cyanosis, or edema  LYMPH: No lymphadenopathy noted  SKIN: No rashes or lesions

## 2022-09-02 NOTE — PROGRESS NOTE ADULT - SUBJECTIVE AND OBJECTIVE BOX
OBJECTIVE  PAST MEDICAL & SURGICAL HISTORY  High blood cholesterol    Hypothyroid    Hypertension    COPD (chronic obstructive pulmonary disease)    CAD (coronary artery disease)    History of coronary artery stent placement    History of cholecystectomy    H/O abdominal hysterectomy    Cataract                                                -----------------------------------------------------------    REVIEW OF SYSTEMS:  CONSTITUTIONAL: No fever, weight loss, or fatigue  EYES: No eye pain, visual disturbances, or discharge  ENMT:  No difficulty hearing, tinnitus, vertigo; No sinus or throat pain  NECK: No pain or stiffness  BREASTS: No pain, masses, or nipple discharge  RESPIRATORY: No cough, wheezing, chills or hemoptysis; No shortness of breath  CARDIOVASCULAR: No chest pain, palpitations, dizziness, or leg swelling  GASTROINTESTINAL: No abdominal or epigastric pain. No nausea, vomiting, or hematemesis; No diarrhea or constipation. No melena or hematochezia.  GENITOURINARY: No dysuria, frequency, hematuria, or incontinence  NEUROLOGICAL: No headaches, memory loss, loss of strength, numbness, or tremors  SKIN: No itching, burning, rashes, or lesions   LYMPH NODES: No enlarged glands  ENDOCRINE: No heat or cold intolerance; No hair loss  MUSCULOSKELETAL: No joint pain or swelling; No muscle, back, or extremity pain  PSYCHIATRIC: No depression, anxiety, mood swings, or difficulty sleeping  HEME/LYMPH: No easy bruising, or bleeding gums  ALLERY AND IMMUNOLOGIC: No hives or eczema                                            ------------------------------------------------------------    ALLERGIES:  No Known Allergies                                            ------------------------------------------------------------    HOME MEDICATIONS  Home Medications:  aspirin 81 mg oral tablet, chewable: 1 tab(s) orally once a day (23 Aug 2022 04:41)  CLOPIDOGREL 75 MG TABLET: 1 each orally once a day (23 Aug 2022 04:41)  hydrALAZINE 25 mg oral tablet: 1 tab(s) orally 3 times a day (23 Aug 2022 04:41)  isosorbide dinitrate 10 mg oral tablet: 1 tab(s) orally once a day (23 Aug 2022 04:41)  latanoprost 0.005% ophthalmic solution: 1 drop(s) to each affected eye once a day (in the evening) (23 Aug 2022 04:41)  levothyroxine 25 mcg (0.025 mg) oral tablet: 1 tab(s) orally once a day (23 Aug 2022 04:41)  lisinopril 20 mg oral tablet: 1 tab(s) orally once a day (23 Aug 2022 04:41)  metoprolol succinate 25 mg oral tablet, extended release: 1  orally once a day (23 Aug 2022 04:41)  pantoprazole 20 mg oral delayed release tablet: 1 tab(s) orally once a day (23 Aug 2022 04:41)  polyethylene glycol 3350 oral powder for reconstitution: 17 gram(s) orally once a day (23 Aug 2022 04:41)  saccharomyces boulardii lyo 250 mg oral capsule: 1 cap(s) orally 2 times a day (23 Aug 2022 04:41)  senna leaf extract oral tablet: 2 tab(s) orally once a day (at bedtime) (23 Aug 2022 04:41)                           MEDICATIONS:  STANDING MEDICATIONS  aspirin  chewable 81 milliGRAM(s) Enteral Tube daily  BACItracin   Ointment 1 Application(s) Topical daily  budesonide  80 MICROgram(s)/formoterol 4.5 MICROgram(s) Inhaler 2 Puff(s) Inhalation two times a day  chlorhexidine 2% Cloths 1 Application(s) Topical daily  clopidogrel Tablet 75 milliGRAM(s) Enteral Tube daily  enoxaparin Injectable 50 milliGRAM(s) SubCutaneous every 12 hours  hydrALAZINE 25 milliGRAM(s) Oral three times a day  isosorbide   dinitrate Tablet (ISORDIL) 10 milliGRAM(s) Enteral Tube three times a day  latanoprost 0.005% Ophthalmic Solution 1 Drop(s) Both EYES at bedtime  levothyroxine 25 MICROGram(s) Oral daily  metoprolol tartrate 12.5 milliGRAM(s) Oral two times a day  pantoprazole    Tablet 40 milliGRAM(s) Oral before breakfast  polyethylene glycol 3350 17 Gram(s) Oral daily  saccharomyces boulardii 250 milliGRAM(s) Oral two times a day  senna 2 Tablet(s) Oral at bedtime    PRN MEDICATIONS  acetaminophen     Tablet .. 650 milliGRAM(s) Oral every 6 hours PRN  ALBUTerol    90 MICROgram(s) HFA Inhaler 2 Puff(s) Inhalation every 6 hours PRN  aluminum hydroxide/magnesium hydroxide/simethicone Suspension 30 milliLiter(s) Oral every 4 hours PRN  glycopyrrolate Injectable 0.4 milliGRAM(s) IV Push every 6 hours PRN  melatonin 3 milliGRAM(s) Oral at bedtime PRN  ondansetron Injectable 4 milliGRAM(s) IV Push every 8 hours PRN                                            ------------------------------------------------------------  VITAL SIGNS: Last 24 Hours  T(C): 36.5 (02 Sep 2022 08:56), Max: 36.8 (01 Sep 2022 17:23)  T(F): 97.7 (02 Sep 2022 08:56), Max: 98.2 (01 Sep 2022 17:23)  HR: 70 (02 Sep 2022 08:56) (70 - 78)  BP: 176/77 (02 Sep 2022 08:56) (102/51 - 176/77)  BP(mean): 109 (02 Sep 2022 04:50) (109 - 109)  RR: 18 (02 Sep 2022 08:56) (18 - 18)  SpO2: 98% (02 Sep 2022 08:56) (96% - 98%)      09-01-22 @ 07:01  -  09-02-22 @ 07:00  --------------------------------------------------------  IN: 0 mL / OUT: 300 mL / NET: -300 mL    09-02-22 @ 07:01  -  09-02-22 @ 12:52  --------------------------------------------------------  IN: 0 mL / OUT: 50 mL / NET: -50 mL                                             --------------------------------------------------------------  LABS:                        12.1   10.19 )-----------( 310      ( 02 Sep 2022 08:42 )             37.0     09-02    138  |  102  |  17  ----------------------------<  107<H>  4.9   |  25  |  0.5<L>    Ca    9.3      02 Sep 2022 08:42  Mg     2.1     09-02                                              -------------------------------------------------------------  RADIOLOGY:                                            --------------------------------------------------------------    PHYSICAL EXAM:  GENERAL: NAD, well-groomed, well-developed  HEAD:  Atraumatic, Normocephalic  EYES: EOMI, PERRLA, conjunctiva and sclera clear  ENMT: No tonsillar erythema, exudates, or enlargement; Moist mucous membranes,  NECK: Supple, No JVD, Normal thyroid  NERVOUS SYSTEM:  Alert & Oriented X4  CHEST/LUNG: Clear to auscultation bilaterally; No rales, rhonchi, wheezing, or rubs  HEART: Regular rate and rhythm; No murmurs, rubs, or gallops  ABDOMEN: Soft, Nontender, Nondistended; Bowel sounds present  EXTREMITIES:  2+ Peripheral Pulses, No clubbing, cyanosis, or edema  LYMPH: No lymphadenopathy noted  SKIN: No rashes or lesions                                           --------------------------------------------------------------    ASSESSMENT and PLAN      89 yo F PMHx asthma, COPD, hyperactive airway, b/l chronic bronchiectasis, DLD, HTN, CAD s/p PCI, hypothyroidism presented for evaluation of worsening metabolic encephalopathy. Patient was recently admitted w/ cyclospora gastroenteritis & cystitis. Course was complicated by metabolic encephalopathy. Upon ER arrival pt found to have subsegmental PE and positive urinalysis. Patient cognitive status improved, oriented to self, place and year. Patient passed speech and swallow today, diet advanced to puree. Family updated bedside.     #Suspected Metabolic encephalopathy from suspected UTI  - History of recent colitis as well as cyclospora PCR positive treated with bactrim & Flagyl until 8/20/22  - Improved cognitive status today A&O x4   - MRI, EEG negative, UCx negative,  - c/w saccharomyces boulardiis.  - Patient passed S&S. Started on a 1:1 clear liquid diet, transitioned to puree.  - Calory count started    #ZAYDA  - resolved    #Subsegmental PE  - duplex shows peroneal thrombus  - c/w Lovenox, will switch to oral eliquis if pt able to take po consistently    #HTN  - c/w Metoprolol 12.5mg BID  - c/w hydralazine 25mg TID    #Disposition  - awaiting NOE for SNF                      OBJECTIVE  PAST MEDICAL & SURGICAL HISTORY  High blood cholesterol    Hypothyroid    Hypertension    COPD (chronic obstructive pulmonary disease)    CAD (coronary artery disease)    History of coronary artery stent placement    History of cholecystectomy    H/O abdominal hysterectomy    Cataract                                                -----------------------------------------------------------    REVIEW OF SYSTEMS:  CONSTITUTIONAL: No fever, weight loss, or fatigue  EYES: No eye pain, visual disturbances, or discharge  ENMT:  No difficulty hearing, tinnitus, vertigo; No sinus or throat pain  NECK: No pain or stiffness  BREASTS: No pain, masses, or nipple discharge  RESPIRATORY: No cough, wheezing, chills or hemoptysis; No shortness of breath  CARDIOVASCULAR: No chest pain, palpitations, dizziness, or leg swelling  GASTROINTESTINAL: No abdominal or epigastric pain. No nausea, vomiting, or hematemesis; No diarrhea or constipation. No melena or hematochezia.  GENITOURINARY: No dysuria, frequency, hematuria, or incontinence  NEUROLOGICAL: No headaches, memory loss, loss of strength, numbness, or tremors  SKIN: No itching, burning, rashes, or lesions   LYMPH NODES: No enlarged glands  ENDOCRINE: No heat or cold intolerance; No hair loss  MUSCULOSKELETAL: No joint pain or swelling; No muscle, back, or extremity pain  PSYCHIATRIC: No depression, anxiety, mood swings, or difficulty sleeping  HEME/LYMPH: No easy bruising, or bleeding gums  ALLERY AND IMMUNOLOGIC: No hives or eczema                                            ------------------------------------------------------------    ALLERGIES:  No Known Allergies                                            ------------------------------------------------------------    HOME MEDICATIONS  Home Medications:  aspirin 81 mg oral tablet, chewable: 1 tab(s) orally once a day (23 Aug 2022 04:41)  CLOPIDOGREL 75 MG TABLET: 1 each orally once a day (23 Aug 2022 04:41)  hydrALAZINE 25 mg oral tablet: 1 tab(s) orally 3 times a day (23 Aug 2022 04:41)  isosorbide dinitrate 10 mg oral tablet: 1 tab(s) orally once a day (23 Aug 2022 04:41)  latanoprost 0.005% ophthalmic solution: 1 drop(s) to each affected eye once a day (in the evening) (23 Aug 2022 04:41)  levothyroxine 25 mcg (0.025 mg) oral tablet: 1 tab(s) orally once a day (23 Aug 2022 04:41)  lisinopril 20 mg oral tablet: 1 tab(s) orally once a day (23 Aug 2022 04:41)  metoprolol succinate 25 mg oral tablet, extended release: 1  orally once a day (23 Aug 2022 04:41)  pantoprazole 20 mg oral delayed release tablet: 1 tab(s) orally once a day (23 Aug 2022 04:41)  polyethylene glycol 3350 oral powder for reconstitution: 17 gram(s) orally once a day (23 Aug 2022 04:41)  saccharomyces boulardii lyo 250 mg oral capsule: 1 cap(s) orally 2 times a day (23 Aug 2022 04:41)  senna leaf extract oral tablet: 2 tab(s) orally once a day (at bedtime) (23 Aug 2022 04:41)                           MEDICATIONS:  STANDING MEDICATIONS  aspirin  chewable 81 milliGRAM(s) Enteral Tube daily  BACItracin   Ointment 1 Application(s) Topical daily  budesonide  80 MICROgram(s)/formoterol 4.5 MICROgram(s) Inhaler 2 Puff(s) Inhalation two times a day  chlorhexidine 2% Cloths 1 Application(s) Topical daily  clopidogrel Tablet 75 milliGRAM(s) Enteral Tube daily  enoxaparin Injectable 50 milliGRAM(s) SubCutaneous every 12 hours  hydrALAZINE 25 milliGRAM(s) Oral three times a day  isosorbide   dinitrate Tablet (ISORDIL) 10 milliGRAM(s) Enteral Tube three times a day  latanoprost 0.005% Ophthalmic Solution 1 Drop(s) Both EYES at bedtime  levothyroxine 25 MICROGram(s) Oral daily  metoprolol tartrate 12.5 milliGRAM(s) Oral two times a day  pantoprazole    Tablet 40 milliGRAM(s) Oral before breakfast  polyethylene glycol 3350 17 Gram(s) Oral daily  saccharomyces boulardii 250 milliGRAM(s) Oral two times a day  senna 2 Tablet(s) Oral at bedtime    PRN MEDICATIONS  acetaminophen     Tablet .. 650 milliGRAM(s) Oral every 6 hours PRN  ALBUTerol    90 MICROgram(s) HFA Inhaler 2 Puff(s) Inhalation every 6 hours PRN  aluminum hydroxide/magnesium hydroxide/simethicone Suspension 30 milliLiter(s) Oral every 4 hours PRN  glycopyrrolate Injectable 0.4 milliGRAM(s) IV Push every 6 hours PRN  melatonin 3 milliGRAM(s) Oral at bedtime PRN  ondansetron Injectable 4 milliGRAM(s) IV Push every 8 hours PRN                                            ------------------------------------------------------------  VITAL SIGNS: Last 24 Hours  T(C): 36.5 (02 Sep 2022 08:56), Max: 36.8 (01 Sep 2022 17:23)  T(F): 97.7 (02 Sep 2022 08:56), Max: 98.2 (01 Sep 2022 17:23)  HR: 70 (02 Sep 2022 08:56) (70 - 78)  BP: 176/77 (02 Sep 2022 08:56) (102/51 - 176/77)  BP(mean): 109 (02 Sep 2022 04:50) (109 - 109)  RR: 18 (02 Sep 2022 08:56) (18 - 18)  SpO2: 98% (02 Sep 2022 08:56) (96% - 98%)      09-01-22 @ 07:01  -  09-02-22 @ 07:00  --------------------------------------------------------  IN: 0 mL / OUT: 300 mL / NET: -300 mL    09-02-22 @ 07:01  -  09-02-22 @ 12:52  --------------------------------------------------------  IN: 0 mL / OUT: 50 mL / NET: -50 mL                                             --------------------------------------------------------------  LABS:                        12.1   10.19 )-----------( 310      ( 02 Sep 2022 08:42 )             37.0     09-02    138  |  102  |  17  ----------------------------<  107<H>  4.9   |  25  |  0.5<L>    Ca    9.3      02 Sep 2022 08:42  Mg     2.1     09-02                                              -------------------------------------------------------------  RADIOLOGY:                                            --------------------------------------------------------------    PHYSICAL EXAM:  GENERAL: NAD, well-groomed, well-developed  HEAD:  Atraumatic, Normocephalic  EYES: EOMI, PERRLA, conjunctiva and sclera clear  ENMT: No tonsillar erythema, exudates, or enlargement; Moist mucous membranes,  NECK: Supple, No JVD, Normal thyroid  NERVOUS SYSTEM:  Alert & Oriented X4  CHEST/LUNG: Clear to auscultation bilaterally; No rales, rhonchi, wheezing, or rubs  HEART: Regular rate and rhythm; No murmurs, rubs, or gallops  ABDOMEN: Soft, Nontender, Nondistended; Bowel sounds present  EXTREMITIES:  2+ Peripheral Pulses, No clubbing, cyanosis, or edema  LYMPH: No lymphadenopathy noted  SKIN: No rashes or lesions                                           --------------------------------------------------------------    ASSESSMENT and PLAN      89 yo F PMHx asthma, COPD, hyperactive airway, b/l chronic bronchiectasis, DLD, HTN, CAD s/p PCI, hypothyroidism presented for evaluation of worsening metabolic encephalopathy. Patient was recently admitted w/ cyclospora gastroenteritis & cystitis. Course was complicated by metabolic encephalopathy. Upon ER arrival pt found to have subsegmental PE and positive urinalysis. Patient cognitive status improved, oriented to self, place and year. Patient passed speech and swallow today, diet advanced to puree. Family updated bedside.     #Suspected Metabolic encephalopathy from suspected UTI  - History of recent colitis as well as cyclospora PCR positive treated with bactrim & Flagyl until 8/20/22  - Improved cognitive status today A&O x4   - MRI, EEG negative, UCx negative,  - c/w saccharomyces boulardiis.  - Patient passed S&S. Started on a 1:1 clear liquid diet, transitioned to puree.  - Calory count started    #ZAYDA  - resolved    #Subsegmental PE  - duplex shows peroneal thrombus  - c/w Lovenox, will switch to oral eliquis tomorrow    #HTN  - c/w Metoprolol 12.5mg BID  - c/w hydralazine 25mg TID    #Disposition  - awaiting NOE for SNF

## 2022-09-03 LAB
ANION GAP SERPL CALC-SCNC: 9 MMOL/L — SIGNIFICANT CHANGE UP (ref 7–14)
APPEARANCE UR: CLEAR — SIGNIFICANT CHANGE UP
BACTERIA # UR AUTO: NEGATIVE — SIGNIFICANT CHANGE UP
BASOPHILS # BLD AUTO: 0.1 K/UL — SIGNIFICANT CHANGE UP (ref 0–0.2)
BASOPHILS NFR BLD AUTO: 1.3 % — HIGH (ref 0–1)
BILIRUB UR-MCNC: NEGATIVE — SIGNIFICANT CHANGE UP
BUN SERPL-MCNC: 16 MG/DL — SIGNIFICANT CHANGE UP (ref 10–20)
CALCIUM SERPL-MCNC: 9.5 MG/DL — SIGNIFICANT CHANGE UP (ref 8.5–10.1)
CHLORIDE SERPL-SCNC: 104 MMOL/L — SIGNIFICANT CHANGE UP (ref 98–110)
CO2 SERPL-SCNC: 26 MMOL/L — SIGNIFICANT CHANGE UP (ref 17–32)
COLOR SPEC: YELLOW — SIGNIFICANT CHANGE UP
CREAT SERPL-MCNC: 0.5 MG/DL — LOW (ref 0.7–1.5)
DIFF PNL FLD: NEGATIVE — SIGNIFICANT CHANGE UP
EGFR: 86 ML/MIN/1.73M2 — SIGNIFICANT CHANGE UP
EOSINOPHIL # BLD AUTO: 0.42 K/UL — SIGNIFICANT CHANGE UP (ref 0–0.7)
EOSINOPHIL NFR BLD AUTO: 5.5 % — SIGNIFICANT CHANGE UP (ref 0–8)
EPI CELLS # UR: 1 /HPF — SIGNIFICANT CHANGE UP (ref 0–5)
GLUCOSE SERPL-MCNC: 88 MG/DL — SIGNIFICANT CHANGE UP (ref 70–99)
GLUCOSE UR QL: NEGATIVE — SIGNIFICANT CHANGE UP
HCT VFR BLD CALC: 35.3 % — LOW (ref 37–47)
HGB BLD-MCNC: 11.2 G/DL — LOW (ref 12–16)
HYALINE CASTS # UR AUTO: 1 /LPF — SIGNIFICANT CHANGE UP (ref 0–7)
IMM GRANULOCYTES NFR BLD AUTO: 1.6 % — HIGH (ref 0.1–0.3)
KETONES UR-MCNC: NEGATIVE — SIGNIFICANT CHANGE UP
LEUKOCYTE ESTERASE UR-ACNC: ABNORMAL
LYMPHOCYTES # BLD AUTO: 1.59 K/UL — SIGNIFICANT CHANGE UP (ref 1.2–3.4)
LYMPHOCYTES # BLD AUTO: 20.9 % — SIGNIFICANT CHANGE UP (ref 20.5–51.1)
MAGNESIUM SERPL-MCNC: 1.9 MG/DL — SIGNIFICANT CHANGE UP (ref 1.8–2.4)
MCHC RBC-ENTMCNC: 31.6 PG — HIGH (ref 27–31)
MCHC RBC-ENTMCNC: 31.7 G/DL — LOW (ref 32–37)
MCV RBC AUTO: 99.7 FL — HIGH (ref 81–99)
MONOCYTES # BLD AUTO: 0.8 K/UL — HIGH (ref 0.1–0.6)
MONOCYTES NFR BLD AUTO: 10.5 % — HIGH (ref 1.7–9.3)
NEUTROPHILS # BLD AUTO: 4.56 K/UL — SIGNIFICANT CHANGE UP (ref 1.4–6.5)
NEUTROPHILS NFR BLD AUTO: 60.2 % — SIGNIFICANT CHANGE UP (ref 42.2–75.2)
NITRITE UR-MCNC: NEGATIVE — SIGNIFICANT CHANGE UP
NRBC # BLD: 0 /100 WBCS — SIGNIFICANT CHANGE UP (ref 0–0)
PH UR: 7 — SIGNIFICANT CHANGE UP (ref 5–8)
PLATELET # BLD AUTO: 360 K/UL — SIGNIFICANT CHANGE UP (ref 130–400)
POTASSIUM SERPL-MCNC: 4.8 MMOL/L — SIGNIFICANT CHANGE UP (ref 3.5–5)
POTASSIUM SERPL-SCNC: 4.8 MMOL/L — SIGNIFICANT CHANGE UP (ref 3.5–5)
PROT UR-MCNC: NEGATIVE — SIGNIFICANT CHANGE UP
RBC # BLD: 3.54 M/UL — LOW (ref 4.2–5.4)
RBC # FLD: 15.1 % — HIGH (ref 11.5–14.5)
RBC CASTS # UR COMP ASSIST: 4 /HPF — SIGNIFICANT CHANGE UP (ref 0–4)
SODIUM SERPL-SCNC: 139 MMOL/L — SIGNIFICANT CHANGE UP (ref 135–146)
SP GR SPEC: 1.01 — SIGNIFICANT CHANGE UP (ref 1.01–1.03)
UROBILINOGEN FLD QL: SIGNIFICANT CHANGE UP
WBC # BLD: 7.59 K/UL — SIGNIFICANT CHANGE UP (ref 4.8–10.8)
WBC # FLD AUTO: 7.59 K/UL — SIGNIFICANT CHANGE UP (ref 4.8–10.8)
WBC UR QL: 12 /HPF — HIGH (ref 0–5)

## 2022-09-03 PROCEDURE — 99232 SBSQ HOSP IP/OBS MODERATE 35: CPT

## 2022-09-03 RX ORDER — POLYETHYLENE GLYCOL 3350 17 G/17G
17 POWDER, FOR SOLUTION ORAL DAILY
Refills: 0 | Status: DISCONTINUED | OUTPATIENT
Start: 2022-09-03 | End: 2022-09-06

## 2022-09-03 RX ORDER — APIXABAN 2.5 MG/1
10 TABLET, FILM COATED ORAL EVERY 12 HOURS
Refills: 0 | Status: DISCONTINUED | OUTPATIENT
Start: 2022-09-03 | End: 2022-09-06

## 2022-09-03 RX ORDER — SENNA PLUS 8.6 MG/1
2 TABLET ORAL AT BEDTIME
Refills: 0 | Status: DISCONTINUED | OUTPATIENT
Start: 2022-09-03 | End: 2022-09-06

## 2022-09-03 RX ADMIN — Medication 12.5 MILLIGRAM(S): at 17:30

## 2022-09-03 RX ADMIN — Medication 81 MILLIGRAM(S): at 12:12

## 2022-09-03 RX ADMIN — Medication 25 MILLIGRAM(S): at 05:09

## 2022-09-03 RX ADMIN — PANTOPRAZOLE SODIUM 40 MILLIGRAM(S): 20 TABLET, DELAYED RELEASE ORAL at 06:20

## 2022-09-03 RX ADMIN — Medication 25 MILLIGRAM(S): at 13:04

## 2022-09-03 RX ADMIN — Medication 25 MILLIGRAM(S): at 21:31

## 2022-09-03 RX ADMIN — ENOXAPARIN SODIUM 50 MILLIGRAM(S): 100 INJECTION SUBCUTANEOUS at 05:17

## 2022-09-03 RX ADMIN — SENNA PLUS 2 TABLET(S): 8.6 TABLET ORAL at 21:31

## 2022-09-03 RX ADMIN — Medication 1 APPLICATION(S): at 12:12

## 2022-09-03 RX ADMIN — Medication 25 MICROGRAM(S): at 05:08

## 2022-09-03 RX ADMIN — ISOSORBIDE DINITRATE 10 MILLIGRAM(S): 5 TABLET ORAL at 12:11

## 2022-09-03 RX ADMIN — CLOPIDOGREL BISULFATE 75 MILLIGRAM(S): 75 TABLET, FILM COATED ORAL at 12:11

## 2022-09-03 RX ADMIN — LATANOPROST 1 DROP(S): 0.05 SOLUTION/ DROPS OPHTHALMIC; TOPICAL at 21:53

## 2022-09-03 RX ADMIN — ISOSORBIDE DINITRATE 10 MILLIGRAM(S): 5 TABLET ORAL at 17:30

## 2022-09-03 RX ADMIN — CHLORHEXIDINE GLUCONATE 1 APPLICATION(S): 213 SOLUTION TOPICAL at 12:12

## 2022-09-03 RX ADMIN — BUDESONIDE AND FORMOTEROL FUMARATE DIHYDRATE 2 PUFF(S): 160; 4.5 AEROSOL RESPIRATORY (INHALATION) at 08:36

## 2022-09-03 RX ADMIN — Medication 250 MILLIGRAM(S): at 05:08

## 2022-09-03 RX ADMIN — POLYETHYLENE GLYCOL 3350 17 GRAM(S): 17 POWDER, FOR SOLUTION ORAL at 12:12

## 2022-09-03 RX ADMIN — ISOSORBIDE DINITRATE 10 MILLIGRAM(S): 5 TABLET ORAL at 05:08

## 2022-09-03 RX ADMIN — Medication 250 MILLIGRAM(S): at 17:29

## 2022-09-03 RX ADMIN — Medication 12.5 MILLIGRAM(S): at 05:08

## 2022-09-03 RX ADMIN — APIXABAN 10 MILLIGRAM(S): 2.5 TABLET, FILM COATED ORAL at 17:30

## 2022-09-03 NOTE — PROGRESS NOTE ADULT - SUBJECTIVE AND OBJECTIVE BOX
OBJECTIVE  PAST MEDICAL & SURGICAL HISTORY  High blood cholesterol    Hypothyroid    Hypertension    COPD (chronic obstructive pulmonary disease)    CAD (coronary artery disease)    History of coronary artery stent placement    History of cholecystectomy    H/O abdominal hysterectomy    Cataract                                                -----------------------------------------------------------    REVIEW OF SYSTEMS:  CONSTITUTIONAL: No fever, weight loss, or fatigue  EYES: No eye pain, visual disturbances, or discharge  ENMT:  No difficulty hearing, tinnitus, vertigo; No sinus or throat pain  NECK: No pain or stiffness  BREASTS: No pain, masses, or nipple discharge  RESPIRATORY: No cough, wheezing, chills or hemoptysis; No shortness of breath  CARDIOVASCULAR: No chest pain, palpitations, dizziness, or leg swelling  GASTROINTESTINAL: No abdominal or epigastric pain. No nausea, vomiting, or hematemesis; No diarrhea or constipation. No melena or hematochezia.  GENITOURINARY: No dysuria, frequency, hematuria, or incontinence  NEUROLOGICAL: No headaches, memory loss, loss of strength, numbness, or tremors  SKIN: No itching, burning, rashes, or lesions   LYMPH NODES: No enlarged glands  ENDOCRINE: No heat or cold intolerance; No hair loss  MUSCULOSKELETAL: No joint pain or swelling; No muscle, back, or extremity pain  PSYCHIATRIC: No depression, anxiety, mood swings, or difficulty sleeping  HEME/LYMPH: No easy bruising, or bleeding gums  ALLERY AND IMMUNOLOGIC: No hives or eczema                                            ------------------------------------------------------------    ALLERGIES:  No Known Allergies                                            ------------------------------------------------------------    HOME MEDICATIONS  Home Medications:  aspirin 81 mg oral tablet, chewable: 1 tab(s) orally once a day (23 Aug 2022 04:41)  CLOPIDOGREL 75 MG TABLET: 1 each orally once a day (23 Aug 2022 04:41)  hydrALAZINE 25 mg oral tablet: 1 tab(s) orally 3 times a day (23 Aug 2022 04:41)  isosorbide dinitrate 10 mg oral tablet: 1 tab(s) orally once a day (23 Aug 2022 04:41)  latanoprost 0.005% ophthalmic solution: 1 drop(s) to each affected eye once a day (in the evening) (23 Aug 2022 04:41)  levothyroxine 25 mcg (0.025 mg) oral tablet: 1 tab(s) orally once a day (23 Aug 2022 04:41)  lisinopril 20 mg oral tablet: 1 tab(s) orally once a day (23 Aug 2022 04:41)  metoprolol succinate 25 mg oral tablet, extended release: 1  orally once a day (23 Aug 2022 04:41)  pantoprazole 20 mg oral delayed release tablet: 1 tab(s) orally once a day (23 Aug 2022 04:41)  polyethylene glycol 3350 oral powder for reconstitution: 17 gram(s) orally once a day (23 Aug 2022 04:41)  saccharomyces boulardii lyo 250 mg oral capsule: 1 cap(s) orally 2 times a day (23 Aug 2022 04:41)  senna leaf extract oral tablet: 2 tab(s) orally once a day (at bedtime) (23 Aug 2022 04:41)                           MEDICATIONS:  STANDING MEDICATIONS  apixaban 10 milliGRAM(s) Oral every 12 hours  aspirin  chewable 81 milliGRAM(s) Enteral Tube daily  BACItracin   Ointment 1 Application(s) Topical daily  budesonide  80 MICROgram(s)/formoterol 4.5 MICROgram(s) Inhaler 2 Puff(s) Inhalation two times a day  chlorhexidine 2% Cloths 1 Application(s) Topical daily  clopidogrel Tablet 75 milliGRAM(s) Enteral Tube daily  hydrALAZINE 25 milliGRAM(s) Oral three times a day  isosorbide   dinitrate Tablet (ISORDIL) 10 milliGRAM(s) Enteral Tube three times a day  latanoprost 0.005% Ophthalmic Solution 1 Drop(s) Both EYES at bedtime  levothyroxine 25 MICROGram(s) Oral daily  metoprolol tartrate 12.5 milliGRAM(s) Oral two times a day  pantoprazole    Tablet 40 milliGRAM(s) Oral before breakfast  polyethylene glycol 3350 17 Gram(s) Oral daily  saccharomyces boulardii 250 milliGRAM(s) Oral two times a day  senna 2 Tablet(s) Oral at bedtime    PRN MEDICATIONS  acetaminophen     Tablet .. 650 milliGRAM(s) Oral every 6 hours PRN  ALBUTerol    90 MICROgram(s) HFA Inhaler 2 Puff(s) Inhalation every 6 hours PRN  aluminum hydroxide/magnesium hydroxide/simethicone Suspension 30 milliLiter(s) Oral every 4 hours PRN  glycopyrrolate Injectable 0.4 milliGRAM(s) IV Push every 6 hours PRN  melatonin 3 milliGRAM(s) Oral at bedtime PRN  ondansetron Injectable 4 milliGRAM(s) IV Push every 8 hours PRN                                            ------------------------------------------------------------  VITAL SIGNS: Last 24 Hours  T(C): 36.2 (03 Sep 2022 08:00), Max: 36.3 (02 Sep 2022 17:39)  T(F): 97.1 (03 Sep 2022 08:00), Max: 97.4 (02 Sep 2022 17:39)  HR: 64 (03 Sep 2022 08:00) (64 - 77)  BP: 153/67 (03 Sep 2022 08:00) (124/64 - 180/77)  BP(mean): 88 (03 Sep 2022 06:47) (88 - 110)  RR: 18 (03 Sep 2022 08:00) (18 - 18)  SpO2: 98% (03 Sep 2022 08:00) (94% - 98%)      09-02-22 @ 07:01  -  09-03-22 @ 07:00  --------------------------------------------------------  IN: 0 mL / OUT: 100 mL / NET: -100 mL                                             --------------------------------------------------------------  LABS:                        11.2   7.59  )-----------( 360      ( 03 Sep 2022 08:28 )             35.3     09-03    139  |  104  |  16  ----------------------------<  88  4.8   |  26  |  0.5<L>    Ca    9.5      03 Sep 2022 08:28  Mg     1.9     09-03                                              -------------------------------------------------------------  RADIOLOGY:                                            --------------------------------------------------------------    PHYSICAL EXAM:  GENERAL: NAD, well-groomed, well-developed  HEAD:  Atraumatic, Normocephalic  EYES: EOMI, PERRLA, conjunctiva and sclera clear  ENMT: No tonsillar erythema, exudates, or enlargement; Moist mucous membranes, Good dentition, No lesions  NECK: Supple, No JVD, Normal thyroid  NERVOUS SYSTEM:  Alert & Oriented X3  CHEST/LUNG: Clear to auscultation bilaterally; No rales, rhonchi, wheezing, or rubs  HEART: Regular rate and rhythm; No murmurs, rubs, or gallops  ABDOMEN: Tender on palpation, normal bowel sounds.   EXTREMITIES:  2+ Peripheral Pulses, No clubbing, cyanosis, or edema  LYMPH: No lymphadenopathy noted  SKIN: No rashes or lesions                                           --------------------------------------------------------------        ASSESSMENT and PLAN      89 yo F PMHx asthma, COPD, hyperactive airway, b/l chronic bronchiectasis, DLD, HTN, CAD s/p PCI, hypothyroidism presented for evaluation of worsening metabolic encephalopathy. Patient was recently admitted w/ cyclospora gastroenteritis & cystitis. Course was complicated by metabolic encephalopathy. Upon ER arrival pt found to have subsegmental PE and positive urinalysis. Patient cognitive status improved, oriented to self, place and year. Patient passed speech and swallow today, diet advanced to puree. Patient endorsed tenderness when palpating the abdomen, possibly secondary to constipation/gas. Miralax and Senna ordered.     #Suspected Metabolic encephalopathy from suspected UTI  - History of recent colitis as well as cyclospora PCR positive treated with bactrim & Flagyl until 8/20/22  - Improved cognitive status today A&O x4   - MRI, EEG negative, UCx negative,  - c/w saccharomyces boulardiis.  - Patient passed S&S. Started on a 1:1 clear liquid diet, transitioned to puree.  - Calory count started    #ZAYDA  - resolved    #Subsegmental PE  - duplex shows peroneal thrombus  - Eliquis restarted today.     #HTN  - c/w Metoprolol 12.5mg BID  - c/w hydralazine 25mg TID    #Disposition  - awaiting NOE for SNF    Handoff:  - Monitor bowel movements and abdominal tenderness  - continue calorie count.                                       OBJECTIVE  PAST MEDICAL & SURGICAL HISTORY  High blood cholesterol    Hypothyroid    Hypertension    COPD (chronic obstructive pulmonary disease)    CAD (coronary artery disease)    History of coronary artery stent placement    History of cholecystectomy    H/O abdominal hysterectomy    Cataract                                                -----------------------------------------------------------    REVIEW OF SYSTEMS:  CONSTITUTIONAL: No fever, weight loss, or fatigue  EYES: No eye pain, visual disturbances, or discharge  ENMT:  No difficulty hearing, tinnitus, vertigo; No sinus or throat pain  NECK: No pain or stiffness  BREASTS: No pain, masses, or nipple discharge  RESPIRATORY: No cough, wheezing, chills or hemoptysis; No shortness of breath  CARDIOVASCULAR: No chest pain, palpitations, dizziness, or leg swelling  GASTROINTESTINAL: No abdominal or epigastric pain. No nausea, vomiting, or hematemesis; No diarrhea or constipation. No melena or hematochezia.  GENITOURINARY: No dysuria, frequency, hematuria, or incontinence  NEUROLOGICAL: No headaches, memory loss, loss of strength, numbness, or tremors  SKIN: No itching, burning, rashes, or lesions   LYMPH NODES: No enlarged glands  ENDOCRINE: No heat or cold intolerance; No hair loss  MUSCULOSKELETAL: No joint pain or swelling; No muscle, back, or extremity pain  PSYCHIATRIC: No depression, anxiety, mood swings, or difficulty sleeping  HEME/LYMPH: No easy bruising, or bleeding gums  ALLERY AND IMMUNOLOGIC: No hives or eczema                                            ------------------------------------------------------------    ALLERGIES:  No Known Allergies                                            ------------------------------------------------------------    HOME MEDICATIONS  Home Medications:  aspirin 81 mg oral tablet, chewable: 1 tab(s) orally once a day (23 Aug 2022 04:41)  CLOPIDOGREL 75 MG TABLET: 1 each orally once a day (23 Aug 2022 04:41)  hydrALAZINE 25 mg oral tablet: 1 tab(s) orally 3 times a day (23 Aug 2022 04:41)  isosorbide dinitrate 10 mg oral tablet: 1 tab(s) orally once a day (23 Aug 2022 04:41)  latanoprost 0.005% ophthalmic solution: 1 drop(s) to each affected eye once a day (in the evening) (23 Aug 2022 04:41)  levothyroxine 25 mcg (0.025 mg) oral tablet: 1 tab(s) orally once a day (23 Aug 2022 04:41)  lisinopril 20 mg oral tablet: 1 tab(s) orally once a day (23 Aug 2022 04:41)  metoprolol succinate 25 mg oral tablet, extended release: 1  orally once a day (23 Aug 2022 04:41)  pantoprazole 20 mg oral delayed release tablet: 1 tab(s) orally once a day (23 Aug 2022 04:41)  polyethylene glycol 3350 oral powder for reconstitution: 17 gram(s) orally once a day (23 Aug 2022 04:41)  saccharomyces boulardii lyo 250 mg oral capsule: 1 cap(s) orally 2 times a day (23 Aug 2022 04:41)  senna leaf extract oral tablet: 2 tab(s) orally once a day (at bedtime) (23 Aug 2022 04:41)                           MEDICATIONS:  STANDING MEDICATIONS  apixaban 10 milliGRAM(s) Oral every 12 hours  aspirin  chewable 81 milliGRAM(s) Enteral Tube daily  BACItracin   Ointment 1 Application(s) Topical daily  budesonide  80 MICROgram(s)/formoterol 4.5 MICROgram(s) Inhaler 2 Puff(s) Inhalation two times a day  chlorhexidine 2% Cloths 1 Application(s) Topical daily  clopidogrel Tablet 75 milliGRAM(s) Enteral Tube daily  hydrALAZINE 25 milliGRAM(s) Oral three times a day  isosorbide   dinitrate Tablet (ISORDIL) 10 milliGRAM(s) Enteral Tube three times a day  latanoprost 0.005% Ophthalmic Solution 1 Drop(s) Both EYES at bedtime  levothyroxine 25 MICROGram(s) Oral daily  metoprolol tartrate 12.5 milliGRAM(s) Oral two times a day  pantoprazole    Tablet 40 milliGRAM(s) Oral before breakfast  polyethylene glycol 3350 17 Gram(s) Oral daily  saccharomyces boulardii 250 milliGRAM(s) Oral two times a day  senna 2 Tablet(s) Oral at bedtime    PRN MEDICATIONS  acetaminophen     Tablet .. 650 milliGRAM(s) Oral every 6 hours PRN  ALBUTerol    90 MICROgram(s) HFA Inhaler 2 Puff(s) Inhalation every 6 hours PRN  aluminum hydroxide/magnesium hydroxide/simethicone Suspension 30 milliLiter(s) Oral every 4 hours PRN  glycopyrrolate Injectable 0.4 milliGRAM(s) IV Push every 6 hours PRN  melatonin 3 milliGRAM(s) Oral at bedtime PRN  ondansetron Injectable 4 milliGRAM(s) IV Push every 8 hours PRN                                            ------------------------------------------------------------  VITAL SIGNS: Last 24 Hours  T(C): 36.2 (03 Sep 2022 08:00), Max: 36.3 (02 Sep 2022 17:39)  T(F): 97.1 (03 Sep 2022 08:00), Max: 97.4 (02 Sep 2022 17:39)  HR: 64 (03 Sep 2022 08:00) (64 - 77)  BP: 153/67 (03 Sep 2022 08:00) (124/64 - 180/77)  BP(mean): 88 (03 Sep 2022 06:47) (88 - 110)  RR: 18 (03 Sep 2022 08:00) (18 - 18)  SpO2: 98% (03 Sep 2022 08:00) (94% - 98%)      09-02-22 @ 07:01  -  09-03-22 @ 07:00  --------------------------------------------------------  IN: 0 mL / OUT: 100 mL / NET: -100 mL                                             --------------------------------------------------------------  LABS:                        11.2   7.59  )-----------( 360      ( 03 Sep 2022 08:28 )             35.3     09-03    139  |  104  |  16  ----------------------------<  88  4.8   |  26  |  0.5<L>    Ca    9.5      03 Sep 2022 08:28  Mg     1.9     09-03                                              -------------------------------------------------------------  RADIOLOGY:                                            --------------------------------------------------------------    PHYSICAL EXAM:  GENERAL: NAD, well-groomed, well-developed  HEAD:  Atraumatic, Normocephalic  EYES: EOMI, PERRLA, conjunctiva and sclera clear  ENMT: No tonsillar erythema, exudates, or enlargement; Moist mucous membranes, Good dentition, No lesions  NECK: Supple, No JVD, Normal thyroid  NERVOUS SYSTEM:  Alert & Oriented X3  CHEST/LUNG: Clear to auscultation bilaterally; No rales, rhonchi, wheezing, or rubs  HEART: Regular rate and rhythm; No murmurs, rubs, or gallops  ABDOMEN: Tender on palpation, normal bowel sounds.   EXTREMITIES:  2+ Peripheral Pulses, No clubbing, cyanosis, or edema  LYMPH: No lymphadenopathy noted  SKIN: No rashes or lesions                                           --------------------------------------------------------------        ASSESSMENT and PLAN      89 yo F PMHx asthma, COPD, hyperactive airway, b/l chronic bronchiectasis, DLD, HTN, CAD s/p PCI, hypothyroidism presented for evaluation of worsening metabolic encephalopathy. Patient was recently admitted w/ cyclospora gastroenteritis & cystitis. Course was complicated by metabolic encephalopathy. Upon ER arrival pt found to have subsegmental PE and positive urinalysis. Patient cognitive status improved, oriented to self, place and year. Patient passed speech and swallow today, diet advanced to puree. Patient endorsed tenderness when palpating the abdomen, possibly secondary to constipation/gas. Miralax and Senna ordered.     #Suspected Metabolic encephalopathy from suspected UTI  - History of recent colitis as well as cyclospora PCR positive treated with bactrim & Flagyl until 8/20/22  - Improved cognitive status today A&O x4   - MRI, EEG negative, UCx negative,  - c/w saccharomyces boulardiis.  - Patient passed S&S. Started on a 1:1 clear liquid diet, transitioned to puree.  - Calory count started  - UA ordered today, pt expressed some discomfort urinating     #ZAYDA  - resolved    #Subsegmental PE  - duplex shows peroneal thrombus  - Eliquis restarted today.     #HTN  - c/w Metoprolol 12.5mg BID  - c/w hydralazine 25mg TID    #Disposition  - awaiting NOE for SNF    Handoff:  - Monitor bowel movements and abdominal tenderness  - continue calorie count.   - Follow up UA                                      OBJECTIVE  PAST MEDICAL & SURGICAL HISTORY  High blood cholesterol    Hypothyroid    Hypertension    COPD (chronic obstructive pulmonary disease)    CAD (coronary artery disease)    History of coronary artery stent placement    History of cholecystectomy    H/O abdominal hysterectomy    Cataract                                                -----------------------------------------------------------    REVIEW OF SYSTEMS:  CONSTITUTIONAL: No fever, weight loss, or fatigue  EYES: No eye pain, visual disturbances, or discharge  ENMT:  No difficulty hearing, tinnitus, vertigo; No sinus or throat pain  NECK: No pain or stiffness  BREASTS: No pain, masses, or nipple discharge  RESPIRATORY: No cough, wheezing, chills or hemoptysis; No shortness of breath  CARDIOVASCULAR: No chest pain, palpitations, dizziness, or leg swelling  GASTROINTESTINAL: No abdominal or epigastric pain. No nausea, vomiting, or hematemesis; No diarrhea or constipation. No melena or hematochezia.  GENITOURINARY: No dysuria, frequency, hematuria, or incontinence  NEUROLOGICAL: No headaches, memory loss, loss of strength, numbness, or tremors  SKIN: No itching, burning, rashes, or lesions   LYMPH NODES: No enlarged glands  ENDOCRINE: No heat or cold intolerance; No hair loss  MUSCULOSKELETAL: No joint pain or swelling; No muscle, back, or extremity pain  PSYCHIATRIC: No depression, anxiety, mood swings, or difficulty sleeping  HEME/LYMPH: No easy bruising, or bleeding gums  ALLERY AND IMMUNOLOGIC: No hives or eczema                                            ------------------------------------------------------------    ALLERGIES:  No Known Allergies                                            ------------------------------------------------------------    HOME MEDICATIONS  Home Medications:  aspirin 81 mg oral tablet, chewable: 1 tab(s) orally once a day (23 Aug 2022 04:41)  CLOPIDOGREL 75 MG TABLET: 1 each orally once a day (23 Aug 2022 04:41)  hydrALAZINE 25 mg oral tablet: 1 tab(s) orally 3 times a day (23 Aug 2022 04:41)  isosorbide dinitrate 10 mg oral tablet: 1 tab(s) orally once a day (23 Aug 2022 04:41)  latanoprost 0.005% ophthalmic solution: 1 drop(s) to each affected eye once a day (in the evening) (23 Aug 2022 04:41)  levothyroxine 25 mcg (0.025 mg) oral tablet: 1 tab(s) orally once a day (23 Aug 2022 04:41)  lisinopril 20 mg oral tablet: 1 tab(s) orally once a day (23 Aug 2022 04:41)  metoprolol succinate 25 mg oral tablet, extended release: 1  orally once a day (23 Aug 2022 04:41)  pantoprazole 20 mg oral delayed release tablet: 1 tab(s) orally once a day (23 Aug 2022 04:41)  polyethylene glycol 3350 oral powder for reconstitution: 17 gram(s) orally once a day (23 Aug 2022 04:41)  saccharomyces boulardii lyo 250 mg oral capsule: 1 cap(s) orally 2 times a day (23 Aug 2022 04:41)  senna leaf extract oral tablet: 2 tab(s) orally once a day (at bedtime) (23 Aug 2022 04:41)                           MEDICATIONS:  STANDING MEDICATIONS  apixaban 10 milliGRAM(s) Oral every 12 hours  aspirin  chewable 81 milliGRAM(s) Enteral Tube daily  BACItracin   Ointment 1 Application(s) Topical daily  budesonide  80 MICROgram(s)/formoterol 4.5 MICROgram(s) Inhaler 2 Puff(s) Inhalation two times a day  chlorhexidine 2% Cloths 1 Application(s) Topical daily  clopidogrel Tablet 75 milliGRAM(s) Enteral Tube daily  hydrALAZINE 25 milliGRAM(s) Oral three times a day  isosorbide   dinitrate Tablet (ISORDIL) 10 milliGRAM(s) Enteral Tube three times a day  latanoprost 0.005% Ophthalmic Solution 1 Drop(s) Both EYES at bedtime  levothyroxine 25 MICROGram(s) Oral daily  metoprolol tartrate 12.5 milliGRAM(s) Oral two times a day  pantoprazole    Tablet 40 milliGRAM(s) Oral before breakfast  polyethylene glycol 3350 17 Gram(s) Oral daily  saccharomyces boulardii 250 milliGRAM(s) Oral two times a day  senna 2 Tablet(s) Oral at bedtime    PRN MEDICATIONS  acetaminophen     Tablet .. 650 milliGRAM(s) Oral every 6 hours PRN  ALBUTerol    90 MICROgram(s) HFA Inhaler 2 Puff(s) Inhalation every 6 hours PRN  aluminum hydroxide/magnesium hydroxide/simethicone Suspension 30 milliLiter(s) Oral every 4 hours PRN  glycopyrrolate Injectable 0.4 milliGRAM(s) IV Push every 6 hours PRN  melatonin 3 milliGRAM(s) Oral at bedtime PRN  ondansetron Injectable 4 milliGRAM(s) IV Push every 8 hours PRN                                            ------------------------------------------------------------  VITAL SIGNS: Last 24 Hours  T(C): 36.2 (03 Sep 2022 08:00), Max: 36.3 (02 Sep 2022 17:39)  T(F): 97.1 (03 Sep 2022 08:00), Max: 97.4 (02 Sep 2022 17:39)  HR: 64 (03 Sep 2022 08:00) (64 - 77)  BP: 153/67 (03 Sep 2022 08:00) (124/64 - 180/77)  BP(mean): 88 (03 Sep 2022 06:47) (88 - 110)  RR: 18 (03 Sep 2022 08:00) (18 - 18)  SpO2: 98% (03 Sep 2022 08:00) (94% - 98%)      09-02-22 @ 07:01  -  09-03-22 @ 07:00  --------------------------------------------------------  IN: 0 mL / OUT: 100 mL / NET: -100 mL                                             --------------------------------------------------------------  LABS:                        11.2   7.59  )-----------( 360      ( 03 Sep 2022 08:28 )             35.3     09-03    139  |  104  |  16  ----------------------------<  88  4.8   |  26  |  0.5<L>    Ca    9.5      03 Sep 2022 08:28  Mg     1.9     09-03                                              -------------------------------------------------------------  RADIOLOGY:                                            --------------------------------------------------------------    PHYSICAL EXAM:  GENERAL: NAD, well-groomed, well-developed  HEAD:  Atraumatic, Normocephalic  EYES: EOMI, PERRLA, conjunctiva and sclera clear  ENMT: No tonsillar erythema, exudates, or enlargement; Moist mucous membranes, Good dentition, No lesions  NECK: Supple, No JVD, Normal thyroid  NERVOUS SYSTEM:  Alert & Oriented X3  CHEST/LUNG: Clear to auscultation bilaterally; No rales, rhonchi, wheezing, or rubs  HEART: Regular rate and rhythm; No murmurs, rubs, or gallops  ABDOMEN: Tender on palpation, normal bowel sounds.   EXTREMITIES:  2+ Peripheral Pulses, No clubbing, cyanosis, or edema  LYMPH: No lymphadenopathy noted  SKIN: No rashes or lesions                                           --------------------------------------------------------------        ASSESSMENT and PLAN      89 yo F PMHx asthma, COPD, hyperactive airway, b/l chronic bronchiectasis, DLD, HTN, CAD s/p PCI, hypothyroidism presented for evaluation of worsening metabolic encephalopathy. Patient was recently admitted w/ cyclospora gastroenteritis & cystitis. Course was complicated by metabolic encephalopathy. Upon ER arrival pt found to have subsegmental PE and positive urinalysis. Patient cognitive status improved, oriented to self, place and year. Patient passed speech and swallow today, diet advanced to puree. Patient endorsed tenderness when palpating the abdomen, possibly secondary to constipation/gas. Miralax and Senna ordered.     #Suspected Metabolic encephalopathy from suspected UTI  - History of recent colitis as well as cyclospora PCR positive treated with bactrim & Flagyl until 8/20/22  - Improved cognitive status today A&O x4   - MRI, EEG negative, UCx negative,  - c/w saccharomyces boulardiis.  - Patient passed S&S. Started on a 1:1 clear liquid diet, transitioned to puree.  - Calory count started  - UA ordered today, pt expressed some discomfort urinating     #ZAYAD  - resolved    #Subsegmental PE  - duplex shows peroneal thrombus  - Eliquis restarted today.     #HTN  - c/w Metoprolol 12.5mg BID  - c/w hydralazine 25mg TID    #Disposition  - awaiting NEO for SNF    Handoff:  - Monitor bowel movements and abdominal tenderness  - continue calorie count.   - Follow up UA

## 2022-09-04 PROCEDURE — 99232 SBSQ HOSP IP/OBS MODERATE 35: CPT

## 2022-09-04 PROCEDURE — 74018 RADEX ABDOMEN 1 VIEW: CPT | Mod: 26

## 2022-09-04 RX ORDER — ISOSORBIDE DINITRATE 5 MG/1
10 TABLET ORAL THREE TIMES A DAY
Refills: 0 | Status: DISCONTINUED | OUTPATIENT
Start: 2022-09-04 | End: 2022-09-06

## 2022-09-04 RX ORDER — ASPIRIN/CALCIUM CARB/MAGNESIUM 324 MG
81 TABLET ORAL DAILY
Refills: 0 | Status: DISCONTINUED | OUTPATIENT
Start: 2022-09-04 | End: 2022-09-06

## 2022-09-04 RX ORDER — CLOPIDOGREL BISULFATE 75 MG/1
75 TABLET, FILM COATED ORAL DAILY
Refills: 0 | Status: DISCONTINUED | OUTPATIENT
Start: 2022-09-04 | End: 2022-09-06

## 2022-09-04 RX ADMIN — LATANOPROST 1 DROP(S): 0.05 SOLUTION/ DROPS OPHTHALMIC; TOPICAL at 21:14

## 2022-09-04 RX ADMIN — Medication 25 MILLIGRAM(S): at 21:14

## 2022-09-04 RX ADMIN — Medication 250 MILLIGRAM(S): at 17:51

## 2022-09-04 RX ADMIN — PANTOPRAZOLE SODIUM 40 MILLIGRAM(S): 20 TABLET, DELAYED RELEASE ORAL at 06:26

## 2022-09-04 RX ADMIN — Medication 12.5 MILLIGRAM(S): at 17:51

## 2022-09-04 RX ADMIN — Medication 25 MILLIGRAM(S): at 05:54

## 2022-09-04 RX ADMIN — Medication 25 MICROGRAM(S): at 05:54

## 2022-09-04 RX ADMIN — ISOSORBIDE DINITRATE 10 MILLIGRAM(S): 5 TABLET ORAL at 05:54

## 2022-09-04 RX ADMIN — Medication 25 MILLIGRAM(S): at 16:39

## 2022-09-04 RX ADMIN — Medication 650 MILLIGRAM(S): at 13:15

## 2022-09-04 RX ADMIN — APIXABAN 10 MILLIGRAM(S): 2.5 TABLET, FILM COATED ORAL at 05:54

## 2022-09-04 RX ADMIN — Medication 12.5 MILLIGRAM(S): at 05:54

## 2022-09-04 RX ADMIN — Medication 250 MILLIGRAM(S): at 06:26

## 2022-09-04 RX ADMIN — ISOSORBIDE DINITRATE 10 MILLIGRAM(S): 5 TABLET ORAL at 11:45

## 2022-09-04 RX ADMIN — Medication 81 MILLIGRAM(S): at 11:45

## 2022-09-04 RX ADMIN — APIXABAN 10 MILLIGRAM(S): 2.5 TABLET, FILM COATED ORAL at 17:52

## 2022-09-04 RX ADMIN — CLOPIDOGREL BISULFATE 75 MILLIGRAM(S): 75 TABLET, FILM COATED ORAL at 11:45

## 2022-09-04 RX ADMIN — Medication 1 APPLICATION(S): at 11:46

## 2022-09-04 RX ADMIN — CHLORHEXIDINE GLUCONATE 1 APPLICATION(S): 213 SOLUTION TOPICAL at 11:46

## 2022-09-04 RX ADMIN — SENNA PLUS 2 TABLET(S): 8.6 TABLET ORAL at 21:14

## 2022-09-04 RX ADMIN — POLYETHYLENE GLYCOL 3350 17 GRAM(S): 17 POWDER, FOR SOLUTION ORAL at 11:46

## 2022-09-04 NOTE — PROGRESS NOTE ADULT - SUBJECTIVE AND OBJECTIVE BOX
CHIRAG HERNÁNDEZ  89y, Female  Allergy: No Known Allergies    Hospital Day: 13d    Patient seen and examined earlier today.  No acute events overnight.     PMH/PSH:  PAST MEDICAL & SURGICAL HISTORY:  High blood cholesterol      Hypothyroid      Hypertension      COPD (chronic obstructive pulmonary disease)      CAD (coronary artery disease)      History of coronary artery stent placement      History of cholecystectomy      H/O abdominal hysterectomy      Cataract          LAST 24-Hr EVENTS:    VITALS:  T(F): 97 (22 @ 08:13), Max: 97.6 (22 @ 00:00)  HR: 70 (22 @ 08:13)  BP: 138/55 (22 @ 08:13) (138/55 - 166/71)  RR: 18 (22 @ 08:13)  SpO2: 98% (22 @ 00:00)          TESTS & MEASUREMENTS:  Weight/BMI      22 @ 07:01  -  22 @ 07:00  --------------------------------------------------------  IN: 0 mL / OUT: 100 mL / NET: -100 mL    22 @ 07:01  -  22 @ 07:00  --------------------------------------------------------  IN: 0 mL / OUT: 800 mL / NET: -800 mL                            11.2   7.59  )-----------( 360      ( 03 Sep 2022 08:28 )             35.3             139  |  104  |  16  ----------------------------<  88  4.8   |  26  |  0.5<L>    Ca    9.5      03 Sep 2022 08:28  Mg     1.9                   Urinalysis Basic - ( 03 Sep 2022 14:23 )    Color: Yellow / Appearance: Clear / S.011 / pH: x  Gluc: x / Ketone: Negative  / Bili: Negative / Urobili: <2 mg/dL   Blood: x / Protein: Negative / Nitrite: Negative   Leuk Esterase: Moderate / RBC: 4 /HPF / WBC 12 /HPF   Sq Epi: x / Non Sq Epi: 1 /HPF / Bacteria: Negative              COVID-19 PCR: NotDetec (22 @ 06:50)  COVID-19 PCR: NotDetec (22 @ 06:30)        A1C with Estimated Average Glucose Result: 4.8 % (22 @ 07:07)      Indwelling Urethral Catheter:     Connect To:  Straight Drainage/Gravity    Indication:  Improved Comfort Care (22 @ 04:43) (not performed)  Indwelling Urethral Catheter:     Connect To:  Straight Drainage/Gravity    Indication:  Improved Comfort Care (22 @ 17:03) (not performed)      RADIOLOGY, ECG, & ADDITIONAL TESTS:  12 Lead ECG:   Ventricular Rate 81 BPM    Atrial Rate 81 BPM    P-R Interval 148 ms    QRS Duration 134 ms    Q-T Interval 446 ms    QTC Calculation(Bazett) 518 ms    P Axis 12 degrees    R Axis -64 degrees    T Axis 86 degrees    Diagnosis Line Normal sinus rhythm  Left axis deviation  Left ventricular hypertrophy with QRS widening and repolarization abnormality  Inferior infarct , age undetermined  Possible inferior infarct, age undetermined  Abnormal ECG    Confirmed by Eduardo Ruth ([id number]) on :35:07 AM (22 @ 20:35)      RECENT DIAGNOSTIC ORDERS:      MEDICATIONS:  MEDICATIONS  (STANDING):  apixaban 10 milliGRAM(s) Oral every 12 hours  aspirin  chewable 81 milliGRAM(s) Enteral Tube daily  BACItracin   Ointment 1 Application(s) Topical daily  budesonide  80 MICROgram(s)/formoterol 4.5 MICROgram(s) Inhaler 2 Puff(s) Inhalation two times a day  chlorhexidine 2% Cloths 1 Application(s) Topical daily  clopidogrel Tablet 75 milliGRAM(s) Enteral Tube daily  hydrALAZINE 25 milliGRAM(s) Oral three times a day  isosorbide   dinitrate Tablet (ISORDIL) 10 milliGRAM(s) Enteral Tube three times a day  latanoprost 0.005% Ophthalmic Solution 1 Drop(s) Both EYES at bedtime  levothyroxine 25 MICROGram(s) Oral daily  metoprolol tartrate 12.5 milliGRAM(s) Oral two times a day  pantoprazole    Tablet 40 milliGRAM(s) Oral before breakfast  polyethylene glycol 3350 17 Gram(s) Oral daily  saccharomyces boulardii 250 milliGRAM(s) Oral two times a day  senna 2 Tablet(s) Oral at bedtime    MEDICATIONS  (PRN):  acetaminophen     Tablet .. 650 milliGRAM(s) Oral every 6 hours PRN Temp greater or equal to 38C (100.4F), Mild Pain (1 - 3)  ALBUTerol    90 MICROgram(s) HFA Inhaler 2 Puff(s) Inhalation every 6 hours PRN Shortness of Breath and/or Wheezing  aluminum hydroxide/magnesium hydroxide/simethicone Suspension 30 milliLiter(s) Oral every 4 hours PRN Dyspepsia  glycopyrrolate Injectable 0.4 milliGRAM(s) IV Push every 6 hours PRN secretions  melatonin 3 milliGRAM(s) Oral at bedtime PRN Insomnia  ondansetron Injectable 4 milliGRAM(s) IV Push every 8 hours PRN Nausea and/or Vomiting      HOME MEDICATIONS:  albuterol 90 mcg/inh inhalation aerosol ()  aspirin 81 mg oral tablet, chewable ()  budesonide-formoterol 80 mcg-4.5 mcg/inh inhalation aerosol ()  CLOPIDOGREL 75 MG TABLET ()  hydrALAZINE 25 mg oral tablet ()  isosorbide dinitrate 10 mg oral tablet ()  latanoprost 0.005% ophthalmic solution ()  levothyroxine 25 mcg (0.025 mg) oral tablet ()  lisinopril 20 mg oral tablet ()  metoprolol succinate 25 mg oral tablet, extended release ()  pantoprazole 20 mg oral delayed release tablet ()  polyethylene glycol 3350 oral powder for reconstitution ()  saccharomyces boulardii lyo 250 mg oral capsule ()  senna leaf extract oral tablet ()      PHYSICAL EXAM:  GENERAL: NAD, well-groomed, well-developed  HEAD:  Atraumatic, Normocephalic  EYES: EOMI, PERRLA, conjunctiva and sclera clear  ENMT: No tonsillar erythema, exudates, or enlargement; Moist mucous membranes, Good dentition, No lesions  NECK: Supple, No JVD, Normal thyroid  NERVOUS SYSTEM:  Alert & Oriented X3, Good concentration; Motor Strength 5/5 B/L upper and lower extremities; DTRs 2+ intact and symmetric  CHEST/LUNG: Clear to auscultation bilaterally; No rales, rhonchi, wheezing, or rubs  HEART: Regular rate and rhythm; No murmurs, rubs, or gallops  ABDOMEN: Soft, Nontender, Nondistended; Bowel sounds present  EXTREMITIES:  2+ Peripheral Pulses, No clubbing, cyanosis, or edema  LYMPH: No lymphadenopathy noted  SKIN: No rashes or lesions

## 2022-09-04 NOTE — PROGRESS NOTE ADULT - ASSESSMENT
89 yo F PMHx asthma, COPD, hyperactive airway, b/l chronic bronchiectasis, DLD, HTN, CAD s/p PCI, hypothyroidism presented for evaluation of worsening metabolic encephalopathy. Patient was recently admitted w/ cyclospora gastroenteritis & cystitis. Course was complicated by metabolic encephalopathy. Upon ER arrival pt found to have subsegmental PE and positive urinalysis. Patient cognitive status improved, oriented to self, place and year. Patient passed speech and swallow today, diet advanced to liquid/puree. Family updated bedside.     #Suspected Metabolic encephalopathy from suspected UTI - improved  History of recent colitis as well as cyclospora PCR positive treated with bactrim & Flagyl until 8/20/22  Not a candidate for LP at this time due to aspiration risk if she is lying flat.  Improved cognitive status today A&O x3   MRI, EEG negative, UCx negative,  acyclovir held  c/w saccharomyces boulardii  Speech and swallow re-evaluation requested given improved mental status. Spoke with the S&S swallow team today, and updated them on patient's improved cognitive status.  Patient passed S&S today. Started on a 1:1 clear liquid diet, will transition to puree tomorrow  Calorie count started - if low caloric intake then will speak with family regarding hospice     #Constipation  abd distended and mildly tender  c/w Miralax and Senna     #ZAYDA  resolved    #Subsegmental PE  duplex shows peroneal thrombus  c/w Lovenox, will switch to oral eliquis if pt able to take po consistently    #HTN  c/w Metoprolol 12.5mg BID  c/w hydralazine 25mg TID    #Disposition  Pending: Calorie Count   Family discussion: Family opting for hospice treatment--awaiting NOE for Hospice in SNF  Disposition: SNF

## 2022-09-05 LAB — SARS-COV-2 RNA SPEC QL NAA+PROBE: SIGNIFICANT CHANGE UP

## 2022-09-05 PROCEDURE — 99232 SBSQ HOSP IP/OBS MODERATE 35: CPT

## 2022-09-05 RX ADMIN — APIXABAN 10 MILLIGRAM(S): 2.5 TABLET, FILM COATED ORAL at 05:14

## 2022-09-05 RX ADMIN — Medication 25 MILLIGRAM(S): at 13:13

## 2022-09-05 RX ADMIN — Medication 25 MILLIGRAM(S): at 05:14

## 2022-09-05 RX ADMIN — Medication 12.5 MILLIGRAM(S): at 05:14

## 2022-09-05 RX ADMIN — Medication 81 MILLIGRAM(S): at 11:52

## 2022-09-05 RX ADMIN — Medication 1 APPLICATION(S): at 11:52

## 2022-09-05 RX ADMIN — APIXABAN 10 MILLIGRAM(S): 2.5 TABLET, FILM COATED ORAL at 18:02

## 2022-09-05 RX ADMIN — LATANOPROST 1 DROP(S): 0.05 SOLUTION/ DROPS OPHTHALMIC; TOPICAL at 21:03

## 2022-09-05 RX ADMIN — Medication 250 MILLIGRAM(S): at 18:01

## 2022-09-05 RX ADMIN — BUDESONIDE AND FORMOTEROL FUMARATE DIHYDRATE 2 PUFF(S): 160; 4.5 AEROSOL RESPIRATORY (INHALATION) at 11:51

## 2022-09-05 RX ADMIN — Medication 12.5 MILLIGRAM(S): at 18:01

## 2022-09-05 RX ADMIN — Medication 250 MILLIGRAM(S): at 05:14

## 2022-09-05 RX ADMIN — ISOSORBIDE DINITRATE 10 MILLIGRAM(S): 5 TABLET ORAL at 17:00

## 2022-09-05 RX ADMIN — Medication 25 MILLIGRAM(S): at 21:03

## 2022-09-05 RX ADMIN — CLOPIDOGREL BISULFATE 75 MILLIGRAM(S): 75 TABLET, FILM COATED ORAL at 11:52

## 2022-09-05 RX ADMIN — POLYETHYLENE GLYCOL 3350 17 GRAM(S): 17 POWDER, FOR SOLUTION ORAL at 11:52

## 2022-09-05 RX ADMIN — CHLORHEXIDINE GLUCONATE 1 APPLICATION(S): 213 SOLUTION TOPICAL at 11:45

## 2022-09-05 RX ADMIN — PANTOPRAZOLE SODIUM 40 MILLIGRAM(S): 20 TABLET, DELAYED RELEASE ORAL at 06:11

## 2022-09-05 RX ADMIN — ISOSORBIDE DINITRATE 10 MILLIGRAM(S): 5 TABLET ORAL at 10:33

## 2022-09-05 RX ADMIN — ISOSORBIDE DINITRATE 10 MILLIGRAM(S): 5 TABLET ORAL at 05:14

## 2022-09-05 RX ADMIN — SENNA PLUS 2 TABLET(S): 8.6 TABLET ORAL at 21:03

## 2022-09-05 RX ADMIN — Medication 25 MICROGRAM(S): at 05:14

## 2022-09-05 NOTE — PROGRESS NOTE ADULT - SUBJECTIVE AND OBJECTIVE BOX
CHIRAG HERNÁNDEZ 89y Female  MRN#: [id number]   Hospital Day: 14d    SUBJECTIVE  Patient is a 89y old Female who presents with a chief complaint of Lethargy (04 Sep 2022 14:37)  Currently admitted to medicine with the primary diagnosis of Metabolic encephalopathy      INTERVAL HPI AND OVERNIGHT EVENTS:  Patient was examined and seen at bedside. This morning she is resting comfortably in bed and reports no issues or overnight events. She reports she is tolerating diet and had a bowel movement yesterday. She denies any chest pain, SOB, headaches, abd pain.    OBJECTIVE  PAST MEDICAL & SURGICAL HISTORY  High blood cholesterol    Hypothyroid    Hypertension    COPD (chronic obstructive pulmonary disease)    CAD (coronary artery disease)    History of coronary artery stent placement    History of cholecystectomy    H/O abdominal hysterectomy    Cataract      ALLERGIES:  No Known Allergies    MEDICATIONS:  STANDING MEDICATIONS  apixaban 10 milliGRAM(s) Oral every 12 hours  aspirin  chewable 81 milliGRAM(s) Oral daily  BACItracin   Ointment 1 Application(s) Topical daily  budesonide  80 MICROgram(s)/formoterol 4.5 MICROgram(s) Inhaler 2 Puff(s) Inhalation two times a day  chlorhexidine 2% Cloths 1 Application(s) Topical daily  clopidogrel Tablet 75 milliGRAM(s) Oral daily  hydrALAZINE 25 milliGRAM(s) Oral three times a day  isosorbide   dinitrate Tablet (ISORDIL) 10 milliGRAM(s) Oral three times a day  latanoprost 0.005% Ophthalmic Solution 1 Drop(s) Both EYES at bedtime  levothyroxine 25 MICROGram(s) Oral daily  metoprolol tartrate 12.5 milliGRAM(s) Oral two times a day  pantoprazole    Tablet 40 milliGRAM(s) Oral before breakfast  polyethylene glycol 3350 17 Gram(s) Oral daily  saccharomyces boulardii 250 milliGRAM(s) Oral two times a day  senna 2 Tablet(s) Oral at bedtime    PRN MEDICATIONS  acetaminophen     Tablet .. 650 milliGRAM(s) Oral every 6 hours PRN  ALBUTerol    90 MICROgram(s) HFA Inhaler 2 Puff(s) Inhalation every 6 hours PRN  aluminum hydroxide/magnesium hydroxide/simethicone Suspension 30 milliLiter(s) Oral every 4 hours PRN  glycopyrrolate Injectable 0.4 milliGRAM(s) IV Push every 6 hours PRN  melatonin 3 milliGRAM(s) Oral at bedtime PRN  ondansetron Injectable 4 milliGRAM(s) IV Push every 8 hours PRN      VITAL SIGNS: Last 24 Hours  T(C): 36.1 (05 Sep 2022 08:21), Max: 36.3 (04 Sep 2022 15:27)  T(F): 96.9 (05 Sep 2022 08:21), Max: 97.4 (04 Sep 2022 15:27)  HR: 68 (05 Sep 2022 08:21) (65 - 69)  BP: 159/72 (05 Sep 2022 08:21) (139/65 - 159/72)  BP(mean): --  RR: 18 (05 Sep 2022 08:21) (18 - 18)  SpO2: 95% (05 Sep 2022 08:21) (95% - 97%)    LABS:            Urinalysis Basic - ( 03 Sep 2022 14:23 )    Color: Yellow / Appearance: Clear / S.011 / pH: x  Gluc: x / Ketone: Negative  / Bili: Negative / Urobili: <2 mg/dL   Blood: x / Protein: Negative / Nitrite: Negative   Leuk Esterase: Moderate / RBC: 4 /HPF / WBC 12 /HPF   Sq Epi: x / Non Sq Epi: 1 /HPF / Bacteria: Negative                RADIOLOGY:      PHYSICAL EXAM:  CONSTITUTIONAL: No acute distress, AAOx3  HEAD: Atraumatic, normocephalic  EYES: conjunctiva and sclera clear  ENT: moist mucous membranes  PULMONARY: Clear to auscultation bilaterally  CARDIOVASCULAR: Regular rate and rhythm  GASTROINTESTINAL: Soft, non-tender, non-distended; bowel sounds present  MUSCULOSKELETAL: no edema  NEUROLOGY: non-focal  SKIN: warm and dry

## 2022-09-05 NOTE — PROGRESS NOTE ADULT - ASSESSMENT
ASSESSMENT & PLAN:    89 yo F PMHx asthma, COPD, hyperactive airway, b/l chronic bronchiectasis, DLD, HTN, CAD s/p PCI, hypothyroidism presented for evaluation of worsening metabolic encephalopathy. Patient was recently admitted w/ cyclospora gastroenteritis & cystitis. Course was complicated by metabolic encephalopathy. Upon ER arrival pt found to have subsegmental PE and positive urinalysis. Patient cognitive status improved, oriented to self, place and year. Patient passed speech and swallow, diet advanced to liquid/puree (tolerating well).     #Suspected Metabolic encephalopathy from suspected UTI - improved  - History of recent colitis as well as cyclospora PCR positive treated with bactrim & Flagyl until 8/20/22  - Not a candidate for LP at this time due to aspiration risk if she is lying flat.  - Improved cognitive status today A&O x3   - MRI, EEG negative, UCx negative,  acyclovir held  c/w saccharomyces boulardii  Speech and swallow re-evaluation requested given improved mental status. Spoke with the S&S swallow team today, and updated them on patient's improved cognitive status.  Patient passed S&S today. Started on a 1:1 clear liquid diet, will transition to puree tomorrow  Calorie count started - if low caloric intake then will speak with family regarding hospice     #Constipation  abd distended and mildly tender  c/w Miralax and Senna     #ZAYDA  resolved    #Subsegmental PE  duplex shows peroneal thrombus  c/w Lovenox, will switch to oral eliquis if pt able to take po consistently    #HTN  c/w Metoprolol 12.5mg BID  c/w hydralazine 25mg TID    #Disposition  Pending: Calorie Count   Family discussion: Family opting for hospice treatment--awaiting NOE for Hospice in SNF  Disposition: SNF        #Misc  - DVT Prophylaxis:  - GI Prophylaxis:  - Diet:  - Activity:  - IV Fluids:  - Code Status:    Dispo: ASSESSMENT & PLAN:    89 yo F PMHx asthma, COPD, hyperactive airway, b/l chronic bronchiectasis, DLD, HTN, CAD s/p PCI, hypothyroidism presented for evaluation of worsening metabolic encephalopathy. Patient was recently admitted w/ cyclospora gastroenteritis & cystitis. Course was complicated by metabolic encephalopathy. Upon ER arrival pt found to have subsegmental PE and positive urinalysis. Patient cognitive status improved, oriented to self, place and year. Patient passed speech and swallow, diet advanced to liquid/puree (tolerating well).     #Suspected Metabolic encephalopathy from suspected UTI - improved  - History of recent colitis as well as cyclospora PCR positive treated with bactrim & Flagyl until 8/20/22  - Not a candidate for LP at this time due to aspiration risk if she is lying flat.  - Improved cognitive status today A&O x3   - MRI, EEG negative, UCx negative,  - acyclovir held  - c/w saccharomyces boulardii  - c/w puree diet  - c/w Calorie count - if low caloric intake then will speak with family regarding hospice     #Constipation, resolving  - bowel movement yesterday  - c/w Miralax and Senna     #ZAYDA  - resolved    #Subsegmental PE  - duplex shows peroneal thrombus  - c/w Lovenox, will switch to oral eliquis if pt able to take po consistently    #HTN  - c/w Metoprolol 12.5mg BID  - c/w hydralazine 25mg TID    #Disposition  Pending: Calorie Count   Family discussion: Family opting for hospice treatment--awaiting NOE for Hospice in SNF  Disposition: SNF

## 2022-09-06 ENCOUNTER — TRANSCRIPTION ENCOUNTER (OUTPATIENT)
Age: 87
End: 2022-09-06

## 2022-09-06 VITALS — SYSTOLIC BLOOD PRESSURE: 158 MMHG | DIASTOLIC BLOOD PRESSURE: 69 MMHG | HEART RATE: 70 BPM

## 2022-09-06 PROCEDURE — 99231 SBSQ HOSP IP/OBS SF/LOW 25: CPT

## 2022-09-06 RX ORDER — APIXABAN 2.5 MG/1
2 TABLET, FILM COATED ORAL
Qty: 0 | Refills: 0 | DISCHARGE
Start: 2022-09-06

## 2022-09-06 RX ADMIN — Medication 81 MILLIGRAM(S): at 12:43

## 2022-09-06 RX ADMIN — ISOSORBIDE DINITRATE 10 MILLIGRAM(S): 5 TABLET ORAL at 05:14

## 2022-09-06 RX ADMIN — APIXABAN 10 MILLIGRAM(S): 2.5 TABLET, FILM COATED ORAL at 17:48

## 2022-09-06 RX ADMIN — LATANOPROST 1 DROP(S): 0.05 SOLUTION/ DROPS OPHTHALMIC; TOPICAL at 21:29

## 2022-09-06 RX ADMIN — Medication 250 MILLIGRAM(S): at 17:48

## 2022-09-06 RX ADMIN — ISOSORBIDE DINITRATE 10 MILLIGRAM(S): 5 TABLET ORAL at 12:00

## 2022-09-06 RX ADMIN — BUDESONIDE AND FORMOTEROL FUMARATE DIHYDRATE 2 PUFF(S): 160; 4.5 AEROSOL RESPIRATORY (INHALATION) at 09:03

## 2022-09-06 RX ADMIN — Medication 12.5 MILLIGRAM(S): at 05:15

## 2022-09-06 RX ADMIN — Medication 25 MILLIGRAM(S): at 21:29

## 2022-09-06 RX ADMIN — Medication 25 MILLIGRAM(S): at 05:14

## 2022-09-06 RX ADMIN — CLOPIDOGREL BISULFATE 75 MILLIGRAM(S): 75 TABLET, FILM COATED ORAL at 12:43

## 2022-09-06 RX ADMIN — APIXABAN 10 MILLIGRAM(S): 2.5 TABLET, FILM COATED ORAL at 05:14

## 2022-09-06 RX ADMIN — PANTOPRAZOLE SODIUM 40 MILLIGRAM(S): 20 TABLET, DELAYED RELEASE ORAL at 06:09

## 2022-09-06 RX ADMIN — Medication 250 MILLIGRAM(S): at 05:14

## 2022-09-06 RX ADMIN — Medication 1 APPLICATION(S): at 12:44

## 2022-09-06 RX ADMIN — Medication 12.5 MILLIGRAM(S): at 17:48

## 2022-09-06 RX ADMIN — Medication 650 MILLIGRAM(S): at 04:48

## 2022-09-06 RX ADMIN — CHLORHEXIDINE GLUCONATE 1 APPLICATION(S): 213 SOLUTION TOPICAL at 12:44

## 2022-09-06 RX ADMIN — Medication 25 MICROGRAM(S): at 05:15

## 2022-09-06 RX ADMIN — Medication 25 MILLIGRAM(S): at 13:57

## 2022-09-06 RX ADMIN — POLYETHYLENE GLYCOL 3350 17 GRAM(S): 17 POWDER, FOR SOLUTION ORAL at 12:45

## 2022-09-06 RX ADMIN — ISOSORBIDE DINITRATE 10 MILLIGRAM(S): 5 TABLET ORAL at 21:00

## 2022-09-06 NOTE — DISCHARGE NOTE NURSING/CASE MANAGEMENT/SOCIAL WORK - NSDCPEFALRISK_GEN_ALL_CORE
For information on Fall & Injury Prevention, visit: [email or web address] OR  [email or web address] OR  https://www.cdc.gov/steadi/patient.html

## 2022-09-06 NOTE — PROGRESS NOTE ADULT - REASON FOR ADMISSION
Lethargy

## 2022-09-06 NOTE — PROGRESS NOTE ADULT - ASSESSMENT
ASSESSMENT & PLAN:    89 yo F PMHx asthma, COPD, hyperactive airway, b/l chronic bronchiectasis, DLD, HTN, CAD s/p PCI, hypothyroidism presented for evaluation of worsening metabolic encephalopathy. Patient was recently admitted w/ cyclospora gastroenteritis & cystitis. Course was complicated by metabolic encephalopathy. Upon ER arrival pt found to have subsegmental PE and positive urinalysis. Patient cognitive status improved, oriented to self, place and year. Patient passed speech and swallow, diet advanced to liquid/puree (tolerating well).     #Suspected Metabolic encephalopathy from suspected UTI - improved  - History of recent colitis as well as cyclospora PCR positive treated with bactrim & Flagyl until 8/20/22  - Not a candidate for LP at this time due to aspiration risk if she is lying flat.  - Improved cognitive status today A&O x3   - MRI, EEG negative, UCx negative,  - acyclovir held  - c/w saccharomyces boulardii  - c/w puree diet  - c/w Calorie count    #Constipation, resolving  - bowel movement yesterday  - c/w Miralax and Senna     #ZAYDA  - resolved    #Subsegmental PE  - duplex shows peroneal thrombus  - c/w Lovenox, will switch to oral eliquis if pt able to take po consistently    #HTN  - c/w Metoprolol 12.5mg BID  - c/w hydralazine 25mg TID    #Disposition  Pending: Calorie Count   Family discussion: Family opting for hospice treatment--awaiting NOE for Hospice in SNF  Disposition: SNF

## 2022-09-06 NOTE — CHART NOTE - NSCHARTNOTEFT_GEN_A_CORE
Patient reported that she was in pain sitting in chair and needed assistance with feeding.  Support rendered. Writer informed RN.

## 2022-09-06 NOTE — PROGRESS NOTE ADULT - PROVIDER SPECIALTY LIST ADULT
Internal Medicine
Hospitalist
Internal Medicine
Nephrology
Palliative Care
Internal Medicine
Palliative Care
Internal Medicine
Palliative Care

## 2022-09-06 NOTE — PROGRESS NOTE ADULT - NUTRITIONAL ASSESSMENT
This patient has been assessed with a concern for Malnutrition and has been determined to have a diagnosis/diagnoses of Severe protein-calorie malnutrition and Underweight (BMI < 19).    This patient is being managed with:   Diet NPO with Tube Feed-  Tube Feeding Modality: Nasogastric  Jevity 1.2 Augustus  Total Volume for 24 Hours (mL): 1080  Bolus  Total Volume of Bolus (mL):  270  Total # of Feeds: 4  Tube Feed Frequency: Every 6 hours   Tube Feed Start Time: 00:00  Bolus Feed Rate (mL per Hour): 270   Bolus Feed Duration (in Hours): 1  Free Water Flush Instructions:  50 mL before and after each bolus feed  Entered: Aug 26 2022  2:39PM    
This patient has been assessed with a concern for Malnutrition and has been determined to have a diagnosis/diagnoses of Severe protein-calorie malnutrition and Underweight (BMI < 19).    This patient is being managed with:   Diet Pureed-  Entered: Sep  2 2022  9:09AM    
This patient has been assessed with a concern for Malnutrition and has been determined to have a diagnosis/diagnoses of Severe protein-calorie malnutrition and Underweight (BMI < 19).    This patient is being managed with:   Diet Pureed-  Entered: Sep  2 2022  9:09AM    
This patient has been assessed with a concern for Malnutrition and has been determined to have a diagnosis/diagnoses of Severe protein-calorie malnutrition and Underweight (BMI < 19).    This patient is being managed with:   Diet NPO with Tube Feed-  Tube Feeding Modality: Nasogastric  Jevity 1.2 Augustus  Total Volume for 24 Hours (mL): 1080  Bolus  Total Volume of Bolus (mL):  270  Total # of Feeds: 4  Tube Feed Frequency: Every 6 hours   Tube Feed Start Time: 00:00  Bolus Feed Rate (mL per Hour): 270   Bolus Feed Duration (in Hours): 1  Free Water Flush Instructions:  50 mL before and after each bolus feed  Entered: Aug 26 2022  2:39PM    
This patient has been assessed with a concern for Malnutrition and has been determined to have a diagnosis/diagnoses of Severe protein-calorie malnutrition and Underweight (BMI < 19).    This patient is being managed with:   Diet NPO with Tube Feed-  Tube Feeding Modality: Nasogastric  Jevity 1.2 Augustus  Total Volume for 24 Hours (mL): 1080  Bolus  Total Volume of Bolus (mL):  270  Total # of Feeds: 4  Tube Feed Frequency: Every 6 hours   Tube Feed Start Time: 00:00  Bolus Feed Rate (mL per Hour): 270   Bolus Feed Duration (in Hours): 1  Free Water Flush Instructions:  50 mL before and after each bolus feed  Entered: Aug 26 2022  2:39PM    
This patient has been assessed with a concern for Malnutrition and has been determined to have a diagnosis/diagnoses of Severe protein-calorie malnutrition and Underweight (BMI < 19).    This patient is being managed with:   Diet Pureed-  Entered: Sep  2 2022  9:09AM    
This patient has been assessed with a concern for Malnutrition and has been determined to have a diagnosis/diagnoses of Severe protein-calorie malnutrition and Underweight (BMI < 19).    This patient is being managed with:   Diet NPO with Tube Feed-  Tube Feeding Modality: Nasogastric  Jevity 1.2 Augustus  Total Volume for 24 Hours (mL): 1080  Bolus  Total Volume of Bolus (mL):  270  Total # of Feeds: 4  Tube Feed Frequency: Every 6 hours   Tube Feed Start Time: 00:00  Bolus Feed Rate (mL per Hour): 270   Bolus Feed Duration (in Hours): 1  Free Water Flush Instructions:  50 mL before and after each bolus feed  Entered: Aug 26 2022  2:39PM    
This patient has been assessed with a concern for Malnutrition and has been determined to have a diagnosis/diagnoses of Severe protein-calorie malnutrition and Underweight (BMI < 19).    This patient is being managed with:   Diet Pureed-  Entered: Sep  2 2022  9:09AM    
This patient has been assessed with a concern for Malnutrition and has been determined to have a diagnosis/diagnoses of Severe protein-calorie malnutrition and Underweight (BMI < 19).    This patient is being managed with:   Diet NPO with Tube Feed-  Tube Feeding Modality: Nasogastric  Jevity 1.2 Augustus  Total Volume for 24 Hours (mL): 1080  Bolus  Total Volume of Bolus (mL):  270  Total # of Feeds: 4  Tube Feed Frequency: Every 6 hours   Tube Feed Start Time: 00:00  Bolus Feed Rate (mL per Hour): 270   Bolus Feed Duration (in Hours): 1  Free Water Flush Instructions:  50 mL before and after each bolus feed  Entered: Aug 26 2022  2:39PM

## 2022-09-06 NOTE — PROGRESS NOTE ADULT - SUBJECTIVE AND OBJECTIVE BOX
CHIRAG HERNÁNDEZ 89y Female  MRN#: [id number]   Hospital Day: 15d    SUBJECTIVE  Patient is a 89y old Female who presents with a chief complaint of Lethargy (05 Sep 2022 10:36)  Currently admitted to medicine with the primary diagnosis of Metabolic encephalopathy      INTERVAL HPI AND OVERNIGHT EVENTS:  Patient was examined and seen at bedside. This morning she is resting comfortably in bed. She reports a headache overnight which resolved with tylenol. Denies any palpitations dizziness, chest pain, N/V, SOB.    OBJECTIVE  PAST MEDICAL & SURGICAL HISTORY  High blood cholesterol    Hypothyroid    Hypertension    COPD (chronic obstructive pulmonary disease)    CAD (coronary artery disease)    History of coronary artery stent placement    History of cholecystectomy    H/O abdominal hysterectomy    Cataract      ALLERGIES:  No Known Allergies    MEDICATIONS:  STANDING MEDICATIONS  apixaban 10 milliGRAM(s) Oral every 12 hours  aspirin  chewable 81 milliGRAM(s) Oral daily  BACItracin   Ointment 1 Application(s) Topical daily  budesonide  80 MICROgram(s)/formoterol 4.5 MICROgram(s) Inhaler 2 Puff(s) Inhalation two times a day  chlorhexidine 2% Cloths 1 Application(s) Topical daily  clopidogrel Tablet 75 milliGRAM(s) Oral daily  hydrALAZINE 25 milliGRAM(s) Oral three times a day  isosorbide   dinitrate Tablet (ISORDIL) 10 milliGRAM(s) Oral three times a day  latanoprost 0.005% Ophthalmic Solution 1 Drop(s) Both EYES at bedtime  levothyroxine 25 MICROGram(s) Oral daily  metoprolol tartrate 12.5 milliGRAM(s) Oral two times a day  pantoprazole    Tablet 40 milliGRAM(s) Oral before breakfast  polyethylene glycol 3350 17 Gram(s) Oral daily  saccharomyces boulardii 250 milliGRAM(s) Oral two times a day  senna 2 Tablet(s) Oral at bedtime    PRN MEDICATIONS  acetaminophen     Tablet .. 650 milliGRAM(s) Oral every 6 hours PRN  ALBUTerol    90 MICROgram(s) HFA Inhaler 2 Puff(s) Inhalation every 6 hours PRN  aluminum hydroxide/magnesium hydroxide/simethicone Suspension 30 milliLiter(s) Oral every 4 hours PRN  glycopyrrolate Injectable 0.4 milliGRAM(s) IV Push every 6 hours PRN  melatonin 3 milliGRAM(s) Oral at bedtime PRN  ondansetron Injectable 4 milliGRAM(s) IV Push every 8 hours PRN      VITAL SIGNS: Last 24 Hours  T(C): 35.6 (06 Sep 2022 07:51), Max: 36.8 (05 Sep 2022 22:43)  T(F): 96.1 (06 Sep 2022 07:51), Max: 98.2 (05 Sep 2022 22:43)  HR: 59 (06 Sep 2022 07:51) (59 - 80)  BP: 147/81 (06 Sep 2022 07:51) (139/63 - 190/81)  BP(mean): 116 (06 Sep 2022 06:00) (116 - 116)  RR: 18 (06 Sep 2022 07:51) (18 - 18)  SpO2: 96% (06 Sep 2022 07:51) (95% - 97%)    LABS:                        RADIOLOGY:      PHYSICAL EXAM:  CONSTITUTIONAL: No acute distress  HEAD: Atraumatic, normocephalic  EYES: conjunctiva and sclera clear  ENT: moist mucous membranes  PULMONARY: Clear to auscultation bilaterally  CARDIOVASCULAR: Regular rate and rhythm  GASTROINTESTINAL: Soft, non-tender, non-distended; bowel sounds present  MUSCULOSKELETAL: no edema  NEUROLOGY: non-focal  SKIN: warm and dry

## 2022-09-06 NOTE — DISCHARGE NOTE NURSING/CASE MANAGEMENT/SOCIAL WORK - PATIENT PORTAL LINK FT
You can access the FollowMyHealth Patient Portal offered by [facility] by registering at the following website: [email or web address]. By joining [facility]’s FollowMyHealth portal, you will also be able to view your health information using other applications (apps) compatible with our system.

## 2022-09-06 NOTE — CHART NOTE - NSCHARTNOTESELECT_GEN_ALL_CORE
Event Note
Palliative Care SW Initial Assessment/Event Note
Palliative Care SW Note/Event Note
neurology/Event Note
Palliative Care SW Note/Event Note
Palliative Care SW Note/Event Note
RD Limited/Event Note
Registered Dietitian Follow-Up/Event Note

## 2022-09-06 NOTE — PROGRESS NOTE ADULT - ATTENDING COMMENTS
89 yo F PMHx asthma, COPD, hyperactive airway, b/l chronic bronchiectasis, DLD, HTN, CAD s/p PCI, hypothyroidism presented for evaluation of worsening metabolic encephalopathy. Patient was recently admitted w/ cyclospora gastroenteritis & cystitis. Course was complicated by metabolic encephalopathy. Upon ER arrival pt found to have subsegmental PE and positive urinalysis. Patient cognitive status improved, oriented to self, place and year. Patient passed speech and swallow, diet advanced to liquid/puree (tolerating well). patient had a bowel movement yesterday.    Pending: Calorie Count   Family discussion: Family opting for SNF  Disposition: SNF
Progress Note Handoff:   monitor Abd distention and bowel movement, cont Calorie Count   Family discussion: family wanting SNF   Disposition: SNF
89 yo F PMHx asthma, COPD, hyperactive airway, b/l chronic bronchiectasis, DLD, HTN, CAD s/p PCI, hypothyroidism presented for evaluation of worsening metabolic encephalopathy. Patient was recently admitted w/ cyclospora gastroenteritis & cystitis. Course was complicated by metabolic encephalopathy. Upon ER arrival pt found to have subsegmental PE and positive urinalysis. Patient cognitive status improved, oriented to self, place and year. Patient passed speech and swallow, diet advanced to liquid/puree (tolerating well). patient had a bowel movement yesterday.    Pending: placement   Family discussion: Family opting for SNF  Disposition: SNF .
89 yo F PMHx asthma, COPD, hyperactive airway, b/l chronic bronchiectasis, DLD, HTN, CAD s/p PCI, hypothyroidism presented for evaluation of worsening metabolic encephalopathy. Patient was recently admitted w/ cyclospora gastroenteritis & cystitis. Course was complicated by metabolic encephalopathy. Upon ER arrival pt found to have subsegmental PE and positive urinalysis. Patient cognitive status improved, oriented to self, place and year. Speech and swallow will re-evaluate today given improvement in mental status. Family still considering hospice VS SNF.     Progress Note Handoff:  Pending: Swallow eval  Family discussion: Family opting for hospice treatment--awaiting NOE for Hospice in SNF  Dispo: NH

## 2022-09-07 ENCOUNTER — APPOINTMENT (OUTPATIENT)
Dept: CARDIOLOGY | Facility: CLINIC | Age: 87
End: 2022-09-07

## 2022-09-09 DIAGNOSIS — K57.30 DIVERTICULOSIS OF LARGE INTESTINE WITHOUT PERFORATION OR ABSCESS WITHOUT BLEEDING: ICD-10-CM

## 2022-09-09 DIAGNOSIS — J44.9 CHRONIC OBSTRUCTIVE PULMONARY DISEASE, UNSPECIFIED: ICD-10-CM

## 2022-09-09 DIAGNOSIS — I26.93 SINGLE SUBSEGMENTAL PULMONARY EMBOLISM WITHOUT ACUTE COR PULMONALE: ICD-10-CM

## 2022-09-09 DIAGNOSIS — E83.42 HYPOMAGNESEMIA: ICD-10-CM

## 2022-09-09 DIAGNOSIS — N39.0 URINARY TRACT INFECTION, SITE NOT SPECIFIED: ICD-10-CM

## 2022-09-09 DIAGNOSIS — Z95.5 PRESENCE OF CORONARY ANGIOPLASTY IMPLANT AND GRAFT: ICD-10-CM

## 2022-09-09 DIAGNOSIS — E43 UNSPECIFIED SEVERE PROTEIN-CALORIE MALNUTRITION: ICD-10-CM

## 2022-09-09 DIAGNOSIS — E87.3 ALKALOSIS: ICD-10-CM

## 2022-09-09 DIAGNOSIS — Z66 DO NOT RESUSCITATE: ICD-10-CM

## 2022-09-09 DIAGNOSIS — I25.10 ATHEROSCLEROTIC HEART DISEASE OF NATIVE CORONARY ARTERY WITHOUT ANGINA PECTORIS: ICD-10-CM

## 2022-09-09 DIAGNOSIS — I10 ESSENTIAL (PRIMARY) HYPERTENSION: ICD-10-CM

## 2022-09-09 DIAGNOSIS — K59.00 CONSTIPATION, UNSPECIFIED: ICD-10-CM

## 2022-09-09 DIAGNOSIS — D72.829 ELEVATED WHITE BLOOD CELL COUNT, UNSPECIFIED: ICD-10-CM

## 2022-09-09 DIAGNOSIS — J98.11 ATELECTASIS: ICD-10-CM

## 2022-09-09 DIAGNOSIS — G93.41 METABOLIC ENCEPHALOPATHY: ICD-10-CM

## 2022-09-09 DIAGNOSIS — K52.9 NONINFECTIVE GASTROENTERITIS AND COLITIS, UNSPECIFIED: ICD-10-CM

## 2022-09-09 DIAGNOSIS — E87.2 ACIDOSIS: ICD-10-CM

## 2022-09-09 DIAGNOSIS — R13.10 DYSPHAGIA, UNSPECIFIED: ICD-10-CM

## 2022-09-09 DIAGNOSIS — N17.9 ACUTE KIDNEY FAILURE, UNSPECIFIED: ICD-10-CM

## 2022-09-09 DIAGNOSIS — E03.9 HYPOTHYROIDISM, UNSPECIFIED: ICD-10-CM

## 2022-09-09 DIAGNOSIS — H40.9 UNSPECIFIED GLAUCOMA: ICD-10-CM

## 2022-09-09 DIAGNOSIS — J90 PLEURAL EFFUSION, NOT ELSEWHERE CLASSIFIED: ICD-10-CM

## 2022-09-09 DIAGNOSIS — E87.5 HYPERKALEMIA: ICD-10-CM

## 2023-02-12 NOTE — ED PROVIDER NOTE - PROGRESS NOTE DETAILS
[id number] [facility]     Emergency Department     Faculty Attestation    I performed a history and physical examination of the patient and discussed management with the resident. I reviewed the residents note and agree with the documented findings and plan of care. Any areas of disagreement are noted on the chart. I was personally present for the key portions of any procedures. I have documented in the chart those procedures where I was not present during the key portions. I have reviewed the emergency nurses triage note. I agree with the chief complaint, past medical history, past surgical history, allergies, medications, social and family history as documented unless otherwise noted below. For Physician Assistant/ Nurse Practitioner cases/documentation I have personally evaluated this patient and have completed at least one if not all key elements of the E/M (history, physical exam, and MDM). Additional findings are as noted. I have personally seen and evaluated the patient. I find the patient's history and physical exam are consistent with the NP/PA documentation. I agree with the care provided, treatment rendered, disposition and follow-up plan. 49-year-old female presenting with 2 weeks of left arm pain. Started in her wrist, moved up to her shoulder. Gets pain radiating down the entire arm. No chest pain or shortness of breath. No history of similar. Patient works rehabbing houses, does repetitive motion, but she states most of that is with her right arm. Pain is keeping her from sleeping. She denies any history of MVA, fall, or other injury to this arm in the past.  No history of carpal tunnel syndrome.     Exam:  General: Laying on the bed, awake, alert and in no acute distress  CV: normal rate and regular rhythm  Lungs: Breathing comfortably on room air with no tachypnea, hypoxia, or increased work of breathing  MSK: Tenderness down the left arm, intermittently to palpation, also with movement. Full active range of motion. No elbow tenderness to palpation.     Plan:  X-ray wrist and shoulder  Multimodal pain therapy  Follow-up with orthopedics for definitive imaging/testing        Alessandra Barrios MD   Attending Emergency  Physician    (Please note that portions of this note were completed with a voice recognition program. Efforts were made to edit the dictations but occasionally words are mis-transcribed.)             Alessandra Barrios MD  03/08/21 2639 BNACP elevated - patient clinically with PNA, minimal crackles on lung exam, +L sided rhonchi. no orthopnea, leg swelling. negative... BNACP elevated - patient clinically with PNA, +rhonchi and no crackles on lung exam, no orthopnea, no leg swelling.

## 2023-07-13 ENCOUNTER — APPOINTMENT (OUTPATIENT)
Dept: CARDIOLOGY | Facility: CLINIC | Age: 88
End: 2023-07-13

## 2023-07-13 ENCOUNTER — APPOINTMENT (OUTPATIENT)
Dept: CARDIOLOGY | Facility: CLINIC | Age: 88
End: 2023-07-13
Payer: MEDICARE

## 2023-07-13 VITALS
DIASTOLIC BLOOD PRESSURE: 58 MMHG | WEIGHT: 149 LBS | HEART RATE: 58 BPM | HEIGHT: 68 IN | SYSTOLIC BLOOD PRESSURE: 110 MMHG | BODY MASS INDEX: 22.58 KG/M2

## 2023-07-13 DIAGNOSIS — I25.10 ATHEROSCLEROTIC HEART DISEASE OF NATIVE CORONARY ARTERY W/OUT ANGINA PECTORIS: ICD-10-CM

## 2023-07-13 DIAGNOSIS — I10 ESSENTIAL (PRIMARY) HYPERTENSION: ICD-10-CM

## 2023-07-13 DIAGNOSIS — I34.9 NONRHEUMATIC MITRAL VALVE DISORDER, UNSPECIFIED: ICD-10-CM

## 2023-07-13 DIAGNOSIS — Z98.61 CORONARY ANGIOPLASTY STATUS: ICD-10-CM

## 2023-07-13 PROCEDURE — 99214 OFFICE O/P EST MOD 30 MIN: CPT | Mod: 25

## 2023-07-13 PROCEDURE — 93000 ELECTROCARDIOGRAM COMPLETE: CPT

## 2023-07-13 RX ORDER — ISOSORBIDE DINITRATE 10 MG/1
10 TABLET ORAL DAILY
Refills: 0 | Status: ACTIVE | COMMUNITY

## 2023-07-13 RX ORDER — ISOSORBIDE MONONITRATE 30 MG/1
30 TABLET, EXTENDED RELEASE ORAL DAILY
Refills: 0 | Status: DISCONTINUED | COMMUNITY
End: 2023-07-13

## 2023-07-13 RX ORDER — APIXABAN 5 MG/1
5 TABLET, FILM COATED ORAL
Qty: 180 | Refills: 1 | Status: ACTIVE | COMMUNITY

## 2023-07-13 NOTE — REASON FOR VISIT
[FreeTextEntry1] : Patient presents for an over due follow up visit.\par She has a known history of CAD and was stented by Dr. Kaminski in the past which was complicated by a CVA pot procedure.

## 2023-07-13 NOTE — ASSESSMENT
[FreeTextEntry1] : ASHD\par S/P PTCA/stenting\par Angina with NYHA class I symptoms\par Prior CVA\par Hypertension\par Hyperlipidemia

## 2023-07-13 NOTE — DISCUSSION/SUMMARY
[FreeTextEntry1] : The patient was advised to maintain her present medications.\par Will continue imdur 30 mg QD.\par May add NTG 0.4 mg SP PRN\par Maintain remaining medications.\par EKG: NSR,rate of 58 LAHB, no ischemic changes\par She was advised to lower her T. cholesterol level to less than 200 mg/dl and LDL to less than 100 mg/dl.\par CBC BMP lipid and hepatic panel is advised.\par Maintain a low fat, low cholesterol diet.\par Weight reduction is advised.\par RV in 6 months. [EKG obtained to assist in diagnosis and management of assessed problem(s)] : EKG obtained to assist in diagnosis and management of assessed problem(s)

## 2023-10-15 NOTE — PROGRESS NOTE ADULT - SUBJECTIVE AND OBJECTIVE BOX
CHIRAG HERNÁNDEZ  89y, Female  Allergy: No Known Allergies      LOS  14d    CHIEF COMPLAINT: Diarrhea (18 Aug 2022 14:45)      INTERVAL EVENTS/HPI  - No acute events overnight  - T(F): , Max: 98.9 (08-17-22 @ 21:06)  - appears more awake today, mild abdominal pain   - WBC Count: 11.99 (08-18-22 @ 07:36)  WBC Count: 10.31 (08-17-22 @ 07:19)     - Creatinine, Serum: 0.7 (08-18-22 @ 07:36)  Creatinine, Serum: 0.6 (08-17-22 @ 07:19)       ROS  General: Denies rigors, nightsweats  HEENT: Denies headache, rhinorrhea, sore throat, eye pain  CV: Denies CP, palpitations  PULM: Denies wheezing, hemoptysis  GI: Denies hematemesis, hematochezia, melena  : Denies discharge, hematuria  MSK: Denies arthralgias, myalgias  SKIN: Denies rash, lesions  NEURO: Denies paresthesias, weakness  PSYCH: Denies depression, anxiety    VITALS:  T(F): 97.7, Max: 98.9 (08-17-22 @ 21:06)  HR: 69  BP: 147/59  RR: 18Vital Signs Last 24 Hrs  T(C): 36.5 (18 Aug 2022 14:00), Max: 37.2 (17 Aug 2022 21:06)  T(F): 97.7 (18 Aug 2022 14:00), Max: 98.9 (17 Aug 2022 21:06)  HR: 69 (18 Aug 2022 14:00) (69 - 71)  BP: 147/59 (18 Aug 2022 14:00) (135/69 - 157/68)  BP(mean): --  RR: 18 (18 Aug 2022 14:00) (18 - 18)  SpO2: --        PHYSICAL EXAM:  Gen: NAD, resting in bed  HEENT: Normocephalic, atraumatic  Neck: supple, no lymphadenopathy  CV: Regular rate & regular rhythm  Lungs: decreased BS at bases, no fremitus  Abdomen: Soft, BS present  Ext: Warm, well perfused  Neuro: non focal, awake  Skin: no rash, no erythema  Lines: no phlebitis    FH: Non-contributory  Social Hx: Non-contributory    TESTS & MEASUREMENTS:                        10.6   11.99 )-----------( 313      ( 18 Aug 2022 07:36 )             32.9     08-18    133<L>  |  98  |  7<L>  ----------------------------<  110<H>  3.4<L>   |  27  |  0.7    Ca    8.6      18 Aug 2022 07:36  Phos  2.5     08-17  Mg     1.8     08-17    TPro  4.7<L>  /  Alb  2.8<L>  /  TBili  0.3  /  DBili  x   /  AST  33  /  ALT  26  /  AlkPhos  68  08-18      LIVER FUNCTIONS - ( 18 Aug 2022 07:36 )  Alb: 2.8 g/dL / Pro: 4.7 g/dL / ALK PHOS: 68 U/L / ALT: 26 U/L / AST: 33 U/L / GGT: x               Culture - Urine (collected 08-10-22 @ 20:20)  Source: Clean Catch Clean Catch (Midstream)  Final Report (08-12-22 @ 07:38):    No growth    Culture - Blood (collected 08-10-22 @ 18:57)  Source: .Blood None  Final Report (08-16-22 @ 01:01):    No Growth Final    Culture - Blood (collected 08-10-22 @ 16:10)  Source: .Blood Blood-Venous  Final Report (08-16-22 @ 01:01):    No Growth Final    GI PCR Panel, Stool (collected 08-06-22 @ 22:51)  Source: .Stool Feces  Final Report (08-07-22 @ 22:44):    Cyclospora species    DETECTED by PCR    *******Please Note:*******    GI panel PCR evaluates for:    Campylobacter, Plesiomonas shigelloides, Salmonella,    Vibrio, Yersinia enterocolitica, Enteroaggregative    Escherichia coli (EAEC), Enteropathogenic E.coli(EPEC),    Enterotoxigenic E. coli (ETEC) lt/st, Shiga-like    toxin-producing E. coli (STEC) stx1/stx2,    Shigella/ Enteroinvasive E. coli (EIEC), Cryptosporidium,    Cyclospora cayetanensis, Entamoeba histolytica,    Giardia lamblia, Adenovirus F 40/41, Astrovirus,    Norovirus GI/GII, Rotavirus A, Sapovirus    Culture - Stool (collected 08-06-22 @ 22:51)  Source: .Stool Feces  Final Report (08-09-22 @ 21:07):    No enteric pathogens isolated.    (Stool culture examined for Salmonella,    Shigella, Campylobacter, Aeromonas, Plesiomonas,    Vibrio, E.coli O157 and Yersinia)    Culture - Urine (collected 08-04-22 @ 18:45)  Source: Clean Catch Clean Catch (Midstream)  Final Report (08-07-22 @ 09:46):    >100,000 CFU/ml Proteus mirabilis  Organism: Proteus mirabilis (08-07-22 @ 09:46)  Organism: Proteus mirabilis (08-07-22 @ 09:46)      -  Amikacin: S <=16      -  Amoxicillin/Clavulanic Acid: S <=8/4      -  Ampicillin: S <=8 These ampicillin results predict results for amoxicillin      -  Ampicillin/Sulbactam: S <=4/2 Enterobacter, Klebsiella aerogenes, Citrobacter, and Serratia may develop resistance during prolonged therapy (3-4 days)      -  Aztreonam: S <=4      -  Cefazolin: S 4 (MIC_CL_COM_ENTERIC_CEFAZU) For uncomplicated UTI with K. pneumoniae, E. coli, or P. mirablis: SAMEER <=16 is sensitive and SAMEER >=32 is resistant. This also predicts results for oral agents cefaclor, cefdinir, cefpodoxime, cefprozil, cefuroxime axetil, cephalexin and locarbef for uncomplicated UTI. Note that some isolates may be susceptible to these agents while testing resistant to cefazolin.      -  Cefepime: S <=2      -  Cefoxitin: S <=8      -  Ceftriaxone: S <=1 Enterobacter, Klebsiella aerogenes, Citrobacter, and Serratia may develop resistance during prolonged therapy      -  Ciprofloxacin: S <=0.25      -  Ertapenem: S <=0.5      -  Gentamicin: S 4      -  Levofloxacin: S <=0.5      -  Meropenem: S <=1      -  Nitrofurantoin: R >64 Should not be used to treat pyelonephritis      -  Piperacillin/Tazobactam: S <=8      -  Tobramycin: S <=2      -  Trimethoprim/Sulfamethoxazole: S <=0.5/9.5      Method Type: [facility]            INFECTIOUS DISEASES TESTING  COVID-19 PCR: NotDetec (08-17-22 @ 11:05)  COVID-19 PCR: NotDetec (08-14-22 @ 13:15)  Procalcitonin, Serum: 0.07 (08-13-22 @ 06:08)  Rapid RVP Result: NotDetec (08-11-22 @ 08:00)  MRSA PCR Result.: Negative (08-11-22 @ 00:00)  Procalcitonin, Serum: 0.05 (08-10-22 @ 18:57)  COVID-19 PCR: NotDetec (08-09-22 @ 11:45)  COVID-19 PCR: NotDetec (08-04-22 @ 22:40)      INFLAMMATORY MARKERS      RADIOLOGY & ADDITIONAL TESTS:  I have personally reviewed the last available Chest xray  CXR      CT      CARDIOLOGY TESTING  12 Lead ECG:   Ventricular Rate 66 BPM    Atrial Rate 66 BPM    P-R Interval 212 ms    QRS Duration 140 ms    Q-T Interval 446 ms    QTC Calculation(Bazett) 467 ms    P Axis 68 degrees    R Axis -54 degrees    T Axis 96 degrees    Diagnosis Line Sinus rhythm hfra5bn degree A-V block  Non-specific intra-ventricular conduction delay  Left ventricular hypertrophy with repolarization abnormality  Left axis deviation  Possible Lateral infarct , age undetermined  Abnormal ECG    Confirmed by Luis Eduardo Johnson ([id number]) on 8/5/2022 9:56:09 AM (08-04-22 @ 18:45)      MEDICATIONS  albuterol/ipratropium for Nebulization 3 Nebulizer every 6 hours  aspirin  chewable 81 Oral daily  budesonide  80 MICROgram(s)/formoterol 4.5 MICROgram(s) Inhaler 2 Inhalation two times a day  clopidogrel Tablet 75 Oral daily  enoxaparin Injectable 30 SubCutaneous every 24 hours  isosorbide   dinitrate Tablet (ISORDIL) 10 Oral three times a day  latanoprost 0.005% Ophthalmic Solution 1 Both EYES at bedtime  levothyroxine 25 Oral daily  lisinopril 20 Oral daily  metoprolol succinate ER 25 Oral daily  metroNIDAZOLE    Tablet 500 Oral every 8 hours  pantoprazole    Tablet 40 Oral before breakfast  polyethylene glycol 3350 17 Oral daily  potassium chloride   Powder 40 Oral every 4 hours  saccharomyces boulardii 250 Oral two times a day  senna 2 Oral at bedtime  sodium chloride 0.9%. 1000 IV Continuous <Continuous>  trimethoprim / sulfamethoxazole IVPB 160 IV Intermittent two times a day      WEIGHT  Weight (kg): 72.3 (08-05-22 @ 03:26)  Creatinine, Serum: 0.7 mg/dL (08-18-22 @ 07:36)      ANTIBIOTICS:  metroNIDAZOLE    Tablet 500 milliGRAM(s) Oral every 8 hours  trimethoprim / sulfamethoxazole IVPB 160 milliGRAM(s) IV Intermittent two times a day      All available historical records have been reviewed       Emergency Department Provider Note       PCP: Quinten Love MD   Age: 52 y.o. Sex: female     DISPOSITION Decision To Discharge 09/05/2023 09:21:21 AM       ICD-10-CM    1. Acute UTI  N39.0       2. Hyperglycemia due to diabetes mellitus ([address])  E11.65       3. Noncompliance with medication regimen  Z91.148           Medical Decision Making     Complexity of Problems Addressed:  1 acute illness    Data Reviewed and Analyzed:  I independently ordered and reviewed each unique test.  I reviewed external records: ED visit note from an outside group. I reviewed external records: provider visit note from outside specialist.  I reviewed external records: previous lab results from outside ED. I reviewed external records: previous imaging study including radiologist interpretation. Discussion of management or test interpretation. DDX:    Medication noncompliance, diabetic diet noncompliance,    Hyperglycemia, diabetic ketoacidosis, diabetic coma,    Electrolyte abnormality, dehydration,    Infection, UTI,          Risk of Complications and/or Morbidity of Patient Management:  Prescription drug management performed. Shared medical decision making was utilized in creating the patients health plan today. ED Course as of 09/05/23 0923   Tue Sep 05, 2023   0836 Patient allowed nursing staff to sticker once they were able to establish an IV. She is refusing any further blood draws and does not want to be evaluated for her hyperglycemia. [XD]   2304 Patient does have moderate leukocyte esterase with 20-50 white blood cells. In light of her recent urine culture being pansensitive we will place her on Bactrim to take twice a day for the next 5 days [KH]   0922 I counseled the patient regarding the risk of uncontrolled diabetes. She does not want further evaluation for diabetes today despite her blood sugar being over 400 on point-of-care testing.   Patient refusing any further intervention regarding shortness of breath

## 2023-11-09 ENCOUNTER — EMERGENCY (EMERGENCY)
Facility: HOSPITAL | Age: 88
LOS: 0 days | Discharge: HOME | End: 2023-11-09
Attending: EMERGENCY MEDICINE
Payer: MEDICARE

## 2023-11-09 VITALS
OXYGEN SATURATION: 97 % | DIASTOLIC BLOOD PRESSURE: 86 MMHG | TEMPERATURE: 98 F | RESPIRATION RATE: 16 BRPM | HEART RATE: 67 BPM | SYSTOLIC BLOOD PRESSURE: 204 MMHG

## 2023-11-09 DIAGNOSIS — S01.81XA LACERATION WITHOUT FOREIGN BODY OF OTHER PART OF HEAD, INITIAL ENCOUNTER: ICD-10-CM

## 2023-11-09 DIAGNOSIS — M25.562 PAIN IN LEFT KNEE: ICD-10-CM

## 2023-11-09 DIAGNOSIS — M54.89 OTHER DORSALGIA: ICD-10-CM

## 2023-11-09 DIAGNOSIS — M25.561 PAIN IN RIGHT KNEE: ICD-10-CM

## 2023-11-09 DIAGNOSIS — Z90.49 ACQUIRED ABSENCE OF OTHER SPECIFIED PARTS OF DIGESTIVE TRACT: Chronic | ICD-10-CM

## 2023-11-09 DIAGNOSIS — Z23 ENCOUNTER FOR IMMUNIZATION: ICD-10-CM

## 2023-11-09 DIAGNOSIS — H26.9 UNSPECIFIED CATARACT: Chronic | ICD-10-CM

## 2023-11-09 DIAGNOSIS — Z90.710 ACQUIRED ABSENCE OF BOTH CERVIX AND UTERUS: Chronic | ICD-10-CM

## 2023-11-09 DIAGNOSIS — M54.2 CERVICALGIA: ICD-10-CM

## 2023-11-09 DIAGNOSIS — J18.9 PNEUMONIA, UNSPECIFIED ORGANISM: ICD-10-CM

## 2023-11-09 DIAGNOSIS — Z95.5 PRESENCE OF CORONARY ANGIOPLASTY IMPLANT AND GRAFT: Chronic | ICD-10-CM
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
## 2023-11-09 DIAGNOSIS — Z79.01 LONG TERM (CURRENT) USE OF ANTICOAGULANTS: ICD-10-CM
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
## 2023-11-09 LAB
ALBUMIN SERPL ELPH-MCNC: 4 G/DL — SIGNIFICANT CHANGE UP (ref 3.5–5.2)
ALBUMIN SERPL ELPH-MCNC: 4 G/DL — SIGNIFICANT CHANGE UP (ref 3.5–5.2)
ALP SERPL-CCNC: 114 U/L — SIGNIFICANT CHANGE UP (ref 30–115)
ALP SERPL-CCNC: 114 U/L — SIGNIFICANT CHANGE UP (ref 30–115)
ALT FLD-CCNC: 12 U/L — SIGNIFICANT CHANGE UP (ref 0–41)
ALT FLD-CCNC: 12 U/L — SIGNIFICANT CHANGE UP (ref 0–41)
ANION GAP SERPL CALC-SCNC: 11 MMOL/L — SIGNIFICANT CHANGE UP (ref 7–14)
ANION GAP SERPL CALC-SCNC: 11 MMOL/L — SIGNIFICANT CHANGE UP (ref 7–14)
APTT BLD: 37.3 SEC — SIGNIFICANT CHANGE UP (ref 27–39.2)
APTT BLD: 37.3 SEC — SIGNIFICANT CHANGE UP (ref 27–39.2)
AST SERPL-CCNC: 23 U/L — SIGNIFICANT CHANGE UP (ref 0–41)
AST SERPL-CCNC: 23 U/L — SIGNIFICANT CHANGE UP (ref 0–41)
BASOPHILS # BLD AUTO: 0.08 K/UL — SIGNIFICANT CHANGE UP (ref 0–0.2)
BASOPHILS # BLD AUTO: 0.08 K/UL — SIGNIFICANT CHANGE UP (ref 0–0.2)
BASOPHILS NFR BLD AUTO: 1 % — SIGNIFICANT CHANGE UP (ref 0–1)
BASOPHILS NFR BLD AUTO: 1 % — SIGNIFICANT CHANGE UP (ref 0–1)
BILIRUB SERPL-MCNC: 0.3 MG/DL — SIGNIFICANT CHANGE UP (ref 0.2–1.2)
BILIRUB SERPL-MCNC: 0.3 MG/DL — SIGNIFICANT CHANGE UP (ref 0.2–1.2)
BUN SERPL-MCNC: 23 MG/DL — HIGH (ref 10–20)
BUN SERPL-MCNC: 23 MG/DL — HIGH (ref 10–20)
CALCIUM SERPL-MCNC: 9.9 MG/DL — SIGNIFICANT CHANGE UP (ref 8.4–10.4)
CALCIUM SERPL-MCNC: 9.9 MG/DL — SIGNIFICANT CHANGE UP (ref 8.4–10.4)
CHLORIDE SERPL-SCNC: 105 MMOL/L — SIGNIFICANT CHANGE UP (ref 98–110)
CHLORIDE SERPL-SCNC: 105 MMOL/L — SIGNIFICANT CHANGE UP (ref 98–110)
CO2 SERPL-SCNC: 24 MMOL/L — SIGNIFICANT CHANGE UP (ref 17–32)
CO2 SERPL-SCNC: 24 MMOL/L — SIGNIFICANT CHANGE UP (ref 17–32)
CREAT SERPL-MCNC: 0.8 MG/DL — SIGNIFICANT CHANGE UP (ref 0.7–1.5)
CREAT SERPL-MCNC: 0.8 MG/DL — SIGNIFICANT CHANGE UP (ref 0.7–1.5)
EGFR: 67 ML/MIN/1.73M2 — SIGNIFICANT CHANGE UP
EGFR: 67 ML/MIN/1.73M2 — SIGNIFICANT CHANGE UP
EOSINOPHIL # BLD AUTO: 0.22 K/UL — SIGNIFICANT CHANGE UP (ref 0–0.7)
EOSINOPHIL # BLD AUTO: 0.22 K/UL — SIGNIFICANT CHANGE UP (ref 0–0.7)
EOSINOPHIL NFR BLD AUTO: 2.8 % — SIGNIFICANT CHANGE UP (ref 0–8)
EOSINOPHIL NFR BLD AUTO: 2.8 % — SIGNIFICANT CHANGE UP (ref 0–8)
GLUCOSE SERPL-MCNC: 99 MG/DL — SIGNIFICANT CHANGE UP (ref 70–99)
GLUCOSE SERPL-MCNC: 99 MG/DL — SIGNIFICANT CHANGE UP (ref 70–99)
HCT VFR BLD CALC: 40.4 % — SIGNIFICANT CHANGE UP (ref 37–47)
HCT VFR BLD CALC: 40.4 % — SIGNIFICANT CHANGE UP (ref 37–47)
HGB BLD-MCNC: 12.6 G/DL — SIGNIFICANT CHANGE UP (ref 12–16)
HGB BLD-MCNC: 12.6 G/DL — SIGNIFICANT CHANGE UP (ref 12–16)
IMM GRANULOCYTES NFR BLD AUTO: 0.4 % — HIGH (ref 0.1–0.3)
IMM GRANULOCYTES NFR BLD AUTO: 0.4 % — HIGH (ref 0.1–0.3)
INR BLD: 1.52 RATIO — HIGH (ref 0.65–1.3)
INR BLD: 1.52 RATIO — HIGH (ref 0.65–1.3)
LACTATE SERPL-SCNC: 1.9 MMOL/L — SIGNIFICANT CHANGE UP (ref 0.7–2)
LACTATE SERPL-SCNC: 1.9 MMOL/L — SIGNIFICANT CHANGE UP (ref 0.7–2)
LIDOCAIN IGE QN: 13 U/L — SIGNIFICANT CHANGE UP (ref 7–60)
LIDOCAIN IGE QN: 13 U/L — SIGNIFICANT CHANGE UP (ref 7–60)
LYMPHOCYTES # BLD AUTO: 2.01 K/UL — SIGNIFICANT CHANGE UP (ref 1.2–3.4)
LYMPHOCYTES # BLD AUTO: 2.01 K/UL — SIGNIFICANT CHANGE UP (ref 1.2–3.4)
LYMPHOCYTES # BLD AUTO: 25.2 % — SIGNIFICANT CHANGE UP (ref 20.5–51.1)
LYMPHOCYTES # BLD AUTO: 25.2 % — SIGNIFICANT CHANGE UP (ref 20.5–51.1)
MCHC RBC-ENTMCNC: 28.9 PG — SIGNIFICANT CHANGE UP (ref 27–31)
MCHC RBC-ENTMCNC: 28.9 PG — SIGNIFICANT CHANGE UP (ref 27–31)
MCHC RBC-ENTMCNC: 31.2 G/DL — LOW (ref 32–37)
MCHC RBC-ENTMCNC: 31.2 G/DL — LOW (ref 32–37)
MCV RBC AUTO: 92.7 FL — SIGNIFICANT CHANGE UP (ref 81–99)
MCV RBC AUTO: 92.7 FL — SIGNIFICANT CHANGE UP (ref 81–99)
MONOCYTES # BLD AUTO: 0.9 K/UL — HIGH (ref 0.1–0.6)
MONOCYTES # BLD AUTO: 0.9 K/UL — HIGH (ref 0.1–0.6)
MONOCYTES NFR BLD AUTO: 11.3 % — HIGH (ref 1.7–9.3)
MONOCYTES NFR BLD AUTO: 11.3 % — HIGH (ref 1.7–9.3)
NEUTROPHILS # BLD AUTO: 4.75 K/UL — SIGNIFICANT CHANGE UP (ref 1.4–6.5)
NEUTROPHILS # BLD AUTO: 4.75 K/UL — SIGNIFICANT CHANGE UP (ref 1.4–6.5)
NEUTROPHILS NFR BLD AUTO: 59.3 % — SIGNIFICANT CHANGE UP (ref 42.2–75.2)
NEUTROPHILS NFR BLD AUTO: 59.3 % — SIGNIFICANT CHANGE UP (ref 42.2–75.2)
NRBC # BLD: 0 /100 WBCS — SIGNIFICANT CHANGE UP (ref 0–0)
NRBC # BLD: 0 /100 WBCS — SIGNIFICANT CHANGE UP (ref 0–0)
PLATELET # BLD AUTO: 277 K/UL — SIGNIFICANT CHANGE UP (ref 130–400)
PLATELET # BLD AUTO: 277 K/UL — SIGNIFICANT CHANGE UP (ref 130–400)
PMV BLD: 11.2 FL — HIGH (ref 7.4–10.4)
PMV BLD: 11.2 FL — HIGH (ref 7.4–10.4)
POTASSIUM SERPL-MCNC: 4.7 MMOL/L — SIGNIFICANT CHANGE UP (ref 3.5–5)
POTASSIUM SERPL-MCNC: 4.7 MMOL/L — SIGNIFICANT CHANGE UP (ref 3.5–5)
POTASSIUM SERPL-SCNC: 4.7 MMOL/L — SIGNIFICANT CHANGE UP (ref 3.5–5)
POTASSIUM SERPL-SCNC: 4.7 MMOL/L — SIGNIFICANT CHANGE UP (ref 3.5–5)
PROT SERPL-MCNC: 6.7 G/DL — SIGNIFICANT CHANGE UP (ref 6–8)
PROT SERPL-MCNC: 6.7 G/DL — SIGNIFICANT CHANGE UP (ref 6–8)
PROTHROM AB SERPL-ACNC: 17.4 SEC — HIGH (ref 9.95–12.87)
PROTHROM AB SERPL-ACNC: 17.4 SEC — HIGH (ref 9.95–12.87)
RBC # BLD: 4.36 M/UL — SIGNIFICANT CHANGE UP (ref 4.2–5.4)
RBC # BLD: 4.36 M/UL — SIGNIFICANT CHANGE UP (ref 4.2–5.4)
RBC # FLD: 13.2 % — SIGNIFICANT CHANGE UP (ref 11.5–14.5)
RBC # FLD: 13.2 % — SIGNIFICANT CHANGE UP (ref 11.5–14.5)
SODIUM SERPL-SCNC: 140 MMOL/L — SIGNIFICANT CHANGE UP (ref 135–146)
SODIUM SERPL-SCNC: 140 MMOL/L — SIGNIFICANT CHANGE UP (ref 135–146)
WBC # BLD: 7.99 K/UL — SIGNIFICANT CHANGE UP (ref 4.8–10.8)
WBC # BLD: 7.99 K/UL — SIGNIFICANT CHANGE UP (ref 4.8–10.8)
WBC # FLD AUTO: 7.99 K/UL — SIGNIFICANT CHANGE UP (ref 4.8–10.8)
WBC # FLD AUTO: 7.99 K/UL — SIGNIFICANT CHANGE UP (ref 4.8–10.8)

## 2023-11-09 PROCEDURE — 70450 CT HEAD/BRAIN W/O DYE: CPT | Mod: MA

## 2023-11-09 PROCEDURE — 80053 COMPREHEN METABOLIC PANEL: CPT

## 2023-11-09 PROCEDURE — 74177 CT ABD & PELVIS W/CONTRAST: CPT | Mod: MA

## 2023-11-09 PROCEDURE — 71260 CT THORAX DX C+: CPT | Mod: 26,MA

## 2023-11-09 PROCEDURE — 82962 GLUCOSE BLOOD TEST: CPT

## 2023-11-09 PROCEDURE — 96374 THER/PROPH/DIAG INJ IV PUSH: CPT | Mod: XU

## 2023-11-09 PROCEDURE — 72125 CT NECK SPINE W/O DYE: CPT | Mod: MA

## 2023-11-09 PROCEDURE — 73562 X-RAY EXAM OF KNEE 3: CPT | Mod: 50

## 2023-11-09 PROCEDURE — 74177 CT ABD & PELVIS W/CONTRAST: CPT | Mod: 26,MA

## 2023-11-09 PROCEDURE — 73562 X-RAY EXAM OF KNEE 3: CPT | Mod: 26,50

## 2023-11-09 PROCEDURE — 71045 X-RAY EXAM CHEST 1 VIEW: CPT | Mod: 26

## 2023-11-09 PROCEDURE — 36415 COLL VENOUS BLD VENIPUNCTURE: CPT

## 2023-11-09 PROCEDURE — 85730 THROMBOPLASTIN TIME PARTIAL: CPT

## 2023-11-09 PROCEDURE — 12014 RPR F/E/E/N/L/M 5.1-7.5 CM: CPT

## 2023-11-09 PROCEDURE — 85025 COMPLETE CBC W/AUTO DIFF WBC: CPT

## 2023-11-09 PROCEDURE — 96376 TX/PRO/DX INJ SAME DRUG ADON: CPT | Mod: XU

## 2023-11-09 PROCEDURE — 99283 EMERGENCY DEPT VISIT LOW MDM: CPT

## 2023-11-09 PROCEDURE — G0390: CPT

## 2023-11-09 PROCEDURE — 71045 X-RAY EXAM CHEST 1 VIEW: CPT

## 2023-11-09 PROCEDURE — 72125 CT NECK SPINE W/O DYE: CPT | Mod: 26,MA

## 2023-11-09 PROCEDURE — 71260 CT THORAX DX C+: CPT | Mod: MA

## 2023-11-09 PROCEDURE — 85610 PROTHROMBIN TIME: CPT

## 2023-11-09 PROCEDURE — 70450 CT HEAD/BRAIN W/O DYE: CPT | Mod: 26,MA

## 2023-11-09 PROCEDURE — 99291 CRITICAL CARE FIRST HOUR: CPT | Mod: 25

## 2023-11-09 PROCEDURE — 90715 TDAP VACCINE 7 YRS/> IM: CPT

## 2023-11-09 PROCEDURE — 83690 ASSAY OF LIPASE: CPT

## 2023-11-09 PROCEDURE — 90471 IMMUNIZATION ADMIN: CPT

## 2023-11-09 PROCEDURE — 83605 ASSAY OF LACTIC ACID: CPT

## 2023-11-09 PROCEDURE — 99285 EMERGENCY DEPT VISIT HI MDM: CPT | Mod: 25

## 2023-11-09 PROCEDURE — 12053 INTMD RPR FACE/MM 5.1-7.5 CM: CPT

## 2023-11-09 RX ORDER — TETANUS TOXOID, REDUCED DIPHTHERIA TOXOID AND ACELLULAR PERTUSSIS VACCINE, ADSORBED 5; 2.5; 8; 8; 2.5 [IU]/.5ML; [IU]/.5ML; UG/.5ML; UG/.5ML; UG/.5ML
0.5 SUSPENSION INTRAMUSCULAR ONCE
Refills: 0 | Status: DISCONTINUED | OUTPATIENT
Start: 2023-11-09 | End: 2023-11-09

## 2023-11-09 RX ORDER — HYDROMORPHONE HYDROCHLORIDE 2 MG/ML
0.5 INJECTION INTRAMUSCULAR; INTRAVENOUS; SUBCUTANEOUS ONCE
Refills: 0 | Status: DISCONTINUED | OUTPATIENT
Start: 2023-11-09 | End: 2023-11-09

## 2023-11-09 RX ORDER — TETANUS TOXOID, REDUCED DIPHTHERIA TOXOID AND ACELLULAR PERTUSSIS VACCINE, ADSORBED 5; 2.5; 8; 8; 2.5 [IU]/.5ML; [IU]/.5ML; UG/.5ML; UG/.5ML; UG/.5ML
0.5 SUSPENSION INTRAMUSCULAR ONCE
Refills: 0 | Status: COMPLETED | OUTPATIENT
Start: 2023-11-09 | End: 2023-11-09

## 2023-11-09 RX ORDER — LEVOFLOXACIN 5 MG/ML
1 INJECTION, SOLUTION INTRAVENOUS
Qty: 7 | Refills: 0
Start: 2023-11-09 | End: 2023-11-15

## 2023-11-09 RX ADMIN — HYDROMORPHONE HYDROCHLORIDE 0.5 MILLIGRAM(S): 2 INJECTION INTRAMUSCULAR; INTRAVENOUS; SUBCUTANEOUS at 19:16

## 2023-11-09 RX ADMIN — TETANUS TOXOID, REDUCED DIPHTHERIA TOXOID AND ACELLULAR PERTUSSIS VACCINE, ADSORBED 0.5 MILLILITER(S): 5; 2.5; 8; 8; 2.5 SUSPENSION INTRAMUSCULAR at 15:58

## 2023-11-09 RX ADMIN — HYDROMORPHONE HYDROCHLORIDE 0.5 MILLIGRAM(S): 2 INJECTION INTRAMUSCULAR; INTRAVENOUS; SUBCUTANEOUS at 15:49

## 2023-11-09 NOTE — CONSULT NOTE ADULT - SUBJECTIVE AND OBJECTIVE BOX
TRAUMA ACTIVATION LEVEL:  ALERT  ACTIVATED BY: EMS  INTUBATED: NO      MECHANISM OF INJURY:   [] Blunt     [] MVC	  [x] Fall	  [] Pedestrian Struck	  [] Motorcycle     [] Assault     [] Bicycle collision    [] Sports injury    [] Penetrating    [] Gun Shot Wound      [] Stab Wound    GCS: 15 	E: 4	V: 5	M: 6    HPI:    89yF w/ PMHx of COPD, urosepsis 1 yr ago with intubation/feeding tube, PE 1 yr ago on Eliquis 5 BID, CAD, HLD, hypothyroidism, lumbar inflammatory spondylopathy, gastroenteritis, urinary retention seen as Trauma Alert s/p mechanical fall from chair +HT -LOC +Eliquis.  Trauma assessment in ED: ABCs intact , GCS 15 , AAOx3. Patient fell forward from her chair onto her knees and then fell and hit the front of her head against the floor without loss of consciousness. Last dose of Eliquis was this morning. Patient was able to get up with some help and use her walker to the chair. Upon arrival HD stable, LOCKWOOD, GCS 15, complaining of headache and bilateral knee pain. External signs of trauma include right knee edema, right forehead laceration, forehead hematoma. Denies nausea/vomiting, fevers/chills, dysuria, abd pain, sob, chest pain.    PAST MEDICAL & SURGICAL HISTORY:  High blood cholesterol      Hypothyroid      Hypertension      COPD (chronic obstructive pulmonary disease)      CAD (coronary artery disease)      History of coronary artery stent placement      History of cholecystectomy      H/O abdominal hysterectomy      Cataract          Allergies    No Known Allergies    Intolerances        Home Medications:  apixaban 5 mg oral tablet: 2 tab(s) orally every 12 hours from 9/3 to 9/10 then 1 tab every 12 hours thereafter (06 Sep 2022 12:01)  aspirin 81 mg oral tablet, chewable: 1 tab(s) orally once a day (23 Aug 2022 04:41)  CLOPIDOGREL 75 MG TABLET: 1 each orally once a day (23 Aug 2022 04:41)  hydrALAZINE 25 mg oral tablet: 1 tab(s) orally 3 times a day (23 Aug 2022 04:41)  isosorbide dinitrate 10 mg oral tablet: 1 tab(s) orally once a day (23 Aug 2022 04:41)  latanoprost 0.005% ophthalmic solution: 1 drop(s) to each affected eye once a day (in the evening) (23 Aug 2022 04:41)  levothyroxine 25 mcg (0.025 mg) oral tablet: 1 tab(s) orally once a day (23 Aug 2022 04:41)  lisinopril 20 mg oral tablet: 1 tab(s) orally once a day (23 Aug 2022 04:41)  metoprolol succinate 25 mg oral tablet, extended release: 1  orally once a day (23 Aug 2022 04:41)  pantoprazole 20 mg oral delayed release tablet: 1 tab(s) orally once a day (23 Aug 2022 04:41)  polyethylene glycol 3350 oral powder for reconstitution: 17 gram(s) orally once a day (23 Aug 2022 04:41)  saccharomyces boulardii lyo 250 mg oral capsule: 1 cap(s) orally 2 times a day (23 Aug 2022 04:41)  senna leaf extract oral tablet: 2 tab(s) orally once a day (at bedtime) (23 Aug 2022 04:41)      ROS: 10-system review is otherwise negative except HPI above.      Primary Survey:    A - airway intact  B - bilateral breath sounds and good chest rise  C - palpable pulses in all extremities  D - GCS 15 on arrival, LOCKWOOD  Exposure obtained    Vital Signs Last 24 Hrs  T(C): 36.4 (09 Nov 2023 15:21), Max: 36.4 (09 Nov 2023 15:21)  T(F): 97.5 (09 Nov 2023 15:21), Max: 97.5 (09 Nov 2023 15:21)  HR: 67 (09 Nov 2023 15:21) (67 - 67)  BP: 204/86 (09 Nov 2023 15:21) (204/86 - 204/86)  BP(mean): --  RR: 16 (09 Nov 2023 15:21) (16 - 16)  SpO2: 97% (09 Nov 2023 15:21) (97% - 97%)    Parameters below as of 09 Nov 2023 15:21  Patient On (Oxygen Delivery Method): room air        Secondary Survey:   General: NAD  HEENT: EOMI, PERRL, 3 cm R forehead laceration with venous oozing, forehead hematoma 3x2 cm.  Neck: Soft, midline trachea. C-spine tenderness present   Chest: No chest wall tenderness, no subcutaneous emphysema   Cardiac: S1, S2, RRR  Respiratory: Bilateral breath sounds, clear and equal bilaterally  Abdomen: Soft, non-distended, non-tender, no rebound, no guarding.  Groin: Normal appearing, pelvis stable   Ext:  Moving b/l upper and lower extremities. Palpable Radial b/l UE, b/l DP palpable in LE.   Back: T spine tenderness to palpation, no L/S tenderness, No palpable runoff/stepoff/deformity  Rectal: No kelsey blood    FAST: [ending    ACCESS / DEVICES:  [X] Peripheral IV  [ ] Central Venous Line	[ ] R	[ ] L	[ ] IJ	[ ] Fem	[ ] SC	Placed:   [ ] Arterial Line		[ ] R	[ ] L	[ ] Fem	[ ] Rad	[ ] Ax	Placed:   [ ] PICC:					[ ] Mediport  [ ] Urinary Catheter,  Date Placed:   [ ] Chest tube: [ ] Right, [ ] Left  [ ] IVON/Kishor Drains    Labs:  CAPILLARY BLOOD GLUCOSE      POCT Blood Glucose.: 97 mg/dL (09 Nov 2023 15:28)                  LFTs:         Coags:                        RADIOLOGY & ADDITIONAL STUDIES:  ---------------------------------------------------------------------------------------     TRAUMA ACTIVATION LEVEL:  ALERT  ACTIVATED BY: EMS  INTUBATED: NO    MECHANISM OF INJURY:   [] Blunt     [] MVC	  [x] Fall	  [] Pedestrian Struck	  [] Motorcycle     [] Assault     [] Bicycle collision    [] Sports injury    [] Penetrating    [] Gun Shot Wound      [] Stab Wound    GCS: 15 	E: 4	V: 5	M: 6    HPI:  89yF w/ PMHx of COPD, urosepsis 1 yr ago with intubation/feeding tube, PE 1 yr ago on Eliquis 5 BID, CAD, HLD, hypothyroidism, lumbar inflammatory spondylopathy, gastroenteritis, urinary retention seen as Trauma Alert s/p mechanical fall from chair +HT -LOC +Eliquis.  Trauma assessment in ED: ABCs intact , GCS 15 , AAOx3. Patient fell forward from her chair onto her knees and then fell and hit the front of her head against the floor without loss of consciousness. Last dose of Eliquis was this morning. Patient was able to get up with some help and use her walker to the chair. Upon arrival HD stable, LOCKWOOD, GCS 15, complaining of headache and bilateral knee pain. External signs of trauma include right knee edema, right forehead laceration, forehead hematoma. Denies nausea/vomiting, fevers/chills, dysuria, abd pain, sob, chest pain.    PAST MEDICAL & SURGICAL HISTORY:  High blood cholesterol  Hypothyroid  Hypertension  COPD (chronic obstructive pulmonary disease)  CAD (coronary artery disease)  History of coronary artery stent placement  History of cholecystectomy  H/O abdominal hysterectomy  Cataract    Allergies  No Known Allergies  Intolerances    Home Medications:  apixaban 5 mg oral tablet: 2 tab(s) orally every 12 hours from 9/3 to 9/10 then 1 tab every 12 hours thereafter (06 Sep 2022 12:01)  aspirin 81 mg oral tablet, chewable: 1 tab(s) orally once a day (23 Aug 2022 04:41)  CLOPIDOGREL 75 MG TABLET: 1 each orally once a day (23 Aug 2022 04:41)  hydrALAZINE 25 mg oral tablet: 1 tab(s) orally 3 times a day (23 Aug 2022 04:41)  isosorbide dinitrate 10 mg oral tablet: 1 tab(s) orally once a day (23 Aug 2022 04:41)  latanoprost 0.005% ophthalmic solution: 1 drop(s) to each affected eye once a day (in the evening) (23 Aug 2022 04:41)  levothyroxine 25 mcg (0.025 mg) oral tablet: 1 tab(s) orally once a day (23 Aug 2022 04:41)  lisinopril 20 mg oral tablet: 1 tab(s) orally once a day (23 Aug 2022 04:41)  metoprolol succinate 25 mg oral tablet, extended release: 1  orally once a day (23 Aug 2022 04:41)  pantoprazole 20 mg oral delayed release tablet: 1 tab(s) orally once a day (23 Aug 2022 04:41)  polyethylene glycol 3350 oral powder for reconstitution: 17 gram(s) orally once a day (23 Aug 2022 04:41)  saccharomyces boulardii lyo 250 mg oral capsule: 1 cap(s) orally 2 times a day (23 Aug 2022 04:41)  senna leaf extract oral tablet: 2 tab(s) orally once a day (at bedtime) (23 Aug 2022 04:41)      ROS: 10-system review is otherwise negative except HPI above.      Primary Survey:    A - airway intact  B - bilateral breath sounds and good chest rise  C - palpable pulses in all extremities  D - GCS 15 on arrival, LOCKWOOD  Exposure obtained    Vital Signs Last 24 Hrs  T(C): 36.4 (09 Nov 2023 15:21), Max: 36.4 (09 Nov 2023 15:21)  T(F): 97.5 (09 Nov 2023 15:21), Max: 97.5 (09 Nov 2023 15:21)  HR: 67 (09 Nov 2023 15:21) (67 - 67)  BP: 204/86 (09 Nov 2023 15:21) (204/86 - 204/86)  BP(mean): --  RR: 16 (09 Nov 2023 15:21) (16 - 16)  SpO2: 97% (09 Nov 2023 15:21) (97% - 97%)    Parameters below as of 09 Nov 2023 15:21  Patient On (Oxygen Delivery Method): room air    Secondary Survey:   General: NAD  HEENT: EOMI, PERRL, 3 cm R forehead laceration with venous oozing, forehead hematoma 3x2 cm.  Neck: Soft, midline trachea. C-spine tenderness present   Chest: No chest wall tenderness, no subcutaneous emphysema   Cardiac: S1, S2, RRR  Respiratory: Bilateral breath sounds, clear and equal bilaterally  Abdomen: Soft, non-distended, non-tender, no rebound, no guarding.  Groin: Normal appearing, pelvis stable   Ext:  Moving b/l upper and lower extremities. Palpable Radial b/l UE, b/l DP palpable in LE.   Back: T spine tenderness to palpation, no L/S tenderness, No palpable runoff/stepoff/deformity  Rectal: No kelsey blood    FAST: Pending    ACCESS / DEVICES:  [X] Peripheral IV  [ ] Central Venous Line	[ ] R	[ ] L	[ ] IJ	[ ] Fem	[ ] SC	Placed:   [ ] Arterial Line		[ ] R	[ ] L	[ ] Fem	[ ] Rad	[ ] Ax	Placed:   [ ] PICC:					[ ] Mediport  [ ] Urinary Catheter,  Date Placed:   [ ] Chest tube: [ ] Right, [ ] Left  [ ] IVON/Kishor Drains    Labs:  CAPILLARY BLOOD GLUCOSE  POCT Blood Glucose.: 97 mg/dL (09 Nov 2023 15:28)    RADIOLOGY & ADDITIONAL STUDIES:  --------------------------------------------------------------------------------------- TRAUMA ACTIVATION LEVEL:  ALERT  ACTIVATED BY: EMS  INTUBATED: NO    MECHANISM OF INJURY:   [] Blunt     [] MVC	  [x] Fall	  [] Pedestrian Struck	  [] Motorcycle     [] Assault     [] Bicycle collision    [] Sports injury    [] Penetrating    [] Gun Shot Wound      [] Stab Wound    GCS: 15 	E: 4	V: 5	M: 6    HPI:  89yF w/ PMHx of COPD, urosepsis 1 yr ago with intubation/feeding tube, PE 1 yr ago on Eliquis 5 BID, CAD, HLD, hypothyroidism, lumbar inflammatory spondylopathy, gastroenteritis, urinary retention seen as Trauma Alert s/p mechanical fall from chair +HT -LOC +Eliquis.  Trauma assessment in ED: ABCs intact , GCS 15 , AAOx3. Patient fell forward from her chair onto her knees and then fell and hit the front of her head against the floor without loss of consciousness. Last dose of Eliquis was this morning. Patient was able to get up with some help and use her walker to the chair. Upon arrival HD stable, LOCKWOOD, GCS 15, complaining of headache and bilateral knee pain. External signs of trauma include right knee edema, right forehead laceration, forehead hematoma. Denies nausea/vomiting, fevers/chills, dysuria, abd pain, sob, chest pain.    PAST MEDICAL & SURGICAL HISTORY:  High blood cholesterol  Hypothyroid  Hypertension  COPD (chronic obstructive pulmonary disease)  CAD (coronary artery disease)  History of coronary artery stent placement  History of cholecystectomy  H/O abdominal hysterectomy  Cataract    Allergies  No Known Allergies  Intolerances    Home Medications:  apixaban 5 mg oral tablet: 2 tab(s) orally every 12 hours from 9/3 to 9/10 then 1 tab every 12 hours thereafter (06 Sep 2022 12:01)  aspirin 81 mg oral tablet, chewable: 1 tab(s) orally once a day (23 Aug 2022 04:41)  CLOPIDOGREL 75 MG TABLET: 1 each orally once a day (23 Aug 2022 04:41)  hydrALAZINE 25 mg oral tablet: 1 tab(s) orally 3 times a day (23 Aug 2022 04:41)  isosorbide dinitrate 10 mg oral tablet: 1 tab(s) orally once a day (23 Aug 2022 04:41)  latanoprost 0.005% ophthalmic solution: 1 drop(s) to each affected eye once a day (in the evening) (23 Aug 2022 04:41)  levothyroxine 25 mcg (0.025 mg) oral tablet: 1 tab(s) orally once a day (23 Aug 2022 04:41)  lisinopril 20 mg oral tablet: 1 tab(s) orally once a day (23 Aug 2022 04:41)  metoprolol succinate 25 mg oral tablet, extended release: 1  orally once a day (23 Aug 2022 04:41)  pantoprazole 20 mg oral delayed release tablet: 1 tab(s) orally once a day (23 Aug 2022 04:41)  polyethylene glycol 3350 oral powder for reconstitution: 17 gram(s) orally once a day (23 Aug 2022 04:41)  saccharomyces boulardii lyo 250 mg oral capsule: 1 cap(s) orally 2 times a day (23 Aug 2022 04:41)  senna leaf extract oral tablet: 2 tab(s) orally once a day (at bedtime) (23 Aug 2022 04:41)      ROS: 10-system review is otherwise negative except HPI above.      Primary Survey:    A - airway intact  B - bilateral breath sounds and good chest rise  C - palpable pulses in all extremities  D - GCS 15 on arrival, LOCKWOOD  Exposure obtained    Vital Signs Last 24 Hrs  T(C): 36.4 (09 Nov 2023 15:21), Max: 36.4 (09 Nov 2023 15:21)  T(F): 97.5 (09 Nov 2023 15:21), Max: 97.5 (09 Nov 2023 15:21)  HR: 67 (09 Nov 2023 15:21) (67 - 67)  BP: 204/86 (09 Nov 2023 15:21) (204/86 - 204/86)  BP(mean): --  RR: 16 (09 Nov 2023 15:21) (16 - 16)  SpO2: 97% (09 Nov 2023 15:21) (97% - 97%)    Parameters below as of 09 Nov 2023 15:21  Patient On (Oxygen Delivery Method): room air    Secondary Survey:   General: NAD  HEENT: EOMI, PERRL, 3 cm R forehead laceration with venous oozing, forehead hematoma 3x2 cm.  Neck: Soft, midline trachea. C-spine tenderness present   Chest: No chest wall tenderness, no subcutaneous emphysema   Cardiac: S1, S2, RRR  Respiratory: Bilateral breath sounds, clear and equal bilaterally  Abdomen: Soft, non-distended, non-tender, no rebound, no guarding.  Groin: Normal appearing, pelvis stable   Ext:  Moving b/l upper and lower extremities. Palpable Radial b/l UE, b/l DP palpable in LE.   Back: T spine tenderness to palpation, no L/S tenderness, No palpable runoff/stepoff/deformity  Rectal: No kelsey blood    FAST: Pending    ACCESS / DEVICES:  [X] Peripheral IV  [ ] Central Venous Line	[ ] R	[ ] L	[ ] IJ	[ ] Fem	[ ] SC	Placed:   [ ] Arterial Line		[ ] R	[ ] L	[ ] Fem	[ ] Rad	[ ] Ax	Placed:   [ ] PICC:					[ ] Mediport  [ ] Urinary Catheter,  Date Placed:   [ ] Chest tube: [ ] Right, [ ] Left  [ ] IVON/Kishor Drains    Labs:  CAPILLARY BLOOD GLUCOSE  POCT Blood Glucose.: 97 mg/dL (09 Nov 2023 15:28)    RADIOLOGY & ADDITIONAL STUDIES:  ---------------------------------------------------------------------------------------    < from: CT Abdomen and Pelvis w/ IV Cont (11.09.23 @ 16:39) >    IMPRESSION:    1. Chronic bronchiectatic and atelectatic changes in the right middle   lobe and lingula; right lower lobe bronchiectasis. Bilateral opacities,   predominantly in the region of the bronchiectasis, compatible with areas   of mucoid impaction.    2. No evidence of thoracic, abdominal or pelvic visceral injury,   laceration, or hematoma.    3. No evidence of acute fracture.    --- End of Report ---    < end of copied text >  < from: CT Cervical Spine No Cont (11.09.23 @ 16:25) >  IMPRESSION:    1.  No evidence of acute cervical spine fracture or subluxation.    2.  Diffuse osteopenia. Multilevel degenerative changes as described.    --- End of Report ---            < end of copied text >  < from: CT Head No Cont (11.09.23 @ 16:18) >  IMPRESSION:    1.  No evidence of acute intracranial pathology.    2.  Moderate/severe chronic microvascular changes and parenchymal atrophy.    3.  Left frontal scalp hematoma.    --- End of Report ---    < end of copied text >

## 2023-11-09 NOTE — ED ADULT NURSE NOTE - CHIEF COMPLAINT QUOTE
Patient BIBA from [facility] s/p fall out of wheelchair onto head with no LOC and + laceration to R forehead and contusion to forehead. Patient on Eliquis. Patient reports pain to head and bilateral knees R > L. Trauma alert initiated in triage.

## 2023-11-09 NOTE — ED PROVIDER NOTE - CARE PLAN
1 Principal Discharge DX:	Fall  Secondary Diagnosis:	Laceration of face  Secondary Diagnosis:	Right knee pain  Secondary Diagnosis:	Pneumonia

## 2023-11-09 NOTE — ED ADULT NURSE NOTE - NSFALLHARMRISKINTERV_ED_ALL_ED

## 2023-11-09 NOTE — ED ADULT NURSE NOTE - OBJECTIVE STATEMENT
Pt. BIBA from [facility] s/p fall. Pt. states she fell asleep in her wheelchair and fell forward out of it. Pt. denies LOC. Pt. (+) head trauma. Laceration and contusion noted to the R forehead. Pt. on Eliquis. Pt. c/o pain to the head and B/L knees. Trauma alert initiated.

## 2023-11-09 NOTE — ED PROVIDER NOTE - PHYSICAL EXAMINATION
CONSTITUTIONAL: Well-developed; well-nourished; in no acute distress. gcs 15, speaking in full sentences, moving all extremities  SKIN: warm, dry  HEAD: Hematoma with complicated 1.7 cm laceration to right frontal forehead.  EYES: PERRL, EOMI, no conjunctival erythema  ENT: No nasal discharge; airway clear, mucous membranes moist  NECK: c collar in place   CARD: +S1, S2 no murmurs, gallops, or rubs. Regular rate and rhythm. radial 2+ b/l, no chest wall tenderness or crepitus  RESP: No wheezes, rales or rhonchi. CTABL  ABD: soft ntnd, no rebound, no guarding, no rigidity  EXT: moves all extremities,  No clubbing, cyanosis or edema.   NEURO: Alert, oriented, grossly unremarkable, cn ii-xii grossly intact, follows commands  PSYCH: Cooperative, appropriate.

## 2023-11-09 NOTE — ED PROVIDER NOTE - WR ORDER DATE AND TIME 2
09-Nov-2023 15:41 Kenalog Preparation: Kenalog Validate Note Data When Using Inventory: Yes Medical Necessity Clause: This procedure was medically necessary because the lesions that were treated were: Detail Level: Detailed Administered By (Optional): IRA Donnelly X Size Of Lesion In Cm (Optional): 0 Consent: The risks of atrophy were reviewed with the patient. Concentration Of Solution Injected (Mg/Ml): 2.5 Total Volume Injected (Ccs- Only Use Numbers And Decimals): 0.2 Include Z78.9 (Other Specified Conditions Influencing Health Status) As An Associated Diagnosis?: No

## 2023-11-09 NOTE — ED PROVIDER NOTE - NSFOLLOWUPINSTRUCTIONS_ED_ALL_ED_FT
9 stiches placed      Pneumonia    Pneumonia is an infection of the lungs. Pneumonia may be caused by bacteria, viruses, or funguses. Symptoms include coughing, fever, chest pain when breathing deeply or coughing, shortness of breath, fatigue, or muscle aches. Pneumonia can be diagnosed with a medical history and physical exam, as well as other tests which may include a chest X-ray. If you were prescribed an antibiotic medicine, take it as told by your health care provider and do not stop taking the antibiotic even if you start to feel better. Do not use tobacco products, including cigarettes, chewing tobacco, and e-cigarettes.    SEEK IMMEDIATE MEDICAL CARE IF YOU HAVE THE FOLLOWING SYMPTOMS: worsening shortness of breath, worsening chest pain, coughing up blood, change in mental status, lightheadedness/dizziness.       Fall Prevention in the Home, Adult  Falls can cause injuries. They can happen to people of all ages. There are many things you can do to make your home safe and to help prevent falls. Ask for help when making these changes, if needed.    What actions can I take to prevent falls?  General Instructions     Use good lighting in all rooms. Replace any light bulbs that burn out.  Turn on the lights when you go into a dark area. Use night-lights.  Keep items that you use often in easy-to-reach places. Lower the shelves around your home if necessary.  Set up your furniture so you have a clear path. Avoid moving your furniture around.  Do not have throw rugs and other things on the floor that can make you trip.  Avoid walking on wet floors.  If any of your floors are uneven, fix them.  Add color or contrast paint or tape to clearly kristen and help you see:  Any grab bars or handrails.  First and last steps of stairways.  Where the edge of each step is.  If you use a stepladder:  Make sure that it is fully opened. Do not climb a closed stepladder.  Make sure that both sides of the stepladder are locked into place.  Ask someone to hold the stepladder for you while you use it.  If there are any pets around you, be aware of where they are.  What can I do in the bathroom?     Keep the floor dry. Clean up any water that spills onto the floor as soon as it happens.  Remove soap buildup in the tub or shower regularly.  Use non-skid mats or decals on the floor of the tub or shower.  Attach bath mats securely with double-sided, non-slip rug tape.  If you need to sit down in the shower, use a plastic, non-slip stool.  Install grab bars by the toilet and in the tub and shower. Do not use towel bars as grab bars.  What can I do in the bedroom?     Make sure that you have a light by your bed that is easy to reach.  Do not use any sheets or blankets that are too big for your bed. They should not hang down onto the floor.  Have a firm chair that has side arms. You can use this for support while you get dressed.  What can I do in the kitchen?     Clean up any spills right away.  If you need to reach something above you, use a strong step stool that has a grab bar.  Keep electrical cords out of the way.  Do not use floor polish or wax that makes floors slippery. If you must use wax, use non-skid floor wax.  What can I do with my stairs?     Do not leave any items on the stairs.  Make sure that you have a light switch at the top of the stairs and the bottom of the stairs. If you do not have them, ask someone to add them for you.  Make sure that there are handrails on both sides of the stairs, and use them. Fix handrails that are broken or loose. Make sure that handrails are as long as the stairways.  Install non-slip stair treads on all stairs in your home.  Avoid having throw rugs at the top or bottom of the stairs. If you do have throw rugs, attach them to the floor with carpet tape.  Choose a carpet that does not hide the edge of the steps on the stairway.  Check any carpeting to make sure that it is firmly attached to the stairs. Fix any carpet that is loose or worn.  What can I do on the outside of my home?     Use bright outdoor lighting.  Regularly fix the edges of walkways and driveways and fix any cracks.  Remove anything that might make you trip as you walk through a door, such as a raised step or threshold.  Trim any bushes or trees on the path to your home.  Regularly check to see if handrails are loose or broken. Make sure that both sides of any steps have handrails.  Install guardrails along the edges of any raised decks and porches.  Clear walking paths of anything that might make someone trip, such as tools or rocks.  Have any leaves, snow, or ice cleared regularly.  Use sand or salt on walking paths during winter.  Clean up any spills in your garage right away. This includes grease or oil spills.  What other actions can I take?     Wear shoes that:  Have a low heel. Do not wear high heels.  Have rubber bottoms.  Are comfortable and fit you well.  Are closed at the toe. Do not wear open-toe sandals.  Use tools that help you move around (mobility aids) if they are needed. These include:  Canes.  Walkers.  Scooters.  Crutches.  Review your medicines with your doctor. Some medicines can make you feel dizzy. This can increase your chance of falling.  Ask your doctor what other things you can do to help prevent falls.    Where to find more information  Centers for [employer] Control and [place]: https://cdc.gov  [employer] [place] [employer]: https://wt8hcho.aleksandr.nih.gov  Contact a doctor if:  You are afraid of falling at home.  You feel weak, drowsy, or dizzy at home.  You fall at home.  Summary  There are many simple things that you can do to make your home safe and to help prevent falls.  Ways to make your home safe include removing tripping hazards and installing grab bars in the bathroom.  Ask for help when making these changes in your home.  This information is not intended to replace advice given to you by your health care provider. Make sure you discuss any questions you have with your health care provider.      Sutured Wound Care  Sutures are stitches that can be used to close wounds. Taking care of your wound properly can help to prevent pain and infection. It can also help your wound to heal more quickly. Follow instructions from your health care provider about how to care for your sutured wound.    Supplies needed:  Soap and water.  A clean bandage (dressing), if needed.  Antibiotic ointment.  A clean towel.  How to care for your sutured wound  Keep the wound completely dry for the first 24 hours, or for as long as directed by your health care provider. After 24–48 hours, you may shower or bathe as directed by your health care provider. Do not soak or submerge the wound in water until the sutures have been removed.  After the first 24 hours, clean the wound once a day, or as often as directed by your health care provider, using the following steps:    Wash the wound with soap and water.  Rinse the wound with water to remove all soap.  Pat the wound dry with a clean towel. Do not rub the wound.    After cleaning the wound, apply a thin layer of antibiotic ointment as directed by your health care provider. This will prevent infection and keep the dressing from sticking to the wound.  Follow instructions from your health care provider about how to change your dressing:    Wash your hands with soap and water. If soap and water are not available, use hand .  Change your dressing at least once a day, or as often as told by your health care provider. If your dressing gets wet or dirty, change it.  Leave sutures and other skin closures, such as adhesive tape or skin glue, in place. These skin closures may need to stay in place for 2 weeks or longer. If adhesive strip edges start to loosen and curl up, you may trim the loose edges. Do not remove adhesive strips completely unless your health care provider tells you to do that.    Check your wound every day for signs of infection. Watch for:    Redness, swelling, or pain.  Fluid or blood.  Warmth.  Pus or a bad smell.    ImageHave the sutures removed as directed by your health care provider.  Follow these instructions at home:  Medicines     Take or apply over-the-counter and prescription medicines only as told by your health care provider.  If you were prescribed an antibiotic medicine or ointment, take or apply it as told by your health care provider. Do not stop using the antibiotic even if your condition improves.  General instructions     To help reduce scarring after your wound heals, cover your wound with clothing or apply sunscreen of at least 30 SPF whenever you are outside.  Do not scratch or pick at your wound.  Avoid stretching your wound.  Raise (elevate) the injured area above the level of your heart while you are sitting or lying down, if possible.  Drink enough fluids to keep your urine clear or pale yellow.  Keep all follow-up visits as told by your health care provider. This is important.  Contact a health care provider if:  You received a tetanus shot and you have swelling, severe pain, redness, or bleeding at the injection site.  Your wound breaks open.  You have redness, swelling, or pain around your wound.  You have fluid or blood coming from your wound.  Your wound feels warm to the touch.  You have a fever.  You notice something coming out of your wound, such as wood or glass.  You have pain that does not get better with medicine.  The skin near your wound changes color.  You need to change your dressing very frequently due to a lot of fluid, blood, or pus draining from the wound.  You develop a new rash.  You develop numbness around the wound.  Get help right away if:  You develop severe swelling around your wound.  You have pus or a bad smell coming from your wound.  Your pain suddenly gets worse and is severe.  You develop painful lumps near your wound or anywhere on your body.  You have a red streak going away from your wound.  The wound is on your hand or foot and:    You cannot properly move a finger or toe.  Your fingers or toes look pale or bluish.  You have numbness that is spreading down your hand, foot, fingers, or toes.    Summary  Sutures are stitches that can be used to close wounds.  Taking care of your wound properly can help to prevent pain and infection.  Keep the wound completely dry for the first 24 hours, or for as long as directed by your health care provider. After 24–48 hours, you may shower or bathe as directed by your health care provider.  This information is not intended to replace advice given to you by your health care provider. Make sure you discuss any questions you have with your health care provider. 16 stiches placed- please come back to the ed or urgent care in 5-7 days.   please take the antibiotics as prescribed.       Pneumonia    Pneumonia is an infection of the lungs. Pneumonia may be caused by bacteria, viruses, or funguses. Symptoms include coughing, fever, chest pain when breathing deeply or coughing, shortness of breath, fatigue, or muscle aches. Pneumonia can be diagnosed with a medical history and physical exam, as well as other tests which may include a chest X-ray. If you were prescribed an antibiotic medicine, take it as told by your health care provider and do not stop taking the antibiotic even if you start to feel better. Do not use tobacco products, including cigarettes, chewing tobacco, and e-cigarettes.    SEEK IMMEDIATE MEDICAL CARE IF YOU HAVE THE FOLLOWING SYMPTOMS: worsening shortness of breath, worsening chest pain, coughing up blood, change in mental status, lightheadedness/dizziness.       Fall Prevention in the Home, Adult  Falls can cause injuries. They can happen to people of all ages. There are many things you can do to make your home safe and to help prevent falls. Ask for help when making these changes, if needed.    What actions can I take to prevent falls?  General Instructions     Use good lighting in all rooms. Replace any light bulbs that burn out.  Turn on the lights when you go into a dark area. Use night-lights.  Keep items that you use often in easy-to-reach places. Lower the shelves around your home if necessary.  Set up your furniture so you have a clear path. Avoid moving your furniture around.  Do not have throw rugs and other things on the floor that can make you trip.  Avoid walking on wet floors.  If any of your floors are uneven, fix them.  Add color or contrast paint or tape to clearly kristen and help you see:  Any grab bars or handrails.  First and last steps of stairways.  Where the edge of each step is.  If you use a stepladder:  Make sure that it is fully opened. Do not climb a closed stepladder.  Make sure that both sides of the stepladder are locked into place.  Ask someone to hold the stepladder for you while you use it.  If there are any pets around you, be aware of where they are.  What can I do in the bathroom?     Keep the floor dry. Clean up any water that spills onto the floor as soon as it happens.  Remove soap buildup in the tub or shower regularly.  Use non-skid mats or decals on the floor of the tub or shower.  Attach bath mats securely with double-sided, non-slip rug tape.  If you need to sit down in the shower, use a plastic, non-slip stool.  Install grab bars by the toilet and in the tub and shower. Do not use towel bars as grab bars.  What can I do in the bedroom?     Make sure that you have a light by your bed that is easy to reach.  Do not use any sheets or blankets that are too big for your bed. They should not hang down onto the floor.  Have a firm chair that has side arms. You can use this for support while you get dressed.  What can I do in the kitchen?     Clean up any spills right away.  If you need to reach something above you, use a strong step stool that has a grab bar.  Keep electrical cords out of the way.  Do not use floor polish or wax that makes floors slippery. If you must use wax, use non-skid floor wax.  What can I do with my stairs?     Do not leave any items on the stairs.  Make sure that you have a light switch at the top of the stairs and the bottom of the stairs. If you do not have them, ask someone to add them for you.  Make sure that there are handrails on both sides of the stairs, and use them. Fix handrails that are broken or loose. Make sure that handrails are as long as the stairways.  Install non-slip stair treads on all stairs in your home.  Avoid having throw rugs at the top or bottom of the stairs. If you do have throw rugs, attach them to the floor with carpet tape.  Choose a carpet that does not hide the edge of the steps on the stairway.  Check any carpeting to make sure that it is firmly attached to the stairs. Fix any carpet that is loose or worn.  What can I do on the outside of my home?     Use bright outdoor lighting.  Regularly fix the edges of walkways and driveways and fix any cracks.  Remove anything that might make you trip as you walk through a door, such as a raised step or threshold.  Trim any bushes or trees on the path to your home.  Regularly check to see if handrails are loose or broken. Make sure that both sides of any steps have handrails.  Install guardrails along the edges of any raised decks and porches.  Clear walking paths of anything that might make someone trip, such as tools or rocks.  Have any leaves, snow, or ice cleared regularly.  Use sand or salt on walking paths during winter.  Clean up any spills in your garage right away. This includes grease or oil spills.  What other actions can I take?     Wear shoes that:  Have a low heel. Do not wear high heels.  Have rubber bottoms.  Are comfortable and fit you well.  Are closed at the toe. Do not wear open-toe sandals.  Use tools that help you move around (mobility aids) if they are needed. These include:  Canes.  Walkers.  Scooters.  Crutches.  Review your medicines with your doctor. Some medicines can make you feel dizzy. This can increase your chance of falling.  Ask your doctor what other things you can do to help prevent falls.    Where to find more information  Centers for Disease Control and Prevention, [place]: https://cdc.gov  [employer]: https://qq1mylk.aleksandr.nih.gov  Contact a doctor if:  You are afraid of falling at home.  You feel weak, drowsy, or dizzy at home.  You fall at home.  Summary  There are many simple things that you can do to make your home safe and to help prevent falls.  Ways to make your home safe include removing tripping hazards and installing grab bars in the bathroom.  Ask for help when making these changes in your home.  This information is not intended to replace advice given to you by your health care provider. Make sure you discuss any questions you have with your health care provider.      Sutured Wound Care  Sutures are stitches that can be used to close wounds. Taking care of your wound properly can help to prevent pain and infection. It can also help your wound to heal more quickly. Follow instructions from your health care provider about how to care for your sutured wound.    Supplies needed:  Soap and water.  A clean bandage (dressing), if needed.  Antibiotic ointment.  A clean towel.  How to care for your sutured wound  Keep the wound completely dry for the first 24 hours, or for as long as directed by your health care provider. After 24–48 hours, you may shower or bathe as directed by your health care provider. Do not soak or submerge the wound in water until the sutures have been removed.  After the first 24 hours, clean the wound once a day, or as often as directed by your health care provider, using the following steps:    Wash the wound with soap and water.  Rinse the wound with water to remove all soap.  Pat the wound dry with a clean towel. Do not rub the wound.    After cleaning the wound, apply a thin layer of antibiotic ointment as directed by your health care provider. This will prevent infection and keep the dressing from sticking to the wound.  Follow instructions from your health care provider about how to change your dressing:    Wash your hands with soap and water. If soap and water are not available, use hand .  Change your dressing at least once a day, or as often as told by your health care provider. If your dressing gets wet or dirty, change it.  Leave sutures and other skin closures, such as adhesive tape or skin glue, in place. These skin closures may need to stay in place for 2 weeks or longer. If adhesive strip edges start to loosen and curl up, you may trim the loose edges. Do not remove adhesive strips completely unless your health care provider tells you to do that.    Check your wound every day for signs of infection. Watch for:    Redness, swelling, or pain.  Fluid or blood.  Warmth.  Pus or a bad smell.    ImageHave the sutures removed as directed by your health care provider.  Follow these instructions at home:  Medicines     Take or apply over-the-counter and prescription medicines only as told by your health care provider.  If you were prescribed an antibiotic medicine or ointment, take or apply it as told by your health care provider. Do not stop using the antibiotic even if your condition improves.  General instructions     To help reduce scarring after your wound heals, cover your wound with clothing or apply sunscreen of at least 30 SPF whenever you are outside.  Do not scratch or pick at your wound.  Avoid stretching your wound.  Raise (elevate) the injured area above the level of your heart while you are sitting or lying down, if possible.  Drink enough fluids to keep your urine clear or pale yellow.  Keep all follow-up visits as told by your health care provider. This is important.  Contact a health care provider if:  You received a tetanus shot and you have swelling, severe pain, redness, or bleeding at the injection site.  Your wound breaks open.  You have redness, swelling, or pain around your wound.  You have fluid or blood coming from your wound.  Your wound feels warm to the touch.  You have a fever.  You notice something coming out of your wound, such as wood or glass.  You have pain that does not get better with medicine.  The skin near your wound changes color.  You need to change your dressing very frequently due to a lot of fluid, blood, or pus draining from the wound.  You develop a new rash.  You develop numbness around the wound.  Get help right away if:  You develop severe swelling around your wound.  You have pus or a bad smell coming from your wound.  Your pain suddenly gets worse and is severe.  You develop painful lumps near your wound or anywhere on your body.  You have a red streak going away from your wound.  The wound is on your hand or foot and:    You cannot properly move a finger or toe.  Your fingers or toes look pale or bluish.  You have numbness that is spreading down your hand, foot, fingers, or toes.    Summary  Sutures are stitches that can be used to close wounds.  Taking care of your wound properly can help to prevent pain and infection.  Keep the wound completely dry for the first 24 hours, or for as long as directed by your health care provider. After 24–48 hours, you may shower or bathe as directed by your health care provider.  This information is not intended to replace advice given to you by your health care provider. Make sure you discuss any questions you have with your health care provider.

## 2023-11-09 NOTE — CONSULT NOTE ADULT - ATTENDING COMMENTS
Trauma Attending H&P Attestation    Patient seen and evaluated with the trauma team in the trauma bay upon arrival. All pertinent labs and radiographic imaging reviewed, pending final reports. Outpatient medications reviewed, including the presence of anticoagulants, if applicable. I agree with the resident's note above, including the physical exam findings, assessment and plan as documented with the following adjustments.     Trauma Level: [ ] Code  [x ] Alert  [ ] Consult [ ] Transfer in  Patient is seen at the bedside at 15:45  Activation by:  [ x] ED physician [ x] EMS  Intubated in Field? [ ] Yes [x ] No  Intubated in ED? [ ] Yes [x ] No  Intubated in Trauma [place]? [ ] Yes [x ] No    CHIRAG HERNÁNDEZ Patient is a 89y old  Female who presents with a chief complaint of fall on AC patient landed on her knees and hit her forehead  Patient presented with GCS [15 ]  upon arrival to the trauma bay.  Allergies  No Known Allergies  Intolerances    PAST MEDICAL & SURGICAL HISTORY:  High blood cholesterol  Hypothyroid  Hypertension  COPD (chronic obstructive pulmonary disease)  CAD (coronary artery disease)  History of coronary artery stent placement  History of cholecystectomy  H/O abdominal hysterectomy  Cataract    On AC/Antiplatelets [x ] Yes [ ] No              [ x] NOVACs, [ ] Coumadin, [ ] ASA, [ ] Antiplatelets     Vital Signs Last 24 Hrs  T(C): 36.4 (09 Nov 2023 15:21), Max: 36.4 (09 Nov 2023 15:21)  T(F): 97.5 (09 Nov 2023 15:21), Max: 97.5 (09 Nov 2023 15:21)  HR: 67 (09 Nov 2023 15:21) (67 - 67)  BP: 204/86 (09 Nov 2023 15:21) (204/86 - 204/86)  BP(mean): --  RR: 16 (09 Nov 2023 15:21) (16 - 16)  SpO2: 97% (09 Nov 2023 15:21) (97% - 97%)    Parameters below as of 09 Nov 2023 15:21  Patient On (Oxygen Delivery Method): room air    PE:  small non bleeding forehead laceration and hematoma  Assessment: Fall on AC  PLAN  - supportive care  - GI/DVT prophylaxis  - pain management  - repeat studies as needed  - complete and follow up on trauma work up included but not limites to                          [x ] CXR [x ] PXR [x ] Extremities X-RAYs                          [ x] NCHCT [ x] C-Spine CT [x ] CT Chest [x ] CT Abdomen/Pelvis                          [x ] FAST [ ] Other                          [x ] Trauma Labs   [ ] Toxicology   - Follow up Consults  [ ] Neurosurgery [ ] Orthopaedics [ ] Plastics [ ] Fascial/OMFS [ ] Opthalmology   [ ] Urology  [ ] ENT                                          [x ] Medicine [ ] Geriatrics [ ] Cardiology/EP [ ] Hospice/Palliative Care                                        [ ] Pediatric ICU  [ ] SICU/SDU [ ] Burn/Burn ICU  - IV ABx give as indicated [ ] Yes [x ] No  - Tetanus given as indicated [ ] Yes [x ] No  - Seizures prophylaxis  [ ] Yes,  [x ] No    Corin Decker MD, FACS  Trauma/ACS/Surgical Critical Care Attending

## 2023-11-09 NOTE — ED ADULT NURSE NOTE - NSFALLCONCLUSION_ED_ALL_ED
Reason for Call:  Other metoprolol-hydrochlorothiazide (DUTOPROL    Detailed comments: patient called said Dr Avery changed her Medication without discussing with her, would like to speak with a Nurse, says she only has 2 pills left, patient is concerned    Phone Number Patient can be reached at: Home number on file [phone] (home)    Best Time: today    Can we leave a detailed message on this number? YES    Call taken on 7/24/2017 at 1:20 PM by Samreen Fisher     Fall with Harm Risk

## 2023-11-09 NOTE — ED PROVIDER NOTE - ATTENDING CONTRIBUTION TO CARE
I personally evaluated patient. I agree with the findings and plan with all documentation on chart except as documented  in my note.    89-year-old female past medical history of COPD, pulmonary embolism on Eliquis, dyslipidemia, CAD, hypothyroidism presents to the emergency department status post fall from wheelchair.  Additional history obtained from EMS and patient's niece.  Patient fell asleep while reading on a wheelchair and face planted forward hitting her nose and forehead and suffering a laceration.  Patient denies any loss of consciousness.  Patient believes she fell on her knees first and then hit her face and denies any other pain or injuries.  Patient was a trauma alert based on head injury on Eliquis and was rushed to the critical care area of the emergency department.    Patient immediately seen and evaluated and primary survey is intact.  GCS 15.  Patient has hematoma to her forehead and a complicated laceration to her right temporal forehead.  Secondary survey shows forehead hematoma, laceration, swelling and tenderness to right knee, tenderness to left knee.  No ligament laxity, no pelvic instability, no spinal tenderness other than mildly at C4-C5 and T4-T5.  Patient had appropriate trauma imaging, tetanus shot was given, patient given pain control.  Labs reviewed and CT scan does not show any serious injuries.  Patient has area of bronchiectasis with concern for mucoid impaction.  Will give atypical coverage with Levaquin.  Patient to return in 7 days for wound check and suture removal.  Ace wrap applied to knees and patient is no longer ambulatory at baseline and gets around in a wheelchair.  Patient and her niece spoken to about results and plan of care and need for follow-up.    Full DC instructions discussed and patient knows when to seek immediate medical attention.  Patient has proper follow up.  All results discussed and patient aware they may require further work up.  Proper follow up ensured. Limitations of ED work up discussed.  Medications administered and prescribed/OTC home meds discussed.  All questions and concerns from patient or family addressed. Understanding of instructions verbalized.

## 2023-11-09 NOTE — CONSULT NOTE ADULT - ASSESSMENT
ASSESSMENT:  89yF w/ PMHx of COPD, urosepsis 1 yr ago with intubation/feeding tube, PE 1 yr ago on Eliquis 5 BID, CAD, HLD, hypothyroidism, lumbar inflammatory spondylopathy, gastroenteritis, urinary retention seen as Trauma Alert s/p mechanical fall from chair +HT -LOC +Eliquis.  Trauma assessment in ED: ABCs intact , GCS 15 , AAOx3. Patient fell forward from her chair onto her knees and then fell and hit the front of her head against the floor without loss of consciousness. Last dose of Eliquis was this morning. Patient was able to get up with some help and use her walker to the chair. Upon arrival HD stable, LOCKWOOD, GCS 15, complaining of headache and bilateral knee pain. External signs of trauma include right knee edema, right forehead laceration, forehead hematoma. Denies nausea/vomiting, fevers/chills, dysuria, abd pain, sob, chest pain.    Injuries identified:   -   -   -     PLAN:   - Trauma Labs: (CBC, BMP, Coags, T&S, UA, EtOH level)  Additional studies:  EKG  Utox    Trauma Imaging to include the following:  - CXR, Pelvic Xray  - CT Head,  CT C-spine, CT Max/Face, CT Chest, CT Abd/Pelvis  - Extremity films: None    Additional consultations:      Disposition pending results of above labs and imaging  Above plan discussed with Trauma attending, Dr. Decker, patient, patient family, and ED team  --------------------------------------------------------------------------------------  11-09-23 @ 16:00   ASSESSMENT:  89yF w/ PMHx of COPD, urosepsis 1 yr ago with intubation/feeding tube, PE 1 yr ago on Eliquis 5 BID, CAD, HLD, hypothyroidism, lumbar inflammatory spondylopathy, gastroenteritis, urinary retention seen as Trauma Alert s/p mechanical fall from chair +HT -LOC +Eliquis.  Trauma assessment in ED: ABCs intact , GCS 15 , AAOx3. Patient fell forward from her chair onto her knees and then fell and hit the front of her head against the floor without loss of consciousness. Last dose of Eliquis was this morning. Patient was able to get up with some help and use her walker to the chair. Upon arrival HD stable, LOCKWOOD, GCS 15, complaining of headache and bilateral knee pain. External signs of trauma include right knee edema, right forehead laceration, forehead hematoma. Denies nausea/vomiting, fevers/chills, dysuria, abd pain, sob, chest pain.    Injuries identified:   -   -   -     PLAN:   - Trauma Labs: (CBC, BMP, Coags, T&S, UA, EtOH level)  Additional studies:  EKG  Utox    Trauma Imaging to include the following:  - CXR, Pelvic Xray  - CT Head,  CT C-spine, CT Max/Face, CT Chest, CT Abd/Pelvis  - Extremity films: b/l knee xrays    Additional consultations:      Disposition pending results of above labs and imaging  Above plan discussed with Trauma attending, Dr. Decker, patient, patient family, and ED team  --------------------------------------------------------------------------------------  11-09-23 @ 16:00   ASSESSMENT:  89yF w/ PMHx of COPD, urosepsis 1 yr ago with intubation/feeding tube, PE 1 yr ago on Eliquis 5 BID, CAD, HLD, hypothyroidism, lumbar inflammatory spondylopathy, gastroenteritis, urinary retention seen as Trauma Alert s/p mechanical fall from chair +HT -LOC +Eliquis.  Trauma assessment in ED: ABCs intact , GCS 15 , AAOx3. Patient fell forward from her chair onto her knees and then fell and hit the front of her head against the floor without loss of consciousness. Last dose of Eliquis was this morning. Patient was able to get up with some help and use her walker to the chair. Upon arrival HD stable, LOCKWOOD, GCS 15, complaining of headache and bilateral knee pain. External signs of trauma include right knee edema, right forehead laceration, forehead hematoma. Denies nausea/vomiting, fevers/chills, dysuria, abd pain, sob, chest pain.    Injuries identified:   -   -   -     PLAN:   - Trauma Labs: (CBC, BMP, Coags, T&S, UA, EtOH level)  Additional studies:  EKG  Utox    Trauma Imaging to include the following:  - CXR, Pelvic Xray  - CT Head,  CT C-spine, CT Max/Face, CT Chest, CT Abd/Pelvis  - Extremity films: b/l knee xrays    Additional consultations:    Disposition pending results of above labs and imaging  Above plan discussed with Trauma attending, Dr. Decker, patient, patient family, and ED team  --------------------------------------------------------------------------------------  11-09-23 @ 16:00   ASSESSMENT:  89yF w/ PMHx of COPD, urosepsis 1 yr ago with intubation/feeding tube, PE 1 yr ago on Eliquis 5 BID, CAD, HLD, hypothyroidism, lumbar inflammatory spondylopathy, gastroenteritis, urinary retention seen as Trauma Alert s/p mechanical fall from chair +HT -LOC +Eliquis.  Trauma assessment in ED: ABCs intact , GCS 15 , AAOx3. Patient fell forward from her chair onto her knees and then fell and hit the front of her head against the floor without loss of consciousness. Last dose of Eliquis was this morning. Patient was able to get up with some help and use her walker to the chair. Upon arrival HD stable, LOCKWOOD, GCS 15, complaining of headache and bilateral knee pain. External signs of trauma include right knee edema, right forehead laceration, forehead hematoma. Denies nausea/vomiting, fevers/chills, dysuria, abd pain, sob, chest pain.    Injuries identified:   - No acute traumatic injuries  -   -     PLAN:   - Trauma Labs: (CBC, BMP, Coags, T&S, UA, EtOH level)  Additional studies:  EKG  Utox    Trauma Imaging to include the following:  - CXR, Pelvic Xray  - CT Head,  CT C-spine, CT Max/Face, CT Chest, CT Abd/Pelvis  - Extremity films: b/l knee xrays    Additional consultations:    Disposition per ED  Above plan discussed with Trauma attending, Dr. Decker, patient, patient family, and ED team  --------------------------------------------------------------------------------------  11-09-23 @ 16:00

## 2023-11-09 NOTE — ED PROVIDER NOTE - CLINICAL SUMMARY MEDICAL DECISION MAKING FREE TEXT BOX
89-year-old female past medical history of COPD, pulmonary embolism on Eliquis, dyslipidemia, CAD, hypothyroidism presents to the emergency department status post fall from wheelchair.  Additional history obtained from EMS and patient's niece.  Patient fell asleep while reading on a wheelchair and face planted forward hitting her nose and forehead and suffering a laceration.  Patient denies any loss of consciousness.  Patient believes she fell on her knees first and then hit her face and denies any other pain or injuries.  Patient was a trauma alert based on head injury on Eliquis and was rushed to the critical care area of the emergency department.    Patient immediately seen and evaluated and primary survey is intact.  GCS 15.  Patient has hematoma to her forehead and a complicated laceration to her right temporal forehead.  Secondary survey shows forehead hematoma, laceration, swelling and tenderness to right knee, tenderness to left knee.  No ligament laxity, no pelvic instability, no spinal tenderness other than mildly at C4-C5 and T4-T5.  Patient had appropriate trauma imaging, tetanus shot was given, patient given pain control.  Labs reviewed and CT scan does not show any serious injuries.  Patient has area of bronchiectasis with concern for mucoid impaction.  Will give atypical coverage with Levaquin.  Patient to return in 7 days for wound check and suture removal.  Ace wrap applied to knees and patient is no longer ambulatory at baseline and gets around in a wheelchair.  Patient and her niece spoken to about results and plan of care and need for follow-up.    Full DC instructions discussed and patient knows when to seek immediate medical attention.  Patient has proper follow up.  All results discussed and patient aware they may require further work up.  Proper follow up ensured. Limitations of ED work up discussed.  Medications administered and prescribed/OTC home meds discussed.  All questions and concerns from patient or family addressed. Understanding of instructions verbalized.

## 2023-11-09 NOTE — ED PROVIDER NOTE - PATIENT PORTAL LINK FT
You can access the FollowMyHealth Patient Portal offered by [facility] by registering at the following website: [email or web address]. By joining [employer]’s FollowMyHealth portal, you will also be able to view your health information using other applications (apps) compatible with our system.

## 2023-11-09 NOTE — ED ADULT TRIAGE NOTE - CHIEF COMPLAINT QUOTE
Patient BIBA from [facility] s/p fall out of wheelchair onto head with no LOC and + laceration to R forehead and contusion to forehead. Patient on Eliquis. Patient reports pain to head and bilateral knees R > L. Trauma alert initiated in triage.

## 2024-08-09 NOTE — ED PROVIDER NOTE - IV ALTEPASE ADMIN HIDDEN
[FreeTextEntry1] : Mr Roberto is a 75 y.o. M who presents with BPH, urinary retention, b/l hydronephrosis  #Retention, b/l hydronephrosis, BPH - Catheter removed. To continue cathing QID. Discussed compliance fransisca around 350 mL - should target volumes less than this. He voids between caths and reports volumes are typically 300-400 mL - WAS scheduled for TURP over 1 year ago but patient canceled.  - Last CT reviewed - b/l hydronephrosis to level of the bladder - Continue finasteride, Flomax - UDS w/ BLANKENSHIP - re-reviewed - Discussed with patient history strongly consider outlet procedure - remeasured prostate with most recent CT using ellipsoid calculator - 72 cc.  Discussed TURP, HoLEP, aqua ablation.  He is more willing to undergo surgery but wants to address his hip first. Discussed could have continued renal deterioration if does not address outlet / continue with adequate cath frequency - ULS in 6 weeks  #Left ureteral stone s/p left URS - Stone analysis: 100% calcium oxalate monohydrate - Increase hydration, add lemon juice - CT 6/2024 without stones  show

## 2024-09-08 NOTE — PHYSICAL THERAPY INITIAL EVALUATION ADULT - BED MOBILITY LIMITATIONS, REHAB EVAL
decreased ability to use arms for pushing/pulling/decreased ability to use legs for bridging/pushing/impaired ability to control trunk for mobility

## 2025-05-26 ENCOUNTER — INPATIENT (INPATIENT)
Facility: HOSPITAL | Age: 89
LOS: 3 days | Discharge: HOME | DRG: 193 | End: 2025-05-30
Attending: INTERNAL MEDICINE | Admitting: STUDENT IN AN ORGANIZED HEALTH CARE EDUCATION/TRAINING PROGRAM
Payer: MEDICARE

## 2025-05-26 VITALS
TEMPERATURE: 98 F | RESPIRATION RATE: 19 BRPM | DIASTOLIC BLOOD PRESSURE: 64 MMHG | HEART RATE: 71 BPM | SYSTOLIC BLOOD PRESSURE: 172 MMHG | OXYGEN SATURATION: 98 %

## 2025-05-26 DIAGNOSIS — J96.01 ACUTE RESPIRATORY FAILURE WITH HYPOXIA: ICD-10-CM

## 2025-05-26 DIAGNOSIS — Z90.710 ACQUIRED ABSENCE OF BOTH CERVIX AND UTERUS: Chronic | ICD-10-CM

## 2025-05-26 DIAGNOSIS — Z95.5 PRESENCE OF CORONARY ANGIOPLASTY IMPLANT AND GRAFT: Chronic | ICD-10-CM

## 2025-05-26 DIAGNOSIS — Z90.49 ACQUIRED ABSENCE OF OTHER SPECIFIED PARTS OF DIGESTIVE TRACT: Chronic | ICD-10-CM

## 2025-05-26 DIAGNOSIS — H26.9 UNSPECIFIED CATARACT: Chronic | ICD-10-CM

## 2025-05-26 LAB
ALBUMIN SERPL ELPH-MCNC: 3.5 G/DL — SIGNIFICANT CHANGE UP (ref 3.5–5.2)
ALP SERPL-CCNC: 104 U/L — SIGNIFICANT CHANGE UP (ref 30–115)
ALT FLD-CCNC: 12 U/L — SIGNIFICANT CHANGE UP (ref 0–41)
ANION GAP SERPL CALC-SCNC: 14 MMOL/L — SIGNIFICANT CHANGE UP (ref 7–14)
APPEARANCE UR: CLEAR — SIGNIFICANT CHANGE UP
AST SERPL-CCNC: 21 U/L — SIGNIFICANT CHANGE UP (ref 0–41)
BASOPHILS # BLD AUTO: 0.04 K/UL — SIGNIFICANT CHANGE UP (ref 0–0.2)
BASOPHILS NFR BLD AUTO: 0.7 % — SIGNIFICANT CHANGE UP (ref 0–1)
BILIRUB SERPL-MCNC: 0.4 MG/DL — SIGNIFICANT CHANGE UP (ref 0.2–1.2)
BILIRUB UR-MCNC: NEGATIVE — SIGNIFICANT CHANGE UP
BUN SERPL-MCNC: 12 MG/DL — SIGNIFICANT CHANGE UP (ref 10–20)
CALCIUM SERPL-MCNC: 9.4 MG/DL — SIGNIFICANT CHANGE UP (ref 8.4–10.5)
CHLORIDE SERPL-SCNC: 103 MMOL/L — SIGNIFICANT CHANGE UP (ref 98–110)
CO2 SERPL-SCNC: 25 MMOL/L — SIGNIFICANT CHANGE UP (ref 17–32)
COLOR SPEC: YELLOW — SIGNIFICANT CHANGE UP
CREAT SERPL-MCNC: 0.5 MG/DL — LOW (ref 0.7–1.5)
DIFF PNL FLD: NEGATIVE — SIGNIFICANT CHANGE UP
EGFR: 84 ML/MIN/1.73M2 — SIGNIFICANT CHANGE UP
EGFR: 84 ML/MIN/1.73M2 — SIGNIFICANT CHANGE UP
EOSINOPHIL # BLD AUTO: 0.19 K/UL — SIGNIFICANT CHANGE UP (ref 0–0.7)
EOSINOPHIL NFR BLD AUTO: 3.2 % — SIGNIFICANT CHANGE UP (ref 0–8)
GAS PNL BLDV: SIGNIFICANT CHANGE UP
GLUCOSE SERPL-MCNC: 72 MG/DL — SIGNIFICANT CHANGE UP (ref 70–99)
GLUCOSE UR QL: NEGATIVE MG/DL — SIGNIFICANT CHANGE UP
HCT VFR BLD CALC: 39.1 % — SIGNIFICANT CHANGE UP (ref 37–47)
HGB BLD-MCNC: 12.1 G/DL — SIGNIFICANT CHANGE UP (ref 12–16)
IMM GRANULOCYTES NFR BLD AUTO: 0.3 % — SIGNIFICANT CHANGE UP (ref 0.1–0.3)
KETONES UR QL: 15 MG/DL
LACTATE SERPL-SCNC: 0.8 MMOL/L — SIGNIFICANT CHANGE UP (ref 0.7–2)
LEUKOCYTE ESTERASE UR-ACNC: NEGATIVE — SIGNIFICANT CHANGE UP
LYMPHOCYTES # BLD AUTO: 1.35 K/UL — SIGNIFICANT CHANGE UP (ref 1.2–3.4)
LYMPHOCYTES # BLD AUTO: 23.1 % — SIGNIFICANT CHANGE UP (ref 20.5–51.1)
MCHC RBC-ENTMCNC: 30.9 G/DL — LOW (ref 32–37)
MCHC RBC-ENTMCNC: 31.3 PG — HIGH (ref 27–31)
MCV RBC AUTO: 101.3 FL — HIGH (ref 81–99)
MONOCYTES # BLD AUTO: 0.8 K/UL — HIGH (ref 0.1–0.6)
MONOCYTES NFR BLD AUTO: 13.7 % — HIGH (ref 1.7–9.3)
MRSA PCR RESULT.: NEGATIVE — SIGNIFICANT CHANGE UP
NEUTROPHILS # BLD AUTO: 3.45 K/UL — SIGNIFICANT CHANGE UP (ref 1.4–6.5)
NEUTROPHILS NFR BLD AUTO: 59 % — SIGNIFICANT CHANGE UP (ref 42.2–75.2)
NITRITE UR-MCNC: NEGATIVE — SIGNIFICANT CHANGE UP
NRBC BLD AUTO-RTO: 0 /100 WBCS — SIGNIFICANT CHANGE UP (ref 0–0)
NT-PROBNP SERPL-SCNC: 2500 PG/ML — HIGH (ref 0–300)
PH UR: 6 — SIGNIFICANT CHANGE UP (ref 5–8)
PLATELET # BLD AUTO: 225 K/UL — SIGNIFICANT CHANGE UP (ref 130–400)
PMV BLD: 12 FL — HIGH (ref 7.4–10.4)
POTASSIUM SERPL-MCNC: 4 MMOL/L — SIGNIFICANT CHANGE UP (ref 3.5–5)
POTASSIUM SERPL-SCNC: 4 MMOL/L — SIGNIFICANT CHANGE UP (ref 3.5–5)
PROT SERPL-MCNC: 6.5 G/DL — SIGNIFICANT CHANGE UP (ref 6–8)
PROT UR-MCNC: SIGNIFICANT CHANGE UP MG/DL
RBC # BLD: 3.86 M/UL — LOW (ref 4.2–5.4)
RBC # FLD: 13.1 % — SIGNIFICANT CHANGE UP (ref 11.5–14.5)
SODIUM SERPL-SCNC: 142 MMOL/L — SIGNIFICANT CHANGE UP (ref 135–146)
SP GR SPEC: 1.02 — SIGNIFICANT CHANGE UP (ref 1–1.03)
TROPONIN T, HIGH SENSITIVITY RESULT: 24 NG/L — HIGH (ref 6–13)
TROPONIN T, HIGH SENSITIVITY RESULT: 24 NG/L — HIGH (ref 6–13)
UROBILINOGEN FLD QL: 0.2 MG/DL — SIGNIFICANT CHANGE UP (ref 0.2–1)
WBC # BLD: 5.85 K/UL — SIGNIFICANT CHANGE UP (ref 4.8–10.8)
WBC # FLD AUTO: 5.85 K/UL — SIGNIFICANT CHANGE UP (ref 4.8–10.8)

## 2025-05-26 PROCEDURE — 99285 EMERGENCY DEPT VISIT HI MDM: CPT

## 2025-05-26 PROCEDURE — 85025 COMPLETE CBC W/AUTO DIFF WBC: CPT

## 2025-05-26 PROCEDURE — 71045 X-RAY EXAM CHEST 1 VIEW: CPT

## 2025-05-26 PROCEDURE — 36415 COLL VENOUS BLD VENIPUNCTURE: CPT

## 2025-05-26 PROCEDURE — 93306 TTE W/DOPPLER COMPLETE: CPT

## 2025-05-26 PROCEDURE — 83605 ASSAY OF LACTIC ACID: CPT

## 2025-05-26 PROCEDURE — 84145 PROCALCITONIN (PCT): CPT

## 2025-05-26 PROCEDURE — 99223 1ST HOSP IP/OBS HIGH 75: CPT

## 2025-05-26 PROCEDURE — 81003 URINALYSIS AUTO W/O SCOPE: CPT

## 2025-05-26 PROCEDURE — 80053 COMPREHEN METABOLIC PANEL: CPT

## 2025-05-26 PROCEDURE — 87640 STAPH A DNA AMP PROBE: CPT

## 2025-05-26 PROCEDURE — 99497 ADVNCD CARE PLAN 30 MIN: CPT | Mod: 25

## 2025-05-26 PROCEDURE — 92610 EVALUATE SWALLOWING FUNCTION: CPT | Mod: GN

## 2025-05-26 PROCEDURE — 83735 ASSAY OF MAGNESIUM: CPT

## 2025-05-26 PROCEDURE — 71045 X-RAY EXAM CHEST 1 VIEW: CPT | Mod: 26

## 2025-05-26 PROCEDURE — 92526 ORAL FUNCTION THERAPY: CPT | Mod: GN

## 2025-05-26 PROCEDURE — 87899 AGENT NOS ASSAY W/OPTIC: CPT

## 2025-05-26 PROCEDURE — 94640 AIRWAY INHALATION TREATMENT: CPT

## 2025-05-26 PROCEDURE — 80048 BASIC METABOLIC PNL TOTAL CA: CPT

## 2025-05-26 PROCEDURE — 87641 MR-STAPH DNA AMP PROBE: CPT

## 2025-05-26 RX ORDER — LISINOPRIL 5 MG/1
20 TABLET ORAL DAILY
Refills: 0 | Status: DISCONTINUED | OUTPATIENT
Start: 2025-05-26 | End: 2025-05-26

## 2025-05-26 RX ORDER — CLOPIDOGREL BISULFATE 75 MG/1
75 TABLET, FILM COATED ORAL DAILY
Refills: 0 | Status: DISCONTINUED | OUTPATIENT
Start: 2025-05-26 | End: 2025-05-30

## 2025-05-26 RX ORDER — APIXABAN 2.5 MG/1
5 TABLET, FILM COATED ORAL EVERY 12 HOURS
Refills: 0 | Status: DISCONTINUED | OUTPATIENT
Start: 2025-05-26 | End: 2025-05-30

## 2025-05-26 RX ORDER — DOCUSATE SODIUM 100 MG
1 CAPSULE ORAL
Refills: 0 | DISCHARGE

## 2025-05-26 RX ORDER — LACTULOSE 10 G/15ML
1 SOLUTION ORAL
Refills: 0 | DISCHARGE

## 2025-05-26 RX ORDER — IPRATROPIUM BROMIDE AND ALBUTEROL SULFATE .5; 2.5 MG/3ML; MG/3ML
3 SOLUTION RESPIRATORY (INHALATION)
Refills: 0 | DISCHARGE

## 2025-05-26 RX ORDER — ACETAMINOPHEN 500 MG/5ML
650 LIQUID (ML) ORAL EVERY 6 HOURS
Refills: 0 | Status: DISCONTINUED | OUTPATIENT
Start: 2025-05-26 | End: 2025-05-30

## 2025-05-26 RX ORDER — MELATONIN 5 MG
3 TABLET ORAL AT BEDTIME
Refills: 0 | Status: DISCONTINUED | OUTPATIENT
Start: 2025-05-26 | End: 2025-05-30

## 2025-05-26 RX ORDER — IPRATROPIUM BROMIDE AND ALBUTEROL SULFATE .5; 2.5 MG/3ML; MG/3ML
3 SOLUTION RESPIRATORY (INHALATION) EVERY 6 HOURS
Refills: 0 | Status: DISCONTINUED | OUTPATIENT
Start: 2025-05-26 | End: 2025-05-30

## 2025-05-26 RX ORDER — AMLODIPINE BESYLATE 10 MG/1
1 TABLET ORAL
Refills: 0 | DISCHARGE

## 2025-05-26 RX ORDER — IPRATROPIUM BROMIDE AND ALBUTEROL SULFATE .5; 2.5 MG/3ML; MG/3ML
3 SOLUTION RESPIRATORY (INHALATION) ONCE
Refills: 0 | Status: COMPLETED | OUTPATIENT
Start: 2025-05-26 | End: 2025-05-26

## 2025-05-26 RX ORDER — LEVOTHYROXINE SODIUM 300 MCG
25 TABLET ORAL DAILY
Refills: 0 | Status: DISCONTINUED | OUTPATIENT
Start: 2025-05-26 | End: 2025-05-30

## 2025-05-26 RX ORDER — AMLODIPINE BESYLATE 10 MG/1
2.5 TABLET ORAL DAILY
Refills: 0 | Status: DISCONTINUED | OUTPATIENT
Start: 2025-05-26 | End: 2025-05-30

## 2025-05-26 RX ORDER — ISOSORBDIE DINITRATE 30 MG/1
10 TABLET ORAL
Refills: 0 | Status: DISCONTINUED | OUTPATIENT
Start: 2025-05-26 | End: 2025-05-30

## 2025-05-26 RX ORDER — ASPIRIN 325 MG
81 TABLET ORAL DAILY
Refills: 0 | Status: DISCONTINUED | OUTPATIENT
Start: 2025-05-26 | End: 2025-05-26

## 2025-05-26 RX ORDER — METHYLPREDNISOLONE ACETATE 80 MG/ML
40 INJECTION, SUSPENSION INTRA-ARTICULAR; INTRALESIONAL; INTRAMUSCULAR; SOFT TISSUE DAILY
Refills: 0 | Status: DISCONTINUED | OUTPATIENT
Start: 2025-05-26 | End: 2025-05-28

## 2025-05-26 RX ORDER — MAGNESIUM SULFATE 500 MG/ML
2 SYRINGE (ML) INJECTION ONCE
Refills: 0 | Status: COMPLETED | OUTPATIENT
Start: 2025-05-26 | End: 2025-05-26

## 2025-05-26 RX ORDER — LATANOPROST PF 0.05 MG/ML
1 SOLUTION/ DROPS OPHTHALMIC AT BEDTIME
Refills: 0 | Status: DISCONTINUED | OUTPATIENT
Start: 2025-05-26 | End: 2025-05-30

## 2025-05-26 RX ORDER — CEFEPIME 2 G/20ML
2000 INJECTION, POWDER, FOR SOLUTION INTRAVENOUS ONCE
Refills: 0 | Status: COMPLETED | OUTPATIENT
Start: 2025-05-26 | End: 2025-05-26

## 2025-05-26 RX ORDER — CEFTRIAXONE 500 MG/1
1000 INJECTION, POWDER, FOR SOLUTION INTRAMUSCULAR; INTRAVENOUS EVERY 24 HOURS
Refills: 0 | Status: DISCONTINUED | OUTPATIENT
Start: 2025-05-26 | End: 2025-05-28

## 2025-05-26 RX ORDER — MAGNESIUM, ALUMINUM HYDROXIDE 200-200 MG
30 TABLET,CHEWABLE ORAL EVERY 4 HOURS
Refills: 0 | Status: DISCONTINUED | OUTPATIENT
Start: 2025-05-26 | End: 2025-05-30

## 2025-05-26 RX ORDER — ISOSORBDIE DINITRATE 30 MG/1
10 TABLET ORAL THREE TIMES A DAY
Refills: 0 | Status: DISCONTINUED | OUTPATIENT
Start: 2025-05-26 | End: 2025-05-26

## 2025-05-26 RX ORDER — DOXYCYCLINE HYCLATE 100 MG
100 TABLET ORAL EVERY 12 HOURS
Refills: 0 | Status: DISCONTINUED | OUTPATIENT
Start: 2025-05-26 | End: 2025-05-28

## 2025-05-26 RX ORDER — METOPROLOL SUCCINATE 50 MG/1
25 TABLET, EXTENDED RELEASE ORAL DAILY
Refills: 0 | Status: DISCONTINUED | OUTPATIENT
Start: 2025-05-26 | End: 2025-05-30

## 2025-05-26 RX ORDER — IPRATROPIUM BROMIDE AND ALBUTEROL SULFATE .5; 2.5 MG/3ML; MG/3ML
3 SOLUTION RESPIRATORY (INHALATION)
Refills: 0 | Status: COMPLETED | OUTPATIENT
Start: 2025-05-26 | End: 2025-05-26

## 2025-05-26 RX ORDER — POLYETHYLENE GLYCOL 3350 17 G/17G
17 POWDER, FOR SOLUTION ORAL DAILY
Refills: 0 | Status: DISCONTINUED | OUTPATIENT
Start: 2025-05-26 | End: 2025-05-30

## 2025-05-26 RX ORDER — SENNA 187 MG
2 TABLET ORAL AT BEDTIME
Refills: 0 | Status: DISCONTINUED | OUTPATIENT
Start: 2025-05-26 | End: 2025-05-30

## 2025-05-26 RX ADMIN — Medication 2 TABLET(S): at 21:43

## 2025-05-26 RX ADMIN — IPRATROPIUM BROMIDE AND ALBUTEROL SULFATE 3 MILLILITER(S): .5; 2.5 SOLUTION RESPIRATORY (INHALATION) at 19:46

## 2025-05-26 RX ADMIN — IPRATROPIUM BROMIDE AND ALBUTEROL SULFATE 3 MILLILITER(S): .5; 2.5 SOLUTION RESPIRATORY (INHALATION) at 13:33

## 2025-05-26 RX ADMIN — Medication 25 MILLIGRAM(S): at 21:43

## 2025-05-26 RX ADMIN — Medication 25 GRAM(S): at 17:33

## 2025-05-26 RX ADMIN — Medication 25 MILLIGRAM(S): at 19:47

## 2025-05-26 RX ADMIN — CEFEPIME 100 MILLIGRAM(S): 2 INJECTION, POWDER, FOR SOLUTION INTRAVENOUS at 17:25

## 2025-05-26 RX ADMIN — IPRATROPIUM BROMIDE AND ALBUTEROL SULFATE 3 MILLILITER(S): .5; 2.5 SOLUTION RESPIRATORY (INHALATION) at 17:05

## 2025-05-26 RX ADMIN — Medication 1000 MILLILITER(S): at 13:13

## 2025-05-26 RX ADMIN — Medication 650 MILLIGRAM(S): at 21:42

## 2025-05-26 RX ADMIN — METHYLPREDNISOLONE ACETATE 40 MILLIGRAM(S): 80 INJECTION, SUSPENSION INTRA-ARTICULAR; INTRALESIONAL; INTRAMUSCULAR; SOFT TISSUE at 19:47

## 2025-05-26 RX ADMIN — CEFTRIAXONE 100 MILLIGRAM(S): 500 INJECTION, POWDER, FOR SOLUTION INTRAMUSCULAR; INTRAVENOUS at 19:46

## 2025-05-26 RX ADMIN — Medication 650 MILLIGRAM(S): at 22:12

## 2025-05-26 RX ADMIN — IPRATROPIUM BROMIDE AND ALBUTEROL SULFATE 3 MILLILITER(S): .5; 2.5 SOLUTION RESPIRATORY (INHALATION) at 18:44

## 2025-05-26 RX ADMIN — LATANOPROST PF 1 DROP(S): 0.05 SOLUTION/ DROPS OPHTHALMIC at 21:55

## 2025-05-26 RX ADMIN — IPRATROPIUM BROMIDE AND ALBUTEROL SULFATE 3 MILLILITER(S): .5; 2.5 SOLUTION RESPIRATORY (INHALATION) at 13:13

## 2025-05-26 NOTE — PATIENT PROFILE ADULT - NSPRONUTRITIONRISK_GEN_A_NUR
1. HNP (herniated nucleus pulposus), lumbar    2. Acute right-sided low back pain with right-sided sciatica      -Patient is following up for re-aggravation of low back pain with radicular symptoms on the right leg.  -Patient had improvement in pain for the past few months up until recently where he reaggravated his symptoms  -Patient is interested in surgery at this time.  Patient had seen Dr. Oquendo in November at the beginning the pandemic who indicated him for a minimally invasive microdiscectomy.  -Order was placed for surgical referral to follow through with surgery at this time  -She was given a refill of gabapentin and tizanidine.  Instructions to initiate gabapentin are included below.  -Call direct clinic number [[phone]] at any time with questions or concerns.    Albert Yeo MD [facility]  [facility]    Gabapentin 1 tab= 300mg     AM                                  PM                                  Bedtime  0                                     0                                     300mg (1 tab).  After 3-5 days, increase as tolerated   300mg (1 tab)                 0                                     300mg (1 tab).  After 3-5 days, increase as tolerated   300mg (1 tab)                 300mg (1 tab)                 300mg (1 tab).  After 3-5 days, increase as tolerated   300mg (1 tab)                 300mg (1 tab)                 600mg (2 tabs). After 3-5 days, increase as tolerated   600mg (2 tabs)               300mg (1 tab)                 600mg (2 tabs). After 3-5 days, increase as tolerated   600mg (2 tabs)               600mg (2 tabs)               600mg (2 tabs).      Call with any problems.  Caution for sedation.    Do not drive until you know how the medication affects you.   You can go slower if you need to or increasing only one dose at a time.  Do not stop abruptly once at higher doses.  This medication must be tapered off.        
No indicators present

## 2025-05-26 NOTE — ED PROVIDER NOTE - CONSIDERATION OF ADMISSION OBSERVATION
Appropriate medications for patients presenting complaints were offered and effects were re-assessed Patients external records reviewed. Additonal history was obtained from daughter at bedside . Escalation to admission was considered. At this time patient requires inpatient hospitalization. Consideration of Admission/Observation

## 2025-05-26 NOTE — ED PROVIDER NOTE - ATTENDING CONTRIBUTION TO CARE
89-year-old female past medical history of COPD CAD PE on Eliquis hypertension hyperlipidemia brought in with daughter at bedside from [facility] for pneumonia.  Patient has been getting treated for pneumonia outpatient with injections however is noticing symptoms have not been improving which is what prompted the visit.  No nausea no vomiting no abdominal pain no chest pain    CONSTITUTIONAL: audible rhonchi   HEAD: NCAT  EYES: PERRL; EOMI;   ENT: Normal pharynx; mucous membranes pink/moist, no erythema.  NECK: Supple; no meningeal signs  CARD: RRR; nl S1/S2; no M/R/G  RESP: Respiratory rate and effort are normal; +audible rhonci   ABD: Soft, NT ND  MSK/EXT: No gross deformities; full range of motion. no pitting edema   SKIN: Warm and dry;   NEURO: AAOx3,  PSYCH: Memory Intact, Normal Affect

## 2025-05-26 NOTE — ED PROVIDER NOTE - CLINICAL SUMMARY MEDICAL DECISION MAKING FREE TEXT BOX
89-year-old female past medical history of COPD CAD PE on Eliquis hypertension hyperlipidemia brought in with daughter at bedside from [facility] for pneumonia.  Patient has been getting treated for pneumonia outpatient with injections however is noticing symptoms have not been improving which is what prompted the visit.  No nausea no vomiting no abdominal pain no chest pain I did send reviewed.  Labs imaging EKG obtained and reviewed.  Antibiotics given.  Patient admitted for pneumonia

## 2025-05-26 NOTE — H&P ADULT - ASSESSMENT
89y F w/ hx of COPD on 2L home O2, Asthma, Bronchiectasis , CAD, PE on Eliquis, HTN and HLD brought [facility] the ed [facility] for evaluation of the shortness of breath.        #Shortness of breath associated with cough  #Acute on chronic hypoxic respiratory failure   #Suspected COPD/bronchiectasis exacerbation vs pneumonia   - vitally stable on 4L now   -  wbc 5k, trops 24, 24, bnp 2500  - vbg: spo2 59, co2 49, hco 33, ph 7.44,   - x ray chest--- Unchanged bilateral opacities.  - EKG---Normal sinus rhythm, Right bundle branch block, Left anterior fascicular block, Bifascicular block ,Left ventricular hypertrophy with repolarization abnormality ( R in aVL, Romhilt-Auguste )  Plan  - rvp, mrsa, urine legionella and urine streptococcus, blood and sputum cultures   - duonebs q6hr  - methylprednisolone 40mg daily   - iv ceftriaxone and doxycyline  - supplemental ox via nasal cannula        #HTN  #HLD      #PE    DVT PPX:   GI PPX:   DIET:   ACTIVITY:   CODE STATUS:   DISPOSITION:        89y F w/ hx of COPD on 2L home O2, Asthma, Bronchiectasis , CAD, PE on Eliquis, HTN and HLD [facility] ed [facility] for [facility] of the shortness of breath.                            med rec completed from NH doc. please call in am to Nursing home for two source verification.       #Shortness of breath associated with cough  #Acute on chronic hypoxic respiratory failure   #Suspected COPD/bronchiectasis exacerbation vs pneumonia   - vitally stable on 4L now   -  wbc 5k, trops 24, 24, bnp 2500  - vbg: spo2 59, co2 49, hco 33, ph 7.44,   - x ray chest--- Unchanged bilateral opacities.  - EKG---Normal sinus rhythm, Right bundle branch block, Left anterior fascicular block, Bifascicular block ,Left ventricular hypertrophy with repolarization abnormality ( R in aVL, Romhilt-Auguste )  Plan  - rvp, mrsa, urine legionella and urine streptococcus, blood and sputum cultures   - duonebs q6hr  - methylprednisolone 40mg daily   - iv ceftriaxone and doxycyline  - supplemental ox via nasal cannula        #HTN  #HLD  - takes amlodipine     #PE  - on Eliquis     DVT PPX: mejia   GI PPX:   DIET: dash   ACTIVITY:   CODE STATUS: dnr, dni   DISPOSITION:     med rec completed from NH doc. please call in am to Nursing home for two source verification.        89y F w/ hx of COPD on 2L home O2, Asthma, Bronchiectasis , CAD, PE on Eliquis, HTN and HLD brought to the ed from [facility] for evaluation of the shortness of breath.                            med rec completed from NH doc. please call in am to Nursing home for two source verification.       #Shortness of breath associated with cough  #Acute on chronic hypoxic respiratory failure   #Suspected COPD/bronchiectasis exacerbation vs pneumonia  #Asthma, Bronchiectasis   - vitally stable on 4L now   - wbc 5k, trops 24, 24, bnp 2500  - vbg: spo2 59, co2 49, hco 33, ph 7.44,   - x ray chest--- Unchanged bilateral opacities.  - EKG---Normal sinus rhythm, Right bundle branch block, Left anterior fascicular block, Bifascicular block ,Left ventricular hypertrophy with repolarization abnormality ( R in aVL, Romhilt-Auguste )  Plan  - rvp, mrsa, urine legionella and urine streptococcus, blood and sputum cultures   - duonebs q6hr  - methylprednisolone 40mg daily   - iv ceftriaxone and doxycyline  - supplemental ox via nasal cannula        #HTN  #HLD  - takes amlodipine     #CAD  #PE  - on Eliquis     DVT PPX: eliquis   GI PPX:   DIET: dash   ACTIVITY:   CODE STATUS: dnr, dni   DISPOSITION: Acute for now.    med rec completed from NH doc. please call in am to Nursing home for two source verification.

## 2025-05-26 NOTE — ED PROVIDER NOTE - OBJECTIVE STATEMENT
89y F w/ hx of COPD on 2L home O2, CAD, PE on eliquis, HTN and HLD is sent in from Nursing Home for shortness of breath x3 days. Pt has been getting treated for pneumonia outpt, but has had increasing oxygen requirements, but denies recent fevers, chills, sweats, chest pain, nausea, vomiting, diarrhea or urinary sx.

## 2025-05-26 NOTE — H&P ADULT - NSHPPHYSICALEXAM_GEN_ALL_CORE
LOS:     VITALS:   T(C): 37.3 (05-26-25 @ 16:05), Max: 37.3 (05-26-25 @ 16:05)  HR: 73 (05-26-25 @ 16:05) (71 - 73)  BP: 180/80 (05-26-25 @ 16:05) (172/64 - 191/69)  RR: 18 (05-26-25 @ 16:05) (18 - 19)  SpO2: 98% (05-26-25 @ 16:05) (98% - 98%)    GENERAL: NAD, lying in bed comfortably  HEAD:  Atraumatic, Normocephalic  EYES: EOMI, PERRLA, conjunctiva and sclera clear  ENT: Moist mucous membranes  NECK: Supple, No JVD  CHEST/LUNG: B/L wheezes and cackles   HEART: Regular rate and rhythm; No murmurs, rubs, or gallops  ABDOMEN: BSx4; Soft, nontender, nondistended  EXTREMITIES:  no edema   NERVOUS SYSTEM:  A&Ox3, no focal deficits LOS:     VITALS:   T(C): 37.3 (05-26-25 @ 16:05), Max: 37.3 (05-26-25 @ 16:05)  HR: 73 (05-26-25 @ 16:05) (71 - 73)  BP: 180/80 (05-26-25 @ 16:05) (172/64 - 191/69)  RR: 18 (05-26-25 @ 16:05) (18 - 19)  SpO2: 98% (05-26-25 @ 16:05) (98% - 98%)    Parameters below as of 26 May 2025 20:16  Patient On (Oxygen Delivery Method): nasal cannula    GENERAL: NAD, lying in bed comfortably  HEAD:  Atraumatic, Normocephalic  EYES: EOMI, PERRLA, conjunctiva and sclera clear  ENT: Moist mucous membranes  NECK: Supple, No JVD  CHEST/LUNG: B/L wheezes and cackles   HEART: Regular rate and rhythm; No murmurs, rubs, or gallops  ABDOMEN: BSx4; Soft, nontender, nondistended  EXTREMITIES:  no edema   NERVOUS SYSTEM:  A&Ox3, no focal deficits

## 2025-05-26 NOTE — ED PROVIDER NOTE - PHYSICAL EXAMINATION
CONSTITUTIONAL: well-appearing, well nourished, non-toxic, NAD  HEAD: NCAT  EYES: EOMI, no scleral icterus  ENT: Dry mucous membranes  NECK: Full ROM.   CARD: RRR  RESP: audible crackles, +diffuse crackles b/l  ABD: soft, non-tender,  EXT: Full ROM, no pedal edema, no calf tenderness  NEURO: normal motor. normal sensory.   PSYCH: Cooperative, appropriate. CONSTITUTIONAL: well-appearing, well nourished, non-toxic, NAD  HEAD: NCAT  EYES: EOMI, no scleral icterus  ENT: Dry mucous membranes  NECK: Full ROM.   CARD: RRR  RESP: audible crackles, +diffuse crackles b/l, wheezing in RUL  ABD: soft, non-tender,  EXT: Full ROM, no pedal edema, no calf tenderness  NEURO: normal motor. normal sensory.   PSYCH: Cooperative, appropriate.

## 2025-05-26 NOTE — GOALS OF CARE CONVERSATION - ADVANCED CARE PLANNING - CONVERSATION DETAILS
I spoke with daughter ASIF rosas who was identified as the health care surrogate / health care proxy and we had an extensive conversation about patient 's care and current condition.     Discussed full vs limited medical management. Extensively explained that full medical management would involve intubating the patient if medically indicated and performing chest compressions in an attempt to resuscitate if the patient were to arrest. Further explained the concept of limited medical management, such as DNR/DNI. Further explained that DNR/DNI would not preclude medical management up until the point of arrest/intubation (such as antibiotics, IV fluids, blood draws, etc.).She expressed understanding of above. Decision made, patient is now DNR/DNI. I spoke with daughter MATHIEU GOLD who was identified as the health care surrogate / health care proxy and we had an extensive conversation about patient 's care and current condition.     Discussed full vs limited medical management. Extensively explained that full medical management would involve intubating the patient if medically indicated and performing chest compressions in an attempt to resuscitate if the patient were to arrest. Further explained the concept of limited medical management, such as DNR/DNI. Further explained that DNR/DNI would not preclude medical management up until the point of arrest/intubation (such as antibiotics, IV fluids, blood draws, etc.).She expressed understanding of above. Decision made, patient is now DNR/DNI.

## 2025-05-26 NOTE — H&P ADULT - HISTORY OF PRESENT ILLNESS
89y F w/ hx of COPD on 2L home O2, Asthma, Bronchiectasis , CAD, PE on Eliquis, HTN and HLD brought [facility] for evaluation of the shortness of breath. Hx taken from the patient and is aox3 at time of the interview. States that for the past1 week, she has been having progressive worsening of shortness of breath associated with productive cough, and loss of her Apetite. Patient states that she was given antibiotics at the NH but she has not been getting well, so she was brought to the ed for further evaluation. Patient also states that she gets these type of episodes once or twice a year and she usually gets better with antibiotics and nebs. Reveiw of the systems is negative for any headache, fever, chest pain, nausea, vomit, diarrhea , abdomen pain, dysuria, frequency, urgency, extremity weakness and other significant complaints  Baseline: AOX3, can dress her self, can eat by herself, wheel chair bound, need help with toilet     ED VITALS    · BP Systolic	 172 mm Hg  · BP Diastolic	64 mm Hg  · Heart Rate	71 /min  · Respiration Rate (breaths/min)	19 /min  · Temp (F)	98.4 Degrees F  · Temp (C)	36.9 Degrees C  · Temp site	oral  · SpO2 (%)	98 %  · O2 Delivery/Oxygen Delivery Method	nasal cannula  · Oxygen Therapy Flow (L/min)	6 L/min  · Temp at ED Arrival (C)	36.9 Degrees C      ED LABS  wbc 5k, trops 24, 24, bnp 2500  vbg: spo2 59, co2 49, hco 33, ph 7.44,     ED IMAGING     x ray chest  Unchanged bilateral opacities.    EKG  Normal sinus rhythm, Right bundle branch block, Left anterior fascicular block, Bifascicular block ,Left ventricular hypertrophy with repolarization abnormality ( R in aVL, Romhilt-Auguste )    89y F w/ hx of COPD on 2L home O2, Asthma, Bronchiectasis , CAD, PE on Eliquis, HTN and HLD [facility] for evaluation of the shortness of breath. Hx taken from the patient and is aox3 at time of the interview. States that for the past1 week, she has been having progressive worsening of shortness of breath associated with productive cough, and loss of her Apetite. Patient states that she was given antibiotics at the NH but she has not been getting well, so she was brought to the ed for further evaluation. Patient also states that she gets these type of episodes once or twice a year and she usually gets better with antibiotics and nebs. Review of the systems is negative for any headache, fever, chest pain, nausea, vomit, diarrhea , abdomen pain, dysuria, frequency, urgency, extremity weakness and other significant complaints  Baseline: AOX3, can dress her self, can eat by herself, wheel chair bound, need help with toilet     ED VITALS    · BP Systolic	 172 mm Hg  · BP Diastolic	64 mm Hg  · Heart Rate	71 /min  · Respiration Rate (breaths/min)	19 /min  · Temp (F)	98.4 Degrees F  · Temp (C)	36.9 Degrees C  · Temp site	oral  · SpO2 (%)	98 %  · O2 Delivery/Oxygen Delivery Method	nasal cannula  · Oxygen Therapy Flow (L/min)	6 L/min  · Temp at ED Arrival (C)	36.9 Degrees C      ED LABS  wbc 5k, trops 24, 24, bnp 2500  vbg: spo2 59, co2 49, hco 33, ph 7.44,     ED IMAGING     x ray chest  Unchanged bilateral opacities.    EKG  Normal sinus rhythm, Right bundle branch block, Left anterior fascicular block, Bifascicular block ,Left ventricular hypertrophy with repolarization abnormality ( R in aVL, Romhilt-Auguste )

## 2025-05-26 NOTE — PATIENT PROFILE ADULT - FALL HARM RISK - HARM RISK INTERVENTIONS

## 2025-05-26 NOTE — H&P ADULT - NSHPLABSRESULTS_GEN_ALL_CORE
.                          12.1   5.85  )-----------( 225      ( 26 May 2025 06:45 )             39.1         142  |  103  |  12  ----------------------------<  72  4.0   |  25  |  0.5[L]    Ca    9.4      26 May 2025 06:45    TPro  6.5  /  Alb  3.5  /  TBili  0.4  /  DBili  x   /  AST  21  /  ALT  12  /  AlkPhos  104            Troponin T, High Sensitivity Result: 24 <--- 24  Pro-Brain Natriuretic Peptide: 2500 pg/mL (25 @ 13:12)    SARS-CoV-2: NotDetec (26 May 2025 13:07)        VBG - ( 26 May 2025 14:51 )  pH: 7.44  /  pCO2: 49    /  pO2: 32    / HCO3: 33    / Base Excess: 1.1   /  SaO2: 59.2  /  Lactate: 1.1        12 Lead ECG:   Ventricular Rate 73 BPM  QTC Calculation(Bazett) 489 ms        Diagnosis Line Normal sinus rhythm  Right bundle branch block  Left anterior fascicular block  *** Bifascicular block ***  Left ventricular hypertrophy with repolarization abnormality ( R in aVL , Romhilt-Auguste )  Abnormal ECG    Confirmed by Eduardo Ruth ([id number]) on 2025 1:44:02 PM (25 @ 13:11)    POCT glucose:   Urinalysis Basic - ( 26 May 2025 23:00 )    Color: Yellow / Appearance: Clear / S.020 / pH: x  Gluc: x / Ketone: x  / Bili: Negative / Urobili: 0.2 mg/dL   Blood: x / Protein: Trace mg/dL / Nitrite: Negative   Leuk Esterase: Negative / RBC: x / WBC x   Sq Epi: x / Non Sq Epi: x / Bacteria: x        RADIOLOGY & ADDITIONAL STUDIES:    < from: Xray Chest 1 View- PORTABLE-Urgent (25 @ 13:36) >    Unchanged bilateral opacities.    < end of copied text >

## 2025-05-26 NOTE — H&P ADULT - ATTENDING COMMENTS
Patient seen on 05/26/25.        89y F w/ hx of COPD on 2L home O2, Asthma, Bronchiectasis , CAD, PE on Eliquis, HTN and HLD [facility] ed [facility] of the shortness of breath.                            med rec completed from NH doc. please call in am to Nursing home for two source verification.       #Shortness of breath associated with cough  #Acute on chronic hypoxic respiratory failure   #Suspected COPD/bronchiectasis exacerbation vs pneumonia  #Asthma, Bronchiectasis   - vitally stable on 4L now   - wbc 5k, trops 24, 24, bnp 2500  - vbg: spo2 59, co2 49, hco 33, ph 7.44,   - x ray chest--- Unchanged bilateral opacities.  - EKG---Normal sinus rhythm, Right bundle branch block, Left anterior fascicular block, Bifascicular block ,Left ventricular hypertrophy with repolarization abnormality ( R in aVL, Romhilt-Auguste )  Plan  - rvp, mrsa, urine legionella and urine streptococcus, blood and sputum cultures   - duonebs q6hr  - methylprednisolone 40mg daily   - iv ceftriaxone and doxycyline  - supplemental ox via nasal cannula        #HTN  #HLD  - takes amlodipine     #CAD  #PE  - on Eliquis     DVT PPX: eliquis   GI PPX:   DIET: dash   ACTIVITY:   CODE STATUS: dnr, dni   DISPOSITION: Acute for now.

## 2025-05-27 ENCOUNTER — RESULT REVIEW (OUTPATIENT)
Age: 89
End: 2025-05-27

## 2025-05-27 LAB
ALBUMIN SERPL ELPH-MCNC: 3.2 G/DL — LOW (ref 3.5–5.2)
ALP SERPL-CCNC: 102 U/L — SIGNIFICANT CHANGE UP (ref 30–115)
ALT FLD-CCNC: 13 U/L — SIGNIFICANT CHANGE UP (ref 0–41)
ANION GAP SERPL CALC-SCNC: 11 MMOL/L — SIGNIFICANT CHANGE UP (ref 7–14)
AST SERPL-CCNC: 26 U/L — SIGNIFICANT CHANGE UP (ref 0–41)
BASOPHILS # BLD AUTO: 0.06 K/UL — SIGNIFICANT CHANGE UP (ref 0–0.2)
BASOPHILS NFR BLD AUTO: 0.9 % — SIGNIFICANT CHANGE UP (ref 0–1)
BILIRUB SERPL-MCNC: 0.3 MG/DL — SIGNIFICANT CHANGE UP (ref 0.2–1.2)
BUN SERPL-MCNC: 11 MG/DL — SIGNIFICANT CHANGE UP (ref 10–20)
CALCIUM SERPL-MCNC: 9.1 MG/DL — SIGNIFICANT CHANGE UP (ref 8.4–10.5)
CHLORIDE SERPL-SCNC: 98 MMOL/L — SIGNIFICANT CHANGE UP (ref 98–110)
CO2 SERPL-SCNC: 27 MMOL/L — SIGNIFICANT CHANGE UP (ref 17–32)
CREAT SERPL-MCNC: 0.6 MG/DL — LOW (ref 0.7–1.5)
EGFR: 81 ML/MIN/1.73M2 — SIGNIFICANT CHANGE UP
EGFR: 81 ML/MIN/1.73M2 — SIGNIFICANT CHANGE UP
EOSINOPHIL # BLD AUTO: 0.13 K/UL — SIGNIFICANT CHANGE UP (ref 0–0.7)
EOSINOPHIL NFR BLD AUTO: 1.9 % — SIGNIFICANT CHANGE UP (ref 0–8)
GLUCOSE SERPL-MCNC: 77 MG/DL — SIGNIFICANT CHANGE UP (ref 70–99)
HCT VFR BLD CALC: 36.4 % — LOW (ref 37–47)
HGB BLD-MCNC: 11.7 G/DL — LOW (ref 12–16)
IMM GRANULOCYTES NFR BLD AUTO: 0.7 % — HIGH (ref 0.1–0.3)
LYMPHOCYTES # BLD AUTO: 1.12 K/UL — LOW (ref 1.2–3.4)
LYMPHOCYTES # BLD AUTO: 16.3 % — LOW (ref 20.5–51.1)
MAGNESIUM SERPL-MCNC: 2 MG/DL — SIGNIFICANT CHANGE UP (ref 1.8–2.4)
MCHC RBC-ENTMCNC: 31.5 PG — HIGH (ref 27–31)
MCHC RBC-ENTMCNC: 32.1 G/DL — SIGNIFICANT CHANGE UP (ref 32–37)
MCV RBC AUTO: 97.8 FL — SIGNIFICANT CHANGE UP (ref 81–99)
MONOCYTES # BLD AUTO: 0.76 K/UL — HIGH (ref 0.1–0.6)
MONOCYTES NFR BLD AUTO: 11.1 % — HIGH (ref 1.7–9.3)
NEUTROPHILS # BLD AUTO: 4.75 K/UL — SIGNIFICANT CHANGE UP (ref 1.4–6.5)
NEUTROPHILS NFR BLD AUTO: 69.1 % — SIGNIFICANT CHANGE UP (ref 42.2–75.2)
NRBC BLD AUTO-RTO: 0 /100 WBCS — SIGNIFICANT CHANGE UP (ref 0–0)
PLATELET # BLD AUTO: 226 K/UL — SIGNIFICANT CHANGE UP (ref 130–400)
PMV BLD: 11.6 FL — HIGH (ref 7.4–10.4)
POTASSIUM SERPL-MCNC: 4 MMOL/L — SIGNIFICANT CHANGE UP (ref 3.5–5)
POTASSIUM SERPL-SCNC: 4 MMOL/L — SIGNIFICANT CHANGE UP (ref 3.5–5)
PROCALCITONIN SERPL-MCNC: 0.07 NG/ML — SIGNIFICANT CHANGE UP (ref 0.02–0.1)
PROT SERPL-MCNC: 5.9 G/DL — LOW (ref 6–8)
RAPID RVP RESULT: DETECTED
RBC # BLD: 3.72 M/UL — LOW (ref 4.2–5.4)
RBC # FLD: 12.6 % — SIGNIFICANT CHANGE UP (ref 11.5–14.5)
RV+EV RNA SPEC QL NAA+PROBE: DETECTED
SARS-COV-2 RNA SPEC QL NAA+PROBE: SIGNIFICANT CHANGE UP
SODIUM SERPL-SCNC: 136 MMOL/L — SIGNIFICANT CHANGE UP (ref 135–146)
WBC # BLD: 6.87 K/UL — SIGNIFICANT CHANGE UP (ref 4.8–10.8)
WBC # FLD AUTO: 6.87 K/UL — SIGNIFICANT CHANGE UP (ref 4.8–10.8)

## 2025-05-27 PROCEDURE — 93306 TTE W/DOPPLER COMPLETE: CPT | Mod: 26

## 2025-05-27 PROCEDURE — 99232 SBSQ HOSP IP/OBS MODERATE 35: CPT

## 2025-05-27 RX ORDER — DEXTROMETHORPHAN HBR, GUAIFENESIN 200 MG/10ML
600 LIQUID ORAL EVERY 12 HOURS
Refills: 0 | Status: DISCONTINUED | OUTPATIENT
Start: 2025-05-27 | End: 2025-05-30

## 2025-05-27 RX ADMIN — Medication 650 MILLIGRAM(S): at 06:51

## 2025-05-27 RX ADMIN — ISOSORBDIE DINITRATE 10 MILLIGRAM(S): 30 TABLET ORAL at 05:19

## 2025-05-27 RX ADMIN — Medication 25 MILLIGRAM(S): at 05:20

## 2025-05-27 RX ADMIN — METOPROLOL SUCCINATE 25 MILLIGRAM(S): 50 TABLET, EXTENDED RELEASE ORAL at 05:20

## 2025-05-27 RX ADMIN — IPRATROPIUM BROMIDE AND ALBUTEROL SULFATE 3 MILLILITER(S): .5; 2.5 SOLUTION RESPIRATORY (INHALATION) at 13:27

## 2025-05-27 RX ADMIN — DEXTROMETHORPHAN HBR, GUAIFENESIN 600 MILLIGRAM(S): 200 LIQUID ORAL at 11:28

## 2025-05-27 RX ADMIN — Medication 1 APPLICATION(S): at 11:27

## 2025-05-27 RX ADMIN — Medication 650 MILLIGRAM(S): at 07:21

## 2025-05-27 RX ADMIN — Medication 25 MICROGRAM(S): at 05:20

## 2025-05-27 RX ADMIN — IPRATROPIUM BROMIDE AND ALBUTEROL SULFATE 3 MILLILITER(S): .5; 2.5 SOLUTION RESPIRATORY (INHALATION) at 02:05

## 2025-05-27 RX ADMIN — Medication 25 MILLIGRAM(S): at 21:28

## 2025-05-27 RX ADMIN — Medication 2 TABLET(S): at 21:28

## 2025-05-27 RX ADMIN — LATANOPROST PF 1 DROP(S): 0.05 SOLUTION/ DROPS OPHTHALMIC at 21:28

## 2025-05-27 RX ADMIN — CLOPIDOGREL BISULFATE 75 MILLIGRAM(S): 75 TABLET, FILM COATED ORAL at 11:25

## 2025-05-27 RX ADMIN — Medication 25 MILLIGRAM(S): at 14:21

## 2025-05-27 RX ADMIN — Medication 100 MILLIGRAM(S): at 05:20

## 2025-05-27 RX ADMIN — IPRATROPIUM BROMIDE AND ALBUTEROL SULFATE 3 MILLILITER(S): .5; 2.5 SOLUTION RESPIRATORY (INHALATION) at 20:05

## 2025-05-27 RX ADMIN — Medication 100 MILLIGRAM(S): at 18:01

## 2025-05-27 RX ADMIN — APIXABAN 5 MILLIGRAM(S): 2.5 TABLET, FILM COATED ORAL at 05:20

## 2025-05-27 RX ADMIN — ISOSORBDIE DINITRATE 10 MILLIGRAM(S): 30 TABLET ORAL at 18:00

## 2025-05-27 RX ADMIN — APIXABAN 5 MILLIGRAM(S): 2.5 TABLET, FILM COATED ORAL at 18:00

## 2025-05-27 RX ADMIN — CEFTRIAXONE 100 MILLIGRAM(S): 500 INJECTION, POWDER, FOR SOLUTION INTRAMUSCULAR; INTRAVENOUS at 19:37

## 2025-05-27 RX ADMIN — IPRATROPIUM BROMIDE AND ALBUTEROL SULFATE 3 MILLILITER(S): .5; 2.5 SOLUTION RESPIRATORY (INHALATION) at 08:34

## 2025-05-27 RX ADMIN — POLYETHYLENE GLYCOL 3350 17 GRAM(S): 17 POWDER, FOR SOLUTION ORAL at 11:25

## 2025-05-27 RX ADMIN — AMLODIPINE BESYLATE 2.5 MILLIGRAM(S): 10 TABLET ORAL at 05:20

## 2025-05-27 NOTE — PROGRESS NOTE ADULT - SUBJECTIVE AND OBJECTIVE BOX
SUBJECTIVE/OVERNIGHT EVENTS  Today is hospital day 1d. This morning patient was seen and examined at bedside. Overnight, patient was febrile at 101.2. Tylenol was given and she has been hemodynamically stable since. RVP positive for entero/rhinovirus. The patient reports persistent SOB and poor PO intake. Denies N/V or chest pain. Last BM 2 days ago. No other acute or major events at this time.      CODE STATUS: DNR/DNI       MEDICATIONS  STANDING MEDICATIONS  albuterol/ipratropium for Nebulization 3 milliLiter(s) Nebulizer every 6 hours  amLODIPine   Tablet 2.5 milliGRAM(s) Oral daily  apixaban 5 milliGRAM(s) Oral every 12 hours  cefTRIAXone   IVPB 1000 milliGRAM(s) IV Intermittent every 24 hours  chlorhexidine 2% Cloths 1 Application(s) Topical daily  clopidogrel Tablet 75 milliGRAM(s) Oral daily  doxycycline IVPB 100 milliGRAM(s) IV Intermittent every 12 hours  hydrALAZINE 25 milliGRAM(s) Oral three times a day  isosorbide   dinitrate Tablet (ISORDIL) 10 milliGRAM(s) Oral two times a day  latanoprost 0.005% Ophthalmic Solution 1 Drop(s) Both EYES at bedtime  levothyroxine 25 MICROGram(s) Oral daily  methylPREDNISolone sodium succinate Injectable 40 milliGRAM(s) IV Push daily  metoprolol succinate ER 25 milliGRAM(s) Oral daily  polyethylene glycol 3350 17 Gram(s) Oral daily  senna 2 Tablet(s) Oral at bedtime    PRN MEDICATIONS  acetaminophen     Tablet .. 650 milliGRAM(s) Oral every 6 hours PRN  aluminum hydroxide/magnesium hydroxide/simethicone Suspension 30 milliLiter(s) Oral every 4 hours PRN  guaiFENesin  milliGRAM(s) Oral every 12 hours PRN  melatonin 3 milliGRAM(s) Oral at bedtime PRN      VITALS  T(F): 98.2 (05-27-25 @ 05:00), Max: 101.2 (05-26-25 @ 21:16)  HR: 67 (05-27-25 @ 05:00) (67 - 73)  BP: 147/66 (05-27-25 @ 05:00) (147/66 - 191/69)  RR: 19 (05-27-25 @ 05:00) (18 - 19)  SpO2: 92% (05-27-25 @ 05:00) (92% - 98%)      PHYSICAL EXAM  General: NAD, well-appearing, sitting comfortably  HEENT: EOMI, moist mucus membranes, conjunctivae clear  Cardio: RRR, no murmurs, normal S1 and S2  Resp: lungs CTAB, no wheezing/rhonchi/rales  Abd: abdomen soft and nontender, nondistended, no rebound or guarding  MSK: no lower extremity edema, 2+ pedal pulses  Neuro: A&Ox3, motor and sensory functions grossly intact  Skin: warm and dry        LABS             11.7   6.87  )-----------( 226      ( 05-27-25 @ 06:56 )             36.4     136  |  98  |  11  -------------------------<  77   05-27-25 @ 06:56  4.0  |  27  |  0.6    Ca      9.1     05-27-25 @ 06:56  Mg     2.0     05-27-25 @ 06:56    TPro  5.9  /  Alb  3.2  /  TBili  0.3  /  DBili  x   /  AST  26  /  ALT  13  /  AlkPhos  102  /  GGT  x     05-27-25 @ 06:56      Troponin T, High Sensitivity Result: 24 ng/L (05-26-25 @ 14:50)  Pro-Brain Natriuretic Peptide: 2500 pg/mL (05-26-25 @ 13:12)  Troponin T, High Sensitivity Result: 24 ng/L (05-26-25 @ 13:09)    Urinalysis Basic - ( 27 May 2025 06:56 )    Color: x / Appearance: x / SG: x / pH: x  Gluc: 77 mg/dL / Ketone: x  / Bili: x / Urobili: x   Blood: x / Protein: x / Nitrite: x   Leuk Esterase: x / RBC: x / WBC x   Sq Epi: x / Non Sq Epi: x / Bacteria: x          IMAGING    < from: Xray Chest 1 View- PORTABLE-Urgent (05.26.25 @ 13:36) >  IMPRESSION:    Unchanged bilateral opacities.    < end of copied text >

## 2025-05-27 NOTE — PROGRESS NOTE ADULT - ASSESSMENT
89y F w/ hx of COPD on 2L home O2, Asthma, Bronchiectasis , CAD, PE on Eliquis, HTN and HLD [facility]ness [facility].                          #Acute on chronic hypoxic respiratory failure   #COPD/Asthma exacerbation  #Entero/rhinovirus positive  #Suspected superimposed bacterial pneumonia  CXR 05/26: Unchanged bilateral opacities  - Duoneb q6hr  - Methylprednisolone 40mg daily   - Cont iv ceftriaxone and doxycyline  - supplemental ox via nasal cannula     #HTN/HLD  - takes amlodipine     #CAD  #PE  - on Eliquis     DVT PPX: eliquis     #Progress Note Handoff  Pending (specify): Cont IV solumedrol/abx  Family discussion: yamilet pt regarding plan of care  Disposition: [facility], from NH

## 2025-05-27 NOTE — PROGRESS NOTE ADULT - SUBJECTIVE AND OBJECTIVE BOX
CHIRAG HERNÁNDEZ  89y, Female  Allergy: fish (Unknown)  No Known Drug Allergies  lactose (Unknown)    Hospital Day: 1d    Patient seen and examined. No acute events overnight    PMH/PSH:  PAST MEDICAL & SURGICAL HISTORY:  High blood cholesterol      Hypothyroid      Hypertension      COPD (chronic obstructive pulmonary disease)      CAD (coronary artery disease)      History of coronary artery stent placement      History of cholecystectomy      H/O abdominal hysterectomy      Cataract          VITALS:  T(F): 98.2 (05-27-25 @ 05:00), Max: 101.2 (05-26-25 @ 21:16)  HR: 67 (05-27-25 @ 05:00)  BP: 147/66 (05-27-25 @ 05:00) (147/66 - 191/69)  RR: 19 (05-27-25 @ 05:00)  SpO2: 92% (05-27-25 @ 05:00)    TESTS & MEASUREMENTS:  Weight (Kg):                             11.7   6.87  )-----------( 226      ( 27 May 2025 06:56 )             36.4       05-27    136  |  98  |  11  ----------------------------<  77  4.0   |  27  |  0.6[L]    Ca    9.1      27 May 2025 06:56  Mg     2.0     05-27    TPro  5.9[L]  /  Alb  3.2[L]  /  TBili  0.3  /  DBili  x   /  AST  26  /  ALT  13  /  AlkPhos  102  05-27    LIVER FUNCTIONS - ( 27 May 2025 06:56 )  Alb: 3.2 g/dL / Pro: 5.9 g/dL / ALK PHOS: 102 U/L / ALT: 13 U/L / AST: 26 U/L / GGT: x                 Urinalysis Basic - ( 27 May 2025 06:56 )    Color: x / Appearance: x / SG: x / pH: x  Gluc: 77 mg/dL / Ketone: x  / Bili: x / Urobili: x   Blood: x / Protein: x / Nitrite: x   Leuk Esterase: x / RBC: x / WBC x   Sq Epi: x / Non Sq Epi: x / Bacteria: x        RADIOLOGY & ADDITIONAL TESTS:    RECENT DIAGNOSTIC ORDERS:  S. pneumoniae & L. pneumophila Ag, Urine: Routine (05-27-25 @ 07:20)  Procalcitonin: 11:00 (05-27-25 @ 06:34)  Procalcitonin: STAT (05-26-25 @ 21:20)  S. pneumoniae & L. pneumophila Ag, Urine: Routine (05-26-25 @ 19:01)  MRSA/MSSA PCR: Routine (05-26-25 @ 19:01)  Diet, Soft and Bite Sized (05-26-25 @ 18:34)  Culture - Blood: STAT  Specimen Source: Blood-Peripheral (05-26-25 @ 13:12)  Culture - Blood: STAT  Specimen Source: Blood-Peripheral (05-26-25 @ 13:12)      MEDICATIONS:  MEDICATIONS  (STANDING):  albuterol/ipratropium for Nebulization 3 milliLiter(s) Nebulizer every 6 hours  amLODIPine   Tablet 2.5 milliGRAM(s) Oral daily  apixaban 5 milliGRAM(s) Oral every 12 hours  cefTRIAXone   IVPB 1000 milliGRAM(s) IV Intermittent every 24 hours  chlorhexidine 2% Cloths 1 Application(s) Topical daily  clopidogrel Tablet 75 milliGRAM(s) Oral daily  doxycycline IVPB 100 milliGRAM(s) IV Intermittent every 12 hours  hydrALAZINE 25 milliGRAM(s) Oral three times a day  isosorbide   dinitrate Tablet (ISORDIL) 10 milliGRAM(s) Oral two times a day  latanoprost 0.005% Ophthalmic Solution 1 Drop(s) Both EYES at bedtime  levothyroxine 25 MICROGram(s) Oral daily  methylPREDNISolone sodium succinate Injectable 40 milliGRAM(s) IV Push daily  metoprolol succinate ER 25 milliGRAM(s) Oral daily  polyethylene glycol 3350 17 Gram(s) Oral daily  senna 2 Tablet(s) Oral at bedtime    MEDICATIONS  (PRN):  acetaminophen     Tablet .. 650 milliGRAM(s) Oral every 6 hours PRN Temp greater or equal to 38C (100.4F), Mild Pain (1 - 3)  aluminum hydroxide/magnesium hydroxide/simethicone Suspension 30 milliLiter(s) Oral every 4 hours PRN Dyspepsia  guaiFENesin  milliGRAM(s) Oral every 12 hours PRN Cough  melatonin 3 milliGRAM(s) Oral at bedtime PRN Insomnia      HOME MEDICATIONS:  amLODIPine 2.5 mg oral tablet (05-26)  apixaban 5 mg oral tablet (09-06)  cefTRIAXone 1 g injection (05-26)  CLOPIDOGREL 75 MG TABLET (08-23)  Colace 100 mg oral capsule (05-26)  doxycycline hyclate 100 mg oral capsule (05-26)  hydrALAZINE 25 mg oral tablet (08-23)  ipratropium-albuterol 0.5 mg-2.5 mg/3 mL inhalation solution (05-26)  isosorbide dinitrate 10 mg oral tablet (08-23)  lactulose 20 g oral powder for reconstitution (05-26)  latanoprost 0.005% ophthalmic solution (08-23)  levothyroxine 25 mcg (0.025 mg) oral tablet (08-23)  metoprolol succinate 25 mg oral tablet, extended release (08-23)  polyethylene glycol 3350 oral powder for reconstitution (08-23)  senna leaf extract oral tablet (08-23)      REVIEW OF SYSTEMS:  All other review of systems is negative unless indicated above.     PHYSICAL EXAM:  PHYSICAL EXAM:  GENERAL: NAD  HEAD:  Atraumatic, Normocephalic  NECK: Supple, No JVD  CHEST/LUNG: bl expiratory wheeze  HEART: Regular rate and rhythm; No murmurs, rubs, or gallops  ABDOMEN: Soft, Nontender, Nondistended; Bowel sounds present  EXTREMITIES:  2+ Peripheral Pulses, No clubbing, cyanosis, or edema  SKIN: No rashes or lesions

## 2025-05-27 NOTE — PROGRESS NOTE ADULT - ASSESSMENT
89y/o F with PMHx of COPD on 2L home O2, asthma, bronchiectasis, CAD, PE on Eliquis and Plavix, HTN, and HLD brought [facility] for evaluation of shortness of breath. Hx taken from the patient and is aox3 at time of the interview. States that for the past week, she has been having progressive worsening of shortness of breath associated with productive cough and loss of appetite. Patient states that she was given antibiotics at the NH but she has not been getting well, so she was brought to the ED for further evaluation. Patient also states that she tends to get these types of episodes 1-2x/yr and she usually gets better with antibiotics and nebs. Review of the systems is negative for any headache, fever, chest pain, N/V, diarrhea, abdominal pain, dysuria, frequency, urgency, extremity weakness and other significant complaints.    Baseline: AOX3, can dress herself, can eat by herself, wheel chair bound, need help with toilet     #Shortness of breath associated with cough  #Acute on chronic hypoxic respiratory failure   #Suspected COPD/bronchiectasis exacerbation vs pneumonia 2/2 vi  #Asthma, Bronchiectasis   - vitally stable on 4L now   - wbc 5k, trops 24, 24, bnp 2500  - vbg: spo2 59, co2 49, hco 33, ph 7.44,   - x ray chest--- Unchanged bilateral opacities.  - EKG---Normal sinus rhythm, Right bundle branch block, Left anterior fascicular block, Bifascicular block ,Left ventricular hypertrophy with repolarization abnormality ( R in aVL, Romhilt-Auguste )  Plan  - rvp, mrsa, urine legionella and urine streptococcus, blood and sputum cultures   - duonebs q6hr  - methylprednisolone 40mg daily   - iv ceftriaxone and doxycyline  - supplemental ox via nasal cannula        #HTN  #HLD  - takes amlodipine     #CAD  #PE  - on Eliquis     DVT PPX: eliquis   GI PPX:   DIET: dash      89y/o F with PMHx of COPD on 2L home O2, asthma, bronchiectasis, CAD, PE on Eliquis and Plavix, HTN, and HLD [facility] for evaluation of shortness of breath. Hx taken from the patient and is aox3 at time of the interview. States that for the past week, she has been having progressive worsening of shortness of breath associated with productive cough and loss of appetite. Patient states that she was given antibiotics at the NH but she has not been getting well, so she was brought to the ED for further evaluation. Patient also states that she tends to get these types of episodes 1-2x/yr and she usually gets better with antibiotics and nebs. Review of the systems is negative for any headache, fever, chest pain, N/V, diarrhea, abdominal pain, dysuria, frequency, urgency, extremity weakness and other significant complaints.    Baseline: AOX3, can dress herself, can eat by herself, wheel chair bound, need help with toilet       #Suspected COPD/bronchiectasis exacerbation vs pneumonia 2/2 viral infection  #Acute on chronic hypoxic respiratory failure   #H/o asthma  - wbc 5k, trops 24, 24, bnp 2500  - vbg: spo2 59, co2 49, hco 33, ph 7.44,   - Xray Chest (05.26.25 @ 13:36): Unchanged bilateral opacities.  - ECG (05.26.25 @ 13:11): Diagnosis Line Normal sinus rhythm. Right bundle branch block. Left anterior fascicular block. *** Bifascicular block *** Left ventricular hypertrophy with repolarization abnormality (R in aVL, Romhilt-Auguste). Abnormal ECG.  - currently on 3L NC, goal SpO2 >92%  - duonebs q6hr  - methylprednisolone 40mg daily   - c/w abx (ceftriaxone + doxycyline)  - monitor respiratory effort, wean O2 as tolerated     #HTN  #HLD  - takes amlodipine     #CAD  #PE  - on Eliquis     DVT PPX: eliquis   GI PPX:   DIET: dash      89y/o F with PMHx of COPD on 2L home O2, asthma, bronchiectasis, CAD, PE on Eliquis and Plavix, HTN, and HLD [facility] for evaluation of shortness of breath. Hx taken from the patient and is aox3 at time of the interview. States that for the past week, she has been having progressive worsening of shortness of breath associated with productive cough and loss of appetite. Patient states that she was given antibiotics at the NH but she has not been getting well, so she was brought to the ED for further evaluation. Patient also states that she tends to get these types of episodes 1-2x/yr and she usually gets better with antibiotics and nebs. Review of the systems is negative for any headache, fever, chest pain, N/V, diarrhea, abdominal pain, dysuria, frequency, urgency, extremity weakness and other significant complaints.    Baseline: AOX3, can dress herself, can eat by herself, wheel chair bound, need help with toilet       #Suspected COPD/bronchiectasis exacerbation vs pneumonia 2/2 viral infection  #Acute on chronic hypoxic respiratory failure   #H/o asthma  - wbc 5k, trops 24, 24, bnp 2500  - vbg: spo2 59, co2 49, hco 33, ph 7.44,   - Xray Chest (05.26.25 @ 13:36): Unchanged bilateral opacities.  - ECG (05.26.25 @ 13:11): Diagnosis Line Normal sinus rhythm. Right bundle branch block. Left anterior fascicular block. *** Bifascicular block *** Left ventricular hypertrophy with repolarization abnormality (R in aVL, Romhilt-Auguste). Abnormal ECG.  - RVP positive for entero/rhinovirus  - currently on 3L NC, goal SpO2 >92%  - duonebs q6hr  - methylprednisolone 40mg daily   - c/w abx (ceftriaxone + doxycyline)  - monitor respiratory effort, wean O2 as tolerated     #Constipation  - last BM 2 days ago  - bowel regimen (miralax + senna)    #Poor PO Intake  - encourage oral intake  - monitor labs and urine output    #HTN  #HLD  - c/w amlodipine     #CAD  #PE  - c/w eliquis   - c/w plavix    DVT PPX: eliquis   GI PPX:   DIET: dash     #Dispo: c/w abx; bowel regimen; monitor labs; duonebs q6hr;      89y/o F with PMHx of COPD on 2L home O2, asthma, bronchiectasis, CAD, PE on Eliquis and Plavix, HTN, and HLD [facility] for evaluation of shortness of breath. Hx taken from the patient and is aox3 at time of the interview. States that for the past week, she has been having progressive worsening of shortness of breath associated with productive cough and loss of appetite. Patient states that she was given antibiotics at the NH but she has not been getting well, so she was brought to the ED for further evaluation. Patient also states that she tends to get these types of episodes 1-2x/yr and she usually gets better with antibiotics and nebs. Review of the systems is negative for any headache, fever, chest pain, N/V, diarrhea, abdominal pain, dysuria, frequency, urgency, extremity weakness and other significant complaints.    Baseline: AOX3, can dress herself, can eat by herself, wheel chair bound, need help with toilet     Updated daughter Magdalena Durant on the phone. Confirmed DNR/DNI Trial status. MOLST form in folder.    #Suspected COPD/bronchiectasis exacerbation vs pneumonia 2/2 viral infection  #Acute on chronic hypoxic respiratory failure   #H/o asthma  - wbc 5k, trops 24, 24, bnp 2500  - vbg: spo2 59, co2 49, hco 33, ph 7.44,   - Xray Chest (05.26.25 @ 13:36): Unchanged bilateral opacities.  - ECG (05.26.25 @ 13:11): Diagnosis Line Normal sinus rhythm. Right bundle branch block. Left anterior fascicular block. *** Bifascicular block *** Left ventricular hypertrophy with repolarization abnormality (R in aVL, Romhilt-Auguste). Abnormal ECG.  - RVP positive for entero/rhinovirus  - currently on 3L NC, goal SpO2 >92%  - duonebs q6hr  - methylprednisolone 40mg daily   - c/w abx (ceftriaxone + doxycyline)  - monitor respiratory effort, wean O2 as tolerated   - Wean O2 to 2L home     #Constipation  - last BM 2 days ago  - Continue bowel regimen (miralax + senna)    #EKG findings of RBBB and left anterior fascicular block, and left ventricular hypertrophy with repolarization abnormality  - Asymptomatic  - F/u echo    #Poor PO Intake  - encourage oral intake  - monitor labs and urine output    #HTN  #HLD  - c/w amlodipine     #CAD  #PE  - c/w eliquis   - c/w plavix    DVT PPX: eliquis   GI PPX:   DIET: dash   DISPO: NH when clinically improved

## 2025-05-28 LAB
ANION GAP SERPL CALC-SCNC: 13 MMOL/L — SIGNIFICANT CHANGE UP (ref 7–14)
BASOPHILS # BLD AUTO: 0.07 K/UL — SIGNIFICANT CHANGE UP (ref 0–0.2)
BASOPHILS NFR BLD AUTO: 0.8 % — SIGNIFICANT CHANGE UP (ref 0–1)
BUN SERPL-MCNC: 13 MG/DL — SIGNIFICANT CHANGE UP (ref 10–20)
CALCIUM SERPL-MCNC: 8.9 MG/DL — SIGNIFICANT CHANGE UP (ref 8.4–10.5)
CHLORIDE SERPL-SCNC: 97 MMOL/L — LOW (ref 98–110)
CO2 SERPL-SCNC: 25 MMOL/L — SIGNIFICANT CHANGE UP (ref 17–32)
CREAT SERPL-MCNC: 0.6 MG/DL — LOW (ref 0.7–1.5)
EGFR: 81 ML/MIN/1.73M2 — SIGNIFICANT CHANGE UP
EGFR: 81 ML/MIN/1.73M2 — SIGNIFICANT CHANGE UP
EOSINOPHIL # BLD AUTO: 0.09 K/UL — SIGNIFICANT CHANGE UP (ref 0–0.7)
EOSINOPHIL NFR BLD AUTO: 1 % — SIGNIFICANT CHANGE UP (ref 0–8)
GLUCOSE SERPL-MCNC: 81 MG/DL — SIGNIFICANT CHANGE UP (ref 70–99)
HCT VFR BLD CALC: 34.5 % — LOW (ref 37–47)
HGB BLD-MCNC: 10.9 G/DL — LOW (ref 12–16)
IMM GRANULOCYTES NFR BLD AUTO: 0.5 % — HIGH (ref 0.1–0.3)
LEGIONELLA AG UR QL: NEGATIVE — SIGNIFICANT CHANGE UP
LYMPHOCYTES # BLD AUTO: 1.8 K/UL — SIGNIFICANT CHANGE UP (ref 1.2–3.4)
LYMPHOCYTES # BLD AUTO: 20.4 % — LOW (ref 20.5–51.1)
MAGNESIUM SERPL-MCNC: 1.8 MG/DL — SIGNIFICANT CHANGE UP (ref 1.8–2.4)
MCHC RBC-ENTMCNC: 30.6 PG — SIGNIFICANT CHANGE UP (ref 27–31)
MCHC RBC-ENTMCNC: 31.6 G/DL — LOW (ref 32–37)
MCV RBC AUTO: 96.9 FL — SIGNIFICANT CHANGE UP (ref 81–99)
MONOCYTES # BLD AUTO: 1.01 K/UL — HIGH (ref 0.1–0.6)
MONOCYTES NFR BLD AUTO: 11.4 % — HIGH (ref 1.7–9.3)
NEUTROPHILS # BLD AUTO: 5.82 K/UL — SIGNIFICANT CHANGE UP (ref 1.4–6.5)
NEUTROPHILS NFR BLD AUTO: 65.9 % — SIGNIFICANT CHANGE UP (ref 42.2–75.2)
NRBC BLD AUTO-RTO: 0 /100 WBCS — SIGNIFICANT CHANGE UP (ref 0–0)
PLATELET # BLD AUTO: 240 K/UL — SIGNIFICANT CHANGE UP (ref 130–400)
PMV BLD: 11.7 FL — HIGH (ref 7.4–10.4)
POTASSIUM SERPL-MCNC: 3.5 MMOL/L — SIGNIFICANT CHANGE UP (ref 3.5–5)
POTASSIUM SERPL-SCNC: 3.5 MMOL/L — SIGNIFICANT CHANGE UP (ref 3.5–5)
PROCALCITONIN SERPL-MCNC: 0.06 NG/ML — SIGNIFICANT CHANGE UP (ref 0.02–0.1)
RBC # BLD: 3.56 M/UL — LOW (ref 4.2–5.4)
RBC # FLD: 12.4 % — SIGNIFICANT CHANGE UP (ref 11.5–14.5)
S PNEUM AG UR QL: NEGATIVE — SIGNIFICANT CHANGE UP
SODIUM SERPL-SCNC: 135 MMOL/L — SIGNIFICANT CHANGE UP (ref 135–146)
WBC # BLD: 8.83 K/UL — SIGNIFICANT CHANGE UP (ref 4.8–10.8)
WBC # FLD AUTO: 8.83 K/UL — SIGNIFICANT CHANGE UP (ref 4.8–10.8)

## 2025-05-28 PROCEDURE — 71045 X-RAY EXAM CHEST 1 VIEW: CPT | Mod: 26

## 2025-05-28 PROCEDURE — 99232 SBSQ HOSP IP/OBS MODERATE 35: CPT

## 2025-05-28 RX ORDER — DOXYCYCLINE HYCLATE 100 MG
1 TABLET ORAL
Refills: 0 | DISCHARGE

## 2025-05-28 RX ORDER — PREDNISONE 20 MG/1
40 TABLET ORAL DAILY
Refills: 0 | Status: DISCONTINUED | OUTPATIENT
Start: 2025-05-29 | End: 2025-05-30

## 2025-05-28 RX ORDER — CEFTRIAXONE 500 MG/1
1 INJECTION, POWDER, FOR SOLUTION INTRAMUSCULAR; INTRAVENOUS
Refills: 0 | DISCHARGE

## 2025-05-28 RX ADMIN — ISOSORBDIE DINITRATE 10 MILLIGRAM(S): 30 TABLET ORAL at 17:06

## 2025-05-28 RX ADMIN — Medication 650 MILLIGRAM(S): at 05:14

## 2025-05-28 RX ADMIN — Medication 25 MICROGRAM(S): at 05:13

## 2025-05-28 RX ADMIN — AMLODIPINE BESYLATE 2.5 MILLIGRAM(S): 10 TABLET ORAL at 05:13

## 2025-05-28 RX ADMIN — METHYLPREDNISOLONE ACETATE 40 MILLIGRAM(S): 80 INJECTION, SUSPENSION INTRA-ARTICULAR; INTRALESIONAL; INTRAMUSCULAR; SOFT TISSUE at 05:14

## 2025-05-28 RX ADMIN — IPRATROPIUM BROMIDE AND ALBUTEROL SULFATE 3 MILLILITER(S): .5; 2.5 SOLUTION RESPIRATORY (INHALATION) at 19:56

## 2025-05-28 RX ADMIN — LATANOPROST PF 1 DROP(S): 0.05 SOLUTION/ DROPS OPHTHALMIC at 21:16

## 2025-05-28 RX ADMIN — APIXABAN 5 MILLIGRAM(S): 2.5 TABLET, FILM COATED ORAL at 17:58

## 2025-05-28 RX ADMIN — CLOPIDOGREL BISULFATE 75 MILLIGRAM(S): 75 TABLET, FILM COATED ORAL at 11:23

## 2025-05-28 RX ADMIN — Medication 100 MILLIGRAM(S): at 05:14

## 2025-05-28 RX ADMIN — IPRATROPIUM BROMIDE AND ALBUTEROL SULFATE 3 MILLILITER(S): .5; 2.5 SOLUTION RESPIRATORY (INHALATION) at 07:51

## 2025-05-28 RX ADMIN — IPRATROPIUM BROMIDE AND ALBUTEROL SULFATE 3 MILLILITER(S): .5; 2.5 SOLUTION RESPIRATORY (INHALATION) at 14:33

## 2025-05-28 RX ADMIN — Medication 25 MILLIGRAM(S): at 05:14

## 2025-05-28 RX ADMIN — APIXABAN 5 MILLIGRAM(S): 2.5 TABLET, FILM COATED ORAL at 05:14

## 2025-05-28 RX ADMIN — IPRATROPIUM BROMIDE AND ALBUTEROL SULFATE 3 MILLILITER(S): .5; 2.5 SOLUTION RESPIRATORY (INHALATION) at 02:26

## 2025-05-28 RX ADMIN — ISOSORBDIE DINITRATE 10 MILLIGRAM(S): 30 TABLET ORAL at 05:13

## 2025-05-28 RX ADMIN — Medication 25 MILLIGRAM(S): at 21:16

## 2025-05-28 RX ADMIN — POLYETHYLENE GLYCOL 3350 17 GRAM(S): 17 POWDER, FOR SOLUTION ORAL at 11:23

## 2025-05-28 RX ADMIN — Medication 2 TABLET(S): at 21:16

## 2025-05-28 RX ADMIN — Medication 25 MILLIGRAM(S): at 14:31

## 2025-05-28 RX ADMIN — Medication 1 APPLICATION(S): at 11:24

## 2025-05-28 RX ADMIN — METOPROLOL SUCCINATE 25 MILLIGRAM(S): 50 TABLET, EXTENDED RELEASE ORAL at 05:13

## 2025-05-28 RX ADMIN — Medication 650 MILLIGRAM(S): at 05:44

## 2025-05-28 NOTE — DIETITIAN INITIAL EVALUATION ADULT - ORAL INTAKE PTA/DIET HISTORY
Pt reportedly w/ fair appetite & po intake reported PTP. Reportedly follows a soft diet PTP. Consumes 2-3 meals/day. Reportedly w/ no therapeutic diet restrictions PTP. Allergy to fish, lactose intolerance reported. No supplements reported. No food preferences reported. No signs of significant muscle wasting/subcutaneous fat loss observed upon comprehensive nutrition focused physical exam. UBW reported to be 63.6kg with no known h/o unintentional wt loss reported

## 2025-05-28 NOTE — DIETITIAN INITIAL EVALUATION ADULT - PERTINENT MEDS FT
MEDICATIONS  (STANDING):  albuterol/ipratropium for Nebulization 3 milliLiter(s) Nebulizer every 6 hours  amLODIPine   Tablet 2.5 milliGRAM(s) Oral daily  apixaban 5 milliGRAM(s) Oral every 12 hours  chlorhexidine 2% Cloths 1 Application(s) Topical daily  clopidogrel Tablet 75 milliGRAM(s) Oral daily  hydrALAZINE 25 milliGRAM(s) Oral three times a day  isosorbide   dinitrate Tablet (ISORDIL) 10 milliGRAM(s) Oral two times a day  latanoprost 0.005% Ophthalmic Solution 1 Drop(s) Both EYES at bedtime  levothyroxine 25 MICROGram(s) Oral daily  metoprolol succinate ER 25 milliGRAM(s) Oral daily  polyethylene glycol 3350 17 Gram(s) Oral daily  senna 2 Tablet(s) Oral at bedtime    MEDICATIONS  (PRN):  acetaminophen     Tablet .. 650 milliGRAM(s) Oral every 6 hours PRN Temp greater or equal to 38C (100.4F), Mild Pain (1 - 3)  aluminum hydroxide/magnesium hydroxide/simethicone Suspension 30 milliLiter(s) Oral every 4 hours PRN Dyspepsia  guaiFENesin  milliGRAM(s) Oral every 12 hours PRN Cough  melatonin 3 milliGRAM(s) Oral at bedtime PRN Insomnia

## 2025-05-28 NOTE — DIETITIAN INITIAL EVALUATION ADULT - OTHER INFO
Pertinent Medical Information: Presented for evaluation of shortness of breath. Acute on chronic hypoxic respiratory failure noted. Constipation noted - on bowel regimen.    PMH includes COPD on 2L home O2, asthma, bronchiectasis, CAD, PE on Eliquis and Plavix, HTN, and HLD.

## 2025-05-28 NOTE — PROGRESS NOTE ADULT - SUBJECTIVE AND OBJECTIVE BOX
SUBJECTIVE/OVERNIGHT EVENTS  Today is hospital day 2d. This morning patient was seen and examined at bedside, resting comfortably in bed. No acute or major events overnight.    HOSPITAL COURSE  Day 1:   Day 2:   Day 3:     CODE STATUS:    FAMILY COMMUNICATION  Contact date:  Name of person contacted:  Relationship to patient:  Communication details:    MEDICATIONS  STANDING MEDICATIONS  albuterol/ipratropium for Nebulization 3 milliLiter(s) Nebulizer every 6 hours  amLODIPine   Tablet 2.5 milliGRAM(s) Oral daily  apixaban 5 milliGRAM(s) Oral every 12 hours  chlorhexidine 2% Cloths 1 Application(s) Topical daily  clopidogrel Tablet 75 milliGRAM(s) Oral daily  hydrALAZINE 25 milliGRAM(s) Oral three times a day  isosorbide   dinitrate Tablet (ISORDIL) 10 milliGRAM(s) Oral two times a day  latanoprost 0.005% Ophthalmic Solution 1 Drop(s) Both EYES at bedtime  levothyroxine 25 MICROGram(s) Oral daily  metoprolol succinate ER 25 milliGRAM(s) Oral daily  polyethylene glycol 3350 17 Gram(s) Oral daily  senna 2 Tablet(s) Oral at bedtime    PRN MEDICATIONS  acetaminophen     Tablet .. 650 milliGRAM(s) Oral every 6 hours PRN  aluminum hydroxide/magnesium hydroxide/simethicone Suspension 30 milliLiter(s) Oral every 4 hours PRN  guaiFENesin  milliGRAM(s) Oral every 12 hours PRN  melatonin 3 milliGRAM(s) Oral at bedtime PRN    VITALS  T(F): 97.7 (05-28-25 @ 13:05), Max: 100.4 (05-28-25 @ 05:14)  HR: 64 (05-28-25 @ 13:05) (64 - 98)  BP: 147/65 (05-28-25 @ 13:05) (102/76 - 166/61)  RR: 18 (05-28-25 @ 13:05) (18 - 18)  SpO2: 95% (05-28-25 @ 13:05) (93% - 95%)    PHYSICAL EXAM  General: NAD, well-appearing, sitting comfortably  HEENT: EOMI, moist mucus membranes, conjunctivae clear  Cardio: RRR, no murmurs, normal S1 and S2  Resp: lungs CTAB, no wheezing/rhonchi/rales  Abd: abdomen soft and nontender, nondistended, no rebound or guarding  MSK: no lower extremity edema, 2+ pedal pulses  Neuro: A&Ox3, motor and sensory functions grossly intact  Skin: warm and dry    (  ) Indwelling Martines Catheter   Date insterted:    Reason (  ) Critical illness     (  ) urinary retention    (  ) Accurate Ins/Outs Monitoring     (  ) CMO patient    (  ) Central Line  Date inserted:  Location: (  ) Right IJ   (  ) Left IJ   (  ) Right Fem   (  ) Left Fem    (  ) SPC  (  ) pigtail  (  ) PEG tube  (  ) colostomy  (  ) jejunostomy  (  ) U-Dall    LABS             10.9   8.83  )-----------( 240      ( 05-28-25 @ 05:48 )             34.5     135  |  97  |  13  -------------------------<  81   05-28-25 @ 05:48  3.5  |  25  |  0.6    Ca      8.9     05-28-25 @ 05:48  Mg     1.8     05-28-25 @ 05:48    TPro  5.9  /  Alb  3.2  /  TBili  0.3  /  DBili  x   /  AST  26  /  ALT  13  /  AlkPhos  102  /  GGT  x     05-27-25 @ 06:56      Troponin T, High Sensitivity Result: 24 ng/L (05-26-25 @ 14:50)  Pro-Brain Natriuretic Peptide: 2500 pg/mL (05-26-25 @ 13:12)  Troponin T, High Sensitivity Result: 24 ng/L (05-26-25 @ 13:09)    Urinalysis Basic - ( 28 May 2025 05:48 )    Color: x / Appearance: x / SG: x / pH: x  Gluc: 81 mg/dL / Ketone: x  / Bili: x / Urobili: x   Blood: x / Protein: x / Nitrite: x   Leuk Esterase: x / RBC: x / WBC x   Sq Epi: x / Non Sq Epi: x / Bacteria: x          Culture - Blood (collected 26 May 2025 14:50)  Source: Blood Blood-Peripheral  Preliminary Report (28 May 2025 02:03):    No growth at 24 hours    Culture - Blood (collected 26 May 2025 14:50)  Source: Blood Blood-Peripheral  Preliminary Report (28 May 2025 02:03):    No growth at 24 hours      IMAGING SUBJECTIVE/OVERNIGHT EVENTS  Today is hospital day 2d. This morning patient was seen and examined at bedside, resting comfortably in bed. No acute or major events overnight. but we noticed coughing when eating and drinking     CODE STATUS:    FAMILY COMMUNICATION  Contact date:  Name of person contacted:  Relationship to patient:  Communication details:    MEDICATIONS  STANDING MEDICATIONS  albuterol/ipratropium for Nebulization 3 milliLiter(s) Nebulizer every 6 hours  amLODIPine   Tablet 2.5 milliGRAM(s) Oral daily  apixaban 5 milliGRAM(s) Oral every 12 hours  chlorhexidine 2% Cloths 1 Application(s) Topical daily  clopidogrel Tablet 75 milliGRAM(s) Oral daily  hydrALAZINE 25 milliGRAM(s) Oral three times a day  isosorbide   dinitrate Tablet (ISORDIL) 10 milliGRAM(s) Oral two times a day  latanoprost 0.005% Ophthalmic Solution 1 Drop(s) Both EYES at bedtime  levothyroxine 25 MICROGram(s) Oral daily  metoprolol succinate ER 25 milliGRAM(s) Oral daily  polyethylene glycol 3350 17 Gram(s) Oral daily  senna 2 Tablet(s) Oral at bedtime    PRN MEDICATIONS  acetaminophen     Tablet .. 650 milliGRAM(s) Oral every 6 hours PRN  aluminum hydroxide/magnesium hydroxide/simethicone Suspension 30 milliLiter(s) Oral every 4 hours PRN  guaiFENesin  milliGRAM(s) Oral every 12 hours PRN  melatonin 3 milliGRAM(s) Oral at bedtime PRN    VITALS  T(F): 97.7 (05-28-25 @ 13:05), Max: 100.4 (05-28-25 @ 05:14)  HR: 64 (05-28-25 @ 13:05) (64 - 98)  BP: 147/65 (05-28-25 @ 13:05) (102/76 - 166/61)  RR: 18 (05-28-25 @ 13:05) (18 - 18)  SpO2: 95% (05-28-25 @ 13:05) (93% - 95%)    PHYSICAL EXAM  General: NAD, well-appearing, sitting comfortably  HEENT: EOMI, moist mucus membranes, conjunctivae clear  Cardio: RRR, no murmurs, normal S1 and S2  Resp: lungs CTAB, no wheezing/rhonchi/rales  Abd: abdomen soft and nontender, nondistended, no rebound or guarding  MSK: no lower extremity edema, 2+ pedal pulses  Neuro: A&Ox3, motor and sensory functions grossly intact  Skin: warm and dry    (  ) Indwelling Martines Catheter   Date insterted:    Reason (  ) Critical illness     (  ) urinary retention    (  ) Accurate Ins/Outs Monitoring     (  ) CMO patient    (  ) Central Line  Date inserted:  Location: (  ) Right IJ   (  ) Left IJ   (  ) Right Fem   (  ) Left Fem    (  ) SPC  (  ) pigtail  (  ) PEG tube  (  ) colostomy  (  ) jejunostomy  (  ) U-Dall    LABS             10.9   8.83  )-----------( 240      ( 05-28-25 @ 05:48 )             34.5     135  |  97  |  13  -------------------------<  81   05-28-25 @ 05:48  3.5  |  25  |  0.6    Ca      8.9     05-28-25 @ 05:48  Mg     1.8     05-28-25 @ 05:48    TPro  5.9  /  Alb  3.2  /  TBili  0.3  /  DBili  x   /  AST  26  /  ALT  13  /  AlkPhos  102  /  GGT  x     05-27-25 @ 06:56      Troponin T, High Sensitivity Result: 24 ng/L (05-26-25 @ 14:50)  Pro-Brain Natriuretic Peptide: 2500 pg/mL (05-26-25 @ 13:12)  Troponin T, High Sensitivity Result: 24 ng/L (05-26-25 @ 13:09)    Urinalysis Basic - ( 28 May 2025 05:48 )    Color: x / Appearance: x / SG: x / pH: x  Gluc: 81 mg/dL / Ketone: x  / Bili: x / Urobili: x   Blood: x / Protein: x / Nitrite: x   Leuk Esterase: x / RBC: x / WBC x   Sq Epi: x / Non Sq Epi: x / Bacteria: x          Culture - Blood (collected 26 May 2025 14:50)  Source: Blood Blood-Peripheral  Preliminary Report (28 May 2025 02:03):    No growth at 24 hours    Culture - Blood (collected 26 May 2025 14:50)  Source: Blood Blood-Peripheral  Preliminary Report (28 May 2025 02:03):    No growth at 24 hours      IMAGING

## 2025-05-28 NOTE — DIETITIAN INITIAL EVALUATION ADULT - NS FNS DIET ORDER
Diet, Minced and Moist:   Mildly Thick Liquids (MILDTHICKLIQS) (05-28-25 @ 15:00)  Pt seen by SLP services 5/28 - notes "Mild oral dysphagia with soft & bite sized solids likely related to generalized weakness. +overt s/s of aspiration w/thin liquids. Toleration observed for puree, minced & moist and mildly thick liquids without overt s/s of penetration/ aspiration. Dysphagia s/s are likely related to presbyphagia." Recommends Initiate minced & moist w/mildly thick liquids. allow for swallow between intakes; no straws; oral hygiene; small sips/bites dependent  1:1 feed 2/2 generalized weakness.  Pt reportedly w/ reduced appetite at this time in setting of weakness; consuming 50% of meals at this time on average. No chewing/swallowing difficulty reported at this time.

## 2025-05-28 NOTE — PROGRESS NOTE ADULT - ATTENDING COMMENTS
89y F w/ hx of COPD on 2L home O2, Asthma, Bronchiectasis , CAD, PE on Eliquis, HTN and HLD [facility] for evaluation of the shortness of breath.                          #Acute on chronic hypoxic respiratory failure . COPD/Asthma exacerbation due to Entero/rhinovirus bronchitis   CXR 05/26: Unchanged bilateral opacities  - Duoneb q6hr  - Methylprednisolone 40mg daily   - Cont iv ceftriaxone and doxycyline  - supplemental ox via nasal cannula     #HTN/HLD, CAD   amLODIPine   Tablet 2.5 milliGRAM(s) Oral daily  apixaban 5 milliGRAM(s) Oral every 12 hours  clopidogrel Tablet 75 milliGRAM(s) Oral daily  hydrALAZINE 25 milliGRAM(s) Oral three times a day  isosorbide   dinitrate Tablet (ISORDIL) 10 milliGRAM(s) Oral two times a day  metoprolol succinate ER 25 milliGRAM(s) Oral daily  check lipid profile     #PE  - on Eliquis     #Progress Note Handoff  Pending (specify): chagne steroids to po , check speech and swallow   Family discussion: yamilet pt   Disposition: GMF, from NH  full code

## 2025-05-28 NOTE — DIETITIAN INITIAL EVALUATION ADULT - PERTINENT LABORATORY DATA
05-28    135  |  97[L]  |  13  ----------------------------<  81  3.5   |  25  |  0.6[L]    Ca    8.9      28 May 2025 05:48  Mg     1.8     05-28    TPro  5.9[L]  /  Alb  3.2[L]  /  TBili  0.3  /  DBili  x   /  AST  26  /  ALT  13  /  AlkPhos  102  05-27

## 2025-05-28 NOTE — DIETITIAN INITIAL EVALUATION ADULT - NSPROEDAREADYLEARN_GEN_A_NUR
Reviewed diet & SLP recommendations. Encouraged adequate po intake. Discussed heart healthy nutrition.

## 2025-05-28 NOTE — PROGRESS NOTE ADULT - ASSESSMENT
89y/o F with PMHx of COPD on 2L home O2, asthma, bronchiectasis, CAD, PE on Eliquis and Plavix, HTN, and HLD [facility] for evaluation of shortness of breath. Hx taken from the patient and is aox3 at time of the interview. States that for the past week, she has been having progressive worsening of shortness of breath associated with productive cough and loss of appetite. Patient states that she was given antibiotics at the NH but she has not been getting well, so she was brought to the ED for further evaluation. Patient also states that she tends to get these types of episodes 1-2x/yr and she usually gets better with antibiotics and nebs. Review of the systems is negative for any headache, fever, chest pain, N/V, diarrhea, abdominal pain, dysuria, frequency, urgency, extremity weakness and other significant complaints.    Baseline: AOX3, can dress herself, can eat by herself, wheel chair bound, need help with toilet     Updated daughter Magdalena Durant on the phone. Confirmed DNR/DNI Trial status. MOLST form in folder.    #Suspected COPD/bronchiectasis exacerbation vs pneumonia 2/2 viral infection  #Acute on chronic hypoxic respiratory failure   #H/o asthma  - wbc 5k, trops 24, 24, bnp 2500  - vbg: spo2 59, co2 49, hco 33, ph 7.44,   - Xray Chest (05.26.25 @ 13:36): Unchanged bilateral opacities.  - ECG (05.26.25 @ 13:11): Diagnosis Line Normal sinus rhythm. Right bundle branch block. Left anterior fascicular block. *** Bifascicular block *** Left ventricular hypertrophy with repolarization abnormality (R in aVL, Romhilt-Auguste). Abnormal ECG.  - RVP positive for entero/rhinovirus  - currently on 3L NC, goal SpO2 >92%  - duonebs q6hr  - Continue methylprednisolone 40mg daily   - D/c abx (ceftriaxone + doxycyline)  - monitor respiratory effort, wean O2 as tolerated   - Wean O2 to 2L home     #Constipation  - last BM 2 days ago  - Continue bowel regimen (miralax + senna)    #EKG findings of RBBB and left anterior fascicular block, and left ventricular hypertrophy with repolarization abnormality  - Asymptomatic  - Echo: Normal global left ventricular systolic function with a biplane EF of  61%. Mild (grade 1) diastolic dysfunction. No regional wall motion abnormalities.    #Poor PO Intake  #Coughing  - encourage oral intake  - monitor labs and urine output  - F/u speech and swallow    #HTN  #HLD  - c/w amlodipine     #CAD  #PE  - c/w eliquis   - c/w plavix    DVT PPX: eliquis   GI PPX:   DIET: dash   DISPO: NH when clinically improved

## 2025-05-28 NOTE — PHYSICAL THERAPY INITIAL EVALUATION ADULT - SPECIFY REASON(S)
13:47. Pt is a LTC resident at [facility]. Pt is w/c bound at [facility]. No acute skilled PT needs. Pt d/c from PT.

## 2025-05-28 NOTE — DIETITIAN INITIAL EVALUATION ADULT - ADD RECOMMEND
Recommendation: Diet order texture/consistency per SLP - minced & moist with mildly thick liquids. Provide Magic Cup three times daily (290 kcal, 9 g protein per serving).

## 2025-05-29 ENCOUNTER — TRANSCRIPTION ENCOUNTER (OUTPATIENT)
Age: 89
End: 2025-05-29

## 2025-05-29 LAB
ANION GAP SERPL CALC-SCNC: 10 MMOL/L — SIGNIFICANT CHANGE UP (ref 7–14)
BASOPHILS # BLD AUTO: 0.05 K/UL — SIGNIFICANT CHANGE UP (ref 0–0.2)
BASOPHILS NFR BLD AUTO: 0.6 % — SIGNIFICANT CHANGE UP (ref 0–1)
BUN SERPL-MCNC: 17 MG/DL — SIGNIFICANT CHANGE UP (ref 10–20)
CALCIUM SERPL-MCNC: 9.3 MG/DL — SIGNIFICANT CHANGE UP (ref 8.4–10.5)
CHLORIDE SERPL-SCNC: 101 MMOL/L — SIGNIFICANT CHANGE UP (ref 98–110)
CO2 SERPL-SCNC: 29 MMOL/L — SIGNIFICANT CHANGE UP (ref 17–32)
CREAT SERPL-MCNC: 0.5 MG/DL — LOW (ref 0.7–1.5)
EGFR: 84 ML/MIN/1.73M2 — SIGNIFICANT CHANGE UP
EGFR: 84 ML/MIN/1.73M2 — SIGNIFICANT CHANGE UP
EOSINOPHIL # BLD AUTO: 0.14 K/UL — SIGNIFICANT CHANGE UP (ref 0–0.7)
EOSINOPHIL NFR BLD AUTO: 1.6 % — SIGNIFICANT CHANGE UP (ref 0–8)
GLUCOSE SERPL-MCNC: 81 MG/DL — SIGNIFICANT CHANGE UP (ref 70–99)
HCT VFR BLD CALC: 35.7 % — LOW (ref 37–47)
HGB BLD-MCNC: 11.6 G/DL — LOW (ref 12–16)
IMM GRANULOCYTES NFR BLD AUTO: 0.7 % — HIGH (ref 0.1–0.3)
LYMPHOCYTES # BLD AUTO: 1.49 K/UL — SIGNIFICANT CHANGE UP (ref 1.2–3.4)
LYMPHOCYTES # BLD AUTO: 17.2 % — LOW (ref 20.5–51.1)
MAGNESIUM SERPL-MCNC: 2 MG/DL — SIGNIFICANT CHANGE UP (ref 1.8–2.4)
MCHC RBC-ENTMCNC: 31.2 PG — HIGH (ref 27–31)
MCHC RBC-ENTMCNC: 32.5 G/DL — SIGNIFICANT CHANGE UP (ref 32–37)
MCV RBC AUTO: 96 FL — SIGNIFICANT CHANGE UP (ref 81–99)
MONOCYTES # BLD AUTO: 1.1 K/UL — HIGH (ref 0.1–0.6)
MONOCYTES NFR BLD AUTO: 12.7 % — HIGH (ref 1.7–9.3)
NEUTROPHILS # BLD AUTO: 5.84 K/UL — SIGNIFICANT CHANGE UP (ref 1.4–6.5)
NEUTROPHILS NFR BLD AUTO: 67.2 % — SIGNIFICANT CHANGE UP (ref 42.2–75.2)
NRBC BLD AUTO-RTO: 0 /100 WBCS — SIGNIFICANT CHANGE UP (ref 0–0)
PLATELET # BLD AUTO: 215 K/UL — SIGNIFICANT CHANGE UP (ref 130–400)
PMV BLD: 11.5 FL — HIGH (ref 7.4–10.4)
POTASSIUM SERPL-MCNC: 3.9 MMOL/L — SIGNIFICANT CHANGE UP (ref 3.5–5)
POTASSIUM SERPL-SCNC: 3.9 MMOL/L — SIGNIFICANT CHANGE UP (ref 3.5–5)
RBC # BLD: 3.72 M/UL — LOW (ref 4.2–5.4)
RBC # FLD: 12.5 % — SIGNIFICANT CHANGE UP (ref 11.5–14.5)
SODIUM SERPL-SCNC: 140 MMOL/L — SIGNIFICANT CHANGE UP (ref 135–146)
WBC # BLD: 8.68 K/UL — SIGNIFICANT CHANGE UP (ref 4.8–10.8)
WBC # FLD AUTO: 8.68 K/UL — SIGNIFICANT CHANGE UP (ref 4.8–10.8)

## 2025-05-29 PROCEDURE — 99239 HOSP IP/OBS DSCHRG MGMT >30: CPT

## 2025-05-29 RX ORDER — PREDNISONE 20 MG/1
2 TABLET ORAL
Qty: 8 | Refills: 0
Start: 2025-05-29 | End: 2025-06-01

## 2025-05-29 RX ADMIN — LATANOPROST PF 1 DROP(S): 0.05 SOLUTION/ DROPS OPHTHALMIC at 21:35

## 2025-05-29 RX ADMIN — AMLODIPINE BESYLATE 2.5 MILLIGRAM(S): 10 TABLET ORAL at 06:31

## 2025-05-29 RX ADMIN — ISOSORBDIE DINITRATE 10 MILLIGRAM(S): 30 TABLET ORAL at 06:31

## 2025-05-29 RX ADMIN — IPRATROPIUM BROMIDE AND ALBUTEROL SULFATE 3 MILLILITER(S): .5; 2.5 SOLUTION RESPIRATORY (INHALATION) at 13:48

## 2025-05-29 RX ADMIN — APIXABAN 5 MILLIGRAM(S): 2.5 TABLET, FILM COATED ORAL at 18:13

## 2025-05-29 RX ADMIN — Medication 1 APPLICATION(S): at 11:22

## 2025-05-29 RX ADMIN — Medication 25 MILLIGRAM(S): at 06:31

## 2025-05-29 RX ADMIN — POLYETHYLENE GLYCOL 3350 17 GRAM(S): 17 POWDER, FOR SOLUTION ORAL at 11:22

## 2025-05-29 RX ADMIN — APIXABAN 5 MILLIGRAM(S): 2.5 TABLET, FILM COATED ORAL at 06:31

## 2025-05-29 RX ADMIN — CLOPIDOGREL BISULFATE 75 MILLIGRAM(S): 75 TABLET, FILM COATED ORAL at 11:22

## 2025-05-29 RX ADMIN — Medication 2 TABLET(S): at 21:34

## 2025-05-29 RX ADMIN — PREDNISONE 40 MILLIGRAM(S): 20 TABLET ORAL at 06:31

## 2025-05-29 RX ADMIN — Medication 25 MILLIGRAM(S): at 13:43

## 2025-05-29 RX ADMIN — IPRATROPIUM BROMIDE AND ALBUTEROL SULFATE 3 MILLILITER(S): .5; 2.5 SOLUTION RESPIRATORY (INHALATION) at 07:53

## 2025-05-29 RX ADMIN — METOPROLOL SUCCINATE 25 MILLIGRAM(S): 50 TABLET, EXTENDED RELEASE ORAL at 06:31

## 2025-05-29 RX ADMIN — IPRATROPIUM BROMIDE AND ALBUTEROL SULFATE 3 MILLILITER(S): .5; 2.5 SOLUTION RESPIRATORY (INHALATION) at 20:27

## 2025-05-29 RX ADMIN — DEXTROMETHORPHAN HBR, GUAIFENESIN 600 MILLIGRAM(S): 200 LIQUID ORAL at 11:22

## 2025-05-29 RX ADMIN — Medication 25 MICROGRAM(S): at 06:30

## 2025-05-29 RX ADMIN — ISOSORBDIE DINITRATE 10 MILLIGRAM(S): 30 TABLET ORAL at 16:55

## 2025-05-29 RX ADMIN — Medication 25 MILLIGRAM(S): at 21:35

## 2025-05-29 NOTE — DISCHARGE NOTE PROVIDER - CARE PROVIDER_API CALL
Susan Azulh  Internal Medicine  [address], [address]  [place] [address]  Phone: [phone]  Fax: [phone]  Established Patient  Follow Up Time: 2 weeks   Leana Azul  Internal Medicine  [address] [facility] [address], [address]  [place] [address]  Phone: [phone]  Fax: [phone]  Established Patient  Follow Up Time: 2 weeks    Niya Andrews  Cardiovascular Disease  [address] [place] [address]  Phone: [phone]  Fax: [phone]  Follow Up Time: 2 weeks

## 2025-05-29 NOTE — DISCHARGE NOTE PROVIDER - NSDCMRMEDTOKEN_GEN_ALL_CORE_FT
albuterol 90 mcg/inh inhalation aerosol: 2 puff(s) inhaled every 6 hours   amLODIPine 2.5 mg oral tablet: 1 tab(s) orally once a day  apixaban 5 mg oral tablet: 2 tab(s) orally every 12 hours from 9/3 to 9/10 then 1 tab every 12 hours thereafter  budesonide-formoterol 80 mcg-4.5 mcg/inh inhalation aerosol: 2 puff(s) inhaled 2 times a day   CLOPIDOGREL 75 MG TABLET: 1 each orally once a day  Colace 100 mg oral capsule: 1 cap(s) orally 2 times a day  hydrALAZINE 25 mg oral tablet: 1 tab(s) orally 3 times a day  ipratropium-albuterol 0.5 mg-2.5 mg/3 mL inhalation solution: 3 milliliter(s) by nebulizer 4 times a day  isosorbide dinitrate 10 mg oral tablet: 1 tab(s) orally once a day  lactulose 20 g oral powder for reconstitution: 1 packet(s) orally 2 times a day as needed for  constipation  latanoprost 0.005% ophthalmic solution: 1 drop(s) to each affected eye once a day (in the evening)  levothyroxine 25 mcg (0.025 mg) oral tablet: 1 tab(s) orally once a day  metoprolol succinate 25 mg oral tablet, extended release: 1  orally once a day  polyethylene glycol 3350 oral powder for reconstitution: 17 gram(s) orally once a day  predniSONE 20 mg oral tablet: 2 tab(s) orally once a day End date 6/2/2025  senna leaf extract oral tablet: 2 tab(s) orally once a day (at bedtime)

## 2025-05-29 NOTE — DISCHARGE NOTE PROVIDER - PROVIDER TOKENS
PROVIDER:[TOKEN:[[id number]],FOLLOWUP:[2 weeks],ESTABLISHEDPATIENT:[T]] PROVIDER:[TOKEN:[[id number]],FOLLOWUP:[2 weeks],ESTABLISHEDPATIENT:[T]],PROVIDER:[TOKEN:[[id number]],FOLLOWUP:[2 weeks]]

## 2025-05-29 NOTE — DISCHARGE NOTE PROVIDER - NSDCCPCAREPLAN_GEN_ALL_CORE_FT
PRINCIPAL DISCHARGE DIAGNOSIS  Diagnosis: Acute hypoxemic respiratory failure  Assessment and Plan of Treatment: You were admitted for acute hypoxemic respiratory failure due to enterovirus/rhinovirus. We treated you with IV steroids. You will need to take oral prednisone 40 mg daily for 4 more days ( End date: 6/2/2025). You were also seen by speech and swallow because you were coughing alot. Your diet was changed to mined and moist with thick liquids. Please take your medication as prescribed and follow up with your PCP.        SECONDARY DISCHARGE DIAGNOSES  Diagnosis: Pneumonia  Assessment and Plan of Treatment:      PRINCIPAL DISCHARGE DIAGNOSIS  Diagnosis: Acute hypoxemic respiratory failure  Assessment and Plan of Treatment: You were admitted for acute hypoxemic respiratory failure due to enterovirus/rhinovirus. We treated you with IV steroids. You will need to take oral prednisone 40 mg daily for 4 more days ( End date: 6/2/2025). You were also seen by speech and swallow because you were coughing alot. Your diet was changed to mined and moist with thick liquids. Please take your medication as prescribed and follow up with your PCP.       PRINCIPAL DISCHARGE DIAGNOSIS  Diagnosis: Acute hypoxemic respiratory failure  Assessment and Plan of Treatment: You were admitted for acute hypoxemic respiratory failure due to enterovirus/rhinovirus. We treated you with IV steroids. You will need to take oral prednisone 40 mg daily for 4 more days ( End date: 6/2/2025). You were also seen by speech and swallow because you were coughing alot. Your diet was changed to mined and moist with thick liquids. You will need to follow up with a cardiologisit outpatient to follow up your EKG changes. Please take your medication as prescribed and follow up with your PCP.

## 2025-05-29 NOTE — DISCHARGE NOTE PROVIDER - HOSPITAL COURSE
89y F w/ hx of COPD on 2L home O2, Asthma, Bronchiectasis , CAD, PE on Eliquis, HTN and HLD [facility] evaluation of the shortness of breath. Hx taken from the patient and is aox3 at time of the interview. States that for the past1 week, she has been having progressive worsening of shortness of breath associated with productive cough, and loss of her Apetite. Patient states that she was given antibiotics at the NH but she has not been getting well, so she was brought to the ed for further evaluation. Patient also states that she gets these type of episodes once or twice a year and she usually gets better with antibiotics and nebs. Review of the systems is negative for any headache, fever, chest pain, nausea, vomit, diarrhea , abdomen pain, dysuria, frequency, urgency, extremity weakness and other significant complaints  Baseline: AOX3, can dress her self, can eat by herself, wheel chair bound, need help with toilet     ED VITALS    · BP Systolic	 172 mm Hg  · BP Diastolic	64 mm Hg  · Heart Rate	71 /min  · Respiration Rate (breaths/min)	19 /min  · Temp (F)	98.4 Degrees F  · Temp (C)	36.9 Degrees C  · Temp site	oral  · SpO2 (%)	98 %  · O2 Delivery/Oxygen Delivery Method	nasal cannula  · Oxygen Therapy Flow (L/min)	6 L/min  · Temp at ED Arrival (C)	36.9 Degrees C      ED LABS  wbc 5k, trops 24, 24, bnp 2500  vbg: spo2 59, co2 49, hco 33, ph 7.44,     ED IMAGING     X ray chest:Unchanged bilateral opacities.    EKG  Normal sinus rhythm, Right bundle branch block, Left anterior fascicular block, Bifascicular block ,Left ventricular hypertrophy with repolarization abnormality ( R in aVL, Romhilt-Auguste )      Patient was seen for the following conditions:    #Suspected COPD/bronchiectasis exacerbation vs pneumonia 2/2 viral infection  #Acute on chronic hypoxic respiratory failure   #H/o asthma  - wbc 5k, trops 24, 24, bnp 2500  - vbg: spo2 59, co2 49, hco 33, ph 7.44,   - Xray Chest (05.26.25 @ 13:36): Unchanged bilateral opacities.  - RVP positive for entero/rhinovirus  - currently on 3L NC; satting well  - s/p duonebs q6hr  - Continue PO prednisone qdaily for 4 more days. End date: 6/2/2025  - D/c abx (ceftriaxone + doxycyline)  - Follow up with PCP    #Constipation  - last BM 2 days ago  - Continue bowel regimen    #EKG findings of RBBB and left anterior fascicular block, and left ventricular hypertrophy with repolarization abnormality  - Asymptomatic  - Echo: Normal global left ventricular systolic function with a biplane EF of  61%. Mild (grade 1) diastolic dysfunction. No regional wall motion abnormalities.    #Poor PO Intake  #Coughing  - encourage oral intake  - Speech and swallow 5/28:Minced & moist w/mildly thick liquids    #HTN  #HLD  - c/w amlodipine     #CAD  #PE  - c/w eliquis   - c/w plavix    Patient is medically stable. Discharge was discussed and approved by Dr. Ye. 89y F w/ hx of COPD on 2L home O2, Asthma, Bronchiectasis , CAD, PE on Eliquis, HTN and HLD [facility] for evaluation of the shortness of breath. Hx taken from the patient and is aox3 at time of the interview. States that for the past1 week, she has been having progressive worsening of shortness of breath associated with productive cough, and loss of her Apetite. Patient states that she was given antibiotics at the NH but she has not been getting well, so she was brought to the ed for further evaluation. Patient also states that she gets these type of episodes once or twice a year and she usually gets better with antibiotics and nebs. Review of the systems is negative for any headache, fever, chest pain, nausea, vomit, diarrhea , abdomen pain, dysuria, frequency, urgency, extremity weakness and other significant complaints  Baseline: AOX3, can dress her self, can eat by herself, wheel chair bound, need help with toilet     ED VITALS    · BP Systolic	 172 mm Hg  · BP Diastolic	64 mm Hg  · Heart Rate	71 /min  · Respiration Rate (breaths/min)	19 /min  · Temp (F)	98.4 Degrees F  · Temp (C)	36.9 Degrees C  · Temp site	oral  · SpO2 (%)	98 %  · O2 Delivery/Oxygen Delivery Method	nasal cannula  · Oxygen Therapy Flow (L/min)	6 L/min  · Temp at ED Arrival (C)	36.9 Degrees C      ED LABS  wbc 5k, trops 24, 24, bnp 2500  vbg: spo2 59, co2 49, hco 33, ph 7.44,     ED IMAGING     X ray chest:Unchanged bilateral opacities.    EKG  Normal sinus rhythm, Right bundle branch block, Left anterior fascicular block, Bifascicular block ,Left ventricular hypertrophy with repolarization abnormality ( R in aVL, Romhilt-Auguste )      Patient was seen for the following conditions:    #Suspected COPD/bronchiectasis exacerbation vs pneumonia 2/2 viral infection  #Acute on chronic hypoxic respiratory failure   #H/o asthma  - wbc 5k, trops 24, 24, bnp 2500  - vbg: spo2 59, co2 49, hco 33, ph 7.44,   - Xray Chest (05.26.25 @ 13:36): Unchanged bilateral opacities.  - RVP positive for entero/rhinovirus  - currently on 3L NC; satting well  - s/p duonebs q6hr  - Continue PO prednisone qdaily for 4 more days. End date: 6/2/2025  - D/c abx (ceftriaxone + doxycyline)  - Follow up with PCP    #Constipation  - last BM 2 days ago  - Continue bowel regimen    #EKG findings of RBBB and left anterior fascicular block, and left ventricular hypertrophy with repolarization abnormality  - Asymptomatic  - Echo: Normal global left ventricular systolic function with a biplane EF of  61%. Mild (grade 1) diastolic dysfunction. No regional wall motion abnormalities.  - Follow up with cardiology outpatient.    #Poor PO Intake  #Coughing  - encourage oral intake  - Speech and swallow 5/28:Minced & moist w/mildly thick liquids    #HTN  #HLD  - c/w amlodipine     #CAD  #PE  - c/w eliquis   - c/w plavix    Patient is medically stable. Discharge was discussed and approved by Dr. Ye.

## 2025-05-29 NOTE — DISCHARGE NOTE PROVIDER - CARE PROVIDERS DIRECT ADDRESSES
[email or web address]

## 2025-05-29 NOTE — DISCHARGE NOTE PROVIDER - ATTENDING DISCHARGE PHYSICAL EXAMINATION:
T(F): 97.8 (05-29-25 @ 04:55), Max: 97.8 (05-28-25 @ 20:39)  HR: 61 (05-29-25 @ 04:55) (61 - 64)  BP: 156/67 (05-29-25 @ 04:55) (147/65 - 163/65)  RR: 18 (05-29-25 @ 04:55) (18 - 19)  SpO2: 97% (05-29-25 @ 04:55) (95% - 98%)  Physical exam:   constitutional NAD, awake , Respiratory  lungs CTA, CVS heart RRR, GI: abdomen Soft NT, ND, BS+,   neuro exam limited mobility, but able to move all 4 ext

## 2025-05-30 ENCOUNTER — TRANSCRIPTION ENCOUNTER (OUTPATIENT)
Age: 89
End: 2025-05-30

## 2025-05-30 VITALS — HEART RATE: 61 BPM | SYSTOLIC BLOOD PRESSURE: 123 MMHG | DIASTOLIC BLOOD PRESSURE: 71 MMHG

## 2025-05-30 LAB
ANION GAP SERPL CALC-SCNC: 9 MMOL/L — SIGNIFICANT CHANGE UP (ref 7–14)
BASOPHILS # BLD AUTO: 0 K/UL — SIGNIFICANT CHANGE UP (ref 0–0.2)
BASOPHILS NFR BLD AUTO: 0 % — SIGNIFICANT CHANGE UP (ref 0–1)
BUN SERPL-MCNC: 19 MG/DL — SIGNIFICANT CHANGE UP (ref 10–20)
CALCIUM SERPL-MCNC: 9.4 MG/DL — SIGNIFICANT CHANGE UP (ref 8.4–10.5)
CHLORIDE SERPL-SCNC: 101 MMOL/L — SIGNIFICANT CHANGE UP (ref 98–110)
CO2 SERPL-SCNC: 29 MMOL/L — SIGNIFICANT CHANGE UP (ref 17–32)
CREAT SERPL-MCNC: 0.6 MG/DL — LOW (ref 0.7–1.5)
EGFR: 81 ML/MIN/1.73M2 — SIGNIFICANT CHANGE UP
EGFR: 81 ML/MIN/1.73M2 — SIGNIFICANT CHANGE UP
EOSINOPHIL # BLD AUTO: 0.32 K/UL — SIGNIFICANT CHANGE UP (ref 0–0.7)
EOSINOPHIL NFR BLD AUTO: 3.5 % — SIGNIFICANT CHANGE UP (ref 0–8)
GLUCOSE SERPL-MCNC: 75 MG/DL — SIGNIFICANT CHANGE UP (ref 70–99)
HCT VFR BLD CALC: 35.4 % — LOW (ref 37–47)
HGB BLD-MCNC: 11.2 G/DL — LOW (ref 12–16)
LYMPHOCYTES # BLD AUTO: 1.61 K/UL — SIGNIFICANT CHANGE UP (ref 1.2–3.4)
LYMPHOCYTES # BLD AUTO: 17.4 % — LOW (ref 20.5–51.1)
MAGNESIUM SERPL-MCNC: 1.9 MG/DL — SIGNIFICANT CHANGE UP (ref 1.8–2.4)
MCHC RBC-ENTMCNC: 30.9 PG — SIGNIFICANT CHANGE UP (ref 27–31)
MCHC RBC-ENTMCNC: 31.6 G/DL — LOW (ref 32–37)
MCV RBC AUTO: 97.8 FL — SIGNIFICANT CHANGE UP (ref 81–99)
MONOCYTES # BLD AUTO: 1.28 K/UL — HIGH (ref 0.1–0.6)
MONOCYTES NFR BLD AUTO: 13.9 % — HIGH (ref 1.7–9.3)
NEUTROPHILS # BLD AUTO: 5.69 K/UL — SIGNIFICANT CHANGE UP (ref 1.4–6.5)
NEUTROPHILS NFR BLD AUTO: 61.7 % — SIGNIFICANT CHANGE UP (ref 42.2–75.2)
PLATELET # BLD AUTO: 243 K/UL — SIGNIFICANT CHANGE UP (ref 130–400)
PMV BLD: 11.6 FL — HIGH (ref 7.4–10.4)
POTASSIUM SERPL-MCNC: 4 MMOL/L — SIGNIFICANT CHANGE UP (ref 3.5–5)
POTASSIUM SERPL-SCNC: 4 MMOL/L — SIGNIFICANT CHANGE UP (ref 3.5–5)
RBC # BLD: 3.62 M/UL — LOW (ref 4.2–5.4)
RBC # FLD: 12.3 % — SIGNIFICANT CHANGE UP (ref 11.5–14.5)
SODIUM SERPL-SCNC: 139 MMOL/L — SIGNIFICANT CHANGE UP (ref 135–146)
WBC # BLD: 9.23 K/UL — SIGNIFICANT CHANGE UP (ref 4.8–10.8)
WBC # FLD AUTO: 9.23 K/UL — SIGNIFICANT CHANGE UP (ref 4.8–10.8)

## 2025-05-30 PROCEDURE — 99232 SBSQ HOSP IP/OBS MODERATE 35: CPT

## 2025-05-30 RX ADMIN — ISOSORBDIE DINITRATE 10 MILLIGRAM(S): 30 TABLET ORAL at 06:12

## 2025-05-30 RX ADMIN — AMLODIPINE BESYLATE 2.5 MILLIGRAM(S): 10 TABLET ORAL at 06:12

## 2025-05-30 RX ADMIN — CLOPIDOGREL BISULFATE 75 MILLIGRAM(S): 75 TABLET, FILM COATED ORAL at 11:44

## 2025-05-30 RX ADMIN — ISOSORBDIE DINITRATE 10 MILLIGRAM(S): 30 TABLET ORAL at 17:07

## 2025-05-30 RX ADMIN — POLYETHYLENE GLYCOL 3350 17 GRAM(S): 17 POWDER, FOR SOLUTION ORAL at 11:44

## 2025-05-30 RX ADMIN — IPRATROPIUM BROMIDE AND ALBUTEROL SULFATE 3 MILLILITER(S): .5; 2.5 SOLUTION RESPIRATORY (INHALATION) at 13:21

## 2025-05-30 RX ADMIN — Medication 25 MICROGRAM(S): at 06:13

## 2025-05-30 RX ADMIN — METOPROLOL SUCCINATE 25 MILLIGRAM(S): 50 TABLET, EXTENDED RELEASE ORAL at 06:12

## 2025-05-30 RX ADMIN — Medication 1 APPLICATION(S): at 11:44

## 2025-05-30 RX ADMIN — Medication 25 MILLIGRAM(S): at 06:12

## 2025-05-30 RX ADMIN — Medication 25 MILLIGRAM(S): at 15:04

## 2025-05-30 RX ADMIN — APIXABAN 5 MILLIGRAM(S): 2.5 TABLET, FILM COATED ORAL at 17:06

## 2025-05-30 RX ADMIN — APIXABAN 5 MILLIGRAM(S): 2.5 TABLET, FILM COATED ORAL at 06:12

## 2025-05-30 RX ADMIN — IPRATROPIUM BROMIDE AND ALBUTEROL SULFATE 3 MILLILITER(S): .5; 2.5 SOLUTION RESPIRATORY (INHALATION) at 07:52

## 2025-05-30 RX ADMIN — PREDNISONE 40 MILLIGRAM(S): 20 TABLET ORAL at 06:12

## 2025-05-30 NOTE — DISCHARGE NOTE NURSING/CASE MANAGEMENT/SOCIAL WORK - FINANCIAL ASSISTANCE
[facility] provides services at a reduced cost to those who are determined to be eligible through [facility]’s financial assistance program. Information regarding [facility]’s financial assistance program can be found by going to [email or web address] or by calling [phone].

## 2025-05-30 NOTE — SWALLOW BEDSIDE ASSESSMENT ADULT - CONSISTENCIES ADMINISTERED
5oz thin liquids, 2oz soft & bite sized/thin liquid/soft & bite-sized
thin liquid/mildly thick/pureed/minced & moist/soft & bite-sized

## 2025-05-30 NOTE — SWALLOW BEDSIDE ASSESSMENT ADULT - SWALLOW EVAL: RECOMMENDED FEEDING/EATING TECHNIQUES
allow for swallow between intakes/no straws/oral hygiene/small sips/bites
no straws/small sips/bites

## 2025-05-30 NOTE — PROGRESS NOTE ADULT - SUBJECTIVE AND OBJECTIVE BOX
pt seen and examined.     My notes supersede resident's notes in case of discrepancy       ROS: no cp, no sob, no n/v, no fever    Vital Signs Last 24 Hrs  T(C): 36.6 (30 May 2025 05:00), Max: 36.6 (29 May 2025 20:47)  T(F): 97.8 (30 May 2025 05:00), Max: 97.9 (29 May 2025 20:47)  HR: 58 (30 May 2025 05:00) (58 - 64)  BP: 165/66 (30 May 2025 05:00) (136/60 - 165/66)  BP(mean): --  RR: 18 (30 May 2025 05:00) (18 - 18)  SpO2: 98% (30 May 2025 05:00) (96% - 98%)    Parameters below as of 29 May 2025 20:47  Patient On (Oxygen Delivery Method): nasal cannula  O2 Flow (L/min): 2    physical exam  constitutional NAD, AAOX3, Respiratory  lungs CTA, CVS heart RRR, GI: abdomen Soft NT, ND, BS+, skin: intact  neuro exam no focal deficit , gen weakness     MEDICATIONS  (STANDING):  albuterol/ipratropium for Nebulization 3 milliLiter(s) Nebulizer every 6 hours  amLODIPine   Tablet 2.5 milliGRAM(s) Oral daily  apixaban 5 milliGRAM(s) Oral every 12 hours  chlorhexidine 2% Cloths 1 Application(s) Topical daily  clopidogrel Tablet 75 milliGRAM(s) Oral daily  hydrALAZINE 25 milliGRAM(s) Oral three times a day  isosorbide   dinitrate Tablet (ISORDIL) 10 milliGRAM(s) Oral two times a day  latanoprost 0.005% Ophthalmic Solution 1 Drop(s) Both EYES at bedtime  levothyroxine 25 MICROGram(s) Oral daily  metoprolol succinate ER 25 milliGRAM(s) Oral daily  polyethylene glycol 3350 17 Gram(s) Oral daily  predniSONE   Tablet 40 milliGRAM(s) Oral daily  senna 2 Tablet(s) Oral at bedtime    MEDICATIONS  (PRN):  acetaminophen     Tablet .. 650 milliGRAM(s) Oral every 6 hours PRN Temp greater or equal to 38C (100.4F), Mild Pain (1 - 3)  aluminum hydroxide/magnesium hydroxide/simethicone Suspension 30 milliLiter(s) Oral every 4 hours PRN Dyspepsia  guaiFENesin  milliGRAM(s) Oral every 12 hours PRN Cough  melatonin 3 milliGRAM(s) Oral at bedtime PRN Insomnia                        11.2   9.23  )-----------( 243      ( 30 May 2025 06:20 )             35.4     05-30    139  |  101  |  19  ----------------------------<  75  4.0   |  29  |  0.6[L]    Ca    9.4      30 May 2025 06:20  Mg     1.9     05-30      Procalcitonin: 0.06 ng/mL [0.02 - 0.10] (05-27-25 @ 10:34)  Procalcitonin: 0.07 ng/mL [0.02 - 0.10] (05-26-25 @ 23:00)    a/p    89y F w/ hx of COPD on 2L home O2, Asthma, Bronchiectasis , CAD, PE on Eliquis, HTN and HLD brought to the ed from [facility] for evaluation of the shortness of breath.                          #Acute on chronic hypoxic respiratory failure . COPD/Asthma exacerbation due to Entero/rhinovirus bronchitis   CXR 05/26: Unchanged bilateral opacities  - Duoneb q6hr  - taper off steroids   - finished courese of abx   - supplemental ox via nasal cannula     #HTN/HLD, CAD   amLODIPine   Tablet 2.5 milliGRAM(s) Oral daily  apixaban 5 milliGRAM(s) Oral every 12 hours  clopidogrel Tablet 75 milliGRAM(s) Oral daily  hydrALAZINE 25 milliGRAM(s) Oral three times a day  isosorbide   dinitrate Tablet (ISORDIL) 10 milliGRAM(s) Oral two times a day  metoprolol succinate ER 25 milliGRAM(s) Oral daily  outpt fu     #PE  - on Eliquis     #Progress Note Handoff  Pending (specify): discharge today   Family discussion: dw pt   Disposition: GMF, from NH  full code  time spent 35 min

## 2025-05-30 NOTE — PROGRESS NOTE ADULT - SUBJECTIVE AND OBJECTIVE BOX
SUBJECTIVE/OVERNIGHT EVENTS  Today is hospital day 4d. This morning patient was seen and examined at bedside, resting comfortably in bed. No acute or major events overnight.    HOSPITAL COURSE  Day 1:   Day 2:   Day 3:     CODE STATUS:    FAMILY COMMUNICATION  Contact date:  Name of person contacted:  Relationship to patient:  Communication details:    MEDICATIONS  STANDING MEDICATIONS  albuterol/ipratropium for Nebulization 3 milliLiter(s) Nebulizer every 6 hours  amLODIPine   Tablet 2.5 milliGRAM(s) Oral daily  apixaban 5 milliGRAM(s) Oral every 12 hours  chlorhexidine 2% Cloths 1 Application(s) Topical daily  clopidogrel Tablet 75 milliGRAM(s) Oral daily  hydrALAZINE 25 milliGRAM(s) Oral three times a day  isosorbide   dinitrate Tablet (ISORDIL) 10 milliGRAM(s) Oral two times a day  latanoprost 0.005% Ophthalmic Solution 1 Drop(s) Both EYES at bedtime  levothyroxine 25 MICROGram(s) Oral daily  metoprolol succinate ER 25 milliGRAM(s) Oral daily  polyethylene glycol 3350 17 Gram(s) Oral daily  predniSONE   Tablet 40 milliGRAM(s) Oral daily  senna 2 Tablet(s) Oral at bedtime    PRN MEDICATIONS  acetaminophen     Tablet .. 650 milliGRAM(s) Oral every 6 hours PRN  aluminum hydroxide/magnesium hydroxide/simethicone Suspension 30 milliLiter(s) Oral every 4 hours PRN  guaiFENesin  milliGRAM(s) Oral every 12 hours PRN  melatonin 3 milliGRAM(s) Oral at bedtime PRN    VITALS  T(F): 97.8 (05-30-25 @ 05:00), Max: 97.9 (05-29-25 @ 20:47)  HR: 58 (05-30-25 @ 05:00) (58 - 64)  BP: 165/66 (05-30-25 @ 05:00) (136/60 - 165/66)  RR: 18 (05-30-25 @ 05:00) (18 - 18)  SpO2: 98% (05-30-25 @ 05:00) (96% - 98%)    PHYSICAL EXAM  General: NAD, well-appearing, sitting comfortably  HEENT: EOMI, moist mucus membranes, conjunctivae clear  Cardio: RRR, no murmurs, normal S1 and S2  Resp: lungs CTAB, no wheezing/rhonchi/rales  Abd: abdomen soft and nontender, nondistended, no rebound or guarding  MSK: no lower extremity edema, 2+ pedal pulses  Neuro: A&Ox3, motor and sensory functions grossly intact  Skin: warm and dry      (  ) Indwelling Martines Catheter   Date insterted:    Reason (  ) Critical illness     (  ) urinary retention    (  ) Accurate Ins/Outs Monitoring     (  ) CMO patient    (  ) Central Line  Date inserted:  Location: (  ) Right IJ   (  ) Left IJ   (  ) Right Fem   (  ) Left Fem    (  ) SPC  (  ) pigtail  (  ) PEG tube  (  ) colostomy  (  ) jejunostomy  (  ) U-Dall    LABS             11.2   9.23  )-----------( 243      ( 05-30-25 @ 06:20 )             35.4     139  |  101  |  19  -------------------------<  75   05-30-25 @ 06:20  4.0  |  29  |  0.6    Ca      9.4     05-30-25 @ 06:20  Mg     1.9     05-30-25 @ 06:20          Urinalysis Basic - ( 30 May 2025 06:20 )    Color: x / Appearance: x / SG: x / pH: x  Gluc: 75 mg/dL / Ketone: x  / Bili: x / Urobili: x   Blood: x / Protein: x / Nitrite: x   Leuk Esterase: x / RBC: x / WBC x   Sq Epi: x / Non Sq Epi: x / Bacteria: x          IMAGING

## 2025-05-30 NOTE — PROGRESS NOTE ADULT - ASSESSMENT
#Suspected COPD/bronchiectasis exacerbation vs pneumonia 2/2 viral infection  #Acute on chronic hypoxic respiratory failure   #H/o asthma  - wbc 5k, trops 24, 24, bnp 2500  - vbg: spo2 59, co2 49, hco 33, ph 7.44,   - Xray Chest (05.26.25 @ 13:36): Unchanged bilateral opacities.  - RVP positive for entero/rhinovirus  - currently on 3L NC; satting well  - s/p duonebs q6hr  - Continue PO prednisone qdaily for 4 more days. End date: 6/2/2025  - D/c abx (ceftriaxone + doxycyline)  - Follow up with PCP    #Constipation  - last BM 2 days ago  - Continue bowel regimen    #EKG findings of RBBB and left anterior fascicular block, and left ventricular hypertrophy with repolarization abnormality  - Asymptomatic  - Echo: Normal global left ventricular systolic function with a biplane EF of  61%. Mild (grade 1) diastolic dysfunction. No regional wall motion abnormalities.  - Follow up with cardiology outpatient.    #Poor PO Intake  #Coughing  - encourage oral intake  - Speech and swallow 5/28:Minced & moist w/mildly thick liquids    #HTN  #HLD  - c/w amlodipine     #CAD  #PE  - c/w eliquis   - c/w plavix

## 2025-05-30 NOTE — SWALLOW BEDSIDE ASSESSMENT ADULT - ASR SWALLOW ASPIRATION MONITOR
[FreeTextEntry6] :  [age] year old male brought to the office because of fever on and off since Sunday with congestion and cough. Cough is dry " barking " type. He has not been eating well last couple of days. He usually develops croup when he gets sick.
change of breathing pattern/oral hygiene/pneumonia
change of breathing pattern/cough/pneumonia

## 2025-05-30 NOTE — SWALLOW BEDSIDE ASSESSMENT ADULT - SLP PERTINENT HISTORY OF CURRENT PROBLEM
89y/o Female with PMHx of COPD on 2L home O2, asthma, bronchiectasis, CAD, PE on Eliquis and Plavix, HTN, and HLD. Brought to the ED from [facility] for SOB. Brought to the ED for further evaluation. CXR showed stable bilateral coarse reticular opacities.
89y/o Female with PMHx of COPD on 2L home O2, asthma, bronchiectasis, CAD, PE on Eliquis and Plavix, HTN, and HLD. Brought to the ED from [facility] for SOB. Brought to the ED for further evaluation. CXR showed stable bilateral coarse reticular opacities.

## 2025-05-30 NOTE — DISCHARGE NOTE NURSING/CASE MANAGEMENT/SOCIAL WORK - NSDCVIVACCINE_GEN_ALL_CORE_FT
Tdap; 09-Nov-2023 15:58; Rhys Valdez (RN); [employer]; [id number] (Exp. Date: 25-Nov-2023); IntraMuscular; Deltoid Right.; 0.5 milliLiter(s); VIS (VIS Published: 09-May-2013, VIS Presented: 09-Nov-2023);

## 2025-05-30 NOTE — SWALLOW BEDSIDE ASSESSMENT ADULT - ADDITIONAL RECOMMENDATIONS
Pt will benefit from outpatient MBS to further assess oropharyngeal swallow if within GOC.
SLP will plan to f/u

## 2025-05-30 NOTE — PROGRESS NOTE ADULT - REASON FOR ADMISSION
shortness of breath
no

## 2025-05-30 NOTE — SWALLOW BEDSIDE ASSESSMENT ADULT - PHARYNGEAL PHASE
w/large sips of thins via straw and sequential sips w/cup/Cough post oral intake
w/thin liquids/Cough post oral intake

## 2025-05-30 NOTE — SWALLOW BEDSIDE ASSESSMENT ADULT - SLP GENERAL OBSERVATIONS
Pt received awake, alert, ox3, 3L NC, +generalized weakness.
Pt received awake, alert, room air, +generalized weakness.

## 2025-05-30 NOTE — SWALLOW BEDSIDE ASSESSMENT ADULT - SWALLOW EVAL: DIAGNOSIS
Mild oral dysphagia with soft solids. +min overt s/s of aspiration w/large/sequential sips of thin liquids. Toleration observed for soft solids and small controlled of thin liquids sips via cup without overt s/s of penetration/ aspiration.
Mild oral dysphagia with soft & bite sized solids likely related to generalized weakness. +overt s/s of aspiration w/thin liquids. Toleration observed for puree, minced & moist and mildly thick liquids without overt s/s of penetration/ aspiration. Dysphagia s/s are likely related to presbyphagia.

## 2025-05-30 NOTE — DISCHARGE NOTE NURSING/CASE MANAGEMENT/SOCIAL WORK - NSDCPEELIQUISREACT_GEN_ALL_CORE
Yes, for sure.  Pt should see Dr Abbott in pain management.   Apixaban/Eliquis increases your risk for bleeding. Notify your doctor if you experience any of the following side effects: bleeding, coughing or vomiting blood, red or black stool, unexpected pain or swelling, itching or hives, chest pain, chest tightness, trouble breathing, changes in how much or how often you urinate, red or pink urine, numbness or tingling in your feet, or unusual muscle weakness. When Apixaban/Eliquis is taken with other medicines, they can affect how it works. Taking other medications such as aspirin, blood thinners, nonsteroidal anti-inflammatories, and medications that treat depression can increase your risk of bleeding. It is very important to tell your health care provider about all of the other medicines, including over-the-counter medications, herbs, and vitamins you are taking. DO NOT start, stop, or change the dosage of any medicine, including over-the-counter medicines, vitamins, and herbal products without your doctor’s approval. Any products containing aspirin or are nonsteroidal anti-inflammatories lessen the blood’s ability to form clots and add to the effect of Apixaban/Eliquis. Never take aspirin or medicines that contain aspirin without speaking to your doctor.

## 2025-05-30 NOTE — DISCHARGE NOTE NURSING/CASE MANAGEMENT/SOCIAL WORK - PATIENT PORTAL LINK FT
You can access the FollowMyHealth Patient Portal offered by [facility] by registering at the following website: [email or web address]. By joining [employer]s FollowMyHealth portal, you will also be able to view your health information using other applications (apps) compatible with our system.

## 2025-05-30 NOTE — SWALLOW BEDSIDE ASSESSMENT ADULT - COMMENTS
#Suspected superimposed bacterial pneumonia.   SLP history:   9/1/22: Dysphagia re-eval completed. Recs for puree w/thin liquids.
#Suspected superimposed bacterial pneumonia.   SLP history:   5/28/25: Initial bedside swallow completed. Recs for minced & moist w/mildly thick 2/2 overts w/thins .   9/1/22: Dysphagia re-eval completed. Recs for puree w/thin liquids.

## 2025-06-01 ENCOUNTER — INPATIENT (INPATIENT)
Facility: HOSPITAL | Age: 89
LOS: 3 days | Discharge: NURSING, REHAB OR LONG-TERM CARE | DRG: 177 | End: 2025-06-05
Attending: INTERNAL MEDICINE | Admitting: STUDENT IN AN ORGANIZED HEALTH CARE EDUCATION/TRAINING PROGRAM
Payer: MEDICARE

## 2025-06-01 VITALS
TEMPERATURE: 98 F | RESPIRATION RATE: 18 BRPM | HEART RATE: 72 BPM | HEIGHT: 68 IN | SYSTOLIC BLOOD PRESSURE: 121 MMHG | DIASTOLIC BLOOD PRESSURE: 43 MMHG | OXYGEN SATURATION: 92 %

## 2025-06-01 DIAGNOSIS — Z79.52 LONG TERM (CURRENT) USE OF SYSTEMIC STEROIDS: ICD-10-CM

## 2025-06-01 DIAGNOSIS — J18.9 PNEUMONIA, UNSPECIFIED ORGANISM: ICD-10-CM

## 2025-06-01 DIAGNOSIS — J44.0 CHRONIC OBSTRUCTIVE PULMONARY DISEASE WITH (ACUTE) LOWER RESPIRATORY INFECTION: ICD-10-CM

## 2025-06-01 DIAGNOSIS — Z91.018 ALLERGY TO OTHER FOODS: ICD-10-CM

## 2025-06-01 DIAGNOSIS — Z79.01 LONG TERM (CURRENT) USE OF ANTICOAGULANTS: ICD-10-CM

## 2025-06-01 DIAGNOSIS — E78.5 HYPERLIPIDEMIA, UNSPECIFIED: ICD-10-CM

## 2025-06-01 DIAGNOSIS — Z95.5 PRESENCE OF CORONARY ANGIOPLASTY IMPLANT AND GRAFT: Chronic | ICD-10-CM

## 2025-06-01 DIAGNOSIS — Z79.02 LONG TERM (CURRENT) USE OF ANTITHROMBOTICS/ANTIPLATELETS: ICD-10-CM

## 2025-06-01 DIAGNOSIS — Z99.81 DEPENDENCE ON SUPPLEMENTAL OXYGEN: ICD-10-CM

## 2025-06-01 DIAGNOSIS — J47.0 BRONCHIECTASIS WITH ACUTE LOWER RESPIRATORY INFECTION: ICD-10-CM

## 2025-06-01 DIAGNOSIS — Z79.890 HORMONE REPLACEMENT THERAPY: ICD-10-CM

## 2025-06-01 DIAGNOSIS — Z90.710 ACQUIRED ABSENCE OF BOTH CERVIX AND UTERUS: Chronic | ICD-10-CM

## 2025-06-01 DIAGNOSIS — E78.00 PURE HYPERCHOLESTEROLEMIA, UNSPECIFIED: ICD-10-CM

## 2025-06-01 DIAGNOSIS — Z95.5 PRESENCE OF CORONARY ANGIOPLASTY IMPLANT AND GRAFT: ICD-10-CM

## 2025-06-01 DIAGNOSIS — K59.00 CONSTIPATION, UNSPECIFIED: ICD-10-CM

## 2025-06-01 DIAGNOSIS — H40.9 UNSPECIFIED GLAUCOMA: ICD-10-CM

## 2025-06-01 DIAGNOSIS — Z11.52 ENCOUNTER FOR SCREENING FOR COVID-19: ICD-10-CM

## 2025-06-01 DIAGNOSIS — Z86.711 PERSONAL HISTORY OF PULMONARY EMBOLISM: ICD-10-CM

## 2025-06-01 DIAGNOSIS — Z66 DO NOT RESUSCITATE: ICD-10-CM

## 2025-06-01 DIAGNOSIS — J22 UNSPECIFIED ACUTE LOWER RESPIRATORY INFECTION: ICD-10-CM

## 2025-06-01 DIAGNOSIS — E03.9 HYPOTHYROIDISM, UNSPECIFIED: ICD-10-CM

## 2025-06-01 DIAGNOSIS — J96.01 ACUTE RESPIRATORY FAILURE WITH HYPOXIA: ICD-10-CM

## 2025-06-01 DIAGNOSIS — I25.10 ATHEROSCLEROTIC HEART DISEASE OF NATIVE CORONARY ARTERY WITHOUT ANGINA PECTORIS: ICD-10-CM

## 2025-06-01 DIAGNOSIS — H26.9 UNSPECIFIED CATARACT: Chronic | ICD-10-CM

## 2025-06-01 DIAGNOSIS — Z90.49 ACQUIRED ABSENCE OF OTHER SPECIFIED PARTS OF DIGESTIVE TRACT: Chronic | ICD-10-CM

## 2025-06-01 DIAGNOSIS — D84.81 IMMUNODEFICIENCY DUE TO CONDITIONS CLASSIFIED ELSEWHERE: ICD-10-CM

## 2025-06-01 DIAGNOSIS — J15.69 PNEUMONIA DUE TO OTHER GRAM-NEGATIVE BACTERIA: ICD-10-CM

## 2025-06-01 LAB
ALBUMIN SERPL ELPH-MCNC: 3.6 G/DL — SIGNIFICANT CHANGE UP (ref 3.5–5.2)
ALP SERPL-CCNC: 91 U/L — SIGNIFICANT CHANGE UP (ref 30–115)
ALT FLD-CCNC: 16 U/L — SIGNIFICANT CHANGE UP (ref 0–41)
ANION GAP SERPL CALC-SCNC: 13 MMOL/L — SIGNIFICANT CHANGE UP (ref 7–14)
APPEARANCE UR: CLEAR — SIGNIFICANT CHANGE UP
APTT BLD: 40.3 SEC — HIGH (ref 27–39.2)
AST SERPL-CCNC: 22 U/L — SIGNIFICANT CHANGE UP (ref 0–41)
BASOPHILS # BLD AUTO: 0 K/UL — SIGNIFICANT CHANGE UP (ref 0–0.2)
BASOPHILS NFR BLD AUTO: 0 % — SIGNIFICANT CHANGE UP (ref 0–1)
BILIRUB SERPL-MCNC: 0.4 MG/DL — SIGNIFICANT CHANGE UP (ref 0.2–1.2)
BILIRUB UR-MCNC: NEGATIVE — SIGNIFICANT CHANGE UP
BUN SERPL-MCNC: 14 MG/DL — SIGNIFICANT CHANGE UP (ref 10–20)
CALCIUM SERPL-MCNC: 9.6 MG/DL — SIGNIFICANT CHANGE UP (ref 8.4–10.5)
CHLORIDE SERPL-SCNC: 100 MMOL/L — SIGNIFICANT CHANGE UP (ref 98–110)
CO2 SERPL-SCNC: 29 MMOL/L — SIGNIFICANT CHANGE UP (ref 17–32)
COLOR SPEC: YELLOW — SIGNIFICANT CHANGE UP
CREAT SERPL-MCNC: 0.6 MG/DL — LOW (ref 0.7–1.5)
CULTURE RESULTS: SIGNIFICANT CHANGE UP
CULTURE RESULTS: SIGNIFICANT CHANGE UP
DIFF PNL FLD: NEGATIVE — SIGNIFICANT CHANGE UP
EGFR: 81 ML/MIN/1.73M2 — SIGNIFICANT CHANGE UP
EGFR: 81 ML/MIN/1.73M2 — SIGNIFICANT CHANGE UP
EOSINOPHIL # BLD AUTO: 0 K/UL — SIGNIFICANT CHANGE UP (ref 0–0.7)
EOSINOPHIL NFR BLD AUTO: 0 % — SIGNIFICANT CHANGE UP (ref 0–8)
GAS PNL BLDV: SIGNIFICANT CHANGE UP
GLUCOSE SERPL-MCNC: 86 MG/DL — SIGNIFICANT CHANGE UP (ref 70–99)
GLUCOSE UR QL: NEGATIVE MG/DL — SIGNIFICANT CHANGE UP
HCT VFR BLD CALC: 38.6 % — SIGNIFICANT CHANGE UP (ref 37–47)
HGB BLD-MCNC: 12.2 G/DL — SIGNIFICANT CHANGE UP (ref 12–16)
INR BLD: 1.36 RATIO — HIGH (ref 0.65–1.3)
KETONES UR QL: NEGATIVE MG/DL — SIGNIFICANT CHANGE UP
LACTATE SERPL-SCNC: 1.9 MMOL/L — SIGNIFICANT CHANGE UP (ref 0.7–2)
LEUKOCYTE ESTERASE UR-ACNC: NEGATIVE — SIGNIFICANT CHANGE UP
LYMPHOCYTES # BLD AUTO: 0.8 K/UL — LOW (ref 1.2–3.4)
LYMPHOCYTES # BLD AUTO: 5.2 % — LOW (ref 20.5–51.1)
MCHC RBC-ENTMCNC: 30.9 PG — SIGNIFICANT CHANGE UP (ref 27–31)
MCHC RBC-ENTMCNC: 31.6 G/DL — LOW (ref 32–37)
MCV RBC AUTO: 97.7 FL — SIGNIFICANT CHANGE UP (ref 81–99)
MONOCYTES # BLD AUTO: 1.87 K/UL — HIGH (ref 0.1–0.6)
MONOCYTES NFR BLD AUTO: 12.2 % — HIGH (ref 1.7–9.3)
NEUTROPHILS # BLD AUTO: 12.24 K/UL — HIGH (ref 1.4–6.5)
NEUTROPHILS NFR BLD AUTO: 80 % — HIGH (ref 42.2–75.2)
NITRITE UR-MCNC: NEGATIVE — SIGNIFICANT CHANGE UP
PH UR: 8.5 (ref 5–8)
PLATELET # BLD AUTO: 278 K/UL — SIGNIFICANT CHANGE UP (ref 130–400)
PMV BLD: 11.6 FL — HIGH (ref 7.4–10.4)
POTASSIUM SERPL-MCNC: 3.9 MMOL/L — SIGNIFICANT CHANGE UP (ref 3.5–5)
POTASSIUM SERPL-SCNC: 3.9 MMOL/L — SIGNIFICANT CHANGE UP (ref 3.5–5)
PROT SERPL-MCNC: 6.2 G/DL — SIGNIFICANT CHANGE UP (ref 6–8)
PROT UR-MCNC: NEGATIVE MG/DL — SIGNIFICANT CHANGE UP
PROTHROM AB SERPL-ACNC: 16.1 SEC — HIGH (ref 9.95–12.87)
RBC # BLD: 3.95 M/UL — LOW (ref 4.2–5.4)
RBC # FLD: 12.6 % — SIGNIFICANT CHANGE UP (ref 11.5–14.5)
SODIUM SERPL-SCNC: 142 MMOL/L — SIGNIFICANT CHANGE UP (ref 135–146)
SP GR SPEC: 1.02 — SIGNIFICANT CHANGE UP (ref 1–1.03)
SPECIMEN SOURCE: SIGNIFICANT CHANGE UP
SPECIMEN SOURCE: SIGNIFICANT CHANGE UP
TROPONIN T, HIGH SENSITIVITY RESULT: 21 NG/L — HIGH (ref 6–13)
TROPONIN T, HIGH SENSITIVITY RESULT: 28 NG/L — HIGH (ref 6–13)
UROBILINOGEN FLD QL: 0.2 MG/DL — SIGNIFICANT CHANGE UP (ref 0.2–1)
WBC # BLD: 15.3 K/UL — HIGH (ref 4.8–10.8)
WBC # FLD AUTO: 15.3 K/UL — HIGH (ref 4.8–10.8)

## 2025-06-01 PROCEDURE — 92610 EVALUATE SWALLOWING FUNCTION: CPT | Mod: GN

## 2025-06-01 PROCEDURE — 85025 COMPLETE CBC W/AUTO DIFF WBC: CPT

## 2025-06-01 PROCEDURE — 80048 BASIC METABOLIC PNL TOTAL CA: CPT

## 2025-06-01 PROCEDURE — 84145 PROCALCITONIN (PCT): CPT

## 2025-06-01 PROCEDURE — 87015 SPECIMEN INFECT AGNT CONCNTJ: CPT

## 2025-06-01 PROCEDURE — 71045 X-RAY EXAM CHEST 1 VIEW: CPT | Mod: 26

## 2025-06-01 PROCEDURE — 36415 COLL VENOUS BLD VENIPUNCTURE: CPT

## 2025-06-01 PROCEDURE — 71045 X-RAY EXAM CHEST 1 VIEW: CPT

## 2025-06-01 PROCEDURE — 83735 ASSAY OF MAGNESIUM: CPT

## 2025-06-01 PROCEDURE — 93005 ELECTROCARDIOGRAM TRACING: CPT

## 2025-06-01 PROCEDURE — 97110 THERAPEUTIC EXERCISES: CPT | Mod: GP

## 2025-06-01 PROCEDURE — 87899 AGENT NOS ASSAY W/OPTIC: CPT

## 2025-06-01 PROCEDURE — 99285 EMERGENCY DEPT VISIT HI MDM: CPT

## 2025-06-01 PROCEDURE — 87116 MYCOBACTERIA CULTURE: CPT

## 2025-06-01 PROCEDURE — 99222 1ST HOSP IP/OBS MODERATE 55: CPT

## 2025-06-01 PROCEDURE — 87205 SMEAR GRAM STAIN: CPT

## 2025-06-01 PROCEDURE — 87070 CULTURE OTHR SPECIMN AEROBIC: CPT

## 2025-06-01 PROCEDURE — 94640 AIRWAY INHALATION TREATMENT: CPT

## 2025-06-01 PROCEDURE — 87206 SMEAR FLUORESCENT/ACID STAI: CPT

## 2025-06-01 PROCEDURE — 74018 RADEX ABDOMEN 1 VIEW: CPT | Mod: 26

## 2025-06-01 PROCEDURE — 81003 URINALYSIS AUTO W/O SCOPE: CPT

## 2025-06-01 PROCEDURE — 71260 CT THORAX DX C+: CPT | Mod: 26

## 2025-06-01 PROCEDURE — 80053 COMPREHEN METABOLIC PANEL: CPT

## 2025-06-01 PROCEDURE — 92526 ORAL FUNCTION THERAPY: CPT | Mod: GN

## 2025-06-01 PROCEDURE — 97163 PT EVAL HIGH COMPLEX 45 MIN: CPT | Mod: GP

## 2025-06-01 PROCEDURE — 93010 ELECTROCARDIOGRAM REPORT: CPT

## 2025-06-01 RX ORDER — VANCOMYCIN HCL IN 5 % DEXTROSE 1.5G/250ML
1000 PLASTIC BAG, INJECTION (ML) INTRAVENOUS ONCE
Refills: 0 | Status: COMPLETED | OUTPATIENT
Start: 2025-06-01 | End: 2025-06-01

## 2025-06-01 RX ORDER — CLOPIDOGREL BISULFATE 75 MG/1
75 TABLET, FILM COATED ORAL DAILY
Refills: 0 | Status: DISCONTINUED | OUTPATIENT
Start: 2025-06-02 | End: 2025-06-05

## 2025-06-01 RX ORDER — SODIUM CHLORIDE 9 G/1000ML
1000 INJECTION, SOLUTION INTRAVENOUS ONCE
Refills: 0 | Status: COMPLETED | OUTPATIENT
Start: 2025-06-01 | End: 2025-06-01

## 2025-06-01 RX ORDER — BISACODYL 5 MG
5 TABLET, DELAYED RELEASE (ENTERIC COATED) ORAL DAILY
Refills: 0 | Status: DISCONTINUED | OUTPATIENT
Start: 2025-06-01 | End: 2025-06-02

## 2025-06-01 RX ORDER — METOPROLOL SUCCINATE 50 MG/1
25 TABLET, EXTENDED RELEASE ORAL DAILY
Refills: 0 | Status: DISCONTINUED | OUTPATIENT
Start: 2025-06-01 | End: 2025-06-05

## 2025-06-01 RX ORDER — LEVOTHYROXINE SODIUM 300 MCG
25 TABLET ORAL DAILY
Refills: 0 | Status: DISCONTINUED | OUTPATIENT
Start: 2025-06-01 | End: 2025-06-05

## 2025-06-01 RX ORDER — IPRATROPIUM BROMIDE AND ALBUTEROL SULFATE .5; 2.5 MG/3ML; MG/3ML
3 SOLUTION RESPIRATORY (INHALATION) EVERY 6 HOURS
Refills: 0 | Status: DISCONTINUED | OUTPATIENT
Start: 2025-06-01 | End: 2025-06-05

## 2025-06-01 RX ORDER — SODIUM CHLORIDE 9 G/1000ML
1000 INJECTION, SOLUTION INTRAVENOUS
Refills: 0 | Status: DISCONTINUED | OUTPATIENT
Start: 2025-06-01 | End: 2025-06-05

## 2025-06-01 RX ORDER — PIPERACILLIN-TAZO-DEXTROSE,ISO 3.375G/5
3.38 IV SOLUTION, PIGGYBACK PREMIX FROZEN(ML) INTRAVENOUS EVERY 8 HOURS
Refills: 0 | Status: DISCONTINUED | OUTPATIENT
Start: 2025-06-01 | End: 2025-06-05

## 2025-06-01 RX ORDER — ACETAMINOPHEN 500 MG/5ML
650 LIQUID (ML) ORAL EVERY 6 HOURS
Refills: 0 | Status: DISCONTINUED | OUTPATIENT
Start: 2025-06-01 | End: 2025-06-05

## 2025-06-01 RX ORDER — POLYETHYLENE GLYCOL 3350 17 G/17G
17 POWDER, FOR SOLUTION ORAL
Refills: 0 | Status: DISCONTINUED | OUTPATIENT
Start: 2025-06-01 | End: 2025-06-05

## 2025-06-01 RX ORDER — ISOSORBDIE DINITRATE 30 MG/1
10 TABLET ORAL THREE TIMES A DAY
Refills: 0 | Status: DISCONTINUED | OUTPATIENT
Start: 2025-06-01 | End: 2025-06-05

## 2025-06-01 RX ORDER — AMLODIPINE BESYLATE 10 MG/1
2.5 TABLET ORAL DAILY
Refills: 0 | Status: DISCONTINUED | OUTPATIENT
Start: 2025-06-01 | End: 2025-06-05

## 2025-06-01 RX ORDER — MELATONIN 5 MG
3 TABLET ORAL AT BEDTIME
Refills: 0 | Status: DISCONTINUED | OUTPATIENT
Start: 2025-06-01 | End: 2025-06-05

## 2025-06-01 RX ORDER — SENNA 187 MG
2 TABLET ORAL AT BEDTIME
Refills: 0 | Status: DISCONTINUED | OUTPATIENT
Start: 2025-06-01 | End: 2025-06-05

## 2025-06-01 RX ORDER — APIXABAN 2.5 MG/1
5 TABLET, FILM COATED ORAL
Refills: 0 | Status: DISCONTINUED | OUTPATIENT
Start: 2025-06-01 | End: 2025-06-05

## 2025-06-01 RX ORDER — CEFEPIME 2 G/20ML
2000 INJECTION, POWDER, FOR SOLUTION INTRAVENOUS ONCE
Refills: 0 | Status: COMPLETED | OUTPATIENT
Start: 2025-06-01 | End: 2025-06-01

## 2025-06-01 RX ORDER — LATANOPROST PF 0.05 MG/ML
1 SOLUTION/ DROPS OPHTHALMIC AT BEDTIME
Refills: 0 | Status: DISCONTINUED | OUTPATIENT
Start: 2025-06-01 | End: 2025-06-05

## 2025-06-01 RX ADMIN — POLYETHYLENE GLYCOL 3350 17 GRAM(S): 17 POWDER, FOR SOLUTION ORAL at 17:49

## 2025-06-01 RX ADMIN — Medication 25 MILLIGRAM(S): at 17:44

## 2025-06-01 RX ADMIN — Medication 250 MILLIGRAM(S): at 14:36

## 2025-06-01 RX ADMIN — Medication 1 DOSE(S): at 20:43

## 2025-06-01 RX ADMIN — Medication 25 MICROGRAM(S): at 17:43

## 2025-06-01 RX ADMIN — Medication 25 GRAM(S): at 17:44

## 2025-06-01 RX ADMIN — SODIUM CHLORIDE 1000 MILLILITER(S): 9 INJECTION, SOLUTION INTRAVENOUS at 13:03

## 2025-06-01 RX ADMIN — Medication 1 TABLET(S): at 17:49

## 2025-06-01 RX ADMIN — METOPROLOL SUCCINATE 25 MILLIGRAM(S): 50 TABLET, EXTENDED RELEASE ORAL at 17:43

## 2025-06-01 RX ADMIN — SODIUM CHLORIDE 1000 MILLILITER(S): 9 INJECTION, SOLUTION INTRAVENOUS at 14:03

## 2025-06-01 RX ADMIN — AMLODIPINE BESYLATE 2.5 MILLIGRAM(S): 10 TABLET ORAL at 17:44

## 2025-06-01 RX ADMIN — Medication 25 MILLIGRAM(S): at 23:44

## 2025-06-01 RX ADMIN — APIXABAN 5 MILLIGRAM(S): 2.5 TABLET, FILM COATED ORAL at 20:09

## 2025-06-01 RX ADMIN — SODIUM CHLORIDE 65 MILLILITER(S): 9 INJECTION, SOLUTION INTRAVENOUS at 17:42

## 2025-06-01 RX ADMIN — IPRATROPIUM BROMIDE AND ALBUTEROL SULFATE 3 MILLILITER(S): .5; 2.5 SOLUTION RESPIRATORY (INHALATION) at 20:09

## 2025-06-01 RX ADMIN — LATANOPROST PF 1 DROP(S): 0.05 SOLUTION/ DROPS OPHTHALMIC at 17:43

## 2025-06-01 RX ADMIN — ISOSORBDIE DINITRATE 10 MILLIGRAM(S): 30 TABLET ORAL at 17:42

## 2025-06-01 RX ADMIN — Medication 20 MILLIGRAM(S): at 17:48

## 2025-06-01 RX ADMIN — Medication 25 GRAM(S): at 23:44

## 2025-06-01 RX ADMIN — CEFEPIME 100 MILLIGRAM(S): 2 INJECTION, POWDER, FOR SOLUTION INTRAVENOUS at 13:03

## 2025-06-01 NOTE — H&P ADULT - TIME BILLING
time spent on review of labs, imaging studies, old records, obtaining history, personally examining patient, counselling and communicating with patient and  her daughter, entering orders for medications/tests/etc, discussions with other health care providers, documentation in electronic health records, independent interpretation of labs, imaging/procedure results and care coordination. and [facility]

## 2025-06-01 NOTE — H&P ADULT - ASSESSMENT
89-year-old female with a PMHx of COPD, Bronchiectasis(on baseline home O2 at 2 L), CAD, PE on Eliquis, hypertension and hyperlipidemia presents to the emergency department from nursing home complaining of increasing shortness of breath, cough and increasing oxygen requirements.      #HAP in setting of Bronchiectasis/COPD  #?Possible aspiration  #Severe sepsis(+fever at home, +WBC, +Lactate)  - Hemodynamically stable, on baseline O2  - CTchest noted  - c/w Vanc, cefepime and Levaquin for now; de-escalate based on Cx  - recent Strep & Legionella Ag negative  - F/u RVP, Procal, MRSA, sputum Cx, Bld cx  - Chest PT for airway clearance  - c/w duonebs q4 or prn, Symbicort  - ID consult  - c/w aspiration precautions; Diet per S&S- soft and bite sized    #Constipation  - last BM 1 week ago  - KUB non-obstructive   - c/w bowel regimen-senna, miralax BID, dulcolax prn    #HTN  #HLD  #CAD  - c/w amlodipine, Hydralazine  - c/w Plavix, Metoprolol, Imdur    #Hx of PE  - c/w Eliquis    #Glaucoma  - c/w latanoprost    #Misc  -DVT prophylaxis: Eliquis  -GI prophylaxis: Famotidine  -Diet: Soft &bite  -Code status: DNR/DNI, trial of NIV   -Activity: AAT  -Dispo: Med    -Med rec confirmed with the patient.     89-year-old female with a PMHx of COPD, Bronchiectasis(on baseline home O2 at 2 L), CAD, PE on Eliquis, hypertension and hyperlipidemia presents to the emergency department from nursing home complaining of increasing shortness of breath, cough and increasing oxygen requirements.      #Pneumonia in setting of Bronchiectasis/COPD  #?Possible aspiration  #Severe sepsis(+fever at home, +WBC, +Lactate)  - Hemodynamically stable, on baseline O2  - CTchest noted  - s/p Vanc, cefepime and Levaquin  - c/w Zosyn for now  - recent MRSA, Strep & Legionella Ag negative  - F/u RVP, Procal, MRSA, sputum Cx, Bld cx  - Chest PT for airway clearance  - c/w duonebs q4 or prn, Symbicort  - ID consult  - c/w aspiration precautions; Diet per S&S- soft and bite sized    #Constipation  - last BM 1 week ago  - KUB non-obstructive   - c/w bowel regimen-senna, miralax BID, dulcolax prn    #HTN  #HLD  #CAD  - c/w amlodipine, Hydralazine  - c/w Plavix, Metoprolol, Imdur    #Hx of PE  - c/w Eliquis    #Glaucoma  - c/w latanoprost    #Misc  -DVT prophylaxis: Eliquis  -GI prophylaxis: Famotidine  -Diet: Soft &bite  -Code status: DNR/DNI, trial of NIV   -Activity: AAT  -Dispo: Med    -Med rec confirmed with the patient.     89-year-old female with a PMHx of COPD, Bronchiectasis(on baseline home O2 at 2 L), CAD, PE on Eliquis, hypertension and hyperlipidemia presents to the emergency department from nursing home complaining of increasing shortness of breath, cough and increasing oxygen requirements.      #Pneumonia in setting of Bronchiectasis/COPD  #?Possible aspiration  #Recent Rhinovirus infection  #Severe sepsis(+fever at home, +WBC, +Lactate)  - Hemodynamically stable, on baseline O2  - CTchest noted  - s/p Vanc, cefepime and Levaquin  - c/w Zosyn for now  - recent MRSA, Strep & Legionella Ag negative  - F/u RVP, Procal, MRSA, sputum Cx, Bld cx  - Chest PT for airway clearance  - c/w duonebs q4 or prn, Symbicort  - ID consult  - c/w aspiration precautions; Diet per S&S    #Constipation  - last BM 1 week ago  - KUB non-obstructive   - c/w bowel regimen-senna, miralax BID, dulcolax prn    #HTN  #HLD  #CAD  - c/w amlodipine, Hydralazine  - c/w Plavix, Metoprolol, Imdur    #Hx of PE  - c/w Eliquis    #Glaucoma  - c/w latanoprost    #Misc  -DVT prophylaxis: Eliquis  -GI prophylaxis: Famotidine  -Diet: Soft &bite; needs 1:1 feeds  -Code status: DNR/trial of intubation and NIV  -Activity: AAT  -Dispo: Med    -Med rec confirmed with the daughter.     89-year-old female with a PMHx of COPD, Bronchiectasis(on baseline home O2 at 2 L), CAD, PE on Eliquis, hypertension and hyperlipidemia presents to the emergency department from nursing home complaining of increasing shortness of breath, cough and increasing oxygen requirements.      #Pneumonia in setting of Bronchiectasis/COPD  #?Possible aspiration  #Recent  Hospitalization for Rhinovirus infection ( was discharge May/30/25)  #Severe sepsis(+fever at home, +WBC, +Lactate)  - Hemodynamically stable now, on baseline O2  - CTchest noted  - s/p Vanc, cefepime and Levaquin  - c/w Zosyn for now  lactate and trop trended down , EKG no significant change from prior   - recent MRSA, Strep & Legionella Ag negative  - F/u RVP, Procal, MRSA, sputum Cx, Bld cx  - Chest PT for airway clearance  - c/w duonebs q4 or prn, Symbicort  - ID and pulmonary consult  - c/w aspiration precautions; Diet per S&S    #Constipation  - last BM 1 week ago  - KUB non-obstructive   - c/w bowel regimen-senna, miralax BID, dulcolax prn    #HTN  #HLD  #CAD  - c/w amlodipine, Hydralazine  - c/w Plavix, Metoprolol, Imdur    #Hx of PE  - c/w Eliquis    #Glaucoma  - c/w latanoprost    #Misc  -DVT prophylaxis: Eliquis  -GI prophylaxis: Famotidine  -Diet: Soft &bite; needs 1:1 feeds  -Code status: DNR/trial of intubation and NIV  -Activity: AAT  -Dispo: Med    -Med rec confirmed with the daughter.

## 2025-06-01 NOTE — ED PROVIDER NOTE - OBJECTIVE STATEMENT
89-year-old female with a past medical history of COPD, on baseline home O2 at 2 L, asthma, bronchiectasis, coronary artery disease, PE on Eliquis, hypertension and hyperlipidemia presents to the emergency department from nursing home complaining of left upper back pain, increasing shortness of breath, increasing oxygen requirements.  Patient currently on 6 L via nasal cannula.  Patient states that it has become more difficult for her to breathe and she is producing increased amounts of sputum but she is unable to expectorate.  Patient does report pain in her left upper back that she has had for some time.  Patient states that she has not had a bowel movement in approximately 1 week.  Patient reports pain in her chest when she coughs.  Denies chest pain at rest.  Patient states that she had a fever this morning but unsure what it was.  Denies abdominal pain, dysuria, decreased appetite, nausea, vomiting.

## 2025-06-01 NOTE — ED ADULT NURSE NOTE - NS TRANSFER PATIENT BELONGINGS
Cardiology Focus Note:    Patient is well known to our service. Seen by  team during current hospitalization.    Chart reviewed and spoke to the wife. No changes in the current cardiac medications until to be evaluated with 's team again tomorrow per wife's preference.      Case was discussed with Dr.Solis Ryan Machado MD ([id number])  Pager: [phone]  Cardiology Fellow         Clothing

## 2025-06-01 NOTE — H&P ADULT - ATTENDING COMMENTS
On exam- IN main ED , daughter at bedside   General: awake, alert, NAD, elderly frail   Lungs:  b/l rhonchi b/l, normal resp effort on 2 L NC   Heart: regular rhythm   Abdomen: soft, non tender non distended  Ext: no edema, can move all  his extremities     I have personally seen and examined the patient.  I fully participated in the care of this patient. I DISCUSSED WITH THE RESIDENT AND I EDITED THE ABOVE NOTE

## 2025-06-01 NOTE — ED ADULT NURSE NOTE - OBJECTIVE STATEMENT
Pt with C.O coughing congested , SOB  since left the Hospital on Friday was admitted with the same symptoms pt wityh productive cough at this toime on 2LNCO2 .

## 2025-06-01 NOTE — ED PROVIDER NOTE - PHYSICAL EXAMINATION
VITAL SIGNS: I have reviewed nursing notes and confirm.  CONSTITUTIONAL: Well-developed; well-nourished; in no acute distress.  SKIN: Skin exam is warm and dry, no acute rash.  HEAD: Normocephalic; atraumatic.  EYES: PERRL, EOM intact; conjunctiva and sclera clear.  CARD: S1, S2 normal; no murmurs, gallops, or rubs. Regular rate and rhythm.  RESP: Normal respiratory effort, no tachypnea or distress. Diffuse rhonchi That are more prominent in the left upper lobe  ABD: soft, NT/ND.  EXT: Normal ROM. No clubbing, cyanosis or edema.  NEURO: Alert, oriented. Grossly unremarkable. No focal deficits.  PSYCH: Cooperative, appropriate.

## 2025-06-01 NOTE — ED ADULT TRIAGE NOTE - CHIEF COMPLAINT QUOTE
Pt BIBEMS from [facility] for cough and pneumonia worsening over the last week. Pt normally on 4L NC but now 89%. Now on 6L sating 91%. Wheezing noted by EMS. Duoneb given by EMS. DNR/DNI

## 2025-06-01 NOTE — H&P ADULT - HISTORY OF PRESENT ILLNESS
89-year-old female with a PMHx of COPD, Bronchiectasis(on baseline home O2 at 2 L), CAD, PE on Eliquis, hypertension and hyperlipidemia presents to the emergency department from nursing home complaining of increasing shortness of breath and increasing oxygen requirements. Patient states that it has become more difficult for her to breathe and she is producing increased amounts of sputum. Patient reports pain in her chest when she coughs. Denies chest pain at rest.  Patient states that she had a fever this morning. Also, reports constipation. Denies abdominal pain, dysuria, decreased appetite, nausea, vomiting. She was recently admitted on 5/26 for Bronchiectasis exacerbation and was discharged on 5/30 on Prednisone course.     In Ed, pt. was Afebrile, not tachycardic or tachypneic no increased work of breathing; Initially requiring 6L then satting 92-97% on baseline 2L  Labs showed WBC 15.3, Lactate 2.5>>1.9, Trop 28>>21  VBG: Ph 7.44, pCO2 52, PO2 39, HCO3 35  CXR Bibasilar opacities  CT chest: Compared to 2023; Interval increase in bronchiectasis with mucoid impaction now throughout all lobes. Additional consolidative opacities are noted in the lower   lobes likely reflecting pneumonia.  KUB: non-obstructive bowel gas pattern    s/p 1L bolus, Vanc, cefepime and Levaquin in ED.      Vital Signs Last 24 Hrs  T(F): 97.9 (01 Jun 2025 15:55), Max: 97.9 (01 Jun 2025 09:41)  HR: 65 (01 Jun 2025 15:55) (64 - 72)  BP: 158/63 (01 Jun 2025 15:55) (121/43 - 158/63)  RR: 18 (01 Jun 2025 15:55) (18 - 20)  SpO2: 97% (01 Jun 2025 15:55) (92% - 97%) 2L

## 2025-06-01 NOTE — H&P ADULT - NSHPPHYSICALEXAM_GEN_ALL_CORE
General: NAD, well-appearing, sitting comfortably  HEENT: EOMI, moist mucus membranes, conjunctivae clear  Cardio: RRR, no murmurs, normal S1 and S2  Resp: B/L coarse crackles, no wheezing  Abd: abdomen soft and nontender, nondistended, no rebound or guarding  MSK: no lower extremity edema, 2+ pedal pulses  Neuro: A&Ox3, motor and sensory functions grossly intact  Skin: warm and dry

## 2025-06-01 NOTE — H&P ADULT - CONVERSATION DETAILS
I discussed with the patient in details in details and her daughter the advance directive , both decided DNR but intubate at this time

## 2025-06-01 NOTE — ED PROVIDER NOTE - ATTENDING CONTRIBUTION TO CARE
Patient is a 89-year-old female with a past medical history of COPD on home oxygen, CAD, asthma who was discharged from [facility] on May 30 for viral pneumonia.  No CT done on prior admission.  The patient is here for worsening shortness of breath and increasing home oxygen demands.  Normally she is on 2 L oxygen.  Now requiring 4 L or more.    On our exam there is diffuse rhonchi more prominent left upper lobe    Impression: Dyspnea with increased oxygen requirements with cough and recent hospitalization.    Differential diagnosis includes hospital-acquired pneumonia.    Plan: Will get CT scan, sepsis labs

## 2025-06-01 NOTE — ED ADULT NURSE NOTE - NSFALLHARMRISKINTERV_ED_ALL_ED
Assistance OOB with selected safe patient handling equipment if applicable/Communicate risk of Fall with Harm to all staff, patient, and family/Provide visual cue: red socks, yellow wristband, yellow gown, etc/Reinforce activity limits and safety measures with patient and family/Bed in lowest position, wheels locked, appropriate side rails in place/Call bell, personal items and telephone in reach/Instruct patient to call for assistance before getting out of bed/chair/stretcher/Non-slip footwear applied when patient is off stretcher/[place] to call system/Physically safe environment - no spills, clutter or unnecessary equipment/Purposeful Proactive Rounding/Room/bathroom lighting operational, light cord in reach

## 2025-06-01 NOTE — ED PROVIDER NOTE - DISPOSITION TYPE
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  S/P partial 3rd ray resection with filleted flap POD 0    Recommendations  Patient was seen postop after her left partial 3rd ray resection with filleted finger/toe flap  1  Check final wound cultures from   Cultures from  are showing beta-hemolytic strep group B and Klebsiella oxytoca that is intermediate to cefazolin  2   For now will continue cefepime 1 g q 12 hours IV but discontinue metronidazole p o  Will likely be able to discontinue all antibiotics 48 hours postop if patient is stable      Antibiotics:  1  Cefepime 1 g q 12 hours IV, day 2 Rx     Subjective: The patient has some postoperative pain  Denies fevers, chills, or sweats  Denies nausea, vomiting, or diarrhea  Objective:  Vitals:  Temp:  [97 3 °F (36 3 °C)-98 7 °F (37 1 °C)] 98 7 °F (37 1 °C)  HR:  [73-96] 76  Resp:  [15-20] 16  BP: (100-119)/(64-97) 117/88  SpO2:  [89 %-96 %] 94 %  Temp (24hrs), Av 2 °F (36 8 °C), Min:97 3 °F (36 3 °C), Max:98 7 °F (37 1 °C)  Current: Temperature: 98 7 °F (37 1 °C)    Physical Exam:     General Appearance:  Alert, nontoxic, no acute distress  Throat: Oropharynx moist without lesions  Lips, mucosa, and tongue normal   Neck: Supple, symmetrical, trachea midline, no adenopathy,  no tenderness/mass/nodules   Lungs:   Clear to auscultation bilaterally, no audible wheezes, rhonchi or rales; respirations unlabored   Heart:  Regular rate and rhythm, S1, S2 normal, no murmur, rub or gallop   Abdomen:   Soft, non-tender, non-distended, positive bowel sounds  No masses, no organomegaly    No CVA tenderness   Extremities: Left foot with fresh dry surgical dressing in place   Skin: As above           Invasive Devices Peripheral Intravenous Line            Peripheral IV 09/06/19 Left Antecubital 2 days          Drain            Open Drain Left Foot less than 1 day                Labs, Imaging, & Other studies:   All pertinent labs were personally reviewed  Results from last 7 days   Lab Units 09/08/19  0545 09/07/19  0603 09/06/19  1732   WBC Thousand/uL 9 94 10 09 14 75*   HEMOGLOBIN g/dL 13 9 13 1 13 8   PLATELETS Thousands/uL 395* 373 426*     Results from last 7 days   Lab Units 09/08/19  0545 09/07/19  0603 09/06/19  1732   SODIUM mmol/L 134* 136 135*   POTASSIUM mmol/L 4 0 3 9 4 2   CHLORIDE mmol/L 102 103 101   CO2 mmol/L 25 25 26   BUN mg/dL 9 9 10   CREATININE mg/dL 0 76 0 76 0 79   EGFR ml/min/1 73sq m 96 96 92   CALCIUM mg/dL 9 0 9 4 10 1   AST U/L  --   --  12   ALT U/L  --   --  25   ALK PHOS U/L  --   --  216*     Results from last 7 days   Lab Units 09/08/19  0834 09/08/19  0833 09/06/19  1341 09/06/19  1324 09/06/19  1252   BLOOD CULTURE   --   --  No Growth at 48 hrs   No Growth at 48 hrs   --    GRAM STAIN RESULT  1+ Polys*  1+ Gram positive cocci in pairs* 1+ Polys*  1+ Gram positive cocci in pairs*  --   --  No polys seen*  3+ Gram positive cocci in pairs and chains*  2+ Gram negative rods*   WOUND CULTURE   --   --   --   --  3+ Growth of Beta Hemolytic Streptococcus Group B*  2+ Growth of Klebsiella oxytoca*  2+ Growth of ADMIT

## 2025-06-01 NOTE — ED PROVIDER NOTE - CLINICAL SUMMARY MEDICAL DECISION MAKING FREE TEXT BOX
Patient is a 89-year-old female with a past medical history of COPD on home oxygen, CAD, asthma who was discharged from [facility] on May 30 for viral pneumonia.  No CT done on prior admission.  The patient is here for worsening shortness of breath and increasing home oxygen demands.  Normally she is on 2 L oxygen.  Now requiring 4 L or more.    On our exam there is diffuse rhonchi more prominent left upper lobe    Impression: Dyspnea with increased oxygen requirements with cough and recent hospitalization.    Differential diagnosis includes hospital-acquired pneumonia.    Plan: Will get CT scan, sepsis labs

## 2025-06-01 NOTE — ED ADULT TRIAGE NOTE - CCCP TRG CHIEF CMPLNT
CARDIOLOGY note      History    Montrell River is a 70 year old male who has history of coronary disease, hypertension, and hyperlipidemia who presents for a followup. Since the last visit on 9/27/2021, the patient states that his chest hurts when he swings a golf club on the right side.  He does not get any discomfort with activity .  There is no associated shortness of breath.  . Th e patient states that Repatha is helping his cholesterol. The patient denies any palpitations, or shortness of breath. The patient denies any lightheadedness or dizziness. There is no orthopnea or PND. No other complaints at this time.    The patient is status post myocardial revascularization in 10/2012 with a left internal mammary artery to the left anterior descending, saphenous vein graft to the second marginal, saphenous vein graft to the marginal, and a saphenous vein graft to the PDA.    Stress Test 10/07/2020:  1.  Lexiscan Cardiolite stress test, which is negative for electrocardiographic evidence of ischemia.   2.  Blood pressure remained stable.   3.  No arrhythmias.   4.  Recovery period was uneventful.   5.  The Cardiolite portion of the study is pending at the time of this dictation and dictated in a separate report.   Nuclear Portion:  1. Probably abnormal study, likely small region of mild inferolateral wall ischemia.  2. No evidence of previous myocardial injury pattern.  3. Normal left ventricular systolic function.     Stress echocardiogram 10/02/2015:  No echocardiographic evidence of myocardial ischemia.   Echo findings:  Normal LV regional wall motion at baseline. Estimated left ventricular ejection fraction 60%.  No LV regional wall motion abnormalities with stress. Left ventricular ejection fraction increased with stress.  Definity contrast was utilized to better visualize the endocardial definition.    The patient exercised according to the NATALIE for 07:31 min:s, achieving a work level of Max. METS: 9.3. The  resting heart rate of 52 bpm moris to a maximal heart rate of 153 bpm. This value represents 97 % of the maximal, age-predicted heart rate. The resting blood pressure of 136/72 mmHg , moris to amaximum blood pressure of 198/72 mmHg.    medical history    Past Medical History:   Diagnosis Date   • Arthritis    • Coronary atherosclerosis of native coronary artery 9/27/2012   • Diabetes mellitus, type II (CMS/HCC) 7/29/2019   • Esophageal reflux    • Kidney stone    • Personal history of traumatic fracture    • Pneumonia        SURGICAL history    Past Surgical History:   Procedure Laterality Date   • Cabg, arterial, four+  9/28/2012   • Colonoscopy diagnostic  09/14/2007   • Eye surgery     • Gastroscopy  12-11-12    Dr. Kennedy [facility]   • Knee surgery      x3   • Lithotripsy - gen'l class     • Wrist surgery         mEDICATIONS    Current Outpatient Medications   Medication Sig   • EVOLocumab (Repatha SureClick) 140 MG/ML injection Inject 1 mL into the skin every 30 days.   • atenolol (TENORMIN) 25 MG tablet TAKE 1 TABLET BY MOUTH EVERY DAY   • ibuprofen (MOTRIN) 800 MG tablet Take 1 tablet by mouth every 8 hours as needed for Pain.   • omeprazole (PrilOSEC) 20 MG capsule Take 1 capsule by mouth daily.   • cannabidiol (CBD) oil    • fexofenadine (ALLEGRA) 180 MG tablet Take 180 mg by mouth daily.   • aspirin 81 MG chewable tablet Chew 1 tablet by mouth daily.     No current facility-administered medications for this visit.       aLLERGIES    ALLERGIES:   Allergen Reactions   • Livalo [Pitavastatin Calcium] PRURITUS       Physical Exam    Vital Signs:    Vitals:    09/29/22 0759   BP: 130/80   Pulse: 70   Resp: 16   Weight: 94.3 kg (208 lb)   Height: 5' 10\" (1.778 m)      General:  NAD, pleasant, alert and oriented x3.  Neck:  No carotid bruits, no JVD (jugular venous distension).  Normal carotid upstroke.  Pulmonary:  No retractions or increased work of breathing, clear to auscultation bilaterally.  No  crackles or wheezing.  Cardiovascular:  PMI (point of maximal impulse) in 5th intercostal place.  RRR, normal S1 S2, no S3 or S4 appreciated.  There are no murmurs.  Abdomen:  Bowel sounds normoactive, soft, nontender, nondistended, no hepatosplenomegaly.  Extremities:  No edema or cyanosis.     Glucose (mg/dL)   Date Value   04/12/2022 134 (H)       Cholesterol (mg/dL)   Date Value   04/12/2022 144     HDL (mg/dL)   Date Value   04/12/2022 50     Cholesterol/ HDL Ratio (no units)   Date Value   04/12/2022 2.9     Triglycerides (mg/dL)   Date Value   04/12/2022 87     LDL (mg/dL)   Date Value   04/12/2022 77       Creatinine (mg/dL)   Date Value   04/12/2022 1.17     BUN (mg/dL)   Date Value   04/12/2022 13     Hemoglobin A1C (%)   Date Value   04/12/2022 6.7 (H)     INR (no units)   Date Value   04/12/2018 1.0     Assessment  1.  Coronary artery disease, status post coronary artery bypass grafting 2012, stable.  2.  Hypertension, stable.   3.  Hyperlipidemia, stable.   4.  Atypical chest pain, resolved.  5.  Diabetes    PLAN  Montrell River is a pleasant 70 year old white gentleman with history of coronary disease status post CABG in 10/2012 who is not having shortness of breath, but is having chest pain when he is swinging golf clubs.  This is not concerning from a cardiac standpoint, and is musculoskeletal related. His blood pressure is stable today. Stress test on 10/07/2020 showed  small region of mild inferolateral wall ischemia. Lipids on 04/12/2022 were stable with LDL of 7 on Repatha injection once a month which is followed by . He is unable to tolerate statins. The patient is stable from a cardiac standpoint. He is stable on his current regimen. He continues the current medications. He will follow up with me in 1 year. If he has any problems before then, he will contact me sooner. Thank you for allowing me to participate in the care of this patient.      On 9/29/2022, RYAN Callejas  personally transcribed this document on behalf of  Troy Gtz MD.    The documentation recorded by the scribe accurately and completely reflects the service(s) I personally performed and the decisions made by me.           Sincerely,       Troy Gtz M.D.-Ph.D. [facility]   cough

## 2025-06-02 LAB
ALBUMIN SERPL ELPH-MCNC: 3.4 G/DL — LOW (ref 3.5–5.2)
ALP SERPL-CCNC: 82 U/L — SIGNIFICANT CHANGE UP (ref 30–115)
ALT FLD-CCNC: 14 U/L — SIGNIFICANT CHANGE UP (ref 0–41)
ANION GAP SERPL CALC-SCNC: 11 MMOL/L — SIGNIFICANT CHANGE UP (ref 7–14)
AST SERPL-CCNC: 18 U/L — SIGNIFICANT CHANGE UP (ref 0–41)
BASOPHILS # BLD AUTO: 0.04 K/UL — SIGNIFICANT CHANGE UP (ref 0–0.2)
BASOPHILS NFR BLD AUTO: 0.4 % — SIGNIFICANT CHANGE UP (ref 0–1)
BILIRUB SERPL-MCNC: 0.5 MG/DL — SIGNIFICANT CHANGE UP (ref 0.2–1.2)
BUN SERPL-MCNC: 9 MG/DL — LOW (ref 10–20)
CALCIUM SERPL-MCNC: 9.3 MG/DL — SIGNIFICANT CHANGE UP (ref 8.4–10.5)
CHLORIDE SERPL-SCNC: 99 MMOL/L — SIGNIFICANT CHANGE UP (ref 98–110)
CO2 SERPL-SCNC: 30 MMOL/L — SIGNIFICANT CHANGE UP (ref 17–32)
CREAT SERPL-MCNC: 0.6 MG/DL — LOW (ref 0.7–1.5)
EGFR: 81 ML/MIN/1.73M2 — SIGNIFICANT CHANGE UP
EGFR: 81 ML/MIN/1.73M2 — SIGNIFICANT CHANGE UP
EOSINOPHIL # BLD AUTO: 0.07 K/UL — SIGNIFICANT CHANGE UP (ref 0–0.7)
EOSINOPHIL NFR BLD AUTO: 0.7 % — SIGNIFICANT CHANGE UP (ref 0–8)
GLUCOSE SERPL-MCNC: 70 MG/DL — SIGNIFICANT CHANGE UP (ref 70–99)
HCT VFR BLD CALC: 37.6 % — SIGNIFICANT CHANGE UP (ref 37–47)
HGB BLD-MCNC: 11.7 G/DL — LOW (ref 12–16)
IMM GRANULOCYTES NFR BLD AUTO: 1.7 % — HIGH (ref 0.1–0.3)
LYMPHOCYTES # BLD AUTO: 1.78 K/UL — SIGNIFICANT CHANGE UP (ref 1.2–3.4)
LYMPHOCYTES # BLD AUTO: 16.7 % — LOW (ref 20.5–51.1)
MAGNESIUM SERPL-MCNC: 1.9 MG/DL — SIGNIFICANT CHANGE UP (ref 1.8–2.4)
MCHC RBC-ENTMCNC: 30.4 PG — SIGNIFICANT CHANGE UP (ref 27–31)
MCHC RBC-ENTMCNC: 31.1 G/DL — LOW (ref 32–37)
MCV RBC AUTO: 97.7 FL — SIGNIFICANT CHANGE UP (ref 81–99)
MONOCYTES # BLD AUTO: 1.16 K/UL — HIGH (ref 0.1–0.6)
MONOCYTES NFR BLD AUTO: 10.9 % — HIGH (ref 1.7–9.3)
NEUTROPHILS # BLD AUTO: 7.46 K/UL — HIGH (ref 1.4–6.5)
NEUTROPHILS NFR BLD AUTO: 69.6 % — SIGNIFICANT CHANGE UP (ref 42.2–75.2)
NRBC BLD AUTO-RTO: 0 /100 WBCS — SIGNIFICANT CHANGE UP (ref 0–0)
PLATELET # BLD AUTO: 257 K/UL — SIGNIFICANT CHANGE UP (ref 130–400)
PMV BLD: 11.9 FL — HIGH (ref 7.4–10.4)
POTASSIUM SERPL-MCNC: 3.7 MMOL/L — SIGNIFICANT CHANGE UP (ref 3.5–5)
POTASSIUM SERPL-SCNC: 3.7 MMOL/L — SIGNIFICANT CHANGE UP (ref 3.5–5)
PROT SERPL-MCNC: 5.9 G/DL — LOW (ref 6–8)
RAPID RVP RESULT: SIGNIFICANT CHANGE UP
RBC # BLD: 3.85 M/UL — LOW (ref 4.2–5.4)
RBC # FLD: 12.6 % — SIGNIFICANT CHANGE UP (ref 11.5–14.5)
SARS-COV-2 RNA SPEC QL NAA+PROBE: SIGNIFICANT CHANGE UP
SODIUM SERPL-SCNC: 140 MMOL/L — SIGNIFICANT CHANGE UP (ref 135–146)
WBC # BLD: 10.69 K/UL — SIGNIFICANT CHANGE UP (ref 4.8–10.8)
WBC # FLD AUTO: 10.69 K/UL — SIGNIFICANT CHANGE UP (ref 4.8–10.8)

## 2025-06-02 PROCEDURE — 99233 SBSQ HOSP IP/OBS HIGH 50: CPT

## 2025-06-02 PROCEDURE — 99222 1ST HOSP IP/OBS MODERATE 55: CPT

## 2025-06-02 RX ORDER — DOXYCYCLINE HYCLATE 100 MG
TABLET ORAL
Refills: 0 | Status: DISCONTINUED | OUTPATIENT
Start: 2025-06-02 | End: 2025-06-03

## 2025-06-02 RX ORDER — DOXYCYCLINE HYCLATE 100 MG
100 TABLET ORAL EVERY 12 HOURS
Refills: 0 | Status: DISCONTINUED | OUTPATIENT
Start: 2025-06-03 | End: 2025-06-03

## 2025-06-02 RX ORDER — BISACODYL 5 MG
10 TABLET, DELAYED RELEASE (ENTERIC COATED) ORAL DAILY
Refills: 0 | Status: COMPLETED | OUTPATIENT
Start: 2025-06-02 | End: 2025-06-03

## 2025-06-02 RX ORDER — LACTULOSE 10 G/15ML
15 SOLUTION ORAL EVERY 8 HOURS
Refills: 0 | Status: COMPLETED | OUTPATIENT
Start: 2025-06-02 | End: 2025-06-04

## 2025-06-02 RX ORDER — MAGNESIUM HYDROXIDE 400 MG/5ML
20 SUSPENSION ORAL ONCE
Refills: 0 | Status: COMPLETED | OUTPATIENT
Start: 2025-06-02 | End: 2025-06-02

## 2025-06-02 RX ORDER — IPRATROPIUM BROMIDE AND ALBUTEROL SULFATE .5; 2.5 MG/3ML; MG/3ML
3 SOLUTION RESPIRATORY (INHALATION) EVERY 6 HOURS
Refills: 0 | Status: DISCONTINUED | OUTPATIENT
Start: 2025-06-02 | End: 2025-06-05

## 2025-06-02 RX ORDER — DOXYCYCLINE HYCLATE 100 MG
100 TABLET ORAL ONCE
Refills: 0 | Status: COMPLETED | OUTPATIENT
Start: 2025-06-02 | End: 2025-06-02

## 2025-06-02 RX ORDER — METOPROLOL SUCCINATE 50 MG/1
25 TABLET, EXTENDED RELEASE ORAL ONCE
Refills: 0 | Status: COMPLETED | OUTPATIENT
Start: 2025-06-02 | End: 2025-06-02

## 2025-06-02 RX ADMIN — METOPROLOL SUCCINATE 25 MILLIGRAM(S): 50 TABLET, EXTENDED RELEASE ORAL at 01:17

## 2025-06-02 RX ADMIN — Medication 20 MILLIGRAM(S): at 11:10

## 2025-06-02 RX ADMIN — Medication 650 MILLIGRAM(S): at 02:45

## 2025-06-02 RX ADMIN — IPRATROPIUM BROMIDE AND ALBUTEROL SULFATE 3 MILLILITER(S): .5; 2.5 SOLUTION RESPIRATORY (INHALATION) at 02:44

## 2025-06-02 RX ADMIN — APIXABAN 5 MILLIGRAM(S): 2.5 TABLET, FILM COATED ORAL at 17:19

## 2025-06-02 RX ADMIN — METOPROLOL SUCCINATE 25 MILLIGRAM(S): 50 TABLET, EXTENDED RELEASE ORAL at 06:39

## 2025-06-02 RX ADMIN — Medication 25 GRAM(S): at 06:39

## 2025-06-02 RX ADMIN — IPRATROPIUM BROMIDE AND ALBUTEROL SULFATE 3 MILLILITER(S): .5; 2.5 SOLUTION RESPIRATORY (INHALATION) at 13:01

## 2025-06-02 RX ADMIN — Medication 3 MILLILITER(S): at 09:19

## 2025-06-02 RX ADMIN — MAGNESIUM HYDROXIDE 20 MILLILITER(S): 400 SUSPENSION ORAL at 08:13

## 2025-06-02 RX ADMIN — Medication 1 DOSE(S): at 07:35

## 2025-06-02 RX ADMIN — ISOSORBDIE DINITRATE 10 MILLIGRAM(S): 30 TABLET ORAL at 11:10

## 2025-06-02 RX ADMIN — LACTULOSE 15 GRAM(S): 10 SOLUTION ORAL at 13:00

## 2025-06-02 RX ADMIN — POLYETHYLENE GLYCOL 3350 17 GRAM(S): 17 POWDER, FOR SOLUTION ORAL at 06:39

## 2025-06-02 RX ADMIN — Medication 25 MILLIGRAM(S): at 13:00

## 2025-06-02 RX ADMIN — Medication 25 GRAM(S): at 21:35

## 2025-06-02 RX ADMIN — Medication 25 GRAM(S): at 13:01

## 2025-06-02 RX ADMIN — Medication 5 MILLIGRAM(S): at 01:43

## 2025-06-02 RX ADMIN — IPRATROPIUM BROMIDE AND ALBUTEROL SULFATE 3 MILLILITER(S): .5; 2.5 SOLUTION RESPIRATORY (INHALATION) at 07:32

## 2025-06-02 RX ADMIN — Medication 25 MILLIGRAM(S): at 21:34

## 2025-06-02 RX ADMIN — LATANOPROST PF 1 DROP(S): 0.05 SOLUTION/ DROPS OPHTHALMIC at 21:31

## 2025-06-02 RX ADMIN — AMLODIPINE BESYLATE 2.5 MILLIGRAM(S): 10 TABLET ORAL at 06:39

## 2025-06-02 RX ADMIN — Medication 10 MILLIGRAM(S): at 11:10

## 2025-06-02 RX ADMIN — CLOPIDOGREL BISULFATE 75 MILLIGRAM(S): 75 TABLET, FILM COATED ORAL at 11:10

## 2025-06-02 RX ADMIN — LACTULOSE 15 GRAM(S): 10 SOLUTION ORAL at 08:13

## 2025-06-02 RX ADMIN — Medication 25 MICROGRAM(S): at 06:39

## 2025-06-02 RX ADMIN — ISOSORBDIE DINITRATE 10 MILLIGRAM(S): 30 TABLET ORAL at 17:19

## 2025-06-02 RX ADMIN — Medication 1 DOSE(S): at 21:35

## 2025-06-02 RX ADMIN — Medication 3 MILLILITER(S): at 17:19

## 2025-06-02 RX ADMIN — Medication 25 MILLIGRAM(S): at 06:39

## 2025-06-02 RX ADMIN — ISOSORBDIE DINITRATE 10 MILLIGRAM(S): 30 TABLET ORAL at 06:39

## 2025-06-02 RX ADMIN — Medication 100 MILLIGRAM(S): at 15:07

## 2025-06-02 RX ADMIN — APIXABAN 5 MILLIGRAM(S): 2.5 TABLET, FILM COATED ORAL at 06:39

## 2025-06-02 NOTE — PHYSICAL THERAPY INITIAL EVALUATION ADULT - DIAGNOSIS, PT EVAL
Debility secondary to Healthcare associated bacterial pneumonia; Pneumonia in setting of Bronchiectasis/COPD

## 2025-06-02 NOTE — CONSULT NOTE ADULT - NS ATTEST RISK PROBLEM GEN_ALL_CORE FT
-My assessment required my independent history taking and time required is independent of the teaching service  -I independently interpreted the most recent imaging ( CXR ) and all available labs ( CBC, CMP) and cultures ( along with the sensitivities / SAMEER )  -Time excludes teaching time  -I reviewed all prior tests and documents  -I discussed my recommendations with the primary team housestaff/Attending  -I assisted with initiation of antibiotics

## 2025-06-02 NOTE — CONSULT NOTE ADULT - SUBJECTIVE AND OBJECTIVE BOX
Patient is a 89y old  Female who presents with a chief complaint of SOB (2025 16:48)      HPI:  89-year-old female with a PMHx of COPD, Bronchiectasis(on baseline home O2 at 2 L), CAD, PE on Eliquis, hypertension and hyperlipidemia presents to the emergency department from nursing home complaining of increasing shortness of breath and increasing oxygen requirements. Patient states that it has become more difficult for her to breathe and she is producing increased amounts of sputum. Patient reports pain in her chest when she coughs. Denies chest pain at rest.  Patient states that she had a fever this morning. Also, reports constipation. Denies abdominal pain, dysuria, decreased appetite, nausea, vomiting. She was recently admitted on  for Bronchiectasis exacerbation and was discharged on  on Prednisone course.     In Ed, pt. was Afebrile, not tachycardic or tachypneic no increased work of breathing; Initially requiring 6L then satting 92-97% on baseline 2L  Labs showed WBC 15.3, Lactate 2.5>>1.9, Trop 28>>21  VBG: Ph 7.44, pCO2 52, PO2 39, HCO3 35  CXR Bibasilar opacities  CT chest: Compared to ; Interval increase in bronchiectasis with mucoid impaction now throughout all lobes. Additional consolidative opacities are noted in the lower   lobes likely reflecting pneumonia.  KUB: non-obstructive bowel gas pattern    s/p 1L bolus, Vanc, cefepime and Levaquin in ED.      Vital Signs Last 24 Hrs  T(F): 97.9 (2025 15:55), Max: 97.9 (2025 09:41)  HR: 65 (2025 15:55) (64 - 72)  BP: 158/63 (2025 15:55) (121/43 - 158/63)  RR: 18 (2025 15:55) (18 - 20)  SpO2: 97% (2025 15:55) (92% - 97%) 2L       (2025 16:48)      PAST MEDICAL & SURGICAL HISTORY:  High blood cholesterol      Hypothyroid      Hypertension      COPD (chronic obstructive pulmonary disease)      CAD (coronary artery disease)      History of coronary artery stent placement      History of cholecystectomy      H/O abdominal hysterectomy      Cataract          SOCIAL HX:   Smoking      none                   ETOH                            Other    FAMILY HISTORY:  .  No cardiovascular or pulmonary family history     REVIEW OF SYSTEMS:    All ROS are negative exept per HPI       Allergies    No Known Drug Allergies  lactose (Unknown)  fish (Unknown)    Intolerances          PHYSICAL EXAM  Vital Signs Last 24 Hrs  T(C): 36.4 (2025 07:50), Max: 36.6 (2025 09:41)  T(F): 97.6 (2025 07:50), Max: 97.9 (2025 09:41)  HR: 57 (2025 07:50) (57 - 72)  BP: 131/59 (2025 07:50) (121/43 - 167/78)  BP(mean): 90 (2025 23:54) (90 - 90)  RR: 18 (2025 07:50) (18 - 20)  SpO2: 96% (2025 07:50) (92% - 98%)    Parameters below as of 2025 07:50  Patient On (Oxygen Delivery Method): nasal cannula  O2 Flow (L/min): 2      CONSTITUTIONAL:    NAD    ENT:   Airway patent,   No thrush    EYES:   Clear bilaterally,   pupils equal,   round and reactive to light.    CARDIAC:   Normal rate,   no edema      RESPIRATORY:   No wheezing   crackles L>R  No use of accessory muscles    GASTROINTESTINAL:  Abdomen soft, non-tender,   No guarding,   Positive BS              LABS:                          12.2   15.30 )-----------( 278      ( 2025 11:07 )             38.6                                               06-01    142  |  100  |  14  ----------------------------<  86  3.9   |  29  |  0.6[L]    Ca    9.6      2025 11:07    TPro  6.2  /  Alb  3.6  /  TBili  0.4  /  DBili  x   /  AST  22  /  ALT  16  /  AlkPhos  91  06-01      PT/INR - ( 2025 11:07 )   PT: 16.10 sec;   INR: 1.36 ratio         PTT - ( 2025 11:07 )  PTT:40.3 sec                                       Urinalysis Basic - ( 2025 21:57 )    Color: Yellow / Appearance: Clear / S.019 / pH: x  Gluc: x / Ketone: x  / Bili: Negative / Urobili: 0.2 mg/dL   Blood: x / Protein: Negative mg/dL / Nitrite: Negative   Leuk Esterase: Negative / RBC: x / WBC x   Sq Epi: x / Non Sq Epi: x / Bacteria: x                                                  LIVER FUNCTIONS - ( 2025 11:07 )  Alb: 3.6 g/dL / Pro: 6.2 g/dL / ALK PHOS: 91 U/L / ALT: 16 U/L / AST: 22 U/L / GGT: x                                                  Urinalysis with Rflx Culture (collected 2025 21:57)                                                    MEDICATIONS  (STANDING):  albuterol/ipratropium for Nebulization 3 milliLiter(s) Nebulizer every 6 hours  amLODIPine   Tablet 2.5 milliGRAM(s) Oral daily  apixaban 5 milliGRAM(s) Oral two times a day  bisacodyl Suppository 10 milliGRAM(s) Rectal daily  clopidogrel Tablet 75 milliGRAM(s) Oral daily  famotidine    Tablet 20 milliGRAM(s) Oral daily  fluticasone propionate/ salmeterol 100-50 MICROgram(s) Diskus 1 Dose(s) Inhalation two times a day  hydrALAZINE 25 milliGRAM(s) Oral three times a day  isosorbide   dinitrate Tablet (ISORDIL) 10 milliGRAM(s) Oral three times a day  lactated ringers. 1000 milliLiter(s) (65 mL/Hr) IV Continuous <Continuous>  lactulose Syrup 15 Gram(s) Oral every 8 hours  latanoprost 0.005% Ophthalmic Solution 1 Drop(s) Both EYES at bedtime  levothyroxine 25 MICROGram(s) Oral daily  magnesium hydroxide Suspension 20 milliLiter(s) Oral once  metoprolol succinate ER 25 milliGRAM(s) Oral daily  piperacillin/tazobactam IVPB.. 3.375 Gram(s) IV Intermittent every 8 hours  polyethylene glycol 3350 17 Gram(s) Oral two times a day  senna 2 Tablet(s) Oral at bedtime    MEDICATIONS  (PRN):  acetaminophen     Tablet .. 650 milliGRAM(s) Oral every 6 hours PRN Temp greater or equal to 38C (100.4F), Mild Pain (1 - 3)  bisacodyl 5 milliGRAM(s) Oral daily PRN Constipation  melatonin 3 milliGRAM(s) Oral at bedtime PRN Insomnia      
  CHIRAG HERNÁNDEZ  89y, Female  Allergy: No Known Drug Allergies  lactose (Unknown)  fish (Unknown)      All historical available data reviewed.    HPI:  89-year-old female with a PMHx of COPD, Bronchiectasis(on baseline home O2 at 2 L), CAD, PE on Eliquis, hypertension and hyperlipidemia presents to the emergency department from nursing home complaining of increasing shortness of breath and increasing oxygen requirements. Patient states that it has become more difficult for her to breathe and she is producing increased amounts of sputum. Patient reports pain in her chest when she coughs. Denies chest pain at rest.  Patient states that she had a fever this morning. Also, reports constipation. Denies abdominal pain, dysuria, decreased appetite, nausea, vomiting. She was recently admitted on 5/26 for Bronchiectasis exacerbation and was discharged on 5/30 on Prednisone course.     In Ed, pt. was Afebrile, not tachycardic or tachypneic no increased work of breathing; Initially requiring 6L then satting 92-97% on baseline 2L  Labs showed WBC 15.3, Lactate 2.5>>1.9, Trop 28>>21  VBG: Ph 7.44, pCO2 52, PO2 39, HCO3 35  CXR Bibasilar opacities  CT chest: Compared to 2023; Interval increase in bronchiectasis with mucoid impaction now throughout all lobes. Additional consolidative opacities are noted in the lower lobes likely reflecting pneumonia.  KUB: non-obstructive bowel gas pattern    s/p 1L bolus, Vanc, cefepime and Levaquin in ED.      Vital Signs Last 24 Hrs  T(F): 97.9 (01 Jun 2025 15:55), Max: 97.9 (01 Jun 2025 09:41)  HR: 65 (01 Jun 2025 15:55) (64 - 72)  BP: 158/63 (01 Jun 2025 15:55) (121/43 - 158/63)  RR: 18 (01 Jun 2025 15:55) (18 - 20)  SpO2: 97% (01 Jun 2025 15:55) (92% - 97%) 2L       (01 Jun 2025 16:48)    INFECTIOUS DISEASE HISTORY.  ID consulted for diagnostic work up/ABx recommendations  Prior hospital charts reviewed.   Primary team notes reviewed.   Other consultant notes reviewed.   Prior cultures noted     FAMILY HISTORY:    PAST MEDICAL & SURGICAL HISTORY:  High blood cholesterol      Hypothyroid      Hypertension      COPD (chronic obstructive pulmonary disease)      CAD (coronary artery disease)      History of coronary artery stent placement      History of cholecystectomy      H/O abdominal hysterectomy      Cataract            VITALS:  T(F): 97.6, Max: 97.9 (06-01-25 @ 15:55)  HR: 57  BP: 131/59  RR: 18Vital Signs Last 24 Hrs  T(C): 36.4 (02 Jun 2025 07:50), Max: 36.6 (01 Jun 2025 15:55)  T(F): 97.6 (02 Jun 2025 07:50), Max: 97.9 (01 Jun 2025 15:55)  HR: 57 (02 Jun 2025 07:50) (57 - 70)  BP: 131/59 (02 Jun 2025 07:50) (131/59 - 167/78)  BP(mean): 90 (01 Jun 2025 23:54) (90 - 90)  RR: 18 (02 Jun 2025 07:50) (18 - 20)  SpO2: 96% (02 Jun 2025 07:50) (96% - 98%)    Parameters below as of 02 Jun 2025 07:50  Patient On (Oxygen Delivery Method): nasal cannula  O2 Flow (L/min): 2      TESTS & MEASUREMENTS:                        11.7   10.69 )-----------( 257      ( 02 Jun 2025 05:49 )             37.6     06-02    140  |  99  |  9[L]  ----------------------------<  70  3.7   |  30  |  0.6[L]    Ca    9.3      02 Jun 2025 05:49  Mg     1.9     06-02    TPro  5.9[L]  /  Alb  3.4[L]  /  TBili  0.5  /  DBili  x   /  AST  18  /  ALT  14  /  AlkPhos  82  06-02    LIVER FUNCTIONS - ( 02 Jun 2025 05:49 )  Alb: 3.4 g/dL / Pro: 5.9 g/dL / ALK PHOS: 82 U/L / ALT: 14 U/L / AST: 18 U/L / GGT: x             Urinalysis with Rflx Culture (collected 06-01-25 @ 21:57)    Culture - Blood (collected 05-26-25 @ 14:50)  Source: Blood Blood-Peripheral  Final Report (06-01-25 @ 02:01):    No growth at 5 days    Culture - Blood (collected 05-26-25 @ 14:50)  Source: Blood Blood-Peripheral  Final Report (06-01-25 @ 02:01):    No growth at 5 days      Urinalysis Basic - ( 02 Jun 2025 05:49 )    Color: x / Appearance: x / SG: x / pH: x  Gluc: 70 mg/dL / Ketone: x  / Bili: x / Urobili: x   Blood: x / Protein: x / Nitrite: x   Leuk Esterase: x / RBC: x / WBC x   Sq Epi: x / Non Sq Epi: x / Bacteria: x          RADIOLOGY & ADDITIONAL TESTS:  Personal review of radiological diagnostics performed  Echo and EKG results noted when applicable.     MEDICATIONS:  acetaminophen     Tablet .. 650 milliGRAM(s) Oral every 6 hours PRN  albuterol/ipratropium for Nebulization 3 milliLiter(s) Nebulizer every 6 hours  albuterol/ipratropium for Nebulization 3 milliLiter(s) Nebulizer every 6 hours  amLODIPine   Tablet 2.5 milliGRAM(s) Oral daily  apixaban 5 milliGRAM(s) Oral two times a day  bisacodyl Suppository 10 milliGRAM(s) Rectal daily  clopidogrel Tablet 75 milliGRAM(s) Oral daily  famotidine    Tablet 20 milliGRAM(s) Oral daily  fluticasone propionate/ salmeterol 100-50 MICROgram(s) Diskus 1 Dose(s) Inhalation two times a day  hydrALAZINE 25 milliGRAM(s) Oral three times a day  isosorbide   dinitrate Tablet (ISORDIL) 10 milliGRAM(s) Oral three times a day  lactated ringers. 1000 milliLiter(s) IV Continuous <Continuous>  lactulose Syrup 15 Gram(s) Oral every 8 hours  latanoprost 0.005% Ophthalmic Solution 1 Drop(s) Both EYES at bedtime  levothyroxine 25 MICROGram(s) Oral daily  melatonin 3 milliGRAM(s) Oral at bedtime PRN  metoprolol succinate ER 25 milliGRAM(s) Oral daily  piperacillin/tazobactam IVPB.. 3.375 Gram(s) IV Intermittent every 8 hours  polyethylene glycol 3350 17 Gram(s) Oral two times a day  senna 2 Tablet(s) Oral at bedtime  sodium chloride 0.9% for Nebulization 3 milliLiter(s) Nebulizer every 12 hours      ANTIBIOTICS:  piperacillin/tazobactam IVPB.. 3.375 Gram(s) IV Intermittent every 8 hours

## 2025-06-02 NOTE — CONSULT NOTE ADULT - ASSESSMENT
89-year-old female with a PMHx of COPD, Bronchiectasis(on baseline home O2 at 2 L), CAD, PE on Eliquis, hypertension and hyperlipidemia presents to the emergency department from nursing home complaining of increasing shortness of breath and increasing oxygen requirements. Patient states that it has become more difficult for her to breathe and she is producing increased amounts of sputum. Patient reports pain in her chest when she coughs. Denies chest pain at rest.  Patient states that she had a fever this morning. Also, reports constipation. Denies abdominal pain, dysuria, decreased appetite, nausea, vomiting. She was recently admitted on 5/26 for Bronchiectasis exacerbation and was discharged on 5/30 on Prednisone course.     IMPRESSION/RECOMMENDATIONS  Immunosuppression/Immunosenescence ( above age 60 yrs there is a exponential decline in immunity which could result in poor clinical outcomes.   PNA : lower lobes, L>R  6/1 CXR Bibasilar opacities. ( Independent interpretation of test : bacterial PNA  6/1 CT chest: Compared to 2023. ( Independent interpretation of test : bacterial PNA  Interval increase in bronchiectasis with mucoid impaction now throughout all lobes. Additional consolidative opacities are noted in the lower lobes likely reflecting pneumonia.  6/1 KUB: non-obstructive bowel gas pattern  6/1 RVP NG  No pyuria  5/26 BCx NG  5/26 Entero/ Rhinovirus positive.  5/26 Nares ORSA NG  WBC 10.6    COPD  Bronchiectasis(on baseline home O2 at 2 L)  CAD  PE on Eliquis  hypertension  hyperlipidemia    -Off loading to prevent pressure sores and preventive measures to avoid aspiration  -bedside PT/Rehab. Out of bed to chair if possible.   -obtain Urine for Legionella antigen  -obtain Sputum gm stain, cultures  -continue with Zosyn 3.375 gm iv q8h over 4h  -start Doxycycline 100 mg iv q12h    Discussion of management/test results( independently interpretated by me ) /antibiotic regimen  with external/primary medical team. Dr Salazar  Discussion of management/test results( independently interpretated by me ) /antibiotic regimen  with external/primary medical team.

## 2025-06-02 NOTE — PROGRESS NOTE ADULT - ASSESSMENT
89-year-old female with a PMHx of COPD, Bronchiectasis(on baseline home O2 at 2 L), CAD, PE on Eliquis, hypertension and hyperlipidemia presents to the emergency department from nursing home complaining of increasing shortness of breath, cough and increasing oxygen requirements.      #Likely bronchiectasis exacerbation- with super imposed PNA    #?Possible aspiration  #Recent  Hospitalization for Rhinovirus infection ( was discharge May/30/25)  #Severe sepsis(+fever at home, +WBC, +Lactate)  - Hemodynamically stable now, on baseline O2  - CTchest noted  - s/p Vanc, cefepime and Levaquin  - c/w Zosyn for now  lactate and trop trended down , EKG no significant change from prior   - recent MRSA, Strep & Legionella Ag negative  - RVP negative  - F/u Procal, MRSA, sputum Cx, Bld cx, urine strep and legionella  - Rule out NTM/ MAC Send sputum AFB and cultures  - Chest PT for airway clearance  - c/w duonebs q4 or prn, saline nebs, symbicort  - ID and pulmonary consult  - c/w aspiration precautions; Diet per S&S    #Constipation  - last BM 1 week ago  - KUB non-obstructive   - c/w bowel regimen-senna, miralax BID, dulcolax prn    #HTN  #HLD  #CAD  - c/w amlodipine, Hydralazine  - c/w Plavix, Metoprolol, Imdur    #Hx of PE  - c/w Eliquis    #Glaucoma  - c/w latanoprost    #Misc  -DVT prophylaxis: Eliquis  -GI prophylaxis: Famotidine  -Diet: Soft &bite; needs 1:1 feeds  -Code status: DNR/trial of intubation and NIV  -Activity: AAT  -Dispo: Med   89-year-old female with a PMHx of COPD, Bronchiectasis(on baseline home O2 at 2 L), CAD, PE on Eliquis, hypertension and hyperlipidemia presents to the emergency department from nursing home complaining of increasing shortness of breath, cough and increasing oxygen requirements.      #Acute resp failure 2/2 suspected gram neg pneumonia   #Chronic bronchiectasis   #?Possible aspiration  #Recent  Hospitalization for Rhinovirus infection ( was discharge May/30/25)  #Severe sepsis(+fever at home, +WBC, +Lactate)  - Hemodynamically stable now, on baseline O2  - CTchest noted  - s/p Vanc, cefepime and Levaquin  - c/w Zosyn for now  lactate and trop trended down , EKG no significant change from prior   - recent MRSA, Strep & Legionella Ag negative  - RVP negative  - F/u Procal, MRSA, sputum Cx, Bld cx, urine strep and legionella  - Rule out NTM/ MAC Send sputum AFB and cultures  - Chest PT for airway clearance  - c/w duonebs q4 or prn, saline nebs, symbicort  - ID and pulmonary consult  - c/w aspiration precautions; Diet per S&S    #Constipation  - last BM 1 week ago  - KUB non-obstructive   - c/w bowel regimen-senna, miralax BID, dulcolax prn    #HTN  #HLD  #CAD  - c/w amlodipine, Hydralazine  - c/w Plavix, Metoprolol, Imdur    #Hx of PE  - c/w Eliquis    #Glaucoma  - c/w latanoprost    #Misc  -DVT prophylaxis: Eliquis  -GI prophylaxis: Famotidine  -Diet: Soft &bite; needs 1:1 feeds  -Code status: DNR/trial of intubation and NIV  -Activity: AAT  -Dispo: Med

## 2025-06-02 NOTE — PHYSICAL THERAPY INITIAL EVALUATION ADULT - ADDITIONAL COMMENTS
Pt admitted from [facility], non-amb at baseline and requires assistance with bed/transfer mobility.
[Negative] : Heme/Lymph

## 2025-06-02 NOTE — SWALLOW BEDSIDE ASSESSMENT ADULT - COMMENTS
Known to SLP services. Recently seen during last admission 5/30 w/ recs for soft + bite sized consistency and thin liquids

## 2025-06-02 NOTE — CONSULT NOTE ADULT - ASSESSMENT
IMPRESSION   Acute hypoxic respiratory failure- improving   Community acquired PNA   Hx of COPD  on 2L at home   Chronic bronchiectasis             PLAN     CT chest noted, PE ruled out   Bronchiectasis noted, also in previous CTs  Rule out NTM/ MAC Send sputum AFB x3   Check legionella and strep   c/w Abx Zosyn   Airway clearance with saline nebs q8h  IPV   continue advair and radha umanzor on tripple therapy   c/w baseline home O2 2L NC - goal sat is 88-92%  follow up OP- Dr. Pop      IMPRESSION   Acute hypoxic respiratory failure- improving   Community acquired PNA   Hx of COPD  on 2L at home   Chronic bronchiectasis   Ho of PE on eliquis           PLAN     CT chest noted- with Bronchiectasis slightly increased from previous CTs  Likely bronchiectasis- with super imposed PNA    Rule out NTM/ MAC Send sputum AFB x3   Check legionella and strep   c/w Abx Zosyn for 5 days   Airway clearance with saline nebs q8h  IPV   continue advair and radha umanzor on tripple therapy   c/w baseline home O2 2L NC - goal sat is 88-92%  follow up OP- Dr. Pop      IMPRESSION:    Acute hypoxic respiratory failure- improving   Community acquired PNA   Hx of COPD  on 2L at home   Chronic bronchiectasis   Ho of PE on eliquis       PLAN:    CT chest noted- with Bronchiectasis slightly increased from previous CTs  Likely bronchiectasis exacerbation- with super imposed PNA    Rule out NTM/ MAC Send sputum AFB and cultures   Check legionella and strep   On Zosyn - de-escalate based on culture results   Airway clearance: Albuterol nebs, followed by saline nebs BID   IPV; incentive doug as needed; Acapella valve on DC   Continue Advair BID   C/W baseline home O2 2L NC - goal sat is 88-92%  On AC with Eliquis - VTE not likely  follow up OP- Dr. Pop

## 2025-06-02 NOTE — PHYSICAL THERAPY INITIAL EVALUATION ADULT - PRECAUTIONS/LIMITATIONS, REHAB EVAL
fall precautions/oxygen therapy device and L/min
In 1 week, pt. will ambulate 100 feet with Min Assist and RW

## 2025-06-02 NOTE — PHYSICAL THERAPY INITIAL EVALUATION ADULT - PERTINENT HX OF CURRENT PROBLEM, REHAB EVAL
89-year-old female with a PMHx of COPD, Bronchiectasis(on baseline home O2 at 2 L), CAD, PE on Eliquis, hypertension and hyperlipidemia presents to the emergency department from nursing home complaining of increasing shortness of breath, cough and increasing oxygen requirements.

## 2025-06-02 NOTE — CONSULT NOTE ADULT - CONSULT REASON
Hx of Bronchiectasis now with PNA
ID consulted for diagnostic work up and antimicrobial treatment of PNA

## 2025-06-02 NOTE — PHYSICAL THERAPY INITIAL EVALUATION ADULT - GENERAL OBSERVATIONS, REHAB EVAL
10:00-10:24am Pt encountered supine in bed, A & O x 3 in NAD,  no c/o pain and agreeable to PT. Pt requires Mod A in bed mobility and Max A sit/stand transfer. Pt demonstrated impaired mobility secondary to weakness,  poor balance and non-amb. Pt is non-ambulatory x 1 yr and requires assistant to transfer OOB at the NH  as per pt. Pt will benefit from skilled PT 3-5x/wk for thera ex, functional mobility training and balance training to achieve goals in mobility and return to previous level of function.

## 2025-06-03 ENCOUNTER — NON-APPOINTMENT (OUTPATIENT)
Age: 89
End: 2025-06-03

## 2025-06-03 LAB
LEGIONELLA AG UR QL: NEGATIVE — SIGNIFICANT CHANGE UP
PROCALCITONIN SERPL-MCNC: 0.05 NG/ML — SIGNIFICANT CHANGE UP (ref 0.02–0.1)
S PNEUM AG UR QL: NEGATIVE — SIGNIFICANT CHANGE UP

## 2025-06-03 PROCEDURE — 71045 X-RAY EXAM CHEST 1 VIEW: CPT | Mod: 26

## 2025-06-03 PROCEDURE — 99232 SBSQ HOSP IP/OBS MODERATE 35: CPT

## 2025-06-03 RX ORDER — DOXYCYCLINE HYCLATE 100 MG
100 TABLET ORAL EVERY 12 HOURS
Refills: 0 | Status: DISCONTINUED | OUTPATIENT
Start: 2025-06-03 | End: 2025-06-05

## 2025-06-03 RX ADMIN — Medication 100 MILLIGRAM(S): at 06:02

## 2025-06-03 RX ADMIN — CLOPIDOGREL BISULFATE 75 MILLIGRAM(S): 75 TABLET, FILM COATED ORAL at 12:05

## 2025-06-03 RX ADMIN — Medication 25 MILLIGRAM(S): at 06:02

## 2025-06-03 RX ADMIN — Medication 3 MILLILITER(S): at 13:18

## 2025-06-03 RX ADMIN — Medication 2 TABLET(S): at 22:29

## 2025-06-03 RX ADMIN — Medication 25 MILLIGRAM(S): at 22:29

## 2025-06-03 RX ADMIN — Medication 25 MICROGRAM(S): at 06:01

## 2025-06-03 RX ADMIN — Medication 3 MILLIGRAM(S): at 22:29

## 2025-06-03 RX ADMIN — POLYETHYLENE GLYCOL 3350 17 GRAM(S): 17 POWDER, FOR SOLUTION ORAL at 06:03

## 2025-06-03 RX ADMIN — APIXABAN 5 MILLIGRAM(S): 2.5 TABLET, FILM COATED ORAL at 06:01

## 2025-06-03 RX ADMIN — ISOSORBDIE DINITRATE 10 MILLIGRAM(S): 30 TABLET ORAL at 12:05

## 2025-06-03 RX ADMIN — Medication 100 MILLIGRAM(S): at 18:39

## 2025-06-03 RX ADMIN — Medication 25 GRAM(S): at 12:07

## 2025-06-03 RX ADMIN — IPRATROPIUM BROMIDE AND ALBUTEROL SULFATE 3 MILLILITER(S): .5; 2.5 SOLUTION RESPIRATORY (INHALATION) at 07:56

## 2025-06-03 RX ADMIN — AMLODIPINE BESYLATE 2.5 MILLIGRAM(S): 10 TABLET ORAL at 06:01

## 2025-06-03 RX ADMIN — METOPROLOL SUCCINATE 25 MILLIGRAM(S): 50 TABLET, EXTENDED RELEASE ORAL at 06:01

## 2025-06-03 RX ADMIN — APIXABAN 5 MILLIGRAM(S): 2.5 TABLET, FILM COATED ORAL at 18:39

## 2025-06-03 RX ADMIN — ISOSORBDIE DINITRATE 10 MILLIGRAM(S): 30 TABLET ORAL at 06:01

## 2025-06-03 RX ADMIN — Medication 25 MILLIGRAM(S): at 13:21

## 2025-06-03 RX ADMIN — IPRATROPIUM BROMIDE AND ALBUTEROL SULFATE 3 MILLILITER(S): .5; 2.5 SOLUTION RESPIRATORY (INHALATION) at 19:58

## 2025-06-03 RX ADMIN — IPRATROPIUM BROMIDE AND ALBUTEROL SULFATE 3 MILLILITER(S): .5; 2.5 SOLUTION RESPIRATORY (INHALATION) at 13:17

## 2025-06-03 RX ADMIN — Medication 20 MILLIGRAM(S): at 12:06

## 2025-06-03 RX ADMIN — Medication 25 GRAM(S): at 22:28

## 2025-06-03 RX ADMIN — ISOSORBDIE DINITRATE 10 MILLIGRAM(S): 30 TABLET ORAL at 18:39

## 2025-06-03 RX ADMIN — Medication 1 DOSE(S): at 23:00

## 2025-06-03 NOTE — PROGRESS NOTE ADULT - ASSESSMENT
89-year-old female with a PMHx of COPD, Bronchiectasis(on baseline home O2 at 2 L), CAD, PE on Eliquis, hypertension and hyperlipidemia presents to the emergency department from nursing home complaining of increasing shortness of breath, cough and increasing oxygen requirements.      #Acute resp failure 2/2 suspected gram neg pneumonia   #Chronic bronchiectasis   #?Possible aspiration  #Recent  Hospitalization for Rhinovirus infection ( was discharge May/30/25)  #Severe sepsis(+fever at home, +WBC, +Lactate)  - Hemodynamically stable now, on baseline O2  - CTchest noted  - s/p Vanc, cefepime and Levaquin  - c/w Zosyn for now  lactate and trop trended down , EKG no significant change from prior   - recent MRSA, Strep & Legionella Ag negative  - RVP negative  - F/u Procal, MRSA, sputum Cx, Bld cx, urine strep and legionella  - Rule out NTM/ MAC Send sputum AFB and cultures  - Chest PT for airway clearance  - c/w duonebs q4 or prn, saline nebs, Symbicort  - ID and pulmonary consult  - c/w aspiration precautions; Diet per S&S  - procal- 0.05    #Constipation  - last BM 1 week ago  - KUB non-obstructive   - c/w bowel regimen-senna, miralax BID, dulcolax prn    #HTN  #HLD  #CAD  - c/w amlodipine, Hydralazine  - c/w Plavix, Metoprolol, Imdur    #Hx of PE  - c/w Eliquis    #Glaucoma  - c/w latanoprost    #Misc  -DVT prophylaxis: Eliquis  -GI prophylaxis: Famotidine  -Diet: Soft &bite; needs 1:1 feeds  -Code status: DNR/trial of intubation and NIV  -Activity: AAT  -Dispo: Med, RAJESH    Provider handoff: fu cultures

## 2025-06-03 NOTE — PROGRESS NOTE ADULT - ASSESSMENT
· Assessment	  89-year-old female with a PMHx of COPD, Bronchiectasis(on baseline home O2 at 2 L), CAD, PE on Eliquis, hypertension and hyperlipidemia presents to the emergency department from nursing home complaining of increasing shortness of breath and increasing oxygen requirements. Patient states that it has become more difficult for her to breathe and she is producing increased amounts of sputum. Patient reports pain in her chest when she coughs. Denies chest pain at rest.  Patient states that she had a fever this morning. Also, reports constipation. Denies abdominal pain, dysuria, decreased appetite, nausea, vomiting. She was recently admitted on 5/26 for Bronchiectasis exacerbation and was discharged on 5/30 on Prednisone course.     IMPRESSION/RECOMMENDATIONS  Immunosuppression/Immunosenescence ( above age 60 yrs there is a exponential decline in immunity which could result in poor clinical outcomes.   PNA : lower lobes  6/3 CXR Bibasilar opacities. ( Independent interpretation of test : bacterial PNA  6/1 CT chest: Compared to 2023. ( Independent interpretation of test : bacterial PNA  Interval increase in bronchiectasis with mucoid impaction now throughout all lobes. Additional consolidative opacities are noted in the lower lobes likely reflecting pneumonia.  6/1 BCX NG  6/1 KUB: non-obstructive bowel gas pattern  6/1 RVP NG  No pyuria  5/26 BCx NG  5/26 Entero/ Rhinovirus positive.  5/26 Nares ORSA NG  WBC 10.6    COPD  Bronchiectasis(on baseline home O2 at 2 L)  CAD  PE on Eliquis  hypertension  hyperlipidemia    -Off loading to prevent pressure sores and preventive measures to avoid aspiration  -obtain Urine for Legionella antigen  -obtain Sputum gm stain, cultures  -continue with Zosyn 3.375 gm iv q8h over 4h  -continue with  Doxycycline 100 mg iv q12h    Discussion of management/test results( independently interpretated by me ) /antibiotic regimen  with external/primary medical team. Dr Salazar

## 2025-06-04 LAB
ANION GAP SERPL CALC-SCNC: 10 MMOL/L — SIGNIFICANT CHANGE UP (ref 7–14)
BASOPHILS # BLD AUTO: 0.08 K/UL — SIGNIFICANT CHANGE UP (ref 0–0.2)
BASOPHILS NFR BLD AUTO: 0.8 % — SIGNIFICANT CHANGE UP (ref 0–1)
BUN SERPL-MCNC: 15 MG/DL — SIGNIFICANT CHANGE UP (ref 10–20)
CALCIUM SERPL-MCNC: 9.3 MG/DL — SIGNIFICANT CHANGE UP (ref 8.4–10.5)
CHLORIDE SERPL-SCNC: 101 MMOL/L — SIGNIFICANT CHANGE UP (ref 98–110)
CO2 SERPL-SCNC: 30 MMOL/L — SIGNIFICANT CHANGE UP (ref 17–32)
CREAT SERPL-MCNC: 0.7 MG/DL — SIGNIFICANT CHANGE UP (ref 0.7–1.5)
EGFR: 78 ML/MIN/1.73M2 — SIGNIFICANT CHANGE UP
EGFR: 78 ML/MIN/1.73M2 — SIGNIFICANT CHANGE UP
EOSINOPHIL # BLD AUTO: 0.67 K/UL — SIGNIFICANT CHANGE UP (ref 0–0.7)
EOSINOPHIL NFR BLD AUTO: 6.7 % — SIGNIFICANT CHANGE UP (ref 0–8)
GLUCOSE SERPL-MCNC: 96 MG/DL — SIGNIFICANT CHANGE UP (ref 70–99)
GRAM STN FLD: ABNORMAL
HCT VFR BLD CALC: 39 % — SIGNIFICANT CHANGE UP (ref 37–47)
HGB BLD-MCNC: 12.5 G/DL — SIGNIFICANT CHANGE UP (ref 12–16)
IMM GRANULOCYTES NFR BLD AUTO: 1.5 % — HIGH (ref 0.1–0.3)
LYMPHOCYTES # BLD AUTO: 1.88 K/UL — SIGNIFICANT CHANGE UP (ref 1.2–3.4)
LYMPHOCYTES # BLD AUTO: 18.7 % — LOW (ref 20.5–51.1)
MAGNESIUM SERPL-MCNC: 1.8 MG/DL — SIGNIFICANT CHANGE UP (ref 1.8–2.4)
MCHC RBC-ENTMCNC: 30.8 PG — SIGNIFICANT CHANGE UP (ref 27–31)
MCHC RBC-ENTMCNC: 32.1 G/DL — SIGNIFICANT CHANGE UP (ref 32–37)
MCV RBC AUTO: 96.1 FL — SIGNIFICANT CHANGE UP (ref 81–99)
MONOCYTES # BLD AUTO: 0.8 K/UL — HIGH (ref 0.1–0.6)
MONOCYTES NFR BLD AUTO: 8 % — SIGNIFICANT CHANGE UP (ref 1.7–9.3)
NEUTROPHILS # BLD AUTO: 6.45 K/UL — SIGNIFICANT CHANGE UP (ref 1.4–6.5)
NEUTROPHILS NFR BLD AUTO: 64.3 % — SIGNIFICANT CHANGE UP (ref 42.2–75.2)
NIGHT BLUE STAIN TISS: SIGNIFICANT CHANGE UP
NRBC BLD AUTO-RTO: 0 /100 WBCS — SIGNIFICANT CHANGE UP (ref 0–0)
PLATELET # BLD AUTO: 293 K/UL — SIGNIFICANT CHANGE UP (ref 130–400)
PMV BLD: 11.5 FL — HIGH (ref 7.4–10.4)
POTASSIUM SERPL-MCNC: 3.4 MMOL/L — LOW (ref 3.5–5)
POTASSIUM SERPL-SCNC: 3.4 MMOL/L — LOW (ref 3.5–5)
RBC # BLD: 4.06 M/UL — LOW (ref 4.2–5.4)
RBC # FLD: 12.8 % — SIGNIFICANT CHANGE UP (ref 11.5–14.5)
SODIUM SERPL-SCNC: 141 MMOL/L — SIGNIFICANT CHANGE UP (ref 135–146)
SPECIMEN SOURCE: SIGNIFICANT CHANGE UP
SPECIMEN SOURCE: SIGNIFICANT CHANGE UP
WBC # BLD: 10.03 K/UL — SIGNIFICANT CHANGE UP (ref 4.8–10.8)
WBC # FLD AUTO: 10.03 K/UL — SIGNIFICANT CHANGE UP (ref 4.8–10.8)

## 2025-06-04 PROCEDURE — 99232 SBSQ HOSP IP/OBS MODERATE 35: CPT

## 2025-06-04 RX ADMIN — Medication 3 MILLILITER(S): at 19:58

## 2025-06-04 RX ADMIN — IPRATROPIUM BROMIDE AND ALBUTEROL SULFATE 3 MILLILITER(S): .5; 2.5 SOLUTION RESPIRATORY (INHALATION) at 09:51

## 2025-06-04 RX ADMIN — Medication 25 GRAM(S): at 22:20

## 2025-06-04 RX ADMIN — LACTULOSE 15 GRAM(S): 10 SOLUTION ORAL at 05:45

## 2025-06-04 RX ADMIN — Medication 25 GRAM(S): at 14:27

## 2025-06-04 RX ADMIN — Medication 25 GRAM(S): at 05:52

## 2025-06-04 RX ADMIN — CLOPIDOGREL BISULFATE 75 MILLIGRAM(S): 75 TABLET, FILM COATED ORAL at 12:31

## 2025-06-04 RX ADMIN — IPRATROPIUM BROMIDE AND ALBUTEROL SULFATE 3 MILLILITER(S): .5; 2.5 SOLUTION RESPIRATORY (INHALATION) at 19:51

## 2025-06-04 RX ADMIN — AMLODIPINE BESYLATE 2.5 MILLIGRAM(S): 10 TABLET ORAL at 05:45

## 2025-06-04 RX ADMIN — Medication 40 MILLIEQUIVALENT(S): at 18:30

## 2025-06-04 RX ADMIN — METOPROLOL SUCCINATE 25 MILLIGRAM(S): 50 TABLET, EXTENDED RELEASE ORAL at 05:45

## 2025-06-04 RX ADMIN — ISOSORBDIE DINITRATE 10 MILLIGRAM(S): 30 TABLET ORAL at 18:28

## 2025-06-04 RX ADMIN — Medication 1 DOSE(S): at 08:42

## 2025-06-04 RX ADMIN — Medication 100 MILLIGRAM(S): at 18:27

## 2025-06-04 RX ADMIN — Medication 1 APPLICATION(S): at 12:40

## 2025-06-04 RX ADMIN — ISOSORBDIE DINITRATE 10 MILLIGRAM(S): 30 TABLET ORAL at 12:31

## 2025-06-04 RX ADMIN — Medication 25 MICROGRAM(S): at 05:50

## 2025-06-04 RX ADMIN — ISOSORBDIE DINITRATE 10 MILLIGRAM(S): 30 TABLET ORAL at 05:45

## 2025-06-04 RX ADMIN — APIXABAN 5 MILLIGRAM(S): 2.5 TABLET, FILM COATED ORAL at 18:28

## 2025-06-04 RX ADMIN — Medication 100 MILLIGRAM(S): at 05:44

## 2025-06-04 RX ADMIN — Medication 25 MILLIGRAM(S): at 06:19

## 2025-06-04 RX ADMIN — Medication 20 MILLIGRAM(S): at 12:30

## 2025-06-04 RX ADMIN — APIXABAN 5 MILLIGRAM(S): 2.5 TABLET, FILM COATED ORAL at 05:45

## 2025-06-04 RX ADMIN — Medication 25 MILLIGRAM(S): at 22:20

## 2025-06-04 NOTE — SWALLOW BEDSIDE ASSESSMENT ADULT - SWALLOW EVAL: FUNCTIONAL LEVEL AT TIME OF EVAL
RN reported positive tolerance of lunch tray minced & moist with mildly thick liquids.
Awake, alert, Ox2, NC 2L

## 2025-06-04 NOTE — DIETITIAN INITIAL EVALUATION ADULT - FACTORS AFF FOOD INTAKE
difficulty chewing/difficulty feeding self/difficulty swallowing/M[employer]ism/ethnic/cultural/personal food preferences Male

## 2025-06-04 NOTE — PROGRESS NOTE ADULT - ATTENDING COMMENTS
Patient seen at bedside. Changes made within note as well. Discussed with medical team that includes resident(s), medical student(s), nursing staff, case management.     as per initial summary     89-year-old female with a PMHx of COPD, Bronchiectasis(on baseline home O2 at 2 L), CAD, PE on Eliquis, hypertension and hyperlipidemia presents to the emergency department from nursing home complaining of increasing shortness of breath, cough and increasing oxygen requirements.      #Acute resp failure 2/2 suspected gram neg pneumonia   #Chronic bronchiectasis   #?Possible aspiration  #Recent  Hospitalization for Rhinovirus infection ( was discharge May/30/25)  #Severe sepsis(+fever at home, +WBC, +Lactate)  - Hemodynamically stable now, on baseline O2  - s/p Vanc, cefepime and Levaquin  - c/w Zosyn for now, added doxycycline as per ID   lactate and trop trended down , EKG no significant change from prior   - recent MRSA, Strep & Legionella Ag negative  - RVP negative  -  MRSA, sputum Cx, Bld cx, urine strep and legionella  - Rule out NTM/ MAC Send sputum AFB and cultures  - Chest PT for airway clearance  - c/w duonebs q4 or prn, saline nebs, Symbicort  - ID and pulmonary consult, follow reocmmendations   - c/w aspiration precautions; Diet per S&S  - procal- 0.05    #Constipation  - last BM 1 week ago  - KUB non-obstructive   - c/w bowel regimen-senna, miralax BID, dulcolax prn    #HTN  #HLD  #CAD  - c/w amlodipine, Hydralazine  - c/w Plavix, Metoprolol, Imdur    #Hx of PE  - c/w Eliquis    #Glaucoma  - c/w latanoprost    #Misc  -DVT prophylaxis: Eliquis  -GI prophylaxis: Famotidine  -Diet: Soft &bite; needs 1:1 feeds  -Code status: DNR/trial of intubation and NIV  -Activity: AAT  -Dispo: Med, RAJESH      follow up: follow ID and pulm, PT. on iv abx.   Acapella valve on DC as per Pulm.   PT recommending RAJESH
Interval history: Pt seen and examined at bedside. No cp or sob.   Vital Signs (24 Hrs):  T(C): 36.3 (06-02-25 @ 16:12), Max: 36.4 (06-02-25 @ 07:50)  HR: 60 (06-02-25 @ 16:12) (57 - 70)  BP: 123/63 (06-02-25 @ 16:12) (123/63 - 167/78)  RR: 18 (06-02-25 @ 16:12) (18 - 18)  SpO2: 95% (06-02-25 @ 16:12) (95% - 98%)  Wt(kg): --  Daily Height in cm: 172.72 (02 Jun 2025 07:50)    Daily     I&O's Summary    PHYSICAL EXAM:  GENERAL: NAD, well-developed  HEAD:  Atraumatic, Normocephalic  EYES: EOMI, PERRLA, conjunctiva and sclera clear  NECK: Supple, No JVD  CHEST/LUNG:  Ronchi   HEART: Regular rate and rhythm; No murmurs, rubs, or gallops  ABDOMEN: Soft, Nontender, Nondistended; Bowel sounds present  EXTREMITIES:  2+ Peripheral Pulses, No clubbing, cyanosis, or edema  PSYCH: Alert   NEUROLOGY: non-focal  SKIN: No rashes or lesions  Labs reviewed  Imaging reviewed independently and reviewed read  < from: CT Chest w/ IV Cont (06.01.25 @ 12:45) >    IMPRESSION:    Compared to 11/9/2023:    Interval increase in bronchiectasis with mucoid impaction now throughout   all lobes. Additional consolidative opacities are noted in the lower   lobes likely reflecting pneumonia.  I have reviewed the above preliminary report the following   comment----among the various possibilities for these findingsis   mycobacterial avium complex (MAC).    < end of copied text >      Plan as above. DW resident. Agree to plan. Made edits    #Progress Note Handoff  Pending (specify):  clinical improvement  Family discussion: house staff updated pt family  Disposition: PT, SNF  Decision to admit the pt is based on acuity as above
Medicine Attending Attestation  Patient was seen and examined with medicine team.  Nursing records reviewed. I agree with the resident/PA/NP's note including past medical history, home medications, social history, allergies, surgical history, family history, and review of system. I have reviewed relevant vitals, laboratory values, imaging studies, and microbiology.   - Above resident's note was edited by me  - rest of A/P as per detailed housestaff note above except above modifications    Total time spent to complete patient's bedside assessment, reviewed medical chart, discussed medical plan of care with team was 45 minutes with >50% of time spent face to face with patient, discussion with patient/family and/or coordination of care.

## 2025-06-04 NOTE — PROGRESS NOTE ADULT - MUSCULOSKELETAL
no calf tenderness/no chest wall tenderness/decreased strength
no calf tenderness/no strength/no chest wall tenderness

## 2025-06-04 NOTE — PROGRESS NOTE ADULT - SUBJECTIVE AND OBJECTIVE BOX
SUBJECTIVE/OVERNIGHT EVENTS  Today is hospital day 1d. This morning patient was seen and examined at bedside, resting comfortably in bed. No acute or major events overnight.    MEDICATIONS  STANDING MEDICATIONS  albuterol/ipratropium for Nebulization 3 milliLiter(s) Nebulizer every 6 hours  albuterol/ipratropium for Nebulization 3 milliLiter(s) Nebulizer every 6 hours  amLODIPine   Tablet 2.5 milliGRAM(s) Oral daily  apixaban 5 milliGRAM(s) Oral two times a day  bisacodyl Suppository 10 milliGRAM(s) Rectal daily  clopidogrel Tablet 75 milliGRAM(s) Oral daily  famotidine    Tablet 20 milliGRAM(s) Oral daily  fluticasone propionate/ salmeterol 100-50 MICROgram(s) Diskus 1 Dose(s) Inhalation two times a day  hydrALAZINE 25 milliGRAM(s) Oral three times a day  isosorbide   dinitrate Tablet (ISORDIL) 10 milliGRAM(s) Oral three times a day  lactated ringers. 1000 milliLiter(s) IV Continuous <Continuous>  lactulose Syrup 15 Gram(s) Oral every 8 hours  latanoprost 0.005% Ophthalmic Solution 1 Drop(s) Both EYES at bedtime  levothyroxine 25 MICROGram(s) Oral daily  metoprolol succinate ER 25 milliGRAM(s) Oral daily  piperacillin/tazobactam IVPB.. 3.375 Gram(s) IV Intermittent every 8 hours  polyethylene glycol 3350 17 Gram(s) Oral two times a day  senna 2 Tablet(s) Oral at bedtime  sodium chloride 0.9% for Nebulization 3 milliLiter(s) Nebulizer every 12 hours    PRN MEDICATIONS  acetaminophen     Tablet .. 650 milliGRAM(s) Oral every 6 hours PRN  melatonin 3 milliGRAM(s) Oral at bedtime PRN    VITALS  T(F): 97.6 (06-02-25 @ 07:50), Max: 97.9 (06-01-25 @ 15:55)  HR: 57 (06-02-25 @ 07:50) (57 - 70)  BP: 131/59 (06-02-25 @ 07:50) (131/59 - 167/78)  RR: 18 (06-02-25 @ 07:50) (18 - 20)  SpO2: 96% (06-02-25 @ 07:50) (96% - 98%)    PHYSICAL EXAM  GENERAL: NAD, lying in bed comfortably  HEAD:  Atraumatic, normocephalic  EYES: EOMI, PERRLA, conjunctiva and sclera clear  ENT: Moist mucous membranes  NECK: Supple, no JVD  HEART: Regular rate and rhythm, no murmurs, rubs, or gallops  LUNGS: Unlabored respirations. Diffuse b/l rhonchi  ABDOMEN: Soft, nontender, nondistended, +BS  EXTREMITIES: No clubbing, cyanosis, or edema  NERVOUS SYSTEM:  A&Ox3, no focal deficits   SKIN: No rashes or lesions on visible skin    LABS             11.7   10.69 )-----------( 257      ( 06-02-25 @ 05:49 )             37.6     140  |  99  |  9   -------------------------<  70   06-02-25 @ 05:49  3.7  |  30  |  0.6    Ca      9.3     06-02-25 @ 05:49  Mg     1.9     06-02-25 @ 05:49    TPro  5.9  /  Alb  3.4  /  TBili  0.5  /  DBili  x   /  AST  18  /  ALT  14  /  AlkPhos  82  /  GGT  x     06-02-25 @ 05:49    PT/INR - ( 06-01-25 @ 11:07 )   PT: 16.10 sec[H];   INR: 1.36 ratio[H]  PTT - ( 06-01-25 @ 11:07 )  PTT:40.3 sec    Troponin T, High Sensitivity Result: 21 ng/L (06-01-25 @ 15:54)  Troponin T, High Sensitivity Result: 28 ng/L (06-01-25 @ 11:07)    Urinalysis Basic - ( 02 Jun 2025 05:49 )    Color: x / Appearance: x / SG: x / pH: x  Gluc: 70 mg/dL / Ketone: x  / Bili: x / Urobili: x   Blood: x / Protein: x / Nitrite: x   Leuk Esterase: x / RBC: x / WBC x   Sq Epi: x / Non Sq Epi: x / Bacteria: x          Urinalysis with Rflx Culture (collected 01 Jun 2025 21:57)      IMAGING
SUBJECTIVE/OVERNIGHT EVENTS  Today is hospital day 2d. This morning patient was seen and examined at bedside, resting comfortably in bed. No acute or major events overnight.    HOSPITAL COURSE  Day 1:   Day 2:   Day 3:     CODE STATUS:    FAMILY COMMUNICATION  Contact date:  Name of person contacted:  Relationship to patient:  Communication details:    MEDICATIONS  STANDING MEDICATIONS  albuterol/ipratropium for Nebulization 3 milliLiter(s) Nebulizer every 6 hours  albuterol/ipratropium for Nebulization 3 milliLiter(s) Nebulizer every 6 hours  amLODIPine   Tablet 2.5 milliGRAM(s) Oral daily  apixaban 5 milliGRAM(s) Oral two times a day  bisacodyl Suppository 10 milliGRAM(s) Rectal daily  clopidogrel Tablet 75 milliGRAM(s) Oral daily  doxycycline IVPB 100 milliGRAM(s) IV Intermittent every 12 hours  famotidine    Tablet 20 milliGRAM(s) Oral daily  fluticasone propionate/ salmeterol 100-50 MICROgram(s) Diskus 1 Dose(s) Inhalation two times a day  hydrALAZINE 25 milliGRAM(s) Oral three times a day  isosorbide   dinitrate Tablet (ISORDIL) 10 milliGRAM(s) Oral three times a day  lactated ringers. 1000 milliLiter(s) IV Continuous <Continuous>  lactulose Syrup 15 Gram(s) Oral every 8 hours  latanoprost 0.005% Ophthalmic Solution 1 Drop(s) Both EYES at bedtime  levothyroxine 25 MICROGram(s) Oral daily  metoprolol succinate ER 25 milliGRAM(s) Oral daily  piperacillin/tazobactam IVPB.. 3.375 Gram(s) IV Intermittent every 8 hours  polyethylene glycol 3350 17 Gram(s) Oral two times a day  senna 2 Tablet(s) Oral at bedtime  sodium chloride 0.9% for Nebulization 3 milliLiter(s) Nebulizer every 12 hours    PRN MEDICATIONS  acetaminophen     Tablet .. 650 milliGRAM(s) Oral every 6 hours PRN  melatonin 3 milliGRAM(s) Oral at bedtime PRN    VITALS  T(F): 97.3 (06-03-25 @ 04:36), Max: 97.7 (06-02-25 @ 18:57)  HR: 69 (06-03-25 @ 04:36) (60 - 71)  BP: 163/68 (06-03-25 @ 04:36) (123/63 - 166/66)  RR: 18 (06-03-25 @ 04:36) (18 - 18)  SpO2: 95% (06-03-25 @ 04:36) (95% - 96%)    Physical Exam:   GENERAL: NAD, lying in bed comfortably  HEAD:  Atraumatic, normocephalic  EYES: EOMI, PERRL  NECK: Supple, trachea midline, no JVD  HEART: Regular rate and rhythm  LUNGS: Unlabored respirations.  Clear to auscultation bilaterally, no crackles, wheezing, or rhonchi  ABDOMEN: Soft, nontender, nondistended, +BS  EXTREMITIES: 2+ peripheral pulses bilaterally. No clubbing, cyanosis, or edema  NERVOUS SYSTEM:  A&Ox3, moving all extremities, no focal deficits         >>> <<<(  ) Indwelling Martines Catheter   Date insterted:    Reason (  ) Critical illness     (  ) urinary retention    (  ) Accurate Ins/Outs Monitoring     (  ) CMO patient    (  ) Central Line  Date inserted:  Location: (  ) Right IJ   (  ) Left IJ   (  ) Right Fem   (  ) Left Fem    (  ) SPC  (  ) pigtail  (  ) PEG tube  (  ) colostomy  (  ) jejunostomy  (  ) U-Dall    LABS             11.7   10.69 )-----------( 257      ( 06-02-25 @ 05:49 )             37.6     140  |  99  |  9   -------------------------<  70   06-02-25 @ 05:49  3.7  |  30  |  0.6    Ca      9.3     06-02-25 @ 05:49  Mg     1.9     06-02-25 @ 05:49    TPro  5.9  /  Alb  3.4  /  TBili  0.5  /  DBili  x   /  AST  18  /  ALT  14  /  AlkPhos  82  /  GGT  x     06-02-25 @ 05:49    PT/INR - ( 06-01-25 @ 11:07 )   PT: 16.10 sec[H];   INR: 1.36 ratio[H]  PTT - ( 06-01-25 @ 11:07 )  PTT:40.3 sec    Troponin T, High Sensitivity Result: 21 ng/L (06-01-25 @ 15:54)  Troponin T, High Sensitivity Result: 28 ng/L (06-01-25 @ 11:07)    Urinalysis Basic - ( 02 Jun 2025 05:49 )    Color: x / Appearance: x / SG: x / pH: x  Gluc: 70 mg/dL / Ketone: x  / Bili: x / Urobili: x   Blood: x / Protein: x / Nitrite: x   Leuk Esterase: x / RBC: x / WBC x   Sq Epi: x / Non Sq Epi: x / Bacteria: x          Urinalysis with Rflx Culture (collected 01 Jun 2025 21:57)    Culture - Blood (collected 01 Jun 2025 11:12)  Source: Blood Blood-Peripheral  Preliminary Report (02 Jun 2025 20:02):    No growth at 24 hours    Culture - Blood (collected 01 Jun 2025 11:12)  Source: Blood Blood-Peripheral  Preliminary Report (02 Jun 2025 20:02):    No growth at 24 hours      IMAGING
SUBJECTIVE/OVERNIGHT EVENTS  Today is hospital day 3d. This morning patient was seen and examined at bedside, resting comfortably in bed. No acute or major events overnight.    HOSPITAL COURSE  Day 1:   Day 2:   Day 3:     CODE STATUS:    FAMILY COMMUNICATION  Contact date:  Name of person contacted:  Relationship to patient:  Communication details:    MEDICATIONS  STANDING MEDICATIONS  albuterol/ipratropium for Nebulization 3 milliLiter(s) Nebulizer every 6 hours  albuterol/ipratropium for Nebulization 3 milliLiter(s) Nebulizer every 6 hours  amLODIPine   Tablet 2.5 milliGRAM(s) Oral daily  apixaban 5 milliGRAM(s) Oral two times a day  chlorhexidine 2% Cloths 1 Application(s) Topical daily  clopidogrel Tablet 75 milliGRAM(s) Oral daily  doxycycline IVPB 100 milliGRAM(s) IV Intermittent every 12 hours  famotidine    Tablet 20 milliGRAM(s) Oral daily  fluticasone propionate/ salmeterol 100-50 MICROgram(s) Diskus 1 Dose(s) Inhalation two times a day  hydrALAZINE 25 milliGRAM(s) Oral three times a day  isosorbide   dinitrate Tablet (ISORDIL) 10 milliGRAM(s) Oral three times a day  lactated ringers. 1000 milliLiter(s) IV Continuous <Continuous>  latanoprost 0.005% Ophthalmic Solution 1 Drop(s) Both EYES at bedtime  levothyroxine 25 MICROGram(s) Oral daily  metoprolol succinate ER 25 milliGRAM(s) Oral daily  piperacillin/tazobactam IVPB.. 3.375 Gram(s) IV Intermittent every 8 hours  polyethylene glycol 3350 17 Gram(s) Oral two times a day  senna 2 Tablet(s) Oral at bedtime  sodium chloride 0.9% for Nebulization 3 milliLiter(s) Nebulizer every 12 hours    PRN MEDICATIONS  acetaminophen     Tablet .. 650 milliGRAM(s) Oral every 6 hours PRN  melatonin 3 milliGRAM(s) Oral at bedtime PRN    VITALS  T(F): 97.4 (06-04-25 @ 05:08), Max: 97.6 (06-03-25 @ 12:31)  HR: 57 (06-04-25 @ 05:08) (57 - 64)  BP: 154/63 (06-04-25 @ 05:08) (125/69 - 158/89)  RR: 18 (06-04-25 @ 05:08) (18 - 18)  SpO2: 96% (06-04-25 @ 05:08) (95% - 100%)    Physical Exam:   GENERAL: NAD, lying in bed comfortably  HEAD:  Atraumatic, normocephalic  EYES: EOMI, PERRL  NECK: Supple, trachea midline, no JVD  HEART: Regular rate and rhythm  LUNGS: Unlabored respirations.  Clear to auscultation bilaterally, no crackles, wheezing, or rhonchi  ABDOMEN: Soft, nontender, nondistended, +BS  EXTREMITIES: 2+ peripheral pulses bilaterally. No clubbing, cyanosis, or edema  NERVOUS SYSTEM:  A&Ox3, moving all extremities, no focal deficits       LABS             12.5   10.03 )-----------( 293      ( 06-04-25 @ 07:59 )             39.0     141  |  101  |  15  -------------------------<  96   06-04-25 @ 07:59  3.4  |  30  |  0.7    Ca      9.3     06-04-25 @ 07:59  Mg     1.8     06-04-25 @ 07:59        Troponin T, High Sensitivity Result: 21 ng/L (06-01-25 @ 15:54)  Troponin T, High Sensitivity Result: 28 ng/L (06-01-25 @ 11:07)    Urinalysis Basic - ( 04 Jun 2025 07:59 )    Color: x / Appearance: x / SG: x / pH: x  Gluc: 96 mg/dL / Ketone: x  / Bili: x / Urobili: x   Blood: x / Protein: x / Nitrite: x   Leuk Esterase: x / RBC: x / WBC x   Sq Epi: x / Non Sq Epi: x / Bacteria: x          Culture - Sputum (collected 03 Jun 2025 17:15)  Source: Sputum Sputum  Gram Stain (04 Jun 2025 07:22):    Numerous polymorphonuclear leukocytes per low power field    No Squamous epithelial cells per low power field    Numerous Yeast like cells seen per oil power field    Rare Gram Negative Rods seen per oil power field    Rare Gram Positive Rods seen per oil power field    Urinalysis with Rflx Culture (collected 01 Jun 2025 21:57)    Culture - Blood (collected 01 Jun 2025 11:12)  Source: Blood Blood-Peripheral  Preliminary Report (03 Jun 2025 20:01):    No growth at 48 Hours    Culture - Blood (collected 01 Jun 2025 11:12)  Source: Blood Blood-Peripheral  Preliminary Report (03 Jun 2025 20:01):    No growth at 48 Hours      IMAGING
  CHIRAG HERNÁNDEZ  89y, Female    All available historical data reviewed    OVERNIGHT EVENTS:  no fevers    ROS:  unable to obtain history secondary to patient's mental status   confused    VITALS:  T(F): 97.6, Max: 97.7 (06-02-25 @ 18:57)  HR: 64  BP: 158/89  RR: 18Vital Signs Last 24 Hrs  T(C): 36.4 (03 Jun 2025 12:31), Max: 36.5 (02 Jun 2025 18:57)  T(F): 97.6 (03 Jun 2025 12:31), Max: 97.7 (02 Jun 2025 18:57)  HR: 64 (03 Jun 2025 12:31) (60 - 71)  BP: 158/89 (03 Jun 2025 12:31) (123/63 - 166/66)  BP(mean): 100 (02 Jun 2025 21:30) (100 - 100)  RR: 18 (03 Jun 2025 12:31) (18 - 18)  SpO2: 95% (03 Jun 2025 12:31) (95% - 96%)    Parameters below as of 03 Jun 2025 12:31  Patient On (Oxygen Delivery Method): nasal cannula        TESTS & MEASUREMENTS:                        11.7   10.69 )-----------( 257      ( 02 Jun 2025 05:49 )             37.6     06-02    140  |  99  |  9[L]  ----------------------------<  70  3.7   |  30  |  0.6[L]    Ca    9.3      02 Jun 2025 05:49  Mg     1.9     06-02    TPro  5.9[L]  /  Alb  3.4[L]  /  TBili  0.5  /  DBili  x   /  AST  18  /  ALT  14  /  AlkPhos  82  06-02    LIVER FUNCTIONS - ( 02 Jun 2025 05:49 )  Alb: 3.4 g/dL / Pro: 5.9 g/dL / ALK PHOS: 82 U/L / ALT: 14 U/L / AST: 18 U/L / GGT: x             Urinalysis with Rflx Culture (collected 06-01-25 @ 21:57)    Culture - Blood (collected 06-01-25 @ 11:12)  Source: Blood Blood-Peripheral  Preliminary Report (06-02-25 @ 20:02):    No growth at 24 hours    Culture - Blood (collected 06-01-25 @ 11:12)  Source: Blood Blood-Peripheral  Preliminary Report (06-02-25 @ 20:02):    No growth at 24 hours      Urinalysis Basic - ( 02 Jun 2025 05:49 )    Color: x / Appearance: x / SG: x / pH: x  Gluc: 70 mg/dL / Ketone: x  / Bili: x / Urobili: x   Blood: x / Protein: x / Nitrite: x   Leuk Esterase: x / RBC: x / WBC x   Sq Epi: x / Non Sq Epi: x / Bacteria: x          Social History:  Tobacco Use: No  Alcohol Use: No  Drug Use: No    RADIOLOGY & ADDITIONAL TESTS:  Personal review of radiological diagnostics performed  Echo and EKG results noted when applicable.     MEDICATIONS:  acetaminophen     Tablet .. 650 milliGRAM(s) Oral every 6 hours PRN  albuterol/ipratropium for Nebulization 3 milliLiter(s) Nebulizer every 6 hours  albuterol/ipratropium for Nebulization 3 milliLiter(s) Nebulizer every 6 hours  amLODIPine   Tablet 2.5 milliGRAM(s) Oral daily  apixaban 5 milliGRAM(s) Oral two times a day  clopidogrel Tablet 75 milliGRAM(s) Oral daily  doxycycline IVPB 100 milliGRAM(s) IV Intermittent every 12 hours  famotidine    Tablet 20 milliGRAM(s) Oral daily  fluticasone propionate/ salmeterol 100-50 MICROgram(s) Diskus 1 Dose(s) Inhalation two times a day  hydrALAZINE 25 milliGRAM(s) Oral three times a day  isosorbide   dinitrate Tablet (ISORDIL) 10 milliGRAM(s) Oral three times a day  lactated ringers. 1000 milliLiter(s) IV Continuous <Continuous>  lactulose Syrup 15 Gram(s) Oral every 8 hours  latanoprost 0.005% Ophthalmic Solution 1 Drop(s) Both EYES at bedtime  levothyroxine 25 MICROGram(s) Oral daily  melatonin 3 milliGRAM(s) Oral at bedtime PRN  metoprolol succinate ER 25 milliGRAM(s) Oral daily  piperacillin/tazobactam IVPB.. 3.375 Gram(s) IV Intermittent every 8 hours  polyethylene glycol 3350 17 Gram(s) Oral two times a day  senna 2 Tablet(s) Oral at bedtime  sodium chloride 0.9% for Nebulization 3 milliLiter(s) Nebulizer every 12 hours      ANTIBIOTICS:  doxycycline IVPB 100 milliGRAM(s) IV Intermittent every 12 hours  piperacillin/tazobactam IVPB.. 3.375 Gram(s) IV Intermittent every 8 hours    
  CHIRAG HERNÁNDEZ  89y, Female    All available historical data reviewed    OVERNIGHT EVENTS:  none    ROS:  unable to obtain history secondary to patient's mental status  not reliable    VITALS:  T(F): 97.3, Max: 97.4 (06-04-25 @ 05:08)  HR: 57  BP: 115/54  RR: 18Vital Signs Last 24 Hrs  T(C): 36.3 (04 Jun 2025 12:29), Max: 36.3 (04 Jun 2025 05:08)  T(F): 97.3 (04 Jun 2025 12:29), Max: 97.4 (04 Jun 2025 05:08)  HR: 57 (04 Jun 2025 14:21) (57 - 60)  BP: 115/54 (04 Jun 2025 14:21) (112/52 - 154/63)  BP(mean): --  RR: 18 (04 Jun 2025 12:29) (18 - 18)  SpO2: 94% (04 Jun 2025 12:29) (94% - 100%)    Parameters below as of 03 Jun 2025 20:10  Patient On (Oxygen Delivery Method): nasal cannula  O2 Flow (L/min): 2      TESTS & MEASUREMENTS:                        12.5   10.03 )-----------( 293      ( 04 Jun 2025 07:59 )             39.0     06-04    141  |  101  |  15  ----------------------------<  96  3.4[L]   |  30  |  0.7    Ca    9.3      04 Jun 2025 07:59  Mg     1.8     06-04          Culture - Acid Fast - Sputum w/Smear (collected 06-03-25 @ 17:15)  Source: Sputum Sputum    Culture - Sputum (collected 06-03-25 @ 17:15)  Source: Sputum Sputum  Gram Stain (06-04-25 @ 07:22):    Numerous polymorphonuclear leukocytes per low power field    No Squamous epithelial cells per low power field    Numerous Yeast like cells seen per oil power field    Rare Gram Negative Rods seen per oil power field    Rare Gram Positive Rods seen per oil power field  Preliminary Report (06-04-25 @ 16:11):    Commensal ravi consistent with body site    Urinalysis with Rflx Culture (collected 06-01-25 @ 21:57)    Culture - Blood (collected 06-01-25 @ 11:12)  Source: Blood Blood-Peripheral  Preliminary Report (06-03-25 @ 20:01):    No growth at 48 Hours    Culture - Blood (collected 06-01-25 @ 11:12)  Source: Blood Blood-Peripheral  Preliminary Report (06-03-25 @ 20:01):    No growth at 48 Hours      Urinalysis Basic - ( 04 Jun 2025 07:59 )    Color: x / Appearance: x / SG: x / pH: x  Gluc: 96 mg/dL / Ketone: x  / Bili: x / Urobili: x   Blood: x / Protein: x / Nitrite: x   Leuk Esterase: x / RBC: x / WBC x   Sq Epi: x / Non Sq Epi: x / Bacteria: x          Social History:  Tobacco Use: No  Alcohol Use: No  Drug Use: No    RADIOLOGY & ADDITIONAL TESTS:  Personal review of radiological diagnostics performed  Echo and EKG results noted when applicable.     MEDICATIONS:  acetaminophen     Tablet .. 650 milliGRAM(s) Oral every 6 hours PRN  albuterol/ipratropium for Nebulization 3 milliLiter(s) Nebulizer every 6 hours  albuterol/ipratropium for Nebulization 3 milliLiter(s) Nebulizer every 6 hours  amLODIPine   Tablet 2.5 milliGRAM(s) Oral daily  apixaban 5 milliGRAM(s) Oral two times a day  chlorhexidine 2% Cloths 1 Application(s) Topical daily  clopidogrel Tablet 75 milliGRAM(s) Oral daily  doxycycline IVPB 100 milliGRAM(s) IV Intermittent every 12 hours  famotidine    Tablet 20 milliGRAM(s) Oral daily  fluticasone propionate/ salmeterol 100-50 MICROgram(s) Diskus 1 Dose(s) Inhalation two times a day  hydrALAZINE 25 milliGRAM(s) Oral three times a day  isosorbide   dinitrate Tablet (ISORDIL) 10 milliGRAM(s) Oral three times a day  lactated ringers. 1000 milliLiter(s) IV Continuous <Continuous>  latanoprost 0.005% Ophthalmic Solution 1 Drop(s) Both EYES at bedtime  levothyroxine 25 MICROGram(s) Oral daily  melatonin 3 milliGRAM(s) Oral at bedtime PRN  metoprolol succinate ER 25 milliGRAM(s) Oral daily  piperacillin/tazobactam IVPB.. 3.375 Gram(s) IV Intermittent every 8 hours  polyethylene glycol 3350 17 Gram(s) Oral two times a day  potassium chloride   Powder 40 milliEquivalent(s) Oral once  senna 2 Tablet(s) Oral at bedtime  sodium chloride 0.9% for Nebulization 3 milliLiter(s) Nebulizer every 12 hours      ANTIBIOTICS:  doxycycline IVPB 100 milliGRAM(s) IV Intermittent every 12 hours  piperacillin/tazobactam IVPB.. 3.375 Gram(s) IV Intermittent every 8 hours

## 2025-06-04 NOTE — DIETITIAN INITIAL EVALUATION ADULT - NSFNSGIASSESSMENTFT_GEN_A_CORE
no n/v/d/c.   Last Bowel Movement: 01-Jun-2025 (06-03-25 @ 08:00)  Last Bowel Movement: 01-Jun-2025 (06-02-25 @ 20:30)

## 2025-06-04 NOTE — PROGRESS NOTE ADULT - TIME BILLING
I have personally seen and examined this patient. I have reviewed all pertinent clinical information and reviewed all relevant imaging ( and noted the impression from the Radiologist ) and diagnostic studies personally. I counseled the patient about the diagnostic testing and treatment plan. I discussed my recommendations with the primary team. Time spent teaching was excluded.
I have personally seen and examined this patient. I have reviewed all pertinent clinical information and reviewed all relevant imaging ( and noted the impression from the Radiologist ) and diagnostic studies personally. I counseled the patient about the diagnostic testing and treatment plan. I discussed my recommendations with the primary team. Time spent teaching was excluded.
time spent on review of labs, imaging studies, old records, obtaining history, personally examining patient, counselling and communicating with patient, entering orders for medications/tests/etc, discussions with other health care providers, documentation in electronic health records, independent interpretation of labs, imaging/procedure results and care coordination.

## 2025-06-04 NOTE — SWALLOW BEDSIDE ASSESSMENT ADULT - CONSISTENCIES ADMINISTERED
3 oz water, 3 oz mildly thick apple juice, 1 oz soft & bite size solids/thin liquid/mildly thick/soft & bite-sized

## 2025-06-04 NOTE — DIETITIAN INITIAL EVALUATION ADULT - PERTINENT MEDS FT
MEDICATIONS  (STANDING):  albuterol/ipratropium for Nebulization 3 milliLiter(s) Nebulizer every 6 hours  albuterol/ipratropium for Nebulization 3 milliLiter(s) Nebulizer every 6 hours  amLODIPine   Tablet 2.5 milliGRAM(s) Oral daily  apixaban 5 milliGRAM(s) Oral two times a day  chlorhexidine 2% Cloths 1 Application(s) Topical daily  clopidogrel Tablet 75 milliGRAM(s) Oral daily  doxycycline IVPB 100 milliGRAM(s) IV Intermittent every 12 hours  famotidine    Tablet 20 milliGRAM(s) Oral daily  fluticasone propionate/ salmeterol 100-50 MICROgram(s) Diskus 1 Dose(s) Inhalation two times a day  hydrALAZINE 25 milliGRAM(s) Oral three times a day  isosorbide   dinitrate Tablet (ISORDIL) 10 milliGRAM(s) Oral three times a day  lactated ringers. 1000 milliLiter(s) (65 mL/Hr) IV Continuous <Continuous>  latanoprost 0.005% Ophthalmic Solution 1 Drop(s) Both EYES at bedtime  levothyroxine 25 MICROGram(s) Oral daily  metoprolol succinate ER 25 milliGRAM(s) Oral daily  piperacillin/tazobactam IVPB.. 3.375 Gram(s) IV Intermittent every 8 hours  polyethylene glycol 3350 17 Gram(s) Oral two times a day  potassium chloride   Powder 40 milliEquivalent(s) Oral once  senna 2 Tablet(s) Oral at bedtime  sodium chloride 0.9% for Nebulization 3 milliLiter(s) Nebulizer every 12 hours    MEDICATIONS  (PRN):  acetaminophen     Tablet .. 650 milliGRAM(s) Oral every 6 hours PRN Temp greater or equal to 38C (100.4F), Mild Pain (1 - 3)  melatonin 3 milliGRAM(s) Oral at bedtime PRN Insomnia

## 2025-06-04 NOTE — DIETITIAN INITIAL EVALUATION ADULT - ENERGY INTAKE
Poor (<50%) Pt reports her food intake is  better at SNF where she enjoys the food. Pt reports personal food preferences contribute to poor po intake.

## 2025-06-04 NOTE — DIETITIAN INITIAL EVALUATION ADULT - ADD RECOMMEND
Moderate Risk     Interventions: Coordination of care, meals, and nutritional supplements  Monitoring/Evaluation: Weights, labs, PO intake, nutrition-focused physical findings, tolerance of nutritional supplements.  1. Continue current diet  2. Add nutritional supplement - Ensure Plus HP 2x/day (350 kcal, 20 g protein per serving) mildly thickened.   3. Encourage PO intake & assist PRN

## 2025-06-04 NOTE — DIETITIAN INITIAL EVALUATION ADULT - EDUCATION DIETARY MODIFICATIONS
14-Jan-2021 Encouraged improved dietary intake and hydration. Described benefits of oral supplement./(1) partially meets; needs review/practice/verbalization

## 2025-06-04 NOTE — DIETITIAN INITIAL EVALUATION ADULT - OTHER INFO
Pt 89 y/o female admitted with PNA  #Acute resp failure 2/2 PNA   #Chronic bronchiectasis   #Severe sepsis  - Hemodynamically stable now, on baseline O2  #Constipation  - On bowel regimen                Per SLP: Dx Mild-mod oral dysphagia w/soft solids. + overt s/s w/ thin liquids and soft solids. Toleration observed for mildly thick liquids.  Recommends: Minced and most w/ mildly thick liquids

## 2025-06-04 NOTE — DIETITIAN INITIAL EVALUATION ADULT - ORAL INTAKE PTA/DIET HISTORY
Pt transferred from [facility] reports a healthy desire to eat usually, pt reports receiving 3 meals/day, but not sure how much of each she eats. Pt reports receiving oral supplements at [facility], but not sure of which kind or how many. Pt reports having allergies to fish and is lactose intolerant. Pt reports usual body wt is 63.6kg not sure how long ago, current wt 64.1 kg, no significant wt loss or gain

## 2025-06-04 NOTE — DIETITIAN INITIAL EVALUATION ADULT - NS FNS DIET ORDER
Diet, Minced and Moist:   DASH/TLC {Sodium & Cholesterol Restricted} (DASH)  Mildly Thick Liquids (MILDTHICKLIQS) (06-02-25 @ 13:38) [Active]

## 2025-06-04 NOTE — DIETITIAN INITIAL EVALUATION ADULT - PERTINENT LABORATORY DATA
06-04    141  |  101  |  15  ----------------------------<  96  3.4[L]   |  30  |  0.7    Ca    9.3      04 Jun 2025 07:59  Mg     1.8     06-04

## 2025-06-04 NOTE — PROGRESS NOTE ADULT - ASSESSMENT
89-year-old female with a PMHx of COPD, Bronchiectasis(on baseline home O2 at 2 L), CAD, PE on Eliquis, hypertension and hyperlipidemia presents to the emergency department from nursing home complaining of increasing shortness of breath, cough and increasing oxygen requirements.      #Acute resp failure 2/2 suspected gram neg pneumonia   #Chronic bronchiectasis   #?Possible aspiration  #Recent  Hospitalization for Rhinovirus infection ( was discharge May/30/25)  #Severe sepsis(+fever at home, +WBC, +Lactate)  - Hemodynamically stable now, on baseline O2  - CTchest noted  - s/p Vanc, cefepime and Levaquin  - c/w Zosyn for now  lactate and trop trended down , EKG no significant change from prior   - recent MRSA, Strep & Legionella Ag negative  - RVP negative  -  Procal 0.05, MRSA neg, sputum Cx pending, Bld cx NGTD, urine strep and legionella neg  - fu NTM/ MAC Send sputum AFB and cultures  - Chest PT for airway clearance  - c/w duonebs q4 or prn, saline nebs, Symbicort  - ID and pulmonary consult  - c/w aspiration precautions; Diet per S&S; failed yesterday; fu today    #Constipation  - last BM 1 week ago  - KUB non-obstructive   - c/w bowel regimen-senna, miralax BID, dulcolax prn    #HTN  #HLD  #CAD  - c/w amlodipine, Hydralazine  - c/w Plavix, Metoprolol, Imdur    #Hx of PE  - c/w Eliquis    #Glaucoma  - c/w latanoprost    #Misc  -DVT prophylaxis: Eliquis  -GI prophylaxis: Famotidine  -Diet: Soft &bite; needs 1:1 feeds  -Code status: DNR/trial of intubation and NIV  -Activity: AAT  -Dispo: Med, RAJESH    Provider handoff: fu cultures 89-year-old female with a PMHx of COPD, Bronchiectasis(on baseline home O2 at 2 L), CAD, PE on Eliquis, hypertension and hyperlipidemia presents to the emergency department from nursing home complaining of increasing shortness of breath, cough and increasing oxygen requirements.    #Acute resp failure 2/2 suspected gram neg pneumonia   #Chronic bronchiectasis   #?Possible aspiration  #Recent  Hospitalization for Rhinovirus infection ( was discharge May/30/25)  #Severe sepsis(+fever at home, +WBC, +Lactate)  - Hemodynamically stable now, on baseline O2  - CTchest noted  - s/p Vanc, cefepime and Levaquin  - c/w Zosyn for now  lactate and trop trended down , EKG no significant change from prior   - recent MRSA, Strep & Legionella Ag negative  - RVP negative  -  Procal 0.05, MRSA neg, sputum Cx pending, Bld cx NGTD, urine strep and legionella neg  - fu NTM/ MAC Send sputum AFB and cultures  - Chest PT for airway clearance  - c/w duonebs q4 or prn, saline nebs, Symbicort  - ID and pulmonary consult  - c/w aspiration precautions; Diet per S&S; failed yesterday; fu today    #Constipation  - last BM 1 week ago  - KUB non-obstructive   - c/w bowel regimen-senna, miralax BID, dulcolax prn    #HTN  #HLD  #CAD  - c/w amlodipine, Hydralazine  - c/w Plavix, Metoprolol, Imdur    #Hx of PE  - c/w Eliquis    #Glaucoma  - c/w latanoprost    #Misc  -DVT prophylaxis: Eliquis  -GI prophylaxis: Famotidine  -Diet: Soft &bite; needs 1:1 feeds  -Code status: DNR/trial of intubation and NIV  -Activity: AAT  -Dispo: Med, RAJESH    Provider handoff: fu cultures

## 2025-06-04 NOTE — PROGRESS NOTE ADULT - ASSESSMENT
· Assessment	  89-year-old female with a PMHx of COPD, Bronchiectasis(on baseline home O2 at 2 L), CAD, PE on Eliquis, hypertension and hyperlipidemia presents to the emergency department from nursing home complaining of increasing shortness of breath and increasing oxygen requirements. Patient states that it has become more difficult for her to breathe and she is producing increased amounts of sputum. Patient reports pain in her chest when she coughs. Denies chest pain at rest.  Patient states that she had a fever this morning. Also, reports constipation. Denies abdominal pain, dysuria, decreased appetite, nausea, vomiting. She was recently admitted on 5/26 for Bronchiectasis exacerbation and was discharged on 5/30 on Prednisone course.     IMPRESSION/RECOMMENDATIONS  Immunosuppression/Immunosenescence ( above age 60 yrs there is a exponential decline in immunity which could result in poor clinical outcomes.   PNA : lower lobes  6/3 CXR Bibasilar opacities. ( Independent interpretation of test : bacterial PNA  6/1 CT chest: Compared to 2023. ( Independent interpretation of test : bacterial PNA  Interval increase in bronchiectasis with mucoid impaction now throughout all lobes. Additional consolidative opacities are noted in the lower lobes likely reflecting pneumonia.  6/1 BCX NG  6/1 KUB: non-obstructive bowel gas pattern  6/1 RVP NG  6/3 Sputum cultures NG  No pyuria  5/26 BCx NG  5/26 Entero/ Rhinovirus positive.  5/26 Nares ORSA NG  WBC 10.0    COPD  Bronchiectasis(on baseline home O2 at 2 L)  CAD  PE on Eliquis  hypertension  hyperlipidemia    -Off loading to prevent pressure sores and preventive measures to avoid aspiration  -continue with Zosyn 3.375 gm iv q8h over 4h  -continue with  Doxycycline 100 mg iv q12h  -could hold Abx on 6/5    Discussion of management/test results( independently interpretated by me ) /antibiotic regimen  with external/primary medical team. Dr Perez

## 2025-06-04 NOTE — PROGRESS NOTE ADULT - GASTROINTESTINAL
normal/soft/nontender/nondistended/normal active bowel sounds
soft/nontender/nondistended/no guarding/no rigidity

## 2025-06-05 ENCOUNTER — TRANSCRIPTION ENCOUNTER (OUTPATIENT)
Age: 89
End: 2025-06-05

## 2025-06-05 VITALS
DIASTOLIC BLOOD PRESSURE: 52 MMHG | RESPIRATION RATE: 18 BRPM | SYSTOLIC BLOOD PRESSURE: 125 MMHG | OXYGEN SATURATION: 98 % | HEART RATE: 63 BPM | TEMPERATURE: 98 F

## 2025-06-05 LAB
ANION GAP SERPL CALC-SCNC: 9 MMOL/L — SIGNIFICANT CHANGE UP (ref 7–14)
BASOPHILS # BLD AUTO: 0.08 K/UL — SIGNIFICANT CHANGE UP (ref 0–0.2)
BASOPHILS NFR BLD AUTO: 0.8 % — SIGNIFICANT CHANGE UP (ref 0–1)
BUN SERPL-MCNC: 15 MG/DL — SIGNIFICANT CHANGE UP (ref 10–20)
CALCIUM SERPL-MCNC: 8.9 MG/DL — SIGNIFICANT CHANGE UP (ref 8.4–10.5)
CHLORIDE SERPL-SCNC: 102 MMOL/L — SIGNIFICANT CHANGE UP (ref 98–110)
CO2 SERPL-SCNC: 29 MMOL/L — SIGNIFICANT CHANGE UP (ref 17–32)
CREAT SERPL-MCNC: 0.8 MG/DL — SIGNIFICANT CHANGE UP (ref 0.7–1.5)
CULTURE RESULTS: ABNORMAL
EGFR: 66 ML/MIN/1.73M2 — SIGNIFICANT CHANGE UP
EGFR: 66 ML/MIN/1.73M2 — SIGNIFICANT CHANGE UP
EOSINOPHIL # BLD AUTO: 0.49 K/UL — SIGNIFICANT CHANGE UP (ref 0–0.7)
EOSINOPHIL NFR BLD AUTO: 4.8 % — SIGNIFICANT CHANGE UP (ref 0–8)
GLUCOSE SERPL-MCNC: 90 MG/DL — SIGNIFICANT CHANGE UP (ref 70–99)
HCT VFR BLD CALC: 35.9 % — LOW (ref 37–47)
HGB BLD-MCNC: 11.6 G/DL — LOW (ref 12–16)
IMM GRANULOCYTES NFR BLD AUTO: 1.2 % — HIGH (ref 0.1–0.3)
LYMPHOCYTES # BLD AUTO: 1.81 K/UL — SIGNIFICANT CHANGE UP (ref 1.2–3.4)
LYMPHOCYTES # BLD AUTO: 17.7 % — LOW (ref 20.5–51.1)
MAGNESIUM SERPL-MCNC: 1.9 MG/DL — SIGNIFICANT CHANGE UP (ref 1.8–2.4)
MCHC RBC-ENTMCNC: 30.9 PG — SIGNIFICANT CHANGE UP (ref 27–31)
MCHC RBC-ENTMCNC: 32.3 G/DL — SIGNIFICANT CHANGE UP (ref 32–37)
MCV RBC AUTO: 95.7 FL — SIGNIFICANT CHANGE UP (ref 81–99)
MONOCYTES # BLD AUTO: 0.9 K/UL — HIGH (ref 0.1–0.6)
MONOCYTES NFR BLD AUTO: 8.8 % — SIGNIFICANT CHANGE UP (ref 1.7–9.3)
NEUTROPHILS # BLD AUTO: 6.82 K/UL — HIGH (ref 1.4–6.5)
NEUTROPHILS NFR BLD AUTO: 66.7 % — SIGNIFICANT CHANGE UP (ref 42.2–75.2)
NRBC BLD AUTO-RTO: 0 /100 WBCS — SIGNIFICANT CHANGE UP (ref 0–0)
PLATELET # BLD AUTO: 284 K/UL — SIGNIFICANT CHANGE UP (ref 130–400)
PMV BLD: 11.4 FL — HIGH (ref 7.4–10.4)
POTASSIUM SERPL-MCNC: 3.3 MMOL/L — LOW (ref 3.5–5)
POTASSIUM SERPL-SCNC: 3.3 MMOL/L — LOW (ref 3.5–5)
RBC # BLD: 3.75 M/UL — LOW (ref 4.2–5.4)
RBC # FLD: 13 % — SIGNIFICANT CHANGE UP (ref 11.5–14.5)
SODIUM SERPL-SCNC: 140 MMOL/L — SIGNIFICANT CHANGE UP (ref 135–146)
SPECIMEN SOURCE: SIGNIFICANT CHANGE UP
WBC # BLD: 10.22 K/UL — SIGNIFICANT CHANGE UP (ref 4.8–10.8)
WBC # FLD AUTO: 10.22 K/UL — SIGNIFICANT CHANGE UP (ref 4.8–10.8)

## 2025-06-05 PROCEDURE — 99239 HOSP IP/OBS DSCHRG MGMT >30: CPT

## 2025-06-05 RX ADMIN — Medication 1 APPLICATION(S): at 12:23

## 2025-06-05 RX ADMIN — Medication 20 MILLIGRAM(S): at 12:22

## 2025-06-05 RX ADMIN — Medication 1 DOSE(S): at 05:39

## 2025-06-05 RX ADMIN — IPRATROPIUM BROMIDE AND ALBUTEROL SULFATE 3 MILLILITER(S): .5; 2.5 SOLUTION RESPIRATORY (INHALATION) at 13:12

## 2025-06-05 RX ADMIN — ISOSORBDIE DINITRATE 10 MILLIGRAM(S): 30 TABLET ORAL at 18:45

## 2025-06-05 RX ADMIN — Medication 25 MILLIGRAM(S): at 05:36

## 2025-06-05 RX ADMIN — Medication 25 MICROGRAM(S): at 05:36

## 2025-06-05 RX ADMIN — Medication 100 MILLIGRAM(S): at 05:35

## 2025-06-05 RX ADMIN — IPRATROPIUM BROMIDE AND ALBUTEROL SULFATE 3 MILLILITER(S): .5; 2.5 SOLUTION RESPIRATORY (INHALATION) at 08:18

## 2025-06-05 RX ADMIN — Medication 3 MILLILITER(S): at 08:19

## 2025-06-05 RX ADMIN — Medication 1 DOSE(S): at 08:57

## 2025-06-05 RX ADMIN — IPRATROPIUM BROMIDE AND ALBUTEROL SULFATE 3 MILLILITER(S): .5; 2.5 SOLUTION RESPIRATORY (INHALATION) at 01:23

## 2025-06-05 RX ADMIN — APIXABAN 5 MILLIGRAM(S): 2.5 TABLET, FILM COATED ORAL at 18:46

## 2025-06-05 RX ADMIN — APIXABAN 5 MILLIGRAM(S): 2.5 TABLET, FILM COATED ORAL at 05:36

## 2025-06-05 RX ADMIN — AMLODIPINE BESYLATE 2.5 MILLIGRAM(S): 10 TABLET ORAL at 05:36

## 2025-06-05 RX ADMIN — METOPROLOL SUCCINATE 25 MILLIGRAM(S): 50 TABLET, EXTENDED RELEASE ORAL at 05:36

## 2025-06-05 RX ADMIN — Medication 40 MILLIEQUIVALENT(S): at 15:42

## 2025-06-05 RX ADMIN — POLYETHYLENE GLYCOL 3350 17 GRAM(S): 17 POWDER, FOR SOLUTION ORAL at 18:47

## 2025-06-05 RX ADMIN — CLOPIDOGREL BISULFATE 75 MILLIGRAM(S): 75 TABLET, FILM COATED ORAL at 12:22

## 2025-06-05 RX ADMIN — ISOSORBDIE DINITRATE 10 MILLIGRAM(S): 30 TABLET ORAL at 05:36

## 2025-06-05 RX ADMIN — ISOSORBDIE DINITRATE 10 MILLIGRAM(S): 30 TABLET ORAL at 12:22

## 2025-06-05 RX ADMIN — Medication 25 MILLIGRAM(S): at 15:59

## 2025-06-05 RX ADMIN — IPRATROPIUM BROMIDE AND ALBUTEROL SULFATE 3 MILLILITER(S): .5; 2.5 SOLUTION RESPIRATORY (INHALATION) at 08:20

## 2025-06-05 RX ADMIN — Medication 25 GRAM(S): at 05:35

## 2025-06-05 NOTE — DISCHARGE NOTE NURSING/CASE MANAGEMENT/SOCIAL WORK - NSDCPEFALRISK_GEN_ALL_CORE
For information on Fall & Injury Prevention, visit: [email or web address] OR  [email or web address] OR  https://www.cdc.gov/steadi/patient.html

## 2025-06-05 NOTE — DISCHARGE NOTE PROVIDER - NSDCMRMEDTOKEN_GEN_ALL_CORE_FT
albuterol 90 mcg/inh inhalation aerosol: 2 puff(s) inhaled every 6 hours   amLODIPine 2.5 mg oral tablet: 1 tab(s) orally once a day  apixaban 5 mg oral tablet: 2 tab(s) orally every 12 hours from 9/3 to 9/10 then 1 tab every 12 hours thereafter  budesonide-formoterol 80 mcg-4.5 mcg/inh inhalation aerosol: 2 puff(s) inhaled 2 times a day   CLOPIDOGREL 75 MG TABLET: 1 each orally once a day  Colace 100 mg oral capsule: 1 cap(s) orally 2 times a day  hydrALAZINE 25 mg oral tablet: 1 tab(s) orally 3 times a day  ipratropium-albuterol 0.5 mg-2.5 mg/3 mL inhalation solution: 3 milliliter(s) by nebulizer 4 times a day  isosorbide dinitrate 10 mg oral tablet: 1 tab(s) orally once a day  lactulose 20 g oral powder for reconstitution: 1 packet(s) orally 2 times a day as needed for  constipation  latanoprost 0.005% ophthalmic solution: 1 drop(s) to each affected eye once a day (in the evening)  levothyroxine 25 mcg (0.025 mg) oral tablet: 1 tab(s) orally once a day  metoprolol succinate 25 mg oral tablet, extended release: 1  orally once a day  polyethylene glycol 3350 oral powder for reconstitution: 17 gram(s) orally once a day  predniSONE 20 mg oral tablet: 2 tab(s) orally once a day End date 6/2/2025  senna leaf extract oral tablet: 2 tab(s) orally once a day (at bedtime)   albuterol 90 mcg/inh inhalation aerosol: 2 puff(s) inhaled every 6 hours   amLODIPine 2.5 mg oral tablet: 1 tab(s) orally once a day  apixaban 5 mg oral tablet: 2 tab(s) orally every 12 hours from 9/3 to 9/10 then 1 tab every 12 hours thereafter  budesonide-formoterol 80 mcg-4.5 mcg/inh inhalation aerosol: 2 puff(s) inhaled 2 times a day   CLOPIDOGREL 75 MG TABLET: 1 each orally once a day  Colace 100 mg oral capsule: 1 cap(s) orally 2 times a day  hydrALAZINE 25 mg oral tablet: 1 tab(s) orally 3 times a day  ipratropium-albuterol 0.5 mg-2.5 mg/3 mL inhalation solution: 3 milliliter(s) by nebulizer 4 times a day  isosorbide dinitrate 10 mg oral tablet: 1 tab(s) orally once a day  lactulose 20 g oral powder for reconstitution: 1 packet(s) orally 2 times a day as needed for  constipation  latanoprost 0.005% ophthalmic solution: 1 drop(s) to each affected eye once a day (in the evening)  levothyroxine 25 mcg (0.025 mg) oral tablet: 1 tab(s) orally once a day  metoprolol succinate 25 mg oral tablet, extended release: 1  orally once a day  polyethylene glycol 3350 oral powder for reconstitution: 17 gram(s) orally once a day  senna leaf extract oral tablet: 2 tab(s) orally once a day (at bedtime)

## 2025-06-05 NOTE — SWALLOW BEDSIDE ASSESSMENT ADULT - ASR SWALLOW DENTITION
edentulous, does not have dentures
Lower edentulous, left dentures at NH
Lower edentulous, left dentures at NH
edentulous, does not have dentures

## 2025-06-05 NOTE — SWALLOW BEDSIDE ASSESSMENT ADULT - ASR SWALLOW ASPIRATION MONITOR
change of breathing pattern/cough/pneumonia/throat clearing
change of breathing pattern/cough/pneumonia/throat clearing
change of breathing pattern/cough/gurgly voice/fever/pneumonia/throat clearing/upper respiratory infection
change of breathing pattern/cough/gurgly voice/fever/pneumonia/throat clearing/upper respiratory infection

## 2025-06-05 NOTE — SWALLOW BEDSIDE ASSESSMENT ADULT - SWALLOW EVAL: PATIENT/FAMILY GOALS STATEMENT
Pt's daughter and son in law at bedside. Pt's daughter reported baseline diet of mildly thick liquids at nursing home.
Per family contact yesterday June 4th Pt's daughter and son in law at bedside. Pt's daughter reported baseline diet of mildly thick liquids at nursing home.

## 2025-06-05 NOTE — SWALLOW BEDSIDE ASSESSMENT ADULT - SWALLOW EVAL: CURRENT DIET
Minced & moist with mildly thick liquids
soft + bite sized consistency and thin liquids

## 2025-06-05 NOTE — SWALLOW BEDSIDE ASSESSMENT ADULT - COMMENTS
Known to SLP services. Recently seen during last admission 5/30 w/ recs for soft + bite sized consistency and thin liquids Immediate cough post small controlled sip of thin liquids

## 2025-06-05 NOTE — DISCHARGE NOTE NURSING/CASE MANAGEMENT/SOCIAL WORK - FINANCIAL ASSISTANCE
[facility] provides services at a reduced cost to those who are determined to be eligible through [facility]’s financial assistance program. Information regarding [facility]’s financial assistance program can be found by going to [email or web address] or by calling [phone].

## 2025-06-05 NOTE — SWALLOW BEDSIDE ASSESSMENT ADULT - SLP GENERAL OBSERVATIONS
Pt received awake, alert, 2L O2 via NC, AOx4.
Pt. received awake, alert, NC 2L, Ox2 (self and place)
Pt received awake but lethargic, slight right sided droop noted by SLP, RN made aware, generalized weakness/lethargy. 2L O2 via NC, AOx1. Mumbled speech.
Pt. received awake, alert, NC 2L, Ox2 (self and place)

## 2025-06-05 NOTE — DISCHARGE NOTE PROVIDER - NSDCDCMDCOMP_GEN_ALL_CORE
Chief Complaint   Patient presents with    Allergy Consult     Ref H01.119 (ICD-10-CM) - Contact dermatitis of eyelid, unspecified laterality     Afua Nielsen RN    
This document is complete and the patient is ready for discharge.

## 2025-06-05 NOTE — DISCHARGE NOTE PROVIDER - NSDCCPCAREPLAN_GEN_ALL_CORE_FT
PRINCIPAL DISCHARGE DIAGNOSIS  Diagnosis: Acute respiratory failure with hypercapnia  Assessment and Plan of Treatment: You were admitted for respiratory failure in the setting of COPD (explained below). You were found to have no infection and with nebulizer, you improved.    Chronic Obstructive Pulmonary Disease  Chronic obstructive pulmonary disease (COPD) is a lung condition in which airflow from the lungs is limited. Causes include smoking, secondhand smoke exposure, genetics, or recurrent infections. Take all medicines (inhaled or pills) as directed by your health care provider. Avoid exposure to irritants such as smoke, chemicals, and fumes that aggravate your breathing.  If you are a smoker, the most important thing that you can do is stop smoking. Continuing to smoke will cause further lung damage and breathing trouble. Ask your health care provider for help with quitting smoking.  SEEK IMMEDIATE MEDICAL CARE IF YOU HAVE ANY OF THE FOLLOWING SYMPTOMS: shortness of breath at rest or when talking, bluish discoloration of lips, skin, fever, worsening cough, unexplained chest pain, or lightheadedness/dizziness.

## 2025-06-05 NOTE — SWALLOW BEDSIDE ASSESSMENT ADULT - SWALLOW EVAL: DIAGNOSIS
Immediate overt s/s aspiration for thin liquids. Further trials not assess 2/2 to increased coughing post PO intake of small controlled cup sip of thin liquids.
baseline cough. overt s/s aspiration for thin liquids. +toleration observed without overt s/s of aspiration/penetration for puree, M&M, and mildly thick liquids
+ Immediate overt s/s w/ thin liquids. Toleration of minced & moist with mildly thick liquids w/o overt s/s of penetration/aspiration.
Mild-mod oral dysphagia w/soft solids. + overt s/s w/ thin liquids and soft solids. Toleration observed for mildly thick liquids.

## 2025-06-05 NOTE — SWALLOW BEDSIDE ASSESSMENT ADULT - SWALLOW EVAL: FEEDING ASSISTANCE
1:1 feed/frequent cues/help required
1:1 feed/frequent cues/help required
minimal assistance required
minimal assistance required

## 2025-06-05 NOTE — SWALLOW BEDSIDE ASSESSMENT ADULT - SWALLOW EVAL: RECOMMENDED FEEDING/EATING TECHNIQUES
maintain upright posture during/after eating for 30 mins/position upright (90 degrees)
maintain upright posture during/after eating for 30 mins/oral hygiene/small sips/bites
maintain upright posture during/after eating for 30 mins/oral hygiene/small sips/bites
maintain upright posture during/after eating for 30 mins/position upright (90 degrees)

## 2025-06-05 NOTE — SWALLOW BEDSIDE ASSESSMENT ADULT - SLP PERTINENT HISTORY OF CURRENT PROBLEM
89-year-old female with a PMHx of COPD, Bronchiectasis(on baseline home O2 at 2 L), CAD, PE on Eliquis, hypertension and hyperlipidemia presents to the emergency department from nursing home complaining of increasing shortness of breath and increasing oxygen requirements. She was recently admitted on 5/26 for Bronchiectasis exacerbation and was discharged on 5/30 on Prednisone course. In Ed, pt. was Afebrile, not tachycardic or tachypneic no increased work of breathing; Initially requiring 6L then satting 92-97% on baseline 2L. CT chest: Compared to 2023; Interval increase in bronchiectasis with mucoid impaction now throughout all lobes. Additional consolidative opacities are noted in the lower lobes likely reflecting pneumonia. Admitted for Acute hypoxic respiratory failure- improving, Community acquired PNA.

## 2025-06-05 NOTE — DISCHARGE NOTE PROVIDER - CARE PROVIDER_API CALL
Susan Azulh  Internal Medicine  [address], [address]  [place] [address]  Phone: [phone]  Fax: [phone]  Follow Up Time: 2 weeks

## 2025-06-05 NOTE — SWALLOW BEDSIDE ASSESSMENT ADULT - SWALLOW EVAL: RECOMMENDED DIET
Minced and most w/ mildly thick liquids ( pt. reported she prefers softer foods)
Minced and most w/ mildly thick liquids
minced + moist consistency and mildly thick liquids
minced + moist consistency and mildly thick liquids

## 2025-06-05 NOTE — DISCHARGE NOTE NURSING/CASE MANAGEMENT/SOCIAL WORK - PATIENT PORTAL LINK FT
You can access the FollowMyHealth Patient Portal offered by [facility] by registering at the following website: [email or web address]. By joining [facility]’s FollowMyHealth portal, you will also be able to view your health information using other applications (apps) compatible with our system.

## 2025-06-05 NOTE — SWALLOW BEDSIDE ASSESSMENT ADULT - DIET PRIOR TO ADMI
Soft & bite sized with thin liquids

## 2025-06-05 NOTE — SWALLOW BEDSIDE ASSESSMENT ADULT - NS ASR SWALLOW FINDINGS DISCUS
Pt.'s daughter, son in law, RN and MD made aware/Physician/Nursing/Patient/Family
RN: Kalli/Physician/Nursing/Patient
Messaged MD Salazar via Teams, SRIKANTH Blakely/Physician/Nursing/Patient
RN and MD made aware/Physician/Nursing/Patient/Family

## 2025-06-05 NOTE — DISCHARGE NOTE PROVIDER - HOSPITAL COURSE
89-year-old female with a PMHx of COPD, Bronchiectasis(on baseline home O2 at 2 L), CAD, PE on Eliquis, hypertension and hyperlipidemia presents to the emergency department from nursing home complaining of increasing shortness of breath and increasing oxygen requirements. Patient states that it has become more difficult for her to breathe and she is producing increased amounts of sputum. Patient reports pain in her chest when she coughs. Denies chest pain at rest.  Patient states that she had a fever this morning. Also, reports constipation. Denies abdominal pain, dysuria, decreased appetite, nausea, vomiting. She was recently admitted on 5/26 for Bronchiectasis exacerbation and was discharged on 5/30 on Prednisone course.     In Ed, pt. was Afebrile, not tachycardic or tachypneic no increased work of breathing; Initially requiring 6L then satting 92-97% on baseline 2L  Labs showed WBC 15.3, Lactate 2.5>>1.9, Trop 28>>21  VBG: Ph 7.44, pCO2 52, PO2 39, HCO3 35  CXR Bibasilar opacities  CT chest: Compared to 2023; Interval increase in bronchiectasis with mucoid impaction now throughout all lobes. Additional consolidative opacities are noted in the lower   lobes likely reflecting pneumonia.  KUB: non-obstructive bowel gas pattern    s/p 1L bolus, Vanc, cefepime and Levaquin in ED.      Vital Signs Last 24 Hrs  T(F): 97.9 (01 Jun 2025 15:55), Max: 97.9 (01 Jun 2025 09:41)  HR: 65 (01 Jun 2025 15:55) (64 - 72)  BP: 158/63 (01 Jun 2025 15:55) (121/43 - 158/63)  RR: 18 (01 Jun 2025 15:55) (18 - 20)  SpO2: 97% (01 Jun 2025 15:55) (92% - 97%) 2L    A/P:   #Acute resp failure 2/2 suspected gram neg pneumonia   #Chronic bronchiectasis   #?Possible aspiration  #Recent  Hospitalization for Rhinovirus infection ( was discharge May/30/25)  #Severe sepsis(+fever at home, +WBC, +Lactate)  - Hemodynamically stable now, on baseline O2  - CTchest noted  - s/p Vanc, cefepime and Levaquin  - c/w Zosyn for now  lactate and trop trended down , EKG no significant change from prior   - recent MRSA, Strep & Legionella Ag negative  - RVP negative  -  Procal 0.05, MRSA neg, sputum Cx pending, Bld cx NGTD, urine strep and legionella neg  - fu NTM/ MAC Send sputum AFB and cultures  - Chest PT for airway clearance  - c/w duonebs q4 or prn, saline nebs, Symbicort  - ID and pulmonary consult  - c/w aspiration precautions; Diet per S&S; failed yesterday; fu today, minced and moist; mod thick liquids    #HTN  #HLD  #CAD  - c/w amlodipine, Hydralazine  - c/w Plavix, Metoprolol, Imdur    #Hx of PE  - c/w Eliquis    #Glaucoma  - c/w latanoprost    In summary: 89-year-old female with a PMHx of COPD, Bronchiectasis(on baseline home O2 at 2 L), CAD, PE on Eliquis, hypertension and hyperlipidemia presents to the emergency department from nursing home complaining of increasing shortness of breath and increasing oxygen requirements. Infectious workup was negative, s/p zosyn. Weaned to bl o2 requirements. Discussion of discharge plan of care, including discharge diagnoses, medication reconciliation, and follow-ups was conducted with Dr. Anderson on 6/5 at 11Am , and discharge was approved.

## 2025-06-05 NOTE — DISCHARGE NOTE PROVIDER - DISCHARGE DATE
[Diarrhea: Grade 0] : Diarrhea: Grade 0 [Negative] : Allergic/Immunologic [Joint Pain] : joint pain 05-Jun-2025

## 2025-06-05 NOTE — DISCHARGE NOTE NURSING/CASE MANAGEMENT/SOCIAL WORK - NSDCVIVACCINE_GEN_ALL_CORE_FT
Tdap; 09-Nov-2023 15:58; Rhys Valdez (RN); [employer]; [id number] (Exp. Date: 25-Nov-2023); IntraMuscular; Deltoid Right.; 0.5 milliLiter(s); VIS (VIS Published: 09-May-2013, VIS Presented: 09-Nov-2023);

## 2025-06-06 DIAGNOSIS — J44.0 CHRONIC OBSTRUCTIVE PULMONARY DISEASE WITH (ACUTE) LOWER RESPIRATORY INFECTION: ICD-10-CM

## 2025-06-06 DIAGNOSIS — Z79.890 HORMONE REPLACEMENT THERAPY: ICD-10-CM

## 2025-06-06 DIAGNOSIS — E03.9 HYPOTHYROIDISM, UNSPECIFIED: ICD-10-CM

## 2025-06-06 DIAGNOSIS — Z79.51 LONG TERM (CURRENT) USE OF INHALED STEROIDS: ICD-10-CM

## 2025-06-06 DIAGNOSIS — J12.89 OTHER VIRAL PNEUMONIA: ICD-10-CM

## 2025-06-06 DIAGNOSIS — Z86.711 PERSONAL HISTORY OF PULMONARY EMBOLISM: ICD-10-CM

## 2025-06-06 DIAGNOSIS — J47.9 BRONCHIECTASIS, UNCOMPLICATED: ICD-10-CM

## 2025-06-06 DIAGNOSIS — Z79.82 LONG TERM (CURRENT) USE OF ASPIRIN: ICD-10-CM

## 2025-06-06 DIAGNOSIS — B97.10 UNSPECIFIED ENTEROVIRUS AS THE CAUSE OF DISEASES CLASSIFIED ELSEWHERE: ICD-10-CM

## 2025-06-06 DIAGNOSIS — Z66 DO NOT RESUSCITATE: ICD-10-CM

## 2025-06-06 DIAGNOSIS — Z79.02 LONG TERM (CURRENT) USE OF ANTITHROMBOTICS/ANTIPLATELETS: ICD-10-CM

## 2025-06-06 DIAGNOSIS — K59.00 CONSTIPATION, UNSPECIFIED: ICD-10-CM

## 2025-06-06 DIAGNOSIS — Z99.81 DEPENDENCE ON SUPPLEMENTAL OXYGEN: ICD-10-CM

## 2025-06-06 DIAGNOSIS — I25.10 ATHEROSCLEROTIC HEART DISEASE OF NATIVE CORONARY ARTERY WITHOUT ANGINA PECTORIS: ICD-10-CM

## 2025-06-06 DIAGNOSIS — I45.2 BIFASCICULAR BLOCK: ICD-10-CM

## 2025-06-06 DIAGNOSIS — B97.89 OTHER VIRAL AGENTS AS THE CAUSE OF DISEASES CLASSIFIED ELSEWHERE: ICD-10-CM

## 2025-06-06 DIAGNOSIS — Z91.011 ALLERGY TO MILK PRODUCTS: ICD-10-CM

## 2025-06-06 DIAGNOSIS — J96.21 ACUTE AND CHRONIC RESPIRATORY FAILURE WITH HYPOXIA: ICD-10-CM

## 2025-06-06 DIAGNOSIS — Z90.710 ACQUIRED ABSENCE OF BOTH CERVIX AND UTERUS: ICD-10-CM

## 2025-06-06 DIAGNOSIS — Z79.01 LONG TERM (CURRENT) USE OF ANTICOAGULANTS: ICD-10-CM

## 2025-06-06 DIAGNOSIS — J44.1 CHRONIC OBSTRUCTIVE PULMONARY DISEASE WITH (ACUTE) EXACERBATION: ICD-10-CM

## 2025-06-06 DIAGNOSIS — Z95.5 PRESENCE OF CORONARY ANGIOPLASTY IMPLANT AND GRAFT: ICD-10-CM

## 2025-06-06 DIAGNOSIS — E78.5 HYPERLIPIDEMIA, UNSPECIFIED: ICD-10-CM

## 2025-06-06 DIAGNOSIS — Z99.3 DEPENDENCE ON WHEELCHAIR: ICD-10-CM

## 2025-06-16 NOTE — CDI QUERY NOTE - NSCDIOTHERTXTBX_GEN_ALL_CORE_HH
There is conflicting documentation in the medical record arising from the documentation of different providers.     In order to ensure accurate coding and accuracy of the clinical record, the documentation in this patient’s medical record requires additional clarification from the Attending of Record.      For reference, please see below the conflicting documentation noted in the medical record.    Based upon the patient’s presentation, evaluation, and medical management, please identify the appropriate diagnosis for this patient:  • Sepsis was evaluated on admission and later ruled out after study,  acute respiratory failure was associated with COPD exacerbation and suspected pneumonia that resolved prior to discharge	  • Sepsis was evaluated on admission and later ruled out after study,  acute respiratory failure was associated with COPD exacerbation without pneumonia	  • Other (specify)      Conflicting documentation and/or clinical evidence is noted:     The diagnosis of  PNA was documented on 6/2 and 6/4  by Infectious Disease Attending Dr. Jamison    The diagnosis of no infection was documented in the Discharge Note by Dr. Anderson    The following information is also documented in the medical record:    ED Provider Note: past medical history of COPD on home oxygen, CAD, asthma who was discharged...  on May 30 for viral pneumonia... patient is here for worsening shortness of breath and increasing home oxygen demands.  Normally she is on 2 L oxygen.  Now requiring 4 L or more.  SEPSIS – was this patient treated for sepsis? Yes.   Presentation suggestive of: Bacterial Etiology Suspicion of sepsis at: 01-Jun-2025 12:42.    6/2 Consult Note Adult-Infectious Disease Attending: PNA : lower lobes, L>R  6/1 CXR Bibasilar opacities. ( Independent interpretation of test : bacterial PNA  6/1 CT chest: Compared to 2023. ( Independent interpretation of test : bacterial PNA  Interval increase in bronchiectasis with mucoid impaction now throughout all lobes. Additional consolidative opacities are noted in the lower lobes likely reflecting pneumonia.   -continue with Zosyn 3.375 gm iv q8h over 4h  -start Doxycycline 100 mg iv q12h    Discharge Note Provider: Afebrile, not tachycardic or tachypneic no increased work of breathing; Initially requiring 6L then satting 92-97% on baseline 2L  Acute resp failure 2/2 suspected gram neg pneumonia   Chronic bronchiectasis   Severe sepsis(+fever at home, +WBC, +Lactate)  - s/p Vanc, cefepime and Levaquin  - c/w Zosyn for now  -  Procal 0.05, MRSA neg, sputum Cx pending, Bld cx NGTD, urine strep and legionella neg  - fu NTM/ MAC Send sputum AFB and cultures  - Chest PT for airway clearance  - c/w duonebs q4 or prn, saline nebs, Symbicort  - ID and pulmonary consult  - c/w aspiration precautions; Diet per S&S; failed yesterday; fu today    Diagnosis: Acute respiratory failure with hypercapnia  Assessment and Plan of Treatment: You were admitted for respiratory failure in the setting of COPD.  You were found to have no infection and with nebulizer, you improved.     Antimicrobials/Anti-infectives:  6/1- 6/5 IV Zosyn; every 8 hours x 11 doses (Indication: Pneumonia)  6/2- 6/5 IV Doxycycline; every 12 hours x 6 doses    6/1 WBC- 15.30     Thank you,  Vicki Jimenes RN, BSN, CCDS  [phone]/ TEAMS (preferred)

## 2025-09-17 ENCOUNTER — TRANSCRIPTION ENCOUNTER (OUTPATIENT)
Age: 89
End: 2025-09-17